# Patient Record
Sex: FEMALE | Race: WHITE | NOT HISPANIC OR LATINO | Employment: OTHER | URBAN - METROPOLITAN AREA
[De-identification: names, ages, dates, MRNs, and addresses within clinical notes are randomized per-mention and may not be internally consistent; named-entity substitution may affect disease eponyms.]

---

## 2017-01-09 ENCOUNTER — TRANSFERRED RECORDS (OUTPATIENT)
Dept: HEALTH INFORMATION MANAGEMENT | Facility: CLINIC | Age: 82
End: 2017-01-09

## 2017-01-11 DIAGNOSIS — E11.22 TYPE 2 DIABETES MELLITUS WITH STAGE 3 CHRONIC KIDNEY DISEASE (H): ICD-10-CM

## 2017-01-11 DIAGNOSIS — I25.10 CORONARY ARTERY DISEASE INVOLVING NATIVE CORONARY ARTERY OF NATIVE HEART WITHOUT ANGINA PECTORIS: ICD-10-CM

## 2017-01-11 DIAGNOSIS — N18.30 TYPE 2 DIABETES MELLITUS WITH STAGE 3 CHRONIC KIDNEY DISEASE (H): ICD-10-CM

## 2017-01-11 DIAGNOSIS — E78.5 HYPERLIPIDEMIA LDL GOAL <100: ICD-10-CM

## 2017-01-11 LAB
ALBUMIN SERPL-MCNC: 3.3 G/DL (ref 3.4–5)
ALP SERPL-CCNC: 91 U/L (ref 40–150)
ALT SERPL W P-5'-P-CCNC: 32 U/L (ref 0–50)
ANION GAP SERPL CALCULATED.3IONS-SCNC: 10 MMOL/L (ref 3–14)
AST SERPL W P-5'-P-CCNC: 28 U/L (ref 0–45)
BILIRUB SERPL-MCNC: 0.4 MG/DL (ref 0.2–1.3)
BUN SERPL-MCNC: 32 MG/DL (ref 7–30)
CALCIUM SERPL-MCNC: 8.5 MG/DL (ref 8.5–10.1)
CHLORIDE SERPL-SCNC: 107 MMOL/L (ref 94–109)
CHOLEST SERPL-MCNC: 184 MG/DL
CHOLEST SERPL-MCNC: 186 MG/DL
CO2 SERPL-SCNC: 21 MMOL/L (ref 20–32)
CREAT SERPL-MCNC: 0.95 MG/DL (ref 0.52–1.04)
GFR SERPL CREATININE-BSD FRML MDRD: 56 ML/MIN/1.7M2
GLUCOSE SERPL-MCNC: 137 MG/DL (ref 70–99)
HBA1C MFR BLD: 6.2 % (ref 4.3–6)
HDLC SERPL-MCNC: 74 MG/DL
HDLC SERPL-MCNC: 76 MG/DL
LDLC SERPL CALC-MCNC: 82 MG/DL
LDLC SERPL CALC-MCNC: 82 MG/DL
NONHDLC SERPL-MCNC: 110 MG/DL
NONHDLC SERPL-MCNC: 110 MG/DL
POTASSIUM SERPL-SCNC: 4 MMOL/L (ref 3.4–5.3)
PROT SERPL-MCNC: 7.3 G/DL (ref 6.8–8.8)
SODIUM SERPL-SCNC: 138 MMOL/L (ref 133–144)
TRIGL SERPL-MCNC: 141 MG/DL
TRIGL SERPL-MCNC: 142 MG/DL

## 2017-01-11 PROCEDURE — 83036 HEMOGLOBIN GLYCOSYLATED A1C: CPT | Performed by: INTERNAL MEDICINE

## 2017-01-11 PROCEDURE — 80061 LIPID PANEL: CPT | Performed by: INTERNAL MEDICINE

## 2017-01-11 PROCEDURE — 36415 COLL VENOUS BLD VENIPUNCTURE: CPT | Performed by: NURSE PRACTITIONER

## 2017-01-11 PROCEDURE — 80053 COMPREHEN METABOLIC PANEL: CPT | Performed by: INTERNAL MEDICINE

## 2017-01-11 PROCEDURE — 80061 LIPID PANEL: CPT | Performed by: NURSE PRACTITIONER

## 2017-01-11 NOTE — Clinical Note
"[facility]  [address] [place] [address]  [phone]    1/14/2017    Gosia BRENDA Cheri  [address] [facility] [address]           Dear MsAlessio Cheri,    The results of your lab tests are enclosed. Everything looks fine. Unless noted otherwise below, any results that are outside the \"normal\" range are within acceptable limits and are of no concern.    Hemoglobin A1C measures control of diabetes. Your Hemoglobin A1C is shown. The ideal target is under 7.0.    LDL= Bad Cholesterol-- the target is below 100.     HDL= Good Cholesterol-- although this is determined mostly by heredity, exercise and/or medications may sometimes raise this number.    Triglycerides are another type of fat in the blood, and can sometimes be lowered by reducing intake of sweets or excess carbohydrates, alcohol, and by weight reduction if needed.  Sometimes medications are also used.    AST and ALT are liver tests, as are the bilirubin (total and direct), albumin, total protein, and alkaline phosphatase. Yours are all normal.     Urea Nitrogen and Creatinine are kidney tests--yours are normal. GFR stands for Glomerular Filtration Rate, a more complicated estimate of kidney function.    Sodium, Potassium, Chloride, Carbon Dioxide, and Calcium are all normal salts in the bloodstream. Yours all look normal. Your glucose (blood sugar) also looks fine. (You can ignore the anion gap result).     If you have any further questions or problems, please contact our office.    Sincerely,      KRIS LEE M.D.  Attachment: Lab results     "

## 2017-01-12 ENCOUNTER — PRE VISIT (OUTPATIENT)
Dept: CARDIOLOGY | Facility: CLINIC | Age: 82
End: 2017-01-12

## 2017-01-13 ENCOUNTER — OFFICE VISIT (OUTPATIENT)
Dept: CARDIOLOGY | Facility: CLINIC | Age: 82
End: 2017-01-13
Payer: MEDICARE

## 2017-01-13 VITALS
DIASTOLIC BLOOD PRESSURE: 72 MMHG | WEIGHT: 136.8 LBS | HEART RATE: 72 BPM | HEIGHT: 65 IN | SYSTOLIC BLOOD PRESSURE: 126 MMHG | BODY MASS INDEX: 22.79 KG/M2

## 2017-01-13 DIAGNOSIS — I25.10 CORONARY ARTERY DISEASE INVOLVING NATIVE CORONARY ARTERY OF NATIVE HEART WITHOUT ANGINA PECTORIS: ICD-10-CM

## 2017-01-13 DIAGNOSIS — F41.9 ANXIETY: Primary | ICD-10-CM

## 2017-01-13 PROCEDURE — 99213 OFFICE O/P EST LOW 20 MIN: CPT | Performed by: INTERNAL MEDICINE

## 2017-01-13 RX ORDER — GUAIFENESIN 600 MG/1
600 TABLET, EXTENDED RELEASE ORAL PRN
COMMUNITY
End: 2022-01-01

## 2017-01-13 RX ORDER — CLORAZEPATE DIPOTASSIUM 7.5 MG/1
7.5 TABLET ORAL 2 TIMES DAILY PRN
Qty: 60 TABLET | Refills: 0 | Status: SHIPPED | OUTPATIENT
Start: 2017-01-13 | End: 2017-02-24

## 2017-01-13 NOTE — PATIENT INSTRUCTIONS
You may try moving your ATORVASTATIN to the morning and see if that makes any difference in your night time sweating.  You may stop the CLOPIDOGREL at this point and your bruising should fade over the next few months.  If you get any additional chest discomfort, call us.  See Rock in three months.

## 2017-01-13 NOTE — Clinical Note
1/13/2017    Michael Londono MD  [facility]   [address]  [facility] [address]    RE: Gosia Alonzo       Dear Colleague,    I had the pleasure of seeing Gosia BRENDA Alonzo in the [facility].    Cardiology Progress Note          Assessment and Plan:     1. Coronary artery disease status post PCI of the LAD and early stent thrombosis    Resultant ischemic cardiomyopathy, medically managed.  Minimally symptomatic.    She may discontinue the clopidogrel as she is having extensive bruising and bleeding.  We discussed there is a risk of late stent thrombosis but her current oozing and bleeding are distressing enough that it warrants a trial of discontinuation.    Continue following with myself and Rock Ortiz.    Follow-up with Rock in three months.      This note was transcribed using electronic voice recognition software and there may be typographical errors present.                Interval History:   The patient is a pleasant 84 year old whom I have seen previously.  She had LAD PCI and early stent thrombosis and anterior infarct in the setting of GI bleed on Brillinta.  She has done well on dual anti-platelet therapy with Plavix and aspirin.  She has extensive bruising and easy bleeding.  She is now about one year from her stent thrombosis.      She feels her energy is good.  She has no limitation.  She does not have dyspnea on exertion or chest discomfort.                       Review of Systems:   Review of Systems:  Skin:  Positive for bruising   Eyes:  Positive for glasses  ENT:  Negative    Respiratory:  Negative    Cardiovascular:  Negative    Gastroenterology: Positive for heartburn;reflux;constipation  Genitourinary:  Positive for nocturia;urinary frequency  Musculoskeletal:  Positive for joint pain;arthritis;back pain  Neurologic:  Positive for headaches  Psychiatric:  Negative    Heme/Lymph/Imm:  Positive for easy bruising;allergies  Endocrine:  " Positive for hot flashes;night sweats              Physical Exam:     Vitals: /72 mmHg  Pulse 72  Ht 1.651 m (5' 5\")  Wt 62.052 kg (136 lb 12.8 oz)  BMI 22.76 kg/m2  Constitutional:  cooperative, alert and oriented, well developed, well nourished, in no acute distress        Skin:  warm and dry to the touch, no apparent skin lesions or masses noted        Head:  normocephalic, no masses or lesions        Eyes:  pupils equal and round, conjunctivae and lids unremarkable, sclera white, no xanthalasma, EOMS intact, no nystagmus        ENT:  no pallor or cyanosis, dentition good        Neck:  JVP normal        Chest:  normal breath sounds, clear to auscultation, normal A-P diameter, normal symmetry, normal respiratory excursion, no use of accessory muscles        Cardiac: regular rhythm                  Extremities and Back:  no deformities, clubbing, cyanosis, erythema observed        Neurological:  affect appropriate, oriented to time, person and place                 Medications:     Current Outpatient Prescriptions   Medication Sig Dispense Refill     guaiFENesin (MUCINEX) 600 MG 12 hr tablet Take 1,200 mg by mouth as needed for congestion       Docusate Sodium (EQUATE STOOL SOFTENER OR) Take by mouth daily       clorazepate (TRANXENE) 7.5 MG tablet Take 1 tablet (7.5 mg) by mouth 2 times daily as needed for anxiety 60 tablet 0     losartan (COZAAR) 25 MG tablet Take 1 tablet (25 mg) by mouth daily 90 tablet 3     atorvastatin (LIPITOR) 40 MG tablet Take 1 tablet (40 mg) by mouth daily 90 tablet 0     nitroglycerin (NITROSTAT) 0.4 MG SL tablet if you are still having symptoms after 3 doses (15 minutes) call 911. 25 tablet 2     carvedilol (COREG) 3.125 MG tablet Take 1 tablet (3.125 mg) by mouth 2 times daily (with meals) 180 tablet 6     pantoprazole (PROTONIX) 40 MG enteric coated tablet Take 1 tablet (40 mg) by mouth daily Take 30-60 minutes before a meal. 90 tablet 9     acetaminophen (TYLENOL) 500 MG " tablet Take 1,000 mg by mouth 2 times daily as needed for mild pain       furosemide (LASIX) 20 MG tablet as needed  90 tablet 3     aspirin EC 81 MG EC tablet Take 1 tablet (81 mg) by mouth daily       HYDROcodone-acetaminophen (NORCO)  MG per tablet Take 1-2 tablets by mouth every 4 hours as needed for moderate to severe pain       Multiple Vitamins-Minerals (CENTRUM SILVER ADULT 50+ PO) Take 1 tablet by mouth daily       [DISCONTINUED] clorazepate (TRANXENE) 7.5 MG tablet Take 1 tablet (7.5 mg) by mouth 2 times daily as needed for anxiety 60 tablet 0                Data:   All laboratory data reviewed  No results found for this or any previous visit (from the past 24 hour(s)).    All laboratory data reviewed  CHOL      184   1/11/2017  HDL       74   1/11/2017  LDL       82   1/11/2017  TRIG      142   1/11/2017  CHOLHDLRATIO      2.8   11/5/2015  TSH   Date Value Ref Range Status   01/04/2016 2.08 0.40 - 4.00 mU/L Final     Last Basic Metabolic Panel:  NA      138   1/11/2017   POTASSIUM      4.0   1/11/2017  CHLORIDE      107   1/11/2017  LIGIA      8.5   1/11/2017  CO2       21   1/11/2017  BUN       32   1/11/2017  CR     0.95   1/11/2017  CR       78   1/22/2009  GLC      137   1/11/2017  WBC      9.3   1/10/2016  RBC     3.36   1/10/2016  HGB     11.4   9/12/2016  HCT     29.3   1/10/2016  MCV       87   1/10/2016  MCH     29.8   1/10/2016  MCHC     34.1   1/10/2016  RDW     15.8   1/10/2016  PLT      292   1/10/2016    Thank you for allowing me to participate in the care of your patient.    Sincerely,     Hiren Collins MD     [facility]

## 2017-01-13 NOTE — PROGRESS NOTES
"Cardiology Progress Note          Assessment and Plan:     1. Coronary artery disease status post PCI of the LAD and early stent thrombosis    Resultant ischemic cardiomyopathy, medically managed.  Minimally symptomatic.    She may discontinue the clopidogrel as she is having extensive bruising and bleeding.  We discussed there is a risk of late stent thrombosis but her current oozing and bleeding are distressing enough that it warrants a trial of discontinuation.    Continue following with myself and Rock Ortiz.    Follow-up with Rock in three months.      This note was transcribed using electronic voice recognition software and there may be typographical errors present.                Interval History:   The patient is a pleasant 84 year old whom I have seen previously.  She had LAD PCI and early stent thrombosis and anterior infarct in the setting of GI bleed on Brillinta.  She has done well on dual anti-platelet therapy with Plavix and aspirin.  She has extensive bruising and easy bleeding.  She is now about one year from her stent thrombosis.      She feels her energy is good.  She has no limitation.  She does not have dyspnea on exertion or chest discomfort.                       Review of Systems:   Review of Systems:  Skin:  Positive for bruising   Eyes:  Positive for glasses  ENT:  Negative    Respiratory:  Negative    Cardiovascular:  Negative    Gastroenterology: Positive for heartburn;reflux;constipation  Genitourinary:  Positive for nocturia;urinary frequency  Musculoskeletal:  Positive for joint pain;arthritis;back pain  Neurologic:  Positive for headaches  Psychiatric:  Negative    Heme/Lymph/Imm:  Positive for easy bruising;allergies  Endocrine:  Positive for hot flashes;night sweats              Physical Exam:     Vitals: /72 mmHg  Pulse 72  Ht 1.651 m (5' 5\")  Wt 62.052 kg (136 lb 12.8 oz)  BMI 22.76 kg/m2  Constitutional:  cooperative, alert and oriented, well developed, well " nourished, in no acute distress        Skin:  warm and dry to the touch, no apparent skin lesions or masses noted        Head:  normocephalic, no masses or lesions        Eyes:  pupils equal and round, conjunctivae and lids unremarkable, sclera white, no xanthalasma, EOMS intact, no nystagmus        ENT:  no pallor or cyanosis, dentition good        Neck:  JVP normal        Chest:  normal breath sounds, clear to auscultation, normal A-P diameter, normal symmetry, normal respiratory excursion, no use of accessory muscles        Cardiac: regular rhythm                  Extremities and Back:  no deformities, clubbing, cyanosis, erythema observed        Neurological:  affect appropriate, oriented to time, person and place                 Medications:     Current Outpatient Prescriptions   Medication Sig Dispense Refill     guaiFENesin (MUCINEX) 600 MG 12 hr tablet Take 1,200 mg by mouth as needed for congestion       Docusate Sodium (EQUATE STOOL SOFTENER OR) Take by mouth daily       clorazepate (TRANXENE) 7.5 MG tablet Take 1 tablet (7.5 mg) by mouth 2 times daily as needed for anxiety 60 tablet 0     losartan (COZAAR) 25 MG tablet Take 1 tablet (25 mg) by mouth daily 90 tablet 3     atorvastatin (LIPITOR) 40 MG tablet Take 1 tablet (40 mg) by mouth daily 90 tablet 0     nitroglycerin (NITROSTAT) 0.4 MG SL tablet if you are still having symptoms after 3 doses (15 minutes) call 911. 25 tablet 2     carvedilol (COREG) 3.125 MG tablet Take 1 tablet (3.125 mg) by mouth 2 times daily (with meals) 180 tablet 6     pantoprazole (PROTONIX) 40 MG enteric coated tablet Take 1 tablet (40 mg) by mouth daily Take 30-60 minutes before a meal. 90 tablet 9     acetaminophen (TYLENOL) 500 MG tablet Take 1,000 mg by mouth 2 times daily as needed for mild pain       furosemide (LASIX) 20 MG tablet as needed  90 tablet 3     aspirin EC 81 MG EC tablet Take 1 tablet (81 mg) by mouth daily       HYDROcodone-acetaminophen (NORCO)  MG  per tablet Take 1-2 tablets by mouth every 4 hours as needed for moderate to severe pain       Multiple Vitamins-Minerals (CENTRUM SILVER ADULT 50+ PO) Take 1 tablet by mouth daily       [DISCONTINUED] clorazepate (TRANXENE) 7.5 MG tablet Take 1 tablet (7.5 mg) by mouth 2 times daily as needed for anxiety 60 tablet 0                Data:   All laboratory data reviewed  No results found for this or any previous visit (from the past 24 hour(s)).    All laboratory data reviewed  CHOL      184   1/11/2017  HDL       74   1/11/2017  LDL       82   1/11/2017  TRIG      142   1/11/2017  CHOLHDLRATIO      2.8   11/5/2015  TSH   Date Value Ref Range Status   01/04/2016 2.08 0.40 - 4.00 mU/L Final     Last Basic Metabolic Panel:  NA      138   1/11/2017   POTASSIUM      4.0   1/11/2017  CHLORIDE      107   1/11/2017  LIGIA      8.5   1/11/2017  CO2       21   1/11/2017  BUN       32   1/11/2017  CR     0.95   1/11/2017  CR       78   1/22/2009  GLC      137   1/11/2017  WBC      9.3   1/10/2016  RBC     3.36   1/10/2016  HGB     11.4   9/12/2016  HCT     29.3   1/10/2016  MCV       87   1/10/2016  MCH     29.8   1/10/2016  MCHC     34.1   1/10/2016  RDW     15.8   1/10/2016  PLT      292   1/10/2016

## 2017-01-13 NOTE — MR AVS SNAPSHOT
After Visit Summary   1/13/2017    Gosia Alonzo    MRN: [id number]           Patient Information     Date Of Birth          11/14/1932        Visit Information        Provider Department      1/13/2017 2:15 PM Hiren Collins MD [facility]        Today's Diagnoses     Anxiety    -  1     Coronary artery disease involving native coronary artery of native heart without angina pectoris           Care Instructions    You may try moving your ATORVASTATIN to the morning and see if that makes any difference in your night time sweating.  You may stop the CLOPIDOGREL at this point and your bruising should fade over the next few months.  If you get any additional chest discomfort, call us.  See Rock in three months.        Follow-ups after your visit        Additional Services     Follow-Up with Cardiac Advanced Practice Provider                 Your next 10 appointments already scheduled     Apr 13, 2017  1:50 PM   Core Return with WILLIE Gilbert CNP   [facility] ([facility])    [address]  [facility] [address] [phone]              Future tests that were ordered for you today     Open Future Orders        Priority Expected Expires Ordered    Follow-Up with Cardiac Advanced Practice Provider Routine 4/13/2017 1/13/2018 1/13/2017            Who to contact     If you have questions or need follow up information about today's clinic visit or your schedule please contact [facility] directly at [phone].  Normal or non-critical lab and imaging results will be communicated to you by MyChart, letter or phone within 4 business days after the clinic has received the results. If you do not hear from us within 7 days, please contact the clinic through MyChart or phone. If you have a critical or abnormal lab result, we will notify you by  "phone as soon as possible.  Submit refill requests through [employer] or call your pharmacy and they will forward the refill request to us. Please allow 3 business days for your refill to be completed.          Additional Information About Your Visit        Care EveryWhere ID     This is your Care EveryWhere ID. This could be used by other organizations to access your [facility] medical records  [id number]        Your Vitals Were     Pulse Height BMI (Body Mass Index)             72 1.651 m (5' 5\") 22.76 kg/m2          Blood Pressure from Last 3 Encounters:   01/13/17 126/72   12/07/16 120/76   10/31/16 133/64    Weight from Last 3 Encounters:   01/13/17 62.052 kg (136 lb 12.8 oz)   12/07/16 61.236 kg (135 lb)   10/31/16 60.691 kg (133 lb 12.8 oz)              We Performed the Following     Follow-Up with Cardiologist          Today's Medication Changes          These changes are accurate as of: 1/13/17  2:52 PM.  If you have any questions, ask your nurse or doctor.               Stop taking these medicines if you haven't already. Please contact your care team if you have questions.     clopidogrel 75 MG tablet   Commonly known as:  PLAVIX           RANITIDINE HCL PO                Where to get your medicines      Some of these will need a paper prescription and others can be bought over the counter.  Ask your nurse if you have questions.     Bring a paper prescription for each of these medications    - clorazepate 7.5 MG tablet             Primary Care Provider Office Phone # Fax #    Michael Londono [phone]       [facility] [address] [place] [address]        Thank you!     Thank you for choosing [facility] [place]  for your care. Our goal is always to provide you with excellent care. Hearing back from our patients is one way we can continue to improve our services. Please take a few minutes to complete the written survey that you may " receive in the mail after your visit with us. Thank you!             Your Updated Medication List - Protect others around you: Learn how to safely use, store and throw away your medicines at www.disposemymeds.org.          This list is accurate as of: 1/13/17  2:52 PM.  Always use your most recent med list.                   Brand Name Dispense Instructions for use    acetaminophen 500 MG tablet    TYLENOL     Take 1,000 mg by mouth 2 times daily as needed for mild pain       aspirin 81 MG EC tablet      Take 1 tablet (81 mg) by mouth daily       atorvastatin 40 MG tablet    LIPITOR    90 tablet    Take 1 tablet (40 mg) by mouth daily       carvedilol 3.125 MG tablet    COREG    180 tablet    Take 1 tablet (3.125 mg) by mouth 2 times daily (with meals)       CENTRUM SILVER ADULT 50+ PO      Take 1 tablet by mouth daily       clorazepate 7.5 MG tablet    TRANXENE    60 tablet    Take 1 tablet (7.5 mg) by mouth 2 times daily as needed for anxiety       EQUATE STOOL SOFTENER OR      Take by mouth daily       furosemide 20 MG tablet    LASIX    90 tablet    as needed       losartan 25 MG tablet    COZAAR    90 tablet    Take 1 tablet (25 mg) by mouth daily       MUCINEX 600 MG 12 hr tablet   Generic drug:  guaiFENesin      Take 1,200 mg by mouth as needed for congestion       nitroglycerin 0.4 MG sublingual tablet    NITROSTAT    25 tablet    if you are still having symptoms after 3 doses (15 minutes) call 911.       NORCO  MG per tablet   Generic drug:  HYDROcodone-acetaminophen      Take 1-2 tablets by mouth every 4 hours as needed for moderate to severe pain       pantoprazole 40 MG EC tablet    PROTONIX    90 tablet    Take 1 tablet (40 mg) by mouth daily Take 30-60 minutes before a meal.

## 2017-01-16 ENCOUNTER — TELEPHONE (OUTPATIENT)
Dept: CARDIOLOGY | Facility: CLINIC | Age: 82
End: 2017-01-16

## 2017-01-16 NOTE — TELEPHONE ENCOUNTER
Pt called stating that she saw Dr. Collins on 1/13/17 and that she got the report of her OV. Pt was wondering if she could get her lab work sent .     Chart reviewed:   Notes Recorded by Aide Rangel MA on 1/16/2017 at 11:50 AM  Letter mailed with copy of results.    CHUYITA RANGEL CMA        ------    Notes Recorded by Michael Londono MD on 1/14/2017 at 6:53 PM  Letter completed, please mail along with lab results.      Informed patient of this. Pt thanked writer for looking into it.

## 2017-02-24 DIAGNOSIS — F41.9 ANXIETY: ICD-10-CM

## 2017-02-24 RX ORDER — CLORAZEPATE DIPOTASSIUM 7.5 MG/1
7.5 TABLET ORAL 2 TIMES DAILY PRN
Qty: 60 TABLET | Refills: 0 | Status: SHIPPED | OUTPATIENT
Start: 2017-02-24 | End: 2017-04-09

## 2017-02-24 NOTE — TELEPHONE ENCOUNTER
Clorazepate      Last Written Prescription Date:  1/13/17 (by Dr. Collins)  Last Fill Quantity: 60,   # refills: 0  Last Office Visit with [facility] prescribing provider: 12/7/16  Future Office visit:       Routing refill request to provider for review/approval because:  Drug not on the [facility] refill protocol or controlled substance

## 2017-03-07 ENCOUNTER — TRANSFERRED RECORDS (OUTPATIENT)
Dept: HEALTH INFORMATION MANAGEMENT | Facility: CLINIC | Age: 82
End: 2017-03-07

## 2017-04-05 DIAGNOSIS — I21.3 ST ELEVATION MYOCARDIAL INFARCTION (STEMI), UNSPECIFIED ARTERY (H): ICD-10-CM

## 2017-04-05 DIAGNOSIS — I21.09 ST ELEVATION MYOCARDIAL INFARCTION (STEMI) INVOLVING OTHER CORONARY ARTERY OF ANTERIOR WALL (H): ICD-10-CM

## 2017-04-05 RX ORDER — ATORVASTATIN CALCIUM 40 MG/1
TABLET, FILM COATED ORAL
Qty: 90 TABLET | Refills: 3 | Status: SHIPPED | OUTPATIENT
Start: 2017-04-05 | End: 2018-04-04

## 2017-04-05 NOTE — TELEPHONE ENCOUNTER
Atorvasatin     Last Written Prescription Date: 10/4/16  Last Fill Quantity: 90, # refills: 0  Last Office Visit with [facility] or [facility] prescribing provider: 12/07/16       Lab Results   Component Value Date    CHOL 184 01/11/2017     Lab Results   Component Value Date    HDL 74 01/11/2017     Lab Results   Component Value Date    LDL 82 01/11/2017     Lab Results   Component Value Date    TRIG 142 01/11/2017     Lab Results   Component Value Date    CHOLHDLRATIO 2.8 11/05/2015       Labs showing if normal/abnormal  Lab Results   Component Value Date    CHOL 184 01/11/2017    TRIG 142 01/11/2017    HDL 74 01/11/2017    LDL 82 01/11/2017    VLDL 30 11/05/2015    CHOLHDLRATIO 2.8 11/05/2015     Prescription approved per [facility] Refill Protocol.

## 2017-04-09 DIAGNOSIS — F41.9 ANXIETY: ICD-10-CM

## 2017-04-11 ENCOUNTER — OFFICE VISIT (OUTPATIENT)
Dept: CARDIOLOGY | Facility: CLINIC | Age: 82
End: 2017-04-11
Attending: INTERNAL MEDICINE
Payer: MEDICARE

## 2017-04-11 VITALS
BODY MASS INDEX: 23.6 KG/M2 | DIASTOLIC BLOOD PRESSURE: 60 MMHG | WEIGHT: 141.8 LBS | HEART RATE: 78 BPM | OXYGEN SATURATION: 97 % | SYSTOLIC BLOOD PRESSURE: 122 MMHG

## 2017-04-11 DIAGNOSIS — I25.10 CORONARY ARTERY DISEASE INVOLVING NATIVE CORONARY ARTERY OF NATIVE HEART WITHOUT ANGINA PECTORIS: ICD-10-CM

## 2017-04-11 PROCEDURE — 99213 OFFICE O/P EST LOW 20 MIN: CPT | Performed by: NURSE PRACTITIONER

## 2017-04-11 RX ORDER — CLORAZEPATE DIPOTASSIUM 7.5 MG/1
TABLET ORAL
Qty: 60 TABLET | Refills: 0 | Status: SHIPPED | OUTPATIENT
Start: 2017-04-11 | End: 2017-05-19

## 2017-04-11 NOTE — PATIENT INSTRUCTIONS
Call the C.O.R.E. nurse for any questions or concerns:  [phone]      1. Medication changes from today: take a lasix in am , low salt diet.       2. Weigh yourself daily and write it down.      3. Call CORE nurse if your weight is up more than 2 pounds in one day or 5 pounds in one week.      4. Call CORE nurse if you feel more short of breath, have more abdominal bloating, or leg swelling.      5. Continue low sodium diet (less than 2000 mg daily). If you eat less salt, you will retain less fluid.      6. Do NOT take Aleve or ibuprofen without talking to your doctor first.      7. Lab Results:     [facility]: Cardiomyopathy, Optimization, Rehabilitation, Education  The [facility] is a heart failure specialty clinic within the [facility] where you will work with specialized nurse practitioners, physician assistants, doctors, and registered nurses. They are dedicated to helping patients with heart failure to carefully adjust medications, receive education, and learn who and when to call if symptoms develop. They specialize in helping you better understand your condition, slow the progression of your disease, improve the length and quality of your life, help you detect future heart problems before they become life threatening, and avoid hospitalizations.

## 2017-04-11 NOTE — LETTER
4/11/2017    Michael Londono MD  [facility]   [address]  [facility] [address]    RE: Gosia Alonzo       Dear Colleague,    I had the pleasure of seeing Gosia Alonzo in the [facility].    Gosia Alonzo is a pleasant 84-year-old patient who has a history of LAD PCI and early stent thrombosis and anterior infarct in the setting of GI bleed on Brilinta.  She did well on dual antiplatelet therapy with Plavix and aspirin.  When she was seen by Dr. Collins in 01/2017, she had extensive bruising and bleeding.  He discussed the risk of late stent thrombosis, but with her bleeding he thought it warranted a trial of discontinuation of her Plavix.  She returns today stating that it is much improved.  She has limited bleeding now.  She denies chest pain, chest pressure, neck or arm pain.      She tells me that she takes Lasix as needed.  She has not taken it for 3 days because she has had 3 days in a row of doctor appointments.  Her weight is up 5 pounds.  She states that she has been eating a general diet.  She does not watch the salt in her diet much anymore.     Outpatient Encounter Prescriptions as of 4/11/2017   Medication Sig Dispense Refill     atorvastatin (LIPITOR) 40 MG tablet TAKE 1 TABLET BY MOUTH ONCE DAILY 90 tablet 3     [DISCONTINUED] clorazepate (TRANXENE) 7.5 MG tablet Take 1 tablet (7.5 mg) by mouth 2 times daily as needed for anxiety 60 tablet 0     guaiFENesin (MUCINEX) 600 MG 12 hr tablet Take 1,200 mg by mouth as needed for congestion Reported on 4/11/2017       Docusate Sodium (EQUATE STOOL SOFTENER OR) Take by mouth daily       losartan (COZAAR) 25 MG tablet Take 1 tablet (25 mg) by mouth daily 90 tablet 3     nitroglycerin (NITROSTAT) 0.4 MG SL tablet if you are still having symptoms after 3 doses (15 minutes) call 911. 25 tablet 2     carvedilol (COREG) 3.125 MG tablet Take 1 tablet (3.125 mg) by mouth 2 times daily (with meals) 180  tablet 6     [DISCONTINUED] pantoprazole (PROTONIX) 40 MG enteric coated tablet Take 1 tablet (40 mg) by mouth daily Take 30-60 minutes before a meal. 90 tablet 9     acetaminophen (TYLENOL) 500 MG tablet Take 1,000 mg by mouth 2 times daily as needed for mild pain       [DISCONTINUED] furosemide (LASIX) 20 MG tablet as needed  90 tablet 3     aspirin EC 81 MG EC tablet Take 1 tablet (81 mg) by mouth daily       HYDROcodone-acetaminophen (NORCO)  MG per tablet Take 1-2 tablets by mouth every 4 hours as needed for moderate to severe pain       [DISCONTINUED] Multiple Vitamins-Minerals (CENTRUM SILVER ADULT 50+ PO) Take 1 tablet by mouth daily Reported on 4/11/2017       No facility-administered encounter medications on file as of 4/11/2017.       IMPRESSION AND PLAN:   1.  Coronary artery disease, status post PCI of the LAD and early stent thrombosis.   2.  Ischemic cardiomyopathy, medically managed.  We have tried to increase the carvedilol in the past, and she can only tolerate this dose.  She recently had her clopidogrel discontinued due to extensive bruising.  She has done well without clopidogrel.  She remains on aspirin.      She has not taken her Lasix and has eaten a general diet.  Her weight is up about 5 pounds, and her assessment shows a slight volume overload.  I encouraged her to take a furosemide in the morning and to watch her diet.  I reviewed her echocardiogram, which shows an ejection fraction of 35%-40%.  I explained to her that she is at risk for fluid retention.  She will follow up in 3 months for an annual echocardiogram, fasting lipid profile and a visit with Dr. Collins.     Again, thank you for allowing me to participate in the care of your patient.      Sincerely,    WILLIE Harding [facility]
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
## 2017-04-11 NOTE — MR AVS SNAPSHOT
After Visit Summary   4/11/2017    Gosia lAonzo    MRN: [id number]           Patient Information     Date Of Birth          11/14/1932        Visit Information        Provider Department      4/11/2017 3:10 PM Megan Ortiz APRN CNP [facility] [place]        Today's Diagnoses     Coronary artery disease involving native coronary artery of native heart without angina pectoris          Care Instructions    Call the C.O.R.E. nurse for any questions or concerns:  [phone]      1. Medication changes from today: take a lasix in am , low salt diet.       2. Weigh yourself daily and write it down.      3. Call [facility] nurse if your weight is up more than 2 pounds in one day or 5 pounds in one week.      4. Call [facility] nurse if you feel more short of breath, have more abdominal bloating, or leg swelling.      5. Continue low sodium diet (less than 2000 mg daily). If you eat less salt, you will retain less fluid.      6. Do NOT take Aleve or ibuprofen without talking to your doctor first.      7. Lab Results:     [facility]: Cardiomyopathy, Optimization, Rehabilitation, Education  The [facility] is a heart failure specialty clinic within the [facility] where you will work with specialized nurse practitioners, physician assistants, doctors, and registered nurses. They are dedicated to helping patients with heart failure to carefully adjust medications, receive education, and learn who and when to call if symptoms develop. They specialize in helping you better understand your condition, slow the progression of your disease, improve the length and quality of your life, help you detect future heart problems before they become life threatening, and avoid hospitalizations.            Follow-ups after your visit        Additional Services     Follow-Up with Cardiologist                 Future tests that were ordered for you today     Open Future Orders         "Priority Expected Expires Ordered    Lipid Profile Routine 7/10/2017 2018 2017    Echocardiogram Routine 7/10/2017 2018 2017    Follow-Up with Cardiologist Routine 7/10/2017 2018 2017    Basic metabolic panel Routine 7/10/2017 2018 2017            Who to contact     If you have questions or need follow up information about today's clinic visit or your schedule please contact [facility] [place] directly at [phone].  Normal or non-critical lab and imaging results will be communicated to you by [employer], letter or phone within 4 business days after the clinic has received the results. If you do not hear from us within 7 days, please contact the clinic through [employer] or phone. If you have a critical or abnormal lab result, we will notify you by phone as soon as possible.  Submit refill requests through [employer] or call your pharmacy and they will forward the refill request to us. Please allow 3 business days for your refill to be completed.          Additional Information About Your Visit        MyChart Information     [employer] lets you send messages to your doctor, view your test results, renew your prescriptions, schedule appointments and more. To sign up, go to [email or web address] . Click on \"Log in\" on the left side of the screen, which will take you to the Welcome page. Then click on \"Sign up Now\" on the right side of the page.     You will be asked to enter the access code listed below, as well as some personal information. Please follow the directions to create your username and password.     Your access code is: [email or web address]  Expires: 7/10/2017  3:56 PM     Your access code will  in 90 days. If you need help or a new code, please call your [facility] clinic or [phone].        Care EveryWhere ID     This is your Care EveryWhere ID. This could be used by other organizations to access your [facility] medical " records  [id number]        Your Vitals Were     Pulse Pulse Oximetry BMI (Body Mass Index)             78 97% 23.6 kg/m2          Blood Pressure from Last 3 Encounters:   04/11/17 122/60   01/13/17 126/72   12/07/16 120/76    Weight from Last 3 Encounters:   04/11/17 64.3 kg (141 lb 12.8 oz)   01/13/17 62.1 kg (136 lb 12.8 oz)   12/07/16 61.2 kg (135 lb)              We Performed the Following     Follow-Up with Cardiac Advanced Practice Provider        Primary Care Provider Office Phone # Fax #    Michael Londono [phone]       [facility] [address]  [facility] [address]        Thank you!     Thank you for choosing [facility] [place]  for your care. Our goal is always to provide you with excellent care. Hearing back from our patients is one way we can continue to improve our services. Please take a few minutes to complete the written survey that you may receive in the mail after your visit with us. Thank you!             Your Updated Medication List - Protect others around you: Learn how to safely use, store and throw away your medicines at www.disposemymeds.org.          This list is accurate as of: 4/11/17  3:57 PM.  Always use your most recent med list.                   Brand Name Dispense Instructions for use    acetaminophen 500 MG tablet    TYLENOL     Take 1,000 mg by mouth 2 times daily as needed for mild pain       aspirin 81 MG EC tablet      Take 1 tablet (81 mg) by mouth daily       atorvastatin 40 MG tablet    LIPITOR    90 tablet    TAKE 1 TABLET BY MOUTH ONCE DAILY       carvedilol 3.125 MG tablet    COREG    180 tablet    Take 1 tablet (3.125 mg) by mouth 2 times daily (with meals)       clorazepate 7.5 MG tablet    TRANXENE    60 tablet    Take 1 tablet (7.5 mg) by mouth 2 times daily as needed for anxiety       EQUATE STOOL SOFTENER OR      Take by mouth daily       furosemide 20 MG tablet    LASIX    90 tablet    as  needed       losartan 25 MG tablet    COZAAR    90 tablet    Take 1 tablet (25 mg) by mouth daily       MUCINEX 600 MG 12 hr tablet   Generic drug:  guaiFENesin      Take 1,200 mg by mouth as needed for congestion Reported on 4/11/2017       nitroglycerin 0.4 MG sublingual tablet    NITROSTAT    25 tablet    if you are still having symptoms after 3 doses (15 minutes) call 911.       NORCO  MG per tablet   Generic drug:  HYDROcodone-acetaminophen      Take 1-2 tablets by mouth every 4 hours as needed for moderate to severe pain       pantoprazole 40 MG EC tablet    PROTONIX    90 tablet    Take 1 tablet (40 mg) by mouth daily Take 30-60 minutes before a meal.

## 2017-04-12 NOTE — PROGRESS NOTES
HISTORY OF PRESENT ILLNESS:     Milagro Ely is a pleasant 84-year-old patient who has a history of LAD PCI and early stent thrombosis and anterior infarct in the setting of GI bleed on Brilinta.  She did well on dual antiplatelet therapy with Plavix and aspirin.  When she was seen by Dr. Collins in 01/2017, she had extensive bruising and bleeding.  He discussed the risk of late stent thrombosis, but with her bleeding he thought it warranted a trial of discontinuation of her Plavix.  She returns today stating that it is much improved.  She has limited bleeding now.  She denies chest pain, chest pressure, neck or arm pain.      She tells me that she takes Lasix as needed.  She has not taken it for 3 days because she has had 3 days in a row of doctor appointments.  Her weight is up 5 pounds.  She states that she has been eating a general diet.  She does not watch the salt in her diet much anymore.      IMPRESSION AND PLAN:   1.  Coronary artery disease, status post PCI of the LAD and early stent thrombosis.   2.  Ischemic cardiomyopathy, medically managed.  We have tried to increase the carvedilol in the past, and she can only tolerate this dose.  She recently had her clopidogrel discontinued due to extensive bruising.  She has done well without clopidogrel.  She remains on aspirin.      She has not taken her Lasix and has eaten a general diet.  Her weight is up about 5 pounds, and her assessment shows a slight volume overload.  I encouraged her to take a furosemide in the morning and to watch her diet.  I reviewed her echocardiogram, which shows an ejection fraction of 35%-40%.  I explained to her that she is at risk for fluid retention.  She will follow up in 3 months for an annual echocardiogram, fasting lipid profile and a visit with Dr. Collins.         WILLIE PRATER, CNP             D: 04/11/2017 15:58   T: 04/12/2017 05:56   MT: ROYER      Name:     MILAGRO ELY   MRN:      [id number]         Account:      [id number]   :      1932           Service Date: 2017      Document: [id number]

## 2017-04-28 DIAGNOSIS — I21.3 ST ELEVATION MYOCARDIAL INFARCTION (STEMI), UNSPECIFIED ARTERY (H): ICD-10-CM

## 2017-04-28 RX ORDER — FUROSEMIDE 20 MG
20 TABLET ORAL PRN
Qty: 90 TABLET | Refills: 2 | Status: SHIPPED | OUTPATIENT
Start: 2017-04-28 | End: 2017-09-05

## 2017-04-28 NOTE — TELEPHONE ENCOUNTER
Refill request from [facility] for furosemide. Called patient to review furosemide use. She said that she is still using one 20 mg tablet daily as needed for swelling or weight gain. Asked patient to call if one tablet a day is not working for her HF symptoms and she agreed. Cardiology and echo follow up due in July. Gloria JORDAN

## 2017-05-19 DIAGNOSIS — F41.9 ANXIETY: ICD-10-CM

## 2017-05-19 RX ORDER — CLORAZEPATE DIPOTASSIUM 7.5 MG/1
7.5 TABLET ORAL 2 TIMES DAILY PRN
Qty: 60 TABLET | Refills: 0 | Status: SHIPPED | OUTPATIENT
Start: 2017-05-19 | End: 2017-07-10

## 2017-05-19 NOTE — TELEPHONE ENCOUNTER
Routing refill request to provider for review/approval because:  Drug not on the [facility] refill protocol     No signed CSA form in chart.    Please advise, thanks.

## 2017-05-19 NOTE — TELEPHONE ENCOUNTER
Clorazepate      Last Written Prescription Date: 04/11/17  Last Fill Quantity: 60,  # refills: 0   Last Office Visit with [facility], [facility] or [facility] prescribing provider: 12/07/16                                         Next 5 appointments (look out 90 days)     Jul 18, 2017 10:45 AM CDT   Return Visit with Hiren Collins MD   [facility] [place] ([facility])    [address] [address] [address] [phone]

## 2017-05-31 DIAGNOSIS — K21.9 GASTROESOPHAGEAL REFLUX DISEASE WITHOUT ESOPHAGITIS: ICD-10-CM

## 2017-05-31 RX ORDER — PANTOPRAZOLE SODIUM 40 MG/1
40 TABLET, DELAYED RELEASE ORAL DAILY
Qty: 90 TABLET | Refills: 0 | Status: SHIPPED | OUTPATIENT
Start: 2017-05-31 | End: 2017-09-05

## 2017-07-10 DIAGNOSIS — F41.9 ANXIETY: ICD-10-CM

## 2017-07-10 DIAGNOSIS — E78.5 HYPERLIPIDEMIA LDL GOAL <100: ICD-10-CM

## 2017-07-10 DIAGNOSIS — N18.30 TYPE 2 DIABETES MELLITUS WITH STAGE 3 CHRONIC KIDNEY DISEASE, WITHOUT LONG-TERM CURRENT USE OF INSULIN (H): Primary | ICD-10-CM

## 2017-07-10 DIAGNOSIS — E03.8 TSH DEFICIENCY: ICD-10-CM

## 2017-07-10 DIAGNOSIS — E11.22 TYPE 2 DIABETES MELLITUS WITH STAGE 3 CHRONIC KIDNEY DISEASE, WITHOUT LONG-TERM CURRENT USE OF INSULIN (H): Primary | ICD-10-CM

## 2017-07-11 RX ORDER — CLORAZEPATE DIPOTASSIUM 7.5 MG/1
7.5 TABLET ORAL 2 TIMES DAILY PRN
Qty: 60 TABLET | Refills: 0 | Status: SHIPPED | OUTPATIENT
Start: 2017-07-11 | End: 2017-09-06

## 2017-07-11 NOTE — TELEPHONE ENCOUNTER
Pt calls to check on refill request below, she has to  prescription from Cardiology today and would like to get the Clorazepate at the same time.    Informed pt that prescription and orders were just approved by Dr. Londono. Once he signs prescription we will fax it for her.

## 2017-07-14 ENCOUNTER — HOSPITAL ENCOUNTER (OUTPATIENT)
Dept: CARDIOLOGY | Facility: CLINIC | Age: 82
Discharge: HOME | End: 2017-07-14
Attending: NURSE PRACTITIONER | Admitting: NURSE PRACTITIONER
Payer: MEDICARE

## 2017-07-14 DIAGNOSIS — I25.10 CORONARY ARTERY DISEASE INVOLVING NATIVE CORONARY ARTERY OF NATIVE HEART WITHOUT ANGINA PECTORIS: ICD-10-CM

## 2017-07-14 DIAGNOSIS — E11.22 TYPE 2 DIABETES MELLITUS WITH STAGE 3 CHRONIC KIDNEY DISEASE, WITHOUT LONG-TERM CURRENT USE OF INSULIN (H): ICD-10-CM

## 2017-07-14 DIAGNOSIS — F41.9 ANXIETY: ICD-10-CM

## 2017-07-14 DIAGNOSIS — N18.30 TYPE 2 DIABETES MELLITUS WITH STAGE 3 CHRONIC KIDNEY DISEASE, WITHOUT LONG-TERM CURRENT USE OF INSULIN (H): ICD-10-CM

## 2017-07-14 DIAGNOSIS — E03.8 TSH DEFICIENCY: ICD-10-CM

## 2017-07-14 LAB
ALT SERPL W P-5'-P-CCNC: 22 U/L (ref 0–50)
ANION GAP SERPL CALCULATED.3IONS-SCNC: 8 MMOL/L (ref 3–14)
BUN SERPL-MCNC: 28 MG/DL (ref 7–30)
CALCIUM SERPL-MCNC: 8.2 MG/DL (ref 8.5–10.1)
CHLORIDE SERPL-SCNC: 105 MMOL/L (ref 94–109)
CHOLEST SERPL-MCNC: 199 MG/DL
CO2 SERPL-SCNC: 24 MMOL/L (ref 20–32)
CREAT SERPL-MCNC: 1.02 MG/DL (ref 0.52–1.04)
GFR SERPL CREATININE-BSD FRML MDRD: 52 ML/MIN/1.7M2
GLUCOSE SERPL-MCNC: 116 MG/DL (ref 70–99)
HBA1C MFR BLD: 6.8 % (ref 4.3–6)
HDLC SERPL-MCNC: 66 MG/DL
LDLC SERPL CALC-MCNC: 91 MG/DL
NONHDLC SERPL-MCNC: 133 MG/DL
POTASSIUM SERPL-SCNC: 3.6 MMOL/L (ref 3.4–5.3)
SODIUM SERPL-SCNC: 137 MMOL/L (ref 133–144)
TRIGL SERPL-MCNC: 210 MG/DL
TSH SERPL DL<=0.005 MIU/L-ACNC: 1.14 MU/L (ref 0.4–4)

## 2017-07-14 PROCEDURE — 80048 BASIC METABOLIC PNL TOTAL CA: CPT | Performed by: NURSE PRACTITIONER

## 2017-07-14 PROCEDURE — 93306 TTE W/DOPPLER COMPLETE: CPT | Mod: 26 | Performed by: INTERNAL MEDICINE

## 2017-07-14 PROCEDURE — 84460 ALANINE AMINO (ALT) (SGPT): CPT | Performed by: NURSE PRACTITIONER

## 2017-07-14 PROCEDURE — 80061 LIPID PANEL: CPT | Performed by: NURSE PRACTITIONER

## 2017-07-14 PROCEDURE — 36415 COLL VENOUS BLD VENIPUNCTURE: CPT | Performed by: NURSE PRACTITIONER

## 2017-07-14 PROCEDURE — 83036 HEMOGLOBIN GLYCOSYLATED A1C: CPT | Performed by: NURSE PRACTITIONER

## 2017-07-14 PROCEDURE — 93306 TTE W/DOPPLER COMPLETE: CPT

## 2017-07-14 PROCEDURE — 84443 ASSAY THYROID STIM HORMONE: CPT | Performed by: NURSE PRACTITIONER

## 2017-07-18 ENCOUNTER — OFFICE VISIT (OUTPATIENT)
Dept: CARDIOLOGY | Facility: CLINIC | Age: 82
End: 2017-07-18
Attending: NURSE PRACTITIONER
Payer: MEDICARE

## 2017-07-18 VITALS
BODY MASS INDEX: 23.74 KG/M2 | WEIGHT: 142.5 LBS | DIASTOLIC BLOOD PRESSURE: 60 MMHG | HEART RATE: 64 BPM | HEIGHT: 65 IN | SYSTOLIC BLOOD PRESSURE: 126 MMHG

## 2017-07-18 DIAGNOSIS — I25.10 CORONARY ARTERY DISEASE INVOLVING NATIVE CORONARY ARTERY OF NATIVE HEART WITHOUT ANGINA PECTORIS: ICD-10-CM

## 2017-07-18 PROCEDURE — 99213 OFFICE O/P EST LOW 20 MIN: CPT | Performed by: INTERNAL MEDICINE

## 2017-07-18 NOTE — LETTER
7/18/2017    Michael Londono MD  [facility]   [address]  [facility] [address]    RE: Gosia Alonzo       Dear Colleague,    I had the pleasure of seeing Gosia Alonzo in the [facility].    Cardiology Progress Note          Assessment and Plan:     1. Ischemic cardiomyopathy, fairly well compensated    Have asked her to take her furosemide more regularly as she appears to be retaining a little bit of fluid.  May consider increasing her atorvastatin to maximum strength given slight suboptimal LDL.    Echocardiogram unchanged with EF 30-35%.      2. Lower extremity edema right greater than left, likely secondary to venous incompetence    Offered venous competency testing.  Patient will try compression stockings or wraps first.    This note was transcribed using electronic voice recognition software and there may be typographical errors present.                Interval History:   The patient is a very inquisitive 84 year old with ischemic cardiomyopathy who comes in to discuss her right lower extremity edema.  She has had multiple knee surgeries on that side.  She does not feel short of breath.  Her weight is up a few pounds.  She does not take her furosemide regularly.  She denies claudication.  She has a number of different questions and is somewhat tangential in her history telling.                     Review of Systems:   Review of Systems:  Skin:  Positive for bruising   Eyes:  Positive for glasses  ENT:  Negative    Respiratory:  Negative    Cardiovascular:    edema;Positive for  Gastroenterology: Positive for heartburn  Genitourinary:  not assessed    Musculoskeletal:  not assessed    Neurologic:  Negative    Psychiatric:  Negative    Heme/Lymph/Imm:  Positive for easy bruising;allergies  Endocrine:  Negative                Physical Exam:     Vitals: /60 (BP Location: Right arm, Patient Position: Sitting, Cuff Size: Adult Regular)  Pulse  "64  Ht 1.651 m (5' 5\")  Wt 64.6 kg (142 lb 8 oz)  Breastfeeding? No  BMI 23.71 kg/m2  Constitutional:  cooperative, alert and oriented, well developed, well nourished, in no acute distress        Skin:  warm and dry to the touch, no apparent skin lesions or masses noted        Head:  normocephalic, no masses or lesions        Eyes:  pupils equal and round, conjunctivae and lids unremarkable, sclera white, no xanthalasma, EOMS intact, no nystagmus        ENT:  no pallor or cyanosis, dentition good        Neck:    JVP 8-10      Chest:  normal breath sounds, clear to auscultation, normal A-P diameter, normal symmetry, normal respiratory excursion, no use of accessory muscles        Cardiac: regular rhythm               with ectopy    Vascular: pulses full and equal                                      Extremities and Back:  no deformities, clubbing, cyanosis, erythema observed        Neurological:  affect appropriate, oriented to time, person and place                 Medications:     Current Outpatient Prescriptions   Medication Sig Dispense Refill     clorazepate (TRANXENE) 7.5 MG tablet Take 1 tablet (7.5 mg) by mouth 2 times daily as needed for anxiety Need to schedule office f/u appt with Dr Londono. 60 tablet 0     pantoprazole (PROTONIX) 40 MG EC tablet Take 1 tablet (40 mg) by mouth daily Take 30-60 minutes before a meal. 90 tablet 0     furosemide (LASIX) 20 MG tablet Take 1 tablet (20 mg) by mouth as needed 90 tablet 2     atorvastatin (LIPITOR) 40 MG tablet TAKE 1 TABLET BY MOUTH ONCE DAILY 90 tablet 3     guaiFENesin (MUCINEX) 600 MG 12 hr tablet Take 1,200 mg by mouth as needed for congestion Reported on 4/11/2017       Docusate Sodium (EQUATE STOOL SOFTENER OR) Take by mouth daily       losartan (COZAAR) 25 MG tablet Take 1 tablet (25 mg) by mouth daily 90 tablet 3     nitroglycerin (NITROSTAT) 0.4 MG SL tablet if you are still having symptoms after 3 doses (15 minutes) call 193. 25 tablet 2     " carvedilol (COREG) 3.125 MG tablet Take 1 tablet (3.125 mg) by mouth 2 times daily (with meals) 180 tablet 6     acetaminophen (TYLENOL) 500 MG tablet Take 1,000 mg by mouth 2 times daily as needed for mild pain       aspirin EC 81 MG EC tablet Take 1 tablet (81 mg) by mouth daily       HYDROcodone-acetaminophen (NORCO)  MG per tablet Take 1-2 tablets by mouth every 4 hours as needed for moderate to severe pain                  Data:   All laboratory data reviewed  No results found for this or any previous visit (from the past 24 hour(s)).    All laboratory data reviewed  Lab Results   Component Value Date    CHOL 199 07/14/2017     Lab Results   Component Value Date    HDL 66 07/14/2017     Lab Results   Component Value Date    LDL 91 07/14/2017     Lab Results   Component Value Date    TRIG 210 07/14/2017     Lab Results   Component Value Date    CHOLHDLRATIO 2.8 11/05/2015     TSH   Date Value Ref Range Status   07/14/2017 1.14 0.40 - 4.00 mU/L Final     Last Basic Metabolic Panel:  Lab Results   Component Value Date     07/14/2017      Lab Results   Component Value Date    POTASSIUM 3.6 07/14/2017     Lab Results   Component Value Date    CHLORIDE 105 07/14/2017     Lab Results   Component Value Date    LIGIA 8.2 07/14/2017     Lab Results   Component Value Date    CO2 24 07/14/2017     Lab Results   Component Value Date    BUN 28 07/14/2017     Lab Results   Component Value Date    CR 1.02 07/14/2017     Lab Results   Component Value Date     07/14/2017     Lab Results   Component Value Date    WBC 9.3 01/10/2016     Lab Results   Component Value Date    RBC 3.36 01/10/2016     Lab Results   Component Value Date    HGB 11.4 09/12/2016     Lab Results   Component Value Date    HCT 29.3 01/10/2016     Lab Results   Component Value Date    MCV 87 01/10/2016     Lab Results   Component Value Date    MCH 29.8 01/10/2016     Lab Results   Component Value Date    MCHC 34.1 01/10/2016     Lab Results    Component Value Date    RDW 15.8 01/10/2016     Lab Results   Component Value Date     01/10/2016     Thank you for allowing me to participate in the care of your patient.    Sincerely,     Hiren Collins MD     [facility]

## 2017-07-18 NOTE — MR AVS SNAPSHOT
"              After Visit Summary   2017    Gosia Alonzo    MRN: [id number]           Patient Information     Date Of Birth          1932        Visit Information        Provider Department      2017 10:45 AM Hiren Collins MD [facility]        Today's Diagnoses     Coronary artery disease involving native coronary artery of native heart without angina pectoris           Follow-ups after your visit        Who to contact     If you have questions or need follow up information about today's clinic visit or your schedule please contact [facility] directly at [phone].  Normal or non-critical lab and imaging results will be communicated to you by MyChart, letter or phone within 4 business days after the clinic has received the results. If you do not hear from us within 7 days, please contact the clinic through [employer] or phone. If you have a critical or abnormal lab result, we will notify you by phone as soon as possible.  Submit refill requests through [employer] or call your pharmacy and they will forward the refill request to us. Please allow 3 business days for your refill to be completed.          Additional Information About Your Visit        MyChart Information     [employer] lets you send messages to your doctor, view your test results, renew your prescriptions, schedule appointments and more. To sign up, go to [email or web address] . Click on \"Log in\" on the left side of the screen, which will take you to the Welcome page. Then click on \"Sign up Now\" on the right side of the page.     You will be asked to enter the access code listed below, as well as some personal information. Please follow the directions to create your username and password.     Your access code is: [email or web address]  Expires: 10/16/2017 11:17 AM     Your access code will  in 90 days. If you need help or a new code, please call " "your [facility] clinic or [phone].        Care EveryWhere ID     This is your Care EveryWhere ID. This could be used by other organizations to access your [facility] medical records  [id number]        Your Vitals Were     Pulse Height Breastfeeding? BMI (Body Mass Index)          64 1.651 m (5' 5\") No 23.71 kg/m2         Blood Pressure from Last 3 Encounters:   07/18/17 126/60   04/11/17 122/60   01/13/17 126/72    Weight from Last 3 Encounters:   07/18/17 64.6 kg (142 lb 8 oz)   04/11/17 64.3 kg (141 lb 12.8 oz)   01/13/17 62.1 kg (136 lb 12.8 oz)              We Performed the Following     Follow-Up with Cardiologist        Primary Care Provider Office Phone # Fax #    Michael Londono [phone]       [facility] [address] [facility] [address]  [place] [id number]        Equal Access to Services     LORELEI JOHN : Hadii aad ku hadasho Soomaali, waaxda luqadaha, qaybta kaalmada adeegyada, waxay idiin hayaan adeeg kharajacobo la'alan . So [facility] [phone].    ATENCIÓN: Si habla español, tiene a barriga disposición servicios gratuitos de asistencia lingüística. Llame al [phone].    We comply with applicable federal civil rights laws and [place] laws. We do not discriminate on the basis of race, color, national origin, age, disability sex, sexual orientation or gender identity.            Thank you!     Thank you for choosing [facility]  for your care. Our goal is always to provide you with excellent care. Hearing back from our patients is one way we can continue to improve our services. Please take a few minutes to complete the written survey that you may receive in the mail after your visit with us. Thank you!             Your Updated Medication List - Protect others around you: Learn how to safely use, store and throw away your medicines at www.disposemymeds.org.          This list is accurate as of: 7/18/17 11:17 AM.  Always use your most recent med " list.                   Brand Name Dispense Instructions for use Diagnosis    acetaminophen 500 MG tablet    TYLENOL     Take 1,000 mg by mouth 2 times daily as needed for mild pain        aspirin 81 MG EC tablet      Take 1 tablet (81 mg) by mouth daily        atorvastatin 40 MG tablet    LIPITOR    90 tablet    TAKE 1 TABLET BY MOUTH ONCE DAILY    ST elevation myocardial infarction (STEMI), unspecified artery (H), ST elevation myocardial infarction (STEMI) involving other coronary artery of anterior wall (H)       carvedilol 3.125 MG tablet    COREG    180 tablet    Take 1 tablet (3.125 mg) by mouth 2 times daily (with meals)    Ischemic cardiomyopathy       clorazepate 7.5 MG tablet    TRANXENE    60 tablet    Take 1 tablet (7.5 mg) by mouth 2 times daily as needed for anxiety Need to schedule office f/u appt with Dr Londono.    Anxiety       EQUATE STOOL SOFTENER OR      Take by mouth daily        furosemide 20 MG tablet    LASIX    90 tablet    Take 1 tablet (20 mg) by mouth as needed    ST elevation myocardial infarction (STEMI), unspecified artery (H)       losartan 25 MG tablet    COZAAR    90 tablet    Take 1 tablet (25 mg) by mouth daily    Coronary artery disease involving native coronary artery of native heart without angina pectoris       MUCINEX 600 MG 12 hr tablet   Generic drug:  guaiFENesin      Take 1,200 mg by mouth as needed for congestion Reported on 4/11/2017        nitroGLYcerin 0.4 MG sublingual tablet    NITROSTAT    25 tablet    if you are still having symptoms after 3 doses (15 minutes) call 911.    ST elevation myocardial infarction (STEMI), unspecified artery (H), ST elevation myocardial infarction (STEMI) involving other coronary artery of anterior wall (H)       NORCO  MG per tablet   Generic drug:  HYDROcodone-acetaminophen      Take 1-2 tablets by mouth every 4 hours as needed for moderate to severe pain        pantoprazole 40 MG EC tablet    PROTONIX    90 tablet    Take 1  tablet (40 mg) by mouth daily Take 30-60 minutes before a meal.    Gastroesophageal reflux disease without esophagitis

## 2017-07-18 NOTE — PROGRESS NOTES
"Cardiology Progress Note          Assessment and Plan:     1. Ischemic cardiomyopathy, fairly well compensated    Have asked her to take her furosemide more regularly as she appears to be retaining a little bit of fluid.  May consider increasing her atorvastatin to maximum strength given slight suboptimal LDL.    Echocardiogram unchanged with EF 30-35%.      2. Lower extremity edema right greater than left, likely secondary to venous incompetence    Offered venous competency testing.  Patient will try compression stockings or wraps first.    This note was transcribed using electronic voice recognition software and there may be typographical errors present.                Interval History:   The patient is a very inquisitive 84 year old with ischemic cardiomyopathy who comes in to discuss her right lower extremity edema.  She has had multiple knee surgeries on that side.  She does not feel short of breath.  Her weight is up a few pounds.  She does not take her furosemide regularly.  She denies claudication.  She has a number of different questions and is somewhat tangential in her history telling.                     Review of Systems:   Review of Systems:  Skin:  Positive for bruising   Eyes:  Positive for glasses  ENT:  Negative    Respiratory:  Negative    Cardiovascular:    edema;Positive for  Gastroenterology: Positive for heartburn  Genitourinary:  not assessed    Musculoskeletal:  not assessed    Neurologic:  Negative    Psychiatric:  Negative    Heme/Lymph/Imm:  Positive for easy bruising;allergies  Endocrine:  Negative                Physical Exam:     Vitals: /60 (BP Location: Right arm, Patient Position: Sitting, Cuff Size: Adult Regular)  Pulse 64  Ht 1.651 m (5' 5\")  Wt 64.6 kg (142 lb 8 oz)  Breastfeeding? No  BMI 23.71 kg/m2  Constitutional:  cooperative, alert and oriented, well developed, well nourished, in no acute distress        Skin:  warm and dry to the touch, no apparent skin lesions " or masses noted        Head:  normocephalic, no masses or lesions        Eyes:  pupils equal and round, conjunctivae and lids unremarkable, sclera white, no xanthalasma, EOMS intact, no nystagmus        ENT:  no pallor or cyanosis, dentition good        Neck:    JVP 8-10      Chest:  normal breath sounds, clear to auscultation, normal A-P diameter, normal symmetry, normal respiratory excursion, no use of accessory muscles        Cardiac: regular rhythm               with ectopy    Vascular: pulses full and equal                                      Extremities and Back:  no deformities, clubbing, cyanosis, erythema observed        Neurological:  affect appropriate, oriented to time, person and place                 Medications:     Current Outpatient Prescriptions   Medication Sig Dispense Refill     clorazepate (TRANXENE) 7.5 MG tablet Take 1 tablet (7.5 mg) by mouth 2 times daily as needed for anxiety Need to schedule office f/u appt with Dr Londono. 60 tablet 0     pantoprazole (PROTONIX) 40 MG EC tablet Take 1 tablet (40 mg) by mouth daily Take 30-60 minutes before a meal. 90 tablet 0     furosemide (LASIX) 20 MG tablet Take 1 tablet (20 mg) by mouth as needed 90 tablet 2     atorvastatin (LIPITOR) 40 MG tablet TAKE 1 TABLET BY MOUTH ONCE DAILY 90 tablet 3     guaiFENesin (MUCINEX) 600 MG 12 hr tablet Take 1,200 mg by mouth as needed for congestion Reported on 4/11/2017       Docusate Sodium (EQUATE STOOL SOFTENER OR) Take by mouth daily       losartan (COZAAR) 25 MG tablet Take 1 tablet (25 mg) by mouth daily 90 tablet 3     nitroglycerin (NITROSTAT) 0.4 MG SL tablet if you are still having symptoms after 3 doses (15 minutes) call 911. 25 tablet 2     carvedilol (COREG) 3.125 MG tablet Take 1 tablet (3.125 mg) by mouth 2 times daily (with meals) 180 tablet 6     acetaminophen (TYLENOL) 500 MG tablet Take 1,000 mg by mouth 2 times daily as needed for mild pain       aspirin EC 81 MG EC tablet Take 1 tablet (81  mg) by mouth daily       HYDROcodone-acetaminophen (NORCO)  MG per tablet Take 1-2 tablets by mouth every 4 hours as needed for moderate to severe pain                  Data:   All laboratory data reviewed  No results found for this or any previous visit (from the past 24 hour(s)).    All laboratory data reviewed  Lab Results   Component Value Date    CHOL 199 07/14/2017     Lab Results   Component Value Date    HDL 66 07/14/2017     Lab Results   Component Value Date    LDL 91 07/14/2017     Lab Results   Component Value Date    TRIG 210 07/14/2017     Lab Results   Component Value Date    CHOLHDLRATIO 2.8 11/05/2015     TSH   Date Value Ref Range Status   07/14/2017 1.14 0.40 - 4.00 mU/L Final     Last Basic Metabolic Panel:  Lab Results   Component Value Date     07/14/2017      Lab Results   Component Value Date    POTASSIUM 3.6 07/14/2017     Lab Results   Component Value Date    CHLORIDE 105 07/14/2017     Lab Results   Component Value Date    LIGIA 8.2 07/14/2017     Lab Results   Component Value Date    CO2 24 07/14/2017     Lab Results   Component Value Date    BUN 28 07/14/2017     Lab Results   Component Value Date    CR 1.02 07/14/2017     Lab Results   Component Value Date     07/14/2017     Lab Results   Component Value Date    WBC 9.3 01/10/2016     Lab Results   Component Value Date    RBC 3.36 01/10/2016     Lab Results   Component Value Date    HGB 11.4 09/12/2016     Lab Results   Component Value Date    HCT 29.3 01/10/2016     Lab Results   Component Value Date    MCV 87 01/10/2016     Lab Results   Component Value Date    MCH 29.8 01/10/2016     Lab Results   Component Value Date    MCHC 34.1 01/10/2016     Lab Results   Component Value Date    RDW 15.8 01/10/2016     Lab Results   Component Value Date     01/10/2016

## 2017-08-15 DIAGNOSIS — I25.5 ISCHEMIC CARDIOMYOPATHY: ICD-10-CM

## 2017-08-15 RX ORDER — CARVEDILOL 3.12 MG/1
3.12 TABLET ORAL 2 TIMES DAILY WITH MEALS
Qty: 180 TABLET | Refills: 3 | Status: SHIPPED | OUTPATIENT
Start: 2017-08-15 | End: 2018-08-10

## 2017-09-05 ENCOUNTER — APPOINTMENT (OUTPATIENT)
Dept: CT IMAGING | Facility: CLINIC | Age: 82
End: 2017-09-05
Attending: EMERGENCY MEDICINE
Payer: MEDICARE

## 2017-09-05 ENCOUNTER — HOSPITAL ENCOUNTER (OUTPATIENT)
Facility: CLINIC | Age: 82
Setting detail: OBSERVATION
Discharge: HOME | End: 2017-09-06
Attending: EMERGENCY MEDICINE | Admitting: INTERNAL MEDICINE
Payer: MEDICARE

## 2017-09-05 DIAGNOSIS — R07.9 ACUTE CHEST PAIN: ICD-10-CM

## 2017-09-05 DIAGNOSIS — K21.9 GASTROESOPHAGEAL REFLUX DISEASE WITHOUT ESOPHAGITIS: ICD-10-CM

## 2017-09-05 DIAGNOSIS — G89.29 CHRONIC LEFT SHOULDER PAIN: Primary | ICD-10-CM

## 2017-09-05 DIAGNOSIS — M25.512 CHRONIC LEFT SHOULDER PAIN: Primary | ICD-10-CM

## 2017-09-05 DIAGNOSIS — M25.512 ACUTE PAIN OF LEFT SHOULDER: ICD-10-CM

## 2017-09-05 LAB
ANION GAP SERPL CALCULATED.3IONS-SCNC: 9 MMOL/L (ref 3–14)
BASOPHILS # BLD AUTO: 0.1 10E9/L (ref 0–0.2)
BASOPHILS NFR BLD AUTO: 1.5 %
BUN SERPL-MCNC: 16 MG/DL (ref 7–30)
CALCIUM SERPL-MCNC: 8.6 MG/DL (ref 8.5–10.1)
CHLORIDE SERPL-SCNC: 101 MMOL/L (ref 94–109)
CO2 SERPL-SCNC: 27 MMOL/L (ref 20–32)
CREAT SERPL-MCNC: 0.91 MG/DL (ref 0.52–1.04)
DIFFERENTIAL METHOD BLD: ABNORMAL
EOSINOPHIL # BLD AUTO: 0.2 10E9/L (ref 0–0.7)
EOSINOPHIL NFR BLD AUTO: 3.2 %
ERYTHROCYTE [DISTWIDTH] IN BLOOD BY AUTOMATED COUNT: 12 % (ref 10–15)
GFR SERPL CREATININE-BSD FRML MDRD: 59 ML/MIN/1.7M2
GLUCOSE SERPL-MCNC: 115 MG/DL (ref 70–99)
HCT VFR BLD AUTO: 36 % (ref 35–47)
HGB BLD-MCNC: 11.6 G/DL (ref 11.7–15.7)
IMM GRANULOCYTES # BLD: 0 10E9/L (ref 0–0.4)
IMM GRANULOCYTES NFR BLD: 0.4 %
LYMPHOCYTES # BLD AUTO: 1.8 10E9/L (ref 0.8–5.3)
LYMPHOCYTES NFR BLD AUTO: 34 %
MCH RBC QN AUTO: 32.6 PG (ref 26.5–33)
MCHC RBC AUTO-ENTMCNC: 32.2 G/DL (ref 31.5–36.5)
MCV RBC AUTO: 101 FL (ref 78–100)
MONOCYTES # BLD AUTO: 0.8 10E9/L (ref 0–1.3)
MONOCYTES NFR BLD AUTO: 15 %
NEUTROPHILS # BLD AUTO: 2.4 10E9/L (ref 1.6–8.3)
NEUTROPHILS NFR BLD AUTO: 45.9 %
NRBC # BLD AUTO: 0 10*3/UL
NRBC BLD AUTO-RTO: 0 /100
NT-PROBNP SERPL-MCNC: 1830 PG/ML (ref 0–1800)
PLATELET # BLD AUTO: 271 10E9/L (ref 150–450)
POTASSIUM SERPL-SCNC: 3.2 MMOL/L (ref 3.4–5.3)
RBC # BLD AUTO: 3.56 10E12/L (ref 3.8–5.2)
SODIUM SERPL-SCNC: 137 MMOL/L (ref 133–144)
TROPONIN I SERPL-MCNC: <0.015 UG/L (ref 0–0.04)
WBC # BLD AUTO: 5.3 10E9/L (ref 4–11)

## 2017-09-05 PROCEDURE — 71260 CT THORAX DX C+: CPT

## 2017-09-05 PROCEDURE — 84484 ASSAY OF TROPONIN QUANT: CPT | Performed by: EMERGENCY MEDICINE

## 2017-09-05 PROCEDURE — 96361 HYDRATE IV INFUSION ADD-ON: CPT

## 2017-09-05 PROCEDURE — 99285 EMERGENCY DEPT VISIT HI MDM: CPT | Mod: 25

## 2017-09-05 PROCEDURE — 99220 ZZC INITIAL OBSERVATION CARE,LEVL III: CPT | Performed by: INTERNAL MEDICINE

## 2017-09-05 PROCEDURE — 25000128 H RX IP 250 OP 636: Performed by: EMERGENCY MEDICINE

## 2017-09-05 PROCEDURE — 80048 BASIC METABOLIC PNL TOTAL CA: CPT | Performed by: EMERGENCY MEDICINE

## 2017-09-05 PROCEDURE — 25000132 ZZH RX MED GY IP 250 OP 250 PS 637: Mod: GY | Performed by: EMERGENCY MEDICINE

## 2017-09-05 PROCEDURE — 93005 ELECTROCARDIOGRAM TRACING: CPT

## 2017-09-05 PROCEDURE — G0378 HOSPITAL OBSERVATION PER HR: HCPCS

## 2017-09-05 PROCEDURE — 85025 COMPLETE CBC W/AUTO DIFF WBC: CPT | Performed by: EMERGENCY MEDICINE

## 2017-09-05 PROCEDURE — A9270 NON-COVERED ITEM OR SERVICE: HCPCS | Mod: GY | Performed by: EMERGENCY MEDICINE

## 2017-09-05 PROCEDURE — 83880 ASSAY OF NATRIURETIC PEPTIDE: CPT | Performed by: EMERGENCY MEDICINE

## 2017-09-05 PROCEDURE — 96374 THER/PROPH/DIAG INJ IV PUSH: CPT

## 2017-09-05 RX ORDER — HYDROCODONE BITARTRATE AND ACETAMINOPHEN 10; 325 MG/1; MG/1
1 TABLET ORAL EVERY 6 HOURS PRN
Status: DISCONTINUED | OUTPATIENT
Start: 2017-09-05 | End: 2017-09-06

## 2017-09-05 RX ORDER — ASPIRIN 81 MG/1
243 TABLET, CHEWABLE ORAL ONCE
Status: COMPLETED | OUTPATIENT
Start: 2017-09-05 | End: 2017-09-05

## 2017-09-05 RX ORDER — HYDROMORPHONE HCL/0.9% NACL/PF 0.2MG/0.2
0.2 SYRINGE (ML) INTRAVENOUS
Status: DISCONTINUED | OUTPATIENT
Start: 2017-09-05 | End: 2017-09-06

## 2017-09-05 RX ORDER — IOPAMIDOL 755 MG/ML
500 INJECTION, SOLUTION INTRAVASCULAR ONCE
Status: COMPLETED | OUTPATIENT
Start: 2017-09-05 | End: 2017-09-05

## 2017-09-05 RX ORDER — PANTOPRAZOLE SODIUM 40 MG/1
40 TABLET, DELAYED RELEASE ORAL DAILY
Qty: 90 TABLET | Refills: 0 | Status: SHIPPED | OUTPATIENT
Start: 2017-09-05 | End: 2017-12-01

## 2017-09-05 RX ADMIN — Medication 0.2 MG: at 22:06

## 2017-09-05 RX ADMIN — SODIUM CHLORIDE 500 ML: 9 INJECTION, SOLUTION INTRAVENOUS at 20:28

## 2017-09-05 RX ADMIN — HYDROCODONE BITARTRATE AND ACETAMINOPHEN 1 TABLET: 10; 325 TABLET ORAL at 19:21

## 2017-09-05 RX ADMIN — IOPAMIDOL 80 ML: 755 INJECTION, SOLUTION INTRAVENOUS at 20:05

## 2017-09-05 RX ADMIN — ASPIRIN 81 MG 243 MG: 81 TABLET ORAL at 19:20

## 2017-09-05 ASSESSMENT — ENCOUNTER SYMPTOMS: ARTHRALGIAS: 1

## 2017-09-05 NOTE — ED NOTES
Pt presents with chest pain. Pt states L side pain into back, states has shoulder pain but this feels different. Pain since Sunday, took 3 nitros at home without change in pain. ABCs intact. Had an echo 7/18, rates pain 9/10.

## 2017-09-05 NOTE — IP AVS SNAPSHOT
[facility]ation [facility]    [address]    [facility] [address]    Phone:  [phone]                                       After Visit Summary   9/5/2017    Gosia Alonzo    MRN: [id number]           After Visit Summary Signature Page     I have received my discharge instructions, and my questions have been answered. I have discussed any challenges I see with this plan with the nurse or doctor.    ..........................................................................................................................................  Patient/Patient Representative Signature      ..........................................................................................................................................  Patient Representative Print Name and Relationship to Patient    ..................................................               ................................................  Date                                            Time    ..........................................................................................................................................  Reviewed by Signature/Title    ...................................................              ..............................................  Date                                                            Time

## 2017-09-05 NOTE — IP AVS SNAPSHOT
MRN:[id number]                      After Visit Summary   9/5/2017    Gosia Alonzo    MRN: [id number]           Thank you!     Thank you for choosing [facility] for your care. Our goal is always to provide you with excellent care. Hearing back from our patients is one way we can continue to improve our services. Please take a few minutes to complete the written survey that you may receive in the mail after you visit. If you would like to speak to someone directly about your visit please contact Patient Relations at [phone]. Thank you!          Patient Information     Date Of Birth          11/14/1932        About your hospital stay     You were admitted on:  September 5, 2017 You last received care in the:  [facility] Observation Department    You were discharged on:  September 6, 2017        Reason for your hospital stay       Left shoulder and chest pain                  Who to Call     For medical emergencies, please call 911.  For non-urgent questions about your medical care, please call your primary care provider or clinic, [phone]          Attending Provider     Provider Specialty    Damien Khan DO Emergency Medicine    Jeremie Barrera MD Emergency Medicine    Zander Toledo MD Internal Medicine    Nicola Garcia MD Internal Medicine       Primary Care Provider Office Phone # Fax #    Michael Londono [phone]      After Care Instructions     Activity       Your activity upon discharge: activity as tolerated            Diet       Follow this diet upon discharge: Regular                  Follow-up Appointments     Follow-up and recommended labs and tests        Follow up with primary care provider, Michael Londono MD as previously scheduled for the 13th. Discuss the finding of possible rupture of your implant with him. Follow-up with your orthopedic surgeon for your scheduled injection. Follow-up with  "cardiology to discuss need for stress testing.                  Your next 10 appointments already scheduled     Sep 13, 2017  2:00 PM CDT   [facility] with [facility]ma MD   [facility]    [address]  [facility] [id number] [phone]                         Pending Results     No orders found for last 3 day(s).            Statement of Approval     Ordered          17 0954  I have reviewed and agree with all the recommendations and orders detailed in this document.  EFFECTIVE NOW     Approved and electronically signed by:  Nicola Garcia MD             Admission Information     Date & Time Provider Department Dept. Phone    2017 Nicola Garcia MD [facility] Observation Department [phone]      Your Vitals Were     Blood Pressure Pulse Temperature Respirations Height Weight    140/72 (BP Location: Right arm) 72 97.6  F (36.4  C) (Oral) 18 1.651 m (5' 5\") 67.4 kg (148 lb 9.6 oz)    Pulse Oximetry BMI (Body Mass Index)                98% 24.73 kg/m2          [employer] Information     [employer] lets you send messages to your doctor, view your test results, renew your prescriptions, schedule appointments and more. To sign up, go to [email or web address] . Click on \"Log in\" on the left side of the screen, which will take you to the Welcome page. Then click on \"Sign up Now\" on the right side of the page.     You will be asked to enter the access code listed below, as well as some personal information. Please follow the directions to create your username and password.     Your access code is: [email or web address]  Expires: 10/16/2017 11:17 AM     Your access code will  in 90 days. If you need help or a new code, please call your [place] clinic or [phone].        Care EveryWhere ID     This is your Care EveryWhere ID. This could be used by other organizations to access your [place] medical records  [id number]        Equal " Access to Services     [facility]: Hadii viral bhakta gemmaerica Ventura, waaxda luqadaha, qaybta kaalmada sparklenedrapadmaja, alejandro bates. So [facility] [phone].    ATENCIÓN: Si habla español, tiene a barriga disposición servicios gratuitos de asistencia lingüística. Llame al [phone].    We comply with applicable federal civil rights laws and [place] laws. We do not discriminate on the basis of race, color, national origin, age, disability sex, sexual orientation or gender identity.               Review of your medicines      CONTINUE these medicines which have NOT CHANGED        Dose / Directions    acetaminophen 500 MG tablet   Commonly known as:  TYLENOL        Dose:  1000 mg   Take 1,000 mg by mouth 2 times daily as needed for mild pain   Refills:  0       aspirin 81 MG EC tablet        Dose:  81 mg   Take 1 tablet (81 mg) by mouth daily   Refills:  0       atorvastatin 40 MG tablet   Commonly known as:  LIPITOR   Used for:  ST elevation myocardial infarction (STEMI), unspecified artery (H), ST elevation myocardial infarction (STEMI) involving other coronary artery of anterior wall (H)        TAKE 1 TABLET BY MOUTH ONCE DAILY   Quantity:  90 tablet   Refills:  3       carvedilol 3.125 MG tablet   Commonly known as:  COREG   Used for:  Ischemic cardiomyopathy        Dose:  3.125 mg   Take 1 tablet (3.125 mg) by mouth 2 times daily (with meals)   Quantity:  180 tablet   Refills:  3       clorazepate 7.5 MG tablet   Commonly known as:  TRANXENE   Used for:  Anxiety        Dose:  7.5 mg   Take 1 tablet (7.5 mg) by mouth 2 times daily as needed for anxiety Need to schedule office f/u appt with Dr Londono.   Quantity:  60 tablet   Refills:  0       doxylamine 25 MG Tabs tablet   Commonly known as:  UNISOM        Dose:  50 mg   Take 50 mg by mouth At Bedtime   Refills:  0       EQUATE STOOL SOFTENER OR        Dose:  100 mg   Take 100 mg by mouth At Bedtime   Refills:  0       HYDROcodone-acetaminophen   MG per tablet   Commonly known as:  NORCO        Dose:  1-2 tablet   Take 1-2 tablets by mouth every 4 hours as needed for moderate to severe pain   Quantity:  20 tablet   Refills:  0       LASIX PO        Dose:  20 mg   Take 20 mg by mouth 2 times daily as needed (edema) 1 tab upto bid prn   Refills:  0       losartan 25 MG tablet   Commonly known as:  COZAAR   Used for:  Coronary artery disease involving native coronary artery of native heart without angina pectoris        Dose:  25 mg   Take 1 tablet (25 mg) by mouth daily   Quantity:  90 tablet   Refills:  3       MUCINEX 600 MG 12 hr tablet   Generic drug:  guaiFENesin        Dose:  1200 mg   Take 1,200 mg by mouth as needed for congestion Reported on 4/11/2017   Refills:  0       nitroGLYcerin 0.4 MG sublingual tablet   Commonly known as:  NITROSTAT   Used for:  ST elevation myocardial infarction (STEMI), unspecified artery (H), ST elevation myocardial infarction (STEMI) involving other coronary artery of anterior wall (H)        if you are still having symptoms after 3 doses (15 minutes) call 911.   Quantity:  25 tablet   Refills:  2       pantoprazole 40 MG EC tablet   Commonly known as:  PROTONIX   Used for:  Gastroesophageal reflux disease without esophagitis        Dose:  40 mg   Take 1 tablet (40 mg) by mouth daily Take 30-60 minutes before a meal.   Quantity:  90 tablet   Refills:  0            Where to get your medicines      Some of these will need a paper prescription and others can be bought over the counter. Ask your nurse if you have questions.     Bring a paper prescription for each of these medications     HYDROcodone-acetaminophen  MG per tablet                Protect others around you: Learn how to safely use, store and throw away your medicines at www.disposemymeds.org.             Medication List: This is a list of all your medications and when to take them. Check marks below indicate your daily home schedule. Keep this list as  a reference.      Medications           Morning Afternoon Evening Bedtime As Needed    acetaminophen 500 MG tablet   Commonly known as:  TYLENOL   Take 1,000 mg by mouth 2 times daily as needed for mild pain                                   aspirin 81 MG EC tablet   Take 1 tablet (81 mg) by mouth daily   Last time this was given:  81 mg on 9/6/2017  7:52 AM                                   atorvastatin 40 MG tablet   Commonly known as:  LIPITOR   TAKE 1 TABLET BY MOUTH ONCE DAILY   Last time this was given:  40 mg on 9/6/2017  7:52 AM                                   carvedilol 3.125 MG tablet   Commonly known as:  COREG   Take 1 tablet (3.125 mg) by mouth 2 times daily (with meals)   Last time this was given:  3.125 mg on 9/6/2017  7:52 AM                                      clorazepate 7.5 MG tablet   Commonly known as:  TRANXENE   Take 1 tablet (7.5 mg) by mouth 2 times daily as needed for anxiety Need to schedule office f/u appt with Dr Londono.                                   doxylamine 25 MG Tabs tablet   Commonly known as:  UNISOM   Take 50 mg by mouth At Bedtime                                   EQUATE STOOL SOFTENER OR   Take 100 mg by mouth At Bedtime                                   HYDROcodone-acetaminophen  MG per tablet   Commonly known as:  NORCO   Take 1-2 tablets by mouth every 4 hours as needed for moderate to severe pain   Last time this was given:  2 tablets on 9/6/2017  9:02 AM                            Next available: 1:00 pm       LASIX PO   Take 20 mg by mouth 2 times daily as needed (edema) 1 tab upto bid prn         You did not take lasix today                            losartan 25 MG tablet   Commonly known as:  COZAAR   Take 1 tablet (25 mg) by mouth daily   Last time this was given:  25 mg on 9/6/2017 12:24 AM                                   MUCINEX 600 MG 12 hr tablet   Take 1,200 mg by mouth as needed for congestion Reported on 4/11/2017   Generic drug:  guaiFENesin                                    nitroGLYcerin 0.4 MG sublingual tablet   Commonly known as:  NITROSTAT   if you are still having symptoms after 3 doses (15 minutes) call 911.                                   pantoprazole 40 MG EC tablet   Commonly known as:  PROTONIX   Take 1 tablet (40 mg) by mouth daily Take 30-60 minutes before a meal.   Last time this was given:  40 mg on 9/6/2017  7:52 AM                                             More Information        Shoulder Pain with Uncertain Cause  Shoulder pain can have many causes. Pain often comes from the structures that surround the shoulder joint. These are the joint capsule, ligaments, tendons, muscles, and bursa. Pain can also come from cartilage in the joint. Cartilage can become worn out or injured. It s important to know what s causing your pain so the healthcare provider can use the correct treatment. But sometimes it s difficult to find the exact cause of shoulder pain. You may need to see a specialist (orthopedist). You may also need special tests such as a CT scan or MRI. The provider may need to use special tools to look inside the joint (arthroscopy).  Shoulder pain can be treated with a sling or a device that keeps your shoulder from moving. You can take an anti-inflammatory medicine such as ibuprofen to ease pain. You may need to do special shoulder exercises. Follow up with a specialist if the pain is severe or doesn t go away after a few weeks.  Home care  Follow these tips when caring for yourself at home:    If a sling was given to you, leave it in place for the time advised by your healthcare provider. If you aren t sure how long to wear it, ask for advice. If the sling becomes loose, adjust it so that your forearm is level with the ground. Your shoulder should feel well supported.    Put an ice pack on the injured area for 20 minutes every 1 to 2 hours the first day. You can make your own ice pack by putting ice cubes in a plastic bag. Wrap the  bag in a thin towel. Continue with ice packs 3 to 4 times a day for the next 2 days. Then use the pack as needed to ease pain and swelling.    You may use acetaminophen or ibuprofen to control pain, unless another pain medicine was prescribed. If you have chronic liver or kidney disease, talk with your healthcare provider before using these medicines. Also talk with your provider if you ve ever had a stomach ulcer or GI bleeding.    Shoulder pain may seem worse at night, when there is less to distract you from the pain. If you sleep on your side, try to keep weight off your painful shoulder. Propping pillows behind you may stop you from rolling over onto that shoulder during sleep.     Shoulder and elbow joints can become stiff if left in a sling for too long. You should start range of motion exercises about 7 to 10 days after the injury. Talk with your provider to find out what type of exercises to do and how soon to start.    You can take the sling off to shower or bathe.  Follow-up care  Follow up with your healthcare provider if you don t start to get better in the next 5 days.  When to seek medical advice  Call your healthcare provider right away if any of these occur:    Pain or swelling gets worse or continues for more than a few days    Your hand or fingers become cold, blue, numb, or tingly    Large amount of bruising on your shoulder or upper arm    Difficulty moving your hand or fingers    Weakness in your hand or fingers    Your shoulder becomes stiff    It feels like your shoulder is popping out    You are less able to do your daily activities  Date Last Reviewed: 10/1/2016    [id number] [employer]. [address] [place] [address], [place] [address]. All rights reserved. This information is not intended as a substitute for professional medical care. Always follow your healthcare professional's instructions.                *CHEST PAIN, NONCARDIAC    Based on your visit today, the exact cause of your  chest pain is not certain. Your condition does not seem serious and your pain does not appear to be coming from your heart. However, sometimes the signs of a serious problem take more time to appear. Therefore, please watch for the warning signs listed below.  HOME CARE:  1. Rest today and avoid strenuous activity.  2. Take any prescribed medicine as directed.  FOLLOW UP with your doctor in 1-3 days.   GET PROMPT MEDICAL ATTENTION if any of the following occur:    A change in the type of pain: if it feels different, becomes more severe, lasts longer, or begins to spread into your shoulder, arm, neck, jaw or back    Shortness of breath or increased pain with breathing    Cough with blood or dark colored sputum (phlegm)    Weakness, dizziness, or fainting    Fever over 101  F (38.3  C)    Swelling, pain or redness in one leg    [phone] [facility], [address] [place], [place] [address]. All rights reserved. This information is not intended as a substitute for professional medical care. Always follow your healthcare professional's instructions.

## 2017-09-05 NOTE — TELEPHONE ENCOUNTER
Pantoprazole  (given previously by )      Last Written Prescription Date: 05/31/17  Last Fill Quantity: 90,  # refills: 0   Last Office Visit with [facility], [facility] or [facility] prescribing provider: 12/07/16                                         Next 5 appointments (look out 90 days)     Sep 13, 2017  2:00 PM CDT   SHORT with Brandon Varma MD   [facility] ([facility])    [address]  [facility] [id number] [phone]

## 2017-09-05 NOTE — ED PROVIDER NOTES
History     Chief Complaint:  Chest Pain      HPI   Gosia Alonzo is a 84 year old female who presents to the emergency department today for evaluation of chest pain. The patient reports left-sided chest pain into back for the past week that worsened today. She states that she took 3 Nitros and 1 Aspirin at home without significant improvement. The patient reports that she may have cracked a rib because she has done so in the past from bending over. She states that the pain began on the right side of her chest and has now moved to the left side. She states that has a previous issue with her left shoulder that is also causing her pain and that she has an appointment with her Orthopedist for her shots. The patient denies any shortness of breath. She notes recent leg swelling for which she has been wearing compression socks. She is on Lasix but has not had a recent dose charge. Of note, the patient's most recent echocardiogram was 7/18 as per below.    [facility] - 7/14/2017  Echocardiogram  There is anterior, septal, and apical wall akinesis.  Left ventricular systolic function is moderate to severely reduced.  The visual ejection fraction is estimated at 30-35%.  The left ventricle is mildly dilated.  The left atrium is moderately dilated.  There is trace mitral regurgitation.  Report per radiology      Cardiac and PE/DVT Risk Factors:  CAD:                           Positive  Hypertension:              Positive  Hyperlipidemia:           Negative  Diabetes:                     Negative  Tobacco use:              Negative  Gender:                       Female  Age:                             84  Familial Hx of CAD:    Positive  Hx of PE/DVT:                        Positive  Hx of clotting disorder:            Negative  Hx of cancer:                          Negative    Allergies:  Codeine  Escitalopram Oxalate  Esomeprazole Magnesium Trihydrate  Imdur [Isosorbide]  Meperidine Hcl  Morphine  Hcl  Oxycodone  Pentazocine  Propoxyphene Hcl  Sumatriptan Succinate      Medications:    nitroglycerin (NITROSTAT) 0.4 MG SL tablet  pantoprazole (PROTONIX) 40 MG EC tablet  carvedilol (COREG) 3.125 MG tablet  clorazepate (TRANXENE) 7.5 MG tablet  furosemide (LASIX) 20 MG tablet  atorvastatin (LIPITOR) 40 MG tablet  losartan (COZAAR) 25 MG tablet  aspirin EC 81 MG EC tablet    Past Medical History:    Allergic rhinitis, cause unspecified   Arthritis   CAD (coronary artery disease)   CKD (chronic kidney disease), stage 3 (moderate)   Diverticula of colon   Edema   Esophageal reflux   GIB (gastrointestinal bleeding)   Hiatal hernia   Hypertension   Impaired fasting glucose   Infected R knee prosthesis   Infiltrating Ductal CA Right Breast   Ischemic cardiomyopathy   Migraine, unspecified, without mention of intractable migraine without mention of status migrainosus  NSTEMI (non-ST elevated myocardial infarction) (H)   Obesity, unspecified   Osteoporosis   Other and unspecified hyperlipidemia   Other iron deficiency anemia   Other seborrheic keratosis    Paroxysmal atrial fibrillation (H)   Pericarditis   PONV (postoperative nausea and vomiting)   Postop DVT right calf   Pyogenic granuloma of skin and subcutaneous tissue   R upper extremity edema, postop   Solitary cyst of breast   Vasomotor rhinitis  Viral warts, unspecified     Past Surgical History:    Angiogram x 2  Appendectomy  Breast surgery  Needle aspiration breast  Colonoscopy  Phacoemulsification clear cornea with standard intraocular lens implant  Right rotator cuff  Appendectomy  Hysterectomy  Left elbow  Total right knee  Total left knee  Lumbar fusion x 4  Right knee re-replacement  Right mastectomy  Right knee prosthesis replacement  Rotator cuff/shoulder replacement  Shoulder revision    Family History:    C.A.D.  Pancreatic Cancer  Colon Cancer  Hypertension    Social History:  The patient was accompanied to the ED by her daughter.  Smoking Status:  Never Smoker  Smokeless Tobacco: Never Used  Alcohol Use: No   Marital Status:        Review of Systems   Cardiovascular: Positive for chest pain.   Musculoskeletal: Positive for arthralgias (left shoulder).   All other systems reviewed and are negative.    Physical Exam   First Vitals:  /73  Temp 97.3  F (36.3  C) (Oral)  Resp 18  SpO2 99%        Physical Exam  Nursing note and vitals reviewed.  Constitutional: Cooperative.   HENT:   Mouth/Throat: Moist mucous membranes.   Eyes: EOMI, nonicteric sclera  Cardiovascular: Normal rate, regular rhythm, no murmurs, rubs, or gallops  Pulmonary/Chest: Effort normal and breath sounds normal. No respiratory distress. No wheezes. No rales.   Abdominal: Soft. Nontender, nondistended, no guarding or rigidity. BS present.   Musculoskeletal: Normal range of motion with exception of left shoulder. Pain to palpation left scapula, medial border. Also pain with palpation over anterior left and right lower ribs at about 6-8. No crepitance.     Neurological: Alert. Moves all extremities spontaneously.   Skin: Skin is warm and dry. No rash noted.   Psychiatric: Normal mood and affect.     Emergency Department Course     ECG:  Indication: Chest Pain  Completed at 1837.  Read at 1853.   Normal sinus rhythm  Inferior infarct, age undetermined  Abnormal ECG  Rate 86 bpm. CT interval 188. QRS duration 78. QT/QTc 384/459. P-R-T axes 11 -14 41.     Imaging:  Radiology findings were communicated with the patient who voiced understanding of the findings.    Chest CT, IV contrast only - PE protocol   IMPRESSION:  1. No evidence of pulmonary embolism. Thoracic aorta is unremarkable.  2. Large hiatal hernia.  3. Evidence of old granulomatous disease.  4. Probable T12 vertebral body compression fracture of uncertain age.  5. Right shoulder joint replacement and probable right breast implant  rupture.  Report per radiology      Laboratory:  Laboratory findings were communicated with  the patient who voiced understanding of the findings.  CBC: HGB 1.6 (L) o/w WNL. (WBC 5.3, )   BMP: Potassium 3.2 (L), Glucose 115 (H), GFR 59 (L) o/wWNL (Creatinine 0.91)  Troponin (Collected 1843): <0.015  BNP: 1830 (H)     Interventions:  1920: Aspirin 243mg PO  1921: Norco 10-325mg PO  2028: NS Bolus 500mL IV      Emergency Department Course:  Nursing notes and vitals reviewed.  1855: I performed an exam of the patient as documented above.   EKG obtained in the ED, see results above.    The patient was sent for a Chest CT, IV contrast only - PE protocol while in the emergency department, results above.   IV was inserted and blood was drawn for laboratory testing, results above.   2152: Patient rechecked and updated.   2211: I spoke with Dr. Toledo of the hospitalist service regarding patient's presentation, findings, and plan of care.   I discussed the treatment plan with the patient. They expressed understanding of this plan and consented to admission. I discussed the patient with Dr. Toledo , who will admit the patient to a monitored bed for further evaluation and treatment.   I personally reviewed the laboratory and imaging results with the Patient and answered all related questions prior to discharge.     Impression & Plan      Medical Decision Making:  Gosia Alonzo is a 84 year old female who presents with left-sided chest and shoulder pain. The patient's shoulder pain is a chronic complaint for the patient that gets worse every few months and improves after a steroid injection. The patient's left-sided chest pain appears to be new, though is reproducible with palpation. Patient does have a quite significant history of Coronary Artery Disease and does have stents in place. Her troponin was fortunately normal and this is reassuring after a week of symptoms. Her proBNP is mildly elevated. A CT chest was performed given her complaints and there is no evidence of any aortic problems, PE, or other  emergent concerns. The patient was informed of the incidental finding of a possible right breast implant rupture. Ultimately, unsure of etiology of patient's complaints. Given her history of Coronary Artery Disease and her stents that is currently only being anticoagulated with Aspirin, this does raise concern for ACS. The patient is high risk by heart score. Given this, I did have a discussion with the patient and her family concerning disposition. Given her risk factors, it is safer to admit her overnight and trend troponins. The patient agrees with this disposition. I discussed with hospitalist, Dr. Toledo who accepts patient for admission. All questions were answered and they are in agreement with the plan.    Diagnosis:    ICD-10-CM    1. Acute chest pain R07.9    2. Acute pain of left shoulder M25.512      Disposition:  Admitted to Observation    Scribe Disclosure:  I, Donna Flannery, am serving as a scribe at 6:35 PM on 9/5/2017 to document services personally performed by Jeremie Barrera MD based on my observations and the provider's statements to me.    9/5/2017   [facility] EMERGENCY DEPARTMENT       Jeremie Barrera MD  09/06/17 0315

## 2017-09-06 VITALS
RESPIRATION RATE: 18 BRPM | DIASTOLIC BLOOD PRESSURE: 72 MMHG | SYSTOLIC BLOOD PRESSURE: 140 MMHG | HEART RATE: 72 BPM | BODY MASS INDEX: 24.76 KG/M2 | TEMPERATURE: 97.6 F | WEIGHT: 148.6 LBS | OXYGEN SATURATION: 98 % | HEIGHT: 65 IN

## 2017-09-06 DIAGNOSIS — F41.9 ANXIETY: ICD-10-CM

## 2017-09-06 PROBLEM — M25.512 LEFT SHOULDER PAIN: Status: ACTIVE | Noted: 2017-09-06

## 2017-09-06 LAB
ANION GAP SERPL CALCULATED.3IONS-SCNC: 6 MMOL/L (ref 3–14)
BASOPHILS # BLD AUTO: 0.1 10E9/L (ref 0–0.2)
BASOPHILS NFR BLD AUTO: 1.7 %
BUN SERPL-MCNC: 13 MG/DL (ref 7–30)
CALCIUM SERPL-MCNC: 8.4 MG/DL (ref 8.5–10.1)
CHLORIDE SERPL-SCNC: 104 MMOL/L (ref 94–109)
CO2 SERPL-SCNC: 27 MMOL/L (ref 20–32)
CREAT SERPL-MCNC: 0.84 MG/DL (ref 0.52–1.04)
DIFFERENTIAL METHOD BLD: ABNORMAL
EOSINOPHIL # BLD AUTO: 0.2 10E9/L (ref 0–0.7)
EOSINOPHIL NFR BLD AUTO: 4.1 %
ERYTHROCYTE [DISTWIDTH] IN BLOOD BY AUTOMATED COUNT: 11.9 % (ref 10–15)
GFR SERPL CREATININE-BSD FRML MDRD: 65 ML/MIN/1.7M2
GLUCOSE SERPL-MCNC: 110 MG/DL (ref 70–99)
HCT VFR BLD AUTO: 33 % (ref 35–47)
HGB BLD-MCNC: 10.5 G/DL (ref 11.7–15.7)
IMM GRANULOCYTES # BLD: 0 10E9/L (ref 0–0.4)
IMM GRANULOCYTES NFR BLD: 0.2 %
INTERPRETATION ECG - MUSE: NORMAL
LYMPHOCYTES # BLD AUTO: 1.5 10E9/L (ref 0.8–5.3)
LYMPHOCYTES NFR BLD AUTO: 31 %
MCH RBC QN AUTO: 32.1 PG (ref 26.5–33)
MCHC RBC AUTO-ENTMCNC: 31.8 G/DL (ref 31.5–36.5)
MCV RBC AUTO: 101 FL (ref 78–100)
MONOCYTES # BLD AUTO: 0.7 10E9/L (ref 0–1.3)
MONOCYTES NFR BLD AUTO: 14.1 %
NEUTROPHILS # BLD AUTO: 2.3 10E9/L (ref 1.6–8.3)
NEUTROPHILS NFR BLD AUTO: 48.9 %
NRBC # BLD AUTO: 0 10*3/UL
NRBC BLD AUTO-RTO: 0 /100
PLATELET # BLD AUTO: 230 10E9/L (ref 150–450)
POTASSIUM SERPL-SCNC: 3.9 MMOL/L (ref 3.4–5.3)
RBC # BLD AUTO: 3.27 10E12/L (ref 3.8–5.2)
SODIUM SERPL-SCNC: 137 MMOL/L (ref 133–144)
TROPONIN I SERPL-MCNC: <0.015 UG/L (ref 0–0.04)
TROPONIN I SERPL-MCNC: <0.015 UG/L (ref 0–0.04)
WBC # BLD AUTO: 4.7 10E9/L (ref 4–11)

## 2017-09-06 PROCEDURE — 99217 ZZC OBSERVATION CARE DISCHARGE: CPT | Performed by: HOSPITALIST

## 2017-09-06 PROCEDURE — 85025 COMPLETE CBC W/AUTO DIFF WBC: CPT | Performed by: INTERNAL MEDICINE

## 2017-09-06 PROCEDURE — 84484 ASSAY OF TROPONIN QUANT: CPT | Performed by: INTERNAL MEDICINE

## 2017-09-06 PROCEDURE — G0378 HOSPITAL OBSERVATION PER HR: HCPCS

## 2017-09-06 PROCEDURE — 80048 BASIC METABOLIC PNL TOTAL CA: CPT | Performed by: INTERNAL MEDICINE

## 2017-09-06 PROCEDURE — A9270 NON-COVERED ITEM OR SERVICE: HCPCS | Mod: GY | Performed by: INTERNAL MEDICINE

## 2017-09-06 PROCEDURE — 25000132 ZZH RX MED GY IP 250 OP 250 PS 637: Mod: GY | Performed by: INTERNAL MEDICINE

## 2017-09-06 PROCEDURE — 36415 COLL VENOUS BLD VENIPUNCTURE: CPT | Performed by: INTERNAL MEDICINE

## 2017-09-06 RX ORDER — POTASSIUM CHLORIDE 29.8 MG/ML
20 INJECTION INTRAVENOUS
Status: DISCONTINUED | OUTPATIENT
Start: 2017-09-06 | End: 2017-09-06 | Stop reason: CLARIF

## 2017-09-06 RX ORDER — NITROGLYCERIN 0.4 MG/1
0.4 TABLET SUBLINGUAL EVERY 5 MIN PRN
Status: DISCONTINUED | OUTPATIENT
Start: 2017-09-06 | End: 2017-09-06 | Stop reason: HOSPADM

## 2017-09-06 RX ORDER — CARVEDILOL 3.12 MG/1
3.12 TABLET ORAL 2 TIMES DAILY WITH MEALS
Status: DISCONTINUED | OUTPATIENT
Start: 2017-09-06 | End: 2017-09-06 | Stop reason: HOSPADM

## 2017-09-06 RX ORDER — POTASSIUM CHLORIDE 1.5 G/1.58G
20-40 POWDER, FOR SOLUTION ORAL
Status: DISCONTINUED | OUTPATIENT
Start: 2017-09-06 | End: 2017-09-06 | Stop reason: HOSPADM

## 2017-09-06 RX ORDER — LOSARTAN POTASSIUM 25 MG/1
25 TABLET ORAL AT BEDTIME
Status: DISCONTINUED | OUTPATIENT
Start: 2017-09-06 | End: 2017-09-06 | Stop reason: HOSPADM

## 2017-09-06 RX ORDER — POTASSIUM CHLORIDE 1500 MG/1
20-40 TABLET, EXTENDED RELEASE ORAL
Status: DISCONTINUED | OUTPATIENT
Start: 2017-09-06 | End: 2017-09-06 | Stop reason: HOSPADM

## 2017-09-06 RX ORDER — HYDROCODONE BITARTRATE AND ACETAMINOPHEN 10; 325 MG/1; MG/1
1-2 TABLET ORAL EVERY 4 HOURS PRN
Qty: 20 TABLET | Refills: 0 | Status: SHIPPED | OUTPATIENT
Start: 2017-09-06 | End: 2019-01-09

## 2017-09-06 RX ORDER — NALOXONE HYDROCHLORIDE 0.4 MG/ML
.1-.4 INJECTION, SOLUTION INTRAMUSCULAR; INTRAVENOUS; SUBCUTANEOUS
Status: DISCONTINUED | OUTPATIENT
Start: 2017-09-06 | End: 2017-09-06 | Stop reason: HOSPADM

## 2017-09-06 RX ORDER — ASPIRIN 81 MG/1
81 TABLET ORAL DAILY
Status: DISCONTINUED | OUTPATIENT
Start: 2017-09-06 | End: 2017-09-06 | Stop reason: HOSPADM

## 2017-09-06 RX ORDER — FUROSEMIDE 20 MG
20 TABLET ORAL DAILY
Status: DISCONTINUED | OUTPATIENT
Start: 2017-09-06 | End: 2017-09-06 | Stop reason: HOSPADM

## 2017-09-06 RX ORDER — CLORAZEPATE DIPOTASSIUM 3.75 MG/1
7.5 TABLET ORAL 2 TIMES DAILY PRN
Status: DISCONTINUED | OUTPATIENT
Start: 2017-09-06 | End: 2017-09-06 | Stop reason: HOSPADM

## 2017-09-06 RX ORDER — POTASSIUM CHLORIDE 7.45 MG/ML
10 INJECTION INTRAVENOUS
Status: DISCONTINUED | OUTPATIENT
Start: 2017-09-06 | End: 2017-09-06 | Stop reason: HOSPADM

## 2017-09-06 RX ORDER — POTASSIUM CL/LIDO/0.9 % NACL 10MEQ/0.1L
10 INTRAVENOUS SOLUTION, PIGGYBACK (ML) INTRAVENOUS
Status: DISCONTINUED | OUTPATIENT
Start: 2017-09-06 | End: 2017-09-06 | Stop reason: HOSPADM

## 2017-09-06 RX ORDER — PANTOPRAZOLE SODIUM 40 MG/1
40 TABLET, DELAYED RELEASE ORAL
Status: DISCONTINUED | OUTPATIENT
Start: 2017-09-06 | End: 2017-09-06 | Stop reason: HOSPADM

## 2017-09-06 RX ORDER — ACETAMINOPHEN 500 MG
1000 TABLET ORAL 2 TIMES DAILY PRN
Status: DISCONTINUED | OUTPATIENT
Start: 2017-09-06 | End: 2017-09-06 | Stop reason: HOSPADM

## 2017-09-06 RX ORDER — ATORVASTATIN CALCIUM 40 MG/1
40 TABLET, FILM COATED ORAL DAILY
Status: DISCONTINUED | OUTPATIENT
Start: 2017-09-06 | End: 2017-09-06 | Stop reason: HOSPADM

## 2017-09-06 RX ORDER — HYDROCODONE BITARTRATE AND ACETAMINOPHEN 10; 325 MG/1; MG/1
1-2 TABLET ORAL EVERY 4 HOURS PRN
Status: DISCONTINUED | OUTPATIENT
Start: 2017-09-06 | End: 2017-09-06 | Stop reason: HOSPADM

## 2017-09-06 RX ADMIN — ASPIRIN 81 MG: 81 TABLET, COATED ORAL at 07:52

## 2017-09-06 RX ADMIN — CARVEDILOL 3.12 MG: 3.12 TABLET, FILM COATED ORAL at 07:52

## 2017-09-06 RX ADMIN — HYDROCODONE BITARTRATE AND ACETAMINOPHEN 2 TABLET: 10; 325 TABLET ORAL at 00:24

## 2017-09-06 RX ADMIN — LOSARTAN POTASSIUM 25 MG: 25 TABLET ORAL at 00:24

## 2017-09-06 RX ADMIN — PANTOPRAZOLE SODIUM 40 MG: 40 TABLET, DELAYED RELEASE ORAL at 07:52

## 2017-09-06 RX ADMIN — HYDROCODONE BITARTRATE AND ACETAMINOPHEN 2 TABLET: 10; 325 TABLET ORAL at 04:35

## 2017-09-06 RX ADMIN — ATORVASTATIN CALCIUM 40 MG: 40 TABLET, FILM COATED ORAL at 07:52

## 2017-09-06 RX ADMIN — HYDROCODONE BITARTRATE AND ACETAMINOPHEN 2 TABLET: 10; 325 TABLET ORAL at 09:02

## 2017-09-06 NOTE — ED NOTES
[facility]  ED Nurse Handoff Report    Gosia Alonzo is a 84 year old female   ED Chief complaint: Chest Pain  . ED Diagnosis:   Final diagnoses:   Acute chest pain   Acute pain of left shoulder     Allergies:   Allergies   Allergen Reactions     Codeine      GI UPSET     Escitalopram Oxalate      fatigue     Esomeprazole Magnesium Trihydrate      HA     Imdur [Isosorbide]      Headache       Meperidine Hcl      N/V     Morphine Hcl      HIVES     Oxycodone      (percodan) GI UPSET     Pentazocine      (talwin)  HALLUCINATIONS     Propoxyphene Hcl      STOMACH UPSET     Sumatriptan Succinate      chest pain       Code Status: Full Code  Activity level - Baseline/Home:  Stand with Assist. Activity Level - Current:   Stand with Assist of 2. Lift room needed: No. Bariatric: No   Needed: No   Isolation: No. Infection: Not Applicable.     Vital Signs:   Vitals:    09/05/17 1945 09/05/17 2030 09/05/17 2045 09/05/17 2100   BP:  138/75  133/85   Resp:       Temp:       TempSrc:       SpO2: 100% 100% 99% 100%       Cardiac Rhythm:  ,      Pain level: 0-10 Pain Scale: 7  Patient confused: No. Patient Falls Risk: Yes.   Elimination Status: Unable to void, no urge during ED stay.  Patient Report - Initial Complaint: Pt presents with chest pain. Pt states L side pain into back, states has shoulder pain but this feels different. Pain since Sunday, took 3 nitros at home without change in pain. Focused Assessment: sinus rhythm, pain L chest into L shoulder. Pt uses walker at home, was able to pivot with Ax2 from wheelchair to bed. A&Ox4.   Tests Performed: Blood work, Chest CT. Abnormal Results:   Labs Ordered and Resulted from Time of ED Arrival Up to the Time of Departure from the ED   CBC WITH PLATELETS DIFFERENTIAL - Abnormal; Notable for the following:        Result Value    RBC Count 3.56 (*)     Hemoglobin 11.6 (*)      (*)     All other components within normal limits   BASIC METABOLIC  PANEL - Abnormal; Notable for the following:     Potassium 3.2 (*)     Glucose 115 (*)     GFR Estimate 59 (*)     All other components within normal limits   NT PROBNP INPATIENT - Abnormal; Notable for the following:     N-Terminal Pro BNP Inpatient 1830 (*)     All other components within normal limits   TROPONIN I   PERIPHERAL IV CATHETER   Ct showed no acute cause for pain. Hiatal hernia.    Treatments provided: Pain management. ECG monitoring. Repositioning for comfort.  Family Comments: Daughters at bedside, have left. Pt is A&O able to answer questions.  OBS brochure/video discussed/provided to patient:  Yes  ED Medications:   Medications   HYDROcodone-acetaminophen (NORCO)  MG per tablet 1 tablet (1 tablet Oral Given 9/5/17 1921)   0.9% sodium chloride BOLUS (0 mLs Intravenous Stopped 9/5/17 2006)   HYDROmorphone (DILAUDID) injection 0.2 mg (0.2 mg Intravenous Given 9/5/17 2206)   aspirin chewable tablet 243 mg (243 mg Oral Given 9/5/17 1920)   0.9% sodium chloride BOLUS (0 mLs Intravenous Stopped 9/5/17 2150)   iopamidol (ISOVUE-370) solution 500 mL (80 mLs Intravenous Given 9/5/17 2005)     Drips infusing:  No  For the majority of the shift this patient was Green. Interventions performed were N/A.     Severe Sepsis OR Septic Shock Diagnosis Present: No      ED Nurse Name/Phone Number: Brayan Lucas,   10:10 PM  RECEIVING UNIT ED HANDOFF REVIEW    Above ED Nurse Handoff Report was reviewed: Yes  Reviewed by: Nicole Nava on September 5, 2017 at 10:50 PM

## 2017-09-06 NOTE — TELEPHONE ENCOUNTER
Clorazepate Dipotassium      Last Written Prescription Date:  07/11/17  Last Fill Quantity: 60,   # refills: 0  Last Office Visit with [facility], [facility] or [facility] prescribing provider: 12/07/17 Avani  Future Office visit:    Next 5 appointments (look out 90 days)     Sep 13, 2017  2:00 PM CDT   [facility]   [facility]    [address]  [facility] [id number] [phone]                   Routing refill request to provider for review/approval because:  Drug not on the [facility], [facility] or [facility] refill protocol or controlled substance

## 2017-09-06 NOTE — PLAN OF CARE
PRIMARY DIAGNOSIS: CHEST PAIN  OUTPATIENT/OBSERVATION GOALS TO BE MET BEFORE DISCHARGE:  1. Ruled out acute coronary syndrome (negative or stable Troponin):  Yes  2. Pain Status: Improved-controlled with oral pain medications.  3. Appropriate provocative testing performed: N/A  - Stress Test Procedure: N/A, nothing scheduled at this time.   - Interpretation of cardiac rhythm per telemetry tech: Sinus rhythm    4. Cleared by Consultants (if applicable): N/A  5. Return to near baseline physical activity: No  Discharge Planner Nurse   Safe discharge environment identified: Yes  Barriers to discharge: No       Entered by: Dee Bueno 09/06/2017 5:20 AM     Please review provider order for any additional goals.   Nurse to notify provider when observation goals have been met and patient is ready for discharge.    A&Ox4. Up Ax1 with walker. IV saline locked. Voiding without difficulty. Chest & L shoulder pain rated 8/10, given [place]. Trop 0200 negative. Sleeping between cares. Will continue to monitor.

## 2017-09-06 NOTE — PLAN OF CARE
PRIMARY DIAGNOSIS: CHEST PAIN  OUTPATIENT/OBSERVATION GOALS TO BE MET BEFORE DISCHARGE:  1. Ruled out acute coronary syndrome (negative or stable Troponin):  No  2. Pain Status: Improved-controlled with oral pain medications.  3. Appropriate provocative testing performed: N/A  - Stress Test Procedure: N/A, nothing scheduled at this time.   - Interpretation of cardiac rhythm per telemetry tech: Sinus rhythm    4. Cleared by Consultants (if applicable):N/A  5. Return to near baseline physical activity: Yes  Discharge Planner Nurse   Safe discharge environment identified: Yes  Barriers to discharge: No       Entered by: Dee Bueno 09/06/2017 2:36 AM     Please review provider order for any additional goals.   Nurse to notify provider when observation goals have been met and patient is ready for discharge.    A&Ox4. Oriented to room. Up SBA with walker. IV saline locked. Voiding without difficulty. Chest & L shoulder pain rated 8/10, given Norco. Trop scheduled for 0200. Will continue to monitor.

## 2017-09-06 NOTE — DISCHARGE SUMMARY
[facility]  Discharge Summary  Name: Gosia Alonzo    MRN: [id number]  YOB: 1932    Age: 84 year old  Date of Discharge:  9/6/2017  Date of Admission: 9/5/2017  Primary Care Provider: Michael Londono  Discharge Physician:  Nicola Garcia MD  Discharging Service:  Hospitalist  Date of Service (when I saw the patient): 09/06/17    Discharge Diagnosis:  Left shoulder pain, chest pain (likely due to shoulder pain)     Other Diagnosis:  Breast ca/mastectomy, CAD s/p CAMILA to mid and prox LAD in 1/2016, ischemic cardiomyopathy, CKD, paroxysmal Afib, hypertension, osteoporosis, history of GI bleed, leg edema     Discharge Disposition:  Discharged to home     Allergies:  Allergies   Allergen Reactions     Codeine      GI UPSET     Escitalopram Oxalate      fatigue     Esomeprazole Magnesium Trihydrate      HA     Imdur [Isosorbide]      Headache       Meperidine Hcl      N/V     Morphine Hcl      HIVES     Oxycodone      (percodan) GI UPSET     Pentazocine      (talwin)  HALLUCINATIONS     Propoxyphene Hcl      STOMACH UPSET     Sumatriptan Succinate      chest pain        Discharge Medications:   Current Discharge Medication List      CONTINUE these medications which have CHANGED    Details   HYDROcodone-acetaminophen (NORCO)  MG per tablet Take 1-2 tablets by mouth every 4 hours as needed for moderate to severe pain  Qty: 20 tablet, Refills: 0    Associated Diagnoses: Chronic left shoulder pain         CONTINUE these medications which have NOT CHANGED    Details   pantoprazole (PROTONIX) 40 MG EC tablet Take 1 tablet (40 mg) by mouth daily Take 30-60 minutes before a meal.  Qty: 90 tablet, Refills: 0    Associated Diagnoses: Gastroesophageal reflux disease without esophagitis      Furosemide (LASIX PO) Take 20 mg by mouth 2 times daily as needed (edema) 1 tab upto bid prn      doxylamine (UNISOM) 25 MG TABS tablet Take 50 mg by mouth At Bedtime      carvedilol (COREG) 3.125 MG tablet  "Take 1 tablet (3.125 mg) by mouth 2 times daily (with meals)  Qty: 180 tablet, Refills: 3    Associated Diagnoses: Ischemic cardiomyopathy      clorazepate (TRANXENE) 7.5 MG tablet Take 1 tablet (7.5 mg) by mouth 2 times daily as needed for anxiety Need to schedule office f/u appt with Dr Londono.  Qty: 60 tablet, Refills: 0    Associated Diagnoses: Anxiety      atorvastatin (LIPITOR) 40 MG tablet TAKE 1 TABLET BY MOUTH ONCE DAILY  Qty: 90 tablet, Refills: 3    Associated Diagnoses: ST elevation myocardial infarction (STEMI), unspecified artery (H); ST elevation myocardial infarction (STEMI) involving other coronary artery of anterior wall (H)      guaiFENesin (MUCINEX) 600 MG 12 hr tablet Take 1,200 mg by mouth as needed for congestion Reported on 4/11/2017      Docusate Sodium (EQUATE STOOL SOFTENER OR) Take 100 mg by mouth At Bedtime       losartan (COZAAR) 25 MG tablet Take 1 tablet (25 mg) by mouth daily  Qty: 90 tablet, Refills: 3    Associated Diagnoses: Coronary artery disease involving native coronary artery of native heart without angina pectoris      nitroglycerin (NITROSTAT) 0.4 MG SL tablet if you are still having symptoms after 3 doses (15 minutes) call 911.  Qty: 25 tablet, Refills: 2    Associated Diagnoses: ST elevation myocardial infarction (STEMI), unspecified artery (H); ST elevation myocardial infarction (STEMI) involving other coronary artery of anterior wall (H)      acetaminophen (TYLENOL) 500 MG tablet Take 1,000 mg by mouth 2 times daily as needed for mild pain      aspirin EC 81 MG EC tablet Take 1 tablet (81 mg) by mouth daily              Condition on Discharge:  Discharge condition: Stable   Discharge vitals: Blood pressure 140/72, pulse 72, temperature 97.6  F (36.4  C), temperature source Oral, resp. rate 18, height 1.651 m (5' 5\"), weight 67.4 kg (148 lb 9.6 oz), SpO2 98 %, not currently breastfeeding.   Code status on discharge: DNR / DNI     History of Illness:  See detailed " admission note for full details.    84 year old female patient with history of CAD s/p CAMILA to mid and prox LAD in 1/2016, ischemic cardiomyopathy, CKD, paroxysmal Afib, hypertension, osteoporosis, history of GI bleed, leg edema, came with a complaint of left shoulder pain and left side chest pain. She stated that she has been having left shoulder pain for long time. She had steroid injection 9 months ago. She stated that she is due for left shoulder steroid injection next week. She denies shortness of breath. Patient thinks her current symptoms are related to her shoulder. She has chronic leg edema    Significant Physical Exam Findings:  Patient states she still has her shoulder/chest pain today but it is much better than when she came in. She really feels like her chest pain is coming from her shoulder. No sob, abdo pain, n/v/d  s1s2 rrr, lungs clear, abdo +BS, left shoulder tenderness    Procedures:  none     Imaging:  Results for orders placed or performed during the hospital encounter of 09/05/17   Chest CT, IV contrast only - PE protocol    Narrative    CT CHEST PULMONARY EMBOLISM WITH CONTRAST 9/5/2017 8:17 PM     HISTORY: Chest pain.    TECHNIQUE: Thin section axial images are performed from the thoracic  inlet to the lung bases utilizing 63 mL of Isovue 370 IV contrast  without adverse event. Coronal reformatted images are also generated.  Radiation dose for this scan was reduced using automated exposure  control, adjustment of the mA and/or kV according to patient size, or  iterative reconstruction technique.    FINDINGS:     Chest: Scattered calcified granulomas are present in the lungs  bilaterally. Significant artifact is noted around the thoracic inlet  due to a right shoulder joint replacement. No infiltrate or  consolidation to indicate pneumonia. Trace amount of left pleural  fluid is present. No right pleural fluid. No pericardial fluid is  evident and the heart is normal in size. Large hiatal  hernia is  present. Esophagus is otherwise unremarkable. Thyroid gland is  prominent but symmetric in size. No enlarged axillary or intrathoracic  lymph nodes. Calcified mediastinal and hilar lymph nodes are  consistent with old granulomatous disease. No evidence of pulmonary  embolism. Thoracic aorta is unremarkable. Extensive coronary artery  calcification is present. Right breast implant appears to have  ruptured. Limited images upper abdomen are within normal limits. Bone  window examination demonstrates marked osteopenia of the bony  structures with multilevel degenerative disc changes. T12 vertebral  body appears to have lost some height anteriorly possibly due to  compression deformity.      Impression    IMPRESSION:  1. No evidence of pulmonary embolism. Thoracic aorta is unremarkable.  2. Large hiatal hernia.  3. Evidence of old granulomatous disease.  4. Probable T12 vertebral body compression fracture of uncertain age.  5. Right shoulder joint replacement and probable right breast implant  rupture.    STACIA DIAZ MD        Consultations:  No consultations were requested during this admission.     Significant Lab Results:    Recent Labs  Lab 09/06/17  0619 09/05/17  1843   WBC 4.7 5.3   HGB 10.5* 11.6*   HCT 33.0* 36.0   * 101*    271          Lab Results   Component Value Date     09/06/2017     09/05/2017     07/14/2017    Lab Results   Component Value Date    CHLORIDE 104 09/06/2017    CHLORIDE 101 09/05/2017    CHLORIDE 105 07/14/2017    Lab Results   Component Value Date    BUN 13 09/06/2017    BUN 16 09/05/2017    BUN 28 07/14/2017      Lab Results   Component Value Date    POTASSIUM 3.9 09/06/2017    POTASSIUM 3.2 09/05/2017    POTASSIUM 3.6 07/14/2017    Lab Results   Component Value Date    CO2 27 09/06/2017    CO2 27 09/05/2017    CO2 24 07/14/2017    Lab Results   Component Value Date    CR 0.84 09/06/2017    CR 0.91 09/05/2017    CR 1.02 07/14/2017           Recent Labs  Lab 09/06/17  0619 09/06/17  0228 09/05/17  1843   TROPI <0.015 <0.015 <0.015          Pending Results:  Unresulted Labs Ordered in the Past 30 Days of this Admission     No orders found for last 61 day(s).           Discharge Instructions and Follow-Up:   Discharge diet:   Active Diet Order      Combination Diet Low Saturated Fat Na <2400mg Diet, No Caffeine Diet      Diet   Discharge activity: Activity as tolerated   Discharge follow-up: Follow up with primary care provider in 7 days   Outpatient therapy: None    Home Care agency: None    Other instructions: Follow-up with orthopedics for injection and cardiology      Hospital Course:  Patient was admitted to the Obs Unit. Her EKG showed no new changes. Trops were negative. She felt her symptoms were due to her shoulder pain. CT showed possible breast implant rupture. I discussed this with her. She states her implant was done when she had her mastectomy in about 1996. She states it is saline. Her surgeon is long retired. She states she will discuss the CT finding with her PCP. I offered to see if I could have her shoulder injected while she was here, but she prefers to see her long-standing orthopedica surgeon. I discussed with her that we have not ruled out underlying CAD and that she should follow-up with Dr. Collins to discuss any need for stress testing (she does not want anything done here). Of note she had early stent thrombosis with her CAMILA and is at some risk for late stent thrombosis due to not tolerating Plavix due to bruising.     Total time spent in face to face contact with the patient and coordinating discharge was:  45 Minutes.    Nicola Garcia MD  Pager: [phone]

## 2017-09-06 NOTE — PROGRESS NOTES
ROOM # 213-2    Living Situation (if not independent, order SW consult): live at home alone  Facility name:  : Daughter, Wen    Activity level at baseline: independently with walker  Activity level on admit: SBA with walker      Patient registered to observation; given Patient Bill of Rights; given the opportunity to ask questions about observation status and their plan of care.  Patient has been oriented to the observation room, bathroom and call light is in place.    Discussed discharge goals and expectations with patient/family.
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
## 2017-09-06 NOTE — PHARMACY-ADMISSION MEDICATION HISTORY
Admission medication history interview status for this patient is complete. See [facility] admission navigator for allergy information, prior to admission medications and immunization status.     Medication history interview source(s):Patient  Medication history resources (including written lists, pill bottles, clinic record):None    Changes made to PTA medication list:  Added: unisojoce colace  Deleted: none  Changed: lasix    Actions taken by pharmacist (provider contacted, etc):None     Additional medication history information:None    Medication reconciliation/reorder completed by provider prior to medication history? Yes    For patients on insulin therapy: no (Yes/No)   Lantus/levemir/NPH/Mix 70/30 dose: ___ in AM/PM or twice daily   Sliding scale Novolog Y/N   If Yes, do you have a baseline novolog pre-meal dose: ______units with meals   Patients eat three meals a day: Y/N ---  How many episodes of hypoglycemia (low blood glucose) do you have weekly: ---   How many missed doses do you have a week: ---  How many times do you check your blood glucose per day: ---  Any Barriers to therapy: cost of medications/comfortable with giving injections (if applicable)/ comfortable and confident with current diabetes regimen ---      Prior to Admission medications    Medication Sig Last Dose Taking? Auth Provider   pantoprazole (PROTONIX) 40 MG EC tablet Take 1 tablet (40 mg) by mouth daily Take 30-60 minutes before a meal. 9/4/2017 at Unknown time Yes Michael Londono MD   Furosemide (LASIX PO) Take 20 mg by mouth 2 times daily as needed (edema) 1 tab upto bid prn 9/5/2017 at am Yes Unknown, Entered By History   doxylamine (UNISOM) 25 MG TABS tablet Take 50 mg by mouth At Bedtime 9/4/2017 at Unknown time Yes Unknown, Entered By History   carvedilol (COREG) 3.125 MG tablet Take 1 tablet (3.125 mg) by mouth 2 times daily (with meals) 9/5/2017 at am Yes Hiren Collins MD   clorazepate (TRANXENE) 7.5 MG tablet Take 1 tablet  (7.5 mg) by mouth 2 times daily as needed for anxiety Need to schedule office f/u appt with Dr Londono. 9/4/2017 at Unknown time Yes Michael Londono MD   atorvastatin (LIPITOR) 40 MG tablet TAKE 1 TABLET BY MOUTH ONCE DAILY 9/5/2017 at am Yes Michael Londono MD   guaiFENesin (MUCINEX) 600 MG 12 hr tablet Take 1,200 mg by mouth as needed for congestion Reported on 4/11/2017 prn Yes Reported, Patient   Docusate Sodium (EQUATE STOOL SOFTENER OR) Take 100 mg by mouth At Bedtime  9/4/2017 at Unknown time Yes Reported, Patient   losartan (COZAAR) 25 MG tablet Take 1 tablet (25 mg) by mouth daily 9/4/2017 at hs Yes Megan Ortiz APRN CNP   nitroglycerin (NITROSTAT) 0.4 MG SL tablet if you are still having symptoms after 3 doses (15 minutes) call 911. 9/5/2017 at Unknown time Yes Megan Ortiz APRN CNP   acetaminophen (TYLENOL) 500 MG tablet Take 1,000 mg by mouth 2 times daily as needed for mild pain Past Week at Unknown time Yes Reported, Patient   aspirin EC 81 MG EC tablet Take 1 tablet (81 mg) by mouth daily 9/5/2017 at am Yes Radha Umanzor PA-C   HYDROcodone-acetaminophen (NORCO)  MG per tablet Take 1-2 tablets by mouth every 4 hours as needed for moderate to severe pain 9/5/2017 at 1700 Yes Unknown, Entered By History

## 2017-09-06 NOTE — PLAN OF CARE
Problem: Discharge Planning  Goal: Discharge Planning (Adult, OB, Behavioral, Peds)  Outcome: Adequate for Discharge Date Met:  09/06/17  PRIMARY DIAGNOSIS: CHEST PAIN  OUTPATIENT/OBSERVATION GOALS TO BE MET BEFORE DISCHARGE:  1. Ruled out acute coronary syndrome (negative or stable Troponin):  Yes  2. Pain Status: Improved-controlled with oral pain medications.  3. Appropriate provocative testing performed: N/A  - Stress Test Procedure: N/A,will follow up with cardiology as outpatient  - Interpretation of cardiac rhythm per telemetry tech: Sinus rhythm     4. Cleared by Consultants (if applicable): N/A  5. Return to near baseline physical activity: Yes  Discharge Planner Nurse   Safe discharge environment identified: Yes  Barriers to discharge: No         Please review provider order for any additional goals.   Nurse to notify provider when observation goals have been met and patient is ready for discharge.     Pt ok to discharge to home. Reviewed all dc instructions and follow up times. Gave norco script for patient to fill at own pharmacy. Pt has a steroid injection for her left shoulder scheduled with her orthopedic MD. Discussed avoiding tylenol while taking max dose of norco. No further questions at this time. Awaiting daughter to transport to home.

## 2017-09-07 ENCOUNTER — TELEPHONE (OUTPATIENT)
Dept: INTERNAL MEDICINE | Facility: CLINIC | Age: 82
End: 2017-09-07

## 2017-09-07 RX ORDER — CLORAZEPATE DIPOTASSIUM 7.5 MG/1
7.5 TABLET ORAL 2 TIMES DAILY PRN
Qty: 60 TABLET | Refills: 0 | Status: SHIPPED | OUTPATIENT
Start: 2017-09-07 | End: 2017-11-02

## 2017-09-07 NOTE — TELEPHONE ENCOUNTER
"IP F/U    Date: 9-6-17  Diagnosis: Chronic L shoulder pain, acute chest pain  Is patient active in care coordination? No  Was patient in TCU? No    Next 5 appointments (look out 90 days)     Sep 13, 2017  2:00 PM CDT   [facility]    [address]  [facility] [address]   [phone]                  ED/Discharge Protocol    \"Hi, my name is Maci Cortes, a registered nurse, and I am calling on behalf of Dr. Londono's office at [facility].  I am calling to follow up and see how things are going for you after your recent visit.\"    \"I see that you were in the (ER/UC/IP) on 9-5-17.    How are you doing now that you are home?\" still c/o L shoulder and some weakness    Is patient experiencing symptoms that may require a hospital visit?  Not at this time    Discharge Instructions    \"Let's review your discharge instructions.  What is/are the follow-up recommendations?  Pt. Response: Discharge Instructions and Follow-Up:   Discharge diet:    Active Diet Order      Combination Diet Low Saturated Fat Na <2400mg Diet, No Caffeine Diet      Diet   Discharge activity: Activity as tolerated   Discharge follow-up: Follow up with primary care provider in 7 days   Outpatient therapy: None    Home Care agency: None    Other instructions: Follow-up with orthopedics for injection and cardiology          \"Were you instructed to make a follow-up appointment?\"  Pt. Response: Yes.  Has appointment been made?   Yes      \"When you see the provider, I would recommend that you bring your discharge instructions with you.    Medications    \"How many new medications are you on since your hospitalization/ED visit?\"    0-1  \"How many of your current medicines changed (dose, timing, name, etc.) while you were in the hospital/ED visit?\"   0-1  \"Do you have questions about your medications?\"   No  \"Were you newly diagnosed with heart failure, COPD, diabetes " "or did you have a heart attack?\"   No  Medication reconciliation completed? Yes    Was MTM referral placed (*Make sure to put transitions as reason for referral)?   No    Call Summary    \"Do you have any questions or concerns about your condition or care plan at the moment?\"    No  Triage nurse advice given: follow DC instructions and keep appt with PCP.  She will also call and schedule her cortisone inj for L shoulder pain.    Patient was in ER 1 in the past year (assess appropriateness of ER visits.)      \"If you have questions or things don't continue to improve, we encourage you contact us through the main clinic number,  [phone].  Even if the clinic is not open, triage nurses are available 24/7 to help you.     We would like you to know that our clinic has extended hours (provide information).  We also have urgent care (provide details on closest location and hours/contact info)\"      \"Thank you for your time and take care!\"        "

## 2017-09-15 ENCOUNTER — TELEPHONE (OUTPATIENT)
Dept: OTHER | Facility: CLINIC | Age: 82
End: 2017-09-15

## 2017-09-15 ENCOUNTER — OFFICE VISIT (OUTPATIENT)
Dept: INTERNAL MEDICINE | Facility: CLINIC | Age: 82
End: 2017-09-15
Payer: MEDICARE

## 2017-09-15 VITALS
BODY MASS INDEX: 23.99 KG/M2 | TEMPERATURE: 100 F | HEART RATE: 104 BPM | WEIGHT: 144 LBS | OXYGEN SATURATION: 100 % | SYSTOLIC BLOOD PRESSURE: 110 MMHG | DIASTOLIC BLOOD PRESSURE: 50 MMHG | HEIGHT: 65 IN

## 2017-09-15 DIAGNOSIS — R60.0 BILATERAL LEG EDEMA: Primary | ICD-10-CM

## 2017-09-15 DIAGNOSIS — Z23 NEED FOR PROPHYLACTIC VACCINATION AND INOCULATION AGAINST INFLUENZA: ICD-10-CM

## 2017-09-15 DIAGNOSIS — M25.512 LEFT SHOULDER PAIN, UNSPECIFIED CHRONICITY: ICD-10-CM

## 2017-09-15 DIAGNOSIS — T85.43XD RUPTURED RIGHT BREAST IMPLANT, SUBSEQUENT ENCOUNTER: ICD-10-CM

## 2017-09-15 PROCEDURE — G0008 ADMIN INFLUENZA VIRUS VAC: HCPCS | Performed by: INTERNAL MEDICINE

## 2017-09-15 PROCEDURE — 90662 IIV NO PRSV INCREASED AG IM: CPT | Performed by: INTERNAL MEDICINE

## 2017-09-15 PROCEDURE — 99495 TRANSJ CARE MGMT MOD F2F 14D: CPT | Performed by: INTERNAL MEDICINE

## 2017-09-15 NOTE — PATIENT INSTRUCTIONS
I suspect that no action is required for the ruptured saline implant. I don't suspect that there is a silicone concern if this was a saline implant. The only avenues to pursue this further would either be to see a different plastic surgeon, or to try to get a hold of the previous operative report.     I think that the Lymphedema clinic seems like the best option to manage the leg swelling.     Another option would be to see the Vascular Medicine specialists. Contact information is provided. If they call you to help schedule appointment, you can either schedule one with them or tell them you want to wait.

## 2017-09-15 NOTE — PROGRESS NOTES
SUBJECTIVE:                                                    Gosia Alonzo is a 84 year old female who presents to clinic today for the following health issues:    Hospital Follow-up Visit:    Hospital/Nursing Home/IP Rehab Facility: [facility]  Date of Admission:9/5/17  Date of Discharge: 9/6/17  Reason(s) for Admission: left shoulder pain             Problems taking medications regularly:  None       Medication changes since discharge: None       Problems adhering to non-medication therapy:  None    Summary of hospitalization:  [facility] discharge summary reviewed  Diagnostic Tests/Treatments reviewed.  Follow up needed: none  Other Healthcare Providers Involved in Patient s Care:         None  Update since discharge: improved.     Post Discharge Medication Reconciliation: discharge medications reconciled and changed, per note/orders (see AVS).  Plan of care communicated with patient and family     Coding guidelines for this visit:  Type of Medical   Decision Making Face-to-Face Visit       within 7 Days of discharge Face-to-Face Visit        within 14 days of discharge   Moderate Complexity [id number]   High Complexity [id number]          Patient was in ED 9/5/2017-9/6/2017 due to left shoulder pain. Patient will see Dr. Abdul 9/18/2017 to receive a cortisone injection in the left shoulder. She follows with Dr. Abdul every 9 weeks for cortisone injections in the left shoulder and left knee due to osteoarthritis.     An CT was performed at ED that revealed no cardiac issues, but revealed that the right breast implant had ruptured. She had the breast implant placed in 1998, and she expressed concern about there being silicone present even though it was a saline implant. Discussed silicone poisoning symptoms with patient. She states that the implant currently does not bother her and she does not feel ill.     Patient had an echocardiogram performed 7/18/2017. Normal results. Pain  "originates from upper left back and will radiate to left chest, left neck, and left arm. Took nitro today states that it had no alleviating affect on pain.     Bilateral lower leg edema   Patient has been experiencing lower extremity edema for many years. Left leg and foot edema is worse than right leg and right foot edema. Patient was taking her nonprescription compression socks off the other day when she cut her leg. The wound kept bleeding and eventually stopped bleeding, but continued to leak fluids. There are no blisters present and fluid continues to leak through skin. She wears 2 Maxi pads on her left lower leg and changes them twice a day.     She is currently taking Lasix as needed and will occasionally take 2 tablets if needed as well as wrap her legs to improve symptoms. Patient has never been to a Lymphedema Clinic.     Patient reports no pain with edema.     Problem list and histories reviewed & adjusted, as indicated.  Additional history: as documented    ROS:  No dyspnea or cough. No chest discomfort, dizziness or palpitations. No diarrhea, abdominal pain or rectal bleeding.   No acute problems with vision or speech, lateralizing weakness or paresthesias.    ROS: as above or negative for Respiratory, CV, GI, endocrine, neuro systems.    MUSC: POSITIVE for left shoulder pain     This document serves as a record of the services and decisions personally performed and made by Michael Londono MD. It was created on his behalf by Rosalind Lainez, a trained medical scribe. The creation of this document is based on the provider's statements to the medical scribe.  Rosalind Lainez September 15, 2017 3:18 PM     OBJECTIVE:     /50  Pulse 104  Temp 100  F (37.8  C)  Ht 1.651 m (5' 5\")  Wt 65.3 kg (144 lb)  SpO2 100%  BMI 23.96 kg/m2  Body mass index is 23.96 kg/(m^2).    GENERAL: healthy, alert and no distress. Patient is present with daughter in-law  RESP: lungs clear to auscultation - no rales, rhonchi " or wheezes  CV: regular rate and rhythm, normal S1 S2, no S3 or S4, no murmur, click or rub, no peripheral edema and peripheral pulses strong  MS: no gross musculoskeletal defects noted. Pretibial and ankle/pedal pitting edema bilaterally, slightly worse on right than left  SKIN: no suspicious lesions or rashes  NEURO: Normal strength and tone, mentation intact and speech normal  PSYCH: mentation appears normal, affect normal/bright    ASSESSMENT/PLAN:   (R60.0) Bilateral leg edema  Comment: Discussed pathophysiology of venous insufficiency. Discussed risks of diuretic therapy. Patient has had difficulty using tight occlusive stockings. Referred to Lymphedema clinic for leg edema. May consider seeing vascular specialist if symptoms do not improve  Plan: LYMPHEDEMA THERAPY REFERRAL, VASCULAR SURGERY         REFERRAL          (T85.49XD) Ruptured right breast implant, subsequent encounter  Comment: Suspect no action required, Offered Referred to plastic surgery  Plan: PLASTIC SURGERY REFERRAL    (M25.512) Left shoulder pain, unspecified chronicity    Comment: Recent left shoulder/arm pain appears to have been noncardiac. We agreed that cardiology f/u could be     Deferred. Pain still present. Continue seeing Dr. Castro as recommended.           (Z23) Need for prophylactic vaccination and inoculation against influenza  Comment: Administered today  Plan: FLU VACCINE, INCREASED ANTIGEN, PRESV FREE          FUTURE APPOINTMENTS:       - Follow-up visit as needed     Patient Instructions   I suspect that no action is required for the ruptured saline implant. I don't suspect that there is a silicone concern if this was a saline implant. The only avenues to pursue this further would either be to see a different plastic surgeon, or to try to get a hold of the previous operative report.     I think that the Lymphedema clinic seems like the best option to manage the leg swelling.     Another option would be to see the Vascular  Medicine specialists. Contact information is provided. If they call you to help schedule appointment, you can either schedule one with them or tell them you want to wait.     The information in this document, created by the medical scribe for me, accurately reflects the services I personally performed and the decisions made by me. I have reviewed and approved this document for accuracy prior to leaving the patient care area.  September 15, 2017 3:18 PM    Michael Londono MD  [facility]      Injectable Influenza Immunization Documentation    1.  Is the person to be vaccinated sick today? No     2. Does the person to be vaccinated have an allergy to a component   of the vaccine? No    3. Has the person to be vaccinated ever had a serious reaction   to influenza vaccine in the past? NO    4. Has the person to be vaccinated ever had Guillain-Barré syndrome? NO    Form completed by TEE Beltran LPN

## 2017-09-15 NOTE — TELEPHONE ENCOUNTER
Pt referred to [facility] by  for bilateral lower extremity edema.    Pt needs to be scheduled for a consult with vascular medicine.  Will route to scheduling to coordinate an appointment next available.    Harika Tucker RN BSN

## 2017-09-15 NOTE — MR AVS SNAPSHOT
After Visit Summary   9/15/2017    Gosia Alonzo    MRN: [id number]           Patient Information     Date Of Birth          11/14/1932        Visit Information        Provider Department      9/15/2017 2:20 PM Michael Londono MD [facility]        Today's Diagnoses     Left shoulder pain, unspecified chronicity    -  1    Bilateral leg edema        Ruptured right breast implant, subsequent encounter        Need for prophylactic vaccination and inoculation against influenza          Care Instructions    I suspect that no action is required for the ruptured saline implant. I don't suspect that there is a silicone concern if this was a saline implant. The only avenues to pursue this further would either be to see a different plastic surgeon, or to try to get a hold of the previous operative report.     I think that the Lymphedema clinic seems like the best option to manage the leg swelling.     Another option would be to see the Vascular Medicine specialists. Contact information is provided. If they call you to help schedule appointment, you can either schedule one with them or tell them you want to wait.           Follow-ups after your visit        Additional Services     LYMPHEDEMA THERAPY REFERRAL       *This therapy referral will be filtered to a centralized scheduling office at [facility] and the patient will receive a call to schedule an appointment at a [place] location most convenient for them. *   If you have not heard from the scheduling office within 2 business days, please call [phone] for all locations, with the exception of [place], please call [phone].     Treatment: PT or OT Evaluation & Treatment  Special Instructions: Lymphedema  PT/OT Treatment Diagnosis: Lymphedema    Please be aware that coverage of these services is subject to the terms and limitations of your health insurance plan.  Call member services at your health plan with  "any benefit or coverage questions.      **Note to Provider:  If you are referring outside of [place] for the therapy appointment, please list the name of the location in the \"special instructions\" above, print the referral and give to the patient to schedule the appointment.            PLASTIC SURGERY REFERRAL       Your provider has referred you to: [facility]: Plastic and Reconstructive Surgery Clinic [facility] [phone]   [email or web address]  [facility] [phone]   [email or web address]    Please be aware that coverage of these services is subject to the terms and limitations of your health insurance plan.  Call member services at your health plan with any benefit or coverage questions.      Please bring the following with you to your appointment:    (1) Any X-Rays, CTs or MRIs which have been performed.  Contact the facility where they were done to arrange for  prior to your scheduled appointment.    (2) List of current medications  (3) This referral request   (4) Any documents/labs given to you for this referral            VASCULAR SURGERY REFERRAL       Your provider has referred you to: **Vascular  Services [phone] - Vascular Medicine Management & None - Please Order Appropriate Testing   [email or web address]    Please be aware that coverage of these services is subject to the terms and limitations of your health insurance plan.  Call member services at your health plan with any benefit or coverage questions.      Please bring the following with you to your appointment:    (1) Any X-Rays, CTs or MRIs which have been performed.  Contact the facility where they were done to arrange for  prior to your scheduled appointment.    (2) List of current medications   (3) This referral request   (4) Any documents/labs given to you for this referral             " "     Who to contact     If you have questions or need follow up information about today's clinic visit or your schedule please contact [facility] directly at [phone].  Normal or non-critical lab and imaging results will be communicated to you by MyChart, letter or phone within 4 business days after the clinic has received the results. If you do not hear from us within 7 days, please contact the clinic through MyChart or phone. If you have a critical or abnormal lab result, we will notify you by phone as soon as possible.  Submit refill requests through [employer] or call your pharmacy and they will forward the refill request to us. Please allow 3 business days for your refill to be completed.          Additional Information About Your Visit        [employer] Information     [employer] lets you send messages to your doctor, view your test results, renew your prescriptions, schedule appointments and more. To sign up, go to [email or web address] . Click on \"Log in\" on the left side of the screen, which will take you to the Welcome page. Then click on \"Sign up Now\" on the right side of the page.     You will be asked to enter the access code listed below, as well as some personal information. Please follow the directions to create your username and password.     Your access code is: [email or web address]  Expires: 10/16/2017 11:17 AM     Your access code will  in 90 days. If you need help or a new code, please call your [place] clinic or [phone].        Care EveryWhere ID     This is your Care EveryWhere ID. This could be used by other organizations to access your [facility] medical records  [id number]        Your Vitals Were     Pulse Temperature Height Pulse Oximetry BMI (Body Mass Index)       104 100  F (37.8  C) 5' 5\" (1.651 m) 100% 23.96 kg/m2        Blood Pressure from Last 3 Encounters:   09/15/17 110/50   17 140/72   17 126/60    Weight from Last 3 Encounters:   09/15/17 144 lb " (65.3 kg)   09/06/17 148 lb 9.6 oz (67.4 kg)   07/18/17 142 lb 8 oz (64.6 kg)              We Performed the Following     FLU VACCINE, INCREASED ANTIGEN, PRESV FREE     LYMPHEDEMA THERAPY REFERRAL     PLASTIC SURGERY REFERRAL     VASCULAR SURGERY REFERRAL        Primary Care Provider Office Phone # Fax #    Michael Londono [phone]       [address] [facility] [address]  [facility] [address]        Equal Access to Services     [facility] HospitalANGELO : Hadii aad ku hadasho Soomaali, waaxda luqadaha, qaybta kaalmada adeegyada, waxay idiin hayaan adeeg kharash la'aan . So [facility] [phone].    ATENCIÓN: Si habla español, tiene a barriga disposición servicios gratuitos de asistencia lingüística. [facility] [phone].    We comply with applicable federal civil rights laws and [place] laws. We do not discriminate on the basis of race, color, national origin, age, disability sex, sexual orientation or gender identity.            Thank you!     Thank you for choosing [facility]  for your care. Our goal is always to provide you with excellent care. Hearing back from our patients is one way we can continue to improve our services. Please take a few minutes to complete the written survey that you may receive in the mail after your visit with us. Thank you!             Your Updated Medication List - Protect others around you: Learn how to safely use, store and throw away your medicines at www.disposemymeds.org.          This list is accurate as of: 9/15/17  3:46 PM.  Always use your most recent med list.                   Brand Name Dispense Instructions for use Diagnosis    acetaminophen 500 MG tablet    TYLENOL     Take 1,000 mg by mouth 2 times daily as needed for mild pain        aspirin 81 MG EC tablet      Take 1 tablet (81 mg) by mouth daily        atorvastatin 40 MG tablet    LIPITOR    90 tablet    TAKE 1 TABLET BY MOUTH ONCE DAILY    ST elevation myocardial infarction (STEMI), unspecified artery (H),  ST elevation myocardial infarction (STEMI) involving other coronary artery of anterior wall (H)       carvedilol 3.125 MG tablet    COREG    180 tablet    Take 1 tablet (3.125 mg) by mouth 2 times daily (with meals)    Ischemic cardiomyopathy       clorazepate 7.5 MG tablet    TRANXENE    60 tablet    Take 1 tablet (7.5 mg) by mouth 2 times daily as needed for anxiety Need to schedule office f/u appt with Dr Londono.    Anxiety       doxylamine 25 MG Tabs tablet    UNISOM     Take 50 mg by mouth At Bedtime        EQUATE STOOL SOFTENER OR      Take 100 mg by mouth At Bedtime        HYDROcodone-acetaminophen  MG per tablet    NORCO    20 tablet    Take 1-2 tablets by mouth every 4 hours as needed for moderate to severe pain    Chronic left shoulder pain       LASIX PO      Take 20 mg by mouth 2 times daily as needed (edema) 1 tab upto bid prn        losartan 25 MG tablet    COZAAR    90 tablet    Take 1 tablet (25 mg) by mouth daily    Coronary artery disease involving native coronary artery of native heart without angina pectoris       MUCINEX 600 MG 12 hr tablet   Generic drug:  guaiFENesin      Take 1,200 mg by mouth as needed for congestion Reported on 4/11/2017        nitroGLYcerin 0.4 MG sublingual tablet    NITROSTAT    25 tablet    if you are still having symptoms after 3 doses (15 minutes) call 911.    ST elevation myocardial infarction (STEMI), unspecified artery (H), ST elevation myocardial infarction (STEMI) involving other coronary artery of anterior wall (H)       pantoprazole 40 MG EC tablet    PROTONIX    90 tablet    Take 1 tablet (40 mg) by mouth daily Take 30-60 minutes before a meal.    Gastroesophageal reflux disease without esophagitis

## 2017-09-18 NOTE — TELEPHONE ENCOUNTER
LM on pt's home phone asking for callback to schedule recommended consult.    Harika Tucker, ADRIANN, RN

## 2017-09-26 ENCOUNTER — OFFICE VISIT (OUTPATIENT)
Dept: SURGERY | Facility: CLINIC | Age: 82
End: 2017-09-26
Payer: MEDICARE

## 2017-09-26 VITALS
HEIGHT: 65 IN | HEART RATE: 92 BPM | BODY MASS INDEX: 23.99 KG/M2 | DIASTOLIC BLOOD PRESSURE: 74 MMHG | SYSTOLIC BLOOD PRESSURE: 130 MMHG | OXYGEN SATURATION: 97 % | WEIGHT: 144 LBS

## 2017-09-26 DIAGNOSIS — M79.89 SWOLLEN LEG: Primary | ICD-10-CM

## 2017-09-26 PROCEDURE — 99204 OFFICE O/P NEW MOD 45 MIN: CPT | Performed by: INTERNAL MEDICINE
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
## 2017-09-26 NOTE — MR AVS SNAPSHOT
"              After Visit Summary   9/26/2017    Gosia Alonzo    MRN: [id number]           Patient Information     Date Of Birth          11/14/1932        Visit Information        Provider Department      9/26/2017 3:00 PM Naresh Valadez MD [facility]      Today's Diagnoses     Swollen leg    -  1       Follow-ups after your visit        Follow-up notes from your care team     Return in about 7 days (around 10/3/2017).      Future tests that were ordered for you today     Open Future Orders        Priority Expected Expires Ordered    Comprehensive metabolic panel Routine 9/28/2017 9/26/2018 9/26/2017     Lower Extremity Venous Duplex Bilateral Routine 9/28/2017 9/26/2018 9/26/2017            Who to contact     If you have questions or need follow up information about today's clinic visit or your schedule please contact [facility] directly at [phone].  Normal or non-critical lab and imaging results will be communicated to you by MyChart, letter or phone within 4 business days after the clinic has received the results. If you do not hear from us within 7 days, please contact the clinic through [employer] or phone. If you have a critical or abnormal lab result, we will notify you by phone as soon as possible.  Submit refill requests through [employer] or call your pharmacy and they will forward the refill request to us. Please allow 3 business days for your refill to be completed.          Additional Information About Your Visit        [employer] Information     [employer] lets you send messages to your doctor, view your test results, renew your prescriptions, schedule appointments and more. To sign up, go to [email or web address] . Click on \"Log in\" on the left side of the screen, which will take you to the Welcome page. Then click on \"Sign up Now\" on the right side of the page.     You will be asked to enter the access code " "listed below, as well as some personal information. Please follow the directions to create your username and password.     Your access code is: [email or web address]  Expires: 10/16/2017 11:17 AM     Your access code will  in 90 days. If you need help or a new code, please call your [facility] or [phone].        Care EveryWhere ID     This is your Care EveryWhere ID. This could be used by other organizations to access your [place] medical records  [id number]        Your Vitals Were     Pulse Height Pulse Oximetry Breastfeeding? BMI (Body Mass Index)       92 5' 5\" (1.651 m) 97% No 23.96 kg/m2        Blood Pressure from Last 3 Encounters:   17 130/74   09/15/17 110/50   17 140/72    Weight from Last 3 Encounters:   17 144 lb (65.3 kg)   09/15/17 144 lb (65.3 kg)   17 148 lb 9.6 oz (67.4 kg)              We Performed the Following     D dimer quantitative        Primary Care Provider Office Phone # Fax #    Michael Londono [phone]       [address] [facility] [address]  [place] [id number]        Equal Access to Services     LORELEI JOHN : Hadii aad ku hadasho Soomaali, waaxda luqadaha, qaybta kaalmada adeegyada, waxay idiin hayaan adeeg kristina la'davidn . So [facility] [phone].    ATENCIÓN: Si habla español, tiene a barriga disposición servicios gratuitos de asistencia lingüística. Llame al [phone].    We comply with applicable federal civil rights laws and [place] laws. We do not discriminate on the basis of race, color, national origin, age, disability sex, sexual orientation or gender identity.            Thank you!     Thank you for choosing [facility]  for your care. Our goal is always to provide you with excellent care. Hearing back from our patients is one way we can continue to improve our services. Please take a few minutes to complete the written survey that you may receive in the mail after your visit with us. Thank you!             Your " Updated Medication List - Protect others around you: Learn how to safely use, store and throw away your medicines at www.disposemymeds.org.          This list is accurate as of: 9/26/17  3:50 PM.  Always use your most recent med list.                   Brand Name Dispense Instructions for use Diagnosis    acetaminophen 500 MG tablet    TYLENOL     Take 1,000 mg by mouth 2 times daily as needed for mild pain        aspirin 81 MG EC tablet      Take 1 tablet (81 mg) by mouth daily        atorvastatin 40 MG tablet    LIPITOR    90 tablet    TAKE 1 TABLET BY MOUTH ONCE DAILY    ST elevation myocardial infarction (STEMI), unspecified artery (H), ST elevation myocardial infarction (STEMI) involving other coronary artery of anterior wall (H)       carvedilol 3.125 MG tablet    COREG    180 tablet    Take 1 tablet (3.125 mg) by mouth 2 times daily (with meals)    Ischemic cardiomyopathy       clorazepate 7.5 MG tablet    TRANXENE    60 tablet    Take 1 tablet (7.5 mg) by mouth 2 times daily as needed for anxiety Need to schedule office f/u appt with Dr Londono.    Anxiety       doxylamine 25 MG Tabs tablet    UNISOM     Take 50 mg by mouth At Bedtime        EQUATE STOOL SOFTENER OR      Take 100 mg by mouth At Bedtime        HYDROcodone-acetaminophen  MG per tablet    NORCO    20 tablet    Take 1-2 tablets by mouth every 4 hours as needed for moderate to severe pain    Chronic left shoulder pain       LASIX PO      Take 20 mg by mouth 2 times daily as needed (edema) 1 tab upto bid prn        losartan 25 MG tablet    COZAAR    90 tablet    Take 1 tablet (25 mg) by mouth daily    Coronary artery disease involving native coronary artery of native heart without angina pectoris       MUCINEX 600 MG 12 hr tablet   Generic drug:  guaiFENesin      Take 1,200 mg by mouth as needed for congestion Reported on 4/11/2017        nitroGLYcerin 0.4 MG sublingual tablet    NITROSTAT    25 tablet    if you are still having symptoms  after 3 doses (15 minutes) call 911.    ST elevation myocardial infarction (STEMI), unspecified artery (H), ST elevation myocardial infarction (STEMI) involving other coronary artery of anterior wall (H)       pantoprazole 40 MG EC tablet    PROTONIX    90 tablet    Take 1 tablet (40 mg) by mouth daily Take 30-60 minutes before a meal.    Gastroesophageal reflux disease without esophagitis

## 2017-09-29 ENCOUNTER — HOSPITAL ENCOUNTER (OUTPATIENT)
Dept: ULTRASOUND IMAGING | Facility: CLINIC | Age: 82
Discharge: HOME | End: 2017-09-29
Attending: INTERNAL MEDICINE | Admitting: INTERNAL MEDICINE
Payer: MEDICARE

## 2017-09-29 DIAGNOSIS — M79.89 SWOLLEN LEG: ICD-10-CM

## 2017-09-29 LAB
ALBUMIN SERPL-MCNC: 3.4 G/DL (ref 3.4–5)
ALP SERPL-CCNC: 154 U/L (ref 40–150)
ALT SERPL W P-5'-P-CCNC: 19 U/L (ref 0–50)
ANION GAP SERPL CALCULATED.3IONS-SCNC: 8 MMOL/L (ref 3–14)
AST SERPL W P-5'-P-CCNC: 26 U/L (ref 0–45)
BILIRUB SERPL-MCNC: 0.4 MG/DL (ref 0.2–1.3)
BUN SERPL-MCNC: 44 MG/DL (ref 7–30)
CALCIUM SERPL-MCNC: 8.4 MG/DL (ref 8.5–10.1)
CHLORIDE SERPL-SCNC: 107 MMOL/L (ref 94–109)
CO2 SERPL-SCNC: 20 MMOL/L (ref 20–32)
CREAT SERPL-MCNC: 1.2 MG/DL (ref 0.52–1.04)
GFR SERPL CREATININE-BSD FRML MDRD: 43 ML/MIN/1.7M2
GLUCOSE SERPL-MCNC: 132 MG/DL (ref 70–99)
POTASSIUM SERPL-SCNC: 4.6 MMOL/L (ref 3.4–5.3)
PROT SERPL-MCNC: 7.9 G/DL (ref 6.8–8.8)
SODIUM SERPL-SCNC: 135 MMOL/L (ref 133–144)

## 2017-09-29 PROCEDURE — 80053 COMPREHEN METABOLIC PANEL: CPT | Performed by: INTERNAL MEDICINE

## 2017-09-29 PROCEDURE — 93970 EXTREMITY STUDY: CPT

## 2017-09-29 PROCEDURE — 36415 COLL VENOUS BLD VENIPUNCTURE: CPT | Performed by: INTERNAL MEDICINE

## 2017-10-03 ENCOUNTER — OFFICE VISIT (OUTPATIENT)
Dept: SURGERY | Facility: CLINIC | Age: 82
End: 2017-10-03
Payer: MEDICARE

## 2017-10-03 DIAGNOSIS — M79.89 SWOLLEN LEG: Primary | ICD-10-CM

## 2017-10-03 PROCEDURE — 99213 OFFICE O/P EST LOW 20 MIN: CPT | Performed by: INTERNAL MEDICINE

## 2017-10-03 NOTE — PROGRESS NOTES
Vascular Medicine Progress Note     Gosia Alonzo is a 84 year old female who was seen on (Not on file). For follow-up on lab testing and Doppler venous ultrasound    Interval History   Patient's lower extremity edema subsided almost completely with compression stockings and patient is starting to exercise and move around more frequently    Physical Exam                      There were no vitals filed for this visit.  Vital Signs with Ranges     [unfilled]    Constitutional: awake, alert, cooperative, no apparent distress, and appears stated age  Eyes: Lids and lashes normal, pupils equal, round and reactive to light, extra ocular muscles intact, sclera clear, conjunctiva normal  ENT: normocepalic, without obvious abnormality, oropharynx pink and moist  Hematologic / Lymphatic: no lymphadenopathy  Respiratory: No increased work of breathing, good air exchange, clear to auscultation bilaterally, no crackles or wheezing  Cardiovascular: regular rate and rhythm, normal S1 and S2 and no murmur noted  GI: Normal bowel sounds, soft, non-distended, non-tender  Skin: no redness, warmth, or swelling, no rashes and no lesions  Musculoskeletal: There is no redness, warmth, or swelling of the joints.  Full range of motion noted.  Motor strength is 5 out of 5 all extremities bilaterally.  Tone is normal.  Neurologic: Awake, alert, oriented to name, place and time.  Cranial nerves II-XII are grossly intact.  Motor is 5 out of 5 bilaterally.    Neuropsychiatric:  Normal affect, memory, insight.  Pulses: Same as previous exam. No carotid bruits appreciated.     Medications           Data  no evidence of DVT bilaterally especially the left leg and lab didn't show much of deterioration in kidney function GFR is 52       Assessment & Plan   No diagnosis found.      Summary: Continue wearing compression stockings, continue with the same current dose of Lasix, continue with exercise and moving around  Follow-up in 6-12  months from now    Naresh Valadez MD

## 2017-10-03 NOTE — MR AVS SNAPSHOT
"              After Visit Summary   10/3/2017    Gosia Alonzo    MRN: [id number]           Patient Information     Date Of Birth          1932        Visit Information        Provider Department      10/3/2017 2:30 PM Naresh Valadez MD [facility]      Today's Diagnoses     Swollen leg    -  1       Follow-ups after your visit        Who to contact     If you have questions or need follow up information about today's clinic visit or your schedule please contact [facility] directly at [phone].  Normal or non-critical lab and imaging results will be communicated to you by [email or web address], letter or phone within 4 business days after the clinic has received the results. If you do not hear from us within 7 days, please contact the clinic through [email or web address] or phone. If you have a critical or abnormal lab result, we will notify you by phone as soon as possible.  Submit refill requests through [employer] or call your pharmacy and they will forward the refill request to us. Please allow 3 business days for your refill to be completed.          Additional Information About Your Visit        MyChart Information     [employer] lets you send messages to your doctor, view your test results, renew your prescriptions, schedule appointments and more. To sign up, go to [email or web address] . Click on \"Log in\" on the left side of the screen, which will take you to the Welcome page. Then click on \"Sign up Now\" on the right side of the page.     You will be asked to enter the access code listed below, as well as some personal information. Please follow the directions to create your username and password.     Your access code is: [email or web address]  Expires: 10/16/2017 11:17 AM     Your access code will  in 90 days. If you need help or a new code, please call your [place] clinic or [phone].        Care EveryWhere ID     This is your Care " EveryWhere ID. This could be used by other organizations to access your [place] medical records  [id number]         Blood Pressure from Last 3 Encounters:   09/26/17 130/74   09/15/17 110/50   09/06/17 140/72    Weight from Last 3 Encounters:   09/26/17 144 lb (65.3 kg)   09/15/17 144 lb (65.3 kg)   09/06/17 148 lb 9.6 oz (67.4 kg)              Today, you had the following     No orders found for display       Primary Care Provider Office Phone # Fax #    Michael Londono [phone]       [address] [facility] [address]  [facility] [address]        Equal Access to Services     MARK JOHN : Hadii viral hearn Sofelicity, waaxda luqadaha, qaybta kaalmada adeegyada, alejandro burnett . So [facility] [phone].    ATENCIÓN: Si habla español, tiene a barriga disposición servicios gratuitos de asistencia lingüística. Llame al [phone].    We comply with applicable federal civil rights laws and [place] laws. We do not discriminate on the basis of race, color, national origin, age, disability, sex, sexual orientation, or gender identity.            Thank you!     Thank you for choosing [facility]  for your care. Our goal is always to provide you with excellent care. Hearing back from our patients is one way we can continue to improve our services. Please take a few minutes to complete the written survey that you may receive in the mail after your visit with us. Thank you!             Your Updated Medication List - Protect others around you: Learn how to safely use, store and throw away your medicines at www.disposemymeds.org.          This list is accurate as of: 10/3/17  3:09 PM.  Always use your most recent med list.                   Brand Name Dispense Instructions for use Diagnosis    acetaminophen 500 MG tablet    TYLENOL     Take 1,000 mg by mouth 2 times daily as needed for mild pain        aspirin 81 MG EC tablet      Take 1 tablet (81 mg) by mouth daily         atorvastatin 40 MG tablet    LIPITOR    90 tablet    TAKE 1 TABLET BY MOUTH ONCE DAILY    ST elevation myocardial infarction (STEMI), unspecified artery (H), ST elevation myocardial infarction (STEMI) involving other coronary artery of anterior wall (H)       carvedilol 3.125 MG tablet    COREG    180 tablet    Take 1 tablet (3.125 mg) by mouth 2 times daily (with meals)    Ischemic cardiomyopathy       clorazepate 7.5 MG tablet    TRANXENE    60 tablet    Take 1 tablet (7.5 mg) by mouth 2 times daily as needed for anxiety Need to schedule office f/u appt with Dr Londono.    Anxiety       doxylamine 25 MG Tabs tablet    UNISOM     Take 50 mg by mouth At Bedtime        EQUATE STOOL SOFTENER OR      Take 100 mg by mouth At Bedtime        HYDROcodone-acetaminophen  MG per tablet    NORCO    20 tablet    Take 1-2 tablets by mouth every 4 hours as needed for moderate to severe pain    Chronic left shoulder pain       LASIX PO      Take 20 mg by mouth 2 times daily as needed (edema) 1 tab upto bid prn        losartan 25 MG tablet    COZAAR    90 tablet    Take 1 tablet (25 mg) by mouth daily    Coronary artery disease involving native coronary artery of native heart without angina pectoris       MUCINEX 600 MG 12 hr tablet   Generic drug:  guaiFENesin      Take 1,200 mg by mouth as needed for congestion Reported on 4/11/2017        nitroGLYcerin 0.4 MG sublingual tablet    NITROSTAT    25 tablet    if you are still having symptoms after 3 doses (15 minutes) call 911.    ST elevation myocardial infarction (STEMI), unspecified artery (H), ST elevation myocardial infarction (STEMI) involving other coronary artery of anterior wall (H)       pantoprazole 40 MG EC tablet    PROTONIX    90 tablet    Take 1 tablet (40 mg) by mouth daily Take 30-60 minutes before a meal.    Gastroesophageal reflux disease without esophagitis

## 2017-11-02 DIAGNOSIS — F41.9 ANXIETY: ICD-10-CM

## 2017-11-06 NOTE — TELEPHONE ENCOUNTER
Tranxene      Last Written Prescription Date:  9/7/17  Last Fill Quantity: 60,   # refills: 0  Future Office visit:       Routing refill request to provider for review/approval because:  Drug not on the [facility] or [facility] refill protocol or controlled substance.

## 2017-11-07 RX ORDER — CLORAZEPATE DIPOTASSIUM 7.5 MG/1
7.5 TABLET ORAL 2 TIMES DAILY PRN
Qty: 60 TABLET | Refills: 0 | Status: SHIPPED | OUTPATIENT
Start: 2017-11-07 | End: 2017-12-28

## 2017-11-08 DIAGNOSIS — I25.5 ISCHEMIC CARDIOMYOPATHY: Primary | ICD-10-CM

## 2017-11-08 DIAGNOSIS — I25.10 CORONARY ARTERY DISEASE INVOLVING NATIVE CORONARY ARTERY OF NATIVE HEART WITHOUT ANGINA PECTORIS: ICD-10-CM

## 2017-11-08 RX ORDER — LOSARTAN POTASSIUM 25 MG/1
25 TABLET ORAL DAILY
Qty: 90 TABLET | Refills: 1 | Status: SHIPPED | OUTPATIENT
Start: 2017-11-08 | End: 2018-05-08

## 2017-11-08 NOTE — TELEPHONE ENCOUNTER
Received refill request for:  Losartan  Last OV was: 7/18/2017 with Dr. Collins  Labs/EKG: last BMP 9/29/2017  F/U scheduled: Orders placed for CORE f/u 1/2018  New script sent to:  [facility]

## 2017-12-28 DIAGNOSIS — F41.9 ANXIETY: ICD-10-CM

## 2018-01-02 ENCOUNTER — OFFICE VISIT (OUTPATIENT)
Dept: CARDIOLOGY | Facility: CLINIC | Age: 83
End: 2018-01-02
Attending: NURSE PRACTITIONER
Payer: MEDICARE

## 2018-01-02 VITALS
HEART RATE: 80 BPM | SYSTOLIC BLOOD PRESSURE: 128 MMHG | BODY MASS INDEX: 23.88 KG/M2 | WEIGHT: 143.3 LBS | HEIGHT: 65 IN | DIASTOLIC BLOOD PRESSURE: 66 MMHG | OXYGEN SATURATION: 98 %

## 2018-01-02 DIAGNOSIS — I25.5 ISCHEMIC CARDIOMYOPATHY: ICD-10-CM

## 2018-01-02 LAB
ANION GAP SERPL CALCULATED.3IONS-SCNC: 9 MMOL/L (ref 3–14)
BUN SERPL-MCNC: 26 MG/DL (ref 7–30)
CALCIUM SERPL-MCNC: 8.7 MG/DL (ref 8.5–10.1)
CHLORIDE SERPL-SCNC: 102 MMOL/L (ref 94–109)
CO2 SERPL-SCNC: 23 MMOL/L (ref 20–32)
CREAT SERPL-MCNC: 1.04 MG/DL (ref 0.52–1.04)
GFR SERPL CREATININE-BSD FRML MDRD: 50 ML/MIN/1.7M2
GLUCOSE SERPL-MCNC: 127 MG/DL (ref 70–99)
POTASSIUM SERPL-SCNC: 3.9 MMOL/L (ref 3.4–5.3)
SODIUM SERPL-SCNC: 134 MMOL/L (ref 133–144)

## 2018-01-02 PROCEDURE — 80048 BASIC METABOLIC PNL TOTAL CA: CPT | Performed by: NURSE PRACTITIONER

## 2018-01-02 PROCEDURE — 36415 COLL VENOUS BLD VENIPUNCTURE: CPT | Performed by: NURSE PRACTITIONER

## 2018-01-02 PROCEDURE — 99214 OFFICE O/P EST MOD 30 MIN: CPT | Performed by: NURSE PRACTITIONER

## 2018-01-02 RX ORDER — FUROSEMIDE 20 MG
20 TABLET ORAL DAILY
COMMUNITY
End: 2018-01-12

## 2018-01-02 NOTE — PATIENT INSTRUCTIONS
Call CORE nurse for any questions or concerns:  [phone]    1. Weigh yourself daily and write it down.    2. Call CORE nurse if your weight is up more than 2 pounds in one day or 5 pounds in one week.    3. Call CORE nurse if you feel more short of breath, have more abdominal bloating, or leg swelling.    4. Continue low sodium diet (less than 2000 mg daily). If you eat less salt, you will retain less fluid.    5. Medication changes:       6. Lab results:  Component      Latest Ref Rng & Units 1/2/2018   Sodium      133 - 144 mmol/L 134   Potassium      3.4 - 5.3 mmol/L 3.9   Chloride      94 - 109 mmol/L 102   Carbon Dioxide      20 - 32 mmol/L 23   Anion Gap      3 - 14 mmol/L 9   Glucose      70 - 99 mg/dL 127 (H)   Urea Nitrogen      7 - 30 mg/dL 26   Creatinine      0.52 - 1.04 mg/dL 1.04   GFR Estimate      >60 mL/min/1.7m2 50 (L)   GFR Estimate If Black      >60 mL/min/1.7m2 61   Calcium      8.5 - 10.1 mg/dL 8.7
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
## 2018-01-02 NOTE — LETTER
1/2/2018      Michael Londono MD, MD  [address] [facility] [address]  [facility] [address]      RE: Gosia Alonzo       Dear Colleague,    I had the pleasure of seeing Gosia Alonzo in the [facility].    HISTORY OF PRESENT ILLNESS:  Gosia Alonzo was here in routine followup.  She is an 85-year-old patient who has a history of CAD, status post CAMILA to the mid and proximal LAD in 01/2016, ischemic cardiomyopathy, CKD, paroxysmal atrial fibrillation, hypertension, osteoporosis, history of GI bleed, leg edema.  She follows with Dr. Collins and myself.  She recently presented to the emergency room with left shoulder pain and reproducible chest pain.  Her symptoms were likely related to chronic pain.  Troponins were negative.  CT showed possible breast implant rupture.  She planned to discuss the CT finding with her PCP.  She was offered a shoulder injection, but she preferred to follow up with her longstanding orthopedic surgeon.  She then followed up with her orthopedic surgeon.      The pain is now relieved.  She returns today in followup.  She denies chest pain, chest pressure, neck or arm pain.  When she was seen by Dr. Collins back in 07/2017, she had increased lower extremity edema.  He placed her on daily Lasix and this clearly has helped.  She now is wearing her compression stockings every day as well.  Her weight came down a few pounds and she is feeling fantastic.      Blood pressure is 128/66, pulse is 80, weight is 143 pounds in the clinic and 139 pounds at home.      Please see complete physical examination below.      ASSESSMENT AND PLAN:   1.  Ischemic cardiomyopathy, well-compensated.  Continue furosemide 20 mg daily.      Echocardiogram in 07/2018.  Her echocardiogram from 2017 showed an LVEF of 30%-35%.      2.  Lower extremity edema, improved with decreasing salt in her diet and increasing Lasix to daily.  Her basic metabolic panel shows a sodium 134, potassium 3.9, BUN  26, creatinine 1.04, GFR 50.      3.  Dyslipidemia.  Dr. Collins considered increasing the atorvastatin when she had an LDL of 91, but continued 40 mg a day.  She will have a repeat fasting lipid profile in July and Dr. Collins will determine if he will increase the dose.        Outpatient Encounter Prescriptions as of 1/2/2018   Medication Sig Dispense Refill     furosemide (LASIX) 20 MG tablet Take 20 mg by mouth daily Add additional 20mg for increased edema       pantoprazole (PROTONIX) 40 MG EC tablet Take 1 tablet (40 mg) by mouth daily, 30-60 minutes before a meal 90 tablet 2     losartan (COZAAR) 25 MG tablet Take 1 tablet (25 mg) by mouth daily 90 tablet 1     [DISCONTINUED] clorazepate (TRANXENE) 7.5 MG tablet Take 1 tablet (7.5 mg) by mouth 2 times daily as needed for anxiety 60 tablet 0     HYDROcodone-acetaminophen (NORCO)  MG per tablet Take 1-2 tablets by mouth every 4 hours as needed for moderate to severe pain 20 tablet 0     doxylamine (UNISOM) 25 MG TABS tablet Take 50 mg by mouth At Bedtime       carvedilol (COREG) 3.125 MG tablet Take 1 tablet (3.125 mg) by mouth 2 times daily (with meals) 180 tablet 3     atorvastatin (LIPITOR) 40 MG tablet TAKE 1 TABLET BY MOUTH ONCE DAILY 90 tablet 3     guaiFENesin (MUCINEX) 600 MG 12 hr tablet Take 600 mg by mouth as needed for congestion Reported on 4/11/2017       Docusate Sodium (EQUATE STOOL SOFTENER OR) Take 100 mg by mouth daily as needed        nitroglycerin (NITROSTAT) 0.4 MG SL tablet if you are still having symptoms after 3 doses (15 minutes) call 911. 25 tablet 2     acetaminophen (TYLENOL) 500 MG tablet Take 1,000 mg by mouth 2 times daily as needed for mild pain       aspirin EC 81 MG EC tablet Take 1 tablet (81 mg) by mouth daily       [DISCONTINUED] Furosemide (LASIX PO) Take 20 mg by mouth 2 times daily as needed (edema) 1 tab upto bid prn       No facility-administered encounter medications on file as of 1/2/2018.        Again, thank you for  allowing me to participate in the care of your patient.      Sincerely,    WILLIE Harding CNP     [facility]

## 2018-01-02 NOTE — MR AVS SNAPSHOT
After Visit Summary   1/2/2018    Gosia Alonzo    MRN: [id number]           Patient Information     Date Of Birth          11/14/1932        Visit Information        Provider Department      1/2/2018 3:10 PM Megan Ortiz APRN CNP [facility]        Today's Diagnoses     Ischemic cardiomyopathy          Care Instructions    Call [facility] nurse for any questions or concerns:  [phone]    1. Weigh yourself daily and write it down.    2. Call [facility] nurse if your weight is up more than 2 pounds in one day or 5 pounds in one week.    3. Call [facility] nurse if you feel more short of breath, have more abdominal bloating, or leg swelling.    4. Continue low sodium diet (less than 2000 mg daily). If you eat less salt, you will retain less fluid.    5. Medication changes:       6. Lab results:  Component      Latest Ref Rng & Units 1/2/2018   Sodium      133 - 144 mmol/L 134   Potassium      3.4 - 5.3 mmol/L 3.9   Chloride      94 - 109 mmol/L 102   Carbon Dioxide      20 - 32 mmol/L 23   Anion Gap      3 - 14 mmol/L 9   Glucose      70 - 99 mg/dL 127 (H)   Urea Nitrogen      7 - 30 mg/dL 26   Creatinine      0.52 - 1.04 mg/dL 1.04   GFR Estimate      >60 mL/min/1.7m2 50 (L)   GFR Estimate If Black      >60 mL/min/1.7m2 61   Calcium      8.5 - 10.1 mg/dL 8.7             Follow-ups after your visit        Additional Services     Follow-Up with Cardiologist                 Future tests that were ordered for you today     Open Future Orders        Priority Expected Expires Ordered    Basic metabolic panel Routine 7/1/2018 1/2/2019 1/2/2018    Lipid Profile Routine 7/1/2018 1/2/2019 1/2/2018    Follow-Up with Cardiologist Routine 7/9/2018 1/2/2019 1/2/2018    Echocardiogram Routine 7/1/2018 1/2/2019 1/2/2018            Who to contact     If you have questions or need follow up information about today's clinic visit or your schedule please contact [facility]  [facility] directly at [phone].  Normal or non-critical lab and imaging results will be communicated to you by MyChart, letter or phone within 4 business days after the clinic has received the results. If you do not hear from us within 7 days, please contact the clinic through [employer] or phone. If you have a critical or abnormal lab result, we will notify you by phone as soon as possible.  Submit refill requests through [employer] or call your pharmacy and they will forward the refill request to us. Please allow 3 business days for your refill to be completed.          Additional Information About Your Visit        [employer] Connect Information     [employer] lets you send messages to your doctor, view your test results, renew your prescriptions, schedule appointments and more. To sign up, go to [email or web address] . Click on \"Log in\" on the left side of the screen, which will take you to the Welcome page. Then click on \"Sign up Now\" on the right side of the page.     You will be asked to enter the access code listed below, as well as some personal information. Please follow the directions to create your username and password.     Your access code is: [email or web address]  Expires: 2018  3:45 PM     Your access code will  in 90 days. If you need help or a new code, please call your [place] clinic or [phone].        Care EveryWhere ID     This is your Care EveryWhere ID. This could be used by other organizations to access your [place] medical records  [id number]        Your Vitals Were     Pulse Height Pulse Oximetry BMI (Body Mass Index)          80 1.651 m (5' 5\") 98% 23.85 kg/m2         Blood Pressure from Last 3 Encounters:   18 128/66   17 130/74   09/15/17 110/50    Weight from Last 3 Encounters:   18 65 kg (143 lb 4.8 oz)   17 65.3 kg (144 lb)   09/15/17 65.3 kg (144 lb)              We Performed the Following     Follow-Up with [facility] - JOANN visit  "       Primary Care Provider Office Phone # Fax #    Michael Londono [phone]       [address] [facility] [address]  [facility] [address]        Equal Access to Services     LORELEI JOHN : Hadede viral bhakta gemmao Sofelicity, waaxda luqadaha, qaybta kaalmada laura, alejandro vincentgregg jana. So [facility] [phone].    ATENCIÓN: Si habla español, tiene a barriga disposición servicios gratuitos de asistencia lingüística. Llame al [phone].    We comply with applicable federal civil rights laws and [place] laws. We do not discriminate on the basis of race, color, national origin, age, disability, sex, sexual orientation, or gender identity.            Thank you!     Thank you for choosing [facility]  for your care. Our goal is always to provide you with excellent care. Hearing back from our patients is one way we can continue to improve our services. Please take a few minutes to complete the written survey that you may receive in the mail after your visit with us. Thank you!             Your Updated Medication List - Protect others around you: Learn how to safely use, store and throw away your medicines at www.disposemymeds.org.          This list is accurate as of: 1/2/18  3:45 PM.  Always use your most recent med list.                   Brand Name Dispense Instructions for use Diagnosis    acetaminophen 500 MG tablet    TYLENOL     Take 1,000 mg by mouth 2 times daily as needed for mild pain        aspirin 81 MG EC tablet      Take 1 tablet (81 mg) by mouth daily        atorvastatin 40 MG tablet    LIPITOR    90 tablet    TAKE 1 TABLET BY MOUTH ONCE DAILY    ST elevation myocardial infarction (STEMI), unspecified artery (H), ST elevation myocardial infarction (STEMI) involving other coronary artery of anterior wall (H)       carvedilol 3.125 MG tablet    COREG    180 tablet    Take 1 tablet (3.125 mg) by mouth 2 times daily (with meals)    Ischemic  cardiomyopathy       clorazepate 7.5 MG tablet    TRANXENE    60 tablet    Take 1 tablet (7.5 mg) by mouth 2 times daily as needed for anxiety    Anxiety       doxylamine 25 MG Tabs tablet    UNISOM     Take 50 mg by mouth At Bedtime        EQUATE STOOL SOFTENER OR      Take 100 mg by mouth daily as needed        furosemide 20 MG tablet    LASIX     Take 20 mg by mouth daily Add additional 20mg for increased edema        HYDROcodone-acetaminophen  MG per tablet    NORCO    20 tablet    Take 1-2 tablets by mouth every 4 hours as needed for moderate to severe pain    Chronic left shoulder pain       losartan 25 MG tablet    COZAAR    90 tablet    Take 1 tablet (25 mg) by mouth daily    Coronary artery disease involving native coronary artery of native heart without angina pectoris       MUCINEX 600 MG 12 hr tablet   Generic drug:  guaiFENesin      Take 600 mg by mouth as needed for congestion Reported on 4/11/2017        nitroGLYcerin 0.4 MG sublingual tablet    NITROSTAT    25 tablet    if you are still having symptoms after 3 doses (15 minutes) call 911.    ST elevation myocardial infarction (STEMI), unspecified artery (H), ST elevation myocardial infarction (STEMI) involving other coronary artery of anterior wall (H)       pantoprazole 40 MG EC tablet    PROTONIX    90 tablet    Take 1 tablet (40 mg) by mouth daily, 30-60 minutes before a meal    Gastroesophageal reflux disease without esophagitis

## 2018-01-02 NOTE — PROGRESS NOTES
HISTORY OF PRESENT ILLNESS:  Gosia Alonzo was here in routine followup.  She is an 85-year-old patient who has a history of CAD, status post CAMILA to the mid and proximal LAD in 01/2016, ischemic cardiomyopathy, CKD, paroxysmal atrial fibrillation, hypertension, osteoporosis, history of GI bleed, leg edema.  She follows with Dr. Collins and myself.  She recently presented to the emergency room with left shoulder pain and reproducible chest pain.  Her symptoms were likely related to chronic pain.  Troponins were negative.  CT showed possible breast implant rupture.  She planned to discuss the CT finding with her PCP.  She was offered a shoulder injection, but she preferred to follow up with her longstanding orthopedic surgeon.  She then followed up with her orthopedic surgeon.      The pain is now relieved.  She returns today in followup.  She denies chest pain, chest pressure, neck or arm pain.  When she was seen by Dr. Collins back in 07/2017, she had increased lower extremity edema.  He placed her on daily Lasix and this clearly has helped.  She now is wearing her compression stockings every day as well.  Her weight came down a few pounds and she is feeling fantastic.      Blood pressure is 128/66, pulse is 80, weight is 143 pounds in the clinic and 139 pounds at home.      Please see complete physical examination below.      ASSESSMENT AND PLAN:   1.  Ischemic cardiomyopathy, well-compensated.  Continue furosemide 20 mg daily.      Echocardiogram in 07/2018.  Her echocardiogram from 2017 showed an LVEF of 30%-35%.      2.  Lower extremity edema, improved with decreasing salt in her diet and increasing Lasix to daily.  Her basic metabolic panel shows a sodium 134, potassium 3.9, BUN 26, creatinine 1.04, GFR 50.      3.  Dyslipidemia.  Dr. Collins considered increasing the atorvastatin when she had an LDL of 91, but continued 40 mg a day.  She will have a repeat fasting lipid profile in July and Dr. Collins will determine if he  will increase the dose.         WILLIE PRATER, CNP             D: 2018 15:50   T: 2018 16:45   MT: al      Name:     MILAGRO ELY   MRN:      [id number]        Account:      [id number]   :      1932           Service Date: 2018      Document: [id number]

## 2018-01-04 RX ORDER — CLORAZEPATE DIPOTASSIUM 7.5 MG/1
TABLET ORAL
Qty: 60 TABLET | Refills: 0 | Status: SHIPPED | OUTPATIENT
Start: 2018-01-04 | End: 2018-02-14

## 2018-01-04 NOTE — TELEPHONE ENCOUNTER
Tranxene      Last Written Prescription Date:  11-7-17  Last Fill Quantity: 60,   # refills: 0  Last Office Visit: 9-15-17  Future Office visit:       Routing refill request to provider for review/approval because:  Drug not on the [facility], [facility] or [facility] refill protocol or controlled substance    Please advise, thanks.

## 2018-01-05 NOTE — TELEPHONE ENCOUNTER
Pt calls to check on status of refill request. Informed pt that this was approved and faxed yesterday. Apologized to pt for the delay.

## 2018-01-12 DIAGNOSIS — I25.5 ISCHEMIC CARDIOMYOPATHY: Primary | ICD-10-CM

## 2018-01-12 RX ORDER — FUROSEMIDE 20 MG
20 TABLET ORAL DAILY
Qty: 95 TABLET | Refills: 3 | Status: SHIPPED | OUTPATIENT
Start: 2018-01-12 | End: 2019-02-01

## 2018-01-12 NOTE — TELEPHONE ENCOUNTER
Received refill request for:  furosemide  Last OV was: 1/2/2018 with GURPREET Sun  Labs/EKG: last BMP 1/2/2018  F/U scheduled: orders in Epic for 7/2018  New script sent to: [facility]

## 2018-02-10 ENCOUNTER — HOSPITAL ENCOUNTER (EMERGENCY)
Facility: CLINIC | Age: 83
Discharge: HOME | End: 2018-02-10
Attending: EMERGENCY MEDICINE | Admitting: EMERGENCY MEDICINE
Payer: MEDICARE

## 2018-02-10 ENCOUNTER — APPOINTMENT (OUTPATIENT)
Dept: GENERAL RADIOLOGY | Facility: CLINIC | Age: 83
End: 2018-02-10
Attending: EMERGENCY MEDICINE
Payer: MEDICARE

## 2018-02-10 ENCOUNTER — APPOINTMENT (OUTPATIENT)
Dept: CT IMAGING | Facility: CLINIC | Age: 83
End: 2018-02-10
Attending: EMERGENCY MEDICINE
Payer: MEDICARE

## 2018-02-10 VITALS
RESPIRATION RATE: 18 BRPM | TEMPERATURE: 98.3 F | OXYGEN SATURATION: 98 % | DIASTOLIC BLOOD PRESSURE: 72 MMHG | SYSTOLIC BLOOD PRESSURE: 131 MMHG

## 2018-02-10 DIAGNOSIS — S32.591A: ICD-10-CM

## 2018-02-10 PROCEDURE — 25000128 H RX IP 250 OP 636: Performed by: EMERGENCY MEDICINE

## 2018-02-10 PROCEDURE — 73030 X-RAY EXAM OF SHOULDER: CPT | Mod: 50

## 2018-02-10 PROCEDURE — 96361 HYDRATE IV INFUSION ADD-ON: CPT

## 2018-02-10 PROCEDURE — 96374 THER/PROPH/DIAG INJ IV PUSH: CPT

## 2018-02-10 PROCEDURE — 99285 EMERGENCY DEPT VISIT HI MDM: CPT | Mod: 25

## 2018-02-10 PROCEDURE — 73502 X-RAY EXAM HIP UNI 2-3 VIEWS: CPT

## 2018-02-10 PROCEDURE — 72125 CT NECK SPINE W/O DYE: CPT

## 2018-02-10 PROCEDURE — 96376 TX/PRO/DX INJ SAME DRUG ADON: CPT

## 2018-02-10 RX ORDER — HYDROCODONE BITARTRATE AND ACETAMINOPHEN 5; 325 MG/1; MG/1
1 TABLET ORAL EVERY 6 HOURS PRN
Qty: 14 TABLET | Refills: 0 | Status: SHIPPED | OUTPATIENT
Start: 2018-02-10 | End: 2019-01-09

## 2018-02-10 RX ORDER — HYDROMORPHONE HYDROCHLORIDE 1 MG/ML
0.5 INJECTION, SOLUTION INTRAMUSCULAR; INTRAVENOUS; SUBCUTANEOUS ONCE
Status: COMPLETED | OUTPATIENT
Start: 2018-02-10 | End: 2018-02-10

## 2018-02-10 RX ADMIN — SODIUM CHLORIDE 500 ML: 9 INJECTION, SOLUTION INTRAVENOUS at 14:54

## 2018-02-10 RX ADMIN — Medication 0.5 MG: at 16:15

## 2018-02-10 RX ADMIN — Medication 0.5 MG: at 14:53

## 2018-02-10 ASSESSMENT — ENCOUNTER SYMPTOMS
BACK PAIN: 1
HEADACHES: 0
NECK PAIN: 1
ARTHRALGIAS: 1
WOUND: 1

## 2018-02-10 NOTE — ED PROVIDER NOTES
History     Chief Complaint:  Fall      HPI   Gosia Alonzo is an 85 year old female who presents accompanied by her family for evaluation following a fall. On 2/1/2018, the patient was putting on a pair of pants when she caught her foot on the pants and fell to the ground striking her buttocks, back, and head on the ground. She did not lose consciousness from this injury. She also sustained a skin tear to her right lower leg from the fall. Following the fall, the patient started to develop pain in her buttocks and, right hip. However, she was able to get up on her own without difficulty. Since then, the patient feels that she has been supporting herself more with her upper body when using her walker due to her right hip pain, and she has since developed pain in her bilateral shoulders and neck. She did not strike her shoulders or neck in the fall, and again feels that she has developed these pains due to supporting herself with the walker. She has been taking Norco without significant improvement of her pain. Due to her persistent pain today, the patient's family brought her into the ED for evaluation. Currently in the ED, the patient rates her pain at a severity of 10/10. The patient takes 81 mg of Aspirin daily but is not otherwise anticoagulated. She has not had any headache since the fall.     Allergies:  Codeine  Escitalopram oxalate   Esomeprazole magnesium trihydrate   Imdur  Meperidine hcl  Morphine hcl   Oxycodone   Pentazocine  Propoxyphene hcl  Sumatriptan succinate      Medications:    furosemide (LASIX) 20 MG tablet  clorazepate (TRANXENE) 7.5 MG tablet  pantoprazole (PROTONIX) 40 MG EC tablet  losartan (COZAAR) 25 MG tablet  HYDROcodone-acetaminophen (NORCO)  MG per tablet  doxylamine (UNISOM) 25 MG TABS tablet  carvedilol (COREG) 3.125 MG tablet  atorvastatin (LIPITOR) 40 MG tablet  guaiFENesin (MUCINEX) 600 MG 12 hr tablet  Docusate Sodium (EQUATE STOOL SOFTENER OR)  nitroglycerin  (NITROSTAT) 0.4 MG SL tablet  acetaminophen (TYLENOL) 500 MG tablet  aspirin EC 81 MG EC tablet     Past Medical History:    Allergic rhinitis  Arthritis  CAD   CKD, stage 3   Diverticula of colon  Edema  Esophageal reflux  Gastrointestinal bleeding   Hiatal hernia  Hypertension   Impaired fasting glucose  Infected right knee prosthesis   Infiltrating ductal CA right breast   NSTEMI   Migraine headaches   Ischemic cardiomyopathy   Obesity  Osteoporosis  Hyperlipidemia  Iron deficiency anemia  Seborrheic keratosis   Paroxysmal atrial fibrillation   Pericarditis   Post-op DVT   Pyogenic granuloma of skin and subcutaneous tissue  Solitary cyst of breast  Vasomotor rhinitis     Past Surgical History:    Angiogram  Appendectomy  Breast surgery  Needle aspiration breast  Colonoscopy   Phacoemulsification clear cornea with standard intraocular lens implant   Right rotator cuff repair  Appendectomy  Hysterectomy   Left elbow surgery  Right total knee  Lumbar fusion  Right mastectomy  Right knee prosthesis replacement   Right shoulder revision     Family History:    CAD - Father  Cancer, pancreatic - Mother  Cancer, colon - Brother   Hypertension - Sister     Social History:  Tobacco use:    Never smoker  Alcohol use:    Negative  Marital status:       Accompanied to ED by:  Family      Review of Systems   Musculoskeletal: Positive for arthralgias (right hip, bilateral shoulders), back pain and neck pain.   Skin: Positive for wound (skin tear, right lower leg).   Neurological: Negative for syncope and headaches.   All other systems reviewed and are negative.    Physical Exam   First Vitals:  BP: (!) 161/137  Heart Rate: 96  Temp: 98.3  F (36.8  C)  Resp: 16  SpO2: 99 %      Physical Exam  Constitutional: The patient is oriented to person, place, and time. Alert and cooperative.  HENT:   Head: atraumatic.   Right Ear: External ear normal. TM normal appearing.   Left Ear: External ear normal. TM normal appearing.    Nose: Nose normal.   Mouth/Throat: Uvula is midline, oropharynx is clear and moist and mucous membranes are normal. No posterior oropharyngeal edema or erythema.   Eyes: Conjunctivae, EOM and lids are normal. Pupils are equal, round, and reactive to light.   Neck: Trachea normal. Normal range of motion. Neck supple.   Cardiovascular: Normal rate, regular rhythm, normal heart sounds, and intact distal pulses.    Pulmonary/Chest: Effort normal and breath sounds equal bilaterally. No crackles or wheezing.   Abdominal: Soft. No tenderness. No rebound and no guarding.   Musculoskeletal: No significant midline tenderness, step-offs, or deformities in the C/T/L spine.  RUE: no obvious deformity, skin intact. Non-tender to palpation over the clavicle, shoulder, humeral shaft, elbow, forearm, wrist, and hand. Normal ROM of the shoulder, elbow, wrist, and hand. Sensation intact in the Ax/M/R/U nerve distributions. Radial pulse 2+  LUE: no obvious deformity, skin intact. Non-tender to palpation over the clavicle, shoulder, humeral shaft, elbow, forearm, wrist, and hand. Normal ROM of the shoulder, elbow, wrist, and hand. Sensation intact in the Ax/M/R/U nerve distributions. Radial pulse 2+  RLE: no obvious deformity. Skin tear to anterior lower leg. Tenderness to palpation over the hip and groin region. Non-tender to palpation over the distal femur, knee, lower leg, ankle, and foot. Decreased ROM at the hip secondary to pain. Normal ROM at the knee, ankle, and foot. Sensation intact to light touch throughout. 2+ DP and PT pulse.  Neurological: Alert and Oriented. Strength 5/5 in upper and lower extremities bilaterally. Sensation intact to light touch throughout.  Skin: Skin is dry. No rash noted.          Emergency Department Course     Imaging:  Radiographic findings were communicated with the patient and family who voiced understanding of the findings.    XR Shoulder, Bilateral:  IMPRESSION:   1. No acute bone abnormality  identified in either shoulder.  2. Severe left glenohumeral osteoarthritis.  3. Postoperative changes of right shoulder arthroplasty.  Per radiology.     XR Pelvis w Hip, Right:  IMPRESSION: Mildly displaced fractures involving the superior and inferior pubic rami on the right. No other fractures are seen in the pelvis or right femur. Partially imaged right total knee arthroplasty  with extended femoral component.  Per radiology.     Cervical Spine CT w/o Contrast:  IMPRESSION:    1. Prior anterior fusion throughout the cervical spine which appears intact.  2. No fractures identified.  3. No stenosis of the cervical spine.  Per radiology.     Interventions:  1453 Dilaudid 0.5 mg IV   1454  mL IV  1615 Dilaudid 0.5 mg IV     Emergency Department Course:  Nursing notes and vitals reviewed.  1416: I performed an exam of the patient as documented above.     1620: I updated and reassessed the patient.     1640: I spoke with Dr. Patrick of the hospitalist service regarding patient's presentation, findings, and plan of care.     1648: I updated and reassessed the patient.     1659: I spoke with Dr. Donaldson of the orthopedics service regarding patient's presentation, findings, and plan of care.     Findings and plan explained to the Patient and family. Patient discharged home with instructions regarding supportive care, medications, and reasons to return. The importance of close follow-up was reviewed. The patient was prescribed Norco.     Impression & Plan      Medical Decision Making:  Gosia Alonzo is an 85 year old female with a history of atrial fibrillation who presents to the emergency department for evaluation of right hip pain following a fall. Upon presentation in the ED, the patient is non-toxic appearing. She is hypertensive but vitals are otherwise within normal limits and stable. On exam, she is well-appearing. She is alert, oriented, and neurologic exam is non-focal. Head is atraumatic without signs  of basilar skull fracture. She has no significant midline tenderness, step-offs, or deformities in the C/T/L spine. Cardiopulmonary exam is unremarkable. Abdomen is soft and non-tender throughout. On exam of the bilateral upper extremities, there is no obvious deformities. Skin is intact. She has good range of motion of the upper extremities without significant pain. She is neurovascularly intact. On exam of the right lower extremity, she has no obvious deformity. She does endorse tenderness with palpation of the groin region and has mildly decreased range of motion of the hip secondary to pain. She is neurovascularly intact. She does have a skin tear over the anterior distal right lower leg. The rest of her exam is as mentioned above. The patient notes that she suffered a mechanical fall one week ago and since then has had persistent right hip pain. She notes that she did not lose consciousness, and although she thinks she may have hit her head, she has not had headaches or any neurologic complaints since that time. Given that the fall was one week ago and the patient denies headache and has a non-focal neurologic exam, I did not feel that CT imaging of the head is indicated at this time. CT of the C spine demonstrates no acute abnormality. X-ray of the bilateral shoulders demonstrates no acute abnormality. X-ray of the pelvis was obtained and demonstrates mildly displaced fractures involving the superior and inferior pubic rami on the right. There are no other fractures identified. These results were discussed with the patient and she notes understanding. The patient was then discussed with the on-call orthopedic physician. He notes that the patient can bear weight as tolerated. The patient does live independently but states that she prefers to be discharged to home. She was given a trial of ambulation with her walker and tolerated this well without difficulty. I did offer admission for pain management and PT/OT,  but the patient adamantly declines this. The patient's family are present and note that they can help care for the patient at home. Given that she is well-appearing, is ambulating independently, and pain is controlled, I do feel that she is safe for discharge home. I did discuss with the patient that I would recommend close follow up with an orthopedic physician and her primary care physician, and she notes understanding and agreement. Return instructions were given. She was stable / improved at the time of discharge.     Diagnosis:    ICD-10-CM   1. Multiple closed stable fractures of ramus of right pubis, initial encounter (H) S32.591A       Disposition:  Discharged to home with [place].     Discharge Medications:  New Prescriptions    HYDROCODONE-ACETAMINOPHEN (NORCO) 5-325 MG PER TABLET    Take 1 tablet by mouth every 6 hours as needed for moderate to severe pain         I, Brad Mcpherson, am serving as a scribe at 2:16 PM on 2/10/2018 to document services personally performed by Dr. Starkey, based on my observations and the provider's statements to me.    [facility] EMERGENCY DEPARTMENT       Rae Starkey MD  02/12/18 173

## 2018-02-10 NOTE — ED AVS SNAPSHOT
[facility]    [address]    [facility] [address]    Phone:  [phone]    Fax:  [phone]                                       Gosia Alonzo   MRN: [id number]    Department:  [facility]   Date of Visit:  2/10/2018           After Visit Summary Signature Page     I have received my discharge instructions, and my questions have been answered. I have discussed any challenges I see with this plan with the nurse or doctor.    ..........................................................................................................................................  Patient/Patient Representative Signature      ..........................................................................................................................................  Patient Representative Print Name and Relationship to Patient    ..................................................               ................................................  Date                                            Time    ..........................................................................................................................................  Reviewed by Signature/Title    ...................................................              ..............................................  Date                                                            Time

## 2018-02-10 NOTE — ED NOTES
Fall last week Thursday. Was not seen at that time. Here with bilateral shoulder pain and right groin pain. Fall was mechanical in nature; caught her foot on her pants leg.

## 2018-02-10 NOTE — DISCHARGE INSTRUCTIONS
Please follow up  Closely with the orthopedic physician and you regular physician.     Please return to the ED if your symptoms worsen or if you develop new or concerning symptoms.         Pelvic Fracture  You have a break or fracture of the pelvic bone. Your fracture is stable because the bones are not out of place and there are no signs of serious internal bleeding. No surgery or other special treatment will be needed. As long as your pain is controlled by oral medicine, you can be treated at home. A broken pelvis will take about 6 to 8 weeks to heal. It can be painful to move for the first 3 to 4 weeks.     Home care    Bed rest and pain medicine is the only treatment required. Stay in bed for the first 2 to 3 days to reduce pain with movement. During this time, you will need help bathing, using the bathroom, and meals. A bedpan or bedside commode may be easier to use than getting up to use the bathroom. As soon as possible, begin sitting or walking to avoid problems with prolonged bed rest (muscle weakness, worsening back stiffness and pain, blood clots in the legs). A walker, crutches, or cane will make walking easier in the first 3 weeks.    Home healthcare may be available to provide in-home nursing services. Check with your healthcare provider, the hospital s social service department or a private nursing agency to see if your insurance will cover this kind of care.    During the first 2 days after the injury there will probably be localized swelling and bruising on the skin over the pelvis. During this time apply an ice pack to the painful area for no more than 20 minutes every 1 to 2 hours to reduce swelling and pain. Wrap the ice pack in a thin towel. Never put ice or an ice pack directly on your skin.    You may use over-the-counter pain medicine to control pain, unless another medicine was prescribed. Take pain medicine as directed. Call your healthcare provider if your pain is not well-controlled. A  dose change or stronger medicine may be needed. If you have chronic liver or kidney disease, talk with your healthcare provider before using pain medicine.  Follow-up care  Follow up with your healthcare provider, or as advised. This will help to make sure the bone is healing properly.  If X-rays were taken, you will be told of any new findings that may affect your care.  Call 911  Call 911 if you have:    Swelling, pain or redness below your knee    Chest pain or shortness of breath  When to seek medical advice  Call your healthcare provider right away if any of these occur:    Pain becomes worse or you are unable to walk with help for more than three days    Blood in your urine or bleeding from the urethra (the opening where urine comes out)    Difficulty passing urine or unable to pass stool due to pain    Fever of 100.4 F (38 C) or above lasting for 24 to 48 hours  Date Last Reviewed: 12/3/2015    [id number] [employer]. [address] [place] [address], [place] [address]. All rights reserved. This information is not intended as a substitute for professional medical care. Always follow your healthcare professional's instructions.

## 2018-02-10 NOTE — ED NOTES
[facility]  ED Nurse Handoff Report    Gosai Alonzo is a 85 year old female   ED Chief complaint: Fall  . ED Diagnosis:   Final diagnoses:   None     Allergies:   Allergies   Allergen Reactions     Codeine      GI UPSET     Escitalopram Oxalate      fatigue     Esomeprazole Magnesium Trihydrate      HA     Imdur [Isosorbide]      Headache       Meperidine Hcl      N/V     Morphine Hcl      HIVES     Oxycodone      (percodan) GI UPSET     Pentazocine      (talwin)  HALLUCINATIONS     Propoxyphene Hcl      STOMACH UPSET     Sumatriptan Succinate      chest pain       Code Status: DNR / DNI  Activity level - Baseline/Home:  Independent. Activity Level - Current:   Stand with Assist of 2. Lift room needed: No. Bariatric: No   Needed: No   Isolation: No. Infection: Not Applicable.     Vital Signs:   Vitals:    02/10/18 1253   BP: (!) 161/137   Resp: 16   Temp: 98.3  F (36.8  C)   TempSrc: Temporal   SpO2: 99%       Cardiac Rhythm:  ,      Pain level: 0-10 Pain Scale: 10  Patient confused: No. Patient Falls Risk: Yes.   Elimination Status: Has voided   Patient Report - Initial Complaint: Fall. Focused Assessment: Neck pain, bilateral shoulder pain, right hip pain   Tests Performed: Xray, CT. Abnormal Results:   XR Shoulder Bilateral G/E 2 Views   Final Result   IMPRESSION:    1. No acute bone abnormality identified in either shoulder.   2. Severe left glenohumeral osteoarthritis.   3. Postoperative changes of right shoulder arthroplasty.      BILL GIRARD MD      Cervical spine CT w/o contrast   Final Result   IMPRESSION:     1. Prior anterior fusion throughout the cervical spine which appears   intact.   2. No fractures identified.   3. No stenosis of the cervical spine.      LINDSEY MAXWELL MD      XR Pelvis w Hip Right G/E 2 Views    (Results Pending)     .   Treatments provided: See MAR  Family Comments: Family at bedside  OBS brochure/video discussed/provided to patient:  Yes  ED  Medications:   Medications   HYDROmorphone (PF) (DILAUDID) injection 0.5 mg (0.5 mg Intravenous Given 2/10/18 1453)   0.9% sodium chloride BOLUS (0 mLs Intravenous Stopped 2/10/18 1616)   HYDROmorphone (PF) (DILAUDID) injection 0.5 mg (0.5 mg Intravenous Given 2/10/18 1615)     Drips infusing:  No  For the majority of the shift, the patient's behavior Green. Interventions performed were See MAR.     Severe Sepsis OR Septic Shock Diagnosis Present: No      ED Nurse Name/Phone Number: Eddi Fairchild,   4:41 PM

## 2018-02-10 NOTE — ED AVS SNAPSHOT
[facility] Emergency Department    [address]    [facility] [address]    Phone:  [phone]    Fax:  [phone]                                       Gosia Alonzo   MRN: [id number]    Department:  [facility] Emergency Department   Date of Visit:  2/10/2018           Patient Information     Date Of Birth          11/14/1932        Your diagnoses for this visit were:     Multiple closed stable fractures of ramus of right pubis, initial encounter (H)        You were seen by Rae Starkey MD.      Follow-up Information     Follow up with Michael Londono MD In 3 days.    Specialty:  Internal Medicine    Contact information:    [address]  [facility] [id number] [phone]          Discharge Instructions       Please follow up  Closely with the orthopedic physician and you regular physician.     Please return to the ED if your symptoms worsen or if you develop new or concerning symptoms.         Pelvic Fracture  You have a break or fracture of the pelvic bone. Your fracture is stable because the bones are not out of place and there are no signs of serious internal bleeding. No surgery or other special treatment will be needed. As long as your pain is controlled by oral medicine, you can be treated at home. A broken pelvis will take about 6 to 8 weeks to heal. It can be painful to move for the first 3 to 4 weeks.     Home care    Bed rest and pain medicine is the only treatment required. Stay in bed for the first 2 to 3 days to reduce pain with movement. During this time, you will need help bathing, using the bathroom, and meals. A bedpan or bedside commode may be easier to use than getting up to use the bathroom. As soon as possible, begin sitting or walking to avoid problems with prolonged bed rest (muscle weakness, worsening back stiffness and pain, blood clots in the legs). A walker, crutches, or cane will make walking easier in the first 3  weeks.    Home healthcare may be available to provide in-home nursing services. Check with your healthcare provider, the hospital s social service department or a private nursing agency to see if your insurance will cover this kind of care.    During the first 2 days after the injury there will probably be localized swelling and bruising on the skin over the pelvis. During this time apply an ice pack to the painful area for no more than 20 minutes every 1 to 2 hours to reduce swelling and pain. Wrap the ice pack in a thin towel. Never put ice or an ice pack directly on your skin.    You may use over-the-counter pain medicine to control pain, unless another medicine was prescribed. Take pain medicine as directed. Call your healthcare provider if your pain is not well-controlled. A dose change or stronger medicine may be needed. If you have chronic liver or kidney disease, talk with your healthcare provider before using pain medicine.  Follow-up care  Follow up with your healthcare provider, or as advised. This will help to make sure the bone is healing properly.  If X-rays were taken, you will be told of any new findings that may affect your care.  Call 911  Call 911 if you have:    Swelling, pain or redness below your knee    Chest pain or shortness of breath  When to seek medical advice  Call your healthcare provider right away if any of these occur:    Pain becomes worse or you are unable to walk with help for more than three days    Blood in your urine or bleeding from the urethra (the opening where urine comes out)    Difficulty passing urine or unable to pass stool due to pain    Fever of 100.4 F (38 C) or above lasting for 24 to 48 hours  Date Last Reviewed: 12/3/2015    [id number] [employer]. [address] [place] [address], [place] [address]. All rights reserved. This information is not intended as a substitute for professional medical care. Always follow your healthcare professional's  instructions.          24 Hour Appointment Hotline       To make an appointment at any [facility], call [phone] ([phone]). If you don't have a family doctor or clinic, we will help you find one. [facility] are conveniently located to serve the needs of you and your family.             Review of your medicines      CONTINUE these medicines which may have CHANGED, or have new prescriptions. If we are uncertain of the size of tablets/capsules you have at home, strength may be listed as something that might have changed.        Dose / Directions Last dose taken    * HYDROcodone-acetaminophen  MG per tablet   Commonly known as:  NORCO   Dose:  1-2 tablet   What changed:  Another medication with the same name was added. Make sure you understand how and when to take each.   Quantity:  20 tablet        Take 1-2 tablets by mouth every 4 hours as needed for moderate to severe pain   Refills:  0        * HYDROcodone-acetaminophen 5-325 MG per tablet   Commonly known as:  NORCO   Dose:  1 tablet   What changed:  You were already taking a medication with the same name, and this prescription was added. Make sure you understand how and when to take each.   Quantity:  14 tablet        Take 1 tablet by mouth every 6 hours as needed for moderate to severe pain   Refills:  0        * Notice:  This list has 2 medication(s) that are the same as other medications prescribed for you. Read the directions carefully, and ask your doctor or other care provider to review them with you.      Our records show that you are taking the medicines listed below. If these are incorrect, please call your family doctor or clinic.        Dose / Directions Last dose taken    acetaminophen 500 MG tablet   Commonly known as:  TYLENOL   Dose:  1000 mg        Take 1,000 mg by mouth 2 times daily as needed for mild pain   Refills:  0        aspirin 81 MG EC tablet   Dose:  81 mg        Take 1 tablet (81 mg) by mouth daily    Refills:  0        atorvastatin 40 MG tablet   Commonly known as:  LIPITOR   Quantity:  90 tablet        TAKE 1 TABLET BY MOUTH ONCE DAILY   Refills:  3        carvedilol 3.125 MG tablet   Commonly known as:  COREG   Dose:  3.125 mg   Quantity:  180 tablet        Take 1 tablet (3.125 mg) by mouth 2 times daily (with meals)   Refills:  3        clorazepate 7.5 MG tablet   Commonly known as:  TRANXENE   Quantity:  60 tablet        TAKE ONE TABLET BY MOUTH TWICE A DAY AS NEEDED FOR ANXIETY   Refills:  0        doxylamine 25 MG Tabs tablet   Commonly known as:  UNISOM   Dose:  50 mg        Take 50 mg by mouth At Bedtime   Refills:  0        EQUATE STOOL SOFTENER OR   Dose:  100 mg        Take 100 mg by mouth daily as needed   Refills:  0        furosemide 20 MG tablet   Commonly known as:  LASIX   Dose:  20 mg   Quantity:  95 tablet        Take 1 tablet (20 mg) by mouth daily Add additional 20mg for increased edema   Refills:  3        losartan 25 MG tablet   Commonly known as:  COZAAR   Dose:  25 mg   Quantity:  90 tablet        Take 1 tablet (25 mg) by mouth daily   Refills:  1        MUCINEX 600 MG 12 hr tablet   Dose:  600 mg   Generic drug:  guaiFENesin        Take 600 mg by mouth as needed for congestion Reported on 4/11/2017   Refills:  0        nitroGLYcerin 0.4 MG sublingual tablet   Commonly known as:  NITROSTAT   Quantity:  25 tablet        if you are still having symptoms after 3 doses (15 minutes) call 911.   Refills:  2        pantoprazole 40 MG EC tablet   Commonly known as:  PROTONIX   Quantity:  90 tablet        Take 1 tablet (40 mg) by mouth daily, 30-60 minutes before a meal   Refills:  2                Prescriptions were sent or printed at these locations (1 Prescription)                   Other Prescriptions                Printed at Department/Unit printer (1 of 1)         HYDROcodone-acetaminophen (NORCO) 5-325 MG per tablet                Procedures and tests performed during your visit      Cervical spine CT w/o contrast    XR Pelvis w Hip Right G/E 2 Views    XR Shoulder Bilateral G/E 2 Views      Orders Needing Specimen Collection     None      Pending Results     No orders found from 2/8/2018 to 2/11/2018.            Pending Culture Results     No orders found from 2/8/2018 to 2/11/2018.            Pending Results Instructions     If you had any lab results that were not finalized at the time of your Discharge, you can call the ED Lab Result RN at [phone]. You will be contacted by this team for any positive Lab results or changes in treatment. The nurses are available 7 days a week from 10A to 6:30P.  You can leave a message 24 hours per day and they will return your call.        Test Results From Your Hospital Stay        2/10/2018  3:39 PM      Narrative     CT CERVICAL SPINE WITHOUT CONTRAST   2/10/2018 3:27 PM     HISTORY: Trauma with neck pain.     TECHNIQUE: Axial images of the cervical spine were obtained without  intravenous contrast. Multiplanar reformations were performed.   Radiation dose for this scan was reduced using automated exposure  control, adjustment of the mA and/or kV according to patient size, or  iterative reconstruction technique.    COMPARISON: None.    FINDINGS: There is no evidence of fracture. There is anterior fusion  throughout the entire cervical spine with opaque suture at the C3-4  and C5-6 levels. The fusion appears intact. The placement of these  wires of opaque suture is unusual. I have not seen this with cervical  fusion in my experience, but the wires appear to be intact. Spinal  canal appears adequate.    Alignment: Normal.      Craniocervical junction: Normal.     C1-C2:  Normal.     C2-C3:  No central or lateral stenosis.     C3-C4:  No central or lateral stenosis.     C4-C5:  No central or lateral stenosis.     C5-C6:  No central or lateral stenosis.      C6-C7:  No central or lateral stenosis.      C7-T1:   No central or lateral stenosis.         Impression     IMPRESSION:    1. Prior anterior fusion throughout the cervical spine which appears  intact.  2. No fractures identified.  3. No stenosis of the cervical spine.    LINDSEY MAXWELL MD         2/10/2018  4:06 PM      Narrative     XR SHOULDER BILATERAL G/E 2 VW 2/10/2018 4:00 PM    COMPARISON: 3/30/2015 and 4/13/2005    HISTORY: Trauma.    FINDINGS:  Right shoulder: Postoperative changes of right shoulder arthroplasty.  Hardware appears intact. No fractures are seen. No dislocation. No  significant soft tissue swelling.    Left shoulder: Severe glenohumeral osteoarthritis with complete loss  of joint space and humeral head remodeling again seen. No fracture or  dislocation identified. No significant soft tissue swelling.        Impression     IMPRESSION:   1. No acute bone abnormality identified in either shoulder.  2. Severe left glenohumeral osteoarthritis.  3. Postoperative changes of right shoulder arthroplasty.    BILL GIRARD MD         2/10/2018  4:46 PM      Narrative     XR PELVIS AND HIP RIGHT 2 VIEWS 2/10/2018 3:59 PM    COMPARISON: None.    HISTORY: Trauma.        Impression     IMPRESSION: Mildly displaced fractures involving the superior and  inferior pubic rami on the right. No other fractures are seen in the  pelvis or right femur. Partially imaged right total knee arthroplasty  with extended femoral component.    BILL GIRARD MD                Clinical Quality Measure: Blood Pressure Screening     Your blood pressure was checked while you were in the emergency department today. The last reading we obtained was  BP: 131/72 . Please read the guidelines below about what these numbers mean and what you should do about them.  If your systolic blood pressure (the top number) is less than 120 and your diastolic blood pressure (the bottom number) is less than 80, then your blood pressure is normal. There is nothing more that you need to do about it.  If your systolic blood pressure (the  "top number) is 120-139 or your diastolic blood pressure (the bottom number) is 80-89, your blood pressure may be higher than it should be. You should have your blood pressure rechecked within a year by a primary care provider.  If your systolic blood pressure (the top number) is 140 or greater or your diastolic blood pressure (the bottom number) is 90 or greater, you may have high blood pressure. High blood pressure is treatable, but if left untreated over time it can put you at risk for heart attack, stroke, or kidney failure. You should have your blood pressure rechecked by a primary care provider within the next 4 weeks.  If your provider in the emergency department today gave you specific instructions to follow-up with your doctor or provider even sooner than that, you should follow that instruction and not wait for up to 4 weeks for your follow-up visit.        Thank you for choosing [facility]       Thank you for choosing [facility] for your care. Our goal is always to provide you with excellent care. Hearing back from our patients is one way we can continue to improve our services. Please take a few minutes to complete the written survey that you may receive in the mail after you visit with us. Thank you!        [employer] Information     [employer] lets you send messages to your doctor, view your test results, renew your prescriptions, schedule appointments and more. To sign up, go to [email or web address] . Click on \"Log in\" on the left side of the screen, which will take you to the Welcome page. Then click on \"Sign up Now\" on the right side of the page.     You will be asked to enter the access code listed below, as well as some personal information. Please follow the directions to create your username and password.     Your access code is: [email or web address]  Expires: 2018  3:45 PM     Your access code will  in 90 days. If you need help or a new code, please call your [facility] clinic or [phone].      "   Care EveryWhere ID     This is your Care EveryWhere ID. This could be used by other organizations to access your [place] medical records  [id number]        Equal Access to Services     LORELEI JOHN : Haroon Whitehead, naveen hayes, louise sanchez, alejandro bates. So [facility] [phone].    ATENCIÓN: Si habla español, tiene a barriga disposición servicios gratuitos de asistencia lingüística. Llame al [phone].    We comply with applicable federal civil rights laws and [place] laws. We do not discriminate on the basis of race, color, national origin, age, disability, sex, sexual orientation, or gender identity.            After Visit Summary       This is your record. Keep this with you and show to your community pharmacist(s) and doctor(s) at your next visit.

## 2018-02-14 DIAGNOSIS — F41.9 ANXIETY: ICD-10-CM

## 2018-02-15 NOTE — TELEPHONE ENCOUNTER
clorazepate      Last Written Prescription Date:  1/14/18  Last Fill Quantity: 60,   # refills: 0  Last Office Visit: 9/15/17  Future Office visit:       Routing refill request to provider for review/approval because:  Drug not on the [facility], [facility] or [facility] refill protocol or controlled substance

## 2018-02-16 RX ORDER — CLORAZEPATE DIPOTASSIUM 7.5 MG/1
TABLET ORAL
Qty: 60 TABLET | Refills: 0 | Status: SHIPPED | OUTPATIENT
Start: 2018-02-16 | End: 2018-04-04

## 2018-03-08 ENCOUNTER — TELEPHONE (OUTPATIENT)
Dept: INTERNAL MEDICINE | Facility: CLINIC | Age: 83
End: 2018-03-08

## 2018-03-08 NOTE — TELEPHONE ENCOUNTER
Wen Alonzo, daughter of patient, dropped off an FMLA form to take care of Gosia. Gosia broke her hip and is very fragile. Her daughter will be her caregiver during her recovery.   said he would do it for yesterday, when I spoke to him about it.   I went over the entire form with the daughter and took down everything we needed to know to fill out the form properly.     Fax the completed form and make a copy for us.   Wen (the FMLA recipient) will  the original.  I have the form on my desk until I can talk to Dr. oLndono about it this afternoon.

## 2018-04-04 DIAGNOSIS — F41.9 ANXIETY: ICD-10-CM

## 2018-04-04 DIAGNOSIS — I21.3 ST ELEVATION MYOCARDIAL INFARCTION (STEMI), UNSPECIFIED ARTERY (H): ICD-10-CM

## 2018-04-04 DIAGNOSIS — I21.09 ST ELEVATION MYOCARDIAL INFARCTION (STEMI) INVOLVING OTHER CORONARY ARTERY OF ANTERIOR WALL (H): ICD-10-CM

## 2018-04-06 RX ORDER — ATORVASTATIN CALCIUM 40 MG/1
40 TABLET, FILM COATED ORAL DAILY
Qty: 90 TABLET | Refills: 1 | Status: SHIPPED | OUTPATIENT
Start: 2018-04-06 | End: 2018-10-02

## 2018-04-06 RX ORDER — CLORAZEPATE DIPOTASSIUM 7.5 MG/1
TABLET ORAL
Qty: 60 TABLET | Refills: 0 | Status: SHIPPED | OUTPATIENT
Start: 2018-04-06 | End: 2018-06-05

## 2018-04-06 NOTE — TELEPHONE ENCOUNTER
"Patient requesting refill today as she will be out of meds.    Requested Prescriptions   Pending Prescriptions Disp Refills     atorvastatin (LIPITOR) 40 MG tablet 90 tablet 3     Sig: Take 1 tablet (40 mg) by mouth daily    Statins Protocol Passed    4/4/2018 11:12 AM       Passed - LDL on file in past 12 months    Recent Labs   Lab Test  07/14/17   1035   LDL  91            Passed - No abnormal creatine kinase in past 12 months    No lab results found.            Passed - Recent (12 mo) or future (30 days) visit within the authorizing provider's specialty    Patient had office visit in the last 12 months or has a visit in the next 30 days with authorizing provider or within the authorizing provider's specialty.  See \"Patient Info\" tab in inbasket, or \"Choose Columns\" in Meds & Orders section of the refill encounter.           Passed - Patient is age 18 or older       Passed - No active pregnancy on record       Passed - No positive pregnancy test in past 12 months        clorazepate (TRANXENE) 7.5 MG tablet 60 tablet 0    There is no refill protocol information for this order        Atorvastatin-Prescription approved per [facility] Refill Protocol.  Clorazepate-Routing refill request to provider for review/approval because:  Drug not on the [facility] refill protocol         "

## 2018-05-08 DIAGNOSIS — I25.10 CORONARY ARTERY DISEASE INVOLVING NATIVE CORONARY ARTERY OF NATIVE HEART WITHOUT ANGINA PECTORIS: ICD-10-CM

## 2018-05-08 RX ORDER — LOSARTAN POTASSIUM 25 MG/1
25 TABLET ORAL DAILY
Qty: 90 TABLET | Refills: 1 | Status: SHIPPED | OUTPATIENT
Start: 2018-05-08 | End: 2018-11-05

## 2018-06-05 DIAGNOSIS — F41.9 ANXIETY: ICD-10-CM

## 2018-06-05 RX ORDER — CLORAZEPATE DIPOTASSIUM 7.5 MG/1
TABLET ORAL
Qty: 60 TABLET | Refills: 0 | Status: SHIPPED | OUTPATIENT
Start: 2018-06-05 | End: 2019-01-09

## 2018-06-05 NOTE — TELEPHONE ENCOUNTER
Last refill-4/6/18-#60    Last OV-9/15/17      Requested Prescriptions   Pending Prescriptions Disp Refills     clorazepate (TRANXENE) 7.5 MG tablet [Pharmacy Med Name: Clorazepate Dipotassium Oral Tablet 7.5 MG] 60 tablet 0     Sig: TAKE ONE TABLET BY MOUTH TWICE DAILY AS NEEDED FOR ANXIETY    There is no refill protocol information for this order

## 2018-06-15 ENCOUNTER — TELEPHONE (OUTPATIENT)
Dept: INTERNAL MEDICINE | Facility: CLINIC | Age: 83
End: 2018-06-15

## 2018-06-15 DIAGNOSIS — F41.9 ANXIETY: Primary | ICD-10-CM

## 2018-06-15 RX ORDER — DIAZEPAM 2 MG
2 TABLET ORAL 2 TIMES DAILY PRN
Qty: 60 TABLET | Refills: 0 | Status: SHIPPED | OUTPATIENT
Start: 2018-06-15 | End: 2018-08-19

## 2018-06-15 NOTE — TELEPHONE ENCOUNTER
"Patient calls. She has prescription for Clorazepate, but cost has increased to $99/month with insurance. Patient would like to have this changed to a similar medication as this is too expensive for her. Patient has not tried any alternatives in the past, she does not want a really strong medication that would \"knock her out.\"  "

## 2018-06-15 NOTE — TELEPHONE ENCOUNTER
She is currently taking fairly aggressive doses of hydrocodone, prescribed by another doctor (Dr Castro).   Benzodiazepines add to the risk of adverse effects of opiates.   She has never signed a CSA in our office.  She needs to schedule an appointment with me to discuss these issues.   Please advise patient of the above.     In the meantime, recommend trying to switch to diazepam.   Please fax written Rx.

## 2018-08-09 ENCOUNTER — TELEPHONE (OUTPATIENT)
Dept: CARDIOLOGY | Facility: CLINIC | Age: 83
End: 2018-08-09

## 2018-08-09 NOTE — TELEPHONE ENCOUNTER
VM from patient's daughter inquiring about the patient taking Aleve for shoulder pain that was recommended by her orthopedic doctor. Attempted to contact patient's daughter, she did not answer. Left detailed message explaining that it is not recommended for patient's with coronary artery disease to take Aleve or ibuprofen due to the increased risk of another heart attack. Left Team 4 direct phone number to call back with any further questions.

## 2018-08-10 DIAGNOSIS — I25.5 ISCHEMIC CARDIOMYOPATHY: ICD-10-CM

## 2018-08-10 DIAGNOSIS — I21.09 ST ELEVATION MYOCARDIAL INFARCTION (STEMI) INVOLVING OTHER CORONARY ARTERY OF ANTERIOR WALL (H): ICD-10-CM

## 2018-08-10 DIAGNOSIS — I21.3 ST ELEVATION MYOCARDIAL INFARCTION (STEMI), UNSPECIFIED ARTERY (H): ICD-10-CM

## 2018-08-10 RX ORDER — NITROGLYCERIN 0.4 MG/1
TABLET SUBLINGUAL
Qty: 25 TABLET | Refills: 1 | Status: SHIPPED | OUTPATIENT
Start: 2018-08-10 | End: 2020-02-25 | Stop reason: CLARIF

## 2018-08-10 RX ORDER — CARVEDILOL 3.12 MG/1
3.12 TABLET ORAL 2 TIMES DAILY WITH MEALS
Qty: 180 TABLET | Refills: 3 | Status: SHIPPED | OUTPATIENT
Start: 2018-08-10 | End: 2019-07-31

## 2018-08-10 NOTE — TELEPHONE ENCOUNTER
Refill request received for: nitrostat SL 0.4 mg and carvedilol  Last cardiology office visit: 1/2018  F/u scheduled: 10/2018  New prescription sent to: [facility] #[id number]  Patient said she only ever used 1 nitrostat tablet, but the bottle was opened a year ago and her prescription is from 2016. Refill sent.   Gloria JORDAN

## 2018-08-19 DIAGNOSIS — F41.9 ANXIETY: ICD-10-CM

## 2018-08-20 NOTE — TELEPHONE ENCOUNTER
Requested Prescriptions   Pending Prescriptions Disp Refills     diazepam (VALIUM) 2 MG tablet [Pharmacy Med Name: DiazePAM Oral Tablet 2 MG]  Last Written Prescription Date:  6/15/2018  Last Fill Quantity: 60,  # refills: 0   Last office visit: 9/15/2017 with prescribing provider:     Future Office Visit:   Next 5 appointments (look out 90 days)     Oct 05, 2018  1:45 PM CDT   Return Visit with Hiren Collins MD   [facility]    [address] [place] [address] [phone]                60 tablet 0     Sig: TAKE 1 TABLET BY MOUTH 2 TIMES DAILY AS NEEDED FOR ANXIETY    There is no refill protocol information for this order

## 2018-08-21 RX ORDER — DIAZEPAM 2 MG
TABLET ORAL
Qty: 60 TABLET | Refills: 0 | Status: SHIPPED | OUTPATIENT
Start: 2018-08-21 | End: 2018-10-02

## 2018-08-21 NOTE — TELEPHONE ENCOUNTER
RX [place] reviewed:  reviewed- no concerns    [place]  [email or web address]

## 2018-10-01 ENCOUNTER — HOSPITAL ENCOUNTER (OUTPATIENT)
Dept: CARDIOLOGY | Facility: CLINIC | Age: 83
Discharge: HOME | End: 2018-10-01
Attending: NURSE PRACTITIONER | Admitting: NURSE PRACTITIONER
Payer: MEDICARE

## 2018-10-01 DIAGNOSIS — I25.5 ISCHEMIC CARDIOMYOPATHY: ICD-10-CM

## 2018-10-01 LAB
ANION GAP SERPL CALCULATED.3IONS-SCNC: 6 MMOL/L (ref 3–14)
BUN SERPL-MCNC: 20 MG/DL (ref 7–30)
CALCIUM SERPL-MCNC: 8.5 MG/DL (ref 8.5–10.1)
CHLORIDE SERPL-SCNC: 104 MMOL/L (ref 94–109)
CHOLEST SERPL-MCNC: 139 MG/DL
CO2 SERPL-SCNC: 25 MMOL/L (ref 20–32)
CREAT SERPL-MCNC: 1 MG/DL (ref 0.52–1.04)
GFR SERPL CREATININE-BSD FRML MDRD: 53 ML/MIN/1.7M2
GLUCOSE SERPL-MCNC: 122 MG/DL (ref 70–99)
HDLC SERPL-MCNC: 48 MG/DL
LDLC SERPL CALC-MCNC: 67 MG/DL
NONHDLC SERPL-MCNC: 91 MG/DL
POTASSIUM SERPL-SCNC: 3.8 MMOL/L (ref 3.4–5.3)
SODIUM SERPL-SCNC: 135 MMOL/L (ref 133–144)
TRIGL SERPL-MCNC: 119 MG/DL

## 2018-10-01 PROCEDURE — 93306 TTE W/DOPPLER COMPLETE: CPT

## 2018-10-01 PROCEDURE — 80061 LIPID PANEL: CPT | Performed by: NURSE PRACTITIONER

## 2018-10-01 PROCEDURE — 80048 BASIC METABOLIC PNL TOTAL CA: CPT | Performed by: NURSE PRACTITIONER

## 2018-10-01 PROCEDURE — 36415 COLL VENOUS BLD VENIPUNCTURE: CPT | Performed by: NURSE PRACTITIONER

## 2018-10-01 PROCEDURE — 93306 TTE W/DOPPLER COMPLETE: CPT | Mod: 26 | Performed by: INTERNAL MEDICINE

## 2018-10-02 DIAGNOSIS — F41.9 ANXIETY: ICD-10-CM

## 2018-10-02 DIAGNOSIS — I21.3 ST ELEVATION MYOCARDIAL INFARCTION (STEMI), UNSPECIFIED ARTERY (H): ICD-10-CM

## 2018-10-02 DIAGNOSIS — I21.09 ST ELEVATION MYOCARDIAL INFARCTION (STEMI) INVOLVING OTHER CORONARY ARTERY OF ANTERIOR WALL (H): ICD-10-CM

## 2018-10-02 NOTE — TELEPHONE ENCOUNTER
"Requested Prescriptions   Pending Prescriptions Disp Refills     atorvastatin (LIPITOR) 40 MG tablet [Pharmacy Med Name: Atorvastatin Calcium Oral Tablet 40 MG]  Last Written Prescription Date:  4/6/18  Last Fill Quantity: 90,  # refills: 1   Last office visit: 9/15/2017 with prescribing provider:  Bry Rosenberg Office Visit:   Next 5 appointments (look out 90 days)     Oct 05, 2018  1:45 PM CDT   Return Visit with Hiren Collins MD   [facility]    [address]  [facility] [address] [phone]                  90 tablet 0     Sig: TAKE 1 TABLET BY MOUTH ONE TIME DAILY    Statins Protocol Failed    10/2/2018  3:32 PM       Failed - Recent (12 mo) or future (30 days) visit within the authorizing provider's specialty    Patient had office visit in the last 12 months or has a visit in the next 30 days with authorizing provider or within the authorizing provider's specialty.  See \"Patient Info\" tab in inbasket, or \"Choose Columns\" in Meds & Orders section of the refill encounter.           Passed - LDL on file in past 12 months    Recent Labs   Lab Test  10/01/18   1026   LDL  67            Passed - No abnormal creatine kinase in past 12 months    No lab results found.            Passed - Patient is age 18 or older       Passed - No active pregnancy on record       Passed - No positive pregnancy test in past 12 months        diazepam (VALIUM) 2 MG tablet [Pharmacy Med Name: DiazePAM Oral Tablet 2 MG]  Last Written Prescription Date:  8/21/18  Last Fill Quantity: 60,  # refills: 0   Last office visit: 9/15/2017 with prescribing provider:  Bry Rosenberg Office Visit:   Next 5 appointments (look out 90 days)     Oct 05, 2018  1:45 PM CDT   Return Visit with Hiren Collins MD   [facility]    [address]  [facility] [address] [phone] "                  60 tablet 0     Sig: TAKE ONE TABLET BY MOUTH TWICE A DAY AS NEEDED FOR ANXIETY (NEEDS TO SCHEDULE FOLLOW UP APPT WITH DR. LEE BEFORE RECEIVING ADDITIONAL REF    There is no refill protocol information for this order

## 2018-10-04 RX ORDER — DIAZEPAM 2 MG
TABLET ORAL
Qty: 60 TABLET | Refills: 0 | Status: SHIPPED | OUTPATIENT
Start: 2018-10-04 | End: 2018-11-14

## 2018-10-04 RX ORDER — ATORVASTATIN CALCIUM 40 MG/1
TABLET, FILM COATED ORAL
Qty: 30 TABLET | Refills: 0 | Status: SHIPPED | OUTPATIENT
Start: 2018-10-04 | End: 2018-11-04

## 2018-10-05 ENCOUNTER — OFFICE VISIT (OUTPATIENT)
Dept: CARDIOLOGY | Facility: CLINIC | Age: 83
End: 2018-10-05
Payer: MEDICARE

## 2018-10-05 VITALS
WEIGHT: 139.3 LBS | HEIGHT: 65 IN | HEART RATE: 80 BPM | BODY MASS INDEX: 23.21 KG/M2 | DIASTOLIC BLOOD PRESSURE: 66 MMHG | SYSTOLIC BLOOD PRESSURE: 120 MMHG

## 2018-10-05 DIAGNOSIS — I25.5 ISCHEMIC CARDIOMYOPATHY: Primary | ICD-10-CM

## 2018-10-05 PROCEDURE — 99213 OFFICE O/P EST LOW 20 MIN: CPT | Performed by: INTERNAL MEDICINE

## 2018-10-05 NOTE — MR AVS SNAPSHOT
"              After Visit Summary   10/5/2018    Gosia Alonzo    MRN: [id number]           Patient Information     Date Of Birth          11/14/1932        Visit Information        Provider Department      10/5/2018 1:45 PM Hiren Collins MD [facility]        Today's Diagnoses     Ischemic cardiomyopathy    -  1       Follow-ups after your visit        Additional Services     Follow-Up with Cardiologist                 Future tests that were ordered for you today     Open Future Orders        Priority Expected Expires Ordered    Follow-Up with Cardiologist Routine 10/5/2019 10/6/2019 10/5/2018            Who to contact     If you have questions or need follow up information about today's clinic visit or your schedule please contact [facility] directly at [phone].  Normal or non-critical lab and imaging results will be communicated to you by MyChart, letter or phone within 4 business days after the clinic has received the results. If you do not hear from us within 7 days, please contact the clinic through MyChart or phone. If you have a critical or abnormal lab result, we will notify you by phone as soon as possible.  Submit refill requests through [employer] or call your pharmacy and they will forward the refill request to us. Please allow 3 business days for your refill to be completed.          Additional Information About Your Visit        Care EveryWhere ID     This is your Care EveryWhere ID. This could be used by other organizations to access your [facility] medical records  [id number]        Your Vitals Were     Pulse Height Breastfeeding? BMI (Body Mass Index)          80 1.651 m (5' 5\") No 23.18 kg/m2         Blood Pressure from Last 3 Encounters:   10/05/18 120/66   02/10/18 131/72   01/02/18 128/66    Weight from Last 3 Encounters:   10/05/18 63.2 kg (139 lb 4.8 oz)   01/02/18 65 kg (143 lb 4.8 oz) "   09/26/17 65.3 kg (144 lb)               Primary Care Provider Office Phone # Fax #    Michael Londono [phone]       [address] [facility] [address]  [facility] [address]        Equal Access to Services     LORELEI JOHN : Hadii aad ku hadpalomoo Soomaali, waaxda luqadaha, qaybta kaalmada adeegyada, waxrachell idiin hayaan adedory acevedo lacinthyagregg . So [facility] [phone].    ATENCIÓN: Si habla español, tiene a barriga disposición servicios gratuitos de asistencia lingüística. Llame al [phone].    We comply with applicable federal civil rights laws and [place] laws. We do not discriminate on the basis of race, color, national origin, age, disability, sex, sexual orientation, or gender identity.            Thank you!     Thank you for choosing [facility]  for your care. Our goal is always to provide you with excellent care. Hearing back from our patients is one way we can continue to improve our services. Please take a few minutes to complete the written survey that you may receive in the mail after your visit with us. Thank you!             Your Updated Medication List - Protect others around you: Learn how to safely use, store and throw away your medicines at www.disposemymeds.org.          This list is accurate as of 10/5/18  2:18 PM.  Always use your most recent med list.                   Brand Name Dispense Instructions for use Diagnosis    acetaminophen 500 MG tablet    TYLENOL     Take 1,000 mg by mouth 2 times daily as needed for mild pain        aspirin 81 MG EC tablet      Take 1 tablet (81 mg) by mouth daily        atorvastatin 40 MG tablet    LIPITOR    30 tablet    TAKE 1 TABLET BY MOUTH ONE TIME DAILY    ST elevation myocardial infarction (STEMI), unspecified artery (H), ST elevation myocardial infarction (STEMI) involving other coronary artery of anterior wall (H)       carvedilol 3.125 MG tablet    COREG    180 tablet    Take 1 tablet (3.125 mg) by mouth 2  times daily (with meals)    Ischemic cardiomyopathy       clorazepate 7.5 MG tablet    TRANXENE    60 tablet    TAKE ONE TABLET BY MOUTH TWICE DAILY AS NEEDED FOR ANXIETY    Anxiety       diazepam 2 MG tablet    VALIUM    60 tablet    TAKE ONE TABLET BY MOUTH TWICE A DAY AS NEEDED FOR ANXIETY (NEEDS TO SCHEDULE FOLLOW UP APPT WITH DR. LEE BEFORE RECEIVING ADDITIONAL REF    Anxiety       doxylamine 25 MG Tabs tablet    UNISOM     Take 50 mg by mouth At Bedtime        EQUATE STOOL SOFTENER OR      Take 100 mg by mouth daily as needed        furosemide 20 MG tablet    LASIX    95 tablet    Take 1 tablet (20 mg) by mouth daily Add additional 20mg for increased edema    Ischemic cardiomyopathy       * HYDROcodone-acetaminophen  MG per tablet    NORCO    20 tablet    Take 1-2 tablets by mouth every 4 hours as needed for moderate to severe pain    Chronic left shoulder pain       * HYDROcodone-acetaminophen 5-325 MG per tablet    NORCO    14 tablet    Take 1 tablet by mouth every 6 hours as needed for moderate to severe pain        losartan 25 MG tablet    COZAAR    90 tablet    Take 1 tablet (25 mg) by mouth daily    Coronary artery disease involving native coronary artery of native heart without angina pectoris       MUCINEX 600 MG 12 hr tablet   Generic drug:  guaiFENesin      Take 600 mg by mouth as needed for congestion Reported on 4/11/2017        nitroGLYcerin 0.4 MG sublingual tablet    NITROSTAT    25 tablet    if you are still having symptoms after 3 doses (15 minutes) call 911.    ST elevation myocardial infarction (STEMI), unspecified artery (H), ST elevation myocardial infarction (STEMI) involving other coronary artery of anterior wall (H)       pantoprazole 40 MG EC tablet    PROTONIX    90 tablet    Take 1 tablet (40 mg) by mouth daily, 30-60 minutes before a meal    Gastroesophageal reflux disease without esophagitis       * Notice:  This list has 2 medication(s) that are the same as other  medications prescribed for you. Read the directions carefully, and ask your doctor or other care provider to review them with you.

## 2018-10-05 NOTE — PROGRESS NOTES
"Cardiology Progress Note          Assessment and Plan:     1. Ischemic cardiomyopathy, stable    Continue current medical management.  Echocardiogram unchanged.    Routine follow-up in 1 year, sooner if symptoms arise.    This note was transcribed using electronic voice recognition software and there may be typographical errors present.                Interval History:   The patient is a very pleasant 85 year old whom I have been following for stable ischemic cardiomyopathy.  She comes in with her daughter whom I have met before.  She remembers that my daughter was just born at our last visit.  She currently feels okay with regard to her dyspnea and lower extremity edema.  Her main complaints are orthopedic and left shoulder.  Echocardiogram shows stable LV function.                     Review of Systems:   Review of Systems:  Skin:  Positive for     Eyes:  Positive for glasses  ENT:  Positive for postnasal drainage;sinus trouble;hearing loss  Respiratory:  Positive for shortness of breath  Cardiovascular:    edema;Positive for  Gastroenterology: Negative    Genitourinary:  Negative    Musculoskeletal:  Positive for back pain;neck pain;joint pain  Neurologic:  Positive for headaches  Psychiatric:       Heme/Lymph/Imm:  Negative    Endocrine:  Negative                Physical Exam:     Vitals: /66 (BP Location: Right arm, Patient Position: Sitting, Cuff Size: Adult Regular)  Pulse 80  Ht 1.651 m (5' 5\")  Wt 63.2 kg (139 lb 4.8 oz)  Breastfeeding? No  BMI 23.18 kg/m2  Constitutional:  cooperative, alert and oriented, well developed, well nourished, in no acute distress        Skin:  warm and dry to the touch, no apparent skin lesions or masses noted        Head:  normocephalic, no masses or lesions        Eyes:  pupils equal and round, conjunctivae and lids unremarkable, sclera white, no xanthalasma, EOMS intact, no nystagmus        ENT:  no pallor or cyanosis, dentition good        Neck:  JVP normal    "     Chest:  normal breath sounds, clear to auscultation, normal A-P diameter, normal symmetry, normal respiratory excursion, no use of accessory muscles        Cardiac: regular rhythm                  Abdomen:      benign    Extremities and Back:  no deformities, clubbing, cyanosis, erythema observed;no edema        Neurological:  no gross motor deficits;affect appropriate                 Medications:     Current Outpatient Prescriptions   Medication Sig Dispense Refill     acetaminophen (TYLENOL) 500 MG tablet Take 1,000 mg by mouth 2 times daily as needed for mild pain       aspirin EC 81 MG EC tablet Take 1 tablet (81 mg) by mouth daily       atorvastatin (LIPITOR) 40 MG tablet TAKE 1 TABLET BY MOUTH ONE TIME DAILY  30 tablet 0     carvedilol (COREG) 3.125 MG tablet Take 1 tablet (3.125 mg) by mouth 2 times daily (with meals) 180 tablet 3     diazepam (VALIUM) 2 MG tablet TAKE ONE TABLET BY MOUTH TWICE A DAY AS NEEDED FOR ANXIETY (NEEDS TO SCHEDULE FOLLOW UP APPT WITH DR. LEE BEFORE RECEIVING ADDITIONAL REF 60 tablet 0     Docusate Sodium (EQUATE STOOL SOFTENER OR) Take 100 mg by mouth daily as needed        doxylamine (UNISOM) 25 MG TABS tablet Take 50 mg by mouth At Bedtime       furosemide (LASIX) 20 MG tablet Take 1 tablet (20 mg) by mouth daily Add additional 20mg for increased edema 95 tablet 3     guaiFENesin (MUCINEX) 600 MG 12 hr tablet Take 600 mg by mouth as needed for congestion Reported on 4/11/2017       HYDROcodone-acetaminophen (NORCO)  MG per tablet Take 1-2 tablets by mouth every 4 hours as needed for moderate to severe pain 20 tablet 0     losartan (COZAAR) 25 MG tablet Take 1 tablet (25 mg) by mouth daily 90 tablet 1     nitroGLYcerin (NITROSTAT) 0.4 MG sublingual tablet if you are still having symptoms after 3 doses (15 minutes) call 911. 25 tablet 1     clorazepate (TRANXENE) 7.5 MG tablet TAKE ONE TABLET BY MOUTH TWICE DAILY AS NEEDED FOR ANXIETY  (Patient not taking: Reported on  10/5/2018) 60 tablet 0     HYDROcodone-acetaminophen (NORCO) 5-325 MG per tablet Take 1 tablet by mouth every 6 hours as needed for moderate to severe pain (Patient not taking: Reported on 10/5/2018) 14 tablet 0     pantoprazole (PROTONIX) 40 MG EC tablet Take 1 tablet (40 mg) by mouth daily, 30-60 minutes before a meal (Patient not taking: Reported on 10/5/2018) 90 tablet 2                Data:   All laboratory data reviewed  No results found for this or any previous visit (from the past 24 hour(s)).    All laboratory data reviewed  Lab Results   Component Value Date    CHOL 139 10/01/2018     Lab Results   Component Value Date    HDL 48 10/01/2018     Lab Results   Component Value Date    LDL 67 10/01/2018     Lab Results   Component Value Date    TRIG 119 10/01/2018     Lab Results   Component Value Date    CHOLHDLRATIO 2.8 11/05/2015     TSH   Date Value Ref Range Status   07/14/2017 1.14 0.40 - 4.00 mU/L Final     Last Basic Metabolic Panel:  Lab Results   Component Value Date     10/01/2018      Lab Results   Component Value Date    POTASSIUM 3.8 10/01/2018     Lab Results   Component Value Date    CHLORIDE 104 10/01/2018     Lab Results   Component Value Date    LIGIA 8.5 10/01/2018     Lab Results   Component Value Date    CO2 25 10/01/2018     Lab Results   Component Value Date    BUN 20 10/01/2018     Lab Results   Component Value Date    CR 1.00 10/01/2018     Lab Results   Component Value Date     10/01/2018     Lab Results   Component Value Date    WBC 4.7 09/06/2017     Lab Results   Component Value Date    RBC 3.27 09/06/2017     Lab Results   Component Value Date    HGB 10.5 09/06/2017     Lab Results   Component Value Date    HCT 33.0 09/06/2017     Lab Results   Component Value Date     09/06/2017     Lab Results   Component Value Date    MCH 32.1 09/06/2017     Lab Results   Component Value Date    MCHC 31.8 09/06/2017     Lab Results   Component Value Date    RDW 11.9 09/06/2017      Lab Results   Component Value Date     09/06/2017

## 2018-10-05 NOTE — LETTER
"10/5/2018    Michael Londono MD, MD  [address] [facility] [address]  [facility] [address]    RE: Gosia Fryel       Dear Colleague,    I had the pleasure of seeing Gosia Alonzo in the [facility].    Cardiology Progress Note          Assessment and Plan:     1. Ischemic cardiomyopathy, stable    Continue current medical management.  Echocardiogram unchanged.    Routine follow-up in 1 year, sooner if symptoms arise.    This note was transcribed using electronic voice recognition software and there may be typographical errors present.                Interval History:   The patient is a very pleasant 85 year old whom I have been following for stable ischemic cardiomyopathy.  She comes in with her daughter whom I have met before.  She remembers that my daughter was just born at our last visit.  She currently feels okay with regard to her dyspnea and lower extremity edema.  Her main complaints are orthopedic and left shoulder.  Echocardiogram shows stable LV function.                     Review of Systems:   Review of Systems:  Skin:  Positive for     Eyes:  Positive for glasses  ENT:  Positive for postnasal drainage;sinus trouble;hearing loss  Respiratory:  Positive for shortness of breath  Cardiovascular:    edema;Positive for  Gastroenterology: Negative    Genitourinary:  Negative    Musculoskeletal:  Positive for back pain;neck pain;joint pain  Neurologic:  Positive for headaches  Psychiatric:       Heme/Lymph/Imm:  Negative    Endocrine:  Negative                Physical Exam:     Vitals: /66 (BP Location: Right arm, Patient Position: Sitting, Cuff Size: Adult Regular)  Pulse 80  Ht 1.651 m (5' 5\")  Wt 63.2 kg (139 lb 4.8 oz)  Breastfeeding? No  BMI 23.18 kg/m2  Constitutional:  cooperative, alert and oriented, well developed, well nourished, in no acute distress        Skin:  warm and dry to the touch, no apparent skin lesions or masses noted        Head:  " normocephalic, no masses or lesions        Eyes:  pupils equal and round, conjunctivae and lids unremarkable, sclera white, no xanthalasma, EOMS intact, no nystagmus        ENT:  no pallor or cyanosis, dentition good        Neck:  JVP normal        Chest:  normal breath sounds, clear to auscultation, normal A-P diameter, normal symmetry, normal respiratory excursion, no use of accessory muscles        Cardiac: regular rhythm                  Abdomen:      benign    Extremities and Back:  no deformities, clubbing, cyanosis, erythema observed;no edema        Neurological:  no gross motor deficits;affect appropriate                 Medications:     Current Outpatient Prescriptions   Medication Sig Dispense Refill     acetaminophen (TYLENOL) 500 MG tablet Take 1,000 mg by mouth 2 times daily as needed for mild pain       aspirin EC 81 MG EC tablet Take 1 tablet (81 mg) by mouth daily       atorvastatin (LIPITOR) 40 MG tablet TAKE 1 TABLET BY MOUTH ONE TIME DAILY  30 tablet 0     carvedilol (COREG) 3.125 MG tablet Take 1 tablet (3.125 mg) by mouth 2 times daily (with meals) 180 tablet 3     diazepam (VALIUM) 2 MG tablet TAKE ONE TABLET BY MOUTH TWICE A DAY AS NEEDED FOR ANXIETY (NEEDS TO SCHEDULE FOLLOW UP APPT WITH DR. LEE BEFORE RECEIVING ADDITIONAL REF 60 tablet 0     Docusate Sodium (EQUATE STOOL SOFTENER OR) Take 100 mg by mouth daily as needed        doxylamine (UNISOM) 25 MG TABS tablet Take 50 mg by mouth At Bedtime       furosemide (LASIX) 20 MG tablet Take 1 tablet (20 mg) by mouth daily Add additional 20mg for increased edema 95 tablet 3     guaiFENesin (MUCINEX) 600 MG 12 hr tablet Take 600 mg by mouth as needed for congestion Reported on 4/11/2017       HYDROcodone-acetaminophen (NORCO)  MG per tablet Take 1-2 tablets by mouth every 4 hours as needed for moderate to severe pain 20 tablet 0     losartan (COZAAR) 25 MG tablet Take 1 tablet (25 mg) by mouth daily 90 tablet 1     nitroGLYcerin  (NITROSTAT) 0.4 MG sublingual tablet if you are still having symptoms after 3 doses (15 minutes) call 911. 25 tablet 1     clorazepate (TRANXENE) 7.5 MG tablet TAKE ONE TABLET BY MOUTH TWICE DAILY AS NEEDED FOR ANXIETY  (Patient not taking: Reported on 10/5/2018) 60 tablet 0     HYDROcodone-acetaminophen (NORCO) 5-325 MG per tablet Take 1 tablet by mouth every 6 hours as needed for moderate to severe pain (Patient not taking: Reported on 10/5/2018) 14 tablet 0     pantoprazole (PROTONIX) 40 MG EC tablet Take 1 tablet (40 mg) by mouth daily, 30-60 minutes before a meal (Patient not taking: Reported on 10/5/2018) 90 tablet 2                Data:   All laboratory data reviewed  No results found for this or any previous visit (from the past 24 hour(s)).    All laboratory data reviewed  Lab Results   Component Value Date    CHOL 139 10/01/2018     Lab Results   Component Value Date    HDL 48 10/01/2018     Lab Results   Component Value Date    LDL 67 10/01/2018     Lab Results   Component Value Date    TRIG 119 10/01/2018     Lab Results   Component Value Date    CHOLHDLRATIO 2.8 11/05/2015     TSH   Date Value Ref Range Status   07/14/2017 1.14 0.40 - 4.00 mU/L Final     Last Basic Metabolic Panel:  Lab Results   Component Value Date     10/01/2018      Lab Results   Component Value Date    POTASSIUM 3.8 10/01/2018     Lab Results   Component Value Date    CHLORIDE 104 10/01/2018     Lab Results   Component Value Date    LIGIA 8.5 10/01/2018     Lab Results   Component Value Date    CO2 25 10/01/2018     Lab Results   Component Value Date    BUN 20 10/01/2018     Lab Results   Component Value Date    CR 1.00 10/01/2018     Lab Results   Component Value Date     10/01/2018     Lab Results   Component Value Date    WBC 4.7 09/06/2017     Lab Results   Component Value Date    RBC 3.27 09/06/2017     Lab Results   Component Value Date    HGB 10.5 09/06/2017     Lab Results   Component Value Date    HCT 33.0  09/06/2017     Lab Results   Component Value Date     09/06/2017     Lab Results   Component Value Date    MCH 32.1 09/06/2017     Lab Results   Component Value Date    MCHC 31.8 09/06/2017     Lab Results   Component Value Date    RDW 11.9 09/06/2017     Lab Results   Component Value Date     09/06/2017                 Thank you for allowing me to participate in the care of your patient.    Sincerely,     Hiren Collins MD     [facility]

## 2018-11-04 DIAGNOSIS — I21.09 ST ELEVATION MYOCARDIAL INFARCTION (STEMI) INVOLVING OTHER CORONARY ARTERY OF ANTERIOR WALL (H): ICD-10-CM

## 2018-11-04 DIAGNOSIS — I21.3 ST ELEVATION MYOCARDIAL INFARCTION (STEMI), UNSPECIFIED ARTERY (H): ICD-10-CM

## 2018-11-05 DIAGNOSIS — I25.10 CORONARY ARTERY DISEASE INVOLVING NATIVE CORONARY ARTERY OF NATIVE HEART WITHOUT ANGINA PECTORIS: ICD-10-CM

## 2018-11-05 RX ORDER — LOSARTAN POTASSIUM 25 MG/1
25 TABLET ORAL DAILY
Qty: 90 TABLET | Refills: 3 | Status: SHIPPED | OUTPATIENT
Start: 2018-11-05 | End: 2019-10-31

## 2018-11-05 NOTE — TELEPHONE ENCOUNTER
Received refill request for:  Losartan  Last OV was: 10/5/2018 with Dr. Collins  Labs/EKG: last BMP 10/1/2018  F/U scheduled: orders in Epic for 10/2019  New script sent to: [facility]

## 2018-11-05 NOTE — TELEPHONE ENCOUNTER
"Requested Prescriptions   Pending Prescriptions Disp Refills     atorvastatin (LIPITOR) 40 MG tablet [Pharmacy Med Name: Atorvastatin Calcium Oral Tablet 40 MG] 30 tablet 0    Last Written Prescription Date:  10/04/2018  Last Fill Quantity: 30,  # refills: 0   Last office visit: 9/15/2017 with prescribing provider:     Future Office Visit:   Next 5 appointments (look out 90 days)     Jan 09, 2019  3:20 PM CST   Office Visit with Michael Londono MD   [facility]    [address]  [facility] [id number] [phone]                Sig: TAKE 1 TABLET BY MOUTH ONE TIME DAILY **NEED APPT**    Statins Protocol Failed    11/4/2018 10:04 AM       Failed - Recent (12 mo) or future (30 days) visit within the authorizing provider's specialty    Patient had office visit in the last 12 months or has a visit in the next 30 days with authorizing provider or within the authorizing provider's specialty.  See \"Patient Info\" tab in inbasket, or \"Choose Columns\" in Meds & Orders section of the refill encounter.             Passed - LDL on file in past 12 months    Recent Labs   Lab Test  10/01/18   1026   LDL  67            Passed - No abnormal creatine kinase in past 12 months    No lab results found.            Passed - Patient is age 18 or older       Passed - No active pregnancy on record       Passed - No positive pregnancy test in past 12 months        "

## 2018-11-06 NOTE — TELEPHONE ENCOUNTER
Routing refill request to provider for review/approval because:  Patient needs to be seen because it has been more than 1 year since last office visit.  Patient does have office visit scheduled 1/9/2018    ARABELLA Valentine, RN  [facility]

## 2018-11-08 RX ORDER — ATORVASTATIN CALCIUM 40 MG/1
TABLET, FILM COATED ORAL
Qty: 90 TABLET | Refills: 0 | Status: SHIPPED | OUTPATIENT
Start: 2018-11-08 | End: 2019-02-01

## 2018-11-14 DIAGNOSIS — F41.9 ANXIETY: ICD-10-CM

## 2018-11-14 NOTE — TELEPHONE ENCOUNTER
Requested Prescriptions   Pending Prescriptions Disp Refills     diazepam (VALIUM) 2 MG tablet [Pharmacy Med Name: DiazePAM Oral Tablet 2 MG] 60 tablet 0     Sig: TAKE ONE TABLET BY MOUTH TWICE DAILY AS NEEDED FOR ANXIETY **NEED APPT**    There is no refill protocol information for this order      Last Written Prescription Date:  10/04/2018  Last Fill Quantity: 60,  # refills: 0   Last office visit: 9/15/2017 with prescribing provider:     Future Office Visit:   Next 5 appointments (look out 90 days)     Jan 09, 2019  3:20 PM CST   Office Visit with Michael Londono MD   [facility] ([facility]    [address]  [facility] [id number] [phone]

## 2018-11-15 RX ORDER — DIAZEPAM 2 MG
TABLET ORAL
Qty: 60 TABLET | Refills: 0 | Status: SHIPPED | OUTPATIENT
Start: 2018-11-15 | End: 2018-12-28

## 2018-12-28 DIAGNOSIS — F41.9 ANXIETY: ICD-10-CM

## 2018-12-28 NOTE — TELEPHONE ENCOUNTER
Requested Prescriptions   Pending Prescriptions Disp Refills     diazepam (VALIUM) 2 MG tablet [Pharmacy Med Name: DiazePAM Oral Tablet 2 MG] 60 tablet 0     Sig: TAKE ONE TABLET BY MOUTH TWICE DAILY AS NEEDED FOR ANXIETY **NEED APPT**    There is no refill protocol information for this order      Last Written Prescription Date:  11/15/2018  Last Fill Quantity: 60,  # refills: 0   Last office visit: 9/15/2017 with prescribing provider:     Future Office Visit:   Next 5 appointments (look out 90 days)    Jan 09, 2019  3:20 PM CST  Office Visit with Michael Londono MD  [facility] ([facility]) [address]  [facility] [id number] [phone]

## 2018-12-31 NOTE — TELEPHONE ENCOUNTER
Routing refill request to provider for review/approval because:  Drug not on the [facility] refill protocol   Marianna Dunn RN

## 2019-01-03 RX ORDER — DIAZEPAM 2 MG
TABLET ORAL
Qty: 60 TABLET | Refills: 0 | Status: SHIPPED | OUTPATIENT
Start: 2019-01-03 | End: 2019-02-14

## 2019-01-09 ENCOUNTER — OFFICE VISIT (OUTPATIENT)
Dept: INTERNAL MEDICINE | Facility: CLINIC | Age: 84
End: 2019-01-09
Payer: MEDICARE

## 2019-01-09 VITALS
HEART RATE: 98 BPM | TEMPERATURE: 98.1 F | SYSTOLIC BLOOD PRESSURE: 132 MMHG | DIASTOLIC BLOOD PRESSURE: 60 MMHG | WEIGHT: 124 LBS | HEIGHT: 65 IN | OXYGEN SATURATION: 100 % | RESPIRATION RATE: 16 BRPM | BODY MASS INDEX: 20.66 KG/M2

## 2019-01-09 DIAGNOSIS — I50.22 CHRONIC SYSTOLIC HEART FAILURE (H): ICD-10-CM

## 2019-01-09 DIAGNOSIS — E44.1 MILD PROTEIN-CALORIE MALNUTRITION (H): ICD-10-CM

## 2019-01-09 DIAGNOSIS — J31.0 CHRONIC RHINITIS: Primary | ICD-10-CM

## 2019-01-09 DIAGNOSIS — I48.0 PAROXYSMAL ATRIAL FIBRILLATION (H): ICD-10-CM

## 2019-01-09 DIAGNOSIS — F41.9 ANXIETY: ICD-10-CM

## 2019-01-09 DIAGNOSIS — E11.21 TYPE 2 DIABETES MELLITUS WITH DIABETIC NEPHROPATHY, WITHOUT LONG-TERM CURRENT USE OF INSULIN (H): ICD-10-CM

## 2019-01-09 PROBLEM — E46 PROTEIN-CALORIE MALNUTRITION (H): Status: ACTIVE | Noted: 2019-01-09

## 2019-01-09 LAB — HBA1C MFR BLD: 6.2 % (ref 0–5.6)

## 2019-01-09 PROCEDURE — 84443 ASSAY THYROID STIM HORMONE: CPT | Performed by: INTERNAL MEDICINE

## 2019-01-09 PROCEDURE — 80048 BASIC METABOLIC PNL TOTAL CA: CPT | Performed by: INTERNAL MEDICINE

## 2019-01-09 PROCEDURE — 36415 COLL VENOUS BLD VENIPUNCTURE: CPT | Performed by: INTERNAL MEDICINE

## 2019-01-09 PROCEDURE — 83036 HEMOGLOBIN GLYCOSYLATED A1C: CPT | Performed by: INTERNAL MEDICINE

## 2019-01-09 PROCEDURE — 99214 OFFICE O/P EST MOD 30 MIN: CPT | Performed by: INTERNAL MEDICINE

## 2019-01-09 RX ORDER — IPRATROPIUM BROMIDE 42 UG/1
2 SPRAY, METERED NASAL 4 TIMES DAILY PRN
Qty: 15 ML | Refills: 3 | Status: ON HOLD | OUTPATIENT
Start: 2019-01-09 | End: 2020-02-15

## 2019-01-09 RX ORDER — HYDROCODONE BITARTRATE AND ACETAMINOPHEN 10; 325 MG/1; MG/1
1 TABLET ORAL EVERY 4 HOURS PRN
Status: ON HOLD | COMMUNITY
End: 2020-02-15

## 2019-01-09 ASSESSMENT — MIFFLIN-ST. JEOR: SCORE: 1003.34
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
## 2019-01-09 NOTE — PATIENT INSTRUCTIONS
"Try the nose inhaler, \"ipratropium\", two puffs each nostril up to four times a day as needed for nasal congestion/post-nasal drainage.   It is also good to use the Mucinex and the Flonase inhaler.     Stop by the lab to update diabetes labs today.     If diabetes is well-controlled, I can see you in six months. If the diabetes is not well-controlled, I need to see you in three months.       "

## 2019-01-10 LAB
ANION GAP SERPL CALCULATED.3IONS-SCNC: 10 MMOL/L (ref 3–14)
BUN SERPL-MCNC: 23 MG/DL (ref 7–30)
CALCIUM SERPL-MCNC: 9.4 MG/DL (ref 8.5–10.1)
CHLORIDE SERPL-SCNC: 104 MMOL/L (ref 94–109)
CO2 SERPL-SCNC: 20 MMOL/L (ref 20–32)
CREAT SERPL-MCNC: 0.93 MG/DL (ref 0.52–1.04)
GFR SERPL CREATININE-BSD FRML MDRD: 55 ML/MIN/{1.73_M2}
GLUCOSE SERPL-MCNC: 143 MG/DL (ref 70–99)
POTASSIUM SERPL-SCNC: 4.4 MMOL/L (ref 3.4–5.3)
SODIUM SERPL-SCNC: 134 MMOL/L (ref 133–144)
TSH SERPL DL<=0.005 MIU/L-ACNC: 0.4 MU/L (ref 0.4–4)

## 2019-01-17 ENCOUNTER — TELEPHONE (OUTPATIENT)
Dept: INTERNAL MEDICINE | Facility: CLINIC | Age: 84
End: 2019-01-17

## 2019-01-17 NOTE — TELEPHONE ENCOUNTER
Daughter Wen Alonzo called and said Dr Londono did not complete the FMLA form completely question number 14.  She has copy of what was sent and would like it to be completed.   Wen will bring in today.     Daughter's phone [phone]. Can leave detailed message on vm.  Kenia Stuart  Pt Service Rep.

## 2019-01-18 ENCOUNTER — TELEPHONE (OUTPATIENT)
Dept: INTERNAL MEDICINE | Facility: CLINIC | Age: 84
End: 2019-01-18

## 2019-01-18 NOTE — LETTER
"  [facility]  [address] [place] [address]  [phone]    1/22/2019    Gosia GUTIERREZ Cheri  [address] [facility] [address] [id number]           Dear MsAlessio Alonzo,    The results of your lab tests from January 9 (ordered by Dr Londono) AND from 10/1/2018 (ordered by cardiology) are enclosed.   Everything looks fine.   Unless noted otherwise below, any results that are outside the \"normal\" range are within acceptable limits and are of no concern.    Hemoglobin A1C measures control of diabetes. Your Hemoglobin A1C is excellent at 6.2. The ideal target is under 7.0.    LDL= Bad Cholesterol-- the target is below 100.     HDL= Good Cholesterol-- although this is determined mostly by heredity, exercise and/or medications may sometimes raise this number.    Triglycerides are another type of fat in the blood, and can sometimes be lowered by reducing intake of sweets or excess carbohydrates, alcohol, and by weight reduction if needed.  Sometimes medications are also used.    Urea Nitrogen and Creatinine are kidney tests--yours are normal. GFR stands for Glomerular Filtration Rate, a more complicated estimate of kidney function.    Sodium, Potassium, Chloride, Carbon Dioxide, and Calcium are all normal salts in the bloodstream. Yours all look normal. Your glucose (blood sugar) also looks fine. (You can ignore the anion gap result).     TSH measures thyroid function. Yours is normal.     If you have any further questions or problems, please contact our office.    Sincerely,      KRIS LONDONO M.D.  Attachment: Lab results     "

## 2019-01-18 NOTE — TELEPHONE ENCOUNTER
Patient would like to discuss lab results.     Best call back is [phone], ok to leave a detailed message.

## 2019-01-22 NOTE — TELEPHONE ENCOUNTER
Letter completed. Please review briefly with patient.     Please mail letter (already completed and marked as sent) along with lab results.

## 2019-01-22 NOTE — TELEPHONE ENCOUNTER
Reason for Call:  Request for results:    Name of test or procedure: labs    Date of test of procedure: 1/9    Location of the test or procedure: [facility] to leave the result message on voice mail or with a family member? YES    Phone number Patient can be reached at:  Home number on file [phone] (home)    Additional comments: pt. Has called before would like someone to call her back     Call taken on 1/22/2019 at 12:56 PM by Leonila Corbett

## 2019-01-23 NOTE — TELEPHONE ENCOUNTER
Call to Wen. She states everything has been addressed and no further questions need to be answered.

## 2019-02-01 DIAGNOSIS — I21.3 ST ELEVATION MYOCARDIAL INFARCTION (STEMI), UNSPECIFIED ARTERY (H): ICD-10-CM

## 2019-02-01 DIAGNOSIS — I25.5 ISCHEMIC CARDIOMYOPATHY: ICD-10-CM

## 2019-02-01 DIAGNOSIS — I21.09 ST ELEVATION MYOCARDIAL INFARCTION (STEMI) INVOLVING OTHER CORONARY ARTERY OF ANTERIOR WALL (H): ICD-10-CM

## 2019-02-01 RX ORDER — FUROSEMIDE 20 MG
20 TABLET ORAL DAILY
Qty: 95 TABLET | Refills: 3 | Status: SHIPPED | OUTPATIENT
Start: 2019-02-01 | End: 2019-10-31

## 2019-02-01 NOTE — TELEPHONE ENCOUNTER
"Requested Prescriptions   Pending Prescriptions Disp Refills     atorvastatin (LIPITOR) 40 MG tablet  Last Written Prescription Date:  11/8/18  Last Fill Quantity: 90,  # refills: 0   Last office visit: 1/9/2019 with prescribing provider:  Bry   Future Office Visit:   90 tablet 0    Statins Protocol Passed - 2/1/2019  3:41 PM       Passed - LDL on file in past 12 months    Recent Labs   Lab Test 10/01/18  1026   LDL 67            Passed - No abnormal creatine kinase in past 12 months    No lab results found.            Passed - Recent (12 mo) or future (30 days) visit within the authorizing provider's specialty    Patient had office visit in the last 12 months or has a visit in the next 30 days with authorizing provider or within the authorizing provider's specialty.  See \"Patient Info\" tab in inbasket, or \"Choose Columns\" in Meds & Orders section of the refill encounter.             Passed - Medication is active on med list       Passed - Patient is age 18 or older       Passed - No active pregnancy on record       Passed - No positive pregnancy test in past 12 months        "

## 2019-02-01 NOTE — TELEPHONE ENCOUNTER
Received refill request for:  Furosemide  Last OV was: 10/5/2018 with Dr. Collins  Labs/EKG: last BMP 1/9/2019  F/U scheduled: orders in Epic for 10/2019  New script sent to: [facility]

## 2019-02-05 RX ORDER — ATORVASTATIN CALCIUM 40 MG/1
TABLET, FILM COATED ORAL
Qty: 90 TABLET | Refills: 0 | Status: SHIPPED | OUTPATIENT
Start: 2019-02-05 | End: 2019-10-31

## 2019-02-05 NOTE — TELEPHONE ENCOUNTER
Prescription approved per [facility] Refill Protocol.  LOV: 01/09/2019  Labs: up to date    Nancy Bonilla, RN  Triage Nurse

## 2019-02-08 ENCOUNTER — TELEPHONE (OUTPATIENT)
Dept: CARDIOLOGY | Facility: CLINIC | Age: 84
End: 2019-02-08

## 2019-02-08 NOTE — TELEPHONE ENCOUNTER
Letter from [facility] on recall of losartan may potentially include this patient - Gosia Alonzo.  Called to [facility] [phone] where patient fills medication and spoke with Pharmacist who states calls to effected patients have been completed and this patient is not effected.   Denise Shea RN 02/08/19 10:16 AM

## 2019-02-14 DIAGNOSIS — F41.9 ANXIETY: ICD-10-CM

## 2019-02-15 NOTE — TELEPHONE ENCOUNTER
Requested Prescriptions   Pending Prescriptions Disp Refills     diazepam (VALIUM) 2 MG tablet [Pharmacy Med Name: diazePAM Oral Tablet 2 MG] 60 tablet 0     Sig: TAKE 1 TABLET BY MOUTH TWICE DAILY AS NEEDED FOR ANXIETY    There is no refill protocol information for this order      Last Written Prescription Date:  01/03/2019  Last Fill Quantity: 60,  # refills: 0   Last office visit: 1/9/2019 with prescribing provider:     Future Office Visit:

## 2019-02-19 RX ORDER — DIAZEPAM 2 MG
TABLET ORAL
Qty: 60 TABLET | Refills: 0 | Status: SHIPPED | OUTPATIENT
Start: 2019-02-19 | End: 2019-04-06

## 2019-04-06 DIAGNOSIS — F41.9 ANXIETY: ICD-10-CM

## 2019-04-08 NOTE — TELEPHONE ENCOUNTER
Requested Prescriptions   Pending Prescriptions Disp Refills     diazepam (VALIUM) 2 MG tablet [Pharmacy Med Name: diazePAM Oral Tablet 2 MG] 60 tablet 0     Sig: TAKE ONE TABLET BY MOUTH TWICE A DAY AS NEEDED FOR ANXIETY       There is no refill protocol information for this order      Last Written Prescription Date:  02/19/2019  Last Fill Quantity: 60,  # refills: 0   Last office visit: 1/9/2019 with prescribing provider:     Future Office Visit:

## 2019-04-09 RX ORDER — DIAZEPAM 2 MG
TABLET ORAL
Qty: 60 TABLET | Refills: 0 | Status: SHIPPED | OUTPATIENT
Start: 2019-04-09 | End: 2019-05-04

## 2019-04-09 NOTE — TELEPHONE ENCOUNTER
Patient called this morning and wants her RX filled before the snow falls. She would like to p/u at pharmacy today or tomorrow.

## 2019-04-26 ENCOUNTER — TELEPHONE (OUTPATIENT)
Dept: INTERNAL MEDICINE | Facility: CLINIC | Age: 84
End: 2019-04-26

## 2019-04-26 NOTE — TELEPHONE ENCOUNTER
Reason for Call:  Other call back    Detailed comments: daughter in law seema called for pt.  She hurt her leg yesterday.  She went to er and they told her to use not stick pads for bandage.  These always stick and daughter in law is asking if the can use tagaderm that they got in hospital on previous visit.  Seema would also like to discuss other issues with nurse and asks for call back     Phone Number Patient can be reached at: Other phone number:  Seema [phone]    Best Time: asap     Can we leave a detailed message on this number? YES    Call taken on 4/26/2019 at 9:41 AM by Leonila Corbett

## 2019-04-26 NOTE — TELEPHONE ENCOUNTER
"Spoke to Seema.   Patient went to open her window   Tore skin on ankle about 4x2\", Paramedics were called and sent her to Urgent Care    Given antibiotic ointment and told to apply with non-stick pad and change dressing BID. She states that about a year ago had a skin tear when she fell and broke her pelvis, the hospital had her use Tegaderm to keep the wound moist to help with healing. She asks if they can try this instead as the non-stick pad is sticking to her skin and she had a little bleeding after the dressing change.     Unsure if Tegaderm would work, recommended they moisten dressing with clean water or saline before changing it to help loosen the dressing. Seema will inform family of this - family members are takign turns assisting patient with dressing changes. She states they were advised to follow-up with PCP, but patient asks if this is necessary as long as wound is not infected. Advised her okay hold off on follow-up appointment, but to monitor for signs and symptoms of infection and have patient be seen right away if any signs of infection are present. Seema agrees with plan.  "

## 2019-05-04 DIAGNOSIS — I21.09 ST ELEVATION MYOCARDIAL INFARCTION (STEMI) INVOLVING OTHER CORONARY ARTERY OF ANTERIOR WALL (H): ICD-10-CM

## 2019-05-04 DIAGNOSIS — F41.9 ANXIETY: ICD-10-CM

## 2019-05-04 DIAGNOSIS — I21.3 ST ELEVATION MYOCARDIAL INFARCTION (STEMI), UNSPECIFIED ARTERY (H): ICD-10-CM

## 2019-05-05 DIAGNOSIS — I21.3 ST ELEVATION MYOCARDIAL INFARCTION (STEMI), UNSPECIFIED ARTERY (H): ICD-10-CM

## 2019-05-05 DIAGNOSIS — I21.09 ST ELEVATION MYOCARDIAL INFARCTION (STEMI) INVOLVING OTHER CORONARY ARTERY OF ANTERIOR WALL (H): ICD-10-CM

## 2019-05-06 RX ORDER — DIAZEPAM 2 MG
TABLET ORAL
Qty: 60 TABLET | Refills: 0 | Status: SHIPPED | OUTPATIENT
Start: 2019-05-06 | End: 2019-06-26

## 2019-05-06 RX ORDER — ATORVASTATIN CALCIUM 40 MG/1
TABLET, FILM COATED ORAL
Qty: 90 TABLET | Refills: 0 | Status: SHIPPED | OUTPATIENT
Start: 2019-05-06 | End: 2019-07-31

## 2019-05-06 RX ORDER — ATORVASTATIN CALCIUM 40 MG/1
TABLET, FILM COATED ORAL
Qty: 90 TABLET | Refills: 0 | OUTPATIENT
Start: 2019-05-06

## 2019-05-06 NOTE — TELEPHONE ENCOUNTER
"Requested Prescriptions   Pending Prescriptions Disp Refills     atorvastatin (LIPITOR) 40 MG tablet [Pharmacy Med Name: Atorvastatin Calcium Oral Tablet 40 MG] 90 tablet 0     Sig: TAKE 1 TABLET BY MOUTH ONE TIME DAILY **NEED APPT**   Last Written Prescription Date:  02/05/2019  Last Fill Quantity: 90,  # refills: 0   Last office visit: 1/9/2019 with prescribing provider:     Future Office Visit:      Statins Protocol Passed - 5/5/2019  1:25 PM        Passed - LDL on file in past 12 months     Recent Labs   Lab Test 10/01/18  1026   LDL 67             Passed - No abnormal creatine kinase in past 12 months     No lab results found.             Passed - Recent (12 mo) or future (30 days) visit within the authorizing provider's specialty     Patient had office visit in the last 12 months or has a visit in the next 30 days with authorizing provider or within the authorizing provider's specialty.  See \"Patient Info\" tab in inbasket, or \"Choose Columns\" in Meds & Orders section of the refill encounter.              Passed - Medication is active on med list        Passed - Patient is age 18 or older        Passed - No active pregnancy on record        Passed - No positive pregnancy test in past 12 months        "

## 2019-05-06 NOTE — TELEPHONE ENCOUNTER
Encounter-Level CSA:    There are no encounter-level csa.     Patient-Level CSA:    There are no patient-level csa.        [place] reviewed:  reviewed- no concerns    [place]:  [email or web address]     Refill faxed to [facility].

## 2019-05-06 NOTE — TELEPHONE ENCOUNTER
"Requested Prescriptions   Pending Prescriptions Disp Refills     atorvastatin (LIPITOR) 40 MG tablet [Pharmacy Med Name: Atorvastatin Calcium Oral Tablet 40 MG] 90 tablet 0     Sig: TAKE 1 TABLET BY MOUTH ONE TIME DAILY **NEED APPT**       Statins Protocol Passed - 5/4/2019  9:14 AM        Passed - LDL on file in past 12 months     Recent Labs   Lab Test 10/01/18  1026   LDL 67             Passed - No abnormal creatine kinase in past 12 months     No lab results found.             Passed - Recent (12 mo) or future (30 days) visit within the authorizing provider's specialty     Patient had office visit in the last 12 months or has a visit in the next 30 days with authorizing provider or within the authorizing provider's specialty.  See \"Patient Info\" tab in inbasket, or \"Choose Columns\" in Meds & Orders section of the refill encounter.              Passed - Medication is active on med list        Passed - Patient is age 18 or older        Passed - No active pregnancy on record        Passed - No positive pregnancy test in past 12 months          "

## 2019-05-06 NOTE — TELEPHONE ENCOUNTER
Routing refill request to provider for review/approval because:  Drug not on the [facility] refill protocol   Writer is not authorized to check  for primary care provider

## 2019-05-06 NOTE — TELEPHONE ENCOUNTER
Prescription approved per [facility] [place]ill Protocol. FRANKLYN Ventura R.N.

## 2019-05-06 NOTE — TELEPHONE ENCOUNTER
Requested Prescriptions   Pending Prescriptions Disp Refills     diazepam (VALIUM) 2 MG tablet [Pharmacy Med Name: diazePAM Oral Tablet 2 MG] 60 tablet 0     Sig: TAKE ONE TABLET BY MOUTH TWICE DAILY AS NEEDED FOR ANXIETY       There is no refill protocol information for this order      Last Written Prescription Date:  04/09/2019  Last Fill Quantity: 60,  # refills: 0   Last office visit: 1/9/2019 with prescribing provider:     Future Office Visit:

## 2019-06-26 DIAGNOSIS — F41.9 ANXIETY: ICD-10-CM

## 2019-06-26 RX ORDER — DIAZEPAM 2 MG
TABLET ORAL
Qty: 60 TABLET | Refills: 0 | Status: SHIPPED | OUTPATIENT
Start: 2019-06-26 | End: 2019-07-31

## 2019-06-26 NOTE — TELEPHONE ENCOUNTER
Requested Prescriptions   Pending Prescriptions Disp Refills     diazepam (VALIUM) 2 MG tablet [Pharmacy Med Name: diazePAM Oral Tablet 2 MG] 60 tablet 0     Sig: TAKE ONE TABLET BY MOUTH TWICE DAILY AS NEEDED FOR ANXIETY       There is no refill protocol information for this order      Last Written Prescription Date:  05/06/2019  Last Fill Quantity: 60,  # refills: 0   Last office visit: 1/9/2019 with prescribing provider:     Future Office Visit:

## 2019-06-26 NOTE — TELEPHONE ENCOUNTER
Controlled Substance Refill Request for diazepam (VALIUM) 2 MG tablet  Problem List Complete:  No     PROVIDER TO CONSIDER COMPLETION OF PROBLEM LIST AND OVERVIEW/CONTROLLED SUBSTANCE AGREEMENT    Last Written Prescription Date:  5/6/19  Last Fill Quantity: 60,   # refills: 0    Last Office Visit with [facility] primary care provider: 1/9/19    Future Office visit:     Controlled substance agreement:   Encounter-Level CSA:    There are no encounter-level csa.     Patient-Level CSA:    There are no patient-level csa.         Last Urine Drug Screen: No results found for: CDAUT, No results found for: COMDAT, No results found for: THC13, PCP13, COC13, MAMP13, OPI13, AMP13, BZO13, TCA13, MTD13, BAR13, OXY13, PPX13, BUP13     Processing:  Fax Rx to [facility] pharmacy     [email or web address]    RX [place] program ([place] reviewed:  reviewed- no concerns

## 2019-06-28 ENCOUNTER — TELEPHONE (OUTPATIENT)
Dept: INTERNAL MEDICINE | Facility: CLINIC | Age: 84
End: 2019-06-28

## 2019-07-31 DIAGNOSIS — I21.3 ST ELEVATION MYOCARDIAL INFARCTION (STEMI), UNSPECIFIED ARTERY (H): ICD-10-CM

## 2019-07-31 DIAGNOSIS — I25.5 ISCHEMIC CARDIOMYOPATHY: ICD-10-CM

## 2019-07-31 DIAGNOSIS — F41.9 ANXIETY: ICD-10-CM

## 2019-07-31 DIAGNOSIS — I21.09 ST ELEVATION MYOCARDIAL INFARCTION (STEMI) INVOLVING OTHER CORONARY ARTERY OF ANTERIOR WALL (H): ICD-10-CM

## 2019-07-31 RX ORDER — CARVEDILOL 3.12 MG/1
3.12 TABLET ORAL 2 TIMES DAILY WITH MEALS
Qty: 180 TABLET | Refills: 0 | Status: SHIPPED | OUTPATIENT
Start: 2019-07-31 | End: 2019-11-01

## 2019-07-31 NOTE — TELEPHONE ENCOUNTER
"Requested Prescriptions   Pending Prescriptions Disp Refills     atorvastatin (LIPITOR) 40 MG tablet [Pharmacy Med Name: Atorvastatin Calcium Oral Tablet 40 MG] 90 tablet 0     Sig: TAKE 1 TABLET BY MOUTH ONE TIME DAILY **NEED APPT**   Last Written Prescription Date:  05/06/2019  Last Fill Quantity: 90,  # refills: 0   Last office visit: 1/9/2019 with prescribing provider:     Future Office Visit:      Statins Protocol Passed - 7/31/2019 12:26 PM        Passed - LDL on file in past 12 months     Recent Labs   Lab Test 10/01/18  1026   LDL 67             Passed - No abnormal creatine kinase in past 12 months     No lab results found.             Passed - Recent (12 mo) or future (30 days) visit within the authorizing provider's specialty     Patient had office visit in the last 12 months or has a visit in the next 30 days with authorizing provider or within the authorizing provider's specialty.  See \"Patient Info\" tab in inbasket, or \"Choose Columns\" in Meds & Orders section of the refill encounter.              Passed - Medication is active on med list        Passed - Patient is age 18 or older        Passed - No active pregnancy on record        Passed - No positive pregnancy test in past 12 months        diazepam (VALIUM) 2 MG tablet [Pharmacy Med Name: diazePAM Oral Tablet 2 MG] 60 tablet 0     Sig: TAKE ONE TABLET BY MOUTH TWICE DAILY AS NEEDED FOR ANXIETY   Last Written Prescription Date:  06/26/2019  Last Fill Quantity: 60,  # refills: 0   Last office visit: 1/9/2019 with prescribing provider:     Future Office Visit:      There is no refill protocol information for this order        "

## 2019-08-01 RX ORDER — ATORVASTATIN CALCIUM 40 MG/1
TABLET, FILM COATED ORAL
Qty: 90 TABLET | Refills: 0 | Status: SHIPPED | OUTPATIENT
Start: 2019-08-01 | End: 2019-10-31

## 2019-08-01 NOTE — TELEPHONE ENCOUNTER
Atorvastatin - Due for diabetes follow up.  Contacted patient, future appointment scheduled  Next 5 appointments (look out 90 days)    Aug 28, 2019  1:20 PM CDT  SHORT with Michael Londono MD  [facility] [address]  [facility] [address]  [phone]         Medication is being filled for 1 time refill only due to:  Future appointment scheduled     Controlled Substance Refill Request for diazepam (VALIUM) 2 MG tablet   Problem List Complete:  No     PROVIDER TO CONSIDER COMPLETION OF PROBLEM LIST AND OVERVIEW/CONTROLLED SUBSTANCE AGREEMENT    Last Written Prescription Date:  6/26/19  Last Fill Quantity: 60,   # refills: 0    Last Office Visit with [facility] primary care provider: 1/9/19    Future Office visit:     Controlled substance agreement:   Encounter-Level CSA:    There are no encounter-level csa.     Patient-Level CSA:    There are no patient-level csa.         Last Urine Drug Screen: No results found for: CDAUT, No results found for: COMDAT, No results found for: THC13, PCP13, COC13, MAMP13, OPI13, AMP13, BZO13, TCA13, MTD13, BAR13, OXY13, PPX13, BUP13     Processing:  Fax Rx to [facility] pharmacy     [email or web address]    [place] reviewed:  reviewed- no concerns    [place]:  [email or web address]

## 2019-08-02 RX ORDER — DIAZEPAM 2 MG
TABLET ORAL
Qty: 60 TABLET | Refills: 0 | Status: SHIPPED | OUTPATIENT
Start: 2019-08-02 | End: 2019-09-04

## 2019-09-04 DIAGNOSIS — F41.9 ANXIETY: ICD-10-CM

## 2019-09-04 NOTE — TELEPHONE ENCOUNTER
Requested Prescriptions   Pending Prescriptions Disp Refills     diazepam (VALIUM) 2 MG tablet [Pharmacy Med Name: diazePAM Oral Tablet 2 MG] 60 tablet 0     Sig: TAKE ONE TABLET BY MOUTH TWICE DAILY AS NEEDED FOR ANXIETY       There is no refill protocol information for this order      Last Written Prescription Date:  08/02/2019  Last Fill Quantity: 60,  # refills: 0   Last office visit: 1/9/2019 with prescribing provider:     Future Office Visit:   Next 5 appointments (look out 90 days)    Oct 31, 2019  3:00 PM CDT  SHORT with Michael Londono MD  [facility] [address]  [facility] [id number] [phone]

## 2019-09-05 NOTE — TELEPHONE ENCOUNTER
Encounter-Level CSA:    There are no encounter-level csa.     Patient-Level CSA:    There are no patient-level csa.        [place] reviewed:  not reviewed/not due - last done on 7/31/19-no concerns    [place]:  [email or web address]

## 2019-09-06 RX ORDER — DIAZEPAM 2 MG
TABLET ORAL
Qty: 60 TABLET | Refills: 0 | Status: SHIPPED | OUTPATIENT
Start: 2019-09-06 | End: 2019-10-31

## 2019-10-31 ENCOUNTER — OFFICE VISIT (OUTPATIENT)
Dept: INTERNAL MEDICINE | Facility: CLINIC | Age: 84
End: 2019-10-31
Payer: MEDICARE

## 2019-10-31 VITALS
DIASTOLIC BLOOD PRESSURE: 88 MMHG | HEIGHT: 65 IN | TEMPERATURE: 97.4 F | HEART RATE: 104 BPM | RESPIRATION RATE: 18 BRPM | BODY MASS INDEX: 21.04 KG/M2 | OXYGEN SATURATION: 99 % | WEIGHT: 126.3 LBS | SYSTOLIC BLOOD PRESSURE: 158 MMHG

## 2019-10-31 DIAGNOSIS — I25.5 ISCHEMIC CARDIOMYOPATHY: ICD-10-CM

## 2019-10-31 DIAGNOSIS — I21.09 ST ELEVATION MYOCARDIAL INFARCTION (STEMI) INVOLVING OTHER CORONARY ARTERY OF ANTERIOR WALL (H): ICD-10-CM

## 2019-10-31 DIAGNOSIS — I10 ESSENTIAL HYPERTENSION: ICD-10-CM

## 2019-10-31 DIAGNOSIS — N18.30 TYPE 2 DIABETES MELLITUS WITH STAGE 3 CHRONIC KIDNEY DISEASE, WITHOUT LONG-TERM CURRENT USE OF INSULIN (H): Primary | ICD-10-CM

## 2019-10-31 DIAGNOSIS — E11.22 TYPE 2 DIABETES MELLITUS WITH STAGE 3 CHRONIC KIDNEY DISEASE, WITHOUT LONG-TERM CURRENT USE OF INSULIN (H): Primary | ICD-10-CM

## 2019-10-31 DIAGNOSIS — E78.5 HYPERLIPIDEMIA LDL GOAL <100: ICD-10-CM

## 2019-10-31 DIAGNOSIS — F41.9 ANXIETY: ICD-10-CM

## 2019-10-31 DIAGNOSIS — I25.10 CORONARY ARTERY DISEASE INVOLVING NATIVE CORONARY ARTERY OF NATIVE HEART WITHOUT ANGINA PECTORIS: ICD-10-CM

## 2019-10-31 LAB — HBA1C MFR BLD: 6 % (ref 0–5.6)

## 2019-10-31 PROCEDURE — 99214 OFFICE O/P EST MOD 30 MIN: CPT | Mod: 25 | Performed by: INTERNAL MEDICINE

## 2019-10-31 PROCEDURE — 83036 HEMOGLOBIN GLYCOSYLATED A1C: CPT | Performed by: INTERNAL MEDICINE

## 2019-10-31 PROCEDURE — 36415 COLL VENOUS BLD VENIPUNCTURE: CPT | Performed by: INTERNAL MEDICINE

## 2019-10-31 PROCEDURE — 80061 LIPID PANEL: CPT | Performed by: INTERNAL MEDICINE

## 2019-10-31 PROCEDURE — 80053 COMPREHEN METABOLIC PANEL: CPT | Performed by: INTERNAL MEDICINE

## 2019-10-31 PROCEDURE — G0008 ADMIN INFLUENZA VIRUS VAC: HCPCS | Performed by: INTERNAL MEDICINE

## 2019-10-31 PROCEDURE — 90662 IIV NO PRSV INCREASED AG IM: CPT | Performed by: INTERNAL MEDICINE

## 2019-10-31 RX ORDER — FUROSEMIDE 20 MG
20 TABLET ORAL DAILY
Qty: 95 TABLET | Refills: 3 | Status: SHIPPED | OUTPATIENT
Start: 2019-10-31 | End: 2020-08-25

## 2019-10-31 RX ORDER — ATORVASTATIN CALCIUM 40 MG/1
TABLET, FILM COATED ORAL
Qty: 90 TABLET | Refills: 0 | Status: SHIPPED | OUTPATIENT
Start: 2019-10-31 | End: 2020-01-31

## 2019-10-31 RX ORDER — DIAZEPAM 2 MG
TABLET ORAL
Qty: 60 TABLET | Refills: 0 | Status: SHIPPED | OUTPATIENT
Start: 2019-10-31 | End: 2020-01-09

## 2019-10-31 RX ORDER — LOSARTAN POTASSIUM 25 MG/1
25 TABLET ORAL DAILY
Qty: 90 TABLET | Refills: 0 | Status: SHIPPED | OUTPATIENT
Start: 2019-10-31 | End: 2020-02-03

## 2019-10-31 ASSESSMENT — MIFFLIN-ST. JEOR: SCORE: 1013.77

## 2019-10-31 NOTE — LETTER
[facility]  10/31/19    Patient: Gosia Alonzo  YOB: 1932  Medical Record Number: [id number]  CSN: [id number]                                                                              Non-opioid Controlled Substance Agreement    I understand that my care provider has prescribed a controlled substance to help manage my condition(s). I am taking this medicine to help me function or work. I know this is strong medicine, and that it can cause serious side effects. Controlled substances can be sedating, addicting and may cause a dependency on the drug. They can affect my ability to drive or think, and cause depression. They need to be taken exactly as prescribed. Combining controlled substances with certain medicines or chemicals (such as cocaine, sedatives and tranquilizers, sleeping pills, meth) can be dangerous or even fatal. Also, if I stop controlled substances suddenly, I may have severe withdrawal symptoms.  If not helpful, I may be asked to stop them.    The risks, benefits, and side effects of these medicine(s) were explained to me. I agree that:    1. I will take part in other treatments as advised by my care team. This may be psychiatry or counseling, physical therapy, behavioral therapy, group treatment or a referral to a pain clinic. I will reduce or stop my medicine when my care team tells me to do so.  2. I will take my medicines as prescribed. I will not change the dose or schedule unless my care team tells me to. There will be no refills if I  run out early.   I may be contactedwithout warning and asked to complete a urine drug test or pill count at any time.   3. I will keep all my appointments, and understand this is part of the monitoring of controlled substances. My care team may require an office visit for EVERY controlled substance refill. If I miss appointments or don t follow instructions, my care team may stop my medicine.  4. I will not ask other  providers to prescribe controlled substances, and I will not accept controlled substances from other people. If I need another prescribed controlled substance for a new reason, I will tell my care team within 1 business day.  5. I will use one pharmacy to fill all of my controlled substance prescriptions, and it is up to me to make sure that I do not run out of my medicines on weekends or holidays. If my care team is willing to refill my controlled substance prescription without a visit, I must request refills only during office hours, refills may take up to 3 days to process, and it may take up to 5 to 7 days for my medicine to be mailed and ready at my pharmacy. Prescriptions will not be mailed anywhere except my pharmacy.    6. I am responsible for my prescriptions. If the medicine/prescription is lost or stolen, it will not be replaced. I also agree not to share controlled substance medicines with anyone.              [facility]  10/31/19  Patient:  Gosia Alonzo  YOB: 1932  Medical Record Number: [id number]  CSN: [id number]    7. I agree to not use ANY illegal or recreational drugs. This includes marijuana, cocaine, bath salts or other drugs. I agree not to use alcohol unless my care team says I may. I agree to give urine samples whenever asked. If I don t give a urine sample, the care team may stop my medicine.    8. If I enroll in the [place] Medical Marijuana program, I will tell my care team. I will also sign an agreement to share my medical records with my care team.    9. I will bring in my list of medicines (or my medicine bottles) each time I come to the clinic.   10. I will tell my care team right away if I become pregnant or have a new medical problem treated outside of my regular clinic.  11. I understand that this medicine can affect my thinking and judgment. It may be unsafe for me to drive, use machinery and do dangerous tasks. I will not do any of these things  until I know how the medicine affects me. If my dose changes, I will wait to see how it affects me. I will contact my care team if I have concerns about medicine side effects.    I understand that if I do not follow any of the conditions above, my prescriptions or treatment may be stopped.      I agree that my provider, clinic care team, and pharmacy may work with any city, state or federal law enforcement agency that investigates the misuse, sale, or other diversion of my controlled medicine. I will allow my provider to discuss my care with or share a copy of this agreement with any other treating provider, pharmacy or emergency room where I receive care. I agree to give up (waive) any right of privacy or confidentiality with respect to these consents.   I have read this agreement and have asked questions about anything I did not understand.    ____________________________________________________    ____________  ________  Patient signature                                                         Date      Time    ____________________________________________________     ____________  ________  Witness                                                          Date      Time    ____________________________________________________  Provider signature

## 2019-10-31 NOTE — PATIENT INSTRUCTIONS
Blood pressure a bit high today, in the 150-160 range for the top number.   Let's keep med doses the same for today, however, if BP still a bit high when you see cardiology ask them whether we should raise the dose of either the Losartan or the carvedilol.     We reviewed a Controlled Substance Agreement today and had you sign it (this is for the diazepam--Dr Castro has taken responsibility for prescribing the hydrocodone).     I've refilled all your meds today.     See me back in 4 to 6 months, sooner if problems.

## 2019-10-31 NOTE — NURSING NOTE
"BP (!) 158/88 (BP Location: Right arm, Patient Position: Sitting, Cuff Size: Adult Regular)   Pulse 104   Temp 97.4  F (36.3  C) (Oral)   Resp 18   Ht 1.651 m (5' 5\")   Wt 57.3 kg (126 lb 4.8 oz)   SpO2 99%   BMI 21.02 kg/m    Patient is here for a medication check.  "

## 2019-10-31 NOTE — NURSING NOTE
Prior to immunization administration, verified patients identity using patient s name and date of birth. Please see Immunization Activity for additional information.     Screening Questionnaire for Adult Immunization    Are you sick today?   No   Do you have allergies to medications, food, a vaccine component or latex?   No   Have you ever had a serious reaction after receiving a vaccination?   No   Do you have a long-term health problem with heart disease, lung disease, asthma, kidney disease, metabolic disease (e.g. diabetes), anemia, or other blood disorder?   No   Do you have cancer, leukemia, HIV/AIDS, or any other immune system problem?   No   In the past 3 months, have you taken medications that affect  your immune system, such as prednisone, other steroids, or anticancer drugs; drugs for the treatment of rheumatoid arthritis, Crohn s disease, or psoriasis; or have you had radiation treatments?   No   Have you had a seizure, or a brain or other nervous system problem?   No   During the past year, have you received a transfusion of blood or blood     products, or been given immune (gamma) globulin or antiviral drug?   No   For women: Are you pregnant or is there a chance you could become        pregnant during the next month?   No   Have you received any vaccinations in the past 4 weeks?   No     Immunization questionnaire answers were all negative.        Per orders of Dr. Londono, injection of high dose flu  given by Hannah Ro. Patient instructed to remain in clinic for 15 minutes afterwards, and to report any adverse reaction to me immediately.       Screening performed by Hannah Ro on 10/31/2019 at 3:29 PM.

## 2019-10-31 NOTE — PROGRESS NOTES
Subjective     Gosia Alonzo is a 86 year old female who presents to clinic today for the following health issues:  Patient is here for a medication check. Her son Gary is present and assists in providing history.    HPI   Hyperlipidemia Follow-Up      Are you having any of the following symptoms? (Select all that apply)  No complaints of shortness of breath, chest pain or pressure.  No increased sweating or nausea with activity.  No left-sided neck or arm pain.  No complaints of pain in calves when walking 1-2 blocks.    Are you regularly taking any medication or supplement to lower your cholesterol?   Yes- atorvastatin    Are you having muscle aches or other side effects that you think could be caused by your cholesterol lowering medication?  No    Hypertension Follow-up      Do you check your blood pressure regularly outside of the clinic? No     Are you following a low salt diet? Yes    Are your blood pressures ever more than 140 on the top number (systolic) OR more   than 90 on the bottom number (diastolic), for example 140/90? No      How many servings of fruits and vegetables do you eat daily?  2-3    On average, how many sweetened beverages do you drink each day (soda, juice, sweet tea, etc)?   0    How many days per week do you miss taking your medication? n/a    Diabetes with stage 3 chronic kidney disease.      Historically well controlled. Will update A1c today.     Coronary artery disease/Ischemic cardiomyopathy  She follows with cardiology yearly. Last echocardiogram on 10/1/2018 showed a visual ejection fraction estimated at 35-40 percent. Her carvedilol has been filled by cardiology. Other medications have been filled by Internal Medicine. She denies having any resting dyspnea, any chest discomfort, dizziness or palpitations.     Anxiety:  The patient has taken benzodiazepines daily for years. Most recently she has taken diazepam 2 mg once or twice daily. We reviewed a controlled substance  "agreement today which she has signed.     Chronic pain related to osteoarthritis:  She has for years followed with Dr Castro of [facility], who has prescribed hydrocodone at #120 tablets monthly for years.     Past medical, family and social histories as well as medications reviewed and updated as needed.  Reviewed and updated as needed this visit by Provider         Review of Systems   No dyspnea or cough. No chest discomfort, dizziness or palpitations. No diarrhea, abdominal pain or rectal bleeding.   No acute problems with vision or speech, lateralizing weakness or paresthesias.    ROS: as above or negative for Respiratory, CV, GI, endocrine, neuro systems.           Objective:  Vitals: BP (!) 158/88 (BP Location: Right arm, Patient Position: Sitting, Cuff Size: Adult Regular)   Pulse 104   Temp 97.4  F (36.3  C) (Oral)   Resp 18   Ht 1.651 m (5' 5\")   Wt 57.3 kg (126 lb 4.8 oz)   SpO2 99%   BMI 21.02 kg/m    BMI= Body mass index is 21.02 kg/m .     Physical Exam   GENERAL: healthy, alert and no distress  RESP: lungs clear to auscultation - no rales, rhonchi or wheezes  CV: regular rate and rhythm, normal S1 S2, no S3 or S4, no murmur, click or rub, no peripheral edema and peripheral pulses strong  MS: no gross musculoskeletal defects noted, no edema  PSYCH: mentation appears normal, affect normal/bright    Diagnostic Test Results:  Labs reviewed in Epic        Assessment & Plan   (E11.22,  N18.3) Type 2 diabetes mellitus with stage 3 chronic kidney disease, without long-term current use of insulin (H)  (primary encounter diagnosis)  Comment: Historically well controlled. Continue current measures. Update labs.   Plan: Hemoglobin A1c, Albumin Random Urine         Quantitative with Creat Ratio         (E78.5) Hyperlipidemia LDL goal <100  Comment: LDL at target. Continue current meds.   Plan: Lipid panel reflex to direct LDL Non-fasting,         Comprehensive metabolic panel (BMP + Alb, Alk         Phos, ALT, AST, " Total. Bili, TP)          (I10) Essential hypertension  Comment: BP above target. Cardiology might consider raising carvedilol dose at upcoming consultation.     (F41.9) Anxiety  Comment: CSA reviewed and signed. Refilled diazepam.   Plan: diazepam (VALIUM) 2 MG tablet          (I25.10) Coronary artery disease involving native coronary artery of native heart without angina pectoris  Comment: No angina. Continue current meds.   Plan: losartan (COZAAR) 25 MG tablet          (I21.09) ST elevation myocardial infarction (STEMI) involving other coronary artery of anterior wall (H)  Comment: Refilled atorvastatin. Update lipids.   Plan: atorvastatin (LIPITOR) 40 MG tablet          (I25.5) Ischemic cardiomyopathy  Comment: No edema on exam. Continue current meds.   Plan: furosemide (LASIX) 20 MG tablet            Patient Instructions   Blood pressure a bit high today, in the 150-160 range for the top number.   Let's keep med doses the same for today, however, if BP still a bit high when you see cardiology ask them whether we should raise the dose of either the Losartan or the carvedilol.     We reviewed a Controlled Substance Agreement today and had you sign it (this is for the diazepam--Dr Castro has taken responsibility for prescribing the hydrocodone).     I've refilled all your meds today.     See me back in 4 to 6 months, sooner if problems.       Return in about 6 months (around 4/30/2020).    Michael Londono MD,   [facility]

## 2019-10-31 NOTE — LETTER
"  [facility] [place]  [address] [place] [address]  [phone]    12/23/2019    Gosia Alonzo  [address] [facility] [address]           Dear Ms. Cheri,    The results of your lab tests are enclosed. Everything looks fine. Unless noted otherwise below, any results that are outside the \"normal\" range are within acceptable limits and are of no concern.    Hemoglobin A1C measures control of diabetes. Your Hemoglobin A1C is excellent at 6.0. The ideal target is under 7.0, although below 8.0 may be acceptable for some patients.    LDL= Bad Cholesterol-- the target is below 100.     HDL= Good Cholesterol-- although this is determined mostly by heredity, exercise and/or medications may sometimes raise this number.    Triglycerides are another type of fat in the blood, and can sometimes be lowered by reducing intake of sweets or excess carbohydrates, alcohol, and by weight reduction if needed.  Sometimes medications are also used.    AST and ALT are liver tests, as are the bilirubin (total and direct), albumin, total protein, and alkaline phosphatase. Yours are all normal.     Urea Nitrogen and Creatinine are kidney tests--yours are normal. GFR stands for Glomerular Filtration Rate, a more complicated estimate of kidney function.    Sodium, Potassium, Chloride, Carbon Dioxide, and Calcium are all normal salts in the bloodstream. Yours all look normal. Your glucose (blood sugar) also looks fine. (You can ignore the anion gap result).     If you have any further questions or problems, please contact our office.    Sincerely,      KRIS LEE M.D.  Attachment: Lab results      "

## 2019-11-01 DIAGNOSIS — I25.5 ISCHEMIC CARDIOMYOPATHY: ICD-10-CM

## 2019-11-01 LAB
ALBUMIN SERPL-MCNC: 3.9 G/DL (ref 3.4–5)
ALP SERPL-CCNC: 189 U/L (ref 40–150)
ALT SERPL W P-5'-P-CCNC: 22 U/L (ref 0–50)
ANION GAP SERPL CALCULATED.3IONS-SCNC: 7 MMOL/L (ref 3–14)
AST SERPL W P-5'-P-CCNC: 23 U/L (ref 0–45)
BILIRUB SERPL-MCNC: 0.4 MG/DL (ref 0.2–1.3)
BUN SERPL-MCNC: 33 MG/DL (ref 7–30)
CALCIUM SERPL-MCNC: 8.6 MG/DL (ref 8.5–10.1)
CHLORIDE SERPL-SCNC: 106 MMOL/L (ref 94–109)
CHOLEST SERPL-MCNC: 186 MG/DL
CO2 SERPL-SCNC: 21 MMOL/L (ref 20–32)
CREAT SERPL-MCNC: 0.97 MG/DL (ref 0.52–1.04)
GFR SERPL CREATININE-BSD FRML MDRD: 52 ML/MIN/{1.73_M2}
GLUCOSE SERPL-MCNC: 163 MG/DL (ref 70–99)
HDLC SERPL-MCNC: 57 MG/DL
LDLC SERPL CALC-MCNC: 88 MG/DL
NONHDLC SERPL-MCNC: 129 MG/DL
POTASSIUM SERPL-SCNC: 4.5 MMOL/L (ref 3.4–5.3)
PROT SERPL-MCNC: 8.6 G/DL (ref 6.8–8.8)
SODIUM SERPL-SCNC: 134 MMOL/L (ref 133–144)
TRIGL SERPL-MCNC: 204 MG/DL

## 2019-11-01 RX ORDER — CARVEDILOL 3.12 MG/1
3.12 TABLET ORAL 2 TIMES DAILY WITH MEALS
Qty: 180 TABLET | Refills: 0 | Status: SHIPPED | OUTPATIENT
Start: 2019-11-01 | End: 2020-02-03

## 2019-11-05 ENCOUNTER — CARE COORDINATION (OUTPATIENT)
Dept: CARDIOLOGY | Facility: CLINIC | Age: 84
End: 2019-11-05

## 2019-11-05 DIAGNOSIS — I50.22 CHRONIC SYSTOLIC HEART FAILURE (H): Primary | ICD-10-CM

## 2019-11-05 NOTE — PROGRESS NOTES
Pt called upset regarding 11/18 CORE appt with [facility] that she not aware about. Pt is also do for labs and echo has been over 1 yr ago.   Pt last [facility] 1/2/18 and Dr Collins 10/5/18.   Messaged scheduling to see if Echo can be scheduled soon and CORE will scheduled  appt with Dr Collins as it has been over a yr since she has seen Rock and needs to see a MD first.   Lorna Plaza RN 10:45 AM 11/05/19

## 2019-11-06 NOTE — PROGRESS NOTES
Echo and BMP scheduled for 11/27. Dr Collins appt for 12/18. Spoke to pt. Pt requested reminder be mailed.   Pt appreciative of the call. She understands she needs to see Dr Collins prior to re establishing with Rock.   Orders placed. Reminder mailed.   Lorna Plaza RN 9:22 AM 11/06/19

## 2019-12-11 ENCOUNTER — HOSPITAL ENCOUNTER (EMERGENCY)
Facility: CLINIC | Age: 84
Discharge: HOME | End: 2019-12-11
Attending: NURSE PRACTITIONER | Admitting: NURSE PRACTITIONER
Payer: MEDICARE

## 2019-12-11 ENCOUNTER — NURSE TRIAGE (OUTPATIENT)
Dept: NURSING | Facility: CLINIC | Age: 84
End: 2019-12-11

## 2019-12-11 ENCOUNTER — APPOINTMENT (OUTPATIENT)
Dept: GENERAL RADIOLOGY | Facility: CLINIC | Age: 84
End: 2019-12-11
Attending: NURSE PRACTITIONER
Payer: MEDICARE

## 2019-12-11 VITALS
OXYGEN SATURATION: 95 % | RESPIRATION RATE: 17 BRPM | DIASTOLIC BLOOD PRESSURE: 76 MMHG | SYSTOLIC BLOOD PRESSURE: 158 MMHG | HEART RATE: 90 BPM | TEMPERATURE: 100 F

## 2019-12-11 DIAGNOSIS — R11.0 NAUSEA: ICD-10-CM

## 2019-12-11 DIAGNOSIS — J06.9 UPPER RESPIRATORY TRACT INFECTION, UNSPECIFIED TYPE: ICD-10-CM

## 2019-12-11 LAB
ALBUMIN UR-MCNC: 30 MG/DL
ANION GAP SERPL CALCULATED.3IONS-SCNC: 13 MMOL/L (ref 3–14)
APPEARANCE UR: CLEAR
BILIRUB UR QL STRIP: NEGATIVE
BUN SERPL-MCNC: 16 MG/DL (ref 7–30)
CALCIUM SERPL-MCNC: 8.1 MG/DL (ref 8.5–10.1)
CHLORIDE SERPL-SCNC: 98 MMOL/L (ref 94–109)
CO2 SERPL-SCNC: 19 MMOL/L (ref 20–32)
COLOR UR AUTO: YELLOW
CREAT SERPL-MCNC: 0.84 MG/DL (ref 0.52–1.04)
ERYTHROCYTE [DISTWIDTH] IN BLOOD BY AUTOMATED COUNT: 13.2 % (ref 10–15)
FLUAV+FLUBV AG SPEC QL: NEGATIVE
FLUAV+FLUBV AG SPEC QL: NEGATIVE
GFR SERPL CREATININE-BSD FRML MDRD: 62 ML/MIN/{1.73_M2}
GLUCOSE SERPL-MCNC: 95 MG/DL (ref 70–99)
GLUCOSE UR STRIP-MCNC: NEGATIVE MG/DL
HCT VFR BLD AUTO: 37.9 % (ref 35–47)
HGB BLD-MCNC: 12.1 G/DL (ref 11.7–15.7)
HGB UR QL STRIP: NEGATIVE
KETONES UR STRIP-MCNC: 40 MG/DL
LEUKOCYTE ESTERASE UR QL STRIP: NEGATIVE
MCH RBC QN AUTO: 32.2 PG (ref 26.5–33)
MCHC RBC AUTO-ENTMCNC: 31.9 G/DL (ref 31.5–36.5)
MCV RBC AUTO: 101 FL (ref 78–100)
MUCOUS THREADS #/AREA URNS LPF: PRESENT /LPF
NITRATE UR QL: NEGATIVE
PH UR STRIP: 5.5 PH (ref 5–7)
PLATELET # BLD AUTO: 198 10E9/L (ref 150–450)
POTASSIUM SERPL-SCNC: 3.9 MMOL/L (ref 3.4–5.3)
RBC # BLD AUTO: 3.76 10E12/L (ref 3.8–5.2)
RBC #/AREA URNS AUTO: 1 /HPF (ref 0–2)
SODIUM SERPL-SCNC: 130 MMOL/L (ref 133–144)
SOURCE: ABNORMAL
SP GR UR STRIP: 1.02 (ref 1–1.03)
SPECIMEN SOURCE: NORMAL
SQUAMOUS #/AREA URNS AUTO: <1 /HPF (ref 0–1)
UROBILINOGEN UR STRIP-MCNC: NORMAL MG/DL (ref 0–2)
WBC # BLD AUTO: 6 10E9/L (ref 4–11)
WBC #/AREA URNS AUTO: 1 /HPF (ref 0–5)

## 2019-12-11 PROCEDURE — 85027 COMPLETE CBC AUTOMATED: CPT | Performed by: NURSE PRACTITIONER

## 2019-12-11 PROCEDURE — 81001 URINALYSIS AUTO W/SCOPE: CPT | Performed by: NURSE PRACTITIONER

## 2019-12-11 PROCEDURE — 87804 INFLUENZA ASSAY W/OPTIC: CPT | Performed by: NURSE PRACTITIONER

## 2019-12-11 PROCEDURE — 96361 HYDRATE IV INFUSION ADD-ON: CPT

## 2019-12-11 PROCEDURE — 25800030 ZZH RX IP 258 OP 636: Performed by: NURSE PRACTITIONER

## 2019-12-11 PROCEDURE — 25000128 H RX IP 250 OP 636: Performed by: NURSE PRACTITIONER

## 2019-12-11 PROCEDURE — 71046 X-RAY EXAM CHEST 2 VIEWS: CPT

## 2019-12-11 PROCEDURE — 99284 EMERGENCY DEPT VISIT MOD MDM: CPT | Mod: 25

## 2019-12-11 PROCEDURE — 80048 BASIC METABOLIC PNL TOTAL CA: CPT | Performed by: NURSE PRACTITIONER

## 2019-12-11 PROCEDURE — 96374 THER/PROPH/DIAG INJ IV PUSH: CPT

## 2019-12-11 RX ORDER — ONDANSETRON 4 MG/1
4 TABLET, ORALLY DISINTEGRATING ORAL EVERY 8 HOURS PRN
Qty: 10 TABLET | Refills: 0 | Status: SHIPPED | OUTPATIENT
Start: 2019-12-11 | End: 2020-02-11

## 2019-12-11 RX ORDER — ONDANSETRON 2 MG/ML
4 INJECTION INTRAMUSCULAR; INTRAVENOUS ONCE
Status: COMPLETED | OUTPATIENT
Start: 2019-12-11 | End: 2019-12-11

## 2019-12-11 RX ADMIN — SODIUM CHLORIDE 500 ML: 9 INJECTION, SOLUTION INTRAVENOUS at 15:36

## 2019-12-11 RX ADMIN — ONDANSETRON HYDROCHLORIDE 4 MG: 2 INJECTION, SOLUTION INTRAMUSCULAR; INTRAVENOUS at 15:36

## 2019-12-11 ASSESSMENT — ENCOUNTER SYMPTOMS
COUGH: 1
FREQUENCY: 0
VOMITING: 0
FATIGUE: 1
DYSURIA: 0
FEVER: 0
HEADACHES: 0
NAUSEA: 1
SHORTNESS OF BREATH: 0

## 2019-12-11 NOTE — ED TRIAGE NOTES
Pt presents with cough, nausea and generalized weakness that started a week ago. Pt is A&O, ABC's intact.

## 2019-12-11 NOTE — TELEPHONE ENCOUNTER
Patient's daughter in law Seema was transferred to the [facility], please call Seema back to advise at [phone].

## 2019-12-11 NOTE — TELEPHONE ENCOUNTER
Seema states patient has been sick for a week, and she is concerned for bronchitis. Seema denies further triage, states she would like an appointment today if possible. Assisted in scheduling. Advised to call with further concerns. Advised ER if respiratory distress. Seema verbalized understanding.    Next 5 appointments (look out 90 days)    Dec 11, 2019  2:40 PM CST  SHORT with Alcira Montague NP  [facility] [address]  [facility] [address] [phone]

## 2019-12-11 NOTE — TELEPHONE ENCOUNTER
Ri and Bry , P  Reason for call;  Daughter in law called without Pt .  Coughing for last week, no fever  . Hx of heart disease .  Caller  Declines to conferenced to Pt for triage, just wants an appt at [place] prefers with PCP Dr Londono .   Recommendation / teaching ; Conferenced to [place] backline for Dr Londono for appt if possible at [facility] today.      Caller Verbalizes understanding and denies further questions and will call back if further symptoms to triage or questions  .  Caroline Mayer RN  - [place] Nurse Advisor

## 2019-12-11 NOTE — ED NOTES
"Pt up to use restroom, ambulated with walker and help. Had issues getting down and up from toilet. Stated \"You guys need handicapped toilets, it's ridiculous you do not have them.\" Gave patient water and cracker. Also pt given jello per md.   "

## 2019-12-11 NOTE — DISCHARGE INSTRUCTIONS

## 2019-12-11 NOTE — ED AVS SNAPSHOT
[facility]  [address]  [facility] [address]  Phone:  [phone]  Fax:  [phone]                                    Gosia Alonzo   MRN: [id number]    Department:  [facility]   Date of Visit:  12/11/2019           After Visit Summary Signature Page    I have received my discharge instructions, and my questions have been answered. I have discussed any challenges I see with this plan with the nurse or doctor.    ..........................................................................................................................................  Patient/Patient Representative Signature      ..........................................................................................................................................  Patient Representative Print Name and Relationship to Patient    ..................................................               ................................................  Date                                   Time    ..........................................................................................................................................  Reviewed by Signature/Title    ...................................................              ..............................................  Date                                               Time          22EPIC Rev 08/18

## 2019-12-11 NOTE — ED PROVIDER NOTES
History     Chief Complaint:  Cough    HPI   Gosia Alonzo is a 87 year old female, with a history of STEMI, diabetes, CAD, CKD, hyperlipidemia, and atrial fibrillation, who presents with her children to the emergency department for evaluation of a productive cough. The patient reports she first started experiencing fatigue three days prior to evaluation. She notes associated productive cough, congestion, and nausea. She denies any fever, chest pain, shortness of breath, post-nasal drip, vomiting, or headache. She notes her daughter had similar symptoms but her symptoms have resolved.    Allergies:  Codeine  Escitalopram Oxalate  Esomeprazole  Imdur  Meperidine  Morphine  Oxycodone  Pentazocine  Propoxyphene  Sumatriptan     Medications:    81 mg Aspirin  Lipitor  Coreg  Docusate sodium  Unisom  Lasix  Mucinex  Losartan  Gas-X    Past Medical History:    Chronic systolic heart failure  Atrial fibrillation  Osteoarthritis  Cardiomyopathy  STEMI  Gastrointestinal bleed  Anxiety  DM 2  CAD  CKD  Anemia  TSH deficiency  HLD  Hiatal hernia  Migraine  Allergic rhinitis  Arthritis  NSTEMI  Pericarditis  DVT  Vasomotor rhinitis  Viral warts    Past Surgical History:    Angiogram x2  Appendectomy  Breast surgery  Needle aspiration  Phacoemulsification clear cornea with IOL  Right rotator cuff repair  Hysterectomy  Left elbow surgery  Right total knee surgery  Total knee removal  Lumbar fusion x4  Right knee re-replacement  Right mastectomy    Family History:    CAD  Cancer  HTN    Social History:  Smoking status: Never  Alcohol use: No  Drug use: No  The patient presents to the emergency department with her children.  PCP: Michael Londono  Marital Status:   [5]     Review of Systems   Constitutional: Positive for fatigue. Negative for fever.   HENT: Positive for congestion. Negative for postnasal drip.    Respiratory: Positive for cough. Negative for shortness of breath.    Cardiovascular: Negative for chest  pain.   Gastrointestinal: Positive for nausea. Negative for vomiting.   Genitourinary: Negative for dysuria and frequency.   Skin: Negative for rash.   Neurological: Negative for headaches.   All other systems reviewed and are negative.    Physical Exam     Patient Vitals for the past 24 hrs:   BP Temp Temp src Pulse Resp SpO2   12/11/19 1449 (!) 151/75 100  F (37.8  C) Temporal 90 22 98 %     Physical Exam  General: Alert, Mild  discomfort, well kept  Eyes: PERRL, conjunctivae pink no scleral icterus or conjunctival injection  ENT:   Moist mucus membranes, posterior oropharynx clear without erythema or exudates, No lymphadenopathy, Normal voice  Resp:  Lungs clear to auscultation bilaterally, no crackles/rubs/wheezes. Good air movement  CV:  Normal rate and rhythm, no murmurs/rubs/gallops, occasional cough  GI:  Abdomen soft and non-distended.  Normoactive BS.  No tenderness, guarding or rebound, No masses  Skin:  Warm, dry.  No rashes or petechiae  Musculoskeletal: No peripheral edema or calf tenderness, Normal gross ROM   Neuro: Alert and oriented to person/place/time, normal sensation  Psychiatric: Normal affect, cooperative, good eye contact    Emergency Department Course   Imaging:  Radiology findings were communicated with the patient and family who voiced understanding of the findings.    XR Chest 2 Views  IMPRESSION: There are no acute infiltrates. The cardiac silhouette is  not enlarged. Pulmonary vasculature is unremarkable.  As read by Radiology.    Laboratory:  Laboratory findings were communicated with the patient and family who voiced understanding of the findings.    UA: Ketones (40), Protein Albumin (30), Mucous (Present), o/w Negative  Urine Culture: Pending    CBC: WNL (WBC 6.0, HGB 12.1, )  BMP: Sodium 130 (L), Carbon Dioxide 19 (L), Calcium 8.1 (L) o/w WNL (Creatinine 0.84)  Influenza A/B antigen: Negative     Interventions:  1536  mL IV Bolus  1536 Zofran 4 mg IV    Emergency  Department Course:  Past medical records, nursing notes, and vitals reviewed.  1517: I performed an exam of the patient and obtained history, as documented above.     IV inserted and blood drawn.    Urinalysis and culture obtained and sent for evaluation.    Influenza screen was obtained and evaluated in the emergency department.    The patient was sent for a chest x-ray while in the emergency department, results above.     1720: I rechecked the patient. I reviewed the results with the Patient and son and daughter and answered all related questions prior to discharge.     Findings and plan explained to the Patient and son and daughter. Patient discharged home with instructions regarding supportive care, medications, and reasons to return. The importance of close follow-up was reviewed. The patient was prescribed Zofran.  Impression & Plan   Medical Decision Making:  Gosia Alonzo is a 87 year old female presents for evaluation of upper respiratory symptoms and nausea. Patient is concerned for nausea and pnuemonia. Evaluation consisted of CXR, physical exam and rapid flu; negative CXR and rapid flu. Exam consistent with viral illness. No meningismus, pneumothorax, or pleural effusion. No evidence of sepsis. Discharged with advice for symptomatic treatment including over the counter medication such as Tylenol and Ibuprofen. Advised to follow up with primary care provider in 5-7 days if continued symptoms, sooner if worsening. Patient will return to the ER/UR if they develop high fevers not controlled with medication, difficulty breathing, shortness of breath, or has other concerns.      Discharge Diagnosis:    ICD-10-CM   1. Upper respiratory tract infection, unspecified type J06.9   2. Nausea R11.0     Disposition:  Discharged to home.    Discharge Medications:  New Prescriptions    ONDANSETRON (ZOFRAN ODT) 4 MG ODT TAB    Take 1 tablet (4 mg) by mouth every 8 hours as needed for nausea     Sharri  Damon  12/11/2019   [facility] EMERGENCY DEPARTMENT  Scribe Disclosure:  I, Sharri Damon, am serving as a scribe at 3:17 PM on 12/11/2019 to document services personally performed by Eddi Desir APRN based on my observations and the provider's statements to me.      Eddi Desir APRN CNP  12/11/19 [id number]

## 2020-01-07 DIAGNOSIS — F41.9 ANXIETY: ICD-10-CM

## 2020-01-07 NOTE — TELEPHONE ENCOUNTER
Requested Prescriptions   Pending Prescriptions Disp Refills     diazepam (VALIUM) 2 MG tablet [Pharmacy Med Name: diazePAM Oral Tablet 2 MG] 60 tablet 0     Sig: TAKE ONE TABLET BY MOUTH TWICE DAILY AS NEEDED FOR ANXIETY  Last Written Prescription Date:  10/31/19  Last Fill Quantity: 60 tab,  # refills: 0   Last office visit: 10/31/2019 with prescribing provider:  Bry   Future Office Visit:         There is no refill protocol information for this order

## 2020-01-08 NOTE — TELEPHONE ENCOUNTER
Controlled Substance Refill Request for Diazepam  Problem List Complete:  No     PROVIDER TO CONSIDER COMPLETION OF PROBLEM LIST AND OVERVIEW/CONTROLLED SUBSTANCE AGREEMENT    Last Written Prescription Date:  10/31/19  Last Fill Quantity: 60,   # refills: 0    THE MOST RECENT OFFICE VISIT MUST BE WITHIN THE PAST 3 MONTHS. AT LEAST ONE FACE TO FACE VISIT MUST OCCUR EVERY 6 MONTHS. ADDITIONAL VISITS CAN BE VIRTUAL.  (THIS STATEMENT SHOULD BE DELETED.)    Last Office Visit with [facility] primary care provider: 10/31/19    Future Office visit:     Controlled substance agreement:   Encounter-Level CSA:    There are no encounter-level csa.     Patient-Level CSA:    Controlled Substance Agreement - Non - Opioid - Scan on 11/8/2019 10:22 AM: NON-OPIOID CONTROLLED SUBSTANCE AGREEMENT         Last Urine Drug Screen: No results found for: CDAUT, No results found for: COMDAT, No results found for: THC13, PCP13, COC13, MAMP13, OPI13, AMP13, BZO13, TCA13, MTD13, BAR13, OXY13, PPX13, BUP13   [place] reviewed:  reviewed- recommended provider review   [place]:  [email or web address]

## 2020-01-09 RX ORDER — DIAZEPAM 2 MG
TABLET ORAL
Qty: 60 TABLET | Refills: 0 | Status: ON HOLD | OUTPATIENT
Start: 2020-01-09 | End: 2020-02-15

## 2020-01-12 ENCOUNTER — APPOINTMENT (OUTPATIENT)
Dept: ULTRASOUND IMAGING | Facility: CLINIC | Age: 85
End: 2020-01-12
Attending: PHYSICIAN ASSISTANT
Payer: MEDICARE

## 2020-01-12 ENCOUNTER — HOSPITAL ENCOUNTER (EMERGENCY)
Facility: CLINIC | Age: 85
Discharge: HOME | End: 2020-01-12
Attending: PHYSICIAN ASSISTANT | Admitting: PHYSICIAN ASSISTANT
Payer: MEDICARE

## 2020-01-12 VITALS
SYSTOLIC BLOOD PRESSURE: 144 MMHG | HEART RATE: 87 BPM | RESPIRATION RATE: 18 BRPM | OXYGEN SATURATION: 99 % | DIASTOLIC BLOOD PRESSURE: 88 MMHG | TEMPERATURE: 98 F

## 2020-01-12 DIAGNOSIS — J32.9 SINUSITIS, UNSPECIFIED CHRONICITY, UNSPECIFIED LOCATION: ICD-10-CM

## 2020-01-12 DIAGNOSIS — R10.10 UPPER ABDOMINAL PAIN: ICD-10-CM

## 2020-01-12 DIAGNOSIS — R11.0 NAUSEA: ICD-10-CM

## 2020-01-12 LAB
ALBUMIN SERPL-MCNC: 3.1 G/DL (ref 3.4–5)
ALP SERPL-CCNC: 114 U/L (ref 40–150)
ALT SERPL W P-5'-P-CCNC: 14 U/L (ref 0–50)
ANION GAP SERPL CALCULATED.3IONS-SCNC: 10 MMOL/L (ref 3–14)
AST SERPL W P-5'-P-CCNC: 20 U/L (ref 0–45)
BASOPHILS # BLD AUTO: 0 10E9/L (ref 0–0.2)
BASOPHILS NFR BLD AUTO: 0.2 %
BILIRUB SERPL-MCNC: 0.9 MG/DL (ref 0.2–1.3)
BUN SERPL-MCNC: 33 MG/DL (ref 7–30)
CALCIUM SERPL-MCNC: 8.5 MG/DL (ref 8.5–10.1)
CHLORIDE SERPL-SCNC: 94 MMOL/L (ref 94–109)
CO2 SERPL-SCNC: 20 MMOL/L (ref 20–32)
CREAT SERPL-MCNC: 0.75 MG/DL (ref 0.52–1.04)
DIFFERENTIAL METHOD BLD: ABNORMAL
EOSINOPHIL # BLD AUTO: 0 10E9/L (ref 0–0.7)
EOSINOPHIL NFR BLD AUTO: 0 %
ERYTHROCYTE [DISTWIDTH] IN BLOOD BY AUTOMATED COUNT: 12.9 % (ref 10–15)
GFR SERPL CREATININE-BSD FRML MDRD: 72 ML/MIN/{1.73_M2}
GLUCOSE SERPL-MCNC: 152 MG/DL (ref 70–99)
HCT VFR BLD AUTO: 41.6 % (ref 35–47)
HGB BLD-MCNC: 14.2 G/DL (ref 11.7–15.7)
IMM GRANULOCYTES # BLD: 0.4 10E9/L (ref 0–0.4)
IMM GRANULOCYTES NFR BLD: 2 %
LIPASE SERPL-CCNC: 333 U/L (ref 73–393)
LYMPHOCYTES # BLD AUTO: 2.5 10E9/L (ref 0.8–5.3)
LYMPHOCYTES NFR BLD AUTO: 13.1 %
MCH RBC QN AUTO: 32.3 PG (ref 26.5–33)
MCHC RBC AUTO-ENTMCNC: 34.1 G/DL (ref 31.5–36.5)
MCV RBC AUTO: 95 FL (ref 78–100)
MONOCYTES # BLD AUTO: 2 10E9/L (ref 0–1.3)
MONOCYTES NFR BLD AUTO: 10.5 %
NEUTROPHILS # BLD AUTO: 13.9 10E9/L (ref 1.6–8.3)
NEUTROPHILS NFR BLD AUTO: 74.2 %
NRBC # BLD AUTO: 0 10*3/UL
NRBC BLD AUTO-RTO: 0 /100
PLATELET # BLD AUTO: 419 10E9/L (ref 150–450)
POTASSIUM SERPL-SCNC: 4.6 MMOL/L (ref 3.4–5.3)
PROT SERPL-MCNC: 7.5 G/DL (ref 6.8–8.8)
RBC # BLD AUTO: 4.39 10E12/L (ref 3.8–5.2)
SODIUM SERPL-SCNC: 124 MMOL/L (ref 133–144)
WBC # BLD AUTO: 18.8 10E9/L (ref 4–11)

## 2020-01-12 PROCEDURE — 25000132 ZZH RX MED GY IP 250 OP 250 PS 637: Mod: GY | Performed by: PHYSICIAN ASSISTANT

## 2020-01-12 PROCEDURE — 80053 COMPREHEN METABOLIC PANEL: CPT | Performed by: PHYSICIAN ASSISTANT

## 2020-01-12 PROCEDURE — 99284 EMERGENCY DEPT VISIT MOD MDM: CPT | Mod: 25

## 2020-01-12 PROCEDURE — 36415 COLL VENOUS BLD VENIPUNCTURE: CPT | Performed by: PHYSICIAN ASSISTANT

## 2020-01-12 PROCEDURE — 25000125 ZZHC RX 250: Performed by: PHYSICIAN ASSISTANT

## 2020-01-12 PROCEDURE — 85025 COMPLETE CBC W/AUTO DIFF WBC: CPT | Performed by: PHYSICIAN ASSISTANT

## 2020-01-12 PROCEDURE — 76705 ECHO EXAM OF ABDOMEN: CPT

## 2020-01-12 PROCEDURE — 83690 ASSAY OF LIPASE: CPT | Performed by: PHYSICIAN ASSISTANT

## 2020-01-12 RX ORDER — METOCLOPRAMIDE 10 MG/1
10 TABLET ORAL 3 TIMES DAILY PRN
Qty: 20 TABLET | Refills: 1 | Status: SHIPPED | OUTPATIENT
Start: 2020-01-12 | End: 2020-02-11

## 2020-01-12 RX ORDER — METOCLOPRAMIDE 5 MG/1
5 TABLET ORAL ONCE
Status: COMPLETED | OUTPATIENT
Start: 2020-01-12 | End: 2020-01-12

## 2020-01-12 RX ORDER — ONDANSETRON 4 MG/1
4 TABLET, ORALLY DISINTEGRATING ORAL ONCE
Status: DISCONTINUED | OUTPATIENT
Start: 2020-01-12 | End: 2020-01-12

## 2020-01-12 RX ORDER — ONDANSETRON 2 MG/ML
4 INJECTION INTRAMUSCULAR; INTRAVENOUS EVERY 30 MIN PRN
Status: DISCONTINUED | OUTPATIENT
Start: 2020-01-12 | End: 2020-01-12

## 2020-01-12 RX ORDER — DIPHENHYDRAMINE HCL 25 MG
25 CAPSULE ORAL ONCE
Status: COMPLETED | OUTPATIENT
Start: 2020-01-12 | End: 2020-01-12

## 2020-01-12 RX ADMIN — DIPHENHYDRAMINE HYDROCHLORIDE 25 MG: 25 CAPSULE ORAL at 17:10

## 2020-01-12 RX ADMIN — LIDOCAINE HYDROCHLORIDE 30 ML: 20 SOLUTION ORAL; TOPICAL at 17:35

## 2020-01-12 RX ADMIN — METOCLOPRAMIDE HYDROCHLORIDE 5 MG: 5 TABLET ORAL at 17:10

## 2020-01-12 ASSESSMENT — ENCOUNTER SYMPTOMS
SHORTNESS OF BREATH: 0
COUGH: 1
VOMITING: 0
SORE THROAT: 0
SINUS PRESSURE: 1
DIARRHEA: 0
NERVOUS/ANXIOUS: 1
NAUSEA: 0
ABDOMINAL PAIN: 1
FEVER: 0
DYSURIA: 0

## 2020-01-12 NOTE — DISCHARGE INSTRUCTIONS
Begin taking the Reglan as needed to help with nausea control.  Also take the Augmentin.  Continue doing Flonase at home.  You can consider doing Henning pot as well.  See your primary in 2 to 3 days if not improving.  Return for changing worsening symptoms, chest pain, shortness of breath, uncontrolled pain, new concerns.    Discharge Instructions  Sinus Infection    You have acute sinusitis, or an infection of the sinuses. The sinuses are the hollow areas within the facial bones that are connected to the nasal opening. The most common cause of acute sinusitis is a virus infection associated with the common cold. Bacterial sinusitis occurs much less commonly, usually as a complication of viral sinusitis. Experts say that most sinusitis is caused by a virus within the first 7-10 days of illness. Antibiotics do nothing to help with virus infections, so most people do not need antibiotics for acute sinusitis.     Return to the Emergency Department if:  Your vision changes.  You are confused or have difficulty thinking clearly.  You have swelling around your eye.  You develop a severe headache or neck stiffness.  You have a fever over 101 degrees.  Your symptoms get worse and you are unable to see your primary doctor.    Follow-up with your doctor:   See your primary doctor in one week if not improving.    Treatment:  Pain relief -- Non-prescription pain medications, such as Tylenol  (acetaminophen) or Motrin  or Advil  (ibuprofen) are recommended for pain.  Do not use a medicine that you are allergic to, or if your doctor has told you not to use it.     Nasal irrigation -- Flushing the nose and sinuses with a saline solution several times per day can help to decrease pain caused by congestion.  Nasal decongestants -- Nasal decongestant sprays, including Afrin  (oxymetazoline) and Torres-Synephrine  (phenylephrine) can be used to temporarily treat congestion. However, these sprays should not be used for more than two to  "three days due to the risk of rebound congestion (when the nose is congested constantly unless the medication is used repeatedly).  Nasal glucocorticoids -- These are prescription steroids delivered by a nasal spray that can help to reduce swelling inside the nose, usually within two to three days. These drugs have few side effects and dramatically relieve symptoms in most people.  If you use these in conjunction with Afrin  you will need to use at least 15 minutes prior to the nasal decongestant.    Do I need an antibiotic? -- Sometimes, but not always, antibiotics are used along with the above treatments.    Antibiotic Warning:   If you have been placed on antibiotics - watch for signs of allergic reaction.  These include rash, lip swelling, difficulty breathing, wheezing, and dizziness.  If you develop any of these symptoms, stop the antibiotic immediately and go to an Emergency Department or Urgent Care for evaluation.    Probiotics: If you have been given an antibiotic, you may want to also take a probiotic pill or eat yogurt with live cultures. Probiotics have \"good bacteria\" to help your intestines stay healthy. Studies have shown that probiotics help prevent diarrhea and other intestine problems (including C. diff infection) when you take antibiotics. You can buy these without a prescription in the pharmacy section of the store.   If you were given a prescription for medicine here today, be sure to read all of the information (including the package insert) that comes with your prescription.  This will include important information about the medicine, its side effects, and any warnings that you need to know about.  The pharmacist who fills the prescription can provide more information and answer questions you may have about the medicine.  If you have questions or concerns that the pharmacist cannot address, please call or return to the Emergency Department.   Opioid Medication Information    Pain medications " are among the most commonly prescribed medicines, so we are including this information for all our patients. If you did not receive pain medication or get a prescription for pain medicine, you can ignore it.     You may have been given a prescription for an opioid (narcotic) pain medicine and/or have received a pain medicine while here in the Emergency Department. These medicines can make you drowsy or impaired. You must not drive, operate dangerous equipment, or engage in any other dangerous activities while taking these medications. If you drive while taking these medications, you could be arrested for DUI, or driving under the influence. Do not drink any alcohol while you are taking these medications.     Opioid pain medications can cause addiction. If you have a history of chemical dependency of any type, you are at a higher risk of becoming addicted to pain medications.  Only take these prescribed medications to treat your pain when all other options have been tried. Take it for as short a time and as few doses as possible. Store your pain pills in a secure place, as they are frequently stolen and provide a dangerous opportunity for children or visitors in your house to start abusing these powerful medications. We will not replace any lost or stolen medicine.  As soon as your pain is better, you should flush all your remaining medication.     Many prescription pain medications contain Tylenol  (acetaminophen), including Vicodin , Tylenol #3 , Norco , Lortab , and Percocet .  You should not take any extra pills of Tylenol  if you are using these prescription medications or you can get very sick.  Do not ever take more than 3000 mg of acetaminophen in any 24 hour period.    All opioids tend to cause constipation. Drink plenty of water and eat foods that have a lot of fiber, such as fruits, vegetables, prune juice, apple juice and high fiber cereal.  Take a laxative if you don t move your bowels at least every  other day. Miralax , Milk of Magnesia, Colace , or Senna  can be used to keep you regular.      Remember that you can always come back to the Emergency Department if you are not able to see your regular doctor in the amount of time listed above, if you get any new symptoms, or if there is anything that worries you.

## 2020-01-12 NOTE — ED AVS SNAPSHOT
[facility] Emergency Department  [address]  [facility] [address]  Phone:  [phone]  Fax:  [phone]                                    Gosia Alonzo   MRN: [id number]    Department:  [facility] Emergency Department   Date of Visit:  1/12/2020           After Visit Summary Signature Page    I have received my discharge instructions, and my questions have been answered. I have discussed any challenges I see with this plan with the nurse or doctor.    ..........................................................................................................................................  Patient/Patient Representative Signature      ..........................................................................................................................................  Patient Representative Print Name and Relationship to Patient    ..................................................               ................................................  Date                                   Time    ..........................................................................................................................................  Reviewed by Signature/Title    ...................................................              ..............................................  Date                                               Time          22EPIC Rev 08/18

## 2020-01-12 NOTE — ED NOTES
Bed: ED38  Expected date: 1/12/20  Expected time: 3:18 PM  Means of arrival: Ambulance  Comments:  GARCIA Senior

## 2020-01-12 NOTE — ED PROVIDER NOTES
History     Chief Complaint:  Sinus pressure, nausea    HPI   Gosia Alonzo is a 87 year old female, with a history of right breast cancer, systolic CHF, NSTEMI, and paroxysmal a-fib, who presents to the ED for evaluation of sinus pressure, nausea. The patient reports she has been ill over the past month. She initially had flu symptoms then cold symptoms followed by a sinus symptoms. She was on Azithromycin which she has finished. The patient notes she has sinus pressure with postnasal drip. This is resulting in her coughing/gagging with associated abdominal pain. She has been using her Flonase spray. The patient reports she is feeling anxious because of her symptoms. The patient denies any fever, sore throat, shortness of breath, nausea, vomiting, diarrhea, dysuria, or other urinary symptoms. She denies history of GERD or gallbladder issues.      Allergies:  Codeine: GI disturbance    Escitalopram: fatigue  Esomeprazole: headache   Imdur: headache   Meperidine: nausea & vomiting   Morphine: hives   Oxycodone: GI disturbance    Pentazocine: hallucinations   Propoxyphene: GI disturbance    Sumatriptan: chest pain      Medications:    Aspirin 81mg  Atorvastatin  Coreg  Valium  Docusate  Unisom  Lasix  Losartan  Gas-X    Past Medical History:    Systolic CHF  Arthritis  CAD  CKD  Colon diverticula  GERD  GI hemorrhage  HTN  Hiatal hernia  Right breast cancer, infiltrating ductal cancer   Ischemic cardiomyopathy   Migraine  NSTEMI  Osteoporosis  Osteoarthritis  HLD  Anemia, iron deficiency   Seborrheic keratosis  Paroxysmal a-fib  Pericarditis  Right DVT  Pyogenic granuloma  Vasomotor rhinitis  Viral warts    Past Surgical History:    Aspiration thrombectomy w/ stent placement  Aspiration thrombectomy w/ balloon angioplasty  Appendectomy  Breast surgery  Right shoulder arthroplasty    Phacoemulsification clear cornea w/ IOL implant   Right rotator cuff repair  Hysterectomy  Left elbow surgery  Right total knee  surgery  Lumbar fusion x4  Right knee arthroplasty   Right mastectomy  Right shoulder revision surgery     Family History:    CAD, MI: father   Pancreatic cancer: mother   Colon cancer: brother   HTN: sister     Social History:  Smoking status: Never smoker    Substance use: No  Alcohol use: Rarely   Presents to ED with family    Marital Status:   [5]     Review of Systems   Constitutional: Negative for fever.   HENT: Positive for postnasal drip and sinus pressure. Negative for sore throat.    Respiratory: Positive for cough. Negative for shortness of breath.    Gastrointestinal: Positive for abdominal pain. Negative for diarrhea, nausea and vomiting.   Genitourinary: Negative for dysuria.   Psychiatric/Behavioral: The patient is nervous/anxious.    All other systems reviewed and are negative.    Physical Exam     Patient Vitals for the past 24 hrs:   BP Temp Temp src Pulse Heart Rate Resp SpO2   01/12/20 1745 (!) 144/88 -- -- 87 -- -- --   01/12/20 1600 -- -- -- -- 80 18 99 %   01/12/20 1545 -- -- -- -- 85 20 98 %   01/12/20 1532 (!) 162/101 98  F (36.7  C) Oral -- 107 -- 100 %     Physical Exam  General: Well appearing, well nourished. Anxious appearing. Occasionally gagging, postnasal drip.  Skin: Good turgor, no rash, no unusual bruising or prominent lesions.  HEENT: Head: Normocephalic, atraumatic, no visible masses.   Eyes: Conjunctiva clear.  Ears: EACs clear, TMs translucent.   Nose: No external lesions, mucosa non-inflamed, septum and turbinates normal.  Frontal and maxillary sinus tenderness with palpation.    Throat/pharynx: Mucous membranes moist, no mucosal lesions.   Neck: Supple, without lymphadenopathy.  Cardiac: Normal rate and regular rhythm, no murmur or gallop.   Lungs: Clear to auscultation.  Abdomen: Mild epigastric and RUQ tenderness with palpation. No rebound tenderness of guarding.   Musculoskeletal: Normal gait and station.   Neurologic: Oriented x 3. GCS: 15.  Psychiatric: Intact  recent and remote memory, judgment and insight. Anxious appearing.      Emergency Department Course     Imaging:  Radiographic findings were communicated with the patient and family who voiced understanding of the findings.    US Abdomen Limited (RUQ)  IMPRESSION:   1. No abnormality is identified on this ultrasound. The pancreas is  completely obscured and could not be evaluated. No definite etiology  for patient's right upper quadrant abdominal pain is seen. No evidence  for cholelithiasis or acute cholecystitis is identified on this study.  As read by Radiology.     Laboratory:  Laboratory findings were communicated with the patient and family who voiced understanding of the findings.    Lipase: 333    CBC: WBC 18.8(H), o/w WNL (HGB 14.2, )  CMP: (L), Glucose 152(H), BUN 33(H), Albumin 3.1(L), o/w WNL (Creatinine 0.75)     Interventions:  1710: Reglan 5mg PO  1710: Benadryl 25mg PO  1735: GI Cocktail - Mylanta/Maalox 15 mL, Viscous Lidocaine 2% 15 mL, 30 mLs PO    Emergency Department Course:  Patient arrived by EMS.     Past medical records, nursing notes, and vitals reviewed.    1531: I performed an exam of the patient as documented above.     1631: IV was inserted and blood was drawn for laboratory testing, results above.    The patient was sent for a limited abdomen ultrasound while in the emergency department, results above.     1721: I rechecked the patient and discussed the results of her workup thus far.     Findings and plan explained to the patient and family. Patient discharged home with instructions regarding supportive care, medications, and reasons to return. The importance of close follow-up was reviewed. The patient was prescribed Augmentin and Reglan.     I personally reviewed the laboratory results with the patient and family and answered all related questions prior to discharge.     Impression & Plan      Medical Decision Making:  Gosia Alonzo is a 87 year old female who  presented with numerous symptoms including nausea, gagging, sinus pressure, postnasal drip.  Details the patient's history can be noted in the HPI.  Differentials considered included viral sinusitis, bacterial sinusitis, influenza, pneumonia, postnasal drip, URI, bronchitis, postviral cough, amongst others.  Upon my exam, the patient was incredibly anxious, she was occasionally coughing and gagging, which occasionally seems self-inflicted.  She did have right upper quadrant tenderness that was mild as well as epigastric tenderness.  Basic labs obtained showing leukocytosis, which I suspect is due to her emesis and gagging.  No other significant abnormalities.  Ultrasound negative for gallbladder inflammation or other pathology.  The patient has only been treated with a azithromycin for her sinusitis.  This is been an adequate treatment, and I suspect that her symptoms are largely due to persistent sinusitis with postnasal drip.  This is likely causing her nausea, and her frequent coughing and nausea/vomiting are causing her upper abdominal discomfort.  She does not wish to try Zofran, Reglan improved her symptoms here as well as GI cocktail.  Will discharge her with Reglan and do treatment trial of Augmentin.  She will continue Flonase at home.  Advised adding humidifier and Sheba pot.  Follow-up with primary in the next 2 to 3 days.  Return for changing worsening symptoms, worsening cough, fever, uncontrolled vomiting, new concerns.  No obvious cough here, and would hold off on chest x-ray given her lack of fever as well.  All questions were answered prior to discharge.  She and her family were in agreement with the treatment plan as stated above.  Diagnosis:    ICD-10-CM   1. Sinusitis, unspecified chronicity, unspecified location J32.9   2. Upper abdominal pain R10.10   3. Nausea R11.0     Disposition: Patient discharged to home     Discharge Medications:  New Prescriptions    AMOXICILLIN-CLAVULANATE (AUGMENTIN)  875-125 MG TABLET    Take 1 tablet by mouth 2 times daily for 10 days    METOCLOPRAMIDE (REGLAN) 10 MG TABLET    Take 1 tablet (10 mg) by mouth 3 times daily as needed (Nausea or Vomiting)     Radha Santacruz  1/12/2020   [facility] EMERGENCY DEPARTMENT    Scribe Disclosure:  I, Radha Neeta, am serving as a scribe at 3:31 PM on 1/12/2020 to document services personally performed by Rebekah Romano PA-C based on my observations and the provider's statements to me.     This was created at least in part with a voice recognition software. Mistakes/typos may be present.        Rebekah Romano PA  01/12/20 6149

## 2020-01-13 ENCOUNTER — PATIENT OUTREACH (OUTPATIENT)
Dept: CARE COORDINATION | Facility: CLINIC | Age: 85
End: 2020-01-13

## 2020-01-13 DIAGNOSIS — Z71.89 OTHER SPECIFIED COUNSELING: ICD-10-CM

## 2020-01-13 NOTE — LETTER
Dear Gosia,                                                                         You were recently referred to the [facility]'s Clinic Care Coordination service.  This is a service offered through your Primary Care Clinic which can help you access resources and services in regard to your health and well-being goals. The clinic Community Health Worker has placed two calls to you to discuss the nature of this service and to offer enrollment.  If you are interested in learning more about Clinic Care Coordination, please call your Primary Care Clinic's Community Health Worker, Rolanda, at [phone].                                                    Sincerely,                                                                                                                                                                      Clinic Care Coordination                                                  [facility]

## 2020-01-13 NOTE — PROGRESS NOTES
Patient identified as high risk for readmission.  Patient meets criteria for care coordination outreach.    Cherry Stuart, [facility]  Clinic Care Coordinator  Ph. [phone]  [email or web address]

## 2020-01-14 ENCOUNTER — TELEPHONE (OUTPATIENT)
Dept: INTERNAL MEDICINE | Facility: CLINIC | Age: 85
End: 2020-01-14

## 2020-01-14 NOTE — PROGRESS NOTES
Community Health Worker called and patient stated not to call her again. If the patient is returning my call, please transfer the patient to [facility] at [phone].   Patient has been mailed an unreachable letter and was provided with CHW contact information if they are interested in accessing Clinic Care Coordination.  Order for Care Management has been closed, no further outreach will be done at this time and patient can be re-referred.     ______________________    1/13    Community Health Worker called and patient disconnected call 2x.

## 2020-01-30 NOTE — TELEPHONE ENCOUNTER
Forms completed, signed and faxed to number on form. Placed original at  for daughter to . Daughter called.

## 2020-01-31 DIAGNOSIS — I21.09 ST ELEVATION MYOCARDIAL INFARCTION (STEMI) INVOLVING OTHER CORONARY ARTERY OF ANTERIOR WALL (H): ICD-10-CM

## 2020-01-31 RX ORDER — ATORVASTATIN CALCIUM 40 MG/1
TABLET, FILM COATED ORAL
Qty: 90 TABLET | Refills: 1 | Status: SHIPPED | OUTPATIENT
Start: 2020-01-31 | End: 2020-02-21

## 2020-01-31 NOTE — TELEPHONE ENCOUNTER
"Requested Prescriptions   Pending Prescriptions Disp Refills     atorvastatin (LIPITOR) 40 MG tablet [Pharmacy Med Name: Atorvastatin Calcium Oral Tablet 40 MG] 90 tablet 0     Sig: TAKE 1 TABLET BY MOUTH ONE TIME DAILY   Last Written Prescription Date:  10/31/2019  Last Fill Quantity: 90,  # refills: 0   Last office visit: 10/31/2019 with prescribing provider:     Future Office Visit:      Statins Protocol Passed - 1/31/2020 11:48 AM        Passed - LDL on file in past 12 months     Recent Labs   Lab Test 10/31/19  1617   LDL 88             Passed - No abnormal creatine kinase in past 12 months     No lab results found.             Passed - Recent (12 mo) or future (30 days) visit within the authorizing provider's specialty     Patient has had an office visit with the authorizing provider or a provider within the authorizing providers department within the previous 12 mos or has a future within next 30 days. See \"Patient Info\" tab in inbasket, or \"Choose Columns\" in Meds & Orders section of the refill encounter.              Passed - Medication is active on med list        Passed - Patient is age 18 or older        Passed - No active pregnancy on record        Passed - No positive pregnancy test in past 12 months        "

## 2020-02-03 DIAGNOSIS — I25.10 CORONARY ARTERY DISEASE INVOLVING NATIVE CORONARY ARTERY OF NATIVE HEART WITHOUT ANGINA PECTORIS: ICD-10-CM

## 2020-02-03 DIAGNOSIS — I25.5 ISCHEMIC CARDIOMYOPATHY: ICD-10-CM

## 2020-02-03 RX ORDER — LOSARTAN POTASSIUM 25 MG/1
25 TABLET ORAL DAILY
Qty: 90 TABLET | Refills: 0 | Status: SHIPPED | OUTPATIENT
Start: 2020-02-03 | End: 2020-05-13

## 2020-02-03 RX ORDER — CARVEDILOL 3.12 MG/1
3.12 TABLET ORAL 2 TIMES DAILY WITH MEALS
Qty: 180 TABLET | Refills: 0 | Status: SHIPPED | OUTPATIENT
Start: 2020-02-03 | End: 2020-05-14

## 2020-02-03 NOTE — TELEPHONE ENCOUNTER
Medication Refilled: Losartan  Last office visit: 10/5/2018 With Dr. Collins  Last Labs/EK20  Next office visit: OVERDUE - OV 19 with Dr. Clolins cancelled due to illness. Pt was to call to reschedule. 0 day supply and letter sent to pt, note to pharmacy, no further refills.    Pharmacy sent to: Yokasta Upton RN

## 2020-02-03 NOTE — LETTER
February 3, 2020       TO: Gosia Alonzo  [address] [address] [address]       Dear Gosia Alonzo,    You have requested a medication refill and our records indicate that you have not been seen by one of our providers for your annual office visit.  We will refill your medication for 3 months, which will allow you enough time to be seen by one of our providers.    Please call our clinic at [phone] to schedule your appointment as soon as possible.    Thank you,    [facility]

## 2020-02-11 ENCOUNTER — APPOINTMENT (OUTPATIENT)
Dept: CT IMAGING | Facility: CLINIC | Age: 85
DRG: 377 | End: 2020-02-11
Attending: PHYSICIAN ASSISTANT
Payer: MEDICARE

## 2020-02-11 ENCOUNTER — APPOINTMENT (OUTPATIENT)
Dept: GENERAL RADIOLOGY | Facility: CLINIC | Age: 85
DRG: 377 | End: 2020-02-11
Attending: PHYSICIAN ASSISTANT
Payer: MEDICARE

## 2020-02-11 ENCOUNTER — HOSPITAL ENCOUNTER (INPATIENT)
Facility: CLINIC | Age: 85
LOS: 4 days | Discharge: NURSING, REHAB OR LONG-TERM CARE | DRG: 377 | End: 2020-02-15
Attending: PHYSICIAN ASSISTANT | Admitting: HOSPITALIST
Payer: MEDICARE

## 2020-02-11 DIAGNOSIS — R53.1 GENERALIZED WEAKNESS: ICD-10-CM

## 2020-02-11 DIAGNOSIS — K29.80 DUODENITIS: ICD-10-CM

## 2020-02-11 DIAGNOSIS — K92.2 ACUTE GI BLEEDING: Primary | ICD-10-CM

## 2020-02-11 DIAGNOSIS — D64.9 ANEMIA: ICD-10-CM

## 2020-02-11 DIAGNOSIS — F41.9 ANXIETY: ICD-10-CM

## 2020-02-11 DIAGNOSIS — K92.1 MELENA: ICD-10-CM

## 2020-02-11 LAB
ABO + RH BLD: NORMAL
ABO + RH BLD: NORMAL
ALBUMIN SERPL-MCNC: 2.5 G/DL (ref 3.4–5)
ALBUMIN UR-MCNC: NEGATIVE MG/DL
ALP SERPL-CCNC: 92 U/L (ref 40–150)
ALT SERPL W P-5'-P-CCNC: 11 U/L (ref 0–50)
ANION GAP SERPL CALCULATED.3IONS-SCNC: 10 MMOL/L (ref 3–14)
APPEARANCE UR: CLEAR
AST SERPL W P-5'-P-CCNC: 16 U/L (ref 0–45)
BASOPHILS # BLD AUTO: 0.1 10E9/L (ref 0–0.2)
BASOPHILS # BLD AUTO: 0.1 10E9/L (ref 0–0.2)
BASOPHILS NFR BLD AUTO: 0.4 %
BASOPHILS NFR BLD AUTO: 0.5 %
BILIRUB SERPL-MCNC: 0.4 MG/DL (ref 0.2–1.3)
BILIRUB UR QL STRIP: NEGATIVE
BLD GP AB SCN SERPL QL: NORMAL
BLD PROD TYP BPU: NORMAL
BLOOD BANK CMNT PATIENT-IMP: NORMAL
BUN SERPL-MCNC: 23 MG/DL (ref 7–30)
CALCIUM SERPL-MCNC: 8.2 MG/DL (ref 8.5–10.1)
CHLORIDE SERPL-SCNC: 99 MMOL/L (ref 94–109)
CO2 SERPL-SCNC: 23 MMOL/L (ref 20–32)
COLOR UR AUTO: ABNORMAL
CREAT SERPL-MCNC: 0.65 MG/DL (ref 0.52–1.04)
DIFFERENTIAL METHOD BLD: ABNORMAL
DIFFERENTIAL METHOD BLD: ABNORMAL
EOSINOPHIL # BLD AUTO: 0 10E9/L (ref 0–0.7)
EOSINOPHIL # BLD AUTO: 0 10E9/L (ref 0–0.7)
EOSINOPHIL NFR BLD AUTO: 0.1 %
EOSINOPHIL NFR BLD AUTO: 0.1 %
ERYTHROCYTE [DISTWIDTH] IN BLOOD BY AUTOMATED COUNT: 14.1 % (ref 10–15)
ERYTHROCYTE [DISTWIDTH] IN BLOOD BY AUTOMATED COUNT: 14.1 % (ref 10–15)
GFR SERPL CREATININE-BSD FRML MDRD: 80 ML/MIN/{1.73_M2}
GLUCOSE SERPL-MCNC: 135 MG/DL (ref 70–99)
GLUCOSE UR STRIP-MCNC: NEGATIVE MG/DL
HCT VFR BLD AUTO: 23.1 % (ref 35–47)
HCT VFR BLD AUTO: 25.2 % (ref 35–47)
HEMOCCULT STL QL: POSITIVE
HGB BLD-MCNC: 7.2 G/DL (ref 11.7–15.7)
HGB BLD-MCNC: 8 G/DL (ref 11.7–15.7)
HGB UR QL STRIP: NEGATIVE
IMM GRANULOCYTES # BLD: 0.4 10E9/L (ref 0–0.4)
IMM GRANULOCYTES # BLD: 0.5 10E9/L (ref 0–0.4)
IMM GRANULOCYTES NFR BLD: 2.7 %
IMM GRANULOCYTES NFR BLD: 3.1 %
KETONES UR STRIP-MCNC: 10 MG/DL
LACTATE BLD-SCNC: 1.7 MMOL/L (ref 0.7–2)
LEUKOCYTE ESTERASE UR QL STRIP: NEGATIVE
LIPASE SERPL-CCNC: 173 U/L (ref 73–393)
LYMPHOCYTES # BLD AUTO: 2 10E9/L (ref 0.8–5.3)
LYMPHOCYTES # BLD AUTO: 2.5 10E9/L (ref 0.8–5.3)
LYMPHOCYTES NFR BLD AUTO: 12.8 %
LYMPHOCYTES NFR BLD AUTO: 17.8 %
MCH RBC QN AUTO: 32.3 PG (ref 26.5–33)
MCH RBC QN AUTO: 32.3 PG (ref 26.5–33)
MCHC RBC AUTO-ENTMCNC: 31.2 G/DL (ref 31.5–36.5)
MCHC RBC AUTO-ENTMCNC: 31.7 G/DL (ref 31.5–36.5)
MCV RBC AUTO: 102 FL (ref 78–100)
MCV RBC AUTO: 104 FL (ref 78–100)
MONOCYTES # BLD AUTO: 1 10E9/L (ref 0–1.3)
MONOCYTES # BLD AUTO: 1.1 10E9/L (ref 0–1.3)
MONOCYTES NFR BLD AUTO: 6.7 %
MONOCYTES NFR BLD AUTO: 7.4 %
MUCOUS THREADS #/AREA URNS LPF: PRESENT /LPF
NEUTROPHILS # BLD AUTO: 12.2 10E9/L (ref 1.6–8.3)
NEUTROPHILS # BLD AUTO: 9.8 10E9/L (ref 1.6–8.3)
NEUTROPHILS NFR BLD AUTO: 71.5 %
NEUTROPHILS NFR BLD AUTO: 76.9 %
NITRATE UR QL: NEGATIVE
NRBC # BLD AUTO: 0 10*3/UL
NRBC # BLD AUTO: 0 10*3/UL
NRBC BLD AUTO-RTO: 0 /100
NRBC BLD AUTO-RTO: 0 /100
NUM BPU REQUESTED: 1
PH UR STRIP: 6 PH (ref 5–7)
PLATELET # BLD AUTO: 326 10E9/L (ref 150–450)
PLATELET # BLD AUTO: 387 10E9/L (ref 150–450)
POTASSIUM SERPL-SCNC: 3.5 MMOL/L (ref 3.4–5.3)
PROT SERPL-MCNC: 6.3 G/DL (ref 6.8–8.8)
RBC # BLD AUTO: 2.23 10E12/L (ref 3.8–5.2)
RBC # BLD AUTO: 2.48 10E12/L (ref 3.8–5.2)
RBC #/AREA URNS AUTO: <1 /HPF (ref 0–2)
SODIUM SERPL-SCNC: 132 MMOL/L (ref 133–144)
SOURCE: ABNORMAL
SP GR UR STRIP: 1.02 (ref 1–1.03)
SPECIMEN EXP DATE BLD: NORMAL
TROPONIN I SERPL-MCNC: <0.015 UG/L (ref 0–0.04)
TSH SERPL DL<=0.005 MIU/L-ACNC: 0.69 MU/L (ref 0.4–4)
UROBILINOGEN UR STRIP-MCNC: NORMAL MG/DL (ref 0–2)
WBC # BLD AUTO: 13.8 10E9/L (ref 4–11)
WBC # BLD AUTO: 15.8 10E9/L (ref 4–11)
WBC #/AREA URNS AUTO: 2 /HPF (ref 0–5)

## 2020-02-11 PROCEDURE — 99285 EMERGENCY DEPT VISIT HI MDM: CPT | Mod: 25

## 2020-02-11 PROCEDURE — 80053 COMPREHEN METABOLIC PANEL: CPT | Performed by: PHYSICIAN ASSISTANT

## 2020-02-11 PROCEDURE — 87493 C DIFF AMPLIFIED PROBE: CPT | Performed by: PHYSICIAN ASSISTANT

## 2020-02-11 PROCEDURE — 25000125 ZZHC RX 250: Performed by: PHYSICIAN ASSISTANT

## 2020-02-11 PROCEDURE — P9016 RBC LEUKOCYTES REDUCED: HCPCS | Performed by: PHYSICIAN ASSISTANT

## 2020-02-11 PROCEDURE — 83690 ASSAY OF LIPASE: CPT | Performed by: PHYSICIAN ASSISTANT

## 2020-02-11 PROCEDURE — 12000047 ZZH R&B IMCU

## 2020-02-11 PROCEDURE — 25800030 ZZH RX IP 258 OP 636: Performed by: PHYSICIAN ASSISTANT

## 2020-02-11 PROCEDURE — 99223 1ST HOSP IP/OBS HIGH 75: CPT | Mod: AI | Performed by: HOSPITALIST

## 2020-02-11 PROCEDURE — 85025 COMPLETE CBC W/AUTO DIFF WBC: CPT | Performed by: PHYSICIAN ASSISTANT

## 2020-02-11 PROCEDURE — 86900 BLOOD TYPING SEROLOGIC ABO: CPT | Performed by: PHYSICIAN ASSISTANT

## 2020-02-11 PROCEDURE — 71046 X-RAY EXAM CHEST 2 VIEWS: CPT

## 2020-02-11 PROCEDURE — 81001 URINALYSIS AUTO W/SCOPE: CPT | Performed by: PHYSICIAN ASSISTANT

## 2020-02-11 PROCEDURE — 96361 HYDRATE IV INFUSION ADD-ON: CPT

## 2020-02-11 PROCEDURE — 93005 ELECTROCARDIOGRAM TRACING: CPT

## 2020-02-11 PROCEDURE — 86901 BLOOD TYPING SEROLOGIC RH(D): CPT | Performed by: PHYSICIAN ASSISTANT

## 2020-02-11 PROCEDURE — 86850 RBC ANTIBODY SCREEN: CPT | Performed by: PHYSICIAN ASSISTANT

## 2020-02-11 PROCEDURE — C9113 INJ PANTOPRAZOLE SODIUM, VIA: HCPCS | Performed by: PHYSICIAN ASSISTANT

## 2020-02-11 PROCEDURE — 25000128 H RX IP 250 OP 636: Performed by: PHYSICIAN ASSISTANT

## 2020-02-11 PROCEDURE — 83605 ASSAY OF LACTIC ACID: CPT | Performed by: PHYSICIAN ASSISTANT

## 2020-02-11 PROCEDURE — 84443 ASSAY THYROID STIM HORMONE: CPT | Performed by: PHYSICIAN ASSISTANT

## 2020-02-11 PROCEDURE — 87506 IADNA-DNA/RNA PROBE TQ 6-11: CPT | Performed by: PHYSICIAN ASSISTANT

## 2020-02-11 PROCEDURE — 86923 COMPATIBILITY TEST ELECTRIC: CPT | Performed by: PHYSICIAN ASSISTANT

## 2020-02-11 PROCEDURE — 96375 TX/PRO/DX INJ NEW DRUG ADDON: CPT

## 2020-02-11 PROCEDURE — 82272 OCCULT BLD FECES 1-3 TESTS: CPT | Performed by: PHYSICIAN ASSISTANT

## 2020-02-11 PROCEDURE — 74177 CT ABD & PELVIS W/CONTRAST: CPT

## 2020-02-11 PROCEDURE — 84484 ASSAY OF TROPONIN QUANT: CPT | Performed by: PHYSICIAN ASSISTANT

## 2020-02-11 PROCEDURE — 96374 THER/PROPH/DIAG INJ IV PUSH: CPT

## 2020-02-11 RX ORDER — CARVEDILOL 3.12 MG/1
3.12 TABLET ORAL 2 TIMES DAILY WITH MEALS
Status: CANCELLED | OUTPATIENT
Start: 2020-02-12

## 2020-02-11 RX ORDER — IOPAMIDOL 755 MG/ML
500 INJECTION, SOLUTION INTRAVASCULAR ONCE
Status: COMPLETED | OUTPATIENT
Start: 2020-02-11 | End: 2020-02-11

## 2020-02-11 RX ORDER — HYDROMORPHONE HYDROCHLORIDE 1 MG/ML
0.2 INJECTION, SOLUTION INTRAMUSCULAR; INTRAVENOUS; SUBCUTANEOUS
Status: COMPLETED | OUTPATIENT
Start: 2020-02-11 | End: 2020-02-11

## 2020-02-11 RX ORDER — ACETAMINOPHEN 325 MG/1
650 TABLET ORAL EVERY 4 HOURS PRN
Status: CANCELLED | OUTPATIENT
Start: 2020-02-11

## 2020-02-11 RX ADMIN — IOPAMIDOL 63 ML: 755 INJECTION, SOLUTION INTRAVENOUS at 21:29

## 2020-02-11 RX ADMIN — PANTOPRAZOLE SODIUM 40 MG: 40 INJECTION, POWDER, FOR SOLUTION INTRAVENOUS at 21:58

## 2020-02-11 RX ADMIN — SODIUM CHLORIDE 1000 ML: 9 INJECTION, SOLUTION INTRAVENOUS at 19:07

## 2020-02-11 RX ADMIN — HYDROMORPHONE HYDROCHLORIDE 0.2 MG: 1 INJECTION, SOLUTION INTRAMUSCULAR; INTRAVENOUS; SUBCUTANEOUS at 23:59

## 2020-02-11 RX ADMIN — SODIUM CHLORIDE 56 ML: 9 INJECTION, SOLUTION INTRAVENOUS at 21:29

## 2020-02-11 ASSESSMENT — ENCOUNTER SYMPTOMS
DIARRHEA: 1
COUGH: 0
FEVER: 0
DYSURIA: 0
BLOOD IN STOOL: 0
WEAKNESS: 1
SHORTNESS OF BREATH: 0

## 2020-02-12 PROBLEM — K92.2 ACUTE GI BLEEDING: Status: ACTIVE | Noted: 2020-02-12

## 2020-02-12 LAB
ANION GAP SERPL CALCULATED.3IONS-SCNC: 12 MMOL/L (ref 3–14)
BLD PROD TYP BPU: NORMAL
BLD UNIT ID BPU: 0
BLOOD PRODUCT CODE: NORMAL
BPU ID: NORMAL
BUN SERPL-MCNC: 17 MG/DL (ref 7–30)
C COLI+JEJUNI+LARI FUSA STL QL NAA+PROBE: NOT DETECTED
C DIFF TOX B STL QL: POSITIVE
CALCIUM SERPL-MCNC: 7.3 MG/DL (ref 8.5–10.1)
CHLORIDE SERPL-SCNC: 108 MMOL/L (ref 94–109)
CO2 SERPL-SCNC: 17 MMOL/L (ref 20–32)
CREAT SERPL-MCNC: 0.66 MG/DL (ref 0.52–1.04)
EC STX1 GENE STL QL NAA+PROBE: NOT DETECTED
EC STX2 GENE STL QL NAA+PROBE: NOT DETECTED
ENTERIC PATHOGEN COMMENT: NORMAL
ERYTHROCYTE [DISTWIDTH] IN BLOOD BY AUTOMATED COUNT: 15.4 % (ref 10–15)
GFR SERPL CREATININE-BSD FRML MDRD: 79 ML/MIN/{1.73_M2}
GLUCOSE BLDC GLUCOMTR-MCNC: 74 MG/DL (ref 70–99)
GLUCOSE SERPL-MCNC: 80 MG/DL (ref 70–99)
HCT VFR BLD AUTO: 25.3 % (ref 35–47)
HGB BLD-MCNC: 7.5 G/DL (ref 11.7–15.7)
HGB BLD-MCNC: 8.3 G/DL (ref 11.7–15.7)
HGB BLD-MCNC: 8.3 G/DL (ref 11.7–15.7)
INTERPRETATION ECG - MUSE: NORMAL
LACTATE BLD-SCNC: 0.9 MMOL/L (ref 0.7–2)
MCH RBC QN AUTO: 31.6 PG (ref 26.5–33)
MCHC RBC AUTO-ENTMCNC: 32.8 G/DL (ref 31.5–36.5)
MCV RBC AUTO: 96 FL (ref 78–100)
NOROV GI+II ORF1-ORF2 JNC STL QL NAA+PR: NOT DETECTED
PLATELET # BLD AUTO: 307 10E9/L (ref 150–450)
POTASSIUM SERPL-SCNC: 3.4 MMOL/L (ref 3.4–5.3)
RBC # BLD AUTO: 2.63 10E12/L (ref 3.8–5.2)
RVA NSP5 STL QL NAA+PROBE: NOT DETECTED
SALMONELLA SP RPOD STL QL NAA+PROBE: NOT DETECTED
SHIGELLA SP+EIEC IPAH STL QL NAA+PROBE: NOT DETECTED
SODIUM SERPL-SCNC: 137 MMOL/L (ref 133–144)
SPECIMEN SOURCE: ABNORMAL
TRANSFUSION STATUS PATIENT QL: NORMAL
TRANSFUSION STATUS PATIENT QL: NORMAL
UPPER GI ENDOSCOPY: NORMAL
V CHOL+PARA RFBL+TRKH+TNAA STL QL NAA+PR: NOT DETECTED
WBC # BLD AUTO: 11.2 10E9/L (ref 4–11)
Y ENTERO RECN STL QL NAA+PROBE: NOT DETECTED

## 2020-02-12 PROCEDURE — 43239 EGD BIOPSY SINGLE/MULTIPLE: CPT | Performed by: INTERNAL MEDICINE

## 2020-02-12 PROCEDURE — 85027 COMPLETE CBC AUTOMATED: CPT | Performed by: HOSPITALIST

## 2020-02-12 PROCEDURE — 25000132 ZZH RX MED GY IP 250 OP 250 PS 637: Mod: GY | Performed by: INTERNAL MEDICINE

## 2020-02-12 PROCEDURE — 12000047 ZZH R&B IMCU

## 2020-02-12 PROCEDURE — 85018 HEMOGLOBIN: CPT | Performed by: HOSPITALIST

## 2020-02-12 PROCEDURE — 83605 ASSAY OF LACTIC ACID: CPT | Performed by: HOSPITALIST

## 2020-02-12 PROCEDURE — 00000146 ZZHCL STATISTIC GLUCOSE BY METER IP

## 2020-02-12 PROCEDURE — 25000128 H RX IP 250 OP 636: Performed by: HOSPITALIST

## 2020-02-12 PROCEDURE — 36415 COLL VENOUS BLD VENIPUNCTURE: CPT | Performed by: HOSPITALIST

## 2020-02-12 PROCEDURE — C9113 INJ PANTOPRAZOLE SODIUM, VIA: HCPCS | Performed by: HOSPITALIST

## 2020-02-12 PROCEDURE — 25000128 H RX IP 250 OP 636: Performed by: INTERNAL MEDICINE

## 2020-02-12 PROCEDURE — 88305 TISSUE EXAM BY PATHOLOGIST: CPT | Performed by: INTERNAL MEDICINE

## 2020-02-12 PROCEDURE — 88305 TISSUE EXAM BY PATHOLOGIST: CPT | Mod: 26 | Performed by: INTERNAL MEDICINE

## 2020-02-12 PROCEDURE — 0W3P8ZZ CONTROL BLEEDING IN GASTROINTESTINAL TRACT, VIA NATURAL OR ARTIFICIAL OPENING ENDOSCOPIC: ICD-10-PCS | Performed by: INTERNAL MEDICINE

## 2020-02-12 PROCEDURE — 40000104 ZZH STATISTIC MODERATE SEDATION < 10 MIN: Performed by: INTERNAL MEDICINE

## 2020-02-12 PROCEDURE — 80048 BASIC METABOLIC PNL TOTAL CA: CPT | Performed by: HOSPITALIST

## 2020-02-12 PROCEDURE — 25800030 ZZH RX IP 258 OP 636: Performed by: HOSPITALIST

## 2020-02-12 PROCEDURE — 99233 SBSQ HOSP IP/OBS HIGH 50: CPT | Performed by: HOSPITALIST

## 2020-02-12 PROCEDURE — 25000125 ZZHC RX 250: Performed by: HOSPITALIST

## 2020-02-12 PROCEDURE — 25000132 ZZH RX MED GY IP 250 OP 250 PS 637: Mod: GY | Performed by: HOSPITALIST

## 2020-02-12 PROCEDURE — 0DB68ZX EXCISION OF STOMACH, VIA NATURAL OR ARTIFICIAL OPENING ENDOSCOPIC, DIAGNOSTIC: ICD-10-PCS | Performed by: INTERNAL MEDICINE

## 2020-02-12 RX ORDER — HYDROCODONE BITARTRATE AND ACETAMINOPHEN 10; 325 MG/1; MG/1
1 TABLET ORAL EVERY 4 HOURS PRN
Status: DISCONTINUED | OUTPATIENT
Start: 2020-02-12 | End: 2020-02-15 | Stop reason: HOSPADM

## 2020-02-12 RX ORDER — LIDOCAINE 40 MG/G
CREAM TOPICAL
Status: DISCONTINUED | OUTPATIENT
Start: 2020-02-12 | End: 2020-02-12 | Stop reason: HOSPADM

## 2020-02-12 RX ORDER — VANCOMYCIN HYDROCHLORIDE 50 MG/ML
125 KIT ORAL 4 TIMES DAILY
Status: DISCONTINUED | OUTPATIENT
Start: 2020-02-12 | End: 2020-02-15 | Stop reason: HOSPADM

## 2020-02-12 RX ORDER — ONDANSETRON 4 MG/1
4 TABLET, ORALLY DISINTEGRATING ORAL EVERY 6 HOURS PRN
Status: DISCONTINUED | OUTPATIENT
Start: 2020-02-12 | End: 2020-02-15 | Stop reason: HOSPADM

## 2020-02-12 RX ORDER — FENTANYL CITRATE 50 UG/ML
25 INJECTION, SOLUTION INTRAMUSCULAR; INTRAVENOUS EVERY 5 MIN PRN
Status: DISCONTINUED | OUTPATIENT
Start: 2020-02-12 | End: 2020-02-13

## 2020-02-12 RX ORDER — ONDANSETRON 2 MG/ML
4 INJECTION INTRAMUSCULAR; INTRAVENOUS EVERY 6 HOURS PRN
Status: DISCONTINUED | OUTPATIENT
Start: 2020-02-12 | End: 2020-02-15 | Stop reason: HOSPADM

## 2020-02-12 RX ORDER — ACETAMINOPHEN 650 MG/1
650 SUPPOSITORY RECTAL EVERY 4 HOURS PRN
Status: DISCONTINUED | OUTPATIENT
Start: 2020-02-12 | End: 2020-02-15 | Stop reason: HOSPADM

## 2020-02-12 RX ORDER — FLUMAZENIL 0.1 MG/ML
0.2 INJECTION, SOLUTION INTRAVENOUS
Status: DISCONTINUED | OUTPATIENT
Start: 2020-02-12 | End: 2020-02-13

## 2020-02-12 RX ORDER — FLUMAZENIL 0.1 MG/ML
0.2 INJECTION, SOLUTION INTRAVENOUS
Status: DISCONTINUED | OUTPATIENT
Start: 2020-02-12 | End: 2020-02-12

## 2020-02-12 RX ORDER — LIDOCAINE 40 MG/G
CREAM TOPICAL
Status: DISCONTINUED | OUTPATIENT
Start: 2020-02-12 | End: 2020-02-15 | Stop reason: HOSPADM

## 2020-02-12 RX ORDER — FENTANYL CITRATE 50 UG/ML
50 INJECTION, SOLUTION INTRAMUSCULAR; INTRAVENOUS
Status: DISCONTINUED | OUTPATIENT
Start: 2020-02-12 | End: 2020-02-13

## 2020-02-12 RX ORDER — CARVEDILOL 3.12 MG/1
3.12 TABLET ORAL 2 TIMES DAILY WITH MEALS
Status: DISCONTINUED | OUTPATIENT
Start: 2020-02-12 | End: 2020-02-15 | Stop reason: HOSPADM

## 2020-02-12 RX ORDER — NALOXONE HYDROCHLORIDE 0.4 MG/ML
.1-.4 INJECTION, SOLUTION INTRAMUSCULAR; INTRAVENOUS; SUBCUTANEOUS
Status: DISCONTINUED | OUTPATIENT
Start: 2020-02-12 | End: 2020-02-15 | Stop reason: HOSPADM

## 2020-02-12 RX ORDER — NALOXONE HYDROCHLORIDE 0.4 MG/ML
.1-.4 INJECTION, SOLUTION INTRAMUSCULAR; INTRAVENOUS; SUBCUTANEOUS
Status: ACTIVE | OUTPATIENT
Start: 2020-02-12 | End: 2020-02-13

## 2020-02-12 RX ORDER — ATORVASTATIN CALCIUM 40 MG/1
40 TABLET, FILM COATED ORAL EVERY EVENING
Status: DISCONTINUED | OUTPATIENT
Start: 2020-02-12 | End: 2020-02-15 | Stop reason: HOSPADM

## 2020-02-12 RX ORDER — SODIUM CHLORIDE 9 MG/ML
INJECTION, SOLUTION INTRAVENOUS CONTINUOUS
Status: DISCONTINUED | OUTPATIENT
Start: 2020-02-12 | End: 2020-02-13

## 2020-02-12 RX ORDER — DIAZEPAM 2 MG
2 TABLET ORAL EVERY 12 HOURS PRN
Status: DISCONTINUED | OUTPATIENT
Start: 2020-02-12 | End: 2020-02-15 | Stop reason: HOSPADM

## 2020-02-12 RX ORDER — NALOXONE HYDROCHLORIDE 0.4 MG/ML
.1-.4 INJECTION, SOLUTION INTRAMUSCULAR; INTRAVENOUS; SUBCUTANEOUS
Status: ACTIVE | OUTPATIENT
Start: 2020-02-12 | End: 2020-02-14

## 2020-02-12 RX ORDER — HYDROMORPHONE HYDROCHLORIDE 1 MG/ML
.3-.5 INJECTION, SOLUTION INTRAMUSCULAR; INTRAVENOUS; SUBCUTANEOUS
Status: DISCONTINUED | OUTPATIENT
Start: 2020-02-12 | End: 2020-02-13

## 2020-02-12 RX ORDER — IPRATROPIUM BROMIDE 42 UG/1
2 SPRAY, METERED NASAL 4 TIMES DAILY PRN
Status: DISCONTINUED | OUTPATIENT
Start: 2020-02-12 | End: 2020-02-12

## 2020-02-12 RX ORDER — ACETAMINOPHEN 325 MG/1
650 TABLET ORAL EVERY 4 HOURS PRN
Status: DISCONTINUED | OUTPATIENT
Start: 2020-02-12 | End: 2020-02-15 | Stop reason: HOSPADM

## 2020-02-12 RX ORDER — FENTANYL CITRATE 50 UG/ML
INJECTION, SOLUTION INTRAMUSCULAR; INTRAVENOUS PRN
Status: DISCONTINUED | OUTPATIENT
Start: 2020-02-12 | End: 2020-02-12 | Stop reason: HOSPADM

## 2020-02-12 RX ADMIN — HYDROMORPHONE HYDROCHLORIDE 0.5 MG: 1 INJECTION, SOLUTION INTRAMUSCULAR; INTRAVENOUS; SUBCUTANEOUS at 17:09

## 2020-02-12 RX ADMIN — CARVEDILOL 3.12 MG: 3.12 TABLET, FILM COATED ORAL at 14:15

## 2020-02-12 RX ADMIN — DOXYLAMINE SUCCINATE 50 MG: 25 TABLET ORAL at 21:16

## 2020-02-12 RX ADMIN — SODIUM CHLORIDE: 9 INJECTION, SOLUTION INTRAVENOUS at 03:53

## 2020-02-12 RX ADMIN — SODIUM CHLORIDE: 9 INJECTION, SOLUTION INTRAVENOUS at 17:12

## 2020-02-12 RX ADMIN — PANTOPRAZOLE SODIUM 40 MG: 40 INJECTION, POWDER, FOR SOLUTION INTRAVENOUS at 09:14

## 2020-02-12 RX ADMIN — VANCOMYCIN HYDROCHLORIDE 125 MG: KIT at 18:29

## 2020-02-12 RX ADMIN — HYDROMORPHONE HYDROCHLORIDE 0.5 MG: 1 INJECTION, SOLUTION INTRAMUSCULAR; INTRAVENOUS; SUBCUTANEOUS at 10:04

## 2020-02-12 RX ADMIN — ATORVASTATIN CALCIUM 40 MG: 40 TABLET, FILM COATED ORAL at 21:10

## 2020-02-12 RX ADMIN — CARVEDILOL 3.12 MG: 3.12 TABLET, FILM COATED ORAL at 17:06

## 2020-02-12 RX ADMIN — HYDROCODONE BITARTRATE AND ACETAMINOPHEN 1 TABLET: 10; 325 TABLET ORAL at 03:50

## 2020-02-12 RX ADMIN — VANCOMYCIN HYDROCHLORIDE 125 MG: KIT at 06:36

## 2020-02-12 RX ADMIN — ACETAMINOPHEN 650 MG: 325 TABLET, FILM COATED ORAL at 15:57

## 2020-02-12 RX ADMIN — VANCOMYCIN HYDROCHLORIDE 125 MG: KIT at 21:16

## 2020-02-12 RX ADMIN — PANTOPRAZOLE SODIUM 40 MG: 40 INJECTION, POWDER, FOR SOLUTION INTRAVENOUS at 21:10

## 2020-02-12 RX ADMIN — VANCOMYCIN HYDROCHLORIDE 125 MG: KIT at 14:15

## 2020-02-12 ASSESSMENT — ACTIVITIES OF DAILY LIVING (ADL)
ADLS_ACUITY_SCORE: 17
ADLS_ACUITY_SCORE: 17
ADLS_ACUITY_SCORE: 19
ADLS_ACUITY_SCORE: 16
ADLS_ACUITY_SCORE: 17

## 2020-02-12 ASSESSMENT — MIFFLIN-ST. JEOR: SCORE: 962.96

## 2020-02-12 NOTE — PHARMACY-ADMISSION MEDICATION HISTORY
Admission medication history interview status for this patient is complete. See [facility] admission navigator for allergy information, prior to admission medications and immunization status.     Medication history interview source(s):Patient and Family (daughter and son-in-law)  Medication history resources (including written lists, pill bottles, clinic record): SureScripts, Care Everywhere, and home med Rx bottles, brought by daughter  Primary pharmacy: [facility] [address] [place] [address]    Changes made to PTA medication list:  Added: none  Deleted: augmentin, metoclopramide, and ondansetron  Changed: none    Actions taken by pharmacist (provider contacted, etc):None     Additional medication history information: Patient struggles with constipation from the Norco, then subsequent diarrhea when using docusate to treat the constipation. Would like a different alternative to regulate her bowel movements.    Medication reconciliation/reorder completed by provider prior to medication history?  No     Do you take OTC medications (eg tylenol, ibuprofen, fish oil, eye/ear drops, etc)? Yes - see list       Prior to Admission medications    Medication Sig Last Dose Taking? Auth Provider   acetaminophen (TYLENOL) 500 MG tablet Take 1,000 mg by mouth 2 times daily as needed for mild pain 2/10/2020 at Unknown time Yes Reported, Patient   aspirin EC 81 MG EC tablet Take 1 tablet (81 mg) by mouth daily 2/10/2020 at am Yes Radha Umanzor PA-C   atorvastatin (LIPITOR) 40 MG tablet TAKE 1 TABLET BY MOUTH ONE TIME DAILY 2/10/2020 at pm Yes Michael Londono MD   carvedilol (COREG) 3.125 MG tablet Take 1 tablet (3.125 mg) by mouth 2 times daily (with meals) 2/11/2020 at am Yes Hiren Collins MD   diazepam (VALIUM) 2 MG tablet TAKE ONE TABLET BY MOUTH TWICE DAILY AS NEEDED FOR ANXIETY 2/10/2020 at Unknown time Yes Michael Londono MD   Docusate Sodium (EQUATE STOOL SOFTENER OR) Take 100 mg by mouth daily  as needed  Past Month at Unknown time Yes Reported, Patient   doxylamine (UNISOM) 25 MG TABS tablet Take 50 mg by mouth At Bedtime 2/10/2020 at pm Yes Unknown, Entered By History   furosemide (LASIX) 20 MG tablet Take 1 tablet (20 mg) by mouth daily Add additional 20mg for increased edema 2/10/2020 at Unknown time Yes Michael Londono MD   HYDROcodone-acetaminophen (NORCO)  MG per tablet Take 1 tablet by mouth every 4 hours as needed for severe pain 2/10/2020 at am Yes Reported, Patient   losartan (COZAAR) 25 MG tablet Take 1 tablet (25 mg) by mouth daily 2/11/2020 at am Yes Hiren Collins MD   nitroGLYcerin (NITROSTAT) 0.4 MG sublingual tablet if you are still having symptoms after 3 doses (15 minutes) call 911.  Yes Hiren Collins MD   Simethicone (GAS-X PO) Take 80 mg by mouth 4 times daily as needed  Past Week at Unknown time Yes Reported, Patient   guaiFENesin (MUCINEX) 600 MG 12 hr tablet Take 600 mg by mouth as needed for congestion Reported on 4/11/2017 More than a month at Unknown time  Reported, Patient

## 2020-02-12 NOTE — ED NOTES
[facility]  ED Nurse Handoff Report    Gosia Alonzo is a 87 year old female   ED Chief complaint: Diarrhea  . ED Diagnosis:   Final diagnoses:   Anemia   Melena   Duodenitis   Generalized weakness     Allergies:   Allergies   Allergen Reactions     Codeine      GI UPSET     Escitalopram Oxalate      fatigue     Esomeprazole Magnesium Trihydrate      HA     Imdur [Isosorbide]      Headache       Meperidine Hcl      N/V     Morphine Hcl      HIVES     Oxycodone      (percodan) GI UPSET     Pentazocine      (talwin)  HALLUCINATIONS     Propoxyphene Hcl      STOMACH UPSET     Sumatriptan Succinate      chest pain       Code Status: DNR / DNI  Activity level - Baseline/Home:  Assist by walker. Activity Level - Current:   Assist X 2. Lift room needed: No. Bariatric: No   Needed: No   Isolation: Yes. Infection: Not Applicable  C-Diff Pending.     Vital Signs:   Vitals:    02/11/20 2250 02/11/20 2300 02/11/20 2310 02/11/20 2320   BP: 121/67 128/70 122/66 114/69   Pulse: 121 105 109 109   Resp: 22 12 13 15   Temp:       TempSrc:       SpO2: 97% 97% 96% 95%       Cardiac Rhythm:  ,   Cardiac  Cardiac Rhythm: Sinus tachycardia;Other (Comment)(W/ freq PACs)  Pain level:    Patient confused: No. Patient Falls Risk: Yes.   Elimination Status: Has voided   Patient Report - Initial Complaint: Diarrhea. Focused Assessment:  Gosia Alonzo is a 87 year old female with history CHF, GERD, GI hemorrhage, NSTEMI, CAD, CKD, and HLD who presents with diarrhea. The patient states since her last visit in the ED on 01/12/2020, she has had intermittent weakness and has been cycling between constipation and diarrhea. The patient says today since 1330 she has had persistent diarrhea with black stool and is severely weak, prompting today's visit. During evaluation, she reports having not eaten all day. She denies emesis, blood in her stool ,dysuria, chest pain, shortness of breath, fever, and cough. She notes  baseline leg swelling and abdominal pain. Outside the room, the patient's daughter voiced concerned of the patient taking more pain medication than prescribed a day, as she found out today the patient was out of her pain medication the past 5 days.  Tests Performed: EKG, labs, CT, xray Abnormal Results:   Labs Ordered and Resulted from Time of ED Arrival Up to the Time of Departure from the ED   CBC WITH PLATELETS DIFFERENTIAL - Abnormal; Notable for the following components:       Result Value    WBC 15.8 (*)     RBC Count 2.48 (*)     Hemoglobin 8.0 (*)     Hematocrit 25.2 (*)      (*)     Absolute Neutrophil 12.2 (*)     Abs Immature Granulocytes 0.5 (*)     All other components within normal limits   COMPREHENSIVE METABOLIC PANEL - Abnormal; Notable for the following components:    Sodium 132 (*)     Glucose 135 (*)     Calcium 8.2 (*)     Albumin 2.5 (*)     Protein Total 6.3 (*)     All other components within normal limits   ROUTINE UA WITH MICROSCOPIC - Abnormal; Notable for the following components:    Ketones Urine 10 (*)     Mucous Urine Present (*)     All other components within normal limits   OCCULT BLOOD STOOL - Abnormal; Notable for the following components:    Occult Blood Positive (*)     All other components within normal limits   CBC WITH PLATELETS DIFFERENTIAL - Abnormal; Notable for the following components:    WBC 13.8 (*)     RBC Count 2.23 (*)     Hemoglobin 7.2 (*)     Hematocrit 23.1 (*)      (*)     MCHC 31.2 (*)     Absolute Neutrophil 9.8 (*)     All other components within normal limits   LIPASE   TROPONIN I   TSH WITH FREE T4 REFLEX   LACTIC ACID WHOLE BLOOD   PERIPHERAL IV CATHETER   ABO/RH TYPE AND SCREEN   ENTERIC BACTERIA AND VIRUS PANEL BY [facility] STOOL   CLOSTRIDIUM DIFFICILE TOXIN B     XR Chest 2 Views   Final Result   IMPRESSION: Prominence of interstitial markings. Calcified hilar lymph nodes. No consolidation. Heart size normal. Coronary artery calcifications.  Large hiatal hernia. Right shoulder arthroplasty. Deformity of the left shoulder. Overall no change    compared to previous study.      CT Abdomen Pelvis w Contrast   Final Result   IMPRESSION:    1.  Inflammatory change demonstrated involving the antrum and the first and second portions of the duodenum consistent with duodenitis. There is an air-filled structure in the medial border of the duodenum which may represent a small ulcer versus    diverticulum.   2.  Hepatic steatosis.            Treatments provided: see MAR  Family Comments: Son and daughter-in-law at bedside  OBS brochure/video discussed/provided to patient:  No  ED Medications:   Medications   0.9% sodium chloride BOLUS (0 mLs Intravenous Stopped 2/11/20 2035)   pantoprazole (PROTONIX) 40 mg IV push injection (40 mg Intravenous Given 2/11/20 2158)   CT Scan Flush (56 mLs Intravenous Given 2/11/20 2129)   iopamidol (ISOVUE-370) solution 500 mL (63 mLs Intravenous Given 2/11/20 2129)     Drips infusing:  Yes - blood  For the majority of the shift, the patient's behavior Green. Interventions performed were N/A.     Severe Sepsis OR Septic Shock Diagnosis Present: No      ED Nurse Name/Phone Number: Ethel Liu RN,   11:26 PM    **Please contact family w/ updates:  -Daughter-in-law: Seema Alonzo, [phone]  -Son: Narayan Alonzo, [phone]    RECEIVING UNIT ED HANDOFF REVIEW    Above ED Nurse Handoff Report was reviewed: Yes  Reviewed by: Brigette Conte RN on February 12, 2020 at 2:23 AM

## 2020-02-12 NOTE — ED NOTES
Pt reports 10/10 L shoulder pain which is chronic pain for her, I informed TORIBIO Welch and requested a pain medication to assist in relieving pt's discomfort.

## 2020-02-12 NOTE — ED PROVIDER NOTES
Emergency Department Attending Supervision Note  2/11/2020  7:59 PM      I evaluated this patient in conjunction with Rebekah Romano PA-c       Briefly, the patient presented with melena, weakness, and low grade abdominal pain. No chest pain, shortness of breath, fever, or vomiting. Patient has a reported history of GI bleed in the past.      On my exam, the patient appears ill, awake, alert and oriented. Respirations even and unlabored. No increased work of breathing. Radial pulses 2+ bilaterally. Generalized pallor. Abdomen with mild epigastric tenderness otherwise soft, without guarding or rebound. No lower abdominal tenderness. No palpable masses.     Results:  All ED results personally reviewed by myself.    ED course:  The patient was seen and evaluated with Rebekah. Signs and symptoms consistent with likely upper GI bleeding. Significant hemoglobin drop since last blood draw and multiple episodes of dark melena diarrheal stools in the ED. No fever or evidence of infection. CT with evidence of duodenitis and possible ulcer/diverticulum which is the likely source of the GI bleeding. Hgb down trended slightly in the ED.  Patient will be admitted for further monitoring, evaluation and treatment. She will likely require GI evaluation and possible endoscopy. Patient remained hemodynamically stable in the ED.     My impression is #1 upper GI bleeding #2 duodenitis         Diagnosis    ICD-10-CM    1. Anemia D64.9 Enteric Bacteria and Virus Panel by [facility] Stool     Clostridium difficile toxin B PCR     ABO/Rh type and screen     Blood component     Blood component   2. Melena K92.1    3. Duodenitis K29.80    4. Generalized weakness R53.1               Michael Morley MD  02/12/20 0110       Michael Morley MD  02/12/20 0112

## 2020-02-12 NOTE — PROVIDER NOTIFICATION
FYI pt CDiff came back positive. Do you want to start on  PO vanco? Thanks  Brigette Conte RN on 2/12/2020 at 4:59 AM

## 2020-02-12 NOTE — PLAN OF CARE
Vss. A&Ox4 c/o chronic left arm pain. Controlled with dilauded  as pt was npo. Denies abd pain n/v. Back from egd around 1400. Tolerated po. A&OX4. Denies pain. Sats stable on RA. Tele sr/st. Coreg given. David inplace did have one incont brief. No bm this shift.

## 2020-02-12 NOTE — ED TRIAGE NOTES
Pt arrives via EMS with diarrhea that started this afternoon about 1300. Pt reports her stool was black today. Pt was having loose stools 2 days ago as well. Denies nausea/vomiting. Reports generalized weakness

## 2020-02-12 NOTE — ED PROVIDER NOTES
History     Chief Complaint:  Diarrhea    HPI   Gosia Alonzo is a 87 year old female with history CHF, GERD, GI hemorrhage, NSTEMI, CAD, CKD, and HLD who presents with diarrhea. The patient states since her last visit in the ED on 01/12/2020, she has had intermittent weakness and has been cycling between constipation and diarrhea. The patient says today since 1330 she has had persistent diarrhea with black stool and is severely weak, prompting today's visit. During evaluation, she reports having not eaten all day. She denies emesis, blood in her stool ,dysuria, chest pain, shortness of breath, fever, and cough. She notes baseline leg swelling and abdominal pain. Outside the room, the patient's daughter voiced concerned of the patient taking more pain medication than prescribed a day, as she found out today the patient was out of her pain medication the past 5 days. Denies NSAID use.    Allergies:  Codeine  Escitalopram  Esomeprazole  Imdur  Meperidine  Morphine  Oxycodone  Pentazocine  Propoxyphene  Sumatriptan     Medications:    Aspirin 81mg  Atorvastatin  Coreg  Valium  Docusate  Unisom  Lasix  Losartan  Gas-X     Past Medical History:    Systolic CHF  Arthritis  CAD  CKD  Colon diverticula  GERD  GI hemorrhage  HTN  Hiatal hernia  Right breast cancer, infiltrating ductal cancer   Ischemic cardiomyopathy   Migraine  NSTEMI  Osteoporosis  Osteoarthritis  HLD  Anemia, iron deficiency   Seborrheic keratosis  Paroxysmal a-fib  Pericarditis  Right DVT  Pyogenic granuloma  Vasomotor rhinitis  Viral warts     Past Surgical History:    Aspiration thrombectomy w/ stent placement  Aspiration thrombectomy w/ balloon angioplasty  Appendectomy  Breast surgery  Right shoulder arthroplasty    Phacoemulsification clear cornea w/ IOL implant   Right rotator cuff repair  Hysterectomy  Left elbow surgery  Right total knee surgery  Lumbar fusion x4  Right knee arthroplasty   Right mastectomy  Right shoulder revision surgery       Family History:    CAD  MI  Pancreatic cancer  Colon cancer  HTN    Social History:  Smoking status: No  Alcohol use: No  Drug use: No  The patient presents to the emergency department with daughter and son-in-law.  Marital Status:   [5]     Review of Systems   Constitutional: Negative for fever.   Respiratory: Negative for cough and shortness of breath.    Gastrointestinal: Positive for diarrhea. Negative for blood in stool.   Genitourinary: Negative for dysuria.   Neurological: Positive for weakness.   All other systems reviewed and are negative.    Physical Exam     Patient Vitals for the past 24 hrs:   BP Temp Temp src Pulse Heart Rate Resp SpO2   02/11/20 2320 114/69 -- -- 109 117 15 95 %   02/11/20 2310 122/66 -- -- 109 109 13 96 %   02/11/20 2300 128/70 -- -- 105 107 12 97 %   02/11/20 2250 121/67 -- -- 121 106 22 97 %   02/11/20 2240 116/68 -- -- 109 109 15 96 %   02/11/20 2230 127/62 -- -- 112 104 11 97 %   02/11/20 2220 119/76 -- -- 105 109 12 --   02/11/20 2155 118/85 -- -- -- 106 15 --   02/11/20 2120 (!) 124/90 -- -- 106 111 14 --   02/11/20 2110 127/69 -- -- 105 107 -- --   02/11/20 2100 124/66 -- -- 105 123 17 --   02/11/20 2040 137/77 -- -- 110 109 -- --   02/11/20 2030 125/85 -- -- 106 101 15 --   02/11/20 2020 (!) 125/108 -- -- 112 107 -- --   02/11/20 2010 (!) 143/78 -- -- 105 102 17 --   02/11/20 2000 (!) 146/113 -- -- 111 111 11 100 %   02/11/20 1955 (!) 143/74 -- -- 112 125 15 96 %   02/11/20 1945 (!) 146/79 -- -- 109 115 14 --   02/11/20 1930 (!) 142/60 -- -- 101 104 24 97 %   02/11/20 1915 113/71 -- -- 108 105 14 100 %   02/11/20 1912 -- -- -- -- 103 14 100 %   02/11/20 1910 108/58 -- -- 104 105 14 100 %   02/11/20 1909 108/58 -- -- 104 99 18 --   02/11/20 1845 133/59 -- -- 111 113 15 96 %   02/11/20 1820 129/76 -- -- 64 -- -- --   02/11/20 1814 118/67 97.7  F (36.5  C) Oral 107 107 16 98 %     Physical Exam  General: Resting on the bed, appears fatigued.  Mildly pale.    Skin:  Good turgor, no rash, no unusual bruising or prominent lesions.  HEENT: Head: Normocephalic, atraumatic, no visible masses.   Eyes: Conjunctiva clear. No conjunctival pallor.  Ears: EACs clear, TMs translucent.   Nose: No external lesions, mucosa non-inflamed, septum and turbinates normal.   Throat/pharynx: Mucous membranes moist, no mucosal lesions.   Cardiac: Tachycardia,regular rhythm, no murmur or gallop.   Lungs: Clear to auscultation.  Abdomen: Diffuse abdominal tenderness, greatest in the upper abdomen, and epigastric region.  No CVA tenderness bilaterally.  Musculoskeletal: Normal gait and station. No calf tenderness or swelling.   Neurologic: Oriented x 3. GCS: 15.  Psychiatric: Intact recent and remote memory, judgment and insight, normal mood and affect.   Rectal: Melena dried on buttocks.  Patient incontinent of stool.  Rectal exam shows shelbie melena in rectal vault.  No obvious external or internal hemorrhoids or palpable mass.    Emergency Department Course   ECG (18:53:05):  Rate 102 bpm. CT interval 176. QRS duration 76. QT/QTc 364/474. P-R-T axes 101 1 44. Sinus tachycardia with premature atrial complexes. Possible inferior infarct, age undetermined. Cannot rule out anterior infarct, age undetermined. Abnormal ECG. Interpreted at 1854 by Jennie Salazar MD.    Imaging:  Radiographic findings were communicated with the patient who voiced understanding of the findings.    CT Abdomen Pelvis w Contrast  IMPRESSION:   1.  Inflammatory change demonstrated involving the antrum and the first and second portions of the duodenum consistent with duodenitis. There is an air-filled structure in the medial border of the duodenum which may represent a small ulcer versus   diverticulum.  2.  Hepatic steatosis.    As read by Radiology.    XR Chest 2 Views  IMPRESSION: Prominence of interstitial markings. Calcified hilar lymph nodes. No consolidation. Heart size normal. Coronary artery calcifications. Large hiatal  hernia. Right shoulder arthroplasty. Deformity of the left shoulder. Overall no change   compared to previous study.    As read by Radiology.    Laboratory:  CBC: WBC 15.8 (H), HGB 8.0 (L), o/w WNL () (1904)  CMP: Sodium 132 (L), Glucose 135 (H), Calcium 8.2 (L), Albumin 2.5 (L), Protein 6.3 (L), o/w WNL (Creatinine 0.65)  1904 Troponin I <0.015  Lipase 173  TSH 0.69  ABO/Rh: ABO AB, Rh Pos, Antibody Neg.  1904 Lactic acid 1.7    HGB: 7.2 (2217)    Stool occult blood: pos.    Interventions:  1907 NS 1L IV Bolus  2158 Protonix 40 mg IV  2359 Dilaudid 0.2 mg IV    Emergency Department Course:  Past medical records, nursing notes, and vitals reviewed.  1827: I performed an exam of the patient and obtained history, as documented above.    IV inserted and blood drawn.    1950: Rectal exam performed.    The patient was sent for a chest x-ray and abdomen pelvis CT while in the emergency department, findings above.    2026: I rechecked the patient. Explained findings to patient.    2253: I rechecked the patient.    Findings and plan explained to the Patient who consents to admission.     2310: Discussed the patient with Dr. French, who will admit the patient to an adult med/surg bed for further monitoring, evaluation, and treatment.     Impression & Plan    Medical Decision Making:  Gosia Alonzo is a 87 year old female who presented the ED today for evaluation of weakness, diffuse diarrhea.  Details the patient's history can be known HPI.  Differentials considered included colitis, gastroenteritis, diverticulitis, anemia, electrolyte abnormalities, ACS, CVA, amongst others.  On my exam, patient appeared weak, uncomfortable, was tachycardic.  She had diffuse abdominal tenderness, greatest in epigastric region.  Basic labs obtained, initial hemoglobin returning at 8.  When this was repeated 3 hours later, it had dropped to 7.2.  Stool occult test was positive here.  She had shelbie melena on rectal exam.    Due to  her diffuse abdominal tenderness, CT was obtained, that showed duodenitis with possible duodenal ulcer.  I do suspect that this is the cause of the patient's bleeding source, abdominal pain, causing her increasing weakness and anemia.  Additional labs showed a normal troponin.  ECG is without ischemic changes.  Urine negative for infection.  Chest x-ray clear.    The patient was consented for blood products here.  They were initiated after her second hemoglobin returned and had significant drop.  40 mg of Protonix was also initiated.  She does need admission for transfusion as well as gastroenterology consultation for upper endoscopy.  I discussed the patient's case with Dr. French who agreed to admit the patient to hospital for further management and care.  All the findings as noted above were discussed in great detail with the patient and her family.  All questions were answered prior to admission.  They are in agreement with the treatment plan as stated above.    Diagnosis:    ICD-10-CM    1. Anemia D64.9 Enteric Bacteria and Virus Panel by [facility] Stool     Clostridium difficile toxin B PCR   2. Melena K92.1    3. Duodenitis K29.80    4. Generalized weakness R53.1        Disposition:  Admitted to [facility] [place]    Scribe Disclosure:  I, Kat Sullivan, am serving as a scribe at 6:27 PM on 2/11/2020 to document services personally performed by Rebekah Romano PA based on my observations and the provider's statements to me.    [facility] EMERGENCY DEPARTMENT    This was created at least in part with a voice recognition software. Mistakes/typos may be present.        Rebekah Romano PA  02/12/20 0010

## 2020-02-12 NOTE — PLAN OF CARE
A&O. VSS. Tele  ST  (HR low 100's)  w/ PAC's.  C/o  left shoulder/neck/back pain- PRN Norco  given. IVF's infusing. Hgb 7.2-1  unit RBC given in ED. NPO. Diarrhea. CDiff and fecal occult positive. +2 BLE  edema. Bilateral legs  xena w/ abrasions. Buttocks blanchable redness- barrier cream applied. Incontinent.  Pt refused repositioning-education provided. C/o dizziness w/ turning. IVP Protonix ordered. GI consulted. Continue plan of care.   Brigette Conte RN on 2/12/2020 at 5:11 AM

## 2020-02-12 NOTE — PROGRESS NOTES
[facility]    Hospitalist Progress Note  Name: Gosia Alonzo    MRN: [id number]  Provider:  Gary Amaya DO, MPH  Date of Service: 02/12/2020    Summary of Stay: Gosia Alonzo is a 87 year old female  patient with history of CAD s/p CAMILA to mid and prox LAD in 1/2016, ischemic cardiomyopathy, CKD, paroxysmal Afib, hypertension, osteoporosis, history of GI bleed, leg edema who presents with loose melanotic stools and epigastric abdominal pain.  CT scan of the abdomen in the emergency room shows duodenitis and duodenal ulcer.  Hemoglobin was in the low 7 range and received 1 unit of packed red blood cells on 2/11.  Started on Protonix and IV fluids.  Seen in consultation by gastroenterology and planning for EGD today.  Also found to have C. difficile and started on oral vancomycin.     Problem list:  1. Upper GI bleed suspect secondary to duodenal ulcer seen on CT scan.  Hemoglobin relatively stable although did receive 1 unit of packed red blood cells on 2/11.  Continue serial hemoglobin checks.  Currently on IV Protonix.  GI consulted and planning EGD today.  Continue IV fluids.     2. CAD s/p CAMILA to mid and prox LAD in 1/2016: EF 35-40% in echocardiogram 2018.  Chronically on aspirin.  Hold for now with ongoing GI bleeding.  No acute issues appears euvolemic.     3. Ischemic cardiomyopathy: Will hold her lasix, losartan, aspirin in the setting of bleeding and borderline blood pressures.  Ejection fraction about 35 to 40% on echocardiogram 2 years ago.  Monitor closely for volume overload.  Resuming Coreg.     4. Atrial fibrillation not on anticoagulation: Not controlled currently, but feel exacerbated in the setting of anemia and bleed.  Will resume prior to admission low-dose Coreg and continue IV fluids.     5. HTN: Blood pressure stable.  Resume Coreg but hold losartan.     6. Weakness: Will probably need PT evaluation prior to discharge.    DVT Prophylaxis: Pneumatic Compression  Devices  Code Status: Prior  Diet: NPO for Medical/Clinical Reasons Except for: Meds, Ice Chips    Manzano Catheter: not present  Disposition: Expected discharge in 2-3 days to home. Goals prior to discharge include work up as above.   Incidental Findings: None.  Family updated today: No     Interval History   Assumed care from previous hospitalist. The history was fully reviewed.  The patient reports doing well. No chest pain or shortness of breath. No nausea, vomiting, diarrhea, constipation. No fevers. No other specific complaints identified.     -Data reviewed today: I personally reviewed all new labs and imaging results over the last 24 hours.     Physical Exam   Temp: 98.1  F (36.7  C) Temp src: Oral BP: 124/84 Pulse: 105 Heart Rate: 99 Resp: 16 SpO2: 99 % O2 Device: None (Room air)    Vitals:    02/12/20 0411   Weight: 52.7 kg (116 lb 3.2 oz)     Vital Signs with Ranges  Temp:  [97.7  F (36.5  C)-98.9  F (37.2  C)] 98.1  F (36.7  C)  Pulse:  [] 105  Heart Rate:  [] 99  Resp:  [11-35] 16  BP: (108-146)/() 124/84  SpO2:  [93 %-100 %] 99 %  I/O last 3 completed shifts:  In: 1316 [IV Piggyback:1000]  Out: -     GENERAL: No apparent distress. Awake, alert, and fully oriented.  HEENT: Normocephalic, atraumatic. Extraocular movements intact.  CARDIOVASCULAR: Tachy slightly and IRR IRR without murmurs or rubs. No S3.  PULMONARY: Clear bilaterally.  GASTROINTESTINAL: Soft, non-tender, non-distended. Bowel sounds normoactive.   EXTREMITIES: No cyanosis or clubbing. No edema.  NEUROLOGICAL: CN 2-12 grossly intact, no focal neurological deficits.  DERMATOLOGICAL: No rash, ulcer, bruising, nor jaundice.     Medications     sodium chloride 75 mL/hr at 02/12/20 0914       atorvastatin  40 mg Oral QPM     doxylamine  50 mg Oral At Bedtime     pantoprazole (PROTONIX) IV  40 mg Intravenous Q12H     sodium chloride (PF)  3 mL Intracatheter Q8H     vancomycin  125 mg Oral 4x Daily     Data     Laboratory:  Recent  Labs   Lab 02/12/20  0621 02/11/20  2217 02/11/20  1904   WBC 11.2* 13.8* 15.8*   HGB 8.3* 7.2* 8.0*   HCT 25.3* 23.1* 25.2*   MCV 96 104* 102*    326 387     Recent Labs   Lab 02/12/20  0621 02/11/20  1904    132*   POTASSIUM 3.4 3.5   CHLORIDE 108 99   CO2 17* 23   ANIONGAP 12 10   GLC 80 135*   BUN 17 23   CR 0.66 0.65   GFRESTIMATED 79 80   GFRESTBLACK >90 >90   LIGIA 7.3* 8.2*     No results for input(s): CULT in the last 168 hours.    Imaging:  Recent Results (from the past 24 hour(s))   CT Abdomen Pelvis w Contrast    Narrative    EXAM: CT ABDOMEN PELVIS W CONTRAST  LOCATION: [facility]  DATE/TIME: 2/11/2020 9:26 PM    INDICATION: Abdomen pain and diarrhea  COMPARISON: None.  TECHNIQUE: CT scan of the abdomen and pelvis was performed following injection of IV contrast. Multiplanar reformats were obtained. Dose reduction techniques were used.  CONTRAST: 63mL Isovue-370    FINDINGS:   LOWER CHEST: Subpleural reticulation right middle lobe and lower lobes. No consolidation.    HEPATOBILIARY: Hepatic steatosis. No radiopaque gallstones.    PANCREAS: Normal.    SPLEEN: Calcified splenic granuloma.    ADRENAL GLANDS: Normal.    KIDNEYS/BLADDER: Right kidney is normal with no mass, stones, or hydronephrosis.   Left kidney is normal with no mass, stones, or hydronephrosis.  Bladder is normal.    BOWEL: There is inflammatory change present involving the antrum, first and second portions of the duodenum consistent with duodenitis. There may be a duodenal ulcer along the medial border of the duodenum on image 28 versus a diverticulum of the   duodenum.    LYMPH NODES: Normal.    VASCULATURE: Atherosclerosis, no aneurysm.    PELVIC ORGANS: Normal.    MUSCULOSKELETAL: Compression fracture T12. Demineralization.      Impression    IMPRESSION:   1.  Inflammatory change demonstrated involving the antrum and the first and second portions of the duodenum consistent with duodenitis. There is an  air-filled structure in the medial border of the duodenum which may represent a small ulcer versus   diverticulum.  2.  Hepatic steatosis.     XR Chest 2 Views    Narrative    EXAM: XR CHEST 2 VW  LOCATION: [facility]  DATE/TIME: 2/11/2020 9:34 PM    INDICATION: Tachycardia and weakness.  COMPARISON: 12/11/2019.      Impression    IMPRESSION: Prominence of interstitial markings. Calcified hilar lymph nodes. No consolidation. Heart size normal. Coronary artery calcifications. Large hiatal hernia. Right shoulder arthroplasty. Deformity of the left shoulder. Overall no change   compared to previous study.         Gary Amaya DO MPH  Formerly [facility] Hospitalist  [address] [place] [address]  Pager: [phone]  02/12/2020

## 2020-02-12 NOTE — CONSULTS
GASTROENTEROLOGY CONSULTATION      Gosia Alonzo  [address] [facility] [id number]  87 year old female     Admission Date/Time: 2/11/2020  Primary Care Provider: Michael Londono     We were asked to see the patient in consultation by Dr. Edward for evaluation of melena.    CC: melena, diarrhea     HPI:  Gosia Alonzo is a 87 year old female with past medical history of CAD s/p drug eluting cardiac stent in 2016, ischemic cardiomyopathy, CKD, paroxysmal Afib, hypertension, osteoporosis, admitted 2/11 with symptoms of epigastric pain, melena, and diarrhea.     Patient reports that since the end of December she has been experiencing both epigastric pain and black stools. Reports having 1-2 loose black stools per day. Epigastric pain noted to feel more like a pressure/bloating sensation without radiation. No associated nausea, vomiting, fevers, chills. Yesterday, she felt weak and lightheaded and was experiencing numerous black liquid stools prompting her to call EMS. Melena noted in ER. History notable for baby ASA use but no other NSAIDs or blood thinners. She denies heartburn or reflux and is not on any antacids. EHR mentions history of GI bleed but patient does not recall having GI bleeding in the past. Recent antibiotic use for upper respiratory infection one month ago.     Reports remote history of EGD and colonoscopy.     Hemoglobin on admission was down to 8 from 14.2 on January 12. Dropped to 7.2, and she has now received one unit PRBC with improvement to 8.3. MCV elevated 102. Platelets normal. WBC elevated 15.8. CT abdomen/pelvis showed wall thickening of the gastric antrum and duodenum concerning for possible duodenal ulcer vs duodenal diverticulum and hepatic steatosis. C diff just came back positive and she has had one dose of vancomycin. Vitals notable for tachycardia but BP stable.      Seen in the ER in January for sinus pressure and nausea and abdominal pain. RUQ US was  unremarkable without evidence of gall stones or bile duct dilation.      PAST MEDICAL HISTORY:  Patient Active Problem List    Diagnosis Date Noted     Acute GI bleeding 02/12/2020     Priority: Medium     Chronic systolic heart failure (H) 01/09/2019     Priority: Medium     Protein-calorie malnutrition (H) 01/09/2019     Priority: Medium     Left shoulder pain 09/06/2017     Priority: Medium     Paroxysmal atrial fibrillation (H) 10/26/2016     Priority: Medium     Osteoarthritis of left knee 10/26/2016     Priority: Medium     Osteoarthritis of shoulder region 10/26/2016     Priority: Medium     Other iron deficiency anemia 04/21/2016     Priority: Medium     Ischemic cardiomyopathy      Priority: Medium     7/14/2017 - EF 30-35% by echo  5/17/2016 - EF 35-40% by echo  2/16/2016 - EF 20-25% by echo  1/9/2016 - EF 25-30% by echo  12/29/2015 - EF 25-30% by echo  8/10/2015 - EF 55-60% by echo         STEMI involving left anterior descending coronary artery (H) 01/09/2016     Priority: Medium     Health Care Home 01/04/2016     Priority: Medium     Pt not active in clinic care coordination at this time.  Status:  Core clinic  Care Coordinator:  Teena Mg    See Letters for [facility] Care Plan  Date:  January 4, 2016           GIB (gastrointestinal bleeding) 01/04/2016     Priority: Medium     Anxiety 12/26/2015     Priority: Medium     Type 2 diabetes mellitus with diabetic nephropathy (H) 10/25/2015     Priority: Medium     CAD (coronary artery disease)      Priority: Medium     Cath 12/2015: aspiration thrombectomy and CAMILA to mid and prox LAD. Cath 1/9/16: In-stent restenosis. Aspiration thrombectomy and PTCA to mLAD.       CKD (chronic kidney disease), stage 3 (moderate)      Priority: Medium     Anemia 11/26/2014     Priority: Medium     Advanced directives, counseling/discussion 01/04/2012     Priority: Medium     Pt received HCD and will return when complete.     Wilton Story MA         Type 2 diabetes  mellitus with stage 3 chronic kidney disease (H) 08/05/2011     Priority: Medium     TSH deficiency 02/16/2010     Priority: Medium     HYPERLIPIDEMIA LDL GOAL <100 02/10/2010     Priority: Medium     Macrocytosis without anemia 01/08/2010     Priority: Medium     Essential hypertension      Priority: Medium     Borderline LAE and LVH on echo 1/09       Osteoporosis      Priority: Medium     Hiatal hernia      Priority: Medium     VASOMOTOR RHINITIS      Priority: Medium     Versus sphenopalatine neuralgia  Dr. Wilson       Esophageal reflux 10/27/2004     Priority: Medium     Hiatal Hernia, Schatski's Ring       R upper extremity edema, postop 01/08/2004     Priority: Medium     Migraine 12/13/2002     Priority: Medium     Zomig 2.5 mg effective for unilateral migraines, though causes nausea  Excedrin migraine works well for non-migraine headaches   Relpax not helpful  Maxalt trial 6/7/2007   Problem list name updated by automated process. Provider to review       Obesity 12/13/2002     Priority: Medium     Problem list name updated by automated process. Provider to review       Allergic rhinitis 12/13/2002     Priority: Medium     Problem list name updated by automated process. Provider to review       Malignant neoplasm of female breast (H) 12/08/1998     Priority: Medium     Mastectomy and reconstruction  Problem list name updated by automated process. Provider to review            ROS: A comprehensive ten point review of systems was negative aside from those in mentioned in the HPI.       MEDICATIONS:   Prior to Admission medications    Medication Sig Start Date End Date Taking? Authorizing Provider   acetaminophen (TYLENOL) 500 MG tablet Take 1,000 mg by mouth 2 times daily as needed for mild pain   Yes Reported, Patient   aspirin EC 81 MG EC tablet Take 1 tablet (81 mg) by mouth daily 1/12/16  Yes Radha Umanzor PA-C   atorvastatin (LIPITOR) 40 MG tablet TAKE 1 TABLET BY MOUTH ONE TIME DAILY 1/31/20   Yes Michael Londono MD   carvedilol (COREG) 3.125 MG tablet Take 1 tablet (3.125 mg) by mouth 2 times daily (with meals) 2/3/20  Yes Hiren Collins MD   diazepam (VALIUM) 2 MG tablet TAKE ONE TABLET BY MOUTH TWICE DAILY AS NEEDED FOR ANXIETY 1/9/20  Yes Michael Londono MD   Docusate Sodium (EQUATE STOOL SOFTENER OR) Take 100 mg by mouth daily as needed    Yes Reported, Patient   doxylamine (UNISOM) 25 MG TABS tablet Take 50 mg by mouth At Bedtime   Yes Unknown, Entered By History   furosemide (LASIX) 20 MG tablet Take 1 tablet (20 mg) by mouth daily Add additional 20mg for increased edema 10/31/19  Yes Michael Londono MD   HYDROcodone-acetaminophen (NORCO)  MG per tablet Take 1 tablet by mouth every 4 hours as needed for severe pain   Yes Reported, Patient   losartan (COZAAR) 25 MG tablet Take 1 tablet (25 mg) by mouth daily 2/3/20  Yes Hiren Collins MD   nitroGLYcerin (NITROSTAT) 0.4 MG sublingual tablet if you are still having symptoms after 3 doses (15 minutes) call 911. 8/10/18  Yes Hiren Collins MD   Simethicone (GAS-X PO) Take 80 mg by mouth 4 times daily as needed    Yes Reported, Patient   guaiFENesin (MUCINEX) 600 MG 12 hr tablet Take 600 mg by mouth as needed for congestion Reported on 4/11/2017    Reported, Patient   ipratropium (ATROVENT) 0.06 % nasal spray Spray 2 sprays into both nostrils 4 times daily as needed for rhinitis 1/9/19 2/11/20  Michael Londono MD        ALLERGIES:   Allergies   Allergen Reactions     Codeine      GI UPSET     Escitalopram Oxalate      fatigue     Esomeprazole Magnesium Trihydrate      HA     Imdur [Isosorbide]      Headache       Meperidine Hcl      N/V     Morphine Hcl      HIVES     Oxycodone      (percodan) GI UPSET     Pentazocine      (talwin)  HALLUCINATIONS     Propoxyphene Hcl      STOMACH UPSET     Sumatriptan Succinate      chest pain        SOCIAL HISTORY:  Social History     Tobacco Use     Smoking status: Never Smoker      "Smokeless tobacco: Never Used   Substance Use Topics     Alcohol use: No     Alcohol/week: 0.0 standard drinks     Comment: rarely     Drug use: No        FAMILY HISTORY:  Family History   Problem Relation Age of Onset     C.A.D. Father         MI 56     Cancer Mother         pancreatic CA     Cancer Brother         CA colon 58     Hypertension Sister         PHYSICAL EXAM:   /58 (BP Location: Left arm)   Pulse 105   Temp 98.8  F (37.1  C) (Oral)   Resp 18   Ht 1.651 m (5' 5\")   Wt 52.7 kg (116 lb 3.2 oz)   SpO2 100%   BMI 19.34 kg/m       PHYSICAL EXAM:  General: alert, oriented, NAD  SKIN: no suspicious lesions, rashes, jaundice, or spider angiomas  HEAD: Normocephalic. No masses, lesions, tenderness or abnormalities  NECK: Neck supple. No adenopathy. Thyroid symmetric, normal size.  EYES: No scleral icterus  ENT: ENT exam normal, no neck nodes or sinus tenderness  RESPIRATORY: negative, Good diaphragmatic excursion. Lungs clear  CARDIOVASCULAR: negative, PMI normal. No lifts, heaves, or thrills. RRR. No murmurs, clicks gallops or rub  GASTROINTESTINAL: +BS, soft, NT, ND, no HSM, no masses/guarding/rebound  JOINT/EXTREMITIES: extremities normal- no gross deformities noted, gait normal and normal muscle tone  NEURO: Reflexes grossly normal and symmetric. Sensation grossly WNL.  PSYCH: no abnormal anxiety/depression  LYMPH: No anterior cervical, posterior cervical, or supraclavicular adenopathy     LABS:  I reviewed the patient's new clinical lab test results.   Recent Labs   Lab Test 02/12/20  0621 02/11/20  2217 02/11/20  1904  01/09/16  1341  01/04/16  1940  08/10/15  1259   WBC 11.2* 13.8* 15.8*   < > 10.2  --  9.3   < > 7.1   HGB 8.3* 7.2* 8.0*   < > 10.5*   < > 8.3*   < > 11.2*   MCV 96 104* 102*   < > 87  --  92   < > 98    326 387   < > 294  --  266   < > 303   INR  --   --   --   --  0.95  --  1.10  --  0.98    < > = values in this interval not displayed.     Recent Labs   Lab Test " 02/12/20  0621 02/11/20  1904 01/12/20  1631    132* 124*   POTASSIUM 3.4 3.5 4.6   CHLORIDE 108 99 94   CO2 17* 23 20   BUN 17 23 33*   ANIONGAP 12 10 10   LIGIA 7.3* 8.2* 8.5     Recent Labs   Lab Test 02/11/20 2018 02/11/20  1904 01/12/20  1631 12/11/19  1654 10/31/19  1617   ALBUMIN  --  2.5* 3.1*  --  3.9   BILITOTAL  --  0.4 0.9  --  0.4   ALT  --  11 14  --  22   AST  --  16 20  --  23   ALKPHOS  --  92 114  --  189*   PROTEIN Negative  --   --  30*  --    LIPASE  --  173 333  --   --         IMAGING  I personally reviewed the patient's new imaging results.    EXAM: CT ABDOMEN PELVIS W CONTRAST  LOCATION: [facility]  DATE/TIME: 2/11/2020 9:26 PM     INDICATION: Abdomen pain and diarrhea  COMPARISON: None.  TECHNIQUE: CT scan of the abdomen and pelvis was performed following injection of IV contrast. Multiplanar reformats were obtained. Dose reduction techniques were used.  CONTRAST: 63mL Isovue-370     FINDINGS:   LOWER CHEST: Subpleural reticulation right middle lobe and lower lobes. No consolidation.     HEPATOBILIARY: Hepatic steatosis. No radiopaque gallstones.     PANCREAS: Normal.     SPLEEN: Calcified splenic granuloma.     ADRENAL GLANDS: Normal.     KIDNEYS/BLADDER: Right kidney is normal with no mass, stones, or hydronephrosis.   Left kidney is normal with no mass, stones, or hydronephrosis.  Bladder is normal.     BOWEL: There is inflammatory change present involving the antrum, first and second portions of the duodenum consistent with duodenitis. There may be a duodenal ulcer along the medial border of the duodenum on image 28 versus a diverticulum of the   duodenum.     LYMPH NODES: Normal.     VASCULATURE: Atherosclerosis, no aneurysm.     PELVIC ORGANS: Normal.     MUSCULOSKELETAL: Compression fracture T12. Demineralization.                                                                      IMPRESSION:   1.  Inflammatory change demonstrated involving the antrum and the  first and second portions of the duodenum consistent with duodenitis. There is an air-filled structure in the medial border of the duodenum which may represent a small ulcer versus   diverticulum.  2.  Hepatic steatosis.     CONSULTATION ASSESSMENT AND PLAN:    87 year old female with past medical history of CAD s/p drug eluting cardiac stent in 2016, ischemic cardiomyopathy, CKD, paroxysmal Afib, hypertension, osteoporosis, admitted 2/11 with symptoms of epigastric pain, melena, and diarrhea. Melena/diarrhea worsened yesterday but had been ongoing over the last 1.5 months. Workup notable for anemia and CT scan concerning for duodenitis vs duodenal ulcer. C diff also positive.     1. Melena. Concern for PUD with associated upper GI bleed likely exacerbated by ASA use. Differential also includes H pylori, small bowel AVM, and malignancy. EHR mentions history of GI bleed but patient does not recall. Remote history of prior EGD and colonoscopy.     2. Epigastric pain likely secondary to above.     --Patient will need EGD to assess cause for bleeding and possible treatment. --Will discuss with Dr. Kingston timing given new C diff infection.   --Continue BID IV PPI.   --Monitor HGB and transfuse prn.   --Hold ASA.   --NPO.    3. C diff positive. Increased diarrhea symptoms yesterday with recent history of antibiotic use. WBC elevated on admission.   --Vancomycin 125 mg QID x 14 days.      Thank you for asking us to participate in the care of this patient.    Mary Egan, PAC  [facility] ([facility])
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
## 2020-02-13 LAB
ANION GAP SERPL CALCULATED.3IONS-SCNC: 9 MMOL/L (ref 3–14)
BUN SERPL-MCNC: 10 MG/DL (ref 7–30)
CALCIUM SERPL-MCNC: 7.3 MG/DL (ref 8.5–10.1)
CHLORIDE SERPL-SCNC: 109 MMOL/L (ref 94–109)
CO2 SERPL-SCNC: 19 MMOL/L (ref 20–32)
COPATH REPORT: NORMAL
CREAT SERPL-MCNC: 0.6 MG/DL (ref 0.52–1.04)
ERYTHROCYTE [DISTWIDTH] IN BLOOD BY AUTOMATED COUNT: 16.6 % (ref 10–15)
GFR SERPL CREATININE-BSD FRML MDRD: 82 ML/MIN/{1.73_M2}
GLUCOSE SERPL-MCNC: 76 MG/DL (ref 70–99)
HCT VFR BLD AUTO: 23.4 % (ref 35–47)
HGB BLD-MCNC: 7.2 G/DL (ref 11.7–15.7)
HGB BLD-MCNC: 8 G/DL (ref 11.7–15.7)
HGB BLD-MCNC: 8.2 G/DL (ref 11.7–15.7)
MCH RBC QN AUTO: 30.6 PG (ref 26.5–33)
MCHC RBC AUTO-ENTMCNC: 30.8 G/DL (ref 31.5–36.5)
MCV RBC AUTO: 100 FL (ref 78–100)
PLATELET # BLD AUTO: 243 10E9/L (ref 150–450)
POTASSIUM SERPL-SCNC: 3.1 MMOL/L (ref 3.4–5.3)
POTASSIUM SERPL-SCNC: 3.8 MMOL/L (ref 3.4–5.3)
RBC # BLD AUTO: 2.35 10E12/L (ref 3.8–5.2)
SODIUM SERPL-SCNC: 137 MMOL/L (ref 133–144)
WBC # BLD AUTO: 6.8 10E9/L (ref 4–11)

## 2020-02-13 PROCEDURE — 25000132 ZZH RX MED GY IP 250 OP 250 PS 637: Mod: GY | Performed by: HOSPITALIST

## 2020-02-13 PROCEDURE — 25800030 ZZH RX IP 258 OP 636: Performed by: HOSPITALIST

## 2020-02-13 PROCEDURE — 85027 COMPLETE CBC AUTOMATED: CPT | Performed by: HOSPITALIST

## 2020-02-13 PROCEDURE — 84132 ASSAY OF SERUM POTASSIUM: CPT | Performed by: HOSPITALIST

## 2020-02-13 PROCEDURE — 99233 SBSQ HOSP IP/OBS HIGH 50: CPT | Performed by: HOSPITALIST

## 2020-02-13 PROCEDURE — 25000132 ZZH RX MED GY IP 250 OP 250 PS 637: Mod: GY | Performed by: INTERNAL MEDICINE

## 2020-02-13 PROCEDURE — 85018 HEMOGLOBIN: CPT | Performed by: HOSPITALIST

## 2020-02-13 PROCEDURE — 25000125 ZZHC RX 250: Performed by: HOSPITALIST

## 2020-02-13 PROCEDURE — 36415 COLL VENOUS BLD VENIPUNCTURE: CPT | Performed by: HOSPITALIST

## 2020-02-13 PROCEDURE — 80048 BASIC METABOLIC PNL TOTAL CA: CPT | Performed by: HOSPITALIST

## 2020-02-13 PROCEDURE — 12000000 ZZH R&B MED SURG/OB

## 2020-02-13 RX ORDER — POTASSIUM CHLORIDE 1500 MG/1
20-40 TABLET, EXTENDED RELEASE ORAL
Status: DISCONTINUED | OUTPATIENT
Start: 2020-02-13 | End: 2020-02-15 | Stop reason: HOSPADM

## 2020-02-13 RX ORDER — POTASSIUM CHLORIDE 29.8 MG/ML
20 INJECTION INTRAVENOUS
Status: DISCONTINUED | OUTPATIENT
Start: 2020-02-13 | End: 2020-02-13

## 2020-02-13 RX ORDER — POTASSIUM CHLORIDE 1.5 G/1.58G
20-40 POWDER, FOR SOLUTION ORAL
Status: DISCONTINUED | OUTPATIENT
Start: 2020-02-13 | End: 2020-02-15 | Stop reason: HOSPADM

## 2020-02-13 RX ORDER — MAGNESIUM SULFATE HEPTAHYDRATE 40 MG/ML
4 INJECTION, SOLUTION INTRAVENOUS EVERY 4 HOURS PRN
Status: DISCONTINUED | OUTPATIENT
Start: 2020-02-13 | End: 2020-02-15 | Stop reason: HOSPADM

## 2020-02-13 RX ORDER — POTASSIUM CHLORIDE 7.45 MG/ML
10 INJECTION INTRAVENOUS
Status: DISCONTINUED | OUTPATIENT
Start: 2020-02-13 | End: 2020-02-15 | Stop reason: HOSPADM

## 2020-02-13 RX ORDER — PANTOPRAZOLE SODIUM 40 MG/1
40 TABLET, DELAYED RELEASE ORAL
Status: DISCONTINUED | OUTPATIENT
Start: 2020-02-13 | End: 2020-02-15 | Stop reason: HOSPADM

## 2020-02-13 RX ORDER — CALCIUM CARBONATE 500 MG/1
1000 TABLET, CHEWABLE ORAL EVERY 4 HOURS PRN
Status: DISCONTINUED | OUTPATIENT
Start: 2020-02-13 | End: 2020-02-15 | Stop reason: HOSPADM

## 2020-02-13 RX ORDER — POTASSIUM CL/LIDO/0.9 % NACL 10MEQ/0.1L
10 INTRAVENOUS SOLUTION, PIGGYBACK (ML) INTRAVENOUS
Status: DISCONTINUED | OUTPATIENT
Start: 2020-02-13 | End: 2020-02-15 | Stop reason: HOSPADM

## 2020-02-13 RX ADMIN — CARVEDILOL 3.12 MG: 3.12 TABLET, FILM COATED ORAL at 08:16

## 2020-02-13 RX ADMIN — DIAZEPAM 2 MG: 2 TABLET ORAL at 22:27

## 2020-02-13 RX ADMIN — POTASSIUM CHLORIDE 40 MEQ: 1500 TABLET, EXTENDED RELEASE ORAL at 09:43

## 2020-02-13 RX ADMIN — VANCOMYCIN HYDROCHLORIDE 125 MG: KIT at 12:43

## 2020-02-13 RX ADMIN — ATORVASTATIN CALCIUM 40 MG: 40 TABLET, FILM COATED ORAL at 21:04

## 2020-02-13 RX ADMIN — POTASSIUM CHLORIDE 20 MEQ: 1500 TABLET, EXTENDED RELEASE ORAL at 12:42

## 2020-02-13 RX ADMIN — CARVEDILOL 3.12 MG: 3.12 TABLET, FILM COATED ORAL at 17:25

## 2020-02-13 RX ADMIN — SODIUM CHLORIDE: 9 INJECTION, SOLUTION INTRAVENOUS at 06:29

## 2020-02-13 RX ADMIN — PANTOPRAZOLE SODIUM 40 MG: 40 TABLET, DELAYED RELEASE ORAL at 16:39

## 2020-02-13 RX ADMIN — VANCOMYCIN HYDROCHLORIDE 125 MG: KIT at 08:27

## 2020-02-13 RX ADMIN — PANTOPRAZOLE SODIUM 40 MG: 40 TABLET, DELAYED RELEASE ORAL at 09:43

## 2020-02-13 RX ADMIN — VANCOMYCIN HYDROCHLORIDE 125 MG: KIT at 21:04

## 2020-02-13 RX ADMIN — HYDROCODONE BITARTRATE AND ACETAMINOPHEN 1 TABLET: 10; 325 TABLET ORAL at 06:45

## 2020-02-13 RX ADMIN — VANCOMYCIN HYDROCHLORIDE 125 MG: KIT at 17:25

## 2020-02-13 RX ADMIN — DOXYLAMINE SUCCINATE 50 MG: 25 TABLET ORAL at 21:04

## 2020-02-13 RX ADMIN — ACETAMINOPHEN 650 MG: 325 TABLET, FILM COATED ORAL at 08:16

## 2020-02-13 ASSESSMENT — ACTIVITIES OF DAILY LIVING (ADL)
ADLS_ACUITY_SCORE: 19
ADLS_ACUITY_SCORE: 19
ADLS_ACUITY_SCORE: 20
ADLS_ACUITY_SCORE: 20
ADLS_ACUITY_SCORE: 18
ADLS_ACUITY_SCORE: 19

## 2020-02-13 NOTE — CONSULTS
CLINICAL NUTRITION SERVICES  -  ASSESSMENT NOTE    ADDENDUM 2/14: updated diet and weight history, nutrition focused physical exam     Recommendations Ordered by Registered Dietitian (RD):     Oral nutrition supplements prn   Malnutrition 2/14:   % Intake:</= 75% for >/= 1 month (severe malnutrition)  % Weight Loss:> 7.5% in 3 months (severe malnutrition)  Subcutaneous Fat Loss: Moderate, as outlined above  Muscle Loss: Moderate, as outlined above  Fluid/Edema:None noted    Malnutrition diagnosis: Severe malnutrition  In the context of: Acute illness or injury with underlying chronic illness or disease     REASON FOR ASSESSMENT  Gosia Alonzo is a 87 year old female seen by Registered Dietitian for positive nutrition risk screen - unintentional weight loss of 10# or more in past 2 months    History of CAD s/p CAMILA to mid and prox LAD in 1/2016, ischemic cardiomyopathy, CKD, paroxysmal Afib, hypertension, osteoporosis, history of GI bleed, leg edema who presents with loose melanotic stools and epigastric abdominal pain    NUTRITION HISTORY    Information obtained from patient  Food allergies/intolerances: NKFA   Patient is on a regular diet at home.  Typical food/fluid intake PTA: overall decline in appetite, volume consumed for ~1.5 months   Relies on convenience based and easy to prepare meals (freezer options)  Supplements at home: premier protein mixed with ice cream, banana - 1 per day  Living situation: alone  Grocery shopping: daughter in law and patient shop  Previous education and adherence: denies previous restrictions  Issues chewing or swallowing: denies  Factors affecting nutrition intake include: diarrhea, decreased appetite, weakness    CURRENT NUTRITION ORDERS    Diet: Regular (advanced 2/13)    Supplement: none  Current Intake/Tolerance:    Per flow sheet review, no intake for meals documented.    Factors affecting nutrition intake include: diarrhea    2/14: reports improving appetite. Meals  "have been cold due to isolation room    NUTRITION FOCUSED PHYSICAL ASSESSMENT FOR DIAGNOSING MALNUTRITION + PHYSICAL FINDINGS  Completed and Observed:    Fat wasting: moderate    Orbital region and surrounding area - hollow look    Upper and lower arm region - some depth pinch but not ample    Thoracic and lumbar region -  not fully observed  Muscle wasting:    Temple - moderate scooping    Clavicle and acromion bone region - prominent and shoulder to arm joint look square    Scapular bone region -  not fully observed    Dorsal hand / Interosseous Muscle - depressed area between thumb and forefinger, bruising    Patellar/quadriceps region - overall thin    Anterior thigh region - at least moderate depression/line on thigh, obviously thin    Gastrocnemius/Posterior Calf region - not well developed bulb of muscle  Dentition: intact  Poor skin turgor and fragile looking skin, bruised/ecchymotic skin  Obtained from Chart/Interdisciplinary Team    Froylan nutrition score: 2; total score: 13     Last BM: 2/12, C diff +    Skin: buttocks blanchable, red    Generalized weakness    Edema: +1-2 BLE edema    EF  35-40%    Lethargy    EGD 2/12 revealed a medium-sized hiatal hernia and two large nonbleeding duodenal ulcers with adherent clot. Biopsies are pending to rule out H.pylori infection.    ANTHROPOMETRICS  Height: 5' 5\"  Weight: 52 kg   Body mass index is 19.34 kg/m .  Weight Status:  Normal BMI (low for age)  Weight History:  8% weight loss in 3 months, as noted below, patient endorses  Wt Readings from Last 10 Encounters:   02/12/20 52.7 kg (116 lb 3.2 oz)   10/31/19 57.3 kg (126 lb 4.8 oz)   01/09/19 56.2 kg (124 lb)   10/05/18 63.2 kg (139 lb 4.8 oz)   01/02/18 65 kg (143 lb 4.8 oz)   09/26/17 65.3 kg (144 lb)   09/15/17 65.3 kg (144 lb)   09/06/17 67.4 kg (148 lb 9.6 oz)   07/18/17 64.6 kg (142 lb 8 oz)   04/11/17 64.3 kg (141 lb 12.8 oz)       ASSESSED NUTRITION NEEDS (PER APPROVED PRACTICE GUIDELINES, Dosing " weight: 52.7 kg):  Estimated Energy Needs: 3262-7189+ kcals (25-30 Kcal/Kg)  Justification: maintenance   Estimated Protein Needs: 63-79 grams protein (1.2-1.5 g pro/Kg)  Justification: preservation of lean body mass  Estimated Fluid Needs: >1 mL/Kcal  Justification: maintenance    LABS  Labs reviewed  Electrolytes  Potassium (mmol/L)   Date Value   02/13/2020 3.1 (L)   02/12/2020 3.4   02/11/2020 3.5     Phosphorus (mg/dL)   Date Value   01/05/2009 4.0    Blood Glucose  Glucose (mg/dL)   Date Value   02/13/2020 76   02/12/2020 80   02/11/2020 135 (H)   01/12/2020 152 (H)   12/11/2019 95     Hemoglobin A1C (%)   Date Value   10/31/2019 6.0 (H)   01/09/2019 6.2 (H)    Inflammatory Markers    WBC (10e9/L)   Date Value   02/13/2020 6.8   02/12/2020 11.2 (H)   02/11/2020 13.8 (H)     Albumin (g/dL)   Date Value   02/11/2020 2.5 (L)      Sodium (mmol/L)   Date Value   02/13/2020 137   02/12/2020 137   02/11/2020 132 (L)    Renal  Urea Nitrogen (mg/dL)   02/13/2020 10   02/12/2020 17   02/11/2020 23     Creatinine (mg/dL)   02/13/2020 0.60   02/12/2020 0.66   02/11/2020 0.65     Additional  Triglycerides (mg/dL)   Date Value   10/31/2019 204 (H)   10/01/2018 119   07/14/2017 210 (H)     Ketones Urine (mg/dL)   Date Value   02/11/2020 10 (A)        MEDICATIONS    Medications reviewed    atorvastatin  40 mg Oral QPM     carvedilol  3.125 mg Oral BID w/meals     doxylamine  50 mg Oral At Bedtime     pantoprazole  40 mg Oral BID AC     sodium chloride (PF)  3 mL Intracatheter Q8H     vancomycin  125 mg Oral 4x Daily         NUTRITION DIAGNOSIS  Inadequate oral intake related to diarrhea, poor appetite and weakness as evidenced by meeting <75% of estimated needs orally for >1 month    INTERVENTIONS  Recommendations / Nutrition Prescription  Continue regular diet as ordered + oral nutrition supplements if any decline in intake/appetite (patient declined scheduled options)    Implementation  Nutrition education: encouraged  small, frequent sips/bites to increase overall intake, snack/ons options   Medical food supplement: Boost plus prn  Collaboration and Referral of care: Discussed patient during interdisciplinary care rounds this morning    Goals  Patient to consume >/= 50-75% of meals TID    MONITORING AND EVALUATION:  Progress towards goals will be monitored and evaluated per protocol and Practice Guidelines      Shae Dennis, DIAZ, RDN, LD, CNSC  Pager - 3rd floor/ICU: [phone]  Pager - All other floors: [phone]  Pager - Weekend/holiday: [phone]  Office: [phone]

## 2020-02-13 NOTE — PROVIDER NOTIFICATION
Dr. Amaya paged: pt is very weak with mobility. family and pt concerned of returning home alone. do you want to order PT consult?

## 2020-02-13 NOTE — PLAN OF CARE
A&Ox4, VSS, prn Tylenol for headache, IV Dilaudid for left shoulder pain, repositioned q2h, IVF NS@75ml/hr, Tele SR/ST with PAC's, tolerating clear liquid diet, GI following, Purewick in place, Enteric precaution, Vanco po for C-diff, reddened heels, foam boots applied, will continue with POC.    Recheck Hgb at 7.5, MD notified.

## 2020-02-13 NOTE — PLAN OF CARE
A&O. VSS. C/o left knee and shoulder pain- Norco given. IVF's infusing. Purewick in place. Receiving PO Vanco for + CDiff. Tele SR. Clear liquid diet. Repositioned q 2 hrs. No BM overnight. GI following.   Brigette Conte RN on 2/13/2020 at 5:03 AM

## 2020-02-13 NOTE — PROGRESS NOTES
"[facility]  [facility]  Gastroenterology Progress Note    Interval History:    Patient had no BM overnight. No abdominal pain, nausea, or vomiting. Tolerating clear liquids; diet advanced to regular. Hemoglobin 8.3 --> 7.5 --> 7.2 over past 24 hours. She has many questions about her C.diff infection.    Physical Exam:      /80 (BP Location: Left arm)   Pulse 93   Temp 98.3  F (36.8  C) (Oral)   Resp 16   Ht 1.651 m (5' 5\")   Wt 52.7 kg (116 lb 3.2 oz)   SpO2 98%   BMI 19.34 kg/m    Temp (24hrs), Av.1  F (36.7  C), Min:97.5  F (36.4  C), Max:98.6  F (37  C)    Patient Vitals for the past 72 hrs:   Weight   20 0411 52.7 kg (116 lb 3.2 oz)       Intake/Output Summary (Last 24 hours) at 2020 0958  Last data filed at 2020 0629  Gross per 24 hour   Intake 1921 ml   Output 500 ml   Net 1421 ml         Constitutional: Comfortable, no distress.  Cardiovascular: RRR.  Respiratory: Effort normal.  Abdomen: Soft, nondistended, nontender.  Skin: No jaundice.    Additional Comments:  ROS, FH, SH: See initial GI consult for details.      Laboratory Data:  Recent Labs   Lab Test 20  0617 20  2207 20  1348 20  0621 20  2217  16  1341  16  1940  08/10/15  1259   WBC 6.8  --   --  11.2* 13.8*   < > 10.2  --  9.3   < > 7.1   HGB 7.2* 7.5* 8.3* 8.3* 7.2*   < > 10.5*   < > 8.3*   < > 11.2*     --   --  96 104*   < > 87  --  92   < > 98     --   --  307 326   < > 294  --  266   < > 303   INR  --   --   --   --   --   --  0.95  --  1.10  --  0.98    < > = values in this interval not displayed.     Recent Labs   Lab Test 20  0617 20  0621 20  1904    137 132*   POTASSIUM 3.1* 3.4 3.5   CHLORIDE 109 108 99   CO2 19* 17* 23   BUN 10 17 23   CR 0.60 0.66 0.65   ANIONGAP 9 12 10   LIGIA 7.3* 7.3* 8.2*     Recent Labs   Lab Test 20  2018 20  1904 20  1631 19  1654 10/31/19  1617 "   ALBUMIN  --  2.5* 3.1*  --  3.9   BILITOTAL  --  0.4 0.9  --  0.4   ALT  --  11 14  --  22   AST  --  16 20  --  23   ALKPHOS  --  92 114  --  189*   PROTEIN Negative  --   --  30*  --    LIPASE  --  173 333  --   --          Assessment & Plan:  Gosia Alonzo is an 87-year-old female with PMH including CAD status post CAIMLA, ischemic cardiomyopathy, atrial fibrillation, hypertension, CKD, and osteoporosis who presented with epigastric pain, melena, and diarrhea. Lab workup notable for anemia and CT scan was concerning for duodenitis vs duodenal ulcer. Stool tested positive for C.diff.    EGD 2/12 revealed a medium-sized hiatal hernia and two large nonbleeding duodenal ulcers with adherent clot. Biopsies are pending to rule out H.pylori infection.    1) Upper GI bleeding 2/2 duodenal ulcers        - Monitor hemoglobin and stool output.        - Follow up biopsy results.        - Diet as tolerated.        - Continue BID PPI for 2 months, then would remain on once daily dosing indefinitely while on aspirin.        - Hold aspirin for 72 hours and can restart if no further signs of bleeding.    2) C.diff infection        - In the setting of recent antibiotic use 1 month ago.        - Continue vancomycin 125 mg QID x 14 days.     Discussed with Dr. Kingston. GI will sign off at this time. Please call if further assistance is needed.    Rae Elizondo PA-C  [facility] ([facility])

## 2020-02-13 NOTE — PROGRESS NOTES
[facility]    Hospitalist Progress Note  Name: Gosia Alonzo    MRN: [id number]  Provider:  Gary Amaya DO, MPH  Date of Service: 02/13/2020    Summary of Stay: Gosia Alonzo is a 87 year old female  patient with history of CAD s/p CAMILA to mid and prox LAD in 1/2016, ischemic cardiomyopathy, CKD, paroxysmal Afib, hypertension, osteoporosis, history of GI bleed, leg edema who presents with loose melanotic stools and epigastric abdominal pain.  CT scan of the abdomen in the emergency room shows duodenitis and duodenal ulcer.  Hemoglobin was in the low 7 range and received 1 unit of packed red blood cells on 2/11.  Started on Protonix and IV fluids.  Seen in consultation by gastroenterology and EGD yesterday shows duodenal ulcers.  Also found to have C. difficile and started on oral vancomycin.     Problem list:  1. Upper GI bleed back in due to duodenal bleeding ulcers.  Hemoglobin relatively stable although did receive 1 unit of packed red blood cells on 2/11.  Continue serial hemoglobin checks.  Currently on PO Protonix.  GI consulted and EGD yesterday shows 2 large nonbleeding duodenal ulcers with adherent clot.  No bleeding overnight, start regular diet as tolerated clears.  Hold prior to admit aspirin for 72 hours.     2. CAD s/p CAMILA to mid and prox LAD in 1/2016: EF 35-40% in echocardiogram 2018.  Chronically on aspirin.  Hold for now with ongoing GI bleeding.  No acute issues appears euvolemic.     3. Ischemic cardiomyopathy: Will hold her lasix, losartan, aspirin in the setting of bleeding and borderline blood pressures.  Ejection fraction about 35 to 40% on echocardiogram 2 years ago.  Monitor closely for volume overload.  Resumed Coreg.     4. Atrial fibrillation not on anticoagulation: Not controlled currently, but feel exacerbated in the setting of anemia and bleed.  Resumed prior to admission low-dose Coreg.     5. HTN: Blood pressure stable.  Resume Coreg but hold losartan.     6.  Weakness: Will probably need PT evaluation prior to discharge.    7.  C. difficile: 14 days of oral vancomycin.    DVT Prophylaxis: Pneumatic Compression Devices  Code Status: DNR/DNI  Diet: Regular Diet Adult    Manzano Catheter: not present  Disposition: Expected discharge in 1-2 days to home. Goals prior to discharge include work up as above.   Incidental Findings: None.  Family updated today: No.     Interval History   The patient reports doing well. No chest pain or shortness of breath. No nausea, vomiting, diarrhea, constipation. No fevers. No other specific complaints identified.     -Data reviewed today: I personally reviewed all new labs and imaging results over the last 24 hours.     Physical Exam   Temp: 98.3  F (36.8  C) Temp src: Oral BP: 128/80 Pulse: 93 Heart Rate: 77 Resp: 16 SpO2: 98 % O2 Device: None (Room air) Oxygen Delivery: 2 LPM  Vitals:    02/12/20 0411   Weight: 52.7 kg (116 lb 3.2 oz)     Vital Signs with Ranges  Temp:  [97.5  F (36.4  C)-98.6  F (37  C)] 98.3  F (36.8  C)  Pulse:  [] 93  Heart Rate:  [] 77  Resp:  [9-28] 16  BP: (100-159)/() 128/80  SpO2:  [91 %-100 %] 98 %  I/O last 3 completed shifts:  In: 1921 [P.O.:720; I.V.:1201]  Out: 500 [Urine:500]    GENERAL: No apparent distress. Awake, alert, and fully oriented.  HEENT: Normocephalic, atraumatic. Extraocular movements intact.  CARDIOVASCULAR: Tachy slightly and IRR IRR without murmurs or rubs. No S3.  PULMONARY: Clear bilaterally.  GASTROINTESTINAL: Soft, non-tender, non-distended. Bowel sounds normoactive.   EXTREMITIES: No cyanosis or clubbing. No edema.  NEUROLOGICAL: CN 2-12 grossly intact, no focal neurological deficits.  DERMATOLOGICAL: No rash, ulcer, bruising, nor jaundice.     Medications     - MEDICATION INSTRUCTIONS -         atorvastatin  40 mg Oral QPM     carvedilol  3.125 mg Oral BID w/meals     doxylamine  50 mg Oral At Bedtime     pantoprazole  40 mg Oral BID AC     sodium chloride (PF)  3 mL  Intracatheter Q8H     vancomycin  125 mg Oral 4x Daily     Data     Laboratory:  Recent Labs   Lab 02/13/20  0617 02/12/20  2207 02/12/20  1348 02/12/20  0621 02/11/20  2217   WBC 6.8  --   --  11.2* 13.8*   HGB 7.2* 7.5* 8.3* 8.3* 7.2*   HCT 23.4*  --   --  25.3* 23.1*     --   --  96 104*     --   --  307 326     Recent Labs   Lab 02/13/20  0617 02/12/20  0621 02/11/20  1904    137 132*   POTASSIUM 3.1* 3.4 3.5   CHLORIDE 109 108 99   CO2 19* 17* 23   ANIONGAP 9 12 10   GLC 76 80 135*   BUN 10 17 23   CR 0.60 0.66 0.65   GFRESTIMATED 82 79 80   GFRESTBLACK >90 >90 >90   LIGIA 7.3* 7.3* 8.2*     No results for input(s): CULT in the last 168 hours.    Imaging:  No results found for this or any previous visit (from the past 24 hour(s)).      Gary Amaya DO MPH  [facility] Hospitalist  [address] [place] [address]  Pager: [phone]  02/13/2020

## 2020-02-14 ENCOUNTER — APPOINTMENT (OUTPATIENT)
Dept: PHYSICAL THERAPY | Facility: CLINIC | Age: 85
DRG: 377 | End: 2020-02-14
Attending: HOSPITALIST
Payer: MEDICARE

## 2020-02-14 LAB
ANION GAP SERPL CALCULATED.3IONS-SCNC: 8 MMOL/L (ref 3–14)
BUN SERPL-MCNC: 9 MG/DL (ref 7–30)
CALCIUM SERPL-MCNC: 7.7 MG/DL (ref 8.5–10.1)
CHLORIDE SERPL-SCNC: 107 MMOL/L (ref 94–109)
CO2 SERPL-SCNC: 20 MMOL/L (ref 20–32)
CREAT SERPL-MCNC: 0.71 MG/DL (ref 0.52–1.04)
ERYTHROCYTE [DISTWIDTH] IN BLOOD BY AUTOMATED COUNT: 16.3 % (ref 10–15)
GFR SERPL CREATININE-BSD FRML MDRD: 76 ML/MIN/{1.73_M2}
GLUCOSE SERPL-MCNC: 97 MG/DL (ref 70–99)
HCT VFR BLD AUTO: 25.3 % (ref 35–47)
HGB BLD-MCNC: 7.9 G/DL (ref 11.7–15.7)
MAGNESIUM SERPL-MCNC: 1.4 MG/DL (ref 1.6–2.3)
MAGNESIUM SERPL-MCNC: 2.7 MG/DL (ref 1.6–2.3)
MCH RBC QN AUTO: 31.7 PG (ref 26.5–33)
MCHC RBC AUTO-ENTMCNC: 31.2 G/DL (ref 31.5–36.5)
MCV RBC AUTO: 102 FL (ref 78–100)
PLATELET # BLD AUTO: 252 10E9/L (ref 150–450)
POTASSIUM SERPL-SCNC: 4.2 MMOL/L (ref 3.4–5.3)
RBC # BLD AUTO: 2.49 10E12/L (ref 3.8–5.2)
SODIUM SERPL-SCNC: 135 MMOL/L (ref 133–144)
WBC # BLD AUTO: 6.7 10E9/L (ref 4–11)

## 2020-02-14 PROCEDURE — 97116 GAIT TRAINING THERAPY: CPT | Mod: GP | Performed by: PHYSICAL THERAPIST

## 2020-02-14 PROCEDURE — 85027 COMPLETE CBC AUTOMATED: CPT | Performed by: HOSPITALIST

## 2020-02-14 PROCEDURE — 83735 ASSAY OF MAGNESIUM: CPT | Performed by: INTERNAL MEDICINE

## 2020-02-14 PROCEDURE — 83735 ASSAY OF MAGNESIUM: CPT | Performed by: HOSPITALIST

## 2020-02-14 PROCEDURE — 99233 SBSQ HOSP IP/OBS HIGH 50: CPT | Performed by: INTERNAL MEDICINE

## 2020-02-14 PROCEDURE — 25000132 ZZH RX MED GY IP 250 OP 250 PS 637: Mod: GY | Performed by: HOSPITALIST

## 2020-02-14 PROCEDURE — 80048 BASIC METABOLIC PNL TOTAL CA: CPT | Performed by: HOSPITALIST

## 2020-02-14 PROCEDURE — 25000128 H RX IP 250 OP 636: Performed by: INTERNAL MEDICINE

## 2020-02-14 PROCEDURE — 25000125 ZZHC RX 250: Performed by: HOSPITALIST

## 2020-02-14 PROCEDURE — 85018 HEMOGLOBIN: CPT | Performed by: HOSPITALIST

## 2020-02-14 PROCEDURE — 36415 COLL VENOUS BLD VENIPUNCTURE: CPT | Performed by: HOSPITALIST

## 2020-02-14 PROCEDURE — 36415 COLL VENOUS BLD VENIPUNCTURE: CPT | Performed by: INTERNAL MEDICINE

## 2020-02-14 PROCEDURE — 97530 THERAPEUTIC ACTIVITIES: CPT | Mod: GP | Performed by: PHYSICAL THERAPIST

## 2020-02-14 PROCEDURE — 12000000 ZZH R&B MED SURG/OB

## 2020-02-14 PROCEDURE — 25000132 ZZH RX MED GY IP 250 OP 250 PS 637: Mod: GY | Performed by: INTERNAL MEDICINE

## 2020-02-14 PROCEDURE — 97161 PT EVAL LOW COMPLEX 20 MIN: CPT | Mod: GP | Performed by: PHYSICAL THERAPIST

## 2020-02-14 RX ORDER — MAGNESIUM SULFATE HEPTAHYDRATE 40 MG/ML
4 INJECTION, SOLUTION INTRAVENOUS EVERY 4 HOURS PRN
Status: DISCONTINUED | OUTPATIENT
Start: 2020-02-14 | End: 2020-02-15 | Stop reason: HOSPADM

## 2020-02-14 RX ORDER — FUROSEMIDE 20 MG/1
10 TABLET ORAL DAILY
Status: DISCONTINUED | OUTPATIENT
Start: 2020-02-14 | End: 2020-02-15 | Stop reason: HOSPADM

## 2020-02-14 RX ADMIN — VANCOMYCIN HYDROCHLORIDE 125 MG: KIT at 12:26

## 2020-02-14 RX ADMIN — Medication 12.5 MG: at 16:29

## 2020-02-14 RX ADMIN — CARVEDILOL 3.12 MG: 3.12 TABLET, FILM COATED ORAL at 18:37

## 2020-02-14 RX ADMIN — DOXYLAMINE SUCCINATE 50 MG: 25 TABLET ORAL at 20:33

## 2020-02-14 RX ADMIN — VANCOMYCIN HYDROCHLORIDE 125 MG: KIT at 09:07

## 2020-02-14 RX ADMIN — VANCOMYCIN HYDROCHLORIDE 125 MG: KIT at 20:29

## 2020-02-14 RX ADMIN — MAGNESIUM SULFATE IN WATER 4 G: 40 INJECTION, SOLUTION INTRAVENOUS at 09:40

## 2020-02-14 RX ADMIN — PANTOPRAZOLE SODIUM 40 MG: 40 TABLET, DELAYED RELEASE ORAL at 06:39

## 2020-02-14 RX ADMIN — CARVEDILOL 3.12 MG: 3.12 TABLET, FILM COATED ORAL at 09:07

## 2020-02-14 RX ADMIN — Medication 10 MG: at 16:29

## 2020-02-14 RX ADMIN — VANCOMYCIN HYDROCHLORIDE 125 MG: KIT at 18:37

## 2020-02-14 RX ADMIN — ATORVASTATIN CALCIUM 40 MG: 40 TABLET, FILM COATED ORAL at 20:29

## 2020-02-14 RX ADMIN — PANTOPRAZOLE SODIUM 40 MG: 40 TABLET, DELAYED RELEASE ORAL at 16:29

## 2020-02-14 RX ADMIN — HYDROCODONE BITARTRATE AND ACETAMINOPHEN 1 TABLET: 10; 325 TABLET ORAL at 23:50

## 2020-02-14 RX ADMIN — HYDROCODONE BITARTRATE AND ACETAMINOPHEN 1 TABLET: 10; 325 TABLET ORAL at 00:20

## 2020-02-14 ASSESSMENT — ACTIVITIES OF DAILY LIVING (ADL)
ADLS_ACUITY_SCORE: 19
ADLS_ACUITY_SCORE: 18
ADLS_ACUITY_SCORE: 19
ADLS_ACUITY_SCORE: 18

## 2020-02-14 NOTE — PROGRESS NOTES
Care Transition Initial Assessment - SW     Met with: Patient    Active Problems:    Acute GI bleeding     Weakness with PT recommendation for TCU  DATA  Lives With: alone   Living Arrangements: Condominium       Identified issues/concerns regarding health management:  PT/OT recommending TCU but patient resistant.  Patient states she wants to go home with PT/OT.  Patient states she would prefer [facility].      Transportation Anticipated: family will provide    ASSESSMENT  Cognitive Status:  WNL  Concerns to be addressed:  Patient weak but refusing to go to TCU instead stating her family will see her everyday and provide her food and everything she needs.  Patient states all her friends live in her condominium and they will also be with her regularly.     PLAN  Financial costs for the patient: none noted  Patient given options and choices for discharge Patient offered TCU list for choices but refused.   Patient stated SW to talk to her daughter-in-law Seema to see what she suggests.    Patient/family is agreeable to the plan?  SW spoke with patient's daughter-in-law who stated she would only support patient going to TCU but she will have to discuss the matter with other family members.  Seema does request a referral for TCU be made to ER however at this time.  Seema said patient returned home after 3 heart attacks despite other recommendation as she can be very persistent in what she would like to do.   Transportation/person available to transport on day of discharge.  Family members will transport.  Patient no longer drives.   Patient Goals and Preferences: Currently patient's goal is to return home with home PT/OT. Discussed homebound status with patient.     Patient anticipates discharging to:  Home. With PT/OT/RN.  Per request of daughter-in-law referral made to ER shared room.        ADDENDUM:  Pt now agreeable to TCU private room per daughter-in-law.  Referrals sent.     Daughter-in-law was referred to the Medicare  website for TCU,?with associated star ratings to assist with choice for referrals/discharge planning Yes   Education was given to pt/family that star ratings are updated/maintained by Medicare and can be reviewed by visiting www.medicare.gov?Yes

## 2020-02-14 NOTE — PLAN OF CARE
VSS, no BM this shift, continues on Vanco po dx C-diff, repositioned q2h, Purewick in place, Protonix po now, tolerating regular diet, PT consult, Hgb recheck at 8.2, will continue with POC.    Prn Valium for anxiety administered at 2230.

## 2020-02-14 NOTE — PLAN OF CARE
Discharge Planner PT   Patient plan for discharge: return home  Current status: PT: Order received; Initial evaluation completed and treatment initiated; patient hospitalized with epigastric pain and found to have duodenitis and duodenal ulcer; also with diarrhea and found to have C-diff; reports being sick on and off since December and now has significant weakness; At baseline patient reports having a bad L shoulder and knee but was independent with use of a rolling walker; [facility]ap [facility] without [facility]; On eval patient with significant weakness and decreased activity tolerance; able to perform bed mobility with HOB elevated and SBA/extra time; no dizziness; min A x 2 for sit>stand; use of 4WW; min A for gait with 4WW x 20 feet in room; slow gait speed; poor safety awareness when backing up to chair; 2nd person to manage equipment and lines primarily; up in bedside chair at end of session;educated about need for TCU; patient with some decreased insight into current deficits; nurse updated on progress  Barriers to return to prior living situation: lives alone; weakness; decreased activity tolerance; falls risk; unable to care for self in current condition  Recommendations for discharge: TCU  Rationale for recommendations: Patient would benefit from ongoing PT to address weakness, decreased activity tolerance, and impaired functional mobility to allow for eventual return to home       Entered by: Marlen Lopez 02/14/2020 11:41 AM

## 2020-02-14 NOTE — PLAN OF CARE
A&O. VSS. C/o left shoulder pain- PRN Norco given. Purewick in place. Refused repositioning. Buttocks  red- blanchable, barrier cream applied. No BM overnight. On PO Vanco for positive CDiff. PT consulted. Possible discharge 1-2 days.  Brigette Conte RN on 2/14/2020 at 6:00 AM

## 2020-02-14 NOTE — PLAN OF CARE
Vss, no co pain/cp/sob.   K+ 4.2, hgb 7.9, mag 1.4, replaced with rechecks ordered for am and later today. Alarms on for safety, PT consulted to see today, per report up with Ax2+gb+walker, pt states that she feels very weak, up to chair for meals.  Purewick in place (needs to be changed by 1600), po vanco continues, no BM this shift, PCDs off, xena legs with BLE edema 1-2+ with R>L, see assessment for full skin details.  Enteric isolation continues for +Cdiff.  Continue poc and monitoring. DNR/DNI.

## 2020-02-14 NOTE — PROGRESS NOTES
"   02/14/20 1108   Quick Adds   Type of Visit Initial PT Evaluation   Living Environment   Lives With alone   [facility]   Home Accessibility no concerns   Transportation Anticipated car, drives self   Living Environment Comment 1 level townhome ; no stairs; handicap accessible   Self-Care   Usual Activity Tolerance good   Current Activity Tolerance poor  (\"very weak\")   Equipment Currently Used at Home walker, rolling  (4WW; has bad knees)   Functional Level Prior   Ambulation 1-->assistive equipment   Transferring 1-->assistive equipment   Toileting 0-->independent   Cognition 0 - no cognition issues reported   Fall history within last six months no   Which of the above functional risks had a recent onset or change? ambulation;transferring;toileting;bathing;dressing   Prior Functional Level Comment patient reports independence with all mobility and cares; uses a 4WW   General Information   Onset of Illness/Injury or Date of Surgery - Date 02/11/20   Referring Physician Gary Amaya, DO   Patient/Family Goals Statement return home   Pertinent History of Current Problem (include personal factors and/or comorbidities that impact the POC) per chart:  Gosia Alonzo is a 87 year old female  patient with history of CAD s/p CAMILA to mid and prox LAD in 1/2016, ischemic cardiomyopathy, CKD, paroxysmal Afib, hypertension, osteoporosis, history of GI bleed, leg edema who presents with loose melanotic stools and epigastric abdominal pain.  CT scan of the abdomen in the emergency room shows duodenitis and duodenal ulcer.  Hemoglobin was in the low 7 range and received 1 unit of packed red blood cells on 2/11.  Started on Protonix and IV fluids.  Seen in consultation by gastroenterology and EGD yesterday shows duodenal ulcers.  Also found to have C. difficile and started on oral vancomycin; see medical record for further information   Precautions/Limitations fall precautions   General " Observations patient in bed; agreeable to PT   General Info Comments needs knee replacement on L; prior replacement on R   Cognitive Status Examination   Orientation orientation to person, place and time   Level of Consciousness alert;other (see comments)  (some decreased insight)   Follows Commands and Answers Questions 100% of the time;75% of the time   Personal Safety and Judgment impaired;at risk behaviors demonstrated   Memory impaired   Cognitive Comment oriented but has some decreased insight into current condition   Pain Assessment   Patient Currently in Pain Yes, see Vital Sign flowsheet  (L shoulder pain; L knee pain)   Integumentary/Edema   Integumentary/Edema Comments dusky skin color in legs   Posture    Posture Comments forward flexed at head and trunk   Range of Motion (ROM)   ROM Comment L shoulder limited at baseline; L knee limited by pain; R knee limited by multiple replacements   Strength   Strength Comments significant functional weakness; decline of strength since December reported   Bed Mobility   Bed Mobility Comments SBA with HOB elevated; extra time   Transfer Skills   Transfer Comments sit>stand with min A of 1-2; use of 4WW;   Gait   Gait Comments gait in room slowly with min A of 1 and walker; second person for safety and to manage equipment   Balance   Balance Comments impaired; current need for walker and assist   General Therapy Interventions   Planned Therapy Interventions bed mobility training;gait training;strengthening;transfer training;progressive activity/exercise   Clinical Impression   Criteria for Skilled Therapeutic Intervention yes, treatment indicated   PT Diagnosis impaired gait/transfers; weakness   Influenced by the following impairments weakness; impaired balance; decreased activity tolerance; decreased insight into current deficits   Functional limitations due to impairments impaired independence with functional mobility   Clinical Presentation Evolving/Changing  "  Clinical Presentation Rationale clinical judgement   Clinical Decision Making (Complexity) Low complexity   Therapy Frequency Daily   Predicted Duration of Therapy Intervention (days/wks) 5 days   Anticipated Equipment Needs at Discharge front wheeled walker   Anticipated Discharge Disposition Transitional Care Facility   Risk & Benefits of therapy have been explained Yes   Patient, Family & other staff in agreement with plan of care Yes   [facility] TM \"6 Clicks\"   2016, Trustees of [facility], under license to [employer].  All rights reserved.   6 Clicks Short Forms Basic Mobility Inpatient Short Form   [facility]  \"6 Clicks\" V.2 Basic Mobility Inpatient Short Form   1. Turning from your back to your side while in a flat bed without using bedrails? 3 - A Little   2. Moving from lying on your back to sitting on the side of a flat bed without using bedrails? 3 - A Little   3. Moving to and from a bed to a chair (including a wheelchair)? 3 - A Little   4. Standing up from a chair using your arms (e.g., wheelchair, or bedside chair)? 2 - A Lot   5. To walk in hospital room? 3 - A Little   6. Climbing 3-5 steps with a railing? 2 - A Lot   Basic Mobility Raw Score (Score out of 24.Lower scores equate to lower levels of function) 16   Total Evaluation Time   Total Evaluation Time (Minutes) 10     "

## 2020-02-14 NOTE — PROGRESS NOTES
[facility]    Medicine Progress Note - Hospitalist Service       Date of Admission:  2/11/2020  Assessment & Plan   Summary of Stay: Gosia Alonzo is a 87 year old female  patient with history of CAD s/p CAMILA to mid and prox LAD in 1/2016, ischemic cardiomyopathy, CKD, paroxysmal Afib, hypertension, osteoporosis, history of GI bleed, leg edema who presents with loose melanotic stools and epigastric abdominal pain.  CT scan of the abdomen in the emergency room shows duodenitis and duodenal ulcer.  Hemoglobin was in the low 7 range and received 1 unit of packed red blood cells on 2/11.  Started on Protonix and IV fluids.  Seen in consultation by gastroenterology and EGD shows duodenal ulcers.  Also found to have C. difficile and started on oral vancomycin.     Problem list:  1. Upper GI bleed due to duodenal bleeding ulcers.  Hemoglobin relatively stable although did receive 1 unit of packed red blood cells on 2/11.  As hemoglobin has been very stable, decrease hemoglobin monitoring to once daily.  Currently on PO Protonix.    Needs to continue on twice daily pantoprazole for the next 2 months.  Likely then able to go to once daily PPI indefinitely.  GI consulted and EGD shows 2 large nonbleeding duodenal ulcers with adherent clot.  Hold prior to admit aspirin for 72 hours.     2. CAD s/p CAMILA to mid and prox LAD in 1/2016: EF 35-40% in echocardiogram 2018.  Chronically on aspirin.  Hold for now with ongoing GI bleeding.  No acute issues appears euvolemic.  Continue carvedilol and atorvastatin.  Restart losartan.     3. Ischemic cardiomyopathy: Ejection fraction about 35 to 40% on echocardiogram 2 years ago.  Furosemide and losartan held at time of admission.  Now restart losartan at 12.5 mg a day and furosemide 10 mg a day.     4. Atrial fibrillation not on anticoagulation: Resumed prior to admission low-dose Coreg.     5. HTN: Blood pressure initially on the low side.  Is now increased.  Has been  on carvedilol.  Furosemide and losartan initially held.  Will now restart furosemide at 10 mg a day and losartan at 12.5 mg a day.     6. Weakness: Physical therapy following.  Current recommendation is for transitional care unit at discharge.     7.  C. difficile: 14 days of oral vancomycin.    8.  Hypomagnesemia.  Start magnesium replacement protocol.    9.  Hypokalemia.  Potassium improved today.  Continue replacement protocol.    Please note that the above information has been mostly copied and pasted from previous documentation.  I have thoroughly reviewed this information.  My changes from today, 2/14, are italicized.    Diet: Regular Diet Adult  Snacks/Supplements Adult: Boost Plus; With Meals    DVT Prophylaxis: Pneumatic Compression Devices  Manzano Catheter: not present  Code Status: DNR/DNI      Disposition Plan   Expected discharge: 1-2 days, recommended to transitional care unit     Kd Malagon DO  Hospitalist Service  [facility]    ______________________________________________________________________    Interval History   No bowel movement today.  Does feel weak.  Denies chest pain, shortness of breath, fevers, chills, nausea, or vomiting.    Data reviewed today: I reviewed all medications, new labs and imaging results over the last 24 hours.     Physical Exam   Vital Signs: Temp: 98.6  F (37  C) Temp src: Oral BP: (!) 143/72 Pulse: 89 Heart Rate: 75 Resp: 16 SpO2: 99 % O2 Device: None (Room air)    Weight: 116 lbs 3.2 oz  Gen:  NAD, A&Ox3.  Eyes:  PERRL, sclera anicteric.  OP:  MMM, no lesions.  Neck:  Supple.  CV:  Regular, no murmurs.  Lung:  CTA b/l, normal effort.  Ab:  +BS, soft.  Skin:  Warm, dry to touch.  No rash.  Ext:  Mild non pitting edema LE b/l.      Data   Recent Labs   Lab 02/14/20  0633 02/13/20  2158 02/13/20  1635 02/13/20  1333 02/13/20  0617  02/12/20  0621  02/11/20  1904   WBC 6.7  --   --   --  6.8  --  11.2*   < > 15.8*   HGB 7.9* 8.2*  --  8.0* 7.2*   <  > 8.3*   < > 8.0*   *  --   --   --  100  --  96   < > 102*     --   --   --  243  --  307   < > 387     --   --   --  137  --  137  --  132*   POTASSIUM 4.2  --  3.8  --  3.1*  --  3.4  --  3.5   CHLORIDE 107  --   --   --  109  --  108  --  99   CO2 20  --   --   --  19*  --  17*  --  23   BUN 9  --   --   --  10  --  17  --  23   CR 0.71  --   --   --  0.60  --  0.66  --  0.65   ANIONGAP 8  --   --   --  9  --  12  --  10   LIGIA 7.7*  --   --   --  7.3*  --  7.3*  --  8.2*   GLC 97  --   --   --  76  --  80  --  135*   ALBUMIN  --   --   --   --   --   --   --   --  2.5*   PROTTOTAL  --   --   --   --   --   --   --   --  6.3*   BILITOTAL  --   --   --   --   --   --   --   --  0.4   ALKPHOS  --   --   --   --   --   --   --   --  92   ALT  --   --   --   --   --   --   --   --  11   AST  --   --   --   --   --   --   --   --  16   LIPASE  --   --   --   --   --   --   --   --  173   TROPI  --   --   --   --   --   --   --   --  <0.015    < > = values in this interval not displayed.

## 2020-02-14 NOTE — PROGRESS NOTES
Your home care referral was sent to [facility]  If you haven't heard from them within the next 24-48 hours,  Please call them at [phone]

## 2020-02-15 VITALS
HEART RATE: 101 BPM | HEIGHT: 65 IN | TEMPERATURE: 98.1 F | DIASTOLIC BLOOD PRESSURE: 73 MMHG | RESPIRATION RATE: 16 BRPM | SYSTOLIC BLOOD PRESSURE: 130 MMHG | WEIGHT: 116.2 LBS | OXYGEN SATURATION: 99 % | BODY MASS INDEX: 19.36 KG/M2

## 2020-02-15 LAB
ANION GAP SERPL CALCULATED.3IONS-SCNC: 3 MMOL/L (ref 3–14)
BUN SERPL-MCNC: 12 MG/DL (ref 7–30)
CALCIUM SERPL-MCNC: 7.9 MG/DL (ref 8.5–10.1)
CHLORIDE SERPL-SCNC: 105 MMOL/L (ref 94–109)
CO2 SERPL-SCNC: 26 MMOL/L (ref 20–32)
CREAT SERPL-MCNC: 0.69 MG/DL (ref 0.52–1.04)
ERYTHROCYTE [DISTWIDTH] IN BLOOD BY AUTOMATED COUNT: 15.8 % (ref 10–15)
GFR SERPL CREATININE-BSD FRML MDRD: 78 ML/MIN/{1.73_M2}
GLUCOSE SERPL-MCNC: 116 MG/DL (ref 70–99)
HCT VFR BLD AUTO: 26.8 % (ref 35–47)
HGB BLD-MCNC: 8.4 G/DL (ref 11.7–15.7)
MAGNESIUM SERPL-MCNC: 2.1 MG/DL (ref 1.6–2.3)
MCH RBC QN AUTO: 31.1 PG (ref 26.5–33)
MCHC RBC AUTO-ENTMCNC: 31.3 G/DL (ref 31.5–36.5)
MCV RBC AUTO: 99 FL (ref 78–100)
PLATELET # BLD AUTO: 284 10E9/L (ref 150–450)
POTASSIUM SERPL-SCNC: 4.3 MMOL/L (ref 3.4–5.3)
RBC # BLD AUTO: 2.7 10E12/L (ref 3.8–5.2)
SODIUM SERPL-SCNC: 134 MMOL/L (ref 133–144)
WBC # BLD AUTO: 6.8 10E9/L (ref 4–11)

## 2020-02-15 PROCEDURE — 25000132 ZZH RX MED GY IP 250 OP 250 PS 637: Mod: GY | Performed by: HOSPITALIST

## 2020-02-15 PROCEDURE — 25000132 ZZH RX MED GY IP 250 OP 250 PS 637: Mod: GY | Performed by: INTERNAL MEDICINE

## 2020-02-15 PROCEDURE — 83735 ASSAY OF MAGNESIUM: CPT | Performed by: INTERNAL MEDICINE

## 2020-02-15 PROCEDURE — 36415 COLL VENOUS BLD VENIPUNCTURE: CPT | Performed by: INTERNAL MEDICINE

## 2020-02-15 PROCEDURE — 85027 COMPLETE CBC AUTOMATED: CPT | Performed by: INTERNAL MEDICINE

## 2020-02-15 PROCEDURE — 99239 HOSP IP/OBS DSCHRG MGMT >30: CPT | Performed by: INTERNAL MEDICINE

## 2020-02-15 PROCEDURE — 80048 BASIC METABOLIC PNL TOTAL CA: CPT | Performed by: INTERNAL MEDICINE

## 2020-02-15 RX ORDER — HYDROCODONE BITARTRATE AND ACETAMINOPHEN 10; 325 MG/1; MG/1
1 TABLET ORAL EVERY 6 HOURS PRN
Qty: 12 TABLET | Refills: 0 | Status: ON HOLD | OUTPATIENT
Start: 2020-02-15 | End: 2022-01-01

## 2020-02-15 RX ORDER — PANTOPRAZOLE SODIUM 40 MG/1
40 TABLET, DELAYED RELEASE ORAL
DISCHARGE
Start: 2020-02-15 | End: 2020-03-19

## 2020-02-15 RX ORDER — VANCOMYCIN HYDROCHLORIDE 50 MG/ML
125 KIT ORAL 4 TIMES DAILY
DISCHARGE
Start: 2020-02-15 | End: 2020-02-21

## 2020-02-15 RX ORDER — ACETAMINOPHEN 325 MG/1
650 TABLET ORAL EVERY 6 HOURS PRN
DISCHARGE
Start: 2020-02-15 | End: 2022-01-01

## 2020-02-15 RX ORDER — DIAZEPAM 2 MG
2 TABLET ORAL EVERY 12 HOURS PRN
Qty: 10 TABLET | Refills: 0 | Status: SHIPPED | OUTPATIENT
Start: 2020-02-15 | End: 2020-03-19

## 2020-02-15 RX ADMIN — VANCOMYCIN HYDROCHLORIDE 125 MG: KIT at 12:35

## 2020-02-15 RX ADMIN — ACETAMINOPHEN 650 MG: 325 TABLET, FILM COATED ORAL at 02:22

## 2020-02-15 RX ADMIN — CARVEDILOL 3.12 MG: 3.12 TABLET, FILM COATED ORAL at 08:04

## 2020-02-15 RX ADMIN — PANTOPRAZOLE SODIUM 40 MG: 40 TABLET, DELAYED RELEASE ORAL at 06:44

## 2020-02-15 RX ADMIN — VANCOMYCIN HYDROCHLORIDE 125 MG: KIT at 08:04

## 2020-02-15 RX ADMIN — Medication 12.5 MG: at 08:04

## 2020-02-15 RX ADMIN — Medication 10 MG: at 08:04

## 2020-02-15 ASSESSMENT — ACTIVITIES OF DAILY LIVING (ADL)
ADLS_ACUITY_SCORE: 18

## 2020-02-15 NOTE — DISCHARGE SUMMARY
[facility]  Hospitalist Discharge Summary       Date of Admission:  2/11/2020  Date of Discharge:  2/15/2020  Discharging Provider: Kd Malagon DO      Discharge Diagnoses   1. Upper GI bleed due to duodenal bleeding ulcers.  Hemoglobin relatively stable although did receive 1 unit of packed red blood cells on 2/11. Continue pantoprazole 40 mg twice a day for now.  Likely able to decrease to once a day after 2 months.  Follow-up with gastroenterology in approximately 6 weeks in the outpatient setting.  Aspirin had initially been held during hospital stay.  Can restart aspirin tomorrow.  Recheck CBC in 5 days.     2. CAD s/p CAMILA to mid and prox LAD in 1/2016: EF 35-40% in echocardiogram 2018.  Chronically on aspirin.  Aspirin initially held during hospital stay due to GI bleed.  Aspirin to restart on 2/16.  Continue carvedilol and atorvastatin.  Restart losartan and furosemide at prior to admission dosing.     3. Ischemic cardiomyopathy: Ejection fraction about 35 to 40% on echocardiogram 2 years ago.  Furosemide and losartan held at time of admission.  Losartan and furosemide had been restarted on half normal dose on 2/14.  Now increased to prior to admission dose at time of discharge.     4. Atrial fibrillation not on anticoagulation: Resumed prior to admission low-dose Coreg.  Aspirin initially held due to GI bleed.  Aspirin to restart on 2/16.      5. HTN: Blood pressure initially on the low side.  Is now back to the normal level.  Resume prior to admission dosing of carvedilol, losartan, furosemide.     6. Weakness: Physical therapy following.  No discharge to transitional care unit with continued therapy.     7.  C. difficile colitis.  14 days of oral vancomycin.     8.  Hypomagnesemia.  Due to diarrhea from C. difficile colitis.  Has improved by time of discharge.  Was on magnesium replacement protocol during hospital stay.     9.  Hypokalemia.  Due to diarrhea from C. difficile colitis.   Was on potassium replacement protocol during hospital stay.  Recheck metabolic panel in 5 days.    10.  Severe malnutrition.  Seen in consultation by registered dietitian during hospital stay.    11.  Acute blood loss anemia.  Due to GI bleed from duodenal ulcer.  Did require unit of packed red blood cells in transfusion on 2/11.       Follow-ups Needed After Discharge   Follow-up Appointments     Follow Up and recommended labs and tests      Follow up with primary care provider in 5 days.  The following labs/tests   are recommended: CBC and BMP in 5 days.             Discharge Disposition   Discharged to rehabilitation facility  Condition at discharge: Stable        Consultations This Hospital Stay   GASTROENTEROLOGY IP CONSULT  PHYSICAL THERAPY ADULT IP CONSULT  SOCIAL WORK IP CONSULT  PHYSICAL THERAPY ADULT IP CONSULT  OCCUPATIONAL THERAPY ADULT IP CONSULT    Code Status   DNR/DNI    Time Spent on this Encounter   I spent 40 minutes with Ms. Alonzo and working on discharge on 2/15/2020.       Kd Malagon, [facility]  ______________________________________________________________________    Physical Exam   Vital Signs: Temp: 98.1  F (36.7  C) Temp src: Oral BP: 130/73 Pulse: 101 Heart Rate: 96 Resp: 16 SpO2: 99 % O2 Device: None (Room air)    Weight: 116 lbs 3.2 oz  Gen:  NAD, A&Ox3.  Eyes:  PERRL, sclera anicteric.  OP:  MMM, no lesions.  Neck:  Supple.  CV:  Fairly Regular, no murmurs.  Lung:  CTA b/l, normal effort.  Ab:  +BS, soft.  Skin:  Warm, dry to touch.  No rash.  Ext:  Mild non pitting edema LE b/l.         Primary Care Physician   Michael Londono MD    Discharge Orders      General info for SNF    Length of Stay Estimate: Short Term Care: Estimated # of Days <30  Condition at Discharge: Improving  Level of care:skilled   Rehabilitation Potential: Good  Admission H&P remains valid and up-to-date: Yes  Recent Chemotherapy: N/A  Use Nursing Home Standing Orders: Yes     Katie  instructions    Give two-step Mantoux (PPD) Per Facility Policy Yes     Reason for your hospital stay    Bleeding from ulcer     Follow Up and recommended labs and tests    Follow up with primary care provider in 5 days.  The following labs/tests are recommended: CBC and BMP in 5 days.     Activity - Up ad bran     Physical Therapy Adult Consult    Evaluate and treat as clinically indicated.    Reason:  weakness     Occupational Therapy Adult Consult    Evaluate and treat as clinically indicated.    Reason:  weakness     Advance Diet as Tolerated    Follow this diet upon discharge: Regular         Discharge Medications   Current Discharge Medication List      START taking these medications    Details   !! acetaminophen (TYLENOL) 325 MG tablet Take 2 tablets (650 mg) by mouth every 6 hours as needed for mild pain    Associated Diagnoses: Acute GI bleeding      pantoprazole (PROTONIX) 40 MG EC tablet Take 1 tablet (40 mg) by mouth 2 times daily (before meals)    Associated Diagnoses: Acute GI bleeding      vancomycin (FIRVANQ) 50 MG/ML oral solution Take 2.5 mLs (125 mg) by mouth 4 times daily for 10 days    Associated Diagnoses: Acute GI bleeding       !! - Potential duplicate medications found. Please discuss with provider.      CONTINUE these medications which have CHANGED    Details   diazepam (VALIUM) 2 MG tablet Take 1 tablet (2 mg) by mouth every 12 hours as needed for anxiety  Qty: 10 tablet, Refills: 0    Associated Diagnoses: Anxiety      HYDROcodone-acetaminophen (NORCO)  MG per tablet Take 1 tablet by mouth every 6 hours as needed for severe pain  Qty: 12 tablet, Refills: 0    Associated Diagnoses: Acute GI bleeding         CONTINUE these medications which have NOT CHANGED    Details   !! acetaminophen (TYLENOL) 500 MG tablet Take 1,000 mg by mouth 2 times daily as needed for mild pain      aspirin EC 81 MG EC tablet Take 1 tablet (81 mg) by mouth daily      atorvastatin (LIPITOR) 40 MG tablet TAKE 1  TABLET BY MOUTH ONE TIME DAILY  Qty: 90 tablet, Refills: 1    Associated Diagnoses: ST elevation myocardial infarction (STEMI) involving other coronary artery of anterior wall (H)      carvedilol (COREG) 3.125 MG tablet Take 1 tablet (3.125 mg) by mouth 2 times daily (with meals)  Qty: 180 tablet, Refills: 0    Comments: Please inform patient that they need an appointment before additional refills can be made. Please have pt. call [phone] as soon as possible to schedule appointment for follow up.  Associated Diagnoses: Ischemic cardiomyopathy      Docusate Sodium (EQUATE STOOL SOFTENER OR) Take 100 mg by mouth daily as needed       doxylamine (UNISOM) 25 MG TABS tablet Take 50 mg by mouth At Bedtime      furosemide (LASIX) 20 MG tablet Take 1 tablet (20 mg) by mouth daily Add additional 20mg for increased edema  Qty: 95 tablet, Refills: 3    Associated Diagnoses: Ischemic cardiomyopathy      losartan (COZAAR) 25 MG tablet Take 1 tablet (25 mg) by mouth daily  Qty: 90 tablet, Refills: 0    Comments: No further refills until follow-up OV  Associated Diagnoses: Coronary artery disease involving native coronary artery of native heart without angina pectoris      nitroGLYcerin (NITROSTAT) 0.4 MG sublingual tablet if you are still having symptoms after 3 doses (15 minutes) call 911.  Qty: 25 tablet, Refills: 1    Associated Diagnoses: ST elevation myocardial infarction (STEMI), unspecified artery (H); ST elevation myocardial infarction (STEMI) involving other coronary artery of anterior wall (H)      Simethicone (GAS-X PO) Take 80 mg by mouth 4 times daily as needed       guaiFENesin (MUCINEX) 600 MG 12 hr tablet Take 600 mg by mouth as needed for congestion Reported on 4/11/2017       !! - Potential duplicate medications found. Please discuss with provider.      STOP taking these medications       ipratropium (ATROVENT) 0.06 % nasal spray Comments:   Reason for Stopping:             Allergies   Allergies   Allergen  Reactions     Codeine      GI UPSET     Escitalopram Oxalate      fatigue     Esomeprazole Magnesium Trihydrate      HA     Imdur [Isosorbide]      Headache       Meperidine Hcl      N/V     Morphine Hcl      HIVES     Oxycodone      (percodan) GI UPSET     Pentazocine      (talwin)  HALLUCINATIONS     Propoxyphene Hcl      STOMACH UPSET     Sumatriptan Succinate      chest pain

## 2020-02-15 NOTE — PROGRESS NOTES
Spoke with [facility] who reported no bed availability till Tuesday.   Spoke with Arabella with [facility] who confirmed bed for today.   Jessica at [facility] confirmed bed for tomorrow.   Met with patient daughter in law who is agreement with bed at [facility].   Updated MD.   Received a call from Jessica at [facility] reporting bed availability for today. Jessica reported due to pt contact precaution they dont charge private room cost.   Discussed pt and her family about new bed. Family would like to go [facility].   Orders send to [facility].

## 2020-02-15 NOTE — PLAN OF CARE
Pt A&O x 4, /73, HR , LS clear / diminished, 99% O2 on RA.  +1-+2 lower ex edema, 6/10 right shoulder pain, given Norco with relief. Regular diet, assist x 1-2 with Gb and walker.  Pure wick in place       .  On enteric precautions for C diff.  SW / PT following.  Plan is discharge 1-2 days and continue PO Vanc, Protonix, Lasix.

## 2020-02-15 NOTE — PLAN OF CARE
Presentation/Diagnosis:Anemia, C diff  History:CKD, Afib, CKD, GI bleed  Labs/Protocols:K 4.2, Hgb 7.9, Mag 2.7  Vitals:VSS no complaints of pain  Respiratory:GRIFFIN, SOB, on RA sating 99%  Neuro:A&O  GI/:Purwick in place with good urine output, no BM on this shift   Skin:xena, bruised, scabs, scars   LDAs:PIV SL  Diet:Regular diet,   Activity:A1/2 with gait belt and walker   Teaching:pt educated on plan of care  Plan:on PO vanco for Cdiff, PO Lasix, PT, SW are following. Will cont with POC

## 2020-02-15 NOTE — PROGRESS NOTES
Pt is in stable condition, vss, no co pain/cp/sob.  Pt dc to TCU today, family to arrive around 1530.  All dc education printed to be reviewed with pt/family in regards to: diet, activity, safety, s/s to report, medications and rx, follow up appointments/care, etc by the oncoming evening shift RN when family arrives.   All belongings packed up to be sent with pt (including personal walker and electronics, foam booties), packet printed to be given to family for facility, will dc via wc by staff to main entrance, private transport to facility by family when they arrive later this afternoon. This RN will update oncoming RN of the above.

## 2020-02-15 NOTE — PROGRESS NOTES
Your information has been submitted on February 15th, 2020 at 02:44:07 PM CST. The confirmation number is [id number]    Sivan Mcintosh  Care Management Coordinator  [facility]  [phone]

## 2020-02-16 VITALS
HEART RATE: 97 BPM | BODY MASS INDEX: 19.83 KG/M2 | RESPIRATION RATE: 18 BRPM | DIASTOLIC BLOOD PRESSURE: 68 MMHG | OXYGEN SATURATION: 96 % | HEIGHT: 65 IN | SYSTOLIC BLOOD PRESSURE: 122 MMHG | TEMPERATURE: 97.9 F | WEIGHT: 119 LBS

## 2020-02-16 RX ORDER — ACETAMINOPHEN 500 MG
1000 TABLET ORAL 2 TIMES DAILY
COMMUNITY
End: 2020-02-25 | Stop reason: CLARIF

## 2020-02-16 ASSESSMENT — MIFFLIN-ST. JEOR: SCORE: 975.66

## 2020-02-16 NOTE — PROGRESS NOTES
[facility]  PRIMARY CARE PROVIDER AND CLINIC:  Michael Londono MD, MD, [address] [facility] [address] / [facility] [address]  Chief Complaint   Patient presents with     Hospital F/U     [place] [facility] Record Number:  [id number]  Place of Service where encounter took place:  [facility] ([facility] [[id number]]    Gosia Alonzo  is a 87 year old  (11/14/1932), history of CAD s/p CAMILA to mid and prox LAD in 1/2016, ischemic cardiomyopathy, CKD, paroxysmal Afib, hypertension, osteoporosis, history of GI bleed, leg edema who presents with loose melanotic stools and epigastric abdominal pain admitted to the above facility from  [facility]. Hospital stay 2/11/2020 through 2/15/2020..  Admitted to this facility for  rehab, medical management and nursing care.    HPI:    HPI information obtained from: facility chart records and facility staff.   Brief Summary of Hospital Course:   Upper GI bleed: CT shows duodenitis and duodenal ulcer, GI follow EGD shows duodenal ulcers,  started on pantoprazole 40mg BID, may decrease to once daily after 2 months, f/u with GI in 6 weeks, ASA initially held but restarted, Hgb 7 on admission stable after 1 unit of PRBC's  CAD/CAMILA to mid and prox LAD 1/2016: EF 35-40% 2018, on ASA chronically, held during hospitalization, resumed.   Ischemic cardiomyopathy/atrial fib, HTN: losartan, coreg and lasix initially held due to hypotension, restarted prior to discharge  C-diff colitis: vancomycin 14 days  Updates on Status Since Skilled nursing Admission: ON exam today patient is sitting up in WC alert, pleasant, states she is feeling very well, states she feels she has gotten much stronger in past 2 days, denies abdominal pain or discomfort, states she did have a loosely formed stool yesterday, appetite is good, denies N/V/D denies blood in stool, fever, chills, cough, congestion, SOB, CP, palpitations, states she slept fair last night, no other concerns noted.      CODE STATUS/ADVANCE DIRECTIVES DISCUSSION:   DNR / DNI  Patient's living condition: lives alone  ALLERGIES: Codeine; Escitalopram oxalate; Esomeprazole magnesium trihydrate; Imdur [isosorbide]; Meperidine hcl; Morphine hcl; Oxycodone; Pentazocine; Propoxyphene hcl; and Sumatriptan succinate  PAST MEDICAL HISTORY:  has a past medical history of Allergic rhinitis, cause unspecified, Arthritis, CAD (coronary artery disease), CKD (chronic kidney disease), stage 3 (moderate), Diverticula of colon, Edema, Esophageal reflux (10/04), GIB (gastrointestinal bleeding) (1/4/2016), Hiatal hernia, Hypertension (11/08), Impaired fasting glucose, 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), 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), Ischemic cardiomyopathy, Migraine, unspecified, without mention of intractable migraine without mention of status migrainosus, NSTEMI (non-ST elevated myocardial infarction) (H) (08-10-15), Obesity, unspecified, Osteoporosis (11/08), Other and unspecified hyperlipidemia, Other iron deficiency anemia (4/21/2016), Other seborrheic keratosis, PAF (paroxysmal atrial fibrillation) (H), Paroxysmal atrial fibrillation (H) (10/26/2016), Pericarditis (age 15), PONV (postoperative nausea and vomiting), Postop DVT right calf (1988), Pyogenic granuloma of skin and subcutaneous tissue, R upper extremity edema, postop, Solitary cyst of breast, VASOMOTOR RHINITIS (2006), and Viral warts, unspecified.  PAST SURGICAL HISTORY:   has a past surgical history that includes surgical history of -  (1/95); NONSPECIFIC PROCEDURE (surgery approx 1980); surgical history of -  (age 4); surgical history of -  (age 38); NONSPECIFIC PROCEDURE (1996); surgical history of -  (1/97); surgical history of -  (1988); surgical history of -  (6/97); surgical history of - ; surgical history of -  (8/97); surgical history of -  (11/98); surgical history of -  (4/88); surgical history of -  (10/99); surgical history of -  (1/04); surgical history of  -  (4/05); Breast surgery; colonoscopy; appendectomy; Phacoemulsification clear cornea with standard intraocular lens implant (12/16/2013); angiogram (12/29/15); angiogram (01/09/16); and Esophagoscopy, gastroscopy, duodenoscopy (EGD), combined (N/A, 2/12/2020).  FAMILY HISTORY: family history includes C.A.D. in her father; Cancer in her brother and mother; Hypertension in her sister.  SOCIAL HISTORY:   reports that she has never smoked. She has never used smokeless tobacco. She reports that she does not drink alcohol or use drugs.    Post Discharge Medication Reconciliation Status: discharge medications reconciled, continue medications without change    Current Outpatient Medications   Medication Sig Dispense Refill     acetaminophen (TYLENOL) 325 MG tablet Take 2 tablets (650 mg) by mouth every 6 hours as needed for mild pain       acetaminophen (TYLENOL) 500 MG tablet Take 1,000 mg by mouth 2 times daily       aspirin EC 81 MG EC tablet Take 1 tablet (81 mg) by mouth daily       atorvastatin (LIPITOR) 40 MG tablet TAKE 1 TABLET BY MOUTH ONE TIME DAILY 90 tablet 1     carvedilol (COREG) 3.125 MG tablet Take 1 tablet (3.125 mg) by mouth 2 times daily (with meals) 180 tablet 0     diazepam (VALIUM) 2 MG tablet Take 1 tablet (2 mg) by mouth every 12 hours as needed for anxiety 10 tablet 0     doxylamine (UNISOM) 25 MG TABS tablet Take 50 mg by mouth At Bedtime       furosemide (LASIX) 20 MG tablet Take 1 tablet (20 mg) by mouth daily Add additional 20mg for increased edema 95 tablet 3     guaiFENesin (MUCINEX) 600 MG 12 hr tablet Take 600 mg by mouth as needed for congestion Reported on 4/11/2017       HYDROcodone-acetaminophen (NORCO)  MG per tablet Take 1 tablet by mouth every 6 hours as needed for severe pain 12 tablet 0     losartan (COZAAR) 25 MG tablet Take 1 tablet (25 mg) by mouth daily 90 tablet 0     pantoprazole (PROTONIX) 40 MG EC tablet Take 1 tablet (40 mg) by mouth 2 times daily (before meals)    "    Simethicone (GAS-X PO) Take 80 mg by mouth 4 times daily as needed        vancomycin (FIRVANQ) 50 MG/ML oral solution Take 2.5 mLs (125 mg) by mouth 4 times daily for 10 days       Docusate Sodium (EQUATE STOOL SOFTENER OR) Take 100 mg by mouth daily as needed        nitroGLYcerin (NITROSTAT) 0.4 MG sublingual tablet if you are still having symptoms after 3 doses (15 minutes) call 911. 25 tablet 1       ROS:  10 point ROS including Respiratory, CV, GI and , other than that noted in the HPI,  is negative    Vitals:  /68   Pulse 97   Temp 97.9  F (36.6  C)   Resp 18   Ht 1.651 m (5' 5\")   Wt 54 kg (119 lb)   SpO2 96%   BMI 19.80 kg/m    Exam:  GENERAL APPEARANCE:  Alert, in no distress  ENT:  Mouth and posterior oropharynx normal, moist mucous membranes, [place]  EYES:  EOM, conjunctivae, lids, pupils and irises normal, PERRL  RESP:  respiratory effort and palpation of chest normal, lungs clear to auscultation , no respiratory distress  CV:  Palpation and auscultation of heart done , regular rate and rhythm, no murmur, rub, or gallop, peripheral edema trace+ in ankles bilaterallyu  ABDOMEN:  normal bowel sounds, soft, nontender, no hepatosplenomegaly or other masses  M/S:   Examination of:   right upper extremity, left upper extremity, right lower extremity and left lower extremity  Inspection, ROM, stability and muscle strength normal  SKIN:  Inspection of skin and subcutaneous tissue baseline  NEURO:   Cranial nerves 2-12 are normal tested and grossly at patient's baseline, speech WNL  PSYCH:  affect and mood normal    Lab/Diagnostic data:  Recent labs in [facility] reviewed by me today.     ASSESSMENT/PLAN:  Gastrointestinal hemorrhage associated with duodenal ulcer  Physical deconditioning  Acute/ongoing: PT and OT for strengthening, protonix 40mg BID,   F/u with GI in 6 weeks    Colitis due to Clostridium difficile  Ongoing: continue vancomycin 125mg QID for 10 days    Paroxysmal atrial fibrillation " (H)  Coronary artery disease involving native coronary artery of native heart without angina pectoris  Ischemic cardiomyopathy  Essential hypertension  Acute/ongoing: daily weights, vitals daily and prn, BMP weekly, continue cozaar 25mg QD, lasix 20mg QD, coreg 3.125mg BID,  lipitor 40mg QD, 81mg QD    CKD (chronic kidney disease), stage 3 (moderate)  Ongoing: BMP weekly, avoid nephrotoxic agents    Anemia due to blood loss, acute  Acute/ongoing: CBC weekly and prn       Orders written by provider at facility  BMP and CBC weekly    Total time spent with patient visit at the skilled nursing facility was 40 minutes including patient visit and review of past records. Greater than 50% of total time spent with counseling and coordinating care due to discussed current medications, will monitor Hgb, discussed therapy and strengthening, will continue with current POC. Discussed C-diff and what concerns to look for, encouraged patient to drink fluids and monitor for loose stool, cramping or abdominal pain.     Electronically signed by:  Tonya Lynn Haase, APRN CNP

## 2020-02-17 ENCOUNTER — NURSING HOME VISIT (OUTPATIENT)
Dept: GERIATRICS | Facility: CLINIC | Age: 85
End: 2020-02-17
Payer: MEDICARE

## 2020-02-17 DIAGNOSIS — N18.30 STAGE 3 CHRONIC KIDNEY DISEASE (H): ICD-10-CM

## 2020-02-17 DIAGNOSIS — D62 ANEMIA DUE TO BLOOD LOSS, ACUTE: ICD-10-CM

## 2020-02-17 DIAGNOSIS — I25.5 ISCHEMIC CARDIOMYOPATHY: ICD-10-CM

## 2020-02-17 DIAGNOSIS — R53.81 PHYSICAL DECONDITIONING: ICD-10-CM

## 2020-02-17 DIAGNOSIS — I10 ESSENTIAL HYPERTENSION: ICD-10-CM

## 2020-02-17 DIAGNOSIS — I25.10 CORONARY ARTERY DISEASE INVOLVING NATIVE CORONARY ARTERY OF NATIVE HEART WITHOUT ANGINA PECTORIS: ICD-10-CM

## 2020-02-17 DIAGNOSIS — K26.4 GASTROINTESTINAL HEMORRHAGE ASSOCIATED WITH DUODENAL ULCER: Primary | ICD-10-CM

## 2020-02-17 DIAGNOSIS — I48.0 PAROXYSMAL ATRIAL FIBRILLATION (H): ICD-10-CM

## 2020-02-17 DIAGNOSIS — A04.72 COLITIS DUE TO CLOSTRIDIUM DIFFICILE: ICD-10-CM

## 2020-02-17 PROCEDURE — 99309 SBSQ NF CARE MODERATE MDM 30: CPT | Performed by: NURSE PRACTITIONER

## 2020-02-17 PROCEDURE — 99207 ZZC CDG-MDM COMPONENT: MEETS LOW - DOWN CODED: CPT | Performed by: NURSE PRACTITIONER

## 2020-02-17 NOTE — LETTER
2/17/2020        RE: Gosia Alonzo  [address] [facility] [address]        [facility]  PRIMARY CARE PROVIDER AND CLINIC:  Michael Londono MD, MD, [address] / [facility] [address]  Chief Complaint   Patient presents with     Hospital F/U     [facility] Medical Record Number:  [id number]  Place of Service where encounter took place:  [facility] ([facility]) [[id number]]    Gosia Alonzo  is a 87 year old  (11/14/1932), history of CAD s/p CAMILA to mid and prox LAD in 1/2016, ischemic cardiomyopathy, CKD, paroxysmal Afib, hypertension, osteoporosis, history of GI bleed, leg edema who presents with loose melanotic stools and epigastric abdominal pain admitted to the above facility from  [facility]. Hospital stay 2/11/2020 through 2/15/2020..  Admitted to this facility for  rehab, medical management and nursing care.    HPI:    HPI information obtained from: facility chart records and facility staff.   Brief Summary of Hospital Course:   Upper GI bleed: CT shows duodenitis and duodenal ulcer, GI follow EGD shows duodenal ulcers,  started on pantoprazole 40mg BID, may decrease to once daily after 2 months, f/u with GI in 6 weeks, ASA initially held but restarted, Hgb 7 on admission stable after 1 unit of PRBC's  CAD/CAMILA to mid and prox LAD 1/2016: EF 35-40% 2018, on ASA chronically, held during hospitalization, resumed.   Ischemic cardiomyopathy/atrial fib, HTN: losartan, coreg and lasix initially held due to hypotension, restarted prior to discharge  C-diff colitis: vancomycin 14 days  Updates on Status Since Skilled nursing Admission: ON exam today patient is sitting up in WC alert, pleasant, states she is feeling very well, states she feels she has gotten much stronger in past 2 days, denies abdominal pain or discomfort, states she did have a loosely formed stool yesterday, appetite is good, denies N/V/D denies blood in stool, fever, chills, cough,  congestion, SOB, CP, palpitations, states she slept fair last night, no other concerns noted.     CODE STATUS/ADVANCE DIRECTIVES DISCUSSION:   DNR / DNI  Patient's living condition: lives alone  ALLERGIES: Codeine; Escitalopram oxalate; Esomeprazole magnesium trihydrate; Imdur [isosorbide]; Meperidine hcl; Morphine hcl; Oxycodone; Pentazocine; Propoxyphene hcl; and Sumatriptan succinate  PAST MEDICAL HISTORY:  has a past medical history of Allergic rhinitis, cause unspecified, Arthritis, CAD (coronary artery disease), CKD (chronic kidney disease), stage 3 (moderate), Diverticula of colon, Edema, Esophageal reflux (10/04), GIB (gastrointestinal bleeding) (1/4/2016), Hiatal hernia, Hypertension (11/08), Impaired fasting glucose, 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), 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), Ischemic cardiomyopathy, Migraine, unspecified, without mention of intractable migraine without mention of status migrainosus, NSTEMI (non-ST elevated myocardial infarction) (H) (08-10-15), Obesity, unspecified, Osteoporosis (11/08), Other and unspecified hyperlipidemia, Other iron deficiency anemia (4/21/2016), Other seborrheic keratosis, PAF (paroxysmal atrial fibrillation) (H), Paroxysmal atrial fibrillation (H) (10/26/2016), Pericarditis (age 15), PONV (postoperative nausea and vomiting), Postop DVT right calf (1988), Pyogenic granuloma of skin and subcutaneous tissue, R upper extremity edema, postop, Solitary cyst of breast, VASOMOTOR RHINITIS (2006), and Viral warts, unspecified.  PAST SURGICAL HISTORY:   has a past surgical history that includes surgical history of -  (1/95); NONSPECIFIC PROCEDURE (surgery approx 1980); surgical history of -  (age 4); surgical history of -  (age 38); NONSPECIFIC PROCEDURE (1996); surgical history of -  (1/97); surgical history of -  (1988); surgical history of -  (6/97); surgical history of - ; surgical history of -  (8/97); surgical history of -  (11/98); surgical history of  -  (4/88); surgical history of -  (10/99); surgical history of -  (1/04); surgical history of -  (4/05); Breast surgery; colonoscopy; appendectomy; Phacoemulsification clear cornea with standard intraocular lens implant (12/16/2013); angiogram (12/29/15); angiogram (01/09/16); and Esophagoscopy, gastroscopy, duodenoscopy (EGD), combined (N/A, 2/12/2020).  FAMILY HISTORY: family history includes C.A.D. in her father; Cancer in her brother and mother; Hypertension in her sister.  SOCIAL HISTORY:   reports that she has never smoked. She has never used smokeless tobacco. She reports that she does not drink alcohol or use drugs.    Post Discharge Medication Reconciliation Status: discharge medications reconciled, continue medications without change    Current Outpatient Medications   Medication Sig Dispense Refill     acetaminophen (TYLENOL) 325 MG tablet Take 2 tablets (650 mg) by mouth every 6 hours as needed for mild pain       acetaminophen (TYLENOL) 500 MG tablet Take 1,000 mg by mouth 2 times daily       aspirin EC 81 MG EC tablet Take 1 tablet (81 mg) by mouth daily       atorvastatin (LIPITOR) 40 MG tablet TAKE 1 TABLET BY MOUTH ONE TIME DAILY 90 tablet 1     carvedilol (COREG) 3.125 MG tablet Take 1 tablet (3.125 mg) by mouth 2 times daily (with meals) 180 tablet 0     diazepam (VALIUM) 2 MG tablet Take 1 tablet (2 mg) by mouth every 12 hours as needed for anxiety 10 tablet 0     doxylamine (UNISOM) 25 MG TABS tablet Take 50 mg by mouth At Bedtime       furosemide (LASIX) 20 MG tablet Take 1 tablet (20 mg) by mouth daily Add additional 20mg for increased edema 95 tablet 3     guaiFENesin (MUCINEX) 600 MG 12 hr tablet Take 600 mg by mouth as needed for congestion Reported on 4/11/2017       HYDROcodone-acetaminophen (NORCO)  MG per tablet Take 1 tablet by mouth every 6 hours as needed for severe pain 12 tablet 0     losartan (COZAAR) 25 MG tablet Take 1 tablet (25 mg) by mouth daily 90 tablet 0      "pantoprazole (PROTONIX) 40 MG EC tablet Take 1 tablet (40 mg) by mouth 2 times daily (before meals)       Simethicone (GAS-X PO) Take 80 mg by mouth 4 times daily as needed        vancomycin (FIRVANQ) 50 MG/ML oral solution Take 2.5 mLs (125 mg) by mouth 4 times daily for 10 days       Docusate Sodium (EQUATE STOOL SOFTENER OR) Take 100 mg by mouth daily as needed        nitroGLYcerin (NITROSTAT) 0.4 MG sublingual tablet if you are still having symptoms after 3 doses (15 minutes) call 911. 56 tablet 1       ROS:  10 point ROS including Respiratory, CV, GI and , other than that noted in the HPI,  is negative    Vitals:  /68   Pulse 97   Temp 97.9  F (36.6  C)   Resp 18   Ht 1.651 m (5' 5\")   Wt 54 kg (119 lb)   SpO2 96%   BMI 19.80 kg/m     Exam:  GENERAL APPEARANCE:  Alert, in no distress  ENT:  Mouth and posterior oropharynx normal, moist mucous membranes, [place]  EYES:  EOM, conjunctivae, lids, pupils and irises normal, PERRL  RESP:  respiratory effort and palpation of chest normal, lungs clear to auscultation , no respiratory distress  CV:  Palpation and auscultation of heart done , regular rate and rhythm, no murmur, rub, or gallop, peripheral edema trace+ in ankles bilaterallyu  ABDOMEN:  normal bowel sounds, soft, nontender, no hepatosplenomegaly or other masses  M/S:   Examination of:   right upper extremity, left upper extremity, right lower extremity and left lower extremity  Inspection, ROM, stability and muscle strength normal  SKIN:  Inspection of skin and subcutaneous tissue baseline  NEURO:   Cranial nerves 2-12 are normal tested and grossly at patient's baseline, speech WNL  PSYCH:  affect and mood normal    Lab/Diagnostic data:  Recent labs in [facility] reviewed by me today.     ASSESSMENT/PLAN:  Gastrointestinal hemorrhage associated with duodenal ulcer  Physical deconditioning  Acute/ongoing: PT and OT for strengthening, protonix 40mg BID,   F/u with GI in 6 weeks    Colitis due to " Clostridium difficile  Ongoing: continue vancomycin 125mg QID for 10 days    Paroxysmal atrial fibrillation (H)  Coronary artery disease involving native coronary artery of native heart without angina pectoris  Ischemic cardiomyopathy  Essential hypertension  Acute/ongoing: daily weights, vitals daily and prn, BMP weekly, continue cozaar 25mg QD, lasix 20mg QD, coreg 3.125mg BID,  lipitor 40mg QD, 81mg QD    CKD (chronic kidney disease), stage 3 (moderate)  Ongoing: BMP weekly, avoid nephrotoxic agents    Anemia due to blood loss, acute  Acute/ongoing: CBC weekly and prn       Orders written by provider at facility  BMP and CBC weekly    Total time spent with patient visit at the [facility] nursing [facility] was 40 minutes including patient visit and review of past records. Greater than 50% of total time spent with counseling and coordinating care due to discussed current medications, will monitor Hgb, discussed therapy and strengthening, will continue with current POC. Discussed C-diff and what concerns to look for, encouraged patient to drink fluids and monitor for loose stool, cramping or abdominal pain.     Electronically signed by:  Tonya Lynn Haase, APRN CNP                       Sincerely,        Tonya Lynn Haase, APRN CNP

## 2020-02-19 ENCOUNTER — TRANSFERRED RECORDS (OUTPATIENT)
Dept: MULTI SPECIALTY CLINIC | Facility: CLINIC | Age: 85
End: 2020-02-19

## 2020-02-19 LAB
ANION GAP SERPL CALCULATED.3IONS-SCNC: 11 MMOL/L (ref 7–16)
BUN SERPL-MCNC: 18 MG/DL (ref 7–26)
CALCIUM SERPL-MCNC: 8.2 MG/DL (ref 8.4–10.4)
CHLORIDE SERPLBLD-SCNC: 102 MMOL/L (ref 98–109)
CO2 SERPL-SCNC: 23 MMOL/L (ref 20–29)
CREAT SERPL-MCNC: 0.72 MG/DL (ref 0.55–1.02)
DIFFERENTIAL: ABNORMAL
ERYTHROCYTE [DISTWIDTH] IN BLOOD BY AUTOMATED COUNT: 15.9 % (ref 11.9–15.5)
GFR SERPL CREATININE-BSD FRML MDRD: >60 ML/MIN/1.73M2
GLUCOSE SERPL-MCNC: 105 MG/DL (ref 70–100)
HCT VFR BLD AUTO: 29.4 % (ref 34.9–44.5)
HEMOGLOBIN: 9 G/DL (ref 12–15.5)
MCH RBC QN AUTO: 30.8 PG (ref 27.6–33.3)
MCHC RBC AUTO-ENTMCNC: 30.6 G/DL (ref 31.5–35.2)
MCV RBC AUTO: 100.7 FL (ref 80–100)
PLATELET # BLD AUTO: 366 10^9/L (ref 150–450)
POTASSIUM SERPL-SCNC: 3.6 MMOL/L (ref 3.5–5.1)
RBC # BLD AUTO: 2.92 10^12/L (ref 3.9–5.03)
SODIUM SERPL-SCNC: 136 MMOL/L (ref 136–145)
WBC # BLD AUTO: 7.9 10^9/L (ref 3.5–10.5)

## 2020-02-21 ENCOUNTER — TELEPHONE (OUTPATIENT)
Dept: GERIATRICS | Facility: CLINIC | Age: 85
End: 2020-02-21

## 2020-02-21 DIAGNOSIS — K92.2 ACUTE GI BLEEDING: ICD-10-CM

## 2020-02-21 DIAGNOSIS — I21.09 ST ELEVATION MYOCARDIAL INFARCTION (STEMI) INVOLVING OTHER CORONARY ARTERY OF ANTERIOR WALL (H): ICD-10-CM

## 2020-02-21 DIAGNOSIS — A04.72 CLOSTRIDIUM DIFFICILE DIARRHEA: Primary | ICD-10-CM

## 2020-02-21 RX ORDER — ATORVASTATIN CALCIUM 40 MG/1
TABLET, FILM COATED ORAL
Qty: 90 TABLET | Refills: 1
Start: 2020-02-21 | End: 2020-08-14

## 2020-02-21 RX ORDER — VANCOMYCIN HYDROCHLORIDE 50 MG/ML
125 KIT ORAL 4 TIMES DAILY
Qty: 140 ML | Refills: 0
Start: 2020-02-21 | End: 2020-03-04

## 2020-02-21 RX ORDER — CHOLESTYRAMINE 4 G/9G
POWDER, FOR SUSPENSION ORAL
Start: 2020-02-21 | End: 2020-08-25

## 2020-02-21 RX ORDER — ONDANSETRON 4 MG/1
4 TABLET, ORALLY DISINTEGRATING ORAL EVERY 6 HOURS PRN
Start: 2020-02-21 | End: 2020-03-04

## 2020-02-21 NOTE — TELEPHONE ENCOUNTER
[facility] TELEPHONE ENCOUNTER    Chief Complaint   Patient presents with     watery stools, nausea, dehydrated     C Diff       Gosia Alonzo is a 87 year old  (11/14/1932),Nurse called today to report: very weak, dehydrated, watery stools started again this morning.    ASSESSMENT/PLAN  1. Clostridium difficile diarrhea  - ondansetron 4 MG PO ODT tab; Take 1 tablet (4 mg) by mouth every 6 hours as needed for nausea    - cholestyramine 4 g PO packet; 1 pkt at 2 pm and 1 pkt during the night PRN watery stool - SCHEDULE AT LEAST 2 HOURS AFTER AND 4 HOURS BEFORE ANY OTHER MEDICATION DUE TO ABSORPTION      - vancomycin 50 MG/ML PO oral solution; Take 2.5 mLs (125 mg) by mouth 4 times daily for 14 days EXTENDED FOR TOTAL OF 14 DAYS, WILL LIKELY NEED TO DO A SLOW TAPER    - atorvastatin 40 MG PO tablet; Hold for now due to diarrhea  - RESTART WHEN DIARRHEA SUBSIDES      START PERIPHERAL IV AND ADMINISTER LACTATED RINGERS TOTAL 2 LITERS @ 100 ML/HR      CBC AND BMP 2/22/20    WILLIE Amos CNP

## 2020-02-22 ENCOUNTER — TELEPHONE (OUTPATIENT)
Dept: GERIATRICS | Facility: CLINIC | Age: 85
End: 2020-02-22

## 2020-02-23 NOTE — TELEPHONE ENCOUNTER
[facility] TELEPHONE ENCOUNTER    Chief Complaint   Patient presents with     Abnormal Labs       Gosia Alonzo is a 87 year old  (11/14/1932),Nurse called today to report: results of labx, including hemoglobin 8.1, sodium 134 most remarkable today.  Patient is feeling better today, ate supper tonight, has had no loose BMs.    ASSESSMENT/PLAN  Sodium improved, hemoglobin slightly decreased from previous of 9.0.  Patient feeling better    Update primary on Monday    Lab draw on 2/24/20 to include     CBC with diff    BMP    WILLIE Stewart CNP

## 2020-02-24 ENCOUNTER — TRANSFERRED RECORDS (OUTPATIENT)
Dept: MULTI SPECIALTY CLINIC | Facility: CLINIC | Age: 85
End: 2020-02-24

## 2020-02-24 LAB
ANION GAP SERPL CALCULATED.3IONS-SCNC: 11 MMOL/L (ref 7–16)
BUN SERPL-MCNC: 8 MG/DL (ref 7–26)
CALCIUM SERPL-MCNC: 8.6 MG/DL (ref 8.4–10.4)
CHLORIDE SERPLBLD-SCNC: 104 MMOL/L (ref 98–109)
CO2 SERPL-SCNC: 22 MMOL/L (ref 20–29)
CREAT SERPL-MCNC: 0.71 MG/DL (ref 0.55–1.02)
DIFFERENTIAL: ABNORMAL
ERYTHROCYTE [DISTWIDTH] IN BLOOD BY AUTOMATED COUNT: 15.6 % (ref 11.9–15.5)
GFR SERPL CREATININE-BSD FRML MDRD: >60 ML/MIN/1.73M2
GLUCOSE SERPL-MCNC: 100 MG/DL (ref 70–100)
HCT VFR BLD AUTO: 31.7 % (ref 34.9–44.5)
HEMOGLOBIN: 9.8 G/DL (ref 12–15.5)
MCH RBC QN AUTO: 30.9 PG (ref 27.6–33.3)
MCHC RBC AUTO-ENTMCNC: 30.9 G/DL (ref 31.5–35.2)
MCV RBC AUTO: 100 FL (ref 80–100)
PLATELET # BLD AUTO: 336 X10^9/L (ref 150–450)
POTASSIUM SERPL-SCNC: 3.6 MMOL/L (ref 3.5–5.1)
RBC # BLD AUTO: 3.17 X10^12/L (ref 3.9–5.03)
SODIUM SERPL-SCNC: 137 MMOL/L (ref 136–145)
WBC # BLD AUTO: 5.9 X10^9/L (ref 3.5–10.5)

## 2020-02-25 ENCOUNTER — DISCHARGE SUMMARY NURSING HOME (OUTPATIENT)
Dept: GERIATRICS | Facility: CLINIC | Age: 85
End: 2020-02-25
Payer: MEDICARE

## 2020-02-25 VITALS
OXYGEN SATURATION: 96 % | WEIGHT: 116.7 LBS | HEART RATE: 84 BPM | BODY MASS INDEX: 19.44 KG/M2 | TEMPERATURE: 97.4 F | HEIGHT: 65 IN | SYSTOLIC BLOOD PRESSURE: 146 MMHG | DIASTOLIC BLOOD PRESSURE: 73 MMHG | RESPIRATION RATE: 18 BRPM

## 2020-02-25 DIAGNOSIS — I10 ESSENTIAL HYPERTENSION: ICD-10-CM

## 2020-02-25 DIAGNOSIS — K92.2 ACUTE GI BLEEDING: ICD-10-CM

## 2020-02-25 DIAGNOSIS — I21.09 ST ELEVATION MYOCARDIAL INFARCTION (STEMI) INVOLVING OTHER CORONARY ARTERY OF ANTERIOR WALL (H): ICD-10-CM

## 2020-02-25 DIAGNOSIS — I25.10 CORONARY ARTERY DISEASE INVOLVING NATIVE CORONARY ARTERY OF NATIVE HEART WITHOUT ANGINA PECTORIS: ICD-10-CM

## 2020-02-25 DIAGNOSIS — A04.72 CLOSTRIDIUM DIFFICILE DIARRHEA: Primary | ICD-10-CM

## 2020-02-25 DIAGNOSIS — I25.5 ISCHEMIC CARDIOMYOPATHY: ICD-10-CM

## 2020-02-25 DIAGNOSIS — I48.0 PAROXYSMAL ATRIAL FIBRILLATION (H): ICD-10-CM

## 2020-02-25 DIAGNOSIS — R53.81 PHYSICAL DECONDITIONING: ICD-10-CM

## 2020-02-25 DIAGNOSIS — D62 ANEMIA DUE TO BLOOD LOSS, ACUTE: ICD-10-CM

## 2020-02-25 DIAGNOSIS — N18.30 STAGE 3 CHRONIC KIDNEY DISEASE (H): ICD-10-CM

## 2020-02-25 PROCEDURE — 99316 NF DSCHRG MGMT 30 MIN+: CPT | Performed by: NURSE PRACTITIONER

## 2020-02-25 ASSESSMENT — MIFFLIN-ST. JEOR: SCORE: 965.23

## 2020-02-25 NOTE — LETTER
2/25/2020        RE: Gosia Alonzo  [address] [facility] [address]          [facility] DISCHARGE SUMMARY  PATIENT'S NAME: Gosia Alonzo  YOB: 1932  MEDICAL RECORD NUMBER:  [id number]  Place of Service where encounter took place:  [facility] ([facility]) [[id number]]    PRIMARY CARE PROVIDER AND CLINIC RESPONSIBLE AFTER TRANSFER:   Michael Londono MD, MD, [address] / [facility] [address]    [facility] Provider     Transferring providers: Tonya Lynn Haase, WILLIE CNP, Елена Rock MD  Recent Hospitalization/ED:  [facility] stay 2/11/2020 to 2/15/2020.  Date of SNF Admission: February / 15 / 2020  Date of SNF (anticipated) Discharge: February / 26 / 2020  Discharged to: previous independent home  Cognitive Scores: BIMS: 15/15 and CPT: SBT 0/28/5.6  Physical Function: Ambulating 300 ft with 4WW  DME: Walker    CODE STATUS/ADVANCE DIRECTIVES DISCUSSION:  DNR / DNI   ALLERGIES: Codeine; Escitalopram oxalate; Esomeprazole magnesium trihydrate; Imdur [isosorbide]; Meperidine hcl; Morphine hcl; Oxycodone; Pentazocine; Propoxyphene hcl; and Sumatriptan succinate    DISCHARGE DIAGNOSIS/NURSING FACILITY COURSE:   Patient progressed in therapy to walking up to 300 feet using a 4WW with SBA, some SBA for ADL's, just set indep with toileting today on exam. Patient will DC to home with home home PT and RN through [facility].     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), Ischemic cardiomyopathy, Migraine, unspecified, without mention of intractable migraine without  mention of status migrainosus, NSTEMI (non-ST elevated myocardial infarction) (H) (08-10-15), Obesity, unspecified, Osteoporosis (11/08), Other and unspecified hyperlipidemia, Other iron deficiency anemia (4/21/2016), Other seborrheic keratosis, PAF (paroxysmal atrial fibrillation) (H), Paroxysmal atrial fibrillation (H) (10/26/2016), Pericarditis (age 15), PONV (postoperative nausea and vomiting), Postop DVT right calf (1988), Pyogenic granuloma of skin and subcutaneous tissue, R upper extremity edema, postop, Solitary cyst of breast, VASOMOTOR RHINITIS (2006), and Viral warts, unspecified.    Discharge Medications:    Current Outpatient Medications   Medication Sig Dispense Refill     acetaminophen (TYLENOL) 325 MG tablet Take 2 tablets (650 mg) by mouth every 6 hours as needed for mild pain       aspirin EC 81 MG EC tablet Take 1 tablet (81 mg) by mouth daily       carvedilol (COREG) 3.125 MG tablet Take 1 tablet (3.125 mg) by mouth 2 times daily (with meals) 180 tablet 0     cholestyramine 4 g PO packet 1 pkt at 2 pm and 1 pkt during the night PRN watery stool       diazepam (VALIUM) 2 MG tablet Take 1 tablet (2 mg) by mouth every 12 hours as needed for anxiety 10 tablet 0     doxylamine (UNISOM) 25 MG TABS tablet Take 50 mg by mouth At Bedtime       furosemide (LASIX) 20 MG tablet Take 1 tablet (20 mg) by mouth daily Add additional 20mg for increased edema 95 tablet 3     guaiFENesin (MUCINEX) 600 MG 12 hr tablet Take 600 mg by mouth as needed for congestion Reported on 4/11/2017       HYDROcodone-acetaminophen (NORCO)  MG per tablet Take 1 tablet by mouth every 6 hours as needed for severe pain 12 tablet 0     losartan (COZAAR) 25 MG tablet Take 1 tablet (25 mg) by mouth daily 90 tablet 0     ondansetron 4 MG PO ODT tab Take 1 tablet (4 mg) by mouth every 6 hours as needed for nausea       pantoprazole (PROTONIX) 40 MG EC tablet Take 1 tablet (40 mg) by mouth 2 times daily (before meals)        "Simethicone (GAS-X PO) Take 80 mg by mouth 4 times daily as needed        vancomycin 50 MG/ML PO oral solution Take 2.5 mLs (125 mg) by mouth 4 times daily for 14 days 140 mL 0     acetaminophen (TYLENOL) 500 MG tablet Take 1,000 mg by mouth 2 times daily       atorvastatin 40 MG PO tablet Hold for now due to diarrhea (Patient not taking: Reported on 2/25/2020) 90 tablet 1     Docusate Sodium (EQUATE STOOL SOFTENER OR) Take 100 mg by mouth daily as needed        nitroGLYcerin (NITROSTAT) 0.4 MG sublingual tablet if you are still having symptoms after 3 doses (15 minutes) call 911. (Patient not taking: Reported on 2/25/2020) 25 tablet 1       Medication Changes/Rationale:     There were no medication changes made    Controlled medications sent with patient:   not applicable/none     ROS:   10 point ROS of systems including Constitutional, Eyes, Respiratory, Cardiovascular, Gastroenterology, Genitourinary, Integumentary, Musculoskeletal, Psychiatric were all negative except for pertinent positives noted in my HPI.    Physical Exam:   Vitals: BP (!) 146/73   Pulse 84   Temp 97.4  F (36.3  C)   Resp 18   Ht 1.651 m (5' 5\")   Wt 52.9 kg (116 lb 11.2 oz)   SpO2 96%   BMI 19.42 kg/m     BMI= Body mass index is 19.42 kg/m .  Exam:  GENERAL APPEARANCE:  Alert, in no distress  ENT:  Mouth and posterior oropharynx normal, moist mucous membranes, [place]  EYES:  EOM, conjunctivae, lids, pupils and irises normal, PERRL  RESP:  respiratory effort and palpation of chest normal, lungs clear to auscultation , no respiratory distress  CV:  Palpation and auscultation of heart done , regular rate and rhythm, no murmur, rub, or gallop, peripheral edema trace+ in ankles bilaterallyu  ABDOMEN:  normal bowel sounds, soft, nontender, no hepatosplenomegaly or other masses  M/S:   Examination of:   right upper extremity, left upper extremity, right lower extremity and left lower extremity  Inspection, ROM, stability and muscle strength " normal  SKIN:  Inspection of skin and subcutaneous tissue baseline  NEURO:   Cranial nerves 2-12 are normal tested and grossly at patient's baseline, speech WNL  PSYCH:  affect and mood normal     SNF labs: Recent labs in [facility] reviewed by me today.     ASSESSMENT/PLAN:  Gastrointestinal hemorrhage associated with duodenal ulcer  Physical deconditioning  Acute/ongoing: DC home with home PT and RN through [facility], continue  protonix 40mg BID,   F/u with GI in 6 weeks     Colitis due to Clostridium difficile  Ongoing: continue vancomycin 125mg QID for 14 days last day is 3/6/20     Paroxysmal atrial fibrillation (H)  Coronary artery disease involving native coronary artery of native heart without angina pectoris  Ischemic cardiomyopathy  Essential hypertension  Acute/ongoing: continue cozaar 25mg QD, lasix 20mg QD, coreg 3.125mg BID,  lipitor 40mg QD, 81mg QD     CKD (chronic kidney disease), stage 3 (moderate)  Ongoing: F/u with PCP, avoid nephrotoxic agents     Anemia due to blood loss, acute  Acute/ongoing: f/u with PCP, Hgb on 2/24/20 was 9.8 stable    DISCHARGE PLAN:    Follow up labs: No labs orders/due    Medical Follow Up:      Follow up with primary care provider in 1-2 weeks    MTM referral needed and placed by this provider: No    Current [place] scheduled appointments:  Next 5 appointments (look out 90 days)    Mar 04, 2020  2:20 PM CST  [facility] [address]  [facility] [id number] [phone]   Mar 24, 2020  1:10 PM CDT  Return Visit with WILLIE [facility] ([facility]) [address] [address]  [facility] [id number] [phone]           Discharge Services: Home Care:  Physical Therapy and Registered Nurse    Discharge Instructions Verbalized to Patient at Discharge:     None      TOTAL DISCHARGE TIME:   Greater than 30  minutes  Electronically signed by:  Tonya Lynn Haase, APRN CNP                       Sincerely,        Tonya Lynn Haase, APRN CNP

## 2020-02-25 NOTE — PROGRESS NOTES
[place] [facility] DISCHARGE SUMMARY  PATIENT'S NAME: Gosia Alonzo  YOB: 1932  MEDICAL RECORD NUMBER:  [id number]  Place of Service where encounter took place:  [facility] ([facility]) [[id number]]    PRIMARY CARE PROVIDER AND CLINIC RESPONSIBLE AFTER TRANSFER:   Michael Londono MD, MD, [address] [facility] [address] / [facility] [address]    [facility] Provider     Transferring providers: Tonya Lynn Haase, WILLIE VÁZQUEZ, Елена Rock MD  Recent Hospitalization/ED:  [facility] Hospital stay 2/11/2020 to 2/15/2020.  Date of SNF Admission: February / 15 / 2020  Date of SNF (anticipated) Discharge: February / 26 / 2020  Discharged to: previous independent home  Cognitive Scores: BIMS: 15/15 and CPT: SBT 0/28/5.6  Physical Function: Ambulating 300 ft with 4WW  DME: Walker    CODE STATUS/ADVANCE DIRECTIVES DISCUSSION:  DNR / DNI   ALLERGIES: Codeine; Escitalopram oxalate; Esomeprazole magnesium trihydrate; Imdur [isosorbide]; Meperidine hcl; Morphine hcl; Oxycodone; Pentazocine; Propoxyphene hcl; and Sumatriptan succinate    DISCHARGE DIAGNOSIS/NURSING FACILITY COURSE:   Patient progressed in therapy to walking up to 300 feet using a 4WW with SBA, some SBA for ADL's, just set indep with toileting today on exam. Patient will DC to home with home home PT and RN through [facility].     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), Ischemic cardiomyopathy, Migraine, unspecified, without mention of intractable migraine without mention of status migrainosus, NSTEMI (non-ST elevated myocardial infarction) (H) (08-10-15),  Obesity, unspecified, Osteoporosis (11/08), Other and unspecified hyperlipidemia, Other iron deficiency anemia (4/21/2016), Other seborrheic keratosis, PAF (paroxysmal atrial fibrillation) (H), Paroxysmal atrial fibrillation (H) (10/26/2016), Pericarditis (age 15), PONV (postoperative nausea and vomiting), Postop DVT right calf (1988), Pyogenic granuloma of skin and subcutaneous tissue, R upper extremity edema, postop, Solitary cyst of breast, VASOMOTOR RHINITIS (2006), and Viral warts, unspecified.    Discharge Medications:    Current Outpatient Medications   Medication Sig Dispense Refill     acetaminophen (TYLENOL) 325 MG tablet Take 2 tablets (650 mg) by mouth every 6 hours as needed for mild pain       aspirin EC 81 MG EC tablet Take 1 tablet (81 mg) by mouth daily       carvedilol (COREG) 3.125 MG tablet Take 1 tablet (3.125 mg) by mouth 2 times daily (with meals) 180 tablet 0     cholestyramine 4 g PO packet 1 pkt at 2 pm and 1 pkt during the night PRN watery stool       diazepam (VALIUM) 2 MG tablet Take 1 tablet (2 mg) by mouth every 12 hours as needed for anxiety 10 tablet 0     doxylamine (UNISOM) 25 MG TABS tablet Take 50 mg by mouth At Bedtime       furosemide (LASIX) 20 MG tablet Take 1 tablet (20 mg) by mouth daily Add additional 20mg for increased edema 95 tablet 3     guaiFENesin (MUCINEX) 600 MG 12 hr tablet Take 600 mg by mouth as needed for congestion Reported on 4/11/2017       HYDROcodone-acetaminophen (NORCO)  MG per tablet Take 1 tablet by mouth every 6 hours as needed for severe pain 12 tablet 0     losartan (COZAAR) 25 MG tablet Take 1 tablet (25 mg) by mouth daily 90 tablet 0     ondansetron 4 MG PO ODT tab Take 1 tablet (4 mg) by mouth every 6 hours as needed for nausea       pantoprazole (PROTONIX) 40 MG EC tablet Take 1 tablet (40 mg) by mouth 2 times daily (before meals)       Simethicone (GAS-X PO) Take 80 mg by mouth 4 times daily as needed        vancomycin 50 MG/ML PO oral  "solution Take 2.5 mLs (125 mg) by mouth 4 times daily for 14 days 140 mL 0     acetaminophen (TYLENOL) 500 MG tablet Take 1,000 mg by mouth 2 times daily       atorvastatin 40 MG PO tablet Hold for now due to diarrhea (Patient not taking: Reported on 2/25/2020) 90 tablet 1     Docusate Sodium (EQUATE STOOL SOFTENER OR) Take 100 mg by mouth daily as needed        nitroGLYcerin (NITROSTAT) 0.4 MG sublingual tablet if you are still having symptoms after 3 doses (15 minutes) call 911. (Patient not taking: Reported on 2/25/2020) 25 tablet 1       Medication Changes/Rationale:     There were no medication changes made    Controlled medications sent with patient:   not applicable/none     ROS:   10 point ROS of systems including Constitutional, Eyes, Respiratory, Cardiovascular, Gastroenterology, Genitourinary, Integumentary, Musculoskeletal, Psychiatric were all negative except for pertinent positives noted in my HPI.    Physical Exam:   Vitals: BP (!) 146/73   Pulse 84   Temp 97.4  F (36.3  C)   Resp 18   Ht 1.651 m (5' 5\")   Wt 52.9 kg (116 lb 11.2 oz)   SpO2 96%   BMI 19.42 kg/m    BMI= Body mass index is 19.42 kg/m .  Exam:  GENERAL APPEARANCE:  Alert, in no distress  ENT:  Mouth and posterior oropharynx normal, moist mucous membranes, [place]  EYES:  EOM, conjunctivae, lids, pupils and irises normal, PERRL  RESP:  respiratory effort and palpation of chest normal, lungs clear to auscultation , no respiratory distress  CV:  Palpation and auscultation of heart done , regular rate and rhythm, no murmur, rub, or gallop, peripheral edema trace+ in ankles bilaterallyu  ABDOMEN:  normal bowel sounds, soft, nontender, no hepatosplenomegaly or other masses  M/S:   Examination of:   right upper extremity, left upper extremity, right lower extremity and left lower extremity  Inspection, ROM, stability and muscle strength normal  SKIN:  Inspection of skin and subcutaneous tissue baseline  NEURO:   Cranial nerves 2-12 are " normal tested and grossly at patient's baseline, speech WNL  PSYCH:  affect and mood normal     SNF labs: Recent labs in EPIC reviewed by me today.     ASSESSMENT/PLAN:  Gastrointestinal hemorrhage associated with duodenal ulcer  Physical deconditioning  Acute/ongoing: DC home with home PT and RN through [facility], continue  protonix 40mg BID,   F/u with GI in 6 weeks     Colitis due to Clostridium difficile  Ongoing: continue vancomycin 125mg QID for 14 days last day is 3/6/20     Paroxysmal atrial fibrillation (H)  Coronary artery disease involving native coronary artery of native heart without angina pectoris  Ischemic cardiomyopathy  Essential hypertension  Acute/ongoing: continue cozaar 25mg QD, lasix 20mg QD, coreg 3.125mg BID,  lipitor 40mg QD, 81mg QD     CKD (chronic kidney disease), stage 3 (moderate)  Ongoing: F/u with PCP, avoid nephrotoxic agents     Anemia due to blood loss, acute  Acute/ongoing: f/u with PCP, Hgb on 2/24/20 was 9.8 stable    DISCHARGE PLAN:    Follow up labs: No labs orders/due    Medical Follow Up:      Follow up with primary care provider in 1-2 weeks    MT referral needed and placed by this provider: No    Current Roby scheduled appointments:  Next 5 appointments (look out 90 days)    Mar 04, 2020  2:20 PM CST  [facility]o [facility]  [facility] [address] [place], [place] [address] [id number] [phone]   Mar 24, 2020  1:10 PM CDT  Return Visit with WILLIE Whitfield CNP  [facility] ([facility]) [address] [facility] [address]  [facility] [id number] [phone]           Discharge Services: Home Care:  Physical Therapy and Registered Nurse    Discharge Instructions Verbalized to Patient at Discharge:     None      TOTAL DISCHARGE TIME:   Greater than 30 minutes  Electronically signed by:  Tonya Lynn Haase, APRN CNP

## 2020-02-27 ENCOUNTER — TELEPHONE (OUTPATIENT)
Dept: INTERNAL MEDICINE | Facility: CLINIC | Age: 85
End: 2020-02-27

## 2020-02-27 NOTE — TELEPHONE ENCOUNTER
Ariadna with [facility] calling for homecare order for PHYSICAL THERAPY for evaluation for one time.  Ariadna [phone]. Ariadna would like this today because she goes on vacation tomorrow.

## 2020-02-27 NOTE — TELEPHONE ENCOUNTER
IP F/U    Date: 2/26/20  Diagnosis: Clostridium difficile diarrhea   Is patient active in care coordination? No  Was patient in TCU? Yes - Route to Care Coordination (P [id number])    Next 5 appointments (look out 90 days)    Mar 04, 2020  2:20 PM CST  Office Visit with Michael Londono MD  [facility] [address]  [facility] [id number] [phone]   Mar 24, 2020  1:10 PM CDT  Return Visit with WILLIE Whitfield CNP  [facility] ([facility]) [address] [facility] [address] [address]  [facility] [address] [phone]

## 2020-02-27 NOTE — TELEPHONE ENCOUNTER
Verbal order given to Ariadna to approve physical therapy eval until certification received for MD signature.

## 2020-02-28 ENCOUNTER — PATIENT OUTREACH (OUTPATIENT)
Dept: CARE COORDINATION | Facility: CLINIC | Age: 85
End: 2020-02-28

## 2020-02-28 ENCOUNTER — TELEPHONE (OUTPATIENT)
Dept: CARE COORDINATION | Facility: CLINIC | Age: 85
End: 2020-02-28

## 2020-02-28 NOTE — TELEPHONE ENCOUNTER
[facility] requesting order for delay PT SOC for 3/2/20 per patient request. Thank you.    Please REPLY TO THIS MESSAGE in order to give authorization for orders when needed.  This is considered a verbal order, you will still receive a faxed copy of orders for signature.  Thank you for your timely assistance.    Order for Gosia Alonzo; MRN [id number]      Sincerely [facility]  Cecilia Pastrana RN  [phone]

## 2020-02-28 NOTE — PROGRESS NOTES
Clinic Care Coordination Contact  Care Coordination Communication     Recent Hospitalization/ED:  [facility] Hospital stay 2/11/2020 to 2/15/2020- GIB  Date of SNF Admission: February / 15 / 2020  Date of SNF (anticipated) Discharge: February / 26 / 2020  Discharged to: previous independent home  Cognitive Scores: BIMS: 15/15 and CPT: SBT 0/28/5.6  Physical Function: Ambulating 300 ft with 4WW  DME: Walker    Patient will DC to home with home home PT and RN through [facility].     Home Care Contact:              Home Care Agency: [facility] RN/PT              Contact name () and phone number: JULIAN Garcia [phone]              Care Coordination contact info added to non clinical note.  Request to be notified of discharge plans.              Anticipated start of care date: 2/27/20      Plan: RN/SW Care Coordinator will await notification from home care staff informing RN/SW Care Coordinator of patients discharge plans/needs. RN/SW Care Coordinator will review chart and outreach to home care every 4 weeks and as needed.      Erika Palomares RN  Care Coordination  Phone:  [phone]  Email: [email or web address], [email or web address], [place] and [facility]

## 2020-03-02 ENCOUNTER — TELEPHONE (OUTPATIENT)
Dept: CARE COORDINATION | Facility: CLINIC | Age: 85
End: 2020-03-02

## 2020-03-02 NOTE — TELEPHONE ENCOUNTER
[facility] now requests orders and shares plan of care/discharge summaries for some patients through [email or web address].  Please REPLY TO THIS MESSAGE OR ROUTE BACK TO THE AUTHOR in order to give authorization for orders when needed.  This is considered a verbal order, you will still receive a faxed copy of orders for signature.  Thank you for your assistance in improving collaboration for our patients.    ORDER  PT for 1 visit eval and treat for transfers, gait, strengthening, home safety recommendations.     MD SUMMARY/PLAN OF CARE  Pt presents today with chronic pain related to OA with increased difficulty with pain in L shoulder and L knee.  Pt reports she is due for injection in both in next few weeks.   Pt demonstrates functional weakness, impaired transfers and impaired gait.  Pt is at increased fall risk.      Goals to be met by 3/2/2020  1.  Pt will be independent with HEP and walking program for long term management of condition.    Brenna Caldwell, PT  [facility]  [phone]

## 2020-03-04 ENCOUNTER — OFFICE VISIT (OUTPATIENT)
Dept: INTERNAL MEDICINE | Facility: CLINIC | Age: 85
End: 2020-03-04
Payer: MEDICARE

## 2020-03-04 VITALS
HEART RATE: 93 BPM | HEIGHT: 65 IN | RESPIRATION RATE: 14 BRPM | WEIGHT: 107.6 LBS | DIASTOLIC BLOOD PRESSURE: 60 MMHG | SYSTOLIC BLOOD PRESSURE: 106 MMHG | TEMPERATURE: 98.4 F | BODY MASS INDEX: 17.93 KG/M2 | OXYGEN SATURATION: 98 %

## 2020-03-04 DIAGNOSIS — I50.22 CHRONIC SYSTOLIC HEART FAILURE (H): ICD-10-CM

## 2020-03-04 DIAGNOSIS — I10 ESSENTIAL HYPERTENSION: ICD-10-CM

## 2020-03-04 DIAGNOSIS — E44.1 MILD PROTEIN-CALORIE MALNUTRITION (H): ICD-10-CM

## 2020-03-04 DIAGNOSIS — E78.5 HYPERLIPIDEMIA LDL GOAL <100: ICD-10-CM

## 2020-03-04 DIAGNOSIS — D62 ANEMIA DUE TO BLOOD LOSS, ACUTE: Primary | ICD-10-CM

## 2020-03-04 DIAGNOSIS — N18.30 TYPE 2 DIABETES MELLITUS WITH STAGE 3 CHRONIC KIDNEY DISEASE, WITHOUT LONG-TERM CURRENT USE OF INSULIN (H): ICD-10-CM

## 2020-03-04 DIAGNOSIS — E11.22 TYPE 2 DIABETES MELLITUS WITH STAGE 3 CHRONIC KIDNEY DISEASE, WITHOUT LONG-TERM CURRENT USE OF INSULIN (H): ICD-10-CM

## 2020-03-04 DIAGNOSIS — I25.5 ISCHEMIC CARDIOMYOPATHY: ICD-10-CM

## 2020-03-04 DIAGNOSIS — L97.901 ULCER OF LOWER EXTREMITY, LIMITED TO BREAKDOWN OF SKIN, UNSPECIFIED LATERALITY (H): ICD-10-CM

## 2020-03-04 DIAGNOSIS — A04.72 C. DIFFICILE COLITIS: ICD-10-CM

## 2020-03-04 PROCEDURE — 99214 OFFICE O/P EST MOD 30 MIN: CPT | Performed by: INTERNAL MEDICINE

## 2020-03-04 ASSESSMENT — MIFFLIN-ST. JEOR: SCORE: 923.95

## 2020-03-04 NOTE — NURSING NOTE
"Patient was at [facility] 02/11/2020-02/15/2020 for acute GI bleeding.  BP (!) 0/0 (BP Location: Right arm, Patient Position: Sitting, Cuff Size: Adult Regular)   Pulse 119   Temp 98.4  F (36.9  C) (Oral)   Resp 14   Ht 1.651 m (5' 5\")   Wt 48.8 kg (107 lb 9.6 oz)   SpO2 98%   BMI 17.91 kg/m      "

## 2020-03-04 NOTE — PATIENT INSTRUCTIONS
The hospitalist suggested that you see the GI specialist back in the office in six weeks after discharge. The GI specialist did not say this in any of their notes.   For stomach ulcers, they usually repeat an endoscopy in six weeks to assure healing. This is not always done with Duodenal ulcers, which is what you had.     You will need to stay on twice daily pantoprazole for a minimum of two months, then once daily forever.     For the C Dificile colitis, some patients will note recurrence of diarrhea after stopping vancomycin. If this happens, we should resume vancomycin four times a day, then each week go down by one dose per day (for example, four times a day for one week, then three times a day for one week, etc).     Ask cardiology to check your hemoglobin when they check other labs.   We will need to check A1c and cholesterol soon. This can be done with the cardiology labs, or it can be done with me in May 2020.     See me back in around May of 2020. If all of the labs look fine and you are fine, this may be stretched out to no later than October 2020.

## 2020-03-11 ENCOUNTER — HOSPITAL ENCOUNTER (OUTPATIENT)
Dept: CARDIOLOGY | Facility: CLINIC | Age: 85
Discharge: HOME | End: 2020-03-11
Attending: INTERNAL MEDICINE | Admitting: INTERNAL MEDICINE
Payer: MEDICARE

## 2020-03-11 DIAGNOSIS — I50.22 CHRONIC SYSTOLIC HEART FAILURE (H): ICD-10-CM

## 2020-03-11 DIAGNOSIS — N18.30 TYPE 2 DIABETES MELLITUS WITH STAGE 3 CHRONIC KIDNEY DISEASE, WITHOUT LONG-TERM CURRENT USE OF INSULIN (H): ICD-10-CM

## 2020-03-11 DIAGNOSIS — D62 ANEMIA DUE TO BLOOD LOSS, ACUTE: ICD-10-CM

## 2020-03-11 DIAGNOSIS — E78.5 HYPERLIPIDEMIA LDL GOAL <100: ICD-10-CM

## 2020-03-11 DIAGNOSIS — E11.22 TYPE 2 DIABETES MELLITUS WITH STAGE 3 CHRONIC KIDNEY DISEASE, WITHOUT LONG-TERM CURRENT USE OF INSULIN (H): ICD-10-CM

## 2020-03-11 LAB
ALBUMIN SERPL-MCNC: 3 G/DL (ref 3.4–5)
ALP SERPL-CCNC: 164 U/L (ref 40–150)
ALT SERPL W P-5'-P-CCNC: 203 U/L (ref 0–50)
ANION GAP SERPL CALCULATED.3IONS-SCNC: 5 MMOL/L (ref 3–14)
AST SERPL W P-5'-P-CCNC: 139 U/L (ref 0–45)
BILIRUB SERPL-MCNC: 0.2 MG/DL (ref 0.2–1.3)
BUN SERPL-MCNC: 35 MG/DL (ref 7–30)
CALCIUM SERPL-MCNC: 8.4 MG/DL (ref 8.5–10.1)
CHLORIDE SERPL-SCNC: 106 MMOL/L (ref 94–109)
CO2 SERPL-SCNC: 24 MMOL/L (ref 20–32)
CREAT SERPL-MCNC: 0.76 MG/DL (ref 0.52–1.04)
ERYTHROCYTE [DISTWIDTH] IN BLOOD BY AUTOMATED COUNT: 15 % (ref 10–15)
GFR SERPL CREATININE-BSD FRML MDRD: 70 ML/MIN/{1.73_M2}
GLUCOSE SERPL-MCNC: 113 MG/DL (ref 70–99)
HBA1C MFR BLD: 5.7 % (ref 0–5.6)
HCT VFR BLD AUTO: 31.7 % (ref 35–47)
HGB BLD-MCNC: 9.5 G/DL (ref 11.7–15.7)
MCH RBC QN AUTO: 30.4 PG (ref 26.5–33)
MCHC RBC AUTO-ENTMCNC: 30 G/DL (ref 31.5–36.5)
MCV RBC AUTO: 102 FL (ref 78–100)
PLATELET # BLD AUTO: 292 10E9/L (ref 150–450)
POTASSIUM SERPL-SCNC: 4.3 MMOL/L (ref 3.4–5.3)
PROT SERPL-MCNC: 6.9 G/DL (ref 6.8–8.8)
RBC # BLD AUTO: 3.12 10E12/L (ref 3.8–5.2)
SODIUM SERPL-SCNC: 135 MMOL/L (ref 133–144)
WBC # BLD AUTO: 9.7 10E9/L (ref 4–11)

## 2020-03-11 PROCEDURE — 93306 TTE W/DOPPLER COMPLETE: CPT

## 2020-03-11 PROCEDURE — 85027 COMPLETE CBC AUTOMATED: CPT | Performed by: INTERNAL MEDICINE

## 2020-03-11 PROCEDURE — 93306 TTE W/DOPPLER COMPLETE: CPT | Mod: 26 | Performed by: INTERNAL MEDICINE

## 2020-03-11 PROCEDURE — 36415 COLL VENOUS BLD VENIPUNCTURE: CPT | Performed by: INTERNAL MEDICINE

## 2020-03-11 PROCEDURE — 83036 HEMOGLOBIN GLYCOSYLATED A1C: CPT | Performed by: INTERNAL MEDICINE

## 2020-03-11 PROCEDURE — 80053 COMPREHEN METABOLIC PANEL: CPT | Performed by: INTERNAL MEDICINE

## 2020-03-17 ENCOUNTER — TELEPHONE (OUTPATIENT)
Dept: INTERNAL MEDICINE | Facility: CLINIC | Age: 85
End: 2020-03-17

## 2020-03-17 ENCOUNTER — TELEPHONE (OUTPATIENT)
Dept: CARDIOLOGY | Facility: CLINIC | Age: 85
End: 2020-03-17

## 2020-03-17 DIAGNOSIS — K92.2 ACUTE GI BLEEDING: ICD-10-CM

## 2020-03-17 DIAGNOSIS — F41.9 ANXIETY: ICD-10-CM

## 2020-03-17 DIAGNOSIS — R79.89 ABNORMAL LFTS: Primary | ICD-10-CM

## 2020-03-17 NOTE — LETTER
"  [facility]  [address] [place] [address]  [phone]    3/17/2020    Regarding:  Gosia GUTIERREZ Cheri  [address] [facility] [address]           Dear Ms. Alonzo,    The results of your lab tests are enclosed. Some of the liver tests are abnormal, hopefully only temporarily so. Unless noted otherwise below, any other results that are outside the \"normal\" range are within acceptable limits and are of no concern.    Hemoglobin A1C measures control of diabetes. Your Hemoglobin A1C is excellent at 5.7. The ideal target is under 7.0, although below 8.0 may be acceptable for some patients.    AST and ALT are liver tests, as are the bilirubin (total and direct), albumin, total protein, and alkaline phosphatase. Your AST and ALT are mildly abnormal compared to last October. This may have been related to your recent illness and/or medications.   We will need to recheck this within the next 6-8 weeks.     Urea Nitrogen and Creatinine are kidney tests--yours are stable. GFR stands for Glomerular Filtration Rate, a more complicated estimate of kidney function.    Sodium, Potassium, Chloride, Carbon Dioxide, and Calcium are all normal salts in the bloodstream. Yours all look normal. Your glucose (blood sugar) also looks fine. (You can ignore the anion gap result).     If you have any further questions or problems, please contact our office.    Sincerely,      KRIS LEE M.D.  Attachment: Lab results     "

## 2020-03-17 NOTE — LETTER
"  [facility]  [address] [place] [address]  [phone]    3/17/2020    Regarding:  Gosia BRENDA Cheri  [address] [facility] [address]         Dear Ms. Alonzo,    The results of your lab tests are enclosed. Some of the liver tests are abnormal, hopefully only temporarily so. Unless noted otherwise below, any other results that are outside the \"normal\" range are within acceptable limits and are of no concern.    Hemoglobin A1C measures control of diabetes. Your Hemoglobin A1C is excellent at 5.7. The ideal target is under 7.0, although below 8.0 may be acceptable for some patients.    AST and ALT are liver tests, as are the bilirubin (total and direct), albumin, total protein, and alkaline phosphatase. Your AST and ALT are mildly abnormal compared to last October. This may have been related to your recent illness and/or medications.   We will need to recheck this within the next 6-8 weeks.     Your hemoglobin levelis stable compared to February. Your white blood cell count, and platelet count looked fine.  Hemoglobin measures the amount of red blood cells carrying oxygen to the body's tissues. A low hemoglobin can cause symptoms of fatigue.  WBC Count measures White Blood Cells, the cells that fight infection. The percentages of various types of white blood cells are listed and look fine.  Platelets assist in normal blood clotting. Your platelet count looks normal.    Reviewed and is urea Nitrogen and Creatinine are kidney tests--yours are stable. GFR stands for Glomerular Filtration Rate, a more complicated estimate of kidney function.    Sodium, Potassium, Chloride, Carbon Dioxide, and Calcium are all normal salts in the bloodstream. Yours all look normal. Your glucose (blood sugar) also looks fine. (You can ignore the anion gap result).     If you have any further questions or problems, please contact our office.    Sincerely,  For  KRIS LEE M.D.  Attachment: " Lab results

## 2020-03-17 NOTE — TELEPHONE ENCOUNTER
VM received from patient stating that she cancelled her OV for next week with Maribel due to COVID-19. Patient requesting the results of her echocardiogram over the phone instead. Contacted patient to review further. Patient states that she is not interested in a telephone visit at this time, she just wants the results of her echocardiogram. Reviewed echocardiogram results with patient. Copy of echocardiogram results mailed to patient per patient request.

## 2020-03-17 NOTE — TELEPHONE ENCOUNTER
RN, please contact patient and advise her of mild abnormalities on some liver tests, need to recheck in 6-8 weeks. See letter for details.     Letter completed, please mail along with lab results.

## 2020-03-18 NOTE — TELEPHONE ENCOUNTER
Pt was given Protonix while in hospital. Pt was told this med would need to be tapered. Please advise any new  instructions and will need refill as well. Please send to [facility] pharmacy in chart.BLU NEGRO LPN

## 2020-03-19 RX ORDER — DIAZEPAM 2 MG
2 TABLET ORAL EVERY 12 HOURS PRN
Qty: 10 TABLET | Refills: 0 | Status: SHIPPED | OUTPATIENT
Start: 2020-03-19 | End: 2020-03-27

## 2020-03-19 RX ORDER — PANTOPRAZOLE SODIUM 40 MG/1
40 TABLET, DELAYED RELEASE ORAL
Qty: 180 TABLET | Refills: 0 | Status: SHIPPED | OUTPATIENT
Start: 2020-03-19 | End: 2020-06-24

## 2020-03-19 NOTE — TELEPHONE ENCOUNTER
Spoke with patient and advised of primary care provider's recommendation below.    1.  Needs prescription e-scribed to pharmacy.  Takes 40mg tabs 1 tab BID.    Prescription pended.    2.  Also needs refill on Valium.    Controlled Substance Refill Request for Valium  Problem List Complete:  No     PROVIDER TO CONSIDER COMPLETION OF PROBLEM LIST AND OVERVIEW/CONTROLLED SUBSTANCE AGREEMENT    Last Written Prescription Date:  2-15-20 per Dr. Barraza  Last Fill Quantity: 10,   # refills: 0    THE MOST RECENT OFFICE VISIT MUST BE WITHIN THE PAST 3 MONTHS. AT LEAST ONE FACE TO FACE VISIT MUST OCCUR EVERY 6 MONTHS. ADDITIONAL VISITS CAN BE VIRTUAL.  (THIS STATEMENT SHOULD BE DELETED.)    Last Office Visit with [facility] primary care provider: 3-4-20    Future Office visit:     Controlled substance agreement:   Encounter-Level CSA:    There are no encounter-level csa.     Patient-Level CSA:    Controlled Substance Agreement - Non - Opioid - Scan on 11/8/2019 10:22 AM: NON-OPIOID CONTROLLED SUBSTANCE AGREEMENT         Last Urine Drug Screen: No results found for: CDAUT, No results found for: COMDAT, No results found for: THC13, PCP13, COC13, MAMP13, OPI13, AMP13, BZO13, TCA13, MTD13, BAR13, OXY13, PPX13, BUP13     Processing:  Rx to be electronically transmitted to pharmacy by provider      [email or web address]       checked in past 3 months?  Yes 3-19-20  Recommend provider review.    Please advise, thanks.

## 2020-03-26 ENCOUNTER — TELEPHONE (OUTPATIENT)
Dept: INTERNAL MEDICINE | Facility: CLINIC | Age: 85
End: 2020-03-26

## 2020-03-26 DIAGNOSIS — Z53.9 DIAGNOSIS NOT YET DEFINED: Primary | ICD-10-CM

## 2020-03-26 PROCEDURE — G0180 MD CERTIFICATION HHA PATIENT: HCPCS | Performed by: INTERNAL MEDICINE

## 2020-03-27 ENCOUNTER — PATIENT OUTREACH (OUTPATIENT)
Dept: CARE COORDINATION | Facility: CLINIC | Age: 85
End: 2020-03-27

## 2020-03-27 DIAGNOSIS — F41.9 ANXIETY: ICD-10-CM

## 2020-03-27 ASSESSMENT — ACTIVITIES OF DAILY LIVING (ADL): DEPENDENT_IADLS:: INDEPENDENT

## 2020-03-27 NOTE — PROGRESS NOTES
Per routing comment from Erika Palomares:    Spoke with patient today.  She had questing about her Diazepam prescription.  She is wondering why PCP changed RX.  She said normally quantity is 34 pills, but this last time only 10 pills were ordered.  Patient is requesting refill.   Please advise.     Erika Palomares, RN   Care Coordination   Phone:  [phone]   Email: [email or web address]   [facility], [place], [place] and [facility]     Per chart, patient was given #10 tabs at hospital discharge 2-15-20 and has received #60 tabs from primary care provider in the past.    Last fill 3-19-20 #10 tabs.    Refill with quantity of #60 tabs?    Please advise, thanks.

## 2020-03-27 NOTE — PROGRESS NOTES
"Clinic Care Coordination Contact    Chart reviewed.  Patient was discharged from [facility] services on 3/2/20.  She has since had follow up with PCP.    RN CC spoke with patient. She states she feels good, \"feeling back to normal\".  She denies any new concerns. She did have question about Diazepam prescription.  States most recent RX by PCP was only filled for 10 pills.  Patient usually gets 34 pills.  She is concerned because she thinks she may need more due to anxieties about COVID 19 and needing to shelter in place.  Message sent to PCP regarding this.    Patient lives a lone.  Daughter is near by and very helpful.  Patient is ambulating with 4WW.  Able to complete ADLs independently.      Patient denied any CC needs at this time.  She was appreciative of call.  RN CC contact info was provided to patient, she is encouraged to call with any other needs.    Erika Palomares RN  Care Coordination  Phone:  [phone]  Email: [email or web address], [place], [place] and [facility]        "

## 2020-03-27 NOTE — LETTER
[facility]ARE [facility]ORDIN[facility]  [address]  [facility] [address]    March 27, 2020    Gosia Alonzo  [address] [facility] [address]      Dear Gosia,    I am a clinic care coordinator who works with Michael Londono MD, MD at [facility]. I wanted to thank you for spending the time to talk with me.  Below is a description of clinic care coordination and how I can further assist you.      The clinic care coordinator team is made up of a registered nurse,  and community health worker who understand the health care system. The goal of clinic care coordination is to help you manage your health and improve access to the health care system in the most efficient manner. The team can assist you in meeting your health care goals by providing education, coordinating services, strengthening the communication among your providers  and supporting you with any resource needs.    Please feel free to contact me, at [phone] with any questions or concerns. We are focused on providing you with the highest-quality healthcare experience possible and that all starts with you.     Sincerely,     Erika Palomares RN  Care Coordination  Phone:  [phone]  Email: [email or web address], [place] and [facility]

## 2020-03-30 RX ORDER — DIAZEPAM 2 MG
2 TABLET ORAL EVERY 12 HOURS PRN
Qty: 60 TABLET | Refills: 0 | Status: SHIPPED | OUTPATIENT
Start: 2020-03-30 | End: 2020-06-24

## 2020-05-13 DIAGNOSIS — I25.10 CORONARY ARTERY DISEASE INVOLVING NATIVE CORONARY ARTERY OF NATIVE HEART WITHOUT ANGINA PECTORIS: ICD-10-CM

## 2020-05-13 RX ORDER — LOSARTAN POTASSIUM 25 MG/1
25 TABLET ORAL DAILY
Qty: 90 TABLET | Refills: 0 | Status: SHIPPED | OUTPATIENT
Start: 2020-05-13 | End: 2020-08-18

## 2020-05-14 DIAGNOSIS — I25.5 ISCHEMIC CARDIOMYOPATHY: ICD-10-CM

## 2020-05-14 RX ORDER — CARVEDILOL 3.12 MG/1
3.12 TABLET ORAL 2 TIMES DAILY WITH MEALS
Qty: 180 TABLET | Refills: 0 | Status: SHIPPED | OUTPATIENT
Start: 2020-05-14 | End: 2020-08-14

## 2020-06-23 DIAGNOSIS — K92.2 ACUTE GI BLEEDING: ICD-10-CM

## 2020-06-23 DIAGNOSIS — F41.9 ANXIETY: ICD-10-CM

## 2020-06-23 NOTE — TELEPHONE ENCOUNTER
Pantoprazole  Routing refill request to provider for review/approval because:  Diagnosis of osteoporosis on file     Controlled Substance Refill Request for Diazepam   Problem List Complete:  No     PROVIDER TO CONSIDER COMPLETION OF PROBLEM LIST AND OVERVIEW/CONTROLLED SUBSTANCE AGREEMENT    Last Written Prescription Date:  3/30/20  Last Fill Quantity: 60,   # refills: 0    Last Office Visit with [facility] [place] primary care provider: 3/4/20    Future Office visit:     Controlled substance agreement:   Encounter-Level CSA:    There are no encounter-level csa.     Patient-Level CSA:    Controlled Substance Agreement - Non - Opioid - Scan on 11/8/2019 10:22 AM: NON-OPIOID CONTROLLED SUBSTANCE AGREEMENT         Last Urine Drug Screen: No results found for: CDAUT, No results found for: COMDAT, No results found for: THC13, PCP13, COC13, MAMP13, OPI13, AMP13, BZO13, TCA13, MTD13, BAR13, OXY13, PPX13, BUP13     [place] reviewed:  reviewed- recommend provider review  [place]:  [email or web address]

## 2020-06-24 RX ORDER — DIAZEPAM 2 MG
TABLET ORAL
Qty: 60 TABLET | Refills: 0 | Status: SHIPPED | OUTPATIENT
Start: 2020-06-24 | End: 2020-10-01

## 2020-06-24 RX ORDER — PANTOPRAZOLE SODIUM 40 MG/1
40 TABLET, DELAYED RELEASE ORAL DAILY
Qty: 90 TABLET | Refills: 3 | Status: SHIPPED | OUTPATIENT
Start: 2020-06-24 | End: 2020-07-09

## 2020-07-07 ENCOUNTER — TELEPHONE (OUTPATIENT)
Dept: INTERNAL MEDICINE | Facility: CLINIC | Age: 85
End: 2020-07-07

## 2020-07-07 DIAGNOSIS — K21.9 GASTROESOPHAGEAL REFLUX DISEASE, ESOPHAGITIS PRESENCE NOT SPECIFIED: Primary | ICD-10-CM

## 2020-07-07 DIAGNOSIS — K92.2 ACUTE GI BLEEDING: ICD-10-CM

## 2020-07-07 NOTE — TELEPHONE ENCOUNTER
Patient calls stating that RX was written for one tablet per day,, but patient states she takes one tablet in the morning and one in the evening. Please correct and send new RX.

## 2020-07-09 RX ORDER — PANTOPRAZOLE SODIUM 40 MG/1
40 TABLET, DELAYED RELEASE ORAL 2 TIMES DAILY
Qty: 180 TABLET | Refills: 3 | Status: SHIPPED | OUTPATIENT
Start: 2020-07-09 | End: 2021-08-03

## 2020-07-09 NOTE — TELEPHONE ENCOUNTER
I presume the Rx we are talking about pantoprazole?  E-prescribed Rx with corrected #capsules and directions.

## 2020-08-13 DIAGNOSIS — I21.09 ST ELEVATION MYOCARDIAL INFARCTION (STEMI) INVOLVING OTHER CORONARY ARTERY OF ANTERIOR WALL (H): ICD-10-CM

## 2020-08-13 DIAGNOSIS — I25.10 CORONARY ARTERY DISEASE INVOLVING NATIVE CORONARY ARTERY OF NATIVE HEART WITHOUT ANGINA PECTORIS: ICD-10-CM

## 2020-08-13 DIAGNOSIS — A04.72 CLOSTRIDIUM DIFFICILE DIARRHEA: ICD-10-CM

## 2020-08-13 NOTE — TELEPHONE ENCOUNTER
Routing refill request to provider for review/approval because:  Last OV 3/4/20.    Last lipids 10/31/19, Triglycerides elevated-204

## 2020-08-14 DIAGNOSIS — I25.5 ISCHEMIC CARDIOMYOPATHY: ICD-10-CM

## 2020-08-14 RX ORDER — ATORVASTATIN CALCIUM 40 MG/1
TABLET, FILM COATED ORAL
Qty: 90 TABLET | Refills: 0 | Status: SHIPPED | OUTPATIENT
Start: 2020-08-14 | End: 2020-11-05

## 2020-08-14 RX ORDER — LOSARTAN POTASSIUM 25 MG/1
25 TABLET ORAL DAILY
Qty: 90 TABLET | Refills: 0 | OUTPATIENT
Start: 2020-08-14

## 2020-08-14 RX ORDER — CARVEDILOL 3.12 MG/1
3.12 TABLET ORAL 2 TIMES DAILY WITH MEALS
Qty: 180 TABLET | Refills: 0 | Status: SHIPPED | OUTPATIENT
Start: 2020-08-14 | End: 2020-08-25

## 2020-08-14 NOTE — TELEPHONE ENCOUNTER
Call from patient regarding prescription refill. Patient is overdue for annual OV and is being denied refills. Patient is apprehensive to come into clinic due to COVID-19. Patient doesn't have access to a smart phone but is willing to do a phone visit instead. Order placed for follow up. Message sent to scheduling to get this arranged. Rx for 90 days sent to pharmacy.           JULIAN Aguilera August 14, 2020 1:35 PM

## 2020-08-18 DIAGNOSIS — I25.10 CORONARY ARTERY DISEASE INVOLVING NATIVE CORONARY ARTERY OF NATIVE HEART WITHOUT ANGINA PECTORIS: ICD-10-CM

## 2020-08-18 RX ORDER — LOSARTAN POTASSIUM 25 MG/1
25 TABLET ORAL DAILY
Qty: 90 TABLET | Refills: 0 | Status: SHIPPED | OUTPATIENT
Start: 2020-08-18 | End: 2020-08-25

## 2020-08-25 ENCOUNTER — VIRTUAL VISIT (OUTPATIENT)
Dept: CARDIOLOGY | Facility: CLINIC | Age: 85
End: 2020-08-25
Payer: MEDICARE

## 2020-08-25 DIAGNOSIS — I25.10 CORONARY ARTERY DISEASE INVOLVING NATIVE CORONARY ARTERY OF NATIVE HEART WITHOUT ANGINA PECTORIS: ICD-10-CM

## 2020-08-25 DIAGNOSIS — I25.5 ISCHEMIC CARDIOMYOPATHY: ICD-10-CM

## 2020-08-25 DIAGNOSIS — I50.22 CHRONIC SYSTOLIC HEART FAILURE (H): ICD-10-CM

## 2020-08-25 PROCEDURE — 99442 ZZC PHYSICIAN TELEPHONE EVALUATION 11-20 MIN: CPT | Mod: 95 | Performed by: INTERNAL MEDICINE

## 2020-08-25 RX ORDER — FUROSEMIDE 20 MG
20 TABLET ORAL DAILY
Qty: 95 TABLET | Refills: 3 | Status: SHIPPED | OUTPATIENT
Start: 2020-08-25 | End: 2021-11-08

## 2020-08-25 RX ORDER — LOSARTAN POTASSIUM 25 MG/1
25 TABLET ORAL DAILY
Qty: 90 TABLET | Refills: 3 | Status: SHIPPED | OUTPATIENT
Start: 2020-08-25 | End: 2021-10-25

## 2020-08-25 RX ORDER — CARVEDILOL 3.12 MG/1
3.12 TABLET ORAL 2 TIMES DAILY WITH MEALS
Qty: 180 TABLET | Refills: 3 | Status: SHIPPED | OUTPATIENT
Start: 2020-08-25 | End: 2021-10-26

## 2020-08-25 NOTE — LETTER
"8/25/2020    Michael Londono MD, MD  [address] [facility] [address]  [facility] [address]    RE: Gosia Alonzo       Dear Colleague,    I had the pleasure of seeing Gosia Alonzo in the [facility].    Gosia Alonzo is a 87 year old female who is being evaluated via a billable telephone visit.      The patient has been notified of following:     \"This telephone visit will be conducted via a call between you and your physician/provider. We have found that certain health care needs can be provided without the need for a physical exam.  This service lets us provide the care you need with a short phone conversation.  If a prescription is necessary we can send it directly to your pharmacy.  If lab work is needed we can place an order for that and you can then stop by our lab to have the test done at a later time.    Telephone visits are billed at different rates depending on your insurance coverage. During this emergency period, for some insurers they may be billed the same as an in-person visit.  Please reach out to your insurance provider with any questions.    If during the course of the call the physician/provider feels a telephone visit is not appropriate, you will not be charged for this service.\"    Patient has given verbal consent for Telephone visit?  Yes    What phone number would you like to be contacted at? [phone]    How would you like to obtain your AVS? Mail a copy    Vitals reported by patient:  B/P:   HR:   Weight:    Review Of Systems  Skin: negative  Eyes: negative  Ears/Nose/Throat: negative  Respiratory: No shortness of breath, dyspnea on exertion, cough, or hemoptysis  Cardiovascular: negative  Gastrointestinal: negative  Genitourinary: negative  Musculoskeletal: arthritis  Neurologic: negative  Psychiatric: anxiety  Hematologic/Lymphatic/Immunologic: allergies  Endocrine: negative  Reviewed by: Colette Cox MA    Phone call duration: 11 " minutes    Cardiology Progress Note:  Physician location: [place] office  Duration of phone visit: 11 minutes    ROS, PMH, PSurgHx, FamHx, SocHx, medication list reviewed in EMR.    Telephone Exam:  General: No audible distress.  Psych: Affect normal, no pressured speech or flights of fancy.  Respiratory: Unlabored. Normal rate. No stridor or audible wheezing.          Interval History:     The patient is a very pleasant 87 year old whom I have been following for ischemic cardiomyopathy.  She has had a rough winter where she had GI bleed that brought her hemoglobin down from baseline 12-14 down to around 7.  She felt very poorly.  She also got C. difficile diarrhea.  Echocardiogram in March 2020 showed stable LV function.  She feels like her energy has been increasing since that time.  She denies any dyspnea on exertion or exertional chest discomfort different than her baseline.                          Assessment and Plan:         1. Stable ischemic cardiomyopathy with ejection fraction 35 to 40%    Currently asymptomatic.  Continue conservative management.  Refilled medications.    Follow-up in 1 year        This note was transcribed using electronic voice recognition software and there may be typographical errors present.        Thank you for allowing me to participate in the care of your patient.    Sincerely,     iHren Collins MD     [facility]

## 2020-08-25 NOTE — PROGRESS NOTES
"Gosia Alonzo is a 87 year old female who is being evaluated via a billable telephone visit.      The patient has been notified of following:     \"This telephone visit will be conducted via a call between you and your physician/provider. We have found that certain health care needs can be provided without the need for a physical exam.  This service lets us provide the care you need with a short phone conversation.  If a prescription is necessary we can send it directly to your pharmacy.  If lab work is needed we can place an order for that and you can then stop by our lab to have the test done at a later time.    Telephone visits are billed at different rates depending on your insurance coverage. During this emergency period, for some insurers they may be billed the same as an in-person visit.  Please reach out to your insurance provider with any questions.    If during the course of the call the physician/provider feels a telephone visit is not appropriate, you will not be charged for this service.\"    Patient has given verbal consent for Telephone visit?  Yes    What phone number would you like to be contacted at? [phone]    How would you like to obtain your AVS? Mail a copy    Vitals reported by patient:  B/P:   HR:   Weight:    Review Of Systems  Skin: negative  Eyes: negative  Ears/Nose/Throat: negative  Respiratory: No shortness of breath, dyspnea on exertion, cough, or hemoptysis  Cardiovascular: negative  Gastrointestinal: negative  Genitourinary: negative  Musculoskeletal: arthritis  Neurologic: negative  Psychiatric: anxiety  Hematologic/Lymphatic/Immunologic: allergies  Endocrine: negative  Reviewed by: Colette Cox MA    Phone call duration: 11 minutes    Cardiology Progress Note:  Physician location: [place] office  Duration of phone visit: 11 minutes    ROS, PMH, PSurgHx, FamHx, SocHx, medication list reviewed in EMR.    Telephone Exam:  General: No audible distress.  Psych: Affect " normal, no pressured speech or flights of fancy.  Respiratory: Unlabored. Normal rate. No stridor or audible wheezing.          Interval History:     The patient is a very pleasant 87 year old whom I have been following for ischemic cardiomyopathy.  She has had a rough winter where she had GI bleed that brought her hemoglobin down from baseline 12-14 down to around 7.  She felt very poorly.  She also got C. difficile diarrhea.  Echocardiogram in March 2020 showed stable LV function.  She feels like her energy has been increasing since that time.  She denies any dyspnea on exertion or exertional chest discomfort different than her baseline.                          Assessment and Plan:         1. Stable ischemic cardiomyopathy with ejection fraction 35 to 40%    Currently asymptomatic.  Continue conservative management.  Refilled medications.    Follow-up in 1 year        This note was transcribed using electronic voice recognition software and there may be typographical errors present.     Hiren Collins MD

## 2020-08-26 ENCOUNTER — TELEPHONE (OUTPATIENT)
Dept: INTERNAL MEDICINE | Facility: CLINIC | Age: 85
End: 2020-08-26

## 2020-08-26 NOTE — TELEPHONE ENCOUNTER
Patient needs a note stating that with her health condition she needs air conditioning. Hers broke and [place] [employer] wont come out today unless she gets this note stating she needs help today due to the heat.    Please fax this note to [phone]    Please call patient when this is faxed [phone] ok to lm

## 2020-08-26 NOTE — LETTER
[facility]  [address] [place] [address]  [phone]    8/26/2020    Regarding:  Gosia Alonzo  [address] [facility] [address]         To whom it may concern:     Due to her advanced age and numerous serious chronic medical conditions, Gosia Alonzo requires restoration of an air conditioned home environment today.       If you have any further questions or problems, please contact our office.    Sincerely,      KRIS LEE M.D.

## 2020-09-25 DIAGNOSIS — F41.9 ANXIETY: ICD-10-CM

## 2020-09-25 NOTE — TELEPHONE ENCOUNTER
Controlled Substance Refill Request for Diazepam  Problem List Complete:  No     PROVIDER TO CONSIDER COMPLETION OF PROBLEM LIST AND OVERVIEW/CONTROLLED SUBSTANCE AGREEMENT    Last Written Prescription Date:  6-24-20  Last Fill Quantity: 60,   # refills: 0    THE MOST RECENT OFFICE VISIT MUST BE WITHIN THE PAST 3 MONTHS. AT LEAST ONE FACE TO FACE VISIT MUST OCCUR EVERY 6 MONTHS. ADDITIONAL VISITS CAN BE VIRTUAL.  (THIS STATEMENT SHOULD BE DELETED.)    Last Office Visit with [facility] primary care provider: 3-4-20    Future Office visit:     Controlled substance agreement:   Encounter-Level CSA:    There are no encounter-level csa.     Patient-Level CSA:    Controlled Substance Agreement - Non - Opioid - Scan on 11/8/2019 10:22 AM: NON-OPIOID CONTROLLED SUBSTANCE AGREEMENT         Last Urine Drug Screen: No results found for: CDAUT, No results found for: COMDAT, No results found for: THC13, PCP13, COC13, MAMP13, OPI13, AMP13, BZO13, TCA13, MTD13, BAR13, OXY13, PPX13, BUP13     Processing:  Rx to be electronically transmitted to pharmacy by provider      [email or web address]       checked in past 3 months?  Yes 9-25-20  Recommend provider review.    Please advise, thanks.

## 2020-10-01 RX ORDER — DIAZEPAM 2 MG
TABLET ORAL
Qty: 60 TABLET | Refills: 0 | Status: SHIPPED | OUTPATIENT
Start: 2020-10-01 | End: 2020-12-11

## 2020-11-05 DIAGNOSIS — A04.72 CLOSTRIDIUM DIFFICILE DIARRHEA: ICD-10-CM

## 2020-11-05 DIAGNOSIS — I21.09 ST ELEVATION MYOCARDIAL INFARCTION (STEMI) INVOLVING OTHER CORONARY ARTERY OF ANTERIOR WALL (H): ICD-10-CM

## 2020-11-05 RX ORDER — ATORVASTATIN CALCIUM 40 MG/1
TABLET, FILM COATED ORAL
Qty: 90 TABLET | Refills: 0 | Status: SHIPPED | OUTPATIENT
Start: 2020-11-05 | End: 2021-02-08

## 2020-11-05 NOTE — LETTER
[facility]  [address], [address]  [place], [place]  [address]                                            TEL:[phone]  FAX:[phone]      Gosia Alonzo  [address] [facility] [address]      November 5, 2020    Dear Gosia,    We have received a refill request from your pharmacy for your medication - Atorvastatin. Many medications require routine follow-up with your provider and a review of your chart indicates that you are due for blood work and an appointment. We have sent a one time, 90 day refill to your pharmacy to allow you time to schedule an appointment.     Please call [phone] to schedule an appointment.  We look forward to seeing you in the near future.      Thank you,     [facility]

## 2020-11-05 NOTE — TELEPHONE ENCOUNTER
Medication is being filled for 1 time refill only due to:  Patient needs to be seen because due for labs and apt.     Sent letter.

## 2020-12-08 DIAGNOSIS — F41.9 ANXIETY: ICD-10-CM

## 2020-12-09 NOTE — TELEPHONE ENCOUNTER
Controlled Substance Refill Request for valium  Problem List Complete:  No     PROVIDER TO CONSIDER COMPLETION OF PROBLEM LIST AND OVERVIEW/CONTROLLED SUBSTANCE AGREEMENT    Last Written Prescription Date:  10/1/20  Last Fill Quantity: 60,   # refills: 0    [place] [facility] [place]: 3/4/20-office visit      Future Office visit:     Controlled substance agreement:   Encounter-Level CSA:    There are no encounter-level csa.     Patient-Level CSA:    Controlled Substance Agreement - Non - Opioid - Scan on 11/8/2019 10:22 AM: NON-OPIOID CONTROLLED SUBSTANCE AGREEMENT         Last Urine Drug Screen: No results found for: CDAUT, No results found for: COMDAT, No results found for: THC13, PCP13, COC13, MAMP13, OPI13, AMP13, BZO13, TCA13, MTD13, BAR13, OXY13, PPX13, BUP13     Processing:  Rx to be electronically transmitted to pharmacy by provider      [email or web address]       checked in past 3 months?  Yes checkedf- no concerns  Last filled 10/1/20

## 2020-12-10 NOTE — TELEPHONE ENCOUNTER
Patient calling to check on status of medication. Has pulled muscle in her upper thigh. Please send ASAP

## 2020-12-11 RX ORDER — DIAZEPAM 2 MG
TABLET ORAL
Qty: 60 TABLET | Refills: 0 | Status: SHIPPED | OUTPATIENT
Start: 2020-12-11 | End: 2021-02-05

## 2021-01-30 ENCOUNTER — IMMUNIZATION (OUTPATIENT)
Dept: NURSING | Facility: CLINIC | Age: 86
End: 2021-01-30
Payer: MEDICARE

## 2021-01-30 PROCEDURE — 0001A PR COVID VAC PFIZER DIL RECON 30 MCG/0.3 ML IM: CPT

## 2021-01-30 PROCEDURE — 91300 PR COVID VAC PFIZER DIL RECON 30 MCG/0.3 ML IM: CPT

## 2021-02-04 DIAGNOSIS — I21.09 ST ELEVATION MYOCARDIAL INFARCTION (STEMI) INVOLVING OTHER CORONARY ARTERY OF ANTERIOR WALL (H): ICD-10-CM

## 2021-02-04 DIAGNOSIS — A04.72 CLOSTRIDIUM DIFFICILE DIARRHEA: ICD-10-CM

## 2021-02-05 NOTE — TELEPHONE ENCOUNTER
Pending Prescriptions:                       Disp   Refills    atorvastatin (LIPITOR) 40 MG tablet [Pharm*90 tab*0        Sig: TAKE 1 TABLET BY MOUTH ONE TIME DAILY (appt with Dr. Londono need for future fills)    Routing refill request to provider for review/approval because:  Patient fails protocol    LDL Cholesterol Calculated   Date Value Ref Range Status   10/31/2019 88 <100 mg/dL Final     Comment:     Desirable:       <100 mg/dl

## 2021-02-08 RX ORDER — ATORVASTATIN CALCIUM 40 MG/1
TABLET, FILM COATED ORAL
Qty: 90 TABLET | Refills: 0 | Status: SHIPPED | OUTPATIENT
Start: 2021-02-08 | End: 2021-05-21

## 2021-02-20 ENCOUNTER — IMMUNIZATION (OUTPATIENT)
Dept: NURSING | Facility: CLINIC | Age: 86
End: 2021-02-20
Attending: INTERNAL MEDICINE
Payer: MEDICARE

## 2021-02-20 PROCEDURE — 0002A PR COVID VAC PFIZER DIL RECON 30 MCG/0.3 ML IM: CPT

## 2021-02-20 PROCEDURE — 91300 PR COVID VAC PFIZER DIL RECON 30 MCG/0.3 ML IM: CPT

## 2021-03-21 ENCOUNTER — HEALTH MAINTENANCE LETTER (OUTPATIENT)
Age: 86
End: 2021-03-21

## 2021-05-18 DIAGNOSIS — A04.72 CLOSTRIDIUM DIFFICILE DIARRHEA: ICD-10-CM

## 2021-05-18 DIAGNOSIS — I21.09 ST ELEVATION MYOCARDIAL INFARCTION (STEMI) INVOLVING OTHER CORONARY ARTERY OF ANTERIOR WALL (H): ICD-10-CM

## 2021-05-18 NOTE — TELEPHONE ENCOUNTER
Routing refill request to provider for review/approval because:  Fartun given x1 and patient did not follow up, please advise  Patient needs to be seen because it has been more than 1 year since last office visit.  Lashonda Doe RN, BSN

## 2021-05-21 RX ORDER — ATORVASTATIN CALCIUM 40 MG/1
TABLET, FILM COATED ORAL
Qty: 30 TABLET | Refills: 0 | Status: SHIPPED | OUTPATIENT
Start: 2021-05-21 | End: 2021-06-24

## 2021-05-21 NOTE — TELEPHONE ENCOUNTER
Pending Prescriptions:                       Disp   Refills    atorvastatin (LIPITOR) 40 MG tablet [Phar*30 tab*0            Sig: TAKE 1 TABLET BY MOUTH ONE TIME DAILY **NEED           APPT**    Medication is being filled for 1 time refill only due to:  patient has a future appointment scheduled

## 2021-06-07 ENCOUNTER — OFFICE VISIT (OUTPATIENT)
Dept: INTERNAL MEDICINE | Facility: CLINIC | Age: 86
End: 2021-06-07
Payer: MEDICARE

## 2021-06-07 ENCOUNTER — ANCILLARY PROCEDURE (OUTPATIENT)
Dept: GENERAL RADIOLOGY | Facility: CLINIC | Age: 86
End: 2021-06-07
Attending: INTERNAL MEDICINE
Payer: MEDICARE

## 2021-06-07 VITALS
TEMPERATURE: 97.8 F | DIASTOLIC BLOOD PRESSURE: 77 MMHG | BODY MASS INDEX: 19.9 KG/M2 | OXYGEN SATURATION: 94 % | HEART RATE: 96 BPM | WEIGHT: 119.6 LBS | SYSTOLIC BLOOD PRESSURE: 133 MMHG | RESPIRATION RATE: 18 BRPM

## 2021-06-07 DIAGNOSIS — M54.6 ACUTE MIDLINE THORACIC BACK PAIN: Primary | ICD-10-CM

## 2021-06-07 DIAGNOSIS — S22.000A COMPRESSION FRACTURE OF THORACIC VERTEBRA, INITIAL ENCOUNTER, UNSPECIFIED THORACIC VERTEBRAL LEVEL: ICD-10-CM

## 2021-06-07 DIAGNOSIS — M54.6 ACUTE MIDLINE THORACIC BACK PAIN: ICD-10-CM

## 2021-06-07 DIAGNOSIS — R07.81 PAIN IN RIB: ICD-10-CM

## 2021-06-07 PROCEDURE — 99213 OFFICE O/P EST LOW 20 MIN: CPT | Performed by: INTERNAL MEDICINE

## 2021-06-07 PROCEDURE — 72074 X-RAY EXAM THORAC SPINE4/>VW: CPT | Performed by: RADIOLOGY

## 2021-06-07 PROCEDURE — 71110 X-RAY EXAM RIBS BIL 3 VIEWS: CPT | Performed by: RADIOLOGY

## 2021-06-07 RX ORDER — CYCLOBENZAPRINE HCL 5 MG
5 TABLET ORAL 3 TIMES DAILY PRN
Qty: 60 TABLET | Refills: 0 | Status: SHIPPED | OUTPATIENT
Start: 2021-06-07 | End: 2022-01-01

## 2021-06-07 ASSESSMENT — ENCOUNTER SYMPTOMS
ARTHRALGIAS: 1
BACK PAIN: 1

## 2021-06-07 ASSESSMENT — PAIN SCALES - GENERAL: PAINLEVEL: EXTREME PAIN (8)

## 2021-06-07 NOTE — PROGRESS NOTES
Assessment & Plan   Problem List Items Addressed This Visit     None      Visit Diagnoses     Acute midline thoracic back pain    -  Primary    Relevant Medications    cyclobenzaprine (FLEXERIL) 5 MG tablet    Other Relevant Orders    XR Thoracic Spine G/E 4 Views    XR Ribs Bilateral 3 Views    XR Chest 2 Views    Pain in rib        Relevant Medications    cyclobenzaprine (FLEXERIL) 5 MG tablet         Patient has pain around paraspinal region/rib area.  X-ray of the thoracic and ribs ordered.  Muscle relaxer given.  Advised about side effects including drowsiness/fall risk.  Already on hydrocodone every 4-6 hours as needed.  Consider CT of the thoracic spine if pain does not improve.    No follow-ups on file.    Sawyer Hayden MD  [facility] JUNE Best is a 88 year old who presents for the following health issues  accompanied by her daughter:    HPI     She has pain in her back and the pain is going around her rib areas. This has been going on for couple of weeks.    She was playing picture puzzle, she was bending and looking at it and started having upper back pain.  Its gradually worsening. She has difficulty sleeping on the back. She is sleeping in seated position. It comes around lower ribs.  Its constant pain. Its a sharp pain.  Denies fall.  Denies numbness or tingling in upper and lower extremity with pain.  Denies bladder/bowel incontinence.  She takes hydrocodone q4-6 hrly and tylenol and not helping.  She sees [facility] for knee and shoulder.  She has h/o lumbar, cervical surgeries.    Review of Systems   Musculoskeletal: Positive for arthralgias and back pain.          Objective    /77 (BP Location: Right arm, Patient Position: Sitting, Cuff Size: Adult Small)   Pulse 96   Temp 97.8  F (36.6  C) (Oral)   Resp 18   Wt 54.3 kg (119 lb 9.6 oz)   SpO2 94%   BMI 19.90 kg/m    Body mass index is 19.9 kg/m .  Physical Exam  Vitals signs reviewed.    Cardiovascular:      Rate and Rhythm: Normal rate.      Heart sounds: No murmur.   Pulmonary:      Effort: Pulmonary effort is normal.   Musculoskeletal:      Comments: No thoracic spine tenderness noted.  Patient had discomfort palpating lower ribs around T10-T12.

## 2021-06-09 ENCOUNTER — VIRTUAL VISIT (OUTPATIENT)
Dept: INTERNAL MEDICINE | Facility: CLINIC | Age: 86
End: 2021-06-09
Payer: MEDICARE

## 2021-06-09 DIAGNOSIS — M80.88XD OSTEOPOROTIC COMPRESSION FRACTURE OF SPINE, WITH ROUTINE HEALING, SUBSEQUENT ENCOUNTER: Primary | ICD-10-CM

## 2021-06-09 DIAGNOSIS — M25.512 LEFT SHOULDER PAIN, UNSPECIFIED CHRONICITY: ICD-10-CM

## 2021-06-09 DIAGNOSIS — M17.12 OSTEOARTHRITIS OF LEFT KNEE, UNSPECIFIED OSTEOARTHRITIS TYPE: ICD-10-CM

## 2021-06-09 PROCEDURE — 99443 PR PHYSICIAN TELEPHONE EVALUATION 21-30 MIN: CPT | Mod: 95 | Performed by: INTERNAL MEDICINE

## 2021-06-09 NOTE — PROGRESS NOTES
Telephone visit: 30 minute conversation.  [phone]---[phone]    Estephania is a 88 year old who is being evaluated via a billable telephone visit.      What phone number would you like to be contacted at? [phone]  How would you like to obtain your AVS? Mayte    Assessment & Plan   (M80.88XD) Osteoporotic compression fracture of spine, with routine healing, subsequent encounter  (primary encounter diagnosis)  Comment: Referred to endocrinology. Will need to consider thoracic spine CT, and also will need to communicate with Dr Castro.   Plan: Adult Endocrinology Referral          (M25.512) Left shoulder pain, unspecified chronicity  (M17.12) Osteoarthritis of left knee, unspecified osteoarthritis type  Comment: Chronic pain for which she has been taking oxycodone at 10 mg q6h.      Patient Instructions   Dr Londono will try to communicate with Dr Castro.   Recommend endocrinology consult to consider medication for osteoporosis.     May need to consider CT scan of the upper spine.       No follow-ups on file.    Michael Londono MD,   [facility]   Estephania is a 88 year old who presents for the following health issues  accompanied by her daughter Wen and daughter-in-law Matilde: medication follow up and refill of Lipitor. Patient states she is in a lot of pain.    HPI     Hyperlipidemia Follow-Up      Are you regularly taking any medication or supplement to lower your cholesterol?   Yes- Lipitor    Are you having muscle aches or other side effects that you think could be caused by your cholesterol lowering medication?  No    Hypertension Follow-up      Do you check your blood pressure regularly outside of the clinic? No     Are you following a low salt diet? Yes    Are your blood pressures ever more than 140 on the top number (systolic) OR more   than 90 on the bottom number (diastolic), for example 140/90? No      How many servings of fruits and vegetables do you eat daily?  2-3    On  "average, how many sweetened beverages do you drink each day (Examples: soda, juice, sweet tea, etc.  Do NOT count diet or artificially sweetened beverages)?   0    How many days per week do you exercise enough to make your heart beat faster? 3 or less    How many minutes a day do you exercise enough to make your heart beat faster? 9 or less    How many days per week do you miss taking your medication? 0    The patient has been bothered in recent days by severe pain in her mid back \"near the bra strap\".  She describes it most of the time he is \"a good 10 out of 10\" pain in severity.  She is assisted on this phone call by her daughter-in-law Seema and her daughter Wen who are present with her on the phone.    She was seen for an office visit on June 7 by Dr. Hayden.. X-rays were obtained with the reports as noted below.  She notes that the patient has already been chronically taking hydrocodone at a dose of 10 mg every 6 hours, a chronic prescription from Dr. Castro who is her orthopedic surgeon.  This has been for chronic left shoulder and left knee pain which is also been treated with injections.    We discussed the difficulty in treating this acute pain superimposed on her chronic pain for which she is already certainly develop tolerance to chronic opioid therapy and moderately high doses.  We discussed some options including possibly seeing a pain specialist, and/or an endocrinologist for likely osteoporosis.    She will also continue to take acetaminophen, and is advised that she may take a maximum of 3000 to 4000 mg each day as a cumulative dose.    We agreed to check a CT scan to better characterize any acute versus chronic fractures.  Also, I have advised her that I would try to reach Dr. Castro to discuss her opioid therapy.  She is reminded that in general 1 provider should maintain responsibility for refilling chronic opioid medications.    Past medical, family and social histories as well as " medications reviewed and updated as needed.    Review of Systems   REVIEW OF SYSTEMS: The following systems have been completely reviewed and are negative except as noted above:   Constitutional, gastrointestinal, genitourinary, musculoskeletal, and neurologic systems.        Objective           Vitals:  No vitals were obtained today due to virtual visit.    Physical Exam   alert and mild distress  PSYCH: Alert and oriented times 3; coherent speech, normal   rate and volume, able to articulate logical thoughts  RESP: No cough, no audible wheezing, able to talk in full sentences  Remainder of exam unable to be completed due to telephone visits    Xr Thoracic Spine G/e 4 Views    Result Date: 6/7/2021  THORACIC SPINE FOUR OR MORE VIEWS  6/7/2021 12:56 PM COMPARISON: Thoracic spine x-ray series 7/18/2011. HISTORY: Acute midline thoracic back pain.     IMPRESSION: There has been interval development of moderate anterior wedge compression fracture deformity of T12. Minimal chronic anterior wedge compression deformity of T9 again noted. Probable minimal compression deformity of T10 and T11 is also new from the comparison study but is age indeterminate. If there is concern for acute fracture, CT of the thoracic spine may be helpful. Alignment of the thoracic vertebrae remains normal. ANDER RAY MD    Xr Ribs Bilateral 3 Views    Result Date: 6/7/2021  RIBS BILATERAL THREE VIEWS   6/7/2021 12:55 PM HISTORY:  Acute midline thoracic pain. COMPARISON: Chest 2/11/2020 FINDINGS: Prominent hiatal hernia. Spine degenerative change and scoliosis. Aortic tortuosity. Right shoulder arthroplasty. Right axillary surgical clips. Left humeral head remodeling; superior translation of the humeral head suggesting rotator cuff pathology. Osteopenia.     IMPRESSION:  No rib fracture identified. No pneumothorax. YANI DE GUZMAN MD            Phone call duration: 30 minutes

## 2021-06-10 ENCOUNTER — HOSPITAL ENCOUNTER (OUTPATIENT)
Dept: LAB | Facility: CLINIC | Age: 86
End: 2021-06-10
Attending: INTERNAL MEDICINE
Payer: MEDICARE

## 2021-06-10 ENCOUNTER — TELEPHONE (OUTPATIENT)
Dept: ENDOCRINOLOGY | Facility: CLINIC | Age: 86
End: 2021-06-10

## 2021-06-10 ENCOUNTER — HOSPITAL ENCOUNTER (OUTPATIENT)
Dept: CT IMAGING | Facility: CLINIC | Age: 86
End: 2021-06-10
Attending: INTERNAL MEDICINE
Payer: MEDICARE

## 2021-06-10 DIAGNOSIS — S22.000A COMPRESSION FRACTURE OF THORACIC VERTEBRA, INITIAL ENCOUNTER, UNSPECIFIED THORACIC VERTEBRAL LEVEL: ICD-10-CM

## 2021-06-10 DIAGNOSIS — M54.6 ACUTE MIDLINE THORACIC BACK PAIN: ICD-10-CM

## 2021-06-10 DIAGNOSIS — R07.81 PAIN IN RIB: ICD-10-CM

## 2021-06-10 DIAGNOSIS — R79.89 ABNORMAL LFTS: ICD-10-CM

## 2021-06-10 LAB
ALBUMIN SERPL-MCNC: 3.1 G/DL (ref 3.4–5)
ALP SERPL-CCNC: 216 U/L (ref 40–150)
ALT SERPL W P-5'-P-CCNC: 13 U/L (ref 0–50)
ANION GAP SERPL CALCULATED.3IONS-SCNC: 8 MMOL/L (ref 3–14)
AST SERPL W P-5'-P-CCNC: 20 U/L (ref 0–45)
BILIRUB SERPL-MCNC: 0.5 MG/DL (ref 0.2–1.3)
BUN SERPL-MCNC: 16 MG/DL (ref 7–30)
CALCIUM SERPL-MCNC: 9 MG/DL (ref 8.5–10.1)
CHLORIDE SERPL-SCNC: 105 MMOL/L (ref 94–109)
CO2 SERPL-SCNC: 20 MMOL/L (ref 20–32)
CREAT SERPL-MCNC: 0.82 MG/DL (ref 0.52–1.04)
GFR SERPL CREATININE-BSD FRML MDRD: 64 ML/MIN/{1.73_M2}
GLUCOSE SERPL-MCNC: 139 MG/DL (ref 70–99)
POTASSIUM SERPL-SCNC: 3.8 MMOL/L (ref 3.4–5.3)
PROT SERPL-MCNC: 8.5 G/DL (ref 6.8–8.8)
SODIUM SERPL-SCNC: 133 MMOL/L (ref 133–144)

## 2021-06-10 PROCEDURE — 80053 COMPREHEN METABOLIC PANEL: CPT | Performed by: INTERNAL MEDICINE

## 2021-06-10 PROCEDURE — 36415 COLL VENOUS BLD VENIPUNCTURE: CPT | Performed by: INTERNAL MEDICINE

## 2021-06-10 PROCEDURE — 72128 CT CHEST SPINE W/O DYE: CPT | Mod: ME

## 2021-06-10 NOTE — TELEPHONE ENCOUNTER
[facility] Call Center    Phone Message    May a detailed message be left on voicemail: no     Reason for Call: Appointment Intake    Referring Provider Name: Dr. Londono  Diagnosis and/or Symptoms: Osteoporotic compression fracture of spine, with routine healing,     This is an Urgent request for pt to be seen in 3-5 days, pt stated she is already scheduled with Neurosurgeon Dr. Garcia on 06/15/21 in [place].  Does pt need to be seen by both Neurosurgery and Endo?    Please have Dr. Garzon triage    Action Taken: Message routed to:  [facility] ([facility] Endo    Travel Screening: Not Applicable

## 2021-06-15 ENCOUNTER — TELEPHONE (OUTPATIENT)
Dept: INTERNAL MEDICINE | Facility: CLINIC | Age: 86
End: 2021-06-15

## 2021-06-15 ENCOUNTER — VIRTUAL VISIT (OUTPATIENT)
Dept: NEUROSURGERY | Facility: CLINIC | Age: 86
End: 2021-06-15
Attending: INTERNAL MEDICINE
Payer: MEDICARE

## 2021-06-15 VITALS — HEIGHT: 65 IN | WEIGHT: 125 LBS | BODY MASS INDEX: 20.83 KG/M2

## 2021-06-15 DIAGNOSIS — M79.18 ACUTE MYOFASCIAL PAIN: ICD-10-CM

## 2021-06-15 DIAGNOSIS — S22.060A COMPRESSION FRACTURE OF T8 VERTEBRA, INITIAL ENCOUNTER (H): Primary | ICD-10-CM

## 2021-06-15 PROCEDURE — 99442 PR PHYSICIAN TELEPHONE EVALUATION 11-20 MIN: CPT | Performed by: PHYSICIAN ASSISTANT

## 2021-06-15 ASSESSMENT — MIFFLIN-ST. JEOR: SCORE: 997.88
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
## 2021-06-15 NOTE — TELEPHONE ENCOUNTER
Pt was talking to Dr. Garcia about her fractured thoracic discs and he thinks she should have a trigger point injection for the pain they are causing.      Pt has appt tomorrow at 3:30 for cortisone injections in left knee and left shoulder with Dr. Castro.  She is wondering if you could talk to him to see if he can do the trigger point injection as well tomorrow.  She states you were going to have a conversation with him about how to handle her pain anyway, so would like you to call him.  [phone]    She has the results of recent xray but not of CT, faxed these results to Dr. Castro at [phone].
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
## 2021-06-16 ENCOUNTER — TRANSFERRED RECORDS (OUTPATIENT)
Dept: HEALTH INFORMATION MANAGEMENT | Facility: CLINIC | Age: 86
End: 2021-06-16

## 2021-06-16 NOTE — TELEPHONE ENCOUNTER
To schedulers : please schedule with consult service (or open new/FRANCESCO) within the week. This could be a virtual visit.      Frannie Garzon MD  Endocrine triage

## 2021-06-16 NOTE — TELEPHONE ENCOUNTER
Spoke with patient. Pt was unsure of reason for referral and was not sure about scheduling in addition to her other appointments at this time. Pt stated they would reach out after more information was gathered and I gave her our scheduling number.     OK for patient to schedule ASAP when pt calls back, using any 60 minute new/FRANCESCO slot. Dr. Garzon is on consult next week.

## 2021-06-21 NOTE — PATIENT INSTRUCTIONS
Dr Londono will try to communicate with Dr Castro.   Recommend endocrinology consult to consider medication for osteoporosis.     May need to consider CT scan of the upper spine.

## 2021-08-18 DIAGNOSIS — I50.22 CHRONIC SYSTOLIC HEART FAILURE (H): Primary | ICD-10-CM

## 2021-08-20 DIAGNOSIS — F41.9 ANXIETY: ICD-10-CM

## 2021-08-24 RX ORDER — DIAZEPAM 2 MG
TABLET ORAL
Qty: 60 TABLET | Refills: 0 | Status: SHIPPED | OUTPATIENT
Start: 2021-08-24 | End: 2021-09-03

## 2021-08-31 ENCOUNTER — TELEPHONE (OUTPATIENT)
Dept: INTERNAL MEDICINE | Facility: CLINIC | Age: 86
End: 2021-08-31

## 2021-08-31 NOTE — TELEPHONE ENCOUNTER
Patient has been receiving Hydrocodone from Dr Castro ([facility]th[facility]) for her back pain, but he will no longer be able to prescribe this medication because Dr Londono has prescribed Diazepam for the patient. Patient is very upset and states she needs to have the pain medication and would like Dr Londono to prescribe this for her. She takes 10 mg every 6 hours as needed for pain. Call her back to advise at [phone] (home)

## 2021-08-31 NOTE — TELEPHONE ENCOUNTER
Discussed with Dr Castro's care coordinator, Denise Baker. [employer] has advised the patient and [facility] that she should not be taking Valium and Hydrocodone at the same time.   She will forward copies of recent encounters with Dr Castro (he is out of the office for about two weeks).   Will review and then contact the patient later this week.

## 2021-09-02 NOTE — TELEPHONE ENCOUNTER
Patient calling. She is willing to stop taking Valium but she can not stop the Hydrocodone. Please advise.

## 2021-09-03 NOTE — TELEPHONE ENCOUNTER
Please call the patient.    I've spoken with Dr Castro's nurse coordinator (Dr Castro is out of the office for a couple of weeks).    She has advised me that [employer] has advised them that she should not be taking the combination of Valium and Hydrocodone.     I believe that Dr Castro is okay with continuing to prescribe the hydrocodone, so long as she is not also taking Valium.     I've left a message for Dr Castro's nurse coordinator advising her of the above.   She may continue to ask Dr Castro for hydrocodone refills, and we can stop the Valium.

## 2021-09-03 NOTE — TELEPHONE ENCOUNTER
Since my last note, I've spoken again with Dr Castro's coordinator. She expects that there will not be a problem for him to continue to prescribe the hydrocodone now that he knows that no one is prescribing Valium.     Please advise pt.

## 2021-09-03 NOTE — TELEPHONE ENCOUNTER
Patient advised.   Diazepam discontinued from medication list.    Paola Shepard RN  [facility]

## 2021-09-03 NOTE — TELEPHONE ENCOUNTER
Patient advised of Dr. Londono's message. Patient is concerned about Dr. Castro being able to continue to order the Hydrocodone as she was told that if he tried to order this again, he would loose his license.     She will check back with Dr. Castro's office in 2 weeks when he will be back to confirm he will be able to continue ordering Hydrocodone. If he cannot, she asks if Dr. Londono could take this over.      Paola Shepard RN  [facility]

## 2021-09-04 ENCOUNTER — HEALTH MAINTENANCE LETTER (OUTPATIENT)
Age: 86
End: 2021-09-04

## 2021-09-10 ENCOUNTER — HOSPITAL ENCOUNTER (OUTPATIENT)
Dept: CARDIOLOGY | Facility: CLINIC | Age: 86
Discharge: HOME | End: 2021-09-10
Attending: INTERNAL MEDICINE | Admitting: INTERNAL MEDICINE
Payer: MEDICARE

## 2021-09-10 ENCOUNTER — LAB (OUTPATIENT)
Dept: LAB | Facility: CLINIC | Age: 86
End: 2021-09-10
Attending: NURSE PRACTITIONER
Payer: MEDICARE

## 2021-09-10 DIAGNOSIS — I50.22 CHRONIC SYSTOLIC HEART FAILURE (H): ICD-10-CM

## 2021-09-10 LAB
CHOLEST SERPL-MCNC: 153 MG/DL
FASTING STATUS PATIENT QL REPORTED: YES
HDLC SERPL-MCNC: 51 MG/DL
LDLC SERPL CALC-MCNC: 79 MG/DL
LVEF ECHO: NORMAL
NONHDLC SERPL-MCNC: 102 MG/DL
TRIGL SERPL-MCNC: 113 MG/DL

## 2021-09-10 PROCEDURE — 93306 TTE W/DOPPLER COMPLETE: CPT | Mod: 26 | Performed by: INTERNAL MEDICINE

## 2021-09-10 PROCEDURE — 36415 COLL VENOUS BLD VENIPUNCTURE: CPT | Performed by: INTERNAL MEDICINE

## 2021-09-10 PROCEDURE — 80061 LIPID PANEL: CPT | Performed by: INTERNAL MEDICINE

## 2021-09-10 PROCEDURE — 93306 TTE W/DOPPLER COMPLETE: CPT

## 2021-09-15 ENCOUNTER — OFFICE VISIT (OUTPATIENT)
Dept: CARDIOLOGY | Facility: CLINIC | Age: 86
End: 2021-09-15
Payer: MEDICARE

## 2021-09-15 VITALS
DIASTOLIC BLOOD PRESSURE: 69 MMHG | WEIGHT: 123 LBS | BODY MASS INDEX: 20.47 KG/M2 | HEART RATE: 97 BPM | OXYGEN SATURATION: 99 % | SYSTOLIC BLOOD PRESSURE: 104 MMHG

## 2021-09-15 DIAGNOSIS — I25.10 CORONARY ARTERY DISEASE INVOLVING NATIVE CORONARY ARTERY OF NATIVE HEART WITHOUT ANGINA PECTORIS: ICD-10-CM

## 2021-09-15 DIAGNOSIS — I25.5 ISCHEMIC CARDIOMYOPATHY: ICD-10-CM

## 2021-09-15 DIAGNOSIS — I50.22 CHRONIC SYSTOLIC HEART FAILURE (H): ICD-10-CM

## 2021-09-15 PROCEDURE — 99214 OFFICE O/P EST MOD 30 MIN: CPT | Performed by: INTERNAL MEDICINE

## 2021-09-15 RX ORDER — FLUTICASONE PROPIONATE 50 MCG
2 SPRAY, SUSPENSION (ML) NASAL DAILY PRN
COMMUNITY

## 2021-09-15 NOTE — PROGRESS NOTES
Cardiology Progress Note          Assessment and Plan:       1. Well compensated mild ischemic cardiomyopathy from LAD disease status post PCI    Continue medical management    Routine follow-up in 1 year per patient request    30 minutes was spent with the patient, precharting and reviewing tests as well as post visit charting all done today..    This note was transcribed using electronic voice recognition software and there may be typographical errors present.                    Interval History:     The patient is a very pleasant 88 year old whom I have been following for coronary artery disease and ischemic cardiomyopathy, stable on echocardiogram 9/10/2021 with ejection fraction around 40-45%.  She feels well from a cardiac standpoint with stable dyspnea and no chest discomfort from a cardiac etiology.  She has significant chest pain from thoracic compression.  Blood pressure is well controlled.                     Review of Systems:     Review of Systems:  Skin:  Positive for bruising   Eyes:  Positive for glasses  ENT:  Positive for postnasal drainage;sinus trouble;hearing loss  Respiratory:  Negative    Cardiovascular:    edema;Positive for  Gastroenterology: Positive for heartburn;poor appetite  Genitourinary:  Negative nocturia;urinary frequency  Musculoskeletal:  Positive for back pain;neck pain;joint pain  Neurologic:  Negative    Psychiatric:  Negative anxiety  Heme/Lymph/Imm:  Negative night sweats;easy bruising  Endocrine:  Negative                Physical Exam:     Vitals: /69   Pulse 97   Wt 55.8 kg (123 lb)   SpO2 99%   BMI 20.47 kg/m    Constitutional:  cooperative;in no acute distress        Skin:  warm and dry to the touch, no apparent skin lesions or masses noted        Head:  normocephalic, no masses or lesions        Eyes:       Equal and reactive    Chest:  normal symmetry        Cardiac: regular rhythm tachycardic                Extremities and Back:  no deformities, clubbing,  cyanosis, erythema observed        Neurological:  no gross motor deficits;affect appropriate   walker             Medications:     Current Outpatient Medications   Medication Sig Dispense Refill     acetaminophen (TYLENOL) 325 MG tablet Take 2 tablets (650 mg) by mouth every 6 hours as needed for mild pain       aspirin EC 81 MG EC tablet Take 1 tablet (81 mg) by mouth daily       atorvastatin (LIPITOR) 40 MG tablet TAKE 1 TABLET BY MOUTH ONE TIME DAILY *Need to update lipid panel* 30 tablet 0     carvedilol (COREG) 3.125 MG tablet Take 1 tablet (3.125 mg) by mouth 2 times daily (with meals) 180 tablet 3     cyclobenzaprine (FLEXERIL) 5 MG tablet Take 1 tablet (5 mg) by mouth 3 times daily as needed for muscle spasms 60 tablet 0     doxylamine (UNISOM) 25 MG TABS tablet Take 50 mg by mouth At Bedtime       fluticasone (FLONASE) 50 MCG/ACT nasal spray Spray 1 spray into both nostrils daily       furosemide (LASIX) 20 MG tablet Take 1 tablet (20 mg) by mouth daily Add additional 20mg for increased edema 95 tablet 3     guaiFENesin (MUCINEX) 600 MG 12 hr tablet Take 600 mg by mouth as needed for congestion Reported on 4/11/2017       HYDROcodone-acetaminophen (NORCO)  MG per tablet Take 1 tablet by mouth every 6 hours as needed for severe pain 12 tablet 0     losartan (COZAAR) 25 MG tablet Take 1 tablet (25 mg) by mouth daily 90 tablet 3     pantoprazole (PROTONIX) 40 MG EC tablet Take 1 tablet (40 mg) by mouth 2 times daily 180 tablet 0                Data:   All laboratory data reviewed  No results found for this or any previous visit (from the past 24 hour(s)).    All laboratory data reviewed  Lab Results   Component Value Date    CHOL 153 09/10/2021    CHOL 186 10/31/2019     Lab Results   Component Value Date    HDL 51 09/10/2021    HDL 57 10/31/2019     Lab Results   Component Value Date    LDL 79 09/10/2021    LDL 88 10/31/2019     Lab Results   Component Value Date    TRIG 113 09/10/2021    TRIG 204  10/31/2019     Lab Results   Component Value Date    CHOLHDLRATIO 2.8 11/05/2015     TSH   Date Value Ref Range Status   02/11/2020 0.69 0.40 - 4.00 mU/L Final     Last Basic Metabolic Panel:  Lab Results   Component Value Date     06/10/2021      Lab Results   Component Value Date    POTASSIUM 3.8 06/10/2021     Lab Results   Component Value Date    CHLORIDE 105 06/10/2021     Lab Results   Component Value Date    LIGIA 9.0 06/10/2021     Lab Results   Component Value Date    CO2 20 06/10/2021     Lab Results   Component Value Date    BUN 16 06/10/2021     Lab Results   Component Value Date    CR 0.82 06/10/2021     Lab Results   Component Value Date     06/10/2021     Lab Results   Component Value Date    WBC 9.7 03/11/2020     Lab Results   Component Value Date    RBC 3.12 03/11/2020     Lab Results   Component Value Date    HGB 9.5 03/11/2020     Lab Results   Component Value Date    HCT 31.7 03/11/2020     Lab Results   Component Value Date     03/11/2020     Lab Results   Component Value Date    MCH 30.4 03/11/2020     Lab Results   Component Value Date    MCHC 30.0 03/11/2020     Lab Results   Component Value Date    RDW 15.0 03/11/2020     Lab Results   Component Value Date     03/11/2020

## 2021-09-15 NOTE — LETTER
9/15/2021    Michael Londono MD, MD  [address] [facility] [address]  [facility] [address]    RE: Gosia Alonzo       Dear Colleague,    I had the pleasure of seeing Gosia Alonzo in the [facility].    Cardiology Progress Note          Assessment and Plan:       1. Well compensated mild ischemic cardiomyopathy from LAD disease status post PCI    Continue medical management    Routine follow-up in 1 year per patient request    30 minutes was spent with the patient, precharting and reviewing tests as well as post visit charting all done today..    This note was transcribed using electronic voice recognition software and there may be typographical errors present.                    Interval History:     The patient is a very pleasant 88 year old whom I have been following for coronary artery disease and ischemic cardiomyopathy, stable on echocardiogram 9/10/2021 with ejection fraction around 40-45%.  She feels well from a cardiac standpoint with stable dyspnea and no chest discomfort from a cardiac etiology.  She has significant chest pain from thoracic compression.  Blood pressure is well controlled.                     Review of Systems:     Review of Systems:  Skin:  Positive for bruising   Eyes:  Positive for glasses  ENT:  Positive for postnasal drainage;sinus trouble;hearing loss  Respiratory:  Negative    Cardiovascular:    edema;Positive for  Gastroenterology: Positive for heartburn;poor appetite  Genitourinary:  Negative nocturia;urinary frequency  Musculoskeletal:  Positive for back pain;neck pain;joint pain  Neurologic:  Negative    Psychiatric:  Negative anxiety  Heme/Lymph/Imm:  Negative night sweats;easy bruising  Endocrine:  Negative                Physical Exam:     Vitals: /69   Pulse 97   Wt 55.8 kg (123 lb)   SpO2 99%   BMI 20.47 kg/m    Constitutional:  cooperative;in no acute distress        Skin:  warm and dry to the touch, no  apparent skin lesions or masses noted        Head:  normocephalic, no masses or lesions        Eyes:       Equal and reactive    Chest:  normal symmetry        Cardiac: regular rhythm tachycardic                Extremities and Back:  no deformities, clubbing, cyanosis, erythema observed        Neurological:  no gross motor deficits;affect appropriate   walker             Medications:     Current Outpatient Medications   Medication Sig Dispense Refill     acetaminophen (TYLENOL) 325 MG tablet Take 2 tablets (650 mg) by mouth every 6 hours as needed for mild pain       aspirin EC 81 MG EC tablet Take 1 tablet (81 mg) by mouth daily       atorvastatin (LIPITOR) 40 MG tablet TAKE 1 TABLET BY MOUTH ONE TIME DAILY *Need to update lipid panel* 30 tablet 0     carvedilol (COREG) 3.125 MG tablet Take 1 tablet (3.125 mg) by mouth 2 times daily (with meals) 180 tablet 3     cyclobenzaprine (FLEXERIL) 5 MG tablet Take 1 tablet (5 mg) by mouth 3 times daily as needed for muscle spasms 60 tablet 0     doxylamine (UNISOM) 25 MG TABS tablet Take 50 mg by mouth At Bedtime       fluticasone (FLONASE) 50 MCG/ACT nasal spray Spray 1 spray into both nostrils daily       furosemide (LASIX) 20 MG tablet Take 1 tablet (20 mg) by mouth daily Add additional 20mg for increased edema 95 tablet 3     guaiFENesin (MUCINEX) 600 MG 12 hr tablet Take 600 mg by mouth as needed for congestion Reported on 4/11/2017       HYDROcodone-acetaminophen (NORCO)  MG per tablet Take 1 tablet by mouth every 6 hours as needed for severe pain 12 tablet 0     losartan (COZAAR) 25 MG tablet Take 1 tablet (25 mg) by mouth daily 90 tablet 3     pantoprazole (PROTONIX) 40 MG EC tablet Take 1 tablet (40 mg) by mouth 2 times daily 180 tablet 0                Data:   All laboratory data reviewed  No results found for this or any previous visit (from the past 24 hour(s)).    All laboratory data reviewed  Lab Results   Component Value Date    CHOL 153 09/10/2021     CHOL 186 10/31/2019     Lab Results   Component Value Date    HDL 51 09/10/2021    HDL 57 10/31/2019     Lab Results   Component Value Date    LDL 79 09/10/2021    LDL 88 10/31/2019     Lab Results   Component Value Date    TRIG 113 09/10/2021    TRIG 204 10/31/2019     Lab Results   Component Value Date    CHOLHDLRATIO 2.8 11/05/2015     TSH   Date Value Ref Range Status   02/11/2020 0.69 0.40 - 4.00 mU/L Final     Last Basic Metabolic Panel:  Lab Results   Component Value Date     06/10/2021      Lab Results   Component Value Date    POTASSIUM 3.8 06/10/2021     Lab Results   Component Value Date    CHLORIDE 105 06/10/2021     Lab Results   Component Value Date    LIGIA 9.0 06/10/2021     Lab Results   Component Value Date    CO2 20 06/10/2021     Lab Results   Component Value Date    BUN 16 06/10/2021     Lab Results   Component Value Date    CR 0.82 06/10/2021     Lab Results   Component Value Date     06/10/2021     Lab Results   Component Value Date    WBC 9.7 03/11/2020     Lab Results   Component Value Date    RBC 3.12 03/11/2020     Lab Results   Component Value Date    HGB 9.5 03/11/2020     Lab Results   Component Value Date    HCT 31.7 03/11/2020     Lab Results   Component Value Date     03/11/2020     Lab Results   Component Value Date    MCH 30.4 03/11/2020     Lab Results   Component Value Date    MCHC 30.0 03/11/2020     Lab Results   Component Value Date    RDW 15.0 03/11/2020     Lab Results   Component Value Date     03/11/2020                   Thank you for allowing me to participate in the care of your patient.      Sincerely,     Hiren Collins MD     [facility]  cc:   Hiren Collins MD  [address]  [place] [id number]

## 2021-10-25 DIAGNOSIS — I25.10 CORONARY ARTERY DISEASE INVOLVING NATIVE CORONARY ARTERY OF NATIVE HEART WITHOUT ANGINA PECTORIS: ICD-10-CM

## 2021-10-25 RX ORDER — LOSARTAN POTASSIUM 25 MG/1
25 TABLET ORAL DAILY
Qty: 90 TABLET | Refills: 3 | Status: SHIPPED | OUTPATIENT
Start: 2021-10-25 | End: 2022-01-01

## 2021-10-26 ENCOUNTER — TELEPHONE (OUTPATIENT)
Dept: INTERNAL MEDICINE | Facility: CLINIC | Age: 86
End: 2021-10-26

## 2021-10-26 DIAGNOSIS — I21.09 ST ELEVATION MYOCARDIAL INFARCTION (STEMI) INVOLVING OTHER CORONARY ARTERY OF ANTERIOR WALL (H): ICD-10-CM

## 2021-10-26 DIAGNOSIS — I25.5 ISCHEMIC CARDIOMYOPATHY: ICD-10-CM

## 2021-10-26 DIAGNOSIS — A04.72 CLOSTRIDIUM DIFFICILE DIARRHEA: ICD-10-CM

## 2021-10-26 RX ORDER — CARVEDILOL 3.12 MG/1
3.12 TABLET ORAL 2 TIMES DAILY WITH MEALS
Qty: 180 TABLET | Refills: 3 | Status: SHIPPED | OUTPATIENT
Start: 2021-10-26

## 2021-10-26 RX ORDER — ATORVASTATIN CALCIUM 40 MG/1
TABLET, FILM COATED ORAL
Qty: 90 TABLET | Refills: 1 | Status: SHIPPED | OUTPATIENT
Start: 2021-10-26 | End: 2022-01-01

## 2021-10-26 NOTE — TELEPHONE ENCOUNTER
Refill request from  [facility]. Pharmacy confirmed.     Atorvastatin Calcium Oral Tablet 40 MG.   patient wants 90 day supply please.

## 2021-10-30 ENCOUNTER — HEALTH MAINTENANCE LETTER (OUTPATIENT)
Age: 86
End: 2021-10-30

## 2021-11-08 DIAGNOSIS — I25.5 ISCHEMIC CARDIOMYOPATHY: ICD-10-CM

## 2021-11-08 RX ORDER — FUROSEMIDE 20 MG
20 TABLET ORAL DAILY
Qty: 95 TABLET | Refills: 0 | Status: SHIPPED | OUTPATIENT
Start: 2021-11-08 | End: 2021-12-27

## 2021-11-09 ENCOUNTER — HOSPITAL ENCOUNTER (INPATIENT)
Facility: CLINIC | Age: 86
LOS: 2 days | Discharge: HOME | DRG: 596 | End: 2021-11-12
Attending: EMERGENCY MEDICINE | Admitting: INTERNAL MEDICINE
Payer: MEDICARE

## 2021-11-09 ENCOUNTER — APPOINTMENT (OUTPATIENT)
Dept: CT IMAGING | Facility: CLINIC | Age: 86
DRG: 596 | End: 2021-11-09
Attending: EMERGENCY MEDICINE
Payer: MEDICARE

## 2021-11-09 DIAGNOSIS — B02.9 HERPES ZOSTER INFECTION OF THORACIC REGION: Primary | ICD-10-CM

## 2021-11-09 DIAGNOSIS — R07.9 CHEST PAIN, UNSPECIFIED TYPE: ICD-10-CM

## 2021-11-09 DIAGNOSIS — D50.9 IRON DEFICIENCY ANEMIA, UNSPECIFIED IRON DEFICIENCY ANEMIA TYPE: ICD-10-CM

## 2021-11-09 DIAGNOSIS — M54.6 ACUTE BILATERAL THORACIC BACK PAIN: ICD-10-CM

## 2021-11-09 LAB
ANION GAP SERPL CALCULATED.3IONS-SCNC: 6 MMOL/L (ref 3–14)
ATRIAL RATE - MUSE: 94 BPM
BASOPHILS # BLD AUTO: 0 10E3/UL (ref 0–0.2)
BASOPHILS NFR BLD AUTO: 1 %
BUN SERPL-MCNC: 20 MG/DL (ref 7–30)
CALCIUM SERPL-MCNC: 8.3 MG/DL (ref 8.5–10.1)
CHLORIDE BLD-SCNC: 108 MMOL/L (ref 94–109)
CO2 SERPL-SCNC: 21 MMOL/L (ref 20–32)
CREAT SERPL-MCNC: 0.94 MG/DL (ref 0.52–1.04)
D DIMER PPP FEU-MCNC: 1.99 UG/ML FEU (ref 0–0.5)
DIASTOLIC BLOOD PRESSURE - MUSE: NORMAL MMHG
EOSINOPHIL # BLD AUTO: 0.2 10E3/UL (ref 0–0.7)
EOSINOPHIL NFR BLD AUTO: 3 %
ERYTHROCYTE [DISTWIDTH] IN BLOOD BY AUTOMATED COUNT: 17.9 % (ref 10–15)
GFR SERPL CREATININE-BSD FRML MDRD: 54 ML/MIN/1.73M2
GLUCOSE BLD-MCNC: 166 MG/DL (ref 70–99)
HCT VFR BLD AUTO: 28.2 % (ref 35–47)
HGB BLD-MCNC: 8.4 G/DL (ref 11.7–15.7)
HOLD SPECIMEN: NORMAL
IMM GRANULOCYTES # BLD: 0 10E3/UL
IMM GRANULOCYTES NFR BLD: 1 %
INTERPRETATION ECG - MUSE: NORMAL
LIPASE SERPL-CCNC: 106 U/L (ref 73–393)
LYMPHOCYTES # BLD AUTO: 1.6 10E3/UL (ref 0.8–5.3)
LYMPHOCYTES NFR BLD AUTO: 25 %
MCH RBC QN AUTO: 25.3 PG (ref 26.5–33)
MCHC RBC AUTO-ENTMCNC: 29.8 G/DL (ref 31.5–36.5)
MCV RBC AUTO: 85 FL (ref 78–100)
MONOCYTES # BLD AUTO: 0.7 10E3/UL (ref 0–1.3)
MONOCYTES NFR BLD AUTO: 11 %
NEUTROPHILS # BLD AUTO: 3.9 10E3/UL (ref 1.6–8.3)
NEUTROPHILS NFR BLD AUTO: 59 %
NRBC # BLD AUTO: 0 10E3/UL
NRBC BLD AUTO-RTO: 0 /100
P AXIS - MUSE: 43 DEGREES
PLATELET # BLD AUTO: 327 10E3/UL (ref 150–450)
POTASSIUM BLD-SCNC: 4.3 MMOL/L (ref 3.4–5.3)
PR INTERVAL - MUSE: 164 MS
QRS DURATION - MUSE: 72 MS
QT - MUSE: 364 MS
QTC - MUSE: 455 MS
R AXIS - MUSE: 5 DEGREES
RBC # BLD AUTO: 3.32 10E6/UL (ref 3.8–5.2)
SODIUM SERPL-SCNC: 135 MMOL/L (ref 133–144)
SYSTOLIC BLOOD PRESSURE - MUSE: NORMAL MMHG
T AXIS - MUSE: 67 DEGREES
TROPONIN I SERPL-MCNC: <0.015 UG/L (ref 0–0.04)
VENTRICULAR RATE- MUSE: 94 BPM
WBC # BLD AUTO: 6.4 10E3/UL (ref 4–11)

## 2021-11-09 PROCEDURE — 80048 BASIC METABOLIC PNL TOTAL CA: CPT | Performed by: EMERGENCY MEDICINE

## 2021-11-09 PROCEDURE — 96376 TX/PRO/DX INJ SAME DRUG ADON: CPT

## 2021-11-09 PROCEDURE — 93005 ELECTROCARDIOGRAM TRACING: CPT

## 2021-11-09 PROCEDURE — 250N000013 HC RX MED GY IP 250 OP 250 PS 637: Performed by: EMERGENCY MEDICINE

## 2021-11-09 PROCEDURE — 85379 FIBRIN DEGRADATION QUANT: CPT | Performed by: EMERGENCY MEDICINE

## 2021-11-09 PROCEDURE — 99285 EMERGENCY DEPT VISIT HI MDM: CPT | Mod: 25

## 2021-11-09 PROCEDURE — 250N000011 HC RX IP 250 OP 636: Performed by: EMERGENCY MEDICINE

## 2021-11-09 PROCEDURE — 96374 THER/PROPH/DIAG INJ IV PUSH: CPT | Mod: 59

## 2021-11-09 PROCEDURE — 84484 ASSAY OF TROPONIN QUANT: CPT | Performed by: EMERGENCY MEDICINE

## 2021-11-09 PROCEDURE — 36415 COLL VENOUS BLD VENIPUNCTURE: CPT | Performed by: EMERGENCY MEDICINE

## 2021-11-09 PROCEDURE — C9803 HOPD COVID-19 SPEC COLLECT: HCPCS

## 2021-11-09 PROCEDURE — 71275 CT ANGIOGRAPHY CHEST: CPT | Mod: ME

## 2021-11-09 PROCEDURE — 83690 ASSAY OF LIPASE: CPT | Performed by: EMERGENCY MEDICINE

## 2021-11-09 PROCEDURE — 85025 COMPLETE CBC W/AUTO DIFF WBC: CPT | Performed by: EMERGENCY MEDICINE

## 2021-11-09 PROCEDURE — 250N000009 HC RX 250: Performed by: EMERGENCY MEDICINE

## 2021-11-09 PROCEDURE — G1004 CDSM NDSC: HCPCS

## 2021-11-09 RX ORDER — NITROGLYCERIN 0.4 MG/1
0.4 TABLET SUBLINGUAL ONCE
Status: COMPLETED | OUTPATIENT
Start: 2021-11-09 | End: 2021-11-09

## 2021-11-09 RX ORDER — IOPAMIDOL 755 MG/ML
58 INJECTION, SOLUTION INTRAVASCULAR ONCE
Status: COMPLETED | OUTPATIENT
Start: 2021-11-09 | End: 2021-11-09

## 2021-11-09 RX ORDER — HYDROMORPHONE HYDROCHLORIDE 1 MG/ML
0.5 INJECTION, SOLUTION INTRAMUSCULAR; INTRAVENOUS; SUBCUTANEOUS
Status: COMPLETED | OUTPATIENT
Start: 2021-11-09 | End: 2021-11-09

## 2021-11-09 RX ORDER — HYDROMORPHONE HCL IN WATER/PF 6 MG/30 ML
0.2 PATIENT CONTROLLED ANALGESIA SYRINGE INTRAVENOUS
Status: COMPLETED | OUTPATIENT
Start: 2021-11-09 | End: 2021-11-09

## 2021-11-09 RX ADMIN — NITROGLYCERIN 0.4 MG: 0.4 TABLET SUBLINGUAL at 22:24

## 2021-11-09 RX ADMIN — IOPAMIDOL 58 ML: 755 INJECTION, SOLUTION INTRAVENOUS at 23:05

## 2021-11-09 RX ADMIN — HYDROMORPHONE HYDROCHLORIDE 0.2 MG: 0.2 INJECTION, SOLUTION INTRAMUSCULAR; INTRAVENOUS; SUBCUTANEOUS at 22:27

## 2021-11-09 RX ADMIN — HYDROMORPHONE HYDROCHLORIDE 0.5 MG: 1 INJECTION, SOLUTION INTRAMUSCULAR; INTRAVENOUS; SUBCUTANEOUS at 23:35

## 2021-11-09 RX ADMIN — SODIUM CHLORIDE 83 ML: 900 INJECTION INTRAVENOUS at 23:05

## 2021-11-09 ASSESSMENT — ENCOUNTER SYMPTOMS
VOMITING: 0
SHORTNESS OF BREATH: 0
DIAPHORESIS: 1
NAUSEA: 1
COUGH: 0
NUMBNESS: 0

## 2021-11-09 ASSESSMENT — MIFFLIN-ST. JEOR: SCORE: 970.66

## 2021-11-10 ENCOUNTER — APPOINTMENT (OUTPATIENT)
Dept: CT IMAGING | Facility: CLINIC | Age: 86
DRG: 596 | End: 2021-11-10
Attending: EMERGENCY MEDICINE
Payer: MEDICARE

## 2021-11-10 PROBLEM — B02.9 HERPES ZOSTER INFECTION OF THORACIC REGION: Status: ACTIVE | Noted: 2021-11-10

## 2021-11-10 PROBLEM — M54.6 ACUTE BILATERAL THORACIC BACK PAIN: Status: ACTIVE | Noted: 2021-11-10

## 2021-11-10 LAB
CRP SERPL-MCNC: 8.2 MG/L (ref 0–8)
FERRITIN SERPL-MCNC: 21 NG/ML (ref 8–252)
FOLATE SERPL-MCNC: 4.8 NG/ML
IRON SATN MFR SERPL: 8 % (ref 15–46)
IRON SERPL-MCNC: 26 UG/DL (ref 35–180)
SARS-COV-2 RNA RESP QL NAA+PROBE: NEGATIVE
TIBC SERPL-MCNC: 319 UG/DL (ref 240–430)
TROPONIN I SERPL-MCNC: <0.015 UG/L (ref 0–0.04)
VIT B12 SERPL-MCNC: 266 PG/ML (ref 193–986)

## 2021-11-10 PROCEDURE — 72128 CT CHEST SPINE W/O DYE: CPT | Mod: MG

## 2021-11-10 PROCEDURE — 250N000011 HC RX IP 250 OP 636: Performed by: INTERNAL MEDICINE

## 2021-11-10 PROCEDURE — 120N000004 HC R&B MS OVERFLOW

## 2021-11-10 PROCEDURE — 96375 TX/PRO/DX INJ NEW DRUG ADDON: CPT

## 2021-11-10 PROCEDURE — 83550 IRON BINDING TEST: CPT | Performed by: INTERNAL MEDICINE

## 2021-11-10 PROCEDURE — 250N000013 HC RX MED GY IP 250 OP 250 PS 637: Performed by: INTERNAL MEDICINE

## 2021-11-10 PROCEDURE — 84484 ASSAY OF TROPONIN QUANT: CPT | Performed by: INTERNAL MEDICINE

## 2021-11-10 PROCEDURE — 96376 TX/PRO/DX INJ SAME DRUG ADON: CPT

## 2021-11-10 PROCEDURE — 87635 SARS-COV-2 COVID-19 AMP PRB: CPT | Performed by: EMERGENCY MEDICINE

## 2021-11-10 PROCEDURE — 250N000013 HC RX MED GY IP 250 OP 250 PS 637: Performed by: PHYSICIAN ASSISTANT

## 2021-11-10 PROCEDURE — 258N000003 HC RX IP 258 OP 636: Performed by: HOSPITALIST

## 2021-11-10 PROCEDURE — 250N000011 HC RX IP 250 OP 636: Performed by: EMERGENCY MEDICINE

## 2021-11-10 PROCEDURE — 99223 1ST HOSP IP/OBS HIGH 75: CPT | Mod: AI | Performed by: INTERNAL MEDICINE

## 2021-11-10 PROCEDURE — 36415 COLL VENOUS BLD VENIPUNCTURE: CPT | Performed by: INTERNAL MEDICINE

## 2021-11-10 PROCEDURE — G1004 CDSM NDSC: HCPCS

## 2021-11-10 PROCEDURE — G0378 HOSPITAL OBSERVATION PER HR: HCPCS

## 2021-11-10 PROCEDURE — 250N000013 HC RX MED GY IP 250 OP 250 PS 637: Performed by: HOSPITALIST

## 2021-11-10 PROCEDURE — 96361 HYDRATE IV INFUSION ADD-ON: CPT

## 2021-11-10 PROCEDURE — 82746 ASSAY OF FOLIC ACID SERUM: CPT | Performed by: INTERNAL MEDICINE

## 2021-11-10 PROCEDURE — 86140 C-REACTIVE PROTEIN: CPT | Performed by: HOSPITALIST

## 2021-11-10 PROCEDURE — 99207 PR APP CREDIT; MD BILLING SHARED VISIT: CPT | Performed by: HOSPITALIST

## 2021-11-10 PROCEDURE — 999N000128 HC STATISTIC PERIPHERAL IV START W/O US GUIDANCE

## 2021-11-10 PROCEDURE — 82607 VITAMIN B-12: CPT | Performed by: INTERNAL MEDICINE

## 2021-11-10 PROCEDURE — 99207 PR CDG-CODE CATEGORY CHANGED: CPT | Performed by: INTERNAL MEDICINE

## 2021-11-10 PROCEDURE — 250N000009 HC RX 250: Performed by: HOSPITALIST

## 2021-11-10 PROCEDURE — 82728 ASSAY OF FERRITIN: CPT | Performed by: INTERNAL MEDICINE

## 2021-11-10 RX ORDER — NALOXONE HYDROCHLORIDE 0.4 MG/ML
0.4 INJECTION, SOLUTION INTRAMUSCULAR; INTRAVENOUS; SUBCUTANEOUS
Status: DISCONTINUED | OUTPATIENT
Start: 2021-11-10 | End: 2021-11-12 | Stop reason: HOSPADM

## 2021-11-10 RX ORDER — HYDROMORPHONE HYDROCHLORIDE 1 MG/ML
0.5 INJECTION, SOLUTION INTRAMUSCULAR; INTRAVENOUS; SUBCUTANEOUS
Status: DISCONTINUED | OUTPATIENT
Start: 2021-11-10 | End: 2021-11-12 | Stop reason: HOSPADM

## 2021-11-10 RX ORDER — SODIUM CHLORIDE 9 MG/ML
INJECTION, SOLUTION INTRAVENOUS CONTINUOUS
Status: ACTIVE | OUTPATIENT
Start: 2021-11-10 | End: 2021-11-10

## 2021-11-10 RX ORDER — NALOXONE HYDROCHLORIDE 0.4 MG/ML
0.2 INJECTION, SOLUTION INTRAMUSCULAR; INTRAVENOUS; SUBCUTANEOUS
Status: DISCONTINUED | OUTPATIENT
Start: 2021-11-10 | End: 2021-11-12 | Stop reason: HOSPADM

## 2021-11-10 RX ORDER — LOSARTAN POTASSIUM 25 MG/1
25 TABLET ORAL DAILY
Status: DISCONTINUED | OUTPATIENT
Start: 2021-11-10 | End: 2021-11-12 | Stop reason: HOSPADM

## 2021-11-10 RX ORDER — PANTOPRAZOLE SODIUM 40 MG/1
40 TABLET, DELAYED RELEASE ORAL 2 TIMES DAILY
Status: DISCONTINUED | OUTPATIENT
Start: 2021-11-10 | End: 2021-11-12 | Stop reason: HOSPADM

## 2021-11-10 RX ORDER — ONDANSETRON 2 MG/ML
4 INJECTION INTRAMUSCULAR; INTRAVENOUS
Status: DISCONTINUED | OUTPATIENT
Start: 2021-11-10 | End: 2021-11-10

## 2021-11-10 RX ORDER — HYDROCODONE BITARTRATE AND ACETAMINOPHEN 10; 325 MG/1; MG/1
1 TABLET ORAL EVERY 6 HOURS PRN
Status: DISCONTINUED | OUTPATIENT
Start: 2021-11-10 | End: 2021-11-12 | Stop reason: HOSPADM

## 2021-11-10 RX ORDER — ONDANSETRON 4 MG/1
4 TABLET, ORALLY DISINTEGRATING ORAL EVERY 6 HOURS PRN
Status: DISCONTINUED | OUTPATIENT
Start: 2021-11-10 | End: 2021-11-12 | Stop reason: HOSPADM

## 2021-11-10 RX ORDER — TIZANIDINE 2 MG/1
2 TABLET ORAL EVERY 6 HOURS PRN
Status: DISCONTINUED | OUTPATIENT
Start: 2021-11-10 | End: 2021-11-12 | Stop reason: HOSPADM

## 2021-11-10 RX ORDER — CARVEDILOL 3.12 MG/1
3.12 TABLET ORAL 2 TIMES DAILY WITH MEALS
Status: DISCONTINUED | OUTPATIENT
Start: 2021-11-10 | End: 2021-11-12 | Stop reason: HOSPADM

## 2021-11-10 RX ORDER — AMOXICILLIN 250 MG
2 CAPSULE ORAL 2 TIMES DAILY PRN
Status: DISCONTINUED | OUTPATIENT
Start: 2021-11-10 | End: 2021-11-12 | Stop reason: HOSPADM

## 2021-11-10 RX ORDER — PROCHLORPERAZINE 25 MG
12.5 SUPPOSITORY, RECTAL RECTAL EVERY 12 HOURS PRN
Status: DISCONTINUED | OUTPATIENT
Start: 2021-11-10 | End: 2021-11-12 | Stop reason: HOSPADM

## 2021-11-10 RX ORDER — ATORVASTATIN CALCIUM 40 MG/1
40 TABLET, FILM COATED ORAL AT BEDTIME
Status: DISCONTINUED | OUTPATIENT
Start: 2021-11-10 | End: 2021-11-12 | Stop reason: HOSPADM

## 2021-11-10 RX ORDER — GABAPENTIN 100 MG/1
100 CAPSULE ORAL 3 TIMES DAILY
Status: DISCONTINUED | OUTPATIENT
Start: 2021-11-10 | End: 2021-11-12 | Stop reason: HOSPADM

## 2021-11-10 RX ORDER — FLUTICASONE PROPIONATE 50 MCG
1 SPRAY, SUSPENSION (ML) NASAL DAILY
Status: DISCONTINUED | OUTPATIENT
Start: 2021-11-10 | End: 2021-11-12 | Stop reason: HOSPADM

## 2021-11-10 RX ORDER — KETOROLAC TROMETHAMINE 15 MG/ML
15 INJECTION, SOLUTION INTRAMUSCULAR; INTRAVENOUS ONCE
Status: COMPLETED | OUTPATIENT
Start: 2021-11-10 | End: 2021-11-10

## 2021-11-10 RX ORDER — HYDROMORPHONE HYDROCHLORIDE 1 MG/ML
0.5 INJECTION, SOLUTION INTRAMUSCULAR; INTRAVENOUS; SUBCUTANEOUS
Status: COMPLETED | OUTPATIENT
Start: 2021-11-10 | End: 2021-11-10

## 2021-11-10 RX ORDER — PROCHLORPERAZINE MALEATE 5 MG
5 TABLET ORAL EVERY 6 HOURS PRN
Status: DISCONTINUED | OUTPATIENT
Start: 2021-11-10 | End: 2021-11-12 | Stop reason: HOSPADM

## 2021-11-10 RX ORDER — ACETAMINOPHEN 325 MG/1
650 TABLET ORAL EVERY 6 HOURS PRN
Status: DISCONTINUED | OUTPATIENT
Start: 2021-11-10 | End: 2021-11-10

## 2021-11-10 RX ORDER — ASPIRIN 81 MG/1
81 TABLET ORAL DAILY
Status: DISCONTINUED | OUTPATIENT
Start: 2021-11-10 | End: 2021-11-12 | Stop reason: HOSPADM

## 2021-11-10 RX ORDER — ONDANSETRON 2 MG/ML
4 INJECTION INTRAMUSCULAR; INTRAVENOUS EVERY 6 HOURS PRN
Status: DISCONTINUED | OUTPATIENT
Start: 2021-11-10 | End: 2021-11-12 | Stop reason: HOSPADM

## 2021-11-10 RX ORDER — HYDROCODONE BITARTRATE AND ACETAMINOPHEN 10; 325 MG/1; MG/1
1 TABLET ORAL EVERY 6 HOURS PRN
Status: DISCONTINUED | OUTPATIENT
Start: 2021-11-10 | End: 2021-11-10

## 2021-11-10 RX ORDER — VALACYCLOVIR HYDROCHLORIDE 1 G/1
1000 TABLET, FILM COATED ORAL EVERY 12 HOURS SCHEDULED
Status: DISCONTINUED | OUTPATIENT
Start: 2021-11-10 | End: 2021-11-12 | Stop reason: HOSPADM

## 2021-11-10 RX ORDER — CYCLOBENZAPRINE HCL 5 MG
5 TABLET ORAL 3 TIMES DAILY PRN
Status: DISCONTINUED | OUTPATIENT
Start: 2021-11-10 | End: 2021-11-10

## 2021-11-10 RX ORDER — OXYCODONE HYDROCHLORIDE 5 MG/1
5 TABLET ORAL
Status: DISCONTINUED | OUTPATIENT
Start: 2021-11-10 | End: 2021-11-10

## 2021-11-10 RX ORDER — LIDOCAINE 40 MG/G
CREAM TOPICAL 2 TIMES DAILY PRN
Status: COMPLETED | OUTPATIENT
Start: 2021-11-10 | End: 2021-11-10

## 2021-11-10 RX ORDER — AMOXICILLIN 250 MG
1 CAPSULE ORAL 2 TIMES DAILY PRN
Status: DISCONTINUED | OUTPATIENT
Start: 2021-11-10 | End: 2021-11-12 | Stop reason: HOSPADM

## 2021-11-10 RX ADMIN — KETOROLAC TROMETHAMINE 15 MG: 15 INJECTION, SOLUTION INTRAMUSCULAR; INTRAVENOUS at 06:28

## 2021-11-10 RX ADMIN — HYDROCODONE BITARTRATE AND ACETAMINOPHEN 1 TABLET: 10; 325 TABLET ORAL at 17:06

## 2021-11-10 RX ADMIN — CARVEDILOL 3.12 MG: 3.12 TABLET, FILM COATED ORAL at 17:07

## 2021-11-10 RX ADMIN — VALACYCLOVIR HYDROCHLORIDE 1000 MG: 1 TABLET, FILM COATED ORAL at 10:48

## 2021-11-10 RX ADMIN — OXYCODONE HYDROCHLORIDE 5 MG: 5 TABLET ORAL at 04:26

## 2021-11-10 RX ADMIN — PANTOPRAZOLE SODIUM 40 MG: 40 TABLET, DELAYED RELEASE ORAL at 21:18

## 2021-11-10 RX ADMIN — LOSARTAN POTASSIUM 25 MG: 25 TABLET, FILM COATED ORAL at 09:10

## 2021-11-10 RX ADMIN — GABAPENTIN 100 MG: 100 CAPSULE ORAL at 09:10

## 2021-11-10 RX ADMIN — SODIUM CHLORIDE: 9 INJECTION, SOLUTION INTRAVENOUS at 09:10

## 2021-11-10 RX ADMIN — HYDROCODONE BITARTRATE AND ACETAMINOPHEN 1 TABLET: 10; 325 TABLET ORAL at 12:19

## 2021-11-10 RX ADMIN — HYDROMORPHONE HYDROCHLORIDE 0.5 MG: 1 INJECTION, SOLUTION INTRAMUSCULAR; INTRAVENOUS; SUBCUTANEOUS at 12:52

## 2021-11-10 RX ADMIN — PROCHLORPERAZINE MALEATE 5 MG: 5 TABLET ORAL at 13:41

## 2021-11-10 RX ADMIN — TIZANIDINE 2 MG: 2 TABLET ORAL at 07:54

## 2021-11-10 RX ADMIN — ONDANSETRON 4 MG: 2 INJECTION INTRAMUSCULAR; INTRAVENOUS at 03:15

## 2021-11-10 RX ADMIN — HYDROMORPHONE HYDROCHLORIDE 0.5 MG: 1 INJECTION, SOLUTION INTRAMUSCULAR; INTRAVENOUS; SUBCUTANEOUS at 05:02

## 2021-11-10 RX ADMIN — ASPIRIN 81 MG: 81 TABLET, COATED ORAL at 07:46

## 2021-11-10 RX ADMIN — PANTOPRAZOLE SODIUM 40 MG: 40 TABLET, DELAYED RELEASE ORAL at 07:46

## 2021-11-10 RX ADMIN — HYDROMORPHONE HYDROCHLORIDE 0.5 MG: 1 INJECTION, SOLUTION INTRAMUSCULAR; INTRAVENOUS; SUBCUTANEOUS at 07:46

## 2021-11-10 RX ADMIN — HYDROCODONE BITARTRATE AND ACETAMINOPHEN 1 TABLET: 10; 325 TABLET ORAL at 23:16

## 2021-11-10 RX ADMIN — ACETAMINOPHEN 650 MG: 325 TABLET, FILM COATED ORAL at 07:54

## 2021-11-10 RX ADMIN — ATORVASTATIN CALCIUM 40 MG: 40 TABLET, FILM COATED ORAL at 22:51

## 2021-11-10 RX ADMIN — HYDROMORPHONE HYDROCHLORIDE 1 MG: 1 INJECTION, SOLUTION INTRAMUSCULAR; INTRAVENOUS; SUBCUTANEOUS at 02:25

## 2021-11-10 RX ADMIN — LIDOCAINE: 40 CREAM TOPICAL at 11:33

## 2021-11-10 RX ADMIN — CARVEDILOL 3.12 MG: 3.12 TABLET, FILM COATED ORAL at 09:09

## 2021-11-10 RX ADMIN — GABAPENTIN 100 MG: 100 CAPSULE ORAL at 14:58

## 2021-11-10 RX ADMIN — VALACYCLOVIR HYDROCHLORIDE 1000 MG: 1 TABLET, FILM COATED ORAL at 21:18

## 2021-11-10 RX ADMIN — GABAPENTIN 100 MG: 100 CAPSULE ORAL at 21:18

## 2021-11-10 RX ADMIN — HYDROMORPHONE HYDROCHLORIDE 0.5 MG: 1 INJECTION, SOLUTION INTRAMUSCULAR; INTRAVENOUS; SUBCUTANEOUS at 01:37

## 2021-11-10 ASSESSMENT — ACTIVITIES OF DAILY LIVING (ADL)
HEARING_DIFFICULTY_OR_DEAF: YES
ADLS_ACUITY_SCORE: 16
DOING_ERRANDS_INDEPENDENTLY_DIFFICULTY: NO
ADLS_ACUITY_SCORE: 16
VISION_MANAGEMENT: EYEGLASSES
ADLS_ACUITY_SCORE: 13
PATIENT_/_FAMILY_COMMUNICATION_STYLE: SPOKEN LANGUAGE (ENGLISH OR BILINGUAL)
DIFFICULTY_COMMUNICATING: NO
ADLS_ACUITY_SCORE: 15
WEAR_GLASSES_OR_BLIND: YES
ADLS_ACUITY_SCORE: 13
ADLS_ACUITY_SCORE: 15
DIFFICULTY_EATING/SWALLOWING: NO
ADLS_ACUITY_SCORE: 16
DESCRIBE_HEARING_LOSS: BILATERAL HEARING LOSS
WALKING_OR_CLIMBING_STAIRS_DIFFICULTY: YES
CONCENTRATING,_REMEMBERING_OR_MAKING_DECISIONS_DIFFICULTY: NO
ADLS_ACUITY_SCORE: 16
FALL_HISTORY_WITHIN_LAST_SIX_MONTHS: NO
WERE_AUXILIARY_AIDS_OFFERED?: NO
THE_FOLLOWING_AIDS_WERE_PROVIDED;: PATIENT DECLINED OFFER OF AUXILIARY AIDS
ADLS_ACUITY_SCORE: 13
WHICH_OF_THE_ABOVE_FUNCTIONAL_RISKS_HAD_A_RECENT_ONSET_OR_CHANGE?: AMBULATION
ADLS_ACUITY_SCORE: 16
EQUIPMENT_CURRENTLY_USED_AT_HOME: WALKER, ROLLING
ADLS_ACUITY_SCORE: 16
DRESSING/BATHING_DIFFICULTY: NO
PATIENT'S_PREFERRED_MEANS_OF_COMMUNICATION: VERBAL
TOILETING_ISSUES: NO
WALKING_OR_CLIMBING_STAIRS: AMBULATION DIFFICULTY, REQUIRES EQUIPMENT

## 2021-11-10 NOTE — ED NOTES
"Back from CT. Reports back pain is \"just awful... just terrible\". Meds administered per orders. Pt readjusted in bed.   "
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
## 2021-11-10 NOTE — PLAN OF CARE
PRIMARY DIAGNOSIS: ACUTE PAIN  OUTPATIENT/OBSERVATION GOALS TO BE MET BEFORE DISCHARGE:  1. Pain Status: No improvement noted. Consider adjustment in pain regimen.    2. Return to near baseline physical activity: No    3. Cleared for discharge by consultants (if involved): No    Discharge Planner Nurse   Safe discharge environment identified: No  Barriers to discharge: Yes       Entered by: Mina Szymanski 11/10/2021 6:15 AM     Please review provider order for any additional goals.   Nurse to notify provider when observation goals have been met and patient is ready for discharge.

## 2021-11-10 NOTE — ED NOTES
Bed: ED28  Expected date:   Expected time:   Means of arrival:   Comments:  Lanny [address] 88f chest pain, hx of MI

## 2021-11-10 NOTE — ED NOTES
"Pt reports the pain comes and goes. Reports she would like more pain meds at this time if possible. Pain is still sharp, midsternal, \"comes and goes\".  "

## 2021-11-10 NOTE — PLAN OF CARE
Observation goals  PRIOR TO DISCHARGE        Comments: -diagnostic tests and consults completed and resulted - not met  -vital signs normal or at patient baseline - met  -adequate pain control on oral analgesics - not met  -returns to baseline functional status - not met  -safe disposition plan has been identified - not met  Nurse to notify provider when observation goals have been met and patient is ready for discharge.
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
## 2021-11-10 NOTE — PROVIDER NOTIFICATION
MD Notification    Notified Person: MD    Notified Person Name: Dr. MARÍA Gregory    Notification Date/Time: 11/10/2021 @ 04:59    Notification Interaction: AMCOM    Purpose of Notification: pt c/o nausea. Need prn compazine as 2nd choice    Orders Received: compazine prn (SL, IV, supp)    Comments:

## 2021-11-10 NOTE — ED PROVIDER NOTES
History   Chief Complaint:  Chest Pain     HPI   Gosia Alonzo is a 88 year old female with history of ischemic cardiomyopathy, CKD, type II diabetes, breast cancer, DVT and atrial fibrillation   who presents with chest pain. The patient reports that this morning she began having mid sternal chest pain that she descries as feeling like someone is stabbing her with a poker. This pain has been constant and steady since it began this morning. Her chest is also tender to the touch and the pain goes through her chest to her back. She also mentions some associated nausea and diaphoresis. The patient has a history of a myocardial infarction and states that her pain today is similar, though she did have arm pain with her prior heart attack. She additionally mentions that she broke 2 thoracic vertebra this summer and that her pain is similar to this. The patient did take 1 tab of nitroglycerin at home and this helped. EMS gave the patient another dose of nitroglycerin and 324 mg of Aspirin. The patient denies any cough, shortness of breath, vomiting, numbness or tingling.     Review of Systems   Constitutional: Positive for diaphoresis.   Respiratory: Negative for cough and shortness of breath.    Cardiovascular: Positive for chest pain.   Gastrointestinal: Positive for nausea. Negative for vomiting.   Neurological: Negative for numbness.   All other systems reviewed and are negative.    Allergies:  Codeine  Escitalopram Oxalate  Esomeprazole Magnesium Trihydrate  Imdur [Isosorbide]  Meperidine Hcl  Morphine Hcl  Oxycodone  Pentazocine  Propoxyphene Hcl  Sumatriptan Succinate    Medications:  Aspirin 81 mg   Lipitor  Coreg  Flexeril  Unisom  Flonase  Lasix  Mucinex  Norco  Cozaar  Protonix     Past Medical History:     Allergic rhinitis  Arthritis  Coronary artery disease   CKD  Diabetes mellitus type II  Diverticula of colon  Edema  Gastrointestinal bleeding  Hiatal hernia  Blood transfusion  Hypertension  Infected  "right knee prosthesis  Right breast cancer   Ischemic cardiomyopathy  Migraine  NSTEMI  Obesity  Osteoporosis  Anemia  Hyperlipidemia  Paroxysmal atrial fibrillation  Pericarditis  DVT  Pyogenic granuloma of skin  Viral warts  Vasomotor rhinitis  Protein-calorie malnutrition  Chronic systolic heart failure  STEMI    Past Surgical History:    Angiogram x2  Appendectomy  Colonoscopy  Esophagoscopy, gastroscopy, duodenoscopy, combined  Head and neck surgery  Cornea clearing  Right rotator cuff repair   Hysterectomy  Right knee surgery x5  Right shoulder surgery x2  Right mastectomy   Left elbow surgery  Lumbar fusion   Rib resection      Family History:    Father: coronary artery disease  Mother: pancreatic cancer  Brother: colon cancer  Sister: hypertension    Social History:  The patient presents with her son.     Physical Exam     Patient Vitals for the past 24 hrs:   BP Temp Temp src Pulse Resp SpO2 Height Weight   11/09/21 2330 133/76 -- -- 87 14 100 % -- --   11/09/21 2230 -- -- -- 106 26 99 % -- --   11/09/21 2225 128/81 -- -- 90 30 98 % -- --   11/09/21 2205 135/85 -- -- 95 19 98 % -- --   11/09/21 2151 -- 98.6  F (37  C) Oral 99 20 100 % 1.651 m (5' 5\") 54 kg (119 lb)       Physical Exam  General: Patient is awake, alert and interactive when I enter the room  Head: The scalp, face, and head appear normal  Eyes: The pupils are equal, round, and reactive to light. Conjunctivae and sclerae are normal  Neck: Normal range of motion.  CV: Regular rate and rhythm. Peripheral pulses including radial pulses are symmetric.   Resp: Lungs are clear without wheezes or rales. No respiratory distress.   GI: Abdomen is soft, no rigidity, guarding, or rebound. No distension. No tenderness to palpation in any quadrant.     MS: Chest wall is tender to palpation. No asymmetric leg swelling, calf or thigh tenderness.    Skin: No rash or lesions noted. Normal capillary refill noted  Neuro: Speech is normal and fluent. Face is " symmetric. Moving all extremities.   Psych:  Normal affect.  Appropriate interactions.    Emergency Department Course   ECG  ECG obtained at 2159, ECG read at 2203  Sinus rhythm with premature atrial complexes with aberrant conduction   No significant changes as compared to prior, dated 2/11/2020.  Rate 94 bpm. VT interval 164 ms. QRS duration 72 ms. QT/QTc 364/455 ms. P-R-T axes 43 5 67.     Imaging:  CT Chest Pulmonary Embolism W Contrast  1.  No evidence for pulmonary embolism or other acute abnormality in the chest.  2.  New cystic mass within the left upper quadrant, likely pancreatic in origin. Further characterization with CT or MRI of the abdomen recommended, which could be performed nonurgently.  Reading per radiology.    CT Thoracic Spine WO Contrast  1. No evidence of an acute thoracic fracture.  2. Good anatomic alignment  3. Chronic diffuse compression fractures T4-T12 with no evidence of retropulsion or significant change in height since previous CT.  4.  No significant canal compromise neural foraminal narrowing throughout thoracic spine.   Reading per radiology.     Laboratory:  CBC: WBC 6.4, HGB 8.4 (L),    BMP: Calcium 8.3 (L), Glucose 166 (H), GFR 54 (L) o/w WNL (Creatinine 0.94)     Troponin (Collected 2159): <0.015    Ddimer: 1.99 (H)    Lipase: 106    Asymptomatic COVID19 Virus PCR by nasopharyngeal swab pending     Emergency Department Course:  Reviewed:  I reviewed nursing notes, vitals, past medical history and Care Everywhere    Assessments:  2215 I obtained history and examined the patient as noted above.   0016 I rechecked the patient and explained findings.     Consults:  0043 I spoke with Dr. Gregory, hospitalist, who agreed to admit the patient.     Interventions:  2224 Nitrostat 0.4 mg Sublingual  2227 Dilaudid 0.2 mg IV   2335 Dilaudid 0.5 mg IV     Disposition:  The patient was admitted to the hospital under the care of Dr. Gregory.     [facility] & Plan     Medical  Decision Making:  Gosia Alonzo is a 88 year old female who presents for evaluation of chest pain. A broad differential was of course considered.  Certainly ischemia is in differential but her pain is very reproducible on exam. Although she states that this is similar to pain that she had with previous MI, this seems to be more musculoskeletal in nature. However given her history, cardiac work up will be completed.  EKG did not show any acute signs of ischemia nor dysrhythmia.  Troponin after multiple hours of symptoms is negative.  D-dimer was elevated therefore CT PE study was obtained.  Fortunately there is no evidence of pulmonary embolism, aortic dissection or significant chest or abdominal pathology that would explain her pain.  3 mm pancreatic cyst was noted on CT imaging.  I discussed these findings with the patient and her son and the need for outpatient follow-up. Other etiologies of chest pain considered in this patient included chest wall source, esophageal spasm or GI source, pleuritis, referred pain, etc. My suspicion for significant ACS at this point is very low and given risk/benefit ratio would not heparin.  Patient's pain was difficult to control therefore will be admitted for further pain control and evaluation.  I have strong suspicion that this is referred pain from her previous thoracic vertebrae fractures.  A CT scan of the thoracic spine was obtained which did not show any new fractures. Given the nature and timing of the patient's symptoms, I will admit the patient to the hospital for further workup and treatment  The patient agrees to be admitted and all questions were answered.      Diagnosis:    ICD-10-CM    1. Chest pain, unspecified type  R07.9    2. Acute bilateral thoracic back pain  M54.6        Scribe Disclosure:  MORENITA, Paxton Lawton, am serving as a scribe at 10:03 PM on 11/9/2021 to document services personally performed by Dany Solomon MD based on my observations and the  provider's statements to me.              Dany Solomon MD  11/10/21 0601

## 2021-11-10 NOTE — PHARMACY-ADMISSION MEDICATION HISTORY
Pharmacy Medication History  Admission medication history interview status for the 11/9/2021  admission is complete. See EPIC admission navigator for prior to admission medications     Location of Interview: Phone  Medication history sources: Patient    Significant changes made to the medication list:  None      In the past week, patient estimated taking medication this percent of the time: greater than 90%        Medication reconciliation completed by provider prior to medication history? Yes    Time spent in this activity: 10    Prior to Admission medications    Medication Sig Last Dose Taking? Auth Provider   acetaminophen (TYLENOL) 325 MG tablet Take 2 tablets (650 mg) by mouth every 6 hours as needed for mild pain 11/10/2021 at Unknown time Yes Kd Malagon,    aspirin EC 81 MG EC tablet Take 1 tablet (81 mg) by mouth daily 11/9/2021 at Unknown time Yes Radha Umanzor PA-C   atorvastatin (LIPITOR) 40 MG tablet TAKE 1 TABLET BY MOUTH ONE TIME DAILY 11/9/2021 at Unknown time Yes Michael Londono MD   carvedilol (COREG) 3.125 MG tablet Take 1 tablet (3.125 mg) by mouth 2 times daily (with meals) 11/9/2021 at Unknown time Yes Hiren Collins MD   cyclobenzaprine (FLEXERIL) 5 MG tablet Take 1 tablet (5 mg) by mouth 3 times daily as needed for muscle spasms Past Month at Unknown time Yes Sawyer Hayden MD   doxylamine (UNISOM) 25 MG TABS tablet Take 50 mg by mouth At Bedtime Past Week at Unknown time Yes Unknown, Entered By History   fluticasone (FLONASE) 50 MCG/ACT nasal spray Spray 1 spray into both nostrils daily 11/9/2021 at Unknown time Yes Reported, Patient   furosemide (LASIX) 20 MG tablet Take 1 tablet (20 mg) by mouth daily Add additional 20mg for increased edema 11/9/2021 at Unknown time Yes Hiren Collins MD   guaiFENesin (MUCINEX) 600 MG 12 hr tablet Take 600 mg by mouth as needed for congestion Reported on 4/11/2017 11/9/2021 at Unknown time Yes Reported, Patient    HYDROcodone-acetaminophen (NORCO)  MG per tablet Take 1 tablet by mouth every 6 hours as needed for severe pain 11/9/2021 at Unknown time Yes Kd Malagon DO   losartan (COZAAR) 25 MG tablet Take 1 tablet (25 mg) by mouth daily 11/9/2021 at Unknown time Yes Hiren Collins MD   pantoprazole (PROTONIX) 40 MG EC tablet Take 1 tablet (40 mg) by mouth 2 times daily 11/9/2021 at Unknown time Yes Michael Londono MD       The information provided in this note is only as accurate as the sources available at the time of update(s)
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
## 2021-11-10 NOTE — PROGRESS NOTES
A&Ox4, VSS and NVI. On Tele: NSR.  W/ persistent MSK c/o sharp stabbing pain at mid and substernal areas 8-10/10. Due PRN meds (Oxycodone PO and Dilaudid IV) given w/o full benefit effect. +Nausea-compazine given. W/ scabs at scalp-cdi. W/ multiple bruises at left forearm. W/ multiple skin lesions on BLE-baseline. Incontinent, purewick reapplied. On Trop I check q4: 0.015. PIV at R-SL comfort measures rendered. Pain not well controlled. MD paged twice w/ orders given. Will continue to monitor.

## 2021-11-10 NOTE — PLAN OF CARE
A&Ox4. VSS on RA. Not OOB this shift. Pure wick in place. Regular diet. Tele SR. C/P 7-8/10, IV dilaudid given, PO Norco, Topical lidocaine cream placed, pain decreases to 4/10. C/O nausea this afternoon, PO compazine given. IVF infusing. Contact precautions placed for Shingles,Valtrex started. Scattered bruising, scabbing. Declined working with PT today d/t pain. Will monitor.

## 2021-11-10 NOTE — PROVIDER NOTIFICATION
MD Notification    Notified Person: MD    Notified Person Name: Delbert Gregory    Notification Date/Time: 11/10/21 at 0614h    Notification Interaction: Amcom    Purpose of Notification: Patient is still c/o severe chest and shoulder pain. 8-9/10. She has taken Oxycodone (0425) and received Dilaudid at 0502. Pain not improving.     Orders Received:    Comments:

## 2021-11-10 NOTE — PROGRESS NOTES
RECEIVING UNIT ED HANDOFF REVIEW    ED Nurse Handoff Report was reviewed by: Mina Szymanski RN on November 10, 2021 at 3:19 AM

## 2021-11-10 NOTE — ED NOTES
"Pt reports she did not take her Lasix today, \"I haven't been swelling so that's why I didn't take it.\"  "

## 2021-11-10 NOTE — ED NOTES
[facility]  ED Nurse Handoff Report    ED Chief complaint: Chest Pain      ED Diagnosis:   Final diagnoses:   Chest pain, unspecified type   Acute bilateral thoracic back pain       Code Status: As determined by Hospitalist MD    Allergies:   Allergies   Allergen Reactions     Codeine      GI UPSET     Escitalopram Oxalate      fatigue     Esomeprazole Magnesium Trihydrate      HA     Imdur [Isosorbide]      Headache       Meperidine Hcl      N/V     Morphine Hcl      HIVES     Oxycodone      (percodan) GI UPSET     Pentazocine      (talwin)  HALLUCINATIONS     Propoxyphene Hcl      STOMACH UPSET     Sumatriptan Succinate      chest pain       Patient Story: Patient comes from home, reports she is having CP since this AM accompanied with diaphoresis and radiation to the back. Pain is stabbing pain, 10/10. Unrelieved with nitorgly tabs by EMS and while in ED. Patient has extensive hx of fractures; takes meds at home for her chronic pain. Has a hx of MI and other cardiac hx. Pain decreased with dilaudid admin.  Focused Assessment:    Neuro; WNL, A&Ox4  Cards: SR with PACs, CP, nitrogl tabs did not help with CP, PE CT done (neg), CT thoracic done as well  Resp: WNL, denies SOB with CP  Musculo: uses walker, purewick for voids  GI/: WNL        Treatments and/or interventions provided: Labs, CTs, pain meds (see MAR)  Patient's response to treatments and/or interventions: +    To be done/followed up on inpatient unit:  pain control/inpatient orders    Does this patient have any cognitive concerns?: n/a    Activity level - Baseline/Home:  Walker  Activity Level - Current:   Unknown    Patient's Preferred language: English   Needed?: No    Isolation: None  Infection: Not Applicable  Patient tested for COVID 19 prior to admission: YES  Bariatric?: No    Vital Signs:   Vitals:    11/09/21 2205 11/09/21 2225 11/09/21 2230 11/09/21 2330   BP: 135/85 128/81  133/76   Pulse: 95 90 106 87   Resp: 19  30 26 14   Temp:       TempSrc:       SpO2: 98% 98% 99% 100%   Weight:       Height:           Cardiac Rhythm:Cardiac Rhythm: Other (Comment) (SR with PACs)    Was the PSS-3 completed:   Yes  What interventions are required if any?               Family Comments: SonBala, was at bedside, aware of plan of care  OBS brochure/video discussed/provided to patient/family: Yes              Name of person given brochure if not patient: n/a              Relationship to patient: n/a    For the majority of the shift this patient's behavior was Green.   Behavioral interventions performed were n/a.    ED NURSE PHONE NUMBER: *[phone]

## 2021-11-10 NOTE — ED TRIAGE NOTES
Pt comes from home, this AM reported CP to midsternal, radiates to back, pt reports today she had some sweating with the CP today which prompted her to call 911. Pt has hx of MI and hx of vertebral fxs, pt reports pain with breathing in, Per EMS ST with PACs, BPs 140s/-- down to 120s/-- with 2 nitrogl tabs,  given by EMS, pt reported no relief of pain with nitrogl tabs.

## 2021-11-11 ENCOUNTER — APPOINTMENT (OUTPATIENT)
Dept: PHYSICAL THERAPY | Facility: CLINIC | Age: 86
DRG: 596 | End: 2021-11-11
Attending: INTERNAL MEDICINE
Payer: MEDICARE

## 2021-11-11 PROCEDURE — 250N000013 HC RX MED GY IP 250 OP 250 PS 637: Performed by: PHYSICIAN ASSISTANT

## 2021-11-11 PROCEDURE — 97161 PT EVAL LOW COMPLEX 20 MIN: CPT | Mod: GP

## 2021-11-11 PROCEDURE — 250N000013 HC RX MED GY IP 250 OP 250 PS 637: Performed by: HOSPITALIST

## 2021-11-11 PROCEDURE — 250N000011 HC RX IP 250 OP 636: Performed by: HOSPITALIST

## 2021-11-11 PROCEDURE — 120N000004 HC R&B MS OVERFLOW

## 2021-11-11 PROCEDURE — 99233 SBSQ HOSP IP/OBS HIGH 50: CPT | Performed by: HOSPITALIST

## 2021-11-11 PROCEDURE — 250N000013 HC RX MED GY IP 250 OP 250 PS 637: Performed by: INTERNAL MEDICINE

## 2021-11-11 PROCEDURE — 258N000003 HC RX IP 258 OP 636: Performed by: HOSPITALIST

## 2021-11-11 RX ORDER — HYDROXYZINE HYDROCHLORIDE 25 MG/1
25 TABLET, FILM COATED ORAL EVERY 6 HOURS PRN
Status: DISCONTINUED | OUTPATIENT
Start: 2021-11-11 | End: 2021-11-12 | Stop reason: HOSPADM

## 2021-11-11 RX ADMIN — ATORVASTATIN CALCIUM 40 MG: 40 TABLET, FILM COATED ORAL at 21:37

## 2021-11-11 RX ADMIN — LOSARTAN POTASSIUM 25 MG: 25 TABLET, FILM COATED ORAL at 09:05

## 2021-11-11 RX ADMIN — PANTOPRAZOLE SODIUM 40 MG: 40 TABLET, DELAYED RELEASE ORAL at 09:05

## 2021-11-11 RX ADMIN — GABAPENTIN 100 MG: 100 CAPSULE ORAL at 13:38

## 2021-11-11 RX ADMIN — VALACYCLOVIR HYDROCHLORIDE 1000 MG: 1 TABLET, FILM COATED ORAL at 09:04

## 2021-11-11 RX ADMIN — ASPIRIN 81 MG: 81 TABLET, COATED ORAL at 09:04

## 2021-11-11 RX ADMIN — VALACYCLOVIR HYDROCHLORIDE 1000 MG: 1 TABLET, FILM COATED ORAL at 19:32

## 2021-11-11 RX ADMIN — GABAPENTIN 100 MG: 100 CAPSULE ORAL at 09:04

## 2021-11-11 RX ADMIN — HYDROCODONE BITARTRATE AND ACETAMINOPHEN 1 TABLET: 10; 325 TABLET ORAL at 13:38

## 2021-11-11 RX ADMIN — FLUTICASONE PROPIONATE 1 SPRAY: 50 SPRAY, METERED NASAL at 09:07

## 2021-11-11 RX ADMIN — HYDROCODONE BITARTRATE AND ACETAMINOPHEN 1 TABLET: 10; 325 TABLET ORAL at 05:24

## 2021-11-11 RX ADMIN — CARVEDILOL 3.12 MG: 3.12 TABLET, FILM COATED ORAL at 09:04

## 2021-11-11 RX ADMIN — HYDROXYZINE HYDROCHLORIDE 25 MG: 25 TABLET ORAL at 10:43

## 2021-11-11 RX ADMIN — GABAPENTIN 100 MG: 100 CAPSULE ORAL at 19:32

## 2021-11-11 RX ADMIN — CARVEDILOL 3.12 MG: 3.12 TABLET, FILM COATED ORAL at 17:47

## 2021-11-11 RX ADMIN — PANTOPRAZOLE SODIUM 40 MG: 40 TABLET, DELAYED RELEASE ORAL at 19:32

## 2021-11-11 RX ADMIN — IRON SUCROSE 300 MG: 20 INJECTION, SOLUTION INTRAVENOUS at 10:32

## 2021-11-11 RX ADMIN — HYDROCODONE BITARTRATE AND ACETAMINOPHEN 1 TABLET: 10; 325 TABLET ORAL at 19:32

## 2021-11-11 ASSESSMENT — ACTIVITIES OF DAILY LIVING (ADL)
ADLS_ACUITY_SCORE: 19
ADLS_ACUITY_SCORE: 19
ADLS_ACUITY_SCORE: 15
ADLS_ACUITY_SCORE: 19
ADLS_ACUITY_SCORE: 15
ADLS_ACUITY_SCORE: 19
ADLS_ACUITY_SCORE: 15
ADLS_ACUITY_SCORE: 19
ADLS_ACUITY_SCORE: 15
ADLS_ACUITY_SCORE: 19
ADLS_ACUITY_SCORE: 15
ADLS_ACUITY_SCORE: 19
ADLS_ACUITY_SCORE: 15
ADLS_ACUITY_SCORE: 19
ADLS_ACUITY_SCORE: 15
ADLS_ACUITY_SCORE: 15

## 2021-11-11 NOTE — PROGRESS NOTES
11/11/21 1147   Quick Adds   Type of Visit Initial PT Evaluation   Living Environment   People in home alone   Current Living Arrangements house  (one story townhouse)   Home Accessibility no concerns   Self-Care   Usual Activity Tolerance moderate   Current Activity Tolerance fair   Regular Exercise No   Equipment Currently Used at Home walker, rolling   Activity/Exercise/Self-Care Comment Daughter assists with shopping and errands. Pt has a  to do cleaning and laundry.   Disability/Function   Hearing Difficulty or Deaf yes   Patient's preferred means of communication English speaker with hearing loss, no speech problems.   Describe hearing loss bilateral hearing loss   Fall history within last six months no   General Information   Onset of Illness/Injury or Date of Surgery 11/09/21   Referring Physician Delbert Gregory MD   Patient/Family Therapy Goals Statement (PT) Return home   Pertinent History of Current Problem (include personal factors and/or comorbidities that impact the POC) Pt admitted with chest and back pain, found to have shingles. PMH: ICM, CAD, HTN, chronic anemia, LUQ mass, history of GI bleed.    Existing Precautions/Restrictions fall   Weight-Bearing Status - LLE full weight-bearing   Weight-Bearing Status - RLE full weight-bearing   General Observations Daughter present   Cognition   Orientation Status (Cognition) person;place;situation   Affect/Mental Status (Cognition) WFL   Follows Commands (Cognition) WFL   Pain Assessment   Patient Currently in Pain Yes, see Vital Sign flowsheet  (L chest and back pain (skin only))   Integumentary/Edema   Integumentary/Edema Comments Shingles rash on L chest and back   Posture    Posture Forward head position;Protracted shoulders   Range of Motion (ROM)   ROM Quick Adds ROM WFL   Strength   Strength Comments B hip flex 4/5, knee ext NT due to pain, R ankle limited motion due to fusion, L ankle 4+/5   Bed Mobility   Comment (Bed Mobility)  Independent bed mobility   Transfers   Transfer Safety Comments Sit to/from stand wtih mod I and 4WW, steady on her feet   Gait/Stairs (Locomotion)   Comment (Gait/Stairs) Pt amb 150 ft with 4ww and SBA progressing to mod I. Steady balance, safe pace.   Balance   Balance Comments Steady balance with mobility   Sensory Examination   Sensory Perception Comments Denies changes   Clinical Impression   Criteria for Skilled Therapeutic Intervention no;evaluation only   Clinical Presentation Stable/Uncomplicated   Clinical Presentation Rationale medically stable   Clinical Decision Making (Complexity) low complexity   Therapy Frequency (PT) Evaluation only   Risk & Benefits of therapy have been explained evaluation/treatment results reviewed;patient;daughter   PT Discharge Planning    PT Discharge Recommendation (DC Rec) home   PT Rationale for DC Rec Pt is at baseline, moving well and has no skilled PT needs.   Total Evaluation Time   Total Evaluation Time (Minutes) 20

## 2021-11-11 NOTE — PROGRESS NOTES
MD Notification    Notified Person: MD    Notified Person Name: Dr. George    Notification Date/Time: 11-10 2130    Notification Interaction: page    Purpose of Notification: Pt states she takes Atorvastatin q night. Pt unsure of dose. Please order if appropriate.     Orders Received: yes, ordered.    Comments:

## 2021-11-11 NOTE — PROGRESS NOTES
Contact, valtrex tx. A&O. A1 walker. [place]. Tele. Reg diet. Purewick. Narco for pain. RA. Cont to monitor.

## 2021-11-11 NOTE — PLAN OF CARE
Pt is A&Ox4, [place]/hearing aid on, VSS on RA expt for HTN, Reg diet, IV SL, Ax1 w/ GB and walker, Reports of chx and back pain, managed w/ prn Atarax and Norco, IV iron sucrose given during shift, Pure wick in place, Incont of BL at times, PT consulted, continue to monitor

## 2021-11-11 NOTE — PLAN OF CARE
A&O, calm and cooperative, purewick in use, Norco for pain Q6 hours, refuse to be off loaded the bed today, refused PT, walker baseline, regular diet, needs assistance in ordering, shingles rashes steady in chest and back, continue to monitor.
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
## 2021-11-11 NOTE — PROGRESS NOTES
CLINICAL NUTRITION SERVICES  -  ASSESSMENT NOTE      Recommendations Ordered by Registered Dietitian (RD):   Magic Cup with dinner daily (vanilla)   Malnutrition:   % Weight Loss:  Weight loss does not meet criteria for malnutrition   % Intake:  <75% for > 7 days (moderate malnutrition)  Subcutaneous Fat Loss:  Orbital region mild depletion and Thoracic region moderate depletion  Muscle Loss:  Temporal region mild depletion, Clavicle bone region moderate depletion and Acromion bone region moderate depletion  Fluid Retention:  None noted    Malnutrition Diagnosis: Moderate malnutrition  In Context of:  Acute illness or injury  Chronic illness or disease        REASON FOR ASSESSMENT  Gosia Alonzo is a 88 year old female seen by Registered Dietitian for Admission Nutrition Risk Screen, MST score 2   - Have you recently lost weight without trying in the last six months?: yes, 2-13 lbs  - Have you been eating poorly because of a decreased appetite?: yes       NUTRITION HISTORY  Chart reviewed and discussed with patient   Resides at home and provides meals for self  States that over the past 1 week she has been in terrible pain and unable to eat much   Recalls that this has happened before and she had lost some weight then and gained it back (suspect this was during previous [facility] admission in 2020)  During this past week, she was able to tolerate some food such as ice cream, mac and cheese and loaded baked potatoes -- choosing higher calorie food items given her decreased PO intake  Has nutritional supplements and protein drinks at home but dislikes them and does not consume  She does not know her usual wt but had last weighed herself 1 day PTA at 119 lbs   No known food allergies       CURRENT NUTRITION ORDERS  Diet Order:     Regular     Current Intake/Tolerance:  Patients appetite continues to be decreased today; she only consumed ~25% of breakfast this AM  She is agreeable to try Magic Cup supplements w/ dinner  "      NUTRITION FOCUSED PHYSICAL ASSESSMENT FOR DIAGNOSING MALNUTRITION)  Yes         Observed:    Muscle wasting (refer to documentation in Malnutrition section) and Subcutaneous fat loss (refer to documentation in Malnutrition section)    Obtained from Chart/Interdisciplinary Team:  None noted     ANTHROPOMETRICS  Height: 5' 5\"  Weight: 119 lbs 0 oz  Body mass index is 19.8 kg/m .  Weight Status:  Normal BMI  IBW: 56.8 kg   % IBW: 95%  Weight History: potential for up to 5 lbs lost in the past </=2 months (3% body wt), however patient has been trending down in wt overall in the past 4 years  Wt Readings from Last 20 Encounters:   11/09/21 54 kg (119 lb)   09/15/21 55.8 kg (123 lb)   06/15/21 56.7 kg (125 lb)   06/07/21 54.3 kg (119 lb 9.6 oz)   03/04/20 48.8 kg (107 lb 9.6 oz)   02/25/20 52.9 kg (116 lb 11.2 oz)   02/16/20 54 kg (119 lb)   02/12/20 52.7 kg (116 lb 3.2 oz)   10/31/19 57.3 kg (126 lb 4.8 oz)   01/09/19 56.2 kg (124 lb)   10/05/18 63.2 kg (139 lb 4.8 oz)   01/02/18 65 kg (143 lb 4.8 oz)   09/26/17 65.3 kg (144 lb)   09/15/17 65.3 kg (144 lb)   09/06/17 67.4 kg (148 lb 9.6 oz)   07/18/17 64.6 kg (142 lb 8 oz)   04/11/17 64.3 kg (141 lb 12.8 oz)   01/13/17 62.1 kg (136 lb 12.8 oz)   12/07/16 61.2 kg (135 lb)   10/31/16 60.7 kg (133 lb 12.8 oz)       LABS  Labs reviewed    MEDICATIONS  Medications reviewed      ASSESSED NUTRITION NEEDS PER APPROVED PRACTICE GUIDELINES:    Dosing Weight 54 kg   Estimated Energy Needs: 2484-0764 kcals (25-30 Kcal/Kg)  Justification: maintenance  Estimated Protein Needs: 65+ grams protein (1.2+ g pro/Kg)  Justification: preservation of lean body mass     MALNUTRITION:  % Weight Loss:  Weight loss does not meet criteria for malnutrition   % Intake:  <75% for > 7 days (moderate malnutrition)  Subcutaneous Fat Loss:  Orbital region mild depletion and Thoracic region moderate depletion  Muscle Loss:  Temporal region mild depletion, Clavicle bone region moderate depletion and " Acromion bone region moderate depletion  Fluid Retention:  None noted    Malnutrition Diagnosis: Moderate malnutrition  In Context of:  Acute illness or injury  Chronic illness or disease      NUTRITION DIAGNOSIS:  Inadequate oral intake related to decreased appetite 2/2 pain as evidenced by intakes <75% for 1 week and 25% of breakfast this morning       NUTRITION INTERVENTIONS  Recommendations / Nutrition Prescription  Magic cup daily       Implementation  Nutrition education: Per Provider order if indicated. Encouraged small, frequent meals and nutrient dense food opinions   Medical Food Supplement: as above       Nutrition Goals  Patient will consume >50% meals TID and 100% 1 supplement/day       MONITORING AND EVALUATION:  Progress towards goals will be monitored and evaluated per protocol and Practice Guidelines        Nithya Harper RD, LD  Clinical Dietitian   Unit pager [phone]

## 2021-11-12 VITALS
HEART RATE: 95 BPM | WEIGHT: 119 LBS | OXYGEN SATURATION: 96 % | BODY MASS INDEX: 19.83 KG/M2 | DIASTOLIC BLOOD PRESSURE: 49 MMHG | TEMPERATURE: 98.2 F | RESPIRATION RATE: 18 BRPM | SYSTOLIC BLOOD PRESSURE: 98 MMHG | HEIGHT: 65 IN

## 2021-11-12 LAB
ANION GAP SERPL CALCULATED.3IONS-SCNC: 6 MMOL/L (ref 3–14)
BASOPHILS # BLD AUTO: 0 10E3/UL (ref 0–0.2)
BASOPHILS NFR BLD AUTO: 1 %
BUN SERPL-MCNC: 21 MG/DL (ref 7–30)
CALCIUM SERPL-MCNC: 8.5 MG/DL (ref 8.5–10.1)
CHLORIDE BLD-SCNC: 107 MMOL/L (ref 94–109)
CO2 SERPL-SCNC: 20 MMOL/L (ref 20–32)
CREAT SERPL-MCNC: 0.94 MG/DL (ref 0.52–1.04)
EOSINOPHIL # BLD AUTO: 0.1 10E3/UL (ref 0–0.7)
EOSINOPHIL NFR BLD AUTO: 3 %
ERYTHROCYTE [DISTWIDTH] IN BLOOD BY AUTOMATED COUNT: 18.3 % (ref 10–15)
GFR SERPL CREATININE-BSD FRML MDRD: 54 ML/MIN/1.73M2
GLUCOSE BLD-MCNC: 118 MG/DL (ref 70–99)
HCT VFR BLD AUTO: 28.5 % (ref 35–47)
HGB BLD-MCNC: 8.7 G/DL (ref 11.7–15.7)
IMM GRANULOCYTES # BLD: 0 10E3/UL
IMM GRANULOCYTES NFR BLD: 1 %
LYMPHOCYTES # BLD AUTO: 1.4 10E3/UL (ref 0.8–5.3)
LYMPHOCYTES NFR BLD AUTO: 25 %
MCH RBC QN AUTO: 25.7 PG (ref 26.5–33)
MCHC RBC AUTO-ENTMCNC: 30.5 G/DL (ref 31.5–36.5)
MCV RBC AUTO: 84 FL (ref 78–100)
MONOCYTES # BLD AUTO: 0.7 10E3/UL (ref 0–1.3)
MONOCYTES NFR BLD AUTO: 13 %
NEUTROPHILS # BLD AUTO: 3.2 10E3/UL (ref 1.6–8.3)
NEUTROPHILS NFR BLD AUTO: 57 %
NRBC # BLD AUTO: 0 10E3/UL
NRBC BLD AUTO-RTO: 0 /100
PLATELET # BLD AUTO: 260 10E3/UL (ref 150–450)
POTASSIUM BLD-SCNC: 4.3 MMOL/L (ref 3.4–5.3)
RBC # BLD AUTO: 3.38 10E6/UL (ref 3.8–5.2)
SODIUM SERPL-SCNC: 133 MMOL/L (ref 133–144)
WBC # BLD AUTO: 5.5 10E3/UL (ref 4–11)

## 2021-11-12 PROCEDURE — 85025 COMPLETE CBC W/AUTO DIFF WBC: CPT | Performed by: HOSPITALIST

## 2021-11-12 PROCEDURE — 250N000013 HC RX MED GY IP 250 OP 250 PS 637: Performed by: HOSPITALIST

## 2021-11-12 PROCEDURE — 250N000013 HC RX MED GY IP 250 OP 250 PS 637: Performed by: INTERNAL MEDICINE

## 2021-11-12 PROCEDURE — 82310 ASSAY OF CALCIUM: CPT | Performed by: HOSPITALIST

## 2021-11-12 PROCEDURE — 99239 HOSP IP/OBS DSCHRG MGMT >30: CPT | Performed by: HOSPITALIST

## 2021-11-12 PROCEDURE — 36415 COLL VENOUS BLD VENIPUNCTURE: CPT | Performed by: HOSPITALIST

## 2021-11-12 RX ORDER — LIDOCAINE 40 MG/G
CREAM TOPICAL 3 TIMES DAILY PRN
Qty: 30 G | Refills: 0 | Status: ON HOLD | OUTPATIENT
Start: 2021-11-12 | End: 2022-01-01

## 2021-11-12 RX ORDER — FERROUS SULFATE 325(65) MG
325 TABLET, DELAYED RELEASE (ENTERIC COATED) ORAL DAILY
Qty: 30 TABLET | Refills: 0 | Status: SHIPPED | OUTPATIENT
Start: 2021-11-12 | End: 2022-01-01

## 2021-11-12 RX ORDER — GABAPENTIN 100 MG/1
CAPSULE ORAL
Qty: 60 CAPSULE | Refills: 0 | Status: SHIPPED | OUTPATIENT
Start: 2021-11-12 | End: 2022-01-01

## 2021-11-12 RX ORDER — LIDOCAINE 40 MG/G
CREAM TOPICAL
Status: DISCONTINUED | OUTPATIENT
Start: 2021-11-12 | End: 2021-11-12 | Stop reason: HOSPADM

## 2021-11-12 RX ORDER — VALACYCLOVIR HYDROCHLORIDE 1 G/1
1000 TABLET, FILM COATED ORAL EVERY 12 HOURS
Qty: 16 TABLET | Refills: 0 | Status: SHIPPED | OUTPATIENT
Start: 2021-11-12 | End: 2022-01-01

## 2021-11-12 RX ORDER — HYDROXYZINE HYDROCHLORIDE 25 MG/1
25 TABLET, FILM COATED ORAL EVERY 6 HOURS PRN
Qty: 20 TABLET | Refills: 0 | Status: SHIPPED | OUTPATIENT
Start: 2021-11-12 | End: 2021-12-09

## 2021-11-12 RX ADMIN — LIDOCAINE: 40 CREAM TOPICAL at 09:29

## 2021-11-12 RX ADMIN — GABAPENTIN 100 MG: 100 CAPSULE ORAL at 08:15

## 2021-11-12 RX ADMIN — CARVEDILOL 3.12 MG: 3.12 TABLET, FILM COATED ORAL at 08:15

## 2021-11-12 RX ADMIN — VALACYCLOVIR HYDROCHLORIDE 1000 MG: 1 TABLET, FILM COATED ORAL at 08:15

## 2021-11-12 RX ADMIN — PANTOPRAZOLE SODIUM 40 MG: 40 TABLET, DELAYED RELEASE ORAL at 08:15

## 2021-11-12 RX ADMIN — ASPIRIN 81 MG: 81 TABLET, COATED ORAL at 08:15

## 2021-11-12 RX ADMIN — HYDROXYZINE HYDROCHLORIDE 25 MG: 25 TABLET ORAL at 09:34

## 2021-11-12 RX ADMIN — LOSARTAN POTASSIUM 25 MG: 25 TABLET, FILM COATED ORAL at 08:15

## 2021-11-12 RX ADMIN — TIZANIDINE 2 MG: 2 TABLET ORAL at 08:15

## 2021-11-12 RX ADMIN — FLUTICASONE PROPIONATE 1 SPRAY: 50 SPRAY, METERED NASAL at 08:16

## 2021-11-12 RX ADMIN — HYDROCODONE BITARTRATE AND ACETAMINOPHEN 1 TABLET: 10; 325 TABLET ORAL at 04:44

## 2021-11-12 RX ADMIN — HYDROCODONE BITARTRATE AND ACETAMINOPHEN 1 TABLET: 10; 325 TABLET ORAL at 11:23

## 2021-11-12 ASSESSMENT — ACTIVITIES OF DAILY LIVING (ADL)
ADLS_ACUITY_SCORE: 15

## 2021-11-12 NOTE — PLAN OF CARE
Pt A&Ox4, VSS on RA. Regular diet. Incont urine at times; purewick in place. PIV SL. L chest and flank shingles rash, c/o 8/10 pain in rash region, given PRN norco, lidocaine ointment, atarax. Bilat LE neuropathy, baseline per pt.     Pt discharging home with family via private transportation. Discharge teaching complete, pt and pt's daughter verbalized understanding of teaching. Medications sent to patients home pharmacy.

## 2021-11-12 NOTE — PLAN OF CARE
AxOx4. VSS on RA. [place]. Hearing aids in room. Regular diet, nutrition following, recommending magic cup supplement with dinner. A1 GB and walker. Purewick in place. C/o 3-4/10 chest and back pain, Norco given, tolerated well. PT recommending discharge home. Continue to monitor.
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
## 2021-11-12 NOTE — PROGRESS NOTES
Contact. A&O. SBA walker. TRISTON Thacker. GI and nutrition following. Narco for pain. Mepilex coccyx. Magic cup supplement with dinner. Plan to discharge to home.

## 2021-11-23 ENCOUNTER — TELEPHONE (OUTPATIENT)
Dept: INTERNAL MEDICINE | Facility: CLINIC | Age: 86
End: 2021-11-23

## 2021-11-23 ENCOUNTER — HOSPITAL ENCOUNTER (EMERGENCY)
Facility: CLINIC | Age: 86
Discharge: HOME | End: 2021-11-23
Attending: EMERGENCY MEDICINE | Admitting: EMERGENCY MEDICINE
Payer: MEDICARE

## 2021-11-23 ENCOUNTER — APPOINTMENT (OUTPATIENT)
Dept: GENERAL RADIOLOGY | Facility: CLINIC | Age: 86
End: 2021-11-23
Attending: EMERGENCY MEDICINE
Payer: MEDICARE

## 2021-11-23 VITALS
HEART RATE: 79 BPM | RESPIRATION RATE: 9 BRPM | SYSTOLIC BLOOD PRESSURE: 157 MMHG | OXYGEN SATURATION: 99 % | DIASTOLIC BLOOD PRESSURE: 83 MMHG

## 2021-11-23 DIAGNOSIS — R11.10 VOMITING AND DIARRHEA: ICD-10-CM

## 2021-11-23 DIAGNOSIS — R19.7 VOMITING AND DIARRHEA: ICD-10-CM

## 2021-11-23 DIAGNOSIS — R07.89 ATYPICAL CHEST PAIN: ICD-10-CM

## 2021-11-23 DIAGNOSIS — B02.9 HERPES ZOSTER WITHOUT COMPLICATION: ICD-10-CM

## 2021-11-23 LAB
ABO/RH(D): NORMAL
ALBUMIN SERPL-MCNC: 2.8 G/DL (ref 3.4–5)
ALP SERPL-CCNC: 243 U/L (ref 40–150)
ALT SERPL W P-5'-P-CCNC: 18 U/L (ref 0–50)
ANION GAP SERPL CALCULATED.3IONS-SCNC: 6 MMOL/L (ref 3–14)
ANTIBODY SCREEN: NEGATIVE
AST SERPL W P-5'-P-CCNC: 24 U/L (ref 0–45)
ATRIAL RATE - MUSE: 84 BPM
BASOPHILS # BLD AUTO: 0.1 10E3/UL (ref 0–0.2)
BASOPHILS NFR BLD AUTO: 1 %
BILIRUB SERPL-MCNC: 0.5 MG/DL (ref 0.2–1.3)
BUN SERPL-MCNC: 15 MG/DL (ref 7–30)
CALCIUM SERPL-MCNC: 8.6 MG/DL (ref 8.5–10.1)
CHLORIDE BLD-SCNC: 106 MMOL/L (ref 94–109)
CO2 SERPL-SCNC: 23 MMOL/L (ref 20–32)
CREAT SERPL-MCNC: 0.88 MG/DL (ref 0.52–1.04)
DIASTOLIC BLOOD PRESSURE - MUSE: NORMAL MMHG
EOSINOPHIL # BLD AUTO: 0.1 10E3/UL (ref 0–0.7)
EOSINOPHIL NFR BLD AUTO: 1 %
ERYTHROCYTE [DISTWIDTH] IN BLOOD BY AUTOMATED COUNT: 22.3 % (ref 10–15)
GFR SERPL CREATININE-BSD FRML MDRD: 58 ML/MIN/1.73M2
GLUCOSE BLD-MCNC: 151 MG/DL (ref 70–99)
HCT VFR BLD AUTO: 32.1 % (ref 35–47)
HEMOCCULT STL QL: NEGATIVE
HGB BLD-MCNC: 9.5 G/DL (ref 11.7–15.7)
IMM GRANULOCYTES # BLD: 0.1 10E3/UL
IMM GRANULOCYTES NFR BLD: 1 %
INTERPRETATION ECG - MUSE: NORMAL
LYMPHOCYTES # BLD AUTO: 1.9 10E3/UL (ref 0.8–5.3)
LYMPHOCYTES NFR BLD AUTO: 25 %
MCH RBC QN AUTO: 26.9 PG (ref 26.5–33)
MCHC RBC AUTO-ENTMCNC: 29.6 G/DL (ref 31.5–36.5)
MCV RBC AUTO: 91 FL (ref 78–100)
MONOCYTES # BLD AUTO: 0.7 10E3/UL (ref 0–1.3)
MONOCYTES NFR BLD AUTO: 8 %
NEUTROPHILS # BLD AUTO: 4.9 10E3/UL (ref 1.6–8.3)
NEUTROPHILS NFR BLD AUTO: 64 %
NRBC # BLD AUTO: 0 10E3/UL
NRBC BLD AUTO-RTO: 0 /100
P AXIS - MUSE: 23 DEGREES
PLATELET # BLD AUTO: 339 10E3/UL (ref 150–450)
POTASSIUM BLD-SCNC: 3.9 MMOL/L (ref 3.4–5.3)
PR INTERVAL - MUSE: 168 MS
PROT SERPL-MCNC: 7.9 G/DL (ref 6.8–8.8)
QRS DURATION - MUSE: 78 MS
QT - MUSE: 376 MS
QTC - MUSE: 444 MS
R AXIS - MUSE: -11 DEGREES
RBC # BLD AUTO: 3.53 10E6/UL (ref 3.8–5.2)
SODIUM SERPL-SCNC: 135 MMOL/L (ref 133–144)
SPECIMEN EXPIRATION DATE: NORMAL
SYSTOLIC BLOOD PRESSURE - MUSE: NORMAL MMHG
T AXIS - MUSE: 52 DEGREES
TROPONIN I SERPL-MCNC: <0.015 UG/L (ref 0–0.04)
VENTRICULAR RATE- MUSE: 84 BPM
WBC # BLD AUTO: 7.8 10E3/UL (ref 4–11)

## 2021-11-23 PROCEDURE — 93005 ELECTROCARDIOGRAM TRACING: CPT

## 2021-11-23 PROCEDURE — 84484 ASSAY OF TROPONIN QUANT: CPT | Performed by: EMERGENCY MEDICINE

## 2021-11-23 PROCEDURE — 82272 OCCULT BLD FECES 1-3 TESTS: CPT | Performed by: EMERGENCY MEDICINE

## 2021-11-23 PROCEDURE — 86900 BLOOD TYPING SEROLOGIC ABO: CPT | Performed by: EMERGENCY MEDICINE

## 2021-11-23 PROCEDURE — 96376 TX/PRO/DX INJ SAME DRUG ADON: CPT

## 2021-11-23 PROCEDURE — 71046 X-RAY EXAM CHEST 2 VIEWS: CPT

## 2021-11-23 PROCEDURE — 36415 COLL VENOUS BLD VENIPUNCTURE: CPT | Performed by: EMERGENCY MEDICINE

## 2021-11-23 PROCEDURE — 96375 TX/PRO/DX INJ NEW DRUG ADDON: CPT

## 2021-11-23 PROCEDURE — 96374 THER/PROPH/DIAG INJ IV PUSH: CPT

## 2021-11-23 PROCEDURE — 82040 ASSAY OF SERUM ALBUMIN: CPT | Performed by: EMERGENCY MEDICINE

## 2021-11-23 PROCEDURE — 250N000013 HC RX MED GY IP 250 OP 250 PS 637: Performed by: EMERGENCY MEDICINE

## 2021-11-23 PROCEDURE — 258N000003 HC RX IP 258 OP 636: Performed by: EMERGENCY MEDICINE

## 2021-11-23 PROCEDURE — 250N000009 HC RX 250: Performed by: EMERGENCY MEDICINE

## 2021-11-23 PROCEDURE — 250N000011 HC RX IP 250 OP 636: Performed by: EMERGENCY MEDICINE

## 2021-11-23 PROCEDURE — 99285 EMERGENCY DEPT VISIT HI MDM: CPT | Mod: 25

## 2021-11-23 PROCEDURE — 96361 HYDRATE IV INFUSION ADD-ON: CPT

## 2021-11-23 PROCEDURE — 85025 COMPLETE CBC W/AUTO DIFF WBC: CPT | Performed by: EMERGENCY MEDICINE

## 2021-11-23 RX ORDER — ONDANSETRON 2 MG/ML
4 INJECTION INTRAMUSCULAR; INTRAVENOUS EVERY 30 MIN PRN
Status: DISCONTINUED | OUTPATIENT
Start: 2021-11-23 | End: 2021-11-23 | Stop reason: HOSPADM

## 2021-11-23 RX ORDER — HYDROCODONE BITARTRATE AND ACETAMINOPHEN 5; 325 MG/1; MG/1
2 TABLET ORAL ONCE
Status: COMPLETED | OUTPATIENT
Start: 2021-11-23 | End: 2021-11-23

## 2021-11-23 RX ORDER — HYDROMORPHONE HCL IN WATER/PF 6 MG/30 ML
0.2 PATIENT CONTROLLED ANALGESIA SYRINGE INTRAVENOUS ONCE
Status: COMPLETED | OUTPATIENT
Start: 2021-11-23 | End: 2021-11-23

## 2021-11-23 RX ORDER — ONDANSETRON 2 MG/ML
4 INJECTION INTRAMUSCULAR; INTRAVENOUS ONCE
Status: COMPLETED | OUTPATIENT
Start: 2021-11-23 | End: 2021-11-23

## 2021-11-23 RX ORDER — ONDANSETRON 4 MG/1
4 TABLET, ORALLY DISINTEGRATING ORAL EVERY 8 HOURS PRN
Qty: 10 TABLET | Refills: 0 | Status: SHIPPED | OUTPATIENT
Start: 2021-11-23 | End: 2021-11-26

## 2021-11-23 RX ORDER — ONDANSETRON 4 MG/1
4 TABLET, ORALLY DISINTEGRATING ORAL ONCE
Status: COMPLETED | OUTPATIENT
Start: 2021-11-23 | End: 2021-11-23

## 2021-11-23 RX ADMIN — HYDROCODONE BITARTRATE AND ACETAMINOPHEN 2 TABLET: 5; 325 TABLET ORAL at 01:35

## 2021-11-23 RX ADMIN — ONDANSETRON 4 MG: 4 TABLET, ORALLY DISINTEGRATING ORAL at 03:59

## 2021-11-23 RX ADMIN — HYDROMORPHONE HYDROCHLORIDE 0.2 MG: 0.2 INJECTION, SOLUTION INTRAMUSCULAR; INTRAVENOUS; SUBCUTANEOUS at 02:27

## 2021-11-23 RX ADMIN — HYDROMORPHONE HYDROCHLORIDE 0.2 MG: 0.2 INJECTION, SOLUTION INTRAMUSCULAR; INTRAVENOUS; SUBCUTANEOUS at 03:38

## 2021-11-23 RX ADMIN — SODIUM CHLORIDE 500 ML: 9 INJECTION, SOLUTION INTRAVENOUS at 01:35

## 2021-11-23 RX ADMIN — ONDANSETRON 4 MG: 2 INJECTION INTRAMUSCULAR; INTRAVENOUS at 02:26

## 2021-11-23 RX ADMIN — ONDANSETRON 4 MG: 2 INJECTION INTRAMUSCULAR; INTRAVENOUS at 01:35

## 2021-11-23 RX ADMIN — FAMOTIDINE 20 MG: 10 INJECTION, SOLUTION INTRAVENOUS at 01:35

## 2021-11-23 ASSESSMENT — ENCOUNTER SYMPTOMS
ABDOMINAL PAIN: 0
BLOOD IN STOOL: 1
VOMITING: 1
NAUSEA: 1
BACK PAIN: 1
DIARRHEA: 1

## 2021-11-23 NOTE — ED NOTES
Bed: ED10  Expected date:   Expected time:   Means of arrival:   Comments:  [facility] 89F

## 2021-11-23 NOTE — TELEPHONE ENCOUNTER
RN--Please advise pt.     Would strongly encourage continuing with valcyclovir.  This has the best chance of resolving the shingles more rapidly. Confusion not a typical side effect of this.     She is already taking maximum dosing of hydrocodone for chronic arthritis.     We could consider raising her dose of gabapentin.  Recommend trying to increase dosing to 300 mg three times a day (morning, early afternoon and bedtime).

## 2021-11-23 NOTE — TELEPHONE ENCOUNTER
Seema is calling because her mother in law was seen in the ED due to her shingles pain.  The patient thinks the valacyclovir is causing her confusion so she stopped taking this.  They need direction.

## 2021-11-23 NOTE — TELEPHONE ENCOUNTER
Spoke to Seema, provided update from Dr. Londono.   Upon further review, the valacyclovir was ordered for 8 days on 11/12 so she should be done with this.     She thinks the increased Gabapentin dose my be helpful, but unsure who much of this patient has left and if new prescription is needed. Seema recommends we reach out to Wen (on consent to communicate) to discuss Gabapentin. Left voice message for Wen to call back. Please discuss recommendations with Wen for Gabapentin.     Paola Shepard RN  [facility]

## 2021-11-23 NOTE — TELEPHONE ENCOUNTER
Routed to Dr. Londono to review for direction for shingles pain. Patient was seen in ER this morning.     Paola Shepard RN  [facility]

## 2021-11-23 NOTE — ED TRIAGE NOTES
Pt BIBA from home after midsternal CP that radiates into her back. Pt was recently seen at [facility] for CP and was DC'd. Pt has shingles rash to center [facility]. Pt was given PO zofran en route, not able to start an IV per EMS. Pt is report black tarry stools. Pt has chronic L shoulder pain.

## 2021-11-23 NOTE — ED PROVIDER NOTES
History   Chief Complaint:  Black Stool     The history is provided by the patient.      Gosia Alonzo is a 89 year old female with history of diabetes mellitus type two, hypertension, and hyperlipidemia who presents with black stool.  She presents today after having one episode of loose black stool this afternoon. She has been nauseous and vomiting. She was recently discharged from [facility] on 11/12 after a three day hospitalization for chest pain and shingles. She complains of mid-sternal stabbing chest pain that radiates to her back which is the same as her previous pain and not new. She has known shingles. She had a bleeding ulcer a couple of years ago. She was given oral Zofran by EMS. Denies abdominal pain. No blood in her vomit. She is not on any blood thinners. She lives independently.     CT Chest PE w Contrast from 11/9/21  IMPRESSION:  1.  No evidence for pulmonary embolism or other acute abnormality in the chest.  2.  New cystic mass within the left upper quadrant, likely pancreatic in origin. Further characterization with CT or MRI of the abdomen recommended, which could be performed nonurgently.    Review of Systems   Cardiovascular: Positive for chest pain.   Gastrointestinal: Positive for blood in stool (black stool), diarrhea, nausea and vomiting. Negative for abdominal pain.   Musculoskeletal: Positive for back pain.   Skin: Positive for rash.   All other systems reviewed and are negative.    Allergies:  Codeine  Escitalopram oxalate  Esomeprazole magnesium trihydrate  Imdur  Meperidine hcl  Morphine hcl  Oxycodone  Pentazocine  Propoxyphene  Sumatriptan succinate     Medications:  Aspirin 81 mg  Lipitor  Coreg  Flexeril   Unisom  Lasix  Neurontin  Mucinex  Norco  Atarax  Cozaar  Protonix   Valtrex     Past Medical History:     Allergic rhinitis  Arthritis  CAD  CKD  Diabetes mellitus type two  Diverticula of colon  Edema  Esophageal reflux  GI bleeding  Hiatal  hernia  Hypertension  Infiltrating ductal ca right breast  Ischemic cardiomyopathy   Migraine  NSTEMI  Obesity  Osteoporosis  Paroxysmal atrial fibrillation  Pericarditis   Herpes zoster   Hyperlipidemia      Past Surgical History:    Angiogram x2  Appendectomy   Breast surgery  Back surgery  Colonoscopy   EGD  Head and neck surgery  Orthopedic surgery  Phacoemulsification clear cornea with standard iol  Right rotator cuff surgery   Hysterectomy   Left elbow surgery  Right knee surgery multiple  Lumbar fusion  Mastectomy     Family History:    Father: CAD, MI  Mother: pancreatic cancer  Brother: colon cancer  Sister: hypertension     Social History:  Presents to ED alone    Physical Exam     Patient Vitals for the past 24 hrs:   BP Pulse Resp SpO2   11/23/21 0345 -- 79 9 99 %   11/23/21 0330 -- 80 10 99 %   11/23/21 0315 -- 80 10 96 %   11/23/21 0300 (!) 157/83 87 14 99 %   11/23/21 0245 -- 92 -- --   11/23/21 0230 (!) 149/103 93 -- --   11/23/21 0130 (!) 163/84 84 11 100 %       Physical Exam  Constitutional:  Oriented to person, place, and time. Well-appearing.   HENT:   Head:    Normocephalic.   Mouth/Throat:   Oropharynx is clear and moist.   Eyes:    EOM are normal. Pupils are equal, round, and reactive to light.   Neck:    Neck supple.   Cardiovascular:  Normal rate, regular rhythm and normal heart sounds.      Exam reveals no gallop and no friction rub.       No murmur heard.  Pulmonary/Chest:  Effort normal and breath sounds normal.      No respiratory distress. No wheezes. No rales.      No reproducible chest wall pain.  Abdominal:   Soft. No distension. No tenderness. No rebound and no guarding.    :   Good rectal tone. She has dark grayish stool, not black. No gross blood.   Musculoskeletal:  Normal range of motion.   Neurological:   Alert and oriented to person, place, and time.           Moves all 4 extremities spontaneously    Skin:    No pallor. Crusting vesicular lesions noted on her left posterior  thoracic region and axilla.     Emergency Department Course   ECG  ECG obtained at 0115, ECG read at 0115  Normal sinus rhythm  Normal ECG   No significant change as compared to prior, dated 11/9/21.  Rate 84 bpm. HI interval 168 ms. QRS duration 78 ms. QT/QTc 376/444 ms. P-R-T axes 23 -11 52.     Imaging:  XR Chest 2 Views   Final Result   IMPRESSION: Cardiomegaly. Pulmonary vascularity normal. Slightly prominent interstitial pattern is unchanged. No new infiltrates or pleural effusions. Esophageal hiatal hernia. Right shoulder arthroplasty. Osteopenia. Chronic degenerative changes    proximal left humerus.      Report per radiology    Laboratory:  Labs Ordered and Resulted from Time of ED Arrival to Time of ED Departure   COMPREHENSIVE METABOLIC PANEL - Abnormal       Result Value    Sodium 135      Potassium 3.9      Chloride 106      Carbon Dioxide (CO2) 23      Anion Gap 6      Urea Nitrogen 15      Creatinine 0.88      Calcium 8.6      Glucose 151 (*)     Alkaline Phosphatase 243 (*)     AST 24      ALT 18      Protein Total 7.9      Albumin 2.8 (*)     Bilirubin Total 0.5      GFR Estimate 58 (*)    CBC WITH PLATELETS AND DIFFERENTIAL - Abnormal    WBC Count 7.8      RBC Count 3.53 (*)     Hemoglobin 9.5 (*)     Hematocrit 32.1 (*)     MCV 91      MCH 26.9      MCHC 29.6 (*)     RDW 22.3 (*)     Platelet Count 339      % Neutrophils 64      % Lymphocytes 25      % Monocytes 8      % Eosinophils 1      % Basophils 1      % Immature Granulocytes 1      NRBCs per 100 WBC 0      Absolute Neutrophils 4.9      Absolute Lymphocytes 1.9      Absolute Monocytes 0.7      Absolute Eosinophils 0.1      Absolute Basophils 0.1      Absolute Immature Granulocytes 0.1 (*)     Absolute NRBCs 0.0     TROPONIN I - Normal    Troponin I <0.015     OCCULT BLOOD STOOL - Normal    Occult Blood Negative     TYPE AND SCREEN, ADULT    ABO/RH(D) AB POS      Antibody Screen Negative      SPECIMEN EXPIRATION DATE [id number]     ABO/RH  TYPE AND SCREEN      Emergency Department Course:  Reviewed:  I reviewed nursing notes, vitals, past medical history and Care Everywhere    Assessments:  0111 I obtained history and examined the patient as noted above.   0215 I rechecked and updated.   0329 I rechecked the patient and explained findings.     Interventions:  0135 Pepcid, 20 mg, IV  0135 NS, 500 mL, IV  0135 Zofran, 4 mg, IV  0135 Norco, 2 tablet, PO  0226 Zofran, 4 mg, IV  0227 Dilaudid, 0.2 mg, IV  0338 Dilaudid, 0.2 mg, IV  0359 Zofran-odt, 4 mg, PO    Disposition:  The patient was discharged to home.     Impression & Plan         Medical Decision Making:  Ms. Alonzo is an 89 year old female who was discharged 11/12 for atypical chest pain turned out to be shingles. She is here with main complaint of one episode of vomiting, one episode of dark stool, and she was concerned for possible bleeding as well as continued chest pain which she has had for the past few weeks. In regards to her episode of vomiting and diarrhea and per rectal exam this does not appear to be bloody or black stool and hemoccult is negative. Her hemoglobin which is slightly low is higher today than her discharge level of 8.7. No concern for bleed. In regards to her chest pain this is clearly from her shingles. Her EKG is unchanged with a negative troponin and her symptoms ongoing for a few weeks. She had previous CT that was negative for PE and I do not believe this needs to be repeated. Upon reevaluation she is currently feeling improved although pain is still present. She is already on gabapentin and discussed further treatment options with her primary care doctor. Told to return for any furthering vomiting, diarrhea, chest pain, any symptoms or concerns.       Diagnosis:    ICD-10-CM    1. Vomiting and diarrhea  R11.10     R19.7    2. Herpes zoster without complication  B02.9    3. Atypical chest pain  R07.89        Discharge Medications:  New Prescriptions    ONDANSETRON  (ZOFRAN ODT) 4 MG ODT TAB    Take 1 tablet (4 mg) by mouth every 8 hours as needed       Scribe Disclosure:  I, Senthil Stockton, am serving as a scribe at 1:11 AM on 11/23/2021 to document services personally performed by Michael Hoff MD based on my observations and the provider's statements to me.            Michael Hoff MD  11/23/21 0399

## 2021-11-23 NOTE — DISCHARGE INSTRUCTIONS
Discharge Instructions  Chest Pain    You have been seen today for chest pain or discomfort.  At this time, your provider has found no signs that your chest pain is due to a serious or life-threatening condition, (or you have declined more testing and/or admission to the hospital). However, sometimes there is a serious problem that does not show up right away. Your evaluation today may not be complete and you may need further testing and evaluation.     Generally, every Emergency Department visit should have a follow-up clinic visit with either a primary or a specialty clinic/provider. Please follow-up as instructed by your emergency provider today.  Return to the Emergency Department if:  Your chest pain changes, gets worse, starts to happen more often, or comes with less activity.  You are newly short of breath.  You get very weak or tired.  You pass out or faint.  You have any new symptoms, like fever, cough, numb legs, or you cough up blood.  You have anything else that worries you.    Until you follow-up with your regular provider, please do the following:  Take one aspirin daily unless you have an allergy or are told not to by your provider.  If a stress test appointment has been made, go to the appointment.  If you have questions, contact your regular provider.  Follow-up with your regular provider/clinic as directed; this is very important.    If you were given a prescription for medicine here today, be sure to read all of the information (including the package insert) that comes with your prescription.  This will include important information about the medicine, its side effects, and any warnings that you need to know about.  The pharmacist who fills the prescription can provide more information and answer questions you may have about the medicine.  If you have questions or concerns that the pharmacist cannot address, please call or return to the Emergency Department.       Remember that you can always come  back to the Emergency Department if you are not able to see your regular provider in the amount of time listed above, if you get any new symptoms, or if there is anything that worries you.  Discharge Instructions  Vomiting and Diarrhea in Children    Your child was seen today for an illness with vomiting (throwing up) and/or diarrhea (loose stools). At this time, your provider feels that there are no signs that your child s symptoms are due to a serious or life-threatening condition, and your child does not appear severely dehydrated. However, sometimes there is a more serious illness that does not show up right away, and you need to watch your child at home and return as directed. Also, we will ask you to do all you can to keep your child from getting dehydrated, and to watch for signs of dehydration.    Generally, every Emergency Department visit should have a follow-up clinic visit with either a primary or a specialty clinic/provider. Please follow-up as instructed by your emergency provider today.    Return to the Emergency Department if:  Your child seems to get sicker, will not wake up, will not respond normally, or is crying for a long time and will not calm down.  Your child seems to have very bad abdominal (belly) pain, has blood in the stool (which may look red, maroon, or black like tar), or vomits bloody or black material.  Your child is showing signs of dehydration.  Signs of dehydration can be:  Your child has a significant decrease in urination (pee).  Your infant or child starts to have dry mouth and lips, or no saliva or tears.  Your child is very pale, seems very tired, or has sunken eyes.  Your child passes out or faints.  Your child has any new symptoms.   You notice anything else that worries you.    Oral Rehydration Therapy (ORT)  Your doctor has recommend that you continue oral rehydration therapy at home, which is the best treatment for mild to moderate cases of dehydration--safer and better  than IV fluids.     What Fluids to Use?   Commercial rehydration solution is best (Pedialyte or Rehydrate are common brands). You can also make your own oral rehydration solution at home with this recipe:  1 level teaspoon of salt.  8 level teaspoons of sugar.  5 measuring cups of clean drinking water.   If your child is older than 1 year and won t drink rehydration solution due to taste, you may use diluted sports drinks (e.g., half Gatorade, half water) or diluted apple juice (e.g., half juice, half water)     What Fluids to Avoid?  Large amounts of plain water   Infants should never be given plain water  High sugar drinks (full strength juice, sodas), this can worsen diarrhea  Diet or sugar free drinks     ORT: How-To  Give small amounts of liquids regularly, usually starting with 1 teaspoon every 5 minutes  Slowly add to the amount given each time, giving the solution less often as he or she tolerates more.  For example, give 1 tablespoon every 15 minutes.  Goals for ongoing rehydration are, by age:    Age Fluids to Start Ongoing Hydration   Age 0-6 Months 5ml (1 tsp) every 5 minutes If no vomiting, may increase to 15 mL (1Tbsp) every 15 minutes.  Gradually increase the amount given.  Goal is to give about 1.5-3 cups (12-24 oz) over the first 4 hour period.  Then give about 1 oz per hour until your child is drinking well on their own.   Age 6 Months - 3 Years Give 10 mL (2 tsp) every 5 minutes If no vomiting, you may increase to 30 mL (2Tbsp) every 15 minutes.  Goal is to give about 2-4 cups (16-32 oz) over the first 4 hours.  Then give about 1-2 oz per hour until your child is drinking well on their own.   Age 3 - 8 Years 15 mL (1 Tbsp) every 5 minutes If not vomiting you may increase to 45 mL (3 Tbsp) every 15 minutes.  Goal is to give about 4-8 cups (48-64oz) over the first 4 hours.  Then give about 3 oz per hour until your child is drinking well on their own.   Age > 8 Years 15-30mL (1-2Tbsp) every 5 minutes  If no vomiting, you may increase to 3 oz (about   cup) every 15 minutes.  Goal is to give about 6-12 cups over the first 4 hours.  Then give about 3-4 oz per hour until your child is drinking well on their own.     Volume References:  1 tsp = 5mL  1 tbsp = 15 mL  1 oz = 30 mL = 2 Tbsp  8 oz = 1 cup    If your child vomits, stop giving the fluid for about 30 minutes, then start again with 1 teaspoon, or at least with a little less than last time.   For younger children, the caregiver may need to use a medication syringe to give the fluid.  Older children may do well if you pour the recommended amount in a small cup and refill the cup every 15 minutes.  Set a timer.   If your child wants to take smaller amounts at a time, it is ok to give smaller amounts every 5-10 minutes to total the amounts listed above.  This may be more effective at the beginning of treatment.  After 4 hours, see if the child will drink on their own based on thirst.  Monitor fluid intake.  Infants can return to breastfeeding or taking formula anytime they are willing.  After older children are drinking one of the above options well, you can transition to liquids of their choice and gradually resume their usual diet.  There is no need to restrict milk or dairy products unless your child has prior dairy intolerance.    Adding Solid Foods  Once your child is taking oral rehydration solution well, you can add mild solids (or formula for babies) in small amounts (crackers, toast, noodles).   Avoid spicy, greasy, or fried foods until the vomiting and diarrhea have stopped for a day or two.   If your child vomits, stop the solids (or formula) for an hour or so. If your baby is breast fed, you may keep breastfeeding frequently.   If your child has diarrhea, milk may give them gas and loose bowels for a few days, and food may make them have more diarrhea at first, but they will get better faster!    What if my child vomits?  If your child vomits, take a  30 min break.  Use nausea/vomiting medications if prescribed then resume oral rehydration treatment.    What if my child still has diarrhea?  Children with ongoing diarrhea will need to take in extra fluids to replace fluids lost in the stool until rehydrated and taking fluids and age appropriate foods on their own.  Give extra rehydration until diarrhea resolves.     Fever:  Treat fever with Tylenol (acetaminophen).  Fever increases the body s need for liquids.    If your doctor today has told you to follow-up with your regular doctor, it is very important that you make an appointment with your clinic and go to that appointment.  If you do not follow-up with your primary doctor, it may result in missing an important development which could result in permanent injury or disability and/or lasting pain.  If there is any problem keeping your appointment, call your doctor or return to the Emergency Department.    If you were given a prescription for medicine here today, be sure to read all of the information (including the package insert) that comes with your prescription.  This will include important information about the medicine, its side effects, and any warnings that you need to know about.  The pharmacist who fills the prescription can provide more information and answer questions you may have about the medicine.  If you have questions or concerns that the pharmacist cannot address, please call or return to the Emergency Department.       Remember that you can always come back to the Emergency Department if you are not able to see your regular provider in the amount of time listed above, if you get any new symptoms, or if there is anything that worries you.

## 2021-11-23 NOTE — ED NOTES
Ambulated pt approx. 15 ft with walker, pt tolerated ambulation well, only reporting slight dizziness that went away after standing for a minute. While sitting up and standing, pt reported significant pain around L shoulder-blade and L chest area.

## 2021-11-26 NOTE — TELEPHONE ENCOUNTER
Spoke to Wen, states that patient has been feeling better. She states they found Hydroxyzine at home that she was not taking, so they had her start this and it seems to be helping. She would like to give this a few more days, she will call back if patient does not continue to improve.    Paola Shepard RN  [facility]

## 2021-12-01 NOTE — TELEPHONE ENCOUNTER
Call back from patient's daughter wondering if tylenol is ok for the patient to take. Reviewed with patient's daughter that tylenol is ok for the patient to take from a cardiac standpoint. She verbalized understanding.    What Is The Reason For Today's Visit?: Full Body Skin Examination

## 2021-12-09 ENCOUNTER — OFFICE VISIT (OUTPATIENT)
Dept: INTERNAL MEDICINE | Facility: CLINIC | Age: 86
End: 2021-12-09
Payer: MEDICARE

## 2021-12-09 VITALS
HEIGHT: 65 IN | WEIGHT: 108 LBS | RESPIRATION RATE: 14 BRPM | TEMPERATURE: 97.7 F | SYSTOLIC BLOOD PRESSURE: 122 MMHG | BODY MASS INDEX: 17.99 KG/M2 | DIASTOLIC BLOOD PRESSURE: 62 MMHG | HEART RATE: 103 BPM | OXYGEN SATURATION: 97 %

## 2021-12-09 DIAGNOSIS — Z23 NEED FOR PROPHYLACTIC VACCINATION AND INOCULATION AGAINST INFLUENZA: ICD-10-CM

## 2021-12-09 DIAGNOSIS — B02.9 HERPES ZOSTER INFECTION OF THORACIC REGION: ICD-10-CM

## 2021-12-09 DIAGNOSIS — I48.0 PAROXYSMAL ATRIAL FIBRILLATION (H): ICD-10-CM

## 2021-12-09 DIAGNOSIS — B02.29 POSTHERPETIC NEURALGIA: Primary | ICD-10-CM

## 2021-12-09 DIAGNOSIS — E44.1 MILD PROTEIN-CALORIE MALNUTRITION (H): ICD-10-CM

## 2021-12-09 PROCEDURE — 90662 IIV NO PRSV INCREASED AG IM: CPT | Performed by: INTERNAL MEDICINE

## 2021-12-09 PROCEDURE — G0008 ADMIN INFLUENZA VIRUS VAC: HCPCS | Performed by: INTERNAL MEDICINE

## 2021-12-09 PROCEDURE — 99214 OFFICE O/P EST MOD 30 MIN: CPT | Mod: 25 | Performed by: INTERNAL MEDICINE

## 2021-12-09 RX ORDER — HYDROXYZINE HYDROCHLORIDE 25 MG/1
25 TABLET, FILM COATED ORAL EVERY 6 HOURS PRN
Qty: 60 TABLET | Refills: 1 | Status: SHIPPED | OUTPATIENT
Start: 2021-12-09 | End: 2022-03-18

## 2021-12-09 RX ORDER — GABAPENTIN 100 MG/1
CAPSULE ORAL
Qty: 90 CAPSULE | Refills: 3 | Status: SHIPPED | OUTPATIENT
Start: 2021-12-09 | End: 2022-01-01

## 2021-12-09 RX ORDER — CYCLOBENZAPRINE HCL 5 MG
5 TABLET ORAL 3 TIMES DAILY PRN
Qty: 60 TABLET | Refills: 0 | Status: CANCELLED | OUTPATIENT
Start: 2021-12-09

## 2021-12-09 ASSESSMENT — PAIN SCALES - GENERAL: PAINLEVEL: SEVERE PAIN (6)

## 2021-12-09 ASSESSMENT — MIFFLIN-ST. JEOR: SCORE: 915.76

## 2021-12-09 NOTE — PROGRESS NOTES
Assessment & Plan   (B02.29) Postherpetic neuralgia  (primary encounter diagnosis)  Comment: Adjust dosing of gabapentin as per patient instructions.   Plan: gabapentin (NEURONTIN) 100 MG capsule          (B02.9) Herpes zoster infection of thoracic region  Comment: Offered Rx for hydroxyzine for itching.   Plan: hydrOXYzine (ATARAX) 25 MG tablet          (E44.1) Mild protein-calorie malnutrition (H)  Comment: moderate malnutrition noted recently by dietician while hospitalized.     (I48.0) Paroxysmal atrial fibrillation (H)  Comment: Stable at present.     (Z23) Need for prophylactic vaccination and inoculation against influenza  Plan: INFLUENZA, QUAD, HIGH DOSE, PF, 65YR + (FLUZONE        HD)               Patient Instructions   Two medications today:    Hydroxyzine 25 mg tabs--for itching. May take one tab every 4-6 hours for itching.     Gabapentin for the neuralgia pain (post-shingles pain).   Lowest dose is 100 mg capsules.   Maximum dose for your age is probably 2700 mg a day (which we won't give you).    Start out with four capsules at bedtime, one capsule in the morning.   Okay to gradually increase to six capsules at bedtime.   You can also gradually increase your morning dose, maybe to three capsules.           Return in about 2 months (around 2/9/2022) for Routine Visit.    Michael Londono MD,   [facility] JUNE Best is a 89 year old who presents for the following health issues  accompanied by her daughter.    [facility]       Hospital Follow-up Visit:    Hospital/Nursing Home/IP Rehab Facility: [facility]  Date of Admission: 11/09/2021  Date of Discharge: 11/12/2021  Reason(s) for Admission: chest pain due to shingles      Was your hospitalization related to COVID-19? No   Problems taking medications regularly:  None  Medication changes since discharge: None  Problems adhering to non-medication therapy:  None    Summary of hospitalization:  [facility] hospital discharge summary reviewed  Diagnostic Tests/Treatments reviewed.  Follow up needed: none  Other Healthcare Providers Involved in Patient s Care:         None  Update since discharge: stable.   Post Discharge Medication Reconciliation: discharge medications reconciled and changed, per note/orders.  Plan of care communicated with patient and daughter            We reviewed her recent hospitalization 11/9-11/12 for chest discomfort due to shingles, and her ED presentation on 11/23/2021 with black stools.     Reviewed hospital discharge summary dated 11/12. Admitted with concern that symptoms were due to angina. She ruled out for acute MI by EKG and cardiac enzymes. Chest CT scan ruled out pulmonary embolism. She developed rash in a left chest dermatomal distribution consistent with shingles, and was started on Valtrex, gabapentin and lidocaine topical cream.    She also received IV iron prior to discharge for iron deficiency anemia, was started on po iron and continued on BID Protonix.     An incidental finding of a left upper quadrant cystic mass was noted, and f/u imaging such as CT/MRI and possible EUS was deferred to her primary care provider.    Dietician noted moderate malnutrition.     She presented to ED again on 11/23 reporting an episode of vomiting and a black stool.  Her stool was heme negative and her hemoglobin was stable.   Her nausea has improved and she has had no further stool problems.     We discussed options for treating her shingles pain. Advised titrating up on her dosing of gabapentin.     Past medical, family and social histories as well as medications reviewed and updated as needed.    Review of Systems   REVIEW OF SYSTEMS: The following systems have been completely reviewed and are negative except as noted above:   Constitutional, respiratory, cardiovascular, gastrointestinal, musculoskeletal, dermatologic, endocrine, psychiatric, and neurologic systems.        Objective   "  /62   Pulse 103   Temp 97.7  F (36.5  C) (Tympanic)   Resp 14   Ht 1.651 m (5' 5\")   Wt 49 kg (108 lb)   LMP  (LMP Unknown)   SpO2 97%   Breastfeeding No   BMI 17.97 kg/m    Body mass index is 17.97 kg/m .     Physical Exam   GENERAL: healthy, alert and no distress  RESP: lungs clear to auscultation - no rales, rhonchi or wheezes  CV: regular rate and rhythm, normal S1 S2, no S3 or S4, no murmur, click or rub, no peripheral edema and peripheral pulses strong  MS: no gross musculoskeletal defects noted, no edema  SKIN: no suspicious lesions or rashes  NEURO: Normal strength and tone, mentation intact and speech normal  PSYCH: mentation appears normal, affect normal/bright          "

## 2021-12-09 NOTE — PATIENT INSTRUCTIONS
Two medications today:    Hydroxyzine 25 mg tabs--for itching. May take one tab every 4-6 hours for itching.     Gabapentin for the neuralgia pain (post-shingles pain).   Lowest dose is 100 mg capsules.   Maximum dose for your age is probably 2700 mg a day (which we won't give you).    Start out with four capsules at bedtime, one capsule in the morning.   Okay to gradually increase to six capsules at bedtime.   You can also gradually increase your morning dose, maybe to three capsules.

## 2021-12-10 ENCOUNTER — TELEPHONE (OUTPATIENT)
Dept: INTERNAL MEDICINE | Facility: CLINIC | Age: 86
End: 2021-12-10
Payer: MEDICARE

## 2021-12-10 NOTE — TELEPHONE ENCOUNTER
Prior Authorization Retail Medication Request    Medication/Dose: hydrOXYzine (ATARAX) 25 MG tablet  ICD code (if different than what is on RX):    Previously Tried and Failed:    Rationale:      Insurance Name:  CoverMyMeds   Insurance ID:  [id number]      Pharmacy Information (if different than what is on RX)  Name:  [facility]  Phone:

## 2021-12-13 NOTE — TELEPHONE ENCOUNTER
Please advise pt:    Would decrease to one each AM and one each bedtime. Gradually raise the bedtime dose every 1-2 days.

## 2021-12-13 NOTE — TELEPHONE ENCOUNTER
Central Prior Authorization Team   Phone: [phone]      PA Initiation    Medication: hydrOXYzine (ATARAX) 25 MG tablet  Insurance Company: Medicare Blue - Phone [phone] Fax [phone]  Pharmacy Filling the Rx: [id number] [place] [facility] [address]  Filling Pharmacy Phone: [phone]  Filling Pharmacy Fax:    Start Date: 12/13/2021

## 2021-12-13 NOTE — TELEPHONE ENCOUNTER
"Pt states she just started taking Gabapentin for her Shingles pain. Saw Dr Londono on Thursday.   She takes One in AM and 3 in PM. Per the directions.     She is having side effects. They are making her shaky and \"goofy\". Cannot concentrate. It is making her scared to feel like that.     She states one pill doesn't help her burning and itching. The 3 pills help her symptoms, but cause the SE.      She doesn't feel real safe walking after she takes the 3 pills.     Please advise if should decrease for a few days or what she should do?           "

## 2021-12-13 NOTE — TELEPHONE ENCOUNTER
Prior Authorization Approval    Authorization Effective Date: 9/14/2021  Authorization Expiration Date: 12/13/2022  Medication: hydrOXYzine (ATARAX) 25 MG tablet-APPROVED  Approved Dose/Quantity:   Reference #:     Insurance Company: Medicare Blue [employer] Phone [phone] Fax [phone]  Expected CoPay:       CoPay Card Available:      Foundation Assistance Needed:    Which Pharmacy is filling the prescription (Not needed for infusion/clinic administered): [id number] - [facility] [address]  Pharmacy Notified: Yes  Patient Notified: No    Pharmacy will notify patient when medication is ready.

## 2021-12-13 NOTE — TELEPHONE ENCOUNTER
Call to pt and advised of decreasing the Gabapentin. Discussed with her on how to do this. She agrees.

## 2021-12-21 ENCOUNTER — TELEPHONE (OUTPATIENT)
Dept: INTERNAL MEDICINE | Facility: CLINIC | Age: 86
End: 2021-12-21
Payer: MEDICARE

## 2021-12-21 NOTE — TELEPHONE ENCOUNTER
Call to patient. Patient informed of Dr. Londono's response below. Patient verbalized understanding.     Beth DURON RN   [facility]

## 2021-12-21 NOTE — TELEPHONE ENCOUNTER
Call to patient. Patient informed of Dr. Londono's response below. Patient verbalized understanding.     Patient wants to know how to wean off Gabapentin in the future.     Patient would like a call back with provider's recommendations on how to wean off Gabapentin.   Okay to leave detailed message on [phone].     Beth DURON RN   [facility]

## 2021-12-21 NOTE — TELEPHONE ENCOUNTER
Okay to transition to one capsule at bedtime if she would like. May try increasing to two capsules at bedtime if needed.    Please advise pt.

## 2021-12-21 NOTE — TELEPHONE ENCOUNTER
Patient also called 12/13/21 regarding concerns for Gabapentin side effects while taking 1 every AM and 3 in the evening. Dr. Londono had directed her decrease dose to 1 every AM and 1 at bedtime, then gradually raise bedtime dose every 1-2 days.    Called patient she was only taking 1 tab in morning and 1 tab at bedtime, but was having side effects with this dose - states she felt very goofy on Sunday night and was having a hard time talking. Gabapentin does help with shingles symptoms - she states she skipped taking it one night and had a lot of itching. Symptoms are worse at night, she asks if she should try just taking this at bedtime and skip the morning dose to see if she has less side effects.     She does have prescription for Lidocaine patches, but they do not stay on well due to the location of the shingles.     Paola Shepard RN  [facility]

## 2021-12-21 NOTE — TELEPHONE ENCOUNTER
If she is taking 600 mg at bedtime, she may reduce to 300 mg.     If taking 300 mg at bedtime and want to taper off, she may either just discontinue taking the capsule at bedtime, or she could try filling a prescription for 100 mg tablets and reduce the dose by 100 mg every 2-4 weeks.

## 2021-12-21 NOTE — TELEPHONE ENCOUNTER
Patient calling. She has been taking gabapentin but she is having side affects. She stated its working but she feels goofy. She is worried about having a fall. Please advise. Ok to call and sarah [phone]

## 2021-12-27 DIAGNOSIS — I25.5 ISCHEMIC CARDIOMYOPATHY: ICD-10-CM

## 2021-12-27 RX ORDER — FUROSEMIDE 20 MG
20 TABLET ORAL DAILY
Qty: 95 TABLET | Refills: 2 | Status: ON HOLD | OUTPATIENT
Start: 2021-12-27 | End: 2022-01-01

## 2022-01-01 ENCOUNTER — TRANSITIONAL CARE UNIT VISIT (OUTPATIENT)
Dept: GERIATRICS | Facility: CLINIC | Age: 87
End: 2022-01-01
Payer: MEDICARE

## 2022-01-01 ENCOUNTER — TELEPHONE (OUTPATIENT)
Dept: INTERNAL MEDICINE | Facility: CLINIC | Age: 87
End: 2022-01-01

## 2022-01-01 ENCOUNTER — HOSPITAL ENCOUNTER (INPATIENT)
Facility: CLINIC | Age: 87
LOS: 6 days | Discharge: NURSING, REHAB OR LONG-TERM CARE | DRG: 563 | End: 2022-10-04
Attending: EMERGENCY MEDICINE | Admitting: STUDENT IN AN ORGANIZED HEALTH CARE EDUCATION/TRAINING PROGRAM
Payer: MEDICARE

## 2022-01-01 ENCOUNTER — APPOINTMENT (OUTPATIENT)
Dept: GENERAL RADIOLOGY | Facility: CLINIC | Age: 87
DRG: 563 | End: 2022-01-01
Attending: EMERGENCY MEDICINE
Payer: MEDICARE

## 2022-01-01 ENCOUNTER — APPOINTMENT (OUTPATIENT)
Dept: CT IMAGING | Facility: CLINIC | Age: 87
DRG: 563 | End: 2022-01-01
Attending: EMERGENCY MEDICINE
Payer: MEDICARE

## 2022-01-01 ENCOUNTER — MEDICAL CORRESPONDENCE (OUTPATIENT)
Dept: HEALTH INFORMATION MANAGEMENT | Facility: CLINIC | Age: 87
End: 2022-01-01

## 2022-01-01 ENCOUNTER — PATIENT OUTREACH (OUTPATIENT)
Dept: NURSING | Facility: CLINIC | Age: 87
End: 2022-01-01
Attending: INTERNAL MEDICINE

## 2022-01-01 ENCOUNTER — APPOINTMENT (OUTPATIENT)
Dept: OCCUPATIONAL THERAPY | Facility: CLINIC | Age: 87
DRG: 542 | End: 2022-01-01
Attending: HOSPITALIST
Payer: MEDICARE

## 2022-01-01 ENCOUNTER — LAB REQUISITION (OUTPATIENT)
Dept: LAB | Facility: CLINIC | Age: 87
End: 2022-01-01

## 2022-01-01 ENCOUNTER — APPOINTMENT (OUTPATIENT)
Dept: ULTRASOUND IMAGING | Facility: CLINIC | Age: 87
DRG: 291 | End: 2022-01-01
Attending: EMERGENCY MEDICINE
Payer: MEDICARE

## 2022-01-01 ENCOUNTER — LAB (OUTPATIENT)
Dept: LAB | Facility: CLINIC | Age: 87
End: 2022-01-01
Payer: MEDICARE

## 2022-01-01 ENCOUNTER — PATIENT OUTREACH (OUTPATIENT)
Dept: CARE COORDINATION | Facility: CLINIC | Age: 87
End: 2022-01-01

## 2022-01-01 ENCOUNTER — APPOINTMENT (OUTPATIENT)
Dept: PHYSICAL THERAPY | Facility: CLINIC | Age: 87
DRG: 291 | End: 2022-01-01
Payer: MEDICARE

## 2022-01-01 ENCOUNTER — DOCUMENTATION ONLY (OUTPATIENT)
Dept: OTHER | Facility: CLINIC | Age: 87
End: 2022-01-01

## 2022-01-01 ENCOUNTER — APPOINTMENT (OUTPATIENT)
Dept: CT IMAGING | Facility: CLINIC | Age: 87
DRG: 542 | End: 2022-01-01
Attending: EMERGENCY MEDICINE
Payer: MEDICARE

## 2022-01-01 ENCOUNTER — OFFICE VISIT (OUTPATIENT)
Dept: INTERNAL MEDICINE | Facility: CLINIC | Age: 87
End: 2022-01-01
Payer: MEDICARE

## 2022-01-01 ENCOUNTER — APPOINTMENT (OUTPATIENT)
Dept: PHYSICAL THERAPY | Facility: CLINIC | Age: 87
DRG: 542 | End: 2022-01-01
Attending: HOSPITALIST
Payer: MEDICARE

## 2022-01-01 ENCOUNTER — HEALTH MAINTENANCE LETTER (OUTPATIENT)
Age: 87
End: 2022-01-01

## 2022-01-01 ENCOUNTER — HOSPITAL ENCOUNTER (INPATIENT)
Facility: CLINIC | Age: 87
LOS: 6 days | Discharge: NURSING, REHAB OR LONG-TERM CARE | DRG: 291 | End: 2022-07-25
Attending: EMERGENCY MEDICINE | Admitting: INTERNAL MEDICINE
Payer: MEDICARE

## 2022-01-01 ENCOUNTER — APPOINTMENT (OUTPATIENT)
Dept: PHYSICAL THERAPY | Facility: CLINIC | Age: 87
DRG: 563 | End: 2022-01-01
Attending: PHYSICIAN ASSISTANT
Payer: MEDICARE

## 2022-01-01 ENCOUNTER — APPOINTMENT (OUTPATIENT)
Dept: PHYSICAL THERAPY | Facility: CLINIC | Age: 87
DRG: 291 | End: 2022-01-01
Attending: STUDENT IN AN ORGANIZED HEALTH CARE EDUCATION/TRAINING PROGRAM
Payer: MEDICARE

## 2022-01-01 ENCOUNTER — APPOINTMENT (OUTPATIENT)
Dept: OCCUPATIONAL THERAPY | Facility: CLINIC | Age: 87
DRG: 563 | End: 2022-01-01
Payer: MEDICARE

## 2022-01-01 ENCOUNTER — LAB REQUISITION (OUTPATIENT)
Dept: LAB | Facility: CLINIC | Age: 87
End: 2022-01-01
Payer: MEDICARE

## 2022-01-01 ENCOUNTER — APPOINTMENT (OUTPATIENT)
Dept: GENERAL RADIOLOGY | Facility: CLINIC | Age: 87
DRG: 542 | End: 2022-01-01
Attending: EMERGENCY MEDICINE
Payer: MEDICARE

## 2022-01-01 ENCOUNTER — APPOINTMENT (OUTPATIENT)
Dept: CT IMAGING | Facility: CLINIC | Age: 87
DRG: 563 | End: 2022-01-01
Attending: PHYSICIAN ASSISTANT
Payer: MEDICARE

## 2022-01-01 ENCOUNTER — APPOINTMENT (OUTPATIENT)
Dept: CARDIOLOGY | Facility: CLINIC | Age: 87
DRG: 291 | End: 2022-01-01
Attending: INTERNAL MEDICINE
Payer: MEDICARE

## 2022-01-01 ENCOUNTER — DISCHARGE SUMMARY NURSING HOME (OUTPATIENT)
Dept: GERIATRICS | Facility: CLINIC | Age: 87
End: 2022-01-01
Payer: MEDICARE

## 2022-01-01 ENCOUNTER — APPOINTMENT (OUTPATIENT)
Dept: MRI IMAGING | Facility: CLINIC | Age: 87
DRG: 542 | End: 2022-01-01
Attending: PHYSICIAN ASSISTANT
Payer: MEDICARE

## 2022-01-01 ENCOUNTER — APPOINTMENT (OUTPATIENT)
Dept: OCCUPATIONAL THERAPY | Facility: CLINIC | Age: 87
DRG: 563 | End: 2022-01-01
Attending: PHYSICIAN ASSISTANT
Payer: MEDICARE

## 2022-01-01 ENCOUNTER — APPOINTMENT (OUTPATIENT)
Dept: OCCUPATIONAL THERAPY | Facility: CLINIC | Age: 87
DRG: 291 | End: 2022-01-01
Payer: MEDICARE

## 2022-01-01 ENCOUNTER — HOSPITAL ENCOUNTER (OUTPATIENT)
Dept: WOUND CARE | Facility: CLINIC | Age: 87
Discharge: HOME | End: 2022-10-19
Attending: PHYSICIAN ASSISTANT
Payer: COMMERCIAL

## 2022-01-01 ENCOUNTER — APPOINTMENT (OUTPATIENT)
Dept: GENERAL RADIOLOGY | Facility: CLINIC | Age: 87
DRG: 291 | End: 2022-01-01
Attending: EMERGENCY MEDICINE
Payer: MEDICARE

## 2022-01-01 ENCOUNTER — APPOINTMENT (OUTPATIENT)
Dept: OCCUPATIONAL THERAPY | Facility: CLINIC | Age: 87
DRG: 542 | End: 2022-01-01
Payer: MEDICARE

## 2022-01-01 ENCOUNTER — TELEPHONE (OUTPATIENT)
Dept: NURSING | Facility: CLINIC | Age: 87
End: 2022-01-01

## 2022-01-01 ENCOUNTER — APPOINTMENT (OUTPATIENT)
Dept: OCCUPATIONAL THERAPY | Facility: CLINIC | Age: 87
DRG: 291 | End: 2022-01-01
Attending: INTERNAL MEDICINE
Payer: MEDICARE

## 2022-01-01 ENCOUNTER — APPOINTMENT (OUTPATIENT)
Dept: PHYSICAL THERAPY | Facility: CLINIC | Age: 87
DRG: 563 | End: 2022-01-01
Payer: MEDICARE

## 2022-01-01 ENCOUNTER — HOSPITAL ENCOUNTER (INPATIENT)
Facility: CLINIC | Age: 87
LOS: 3 days | Discharge: HOME HEALTH | DRG: 542 | End: 2022-09-14
Attending: EMERGENCY MEDICINE | Admitting: HOSPITALIST
Payer: MEDICARE

## 2022-01-01 ENCOUNTER — TELEPHONE (OUTPATIENT)
Dept: GERIATRICS | Facility: CLINIC | Age: 87
End: 2022-01-01

## 2022-01-01 ENCOUNTER — NURSING HOME VISIT (OUTPATIENT)
Dept: GERIATRICS | Facility: CLINIC | Age: 87
End: 2022-01-01
Payer: MEDICARE

## 2022-01-01 ENCOUNTER — APPOINTMENT (OUTPATIENT)
Dept: CT IMAGING | Facility: CLINIC | Age: 87
DRG: 291 | End: 2022-01-01
Attending: EMERGENCY MEDICINE
Payer: MEDICARE

## 2022-01-01 ENCOUNTER — TELEPHONE (OUTPATIENT)
Dept: CARDIOLOGY | Facility: CLINIC | Age: 87
End: 2022-01-01

## 2022-01-01 ENCOUNTER — APPOINTMENT (OUTPATIENT)
Dept: ULTRASOUND IMAGING | Facility: CLINIC | Age: 87
DRG: 542 | End: 2022-01-01
Attending: HOSPITALIST
Payer: MEDICARE

## 2022-01-01 ENCOUNTER — PATIENT OUTREACH (OUTPATIENT)
Dept: NURSING | Facility: CLINIC | Age: 87
End: 2022-01-01
Payer: MEDICARE

## 2022-01-01 VITALS
HEART RATE: 75 BPM | OXYGEN SATURATION: 98 % | SYSTOLIC BLOOD PRESSURE: 116 MMHG | BODY MASS INDEX: 18.8 KG/M2 | TEMPERATURE: 98.2 F | DIASTOLIC BLOOD PRESSURE: 61 MMHG | RESPIRATION RATE: 18 BRPM | WEIGHT: 113 LBS

## 2022-01-01 VITALS
TEMPERATURE: 98.1 F | WEIGHT: 116 LBS | RESPIRATION RATE: 18 BRPM | OXYGEN SATURATION: 94 % | SYSTOLIC BLOOD PRESSURE: 119 MMHG | DIASTOLIC BLOOD PRESSURE: 68 MMHG | BODY MASS INDEX: 19.33 KG/M2 | HEART RATE: 90 BPM | HEIGHT: 65 IN

## 2022-01-01 VITALS
HEART RATE: 83 BPM | BODY MASS INDEX: 19.64 KG/M2 | HEIGHT: 65 IN | WEIGHT: 117.9 LBS | TEMPERATURE: 99 F | OXYGEN SATURATION: 99 % | SYSTOLIC BLOOD PRESSURE: 146 MMHG | RESPIRATION RATE: 18 BRPM | DIASTOLIC BLOOD PRESSURE: 74 MMHG

## 2022-01-01 VITALS
DIASTOLIC BLOOD PRESSURE: 80 MMHG | WEIGHT: 114.8 LBS | HEART RATE: 83 BPM | OXYGEN SATURATION: 95 % | RESPIRATION RATE: 18 BRPM | TEMPERATURE: 98 F | HEIGHT: 65 IN | BODY MASS INDEX: 19.13 KG/M2 | SYSTOLIC BLOOD PRESSURE: 137 MMHG

## 2022-01-01 VITALS
DIASTOLIC BLOOD PRESSURE: 65 MMHG | SYSTOLIC BLOOD PRESSURE: 133 MMHG | WEIGHT: 112.4 LBS | TEMPERATURE: 98.4 F | HEART RATE: 96 BPM | HEIGHT: 65 IN | BODY MASS INDEX: 18.73 KG/M2 | OXYGEN SATURATION: 95 %

## 2022-01-01 VITALS
TEMPERATURE: 98.6 F | OXYGEN SATURATION: 96 % | RESPIRATION RATE: 18 BRPM | WEIGHT: 113.6 LBS | BODY MASS INDEX: 18.9 KG/M2 | DIASTOLIC BLOOD PRESSURE: 59 MMHG | SYSTOLIC BLOOD PRESSURE: 123 MMHG | HEART RATE: 81 BPM

## 2022-01-01 VITALS
HEART RATE: 90 BPM | SYSTOLIC BLOOD PRESSURE: 132 MMHG | TEMPERATURE: 98.2 F | RESPIRATION RATE: 18 BRPM | OXYGEN SATURATION: 96 % | WEIGHT: 117.8 LBS | BODY MASS INDEX: 19.63 KG/M2 | DIASTOLIC BLOOD PRESSURE: 73 MMHG | HEIGHT: 65 IN

## 2022-01-01 VITALS
HEIGHT: 65 IN | RESPIRATION RATE: 18 BRPM | SYSTOLIC BLOOD PRESSURE: 152 MMHG | WEIGHT: 117.9 LBS | HEART RATE: 74 BPM | DIASTOLIC BLOOD PRESSURE: 75 MMHG | OXYGEN SATURATION: 97 % | TEMPERATURE: 97.9 F | BODY MASS INDEX: 19.64 KG/M2

## 2022-01-01 VITALS
SYSTOLIC BLOOD PRESSURE: 113 MMHG | BODY MASS INDEX: 26.51 KG/M2 | TEMPERATURE: 98.2 F | HEIGHT: 65 IN | HEART RATE: 109 BPM | DIASTOLIC BLOOD PRESSURE: 59 MMHG | OXYGEN SATURATION: 98 % | RESPIRATION RATE: 16 BRPM | WEIGHT: 159.1 LBS

## 2022-01-01 VITALS
WEIGHT: 113 LBS | RESPIRATION RATE: 18 BRPM | HEART RATE: 78 BPM | OXYGEN SATURATION: 96 % | SYSTOLIC BLOOD PRESSURE: 120 MMHG | DIASTOLIC BLOOD PRESSURE: 79 MMHG | TEMPERATURE: 97.7 F | BODY MASS INDEX: 18.8 KG/M2

## 2022-01-01 VITALS
OXYGEN SATURATION: 95 % | WEIGHT: 100 LBS | RESPIRATION RATE: 16 BRPM | HEART RATE: 88 BPM | DIASTOLIC BLOOD PRESSURE: 73 MMHG | TEMPERATURE: 98.1 F | HEIGHT: 65 IN | BODY MASS INDEX: 16.66 KG/M2 | SYSTOLIC BLOOD PRESSURE: 143 MMHG

## 2022-01-01 VITALS
WEIGHT: 117.9 LBS | RESPIRATION RATE: 18 BRPM | DIASTOLIC BLOOD PRESSURE: 63 MMHG | HEIGHT: 65 IN | HEART RATE: 72 BPM | BODY MASS INDEX: 19.64 KG/M2 | OXYGEN SATURATION: 97 % | SYSTOLIC BLOOD PRESSURE: 102 MMHG | TEMPERATURE: 97.5 F

## 2022-01-01 VITALS
HEART RATE: 80 BPM | SYSTOLIC BLOOD PRESSURE: 120 MMHG | OXYGEN SATURATION: 98 % | BODY MASS INDEX: 19.1 KG/M2 | WEIGHT: 114.8 LBS | TEMPERATURE: 97.6 F | RESPIRATION RATE: 18 BRPM | DIASTOLIC BLOOD PRESSURE: 65 MMHG

## 2022-01-01 VITALS
BODY MASS INDEX: 19.43 KG/M2 | DIASTOLIC BLOOD PRESSURE: 77 MMHG | HEIGHT: 65 IN | OXYGEN SATURATION: 93 % | RESPIRATION RATE: 12 BRPM | WEIGHT: 116.6 LBS | SYSTOLIC BLOOD PRESSURE: 160 MMHG | HEART RATE: 90 BPM | TEMPERATURE: 98.1 F

## 2022-01-01 VITALS
DIASTOLIC BLOOD PRESSURE: 72 MMHG | WEIGHT: 101 LBS | HEIGHT: 65 IN | HEART RATE: 88 BPM | TEMPERATURE: 97.3 F | OXYGEN SATURATION: 94 % | RESPIRATION RATE: 16 BRPM | SYSTOLIC BLOOD PRESSURE: 140 MMHG | BODY MASS INDEX: 16.83 KG/M2

## 2022-01-01 VITALS
DIASTOLIC BLOOD PRESSURE: 78 MMHG | TEMPERATURE: 98.6 F | RESPIRATION RATE: 17 BRPM | HEART RATE: 63 BPM | OXYGEN SATURATION: 96 % | HEIGHT: 65 IN | SYSTOLIC BLOOD PRESSURE: 129 MMHG | WEIGHT: 116.1 LBS | BODY MASS INDEX: 19.34 KG/M2

## 2022-01-01 VITALS
OXYGEN SATURATION: 96 % | TEMPERATURE: 98.4 F | DIASTOLIC BLOOD PRESSURE: 68 MMHG | HEART RATE: 81 BPM | BODY MASS INDEX: 26.48 KG/M2 | SYSTOLIC BLOOD PRESSURE: 121 MMHG | HEIGHT: 65 IN | RESPIRATION RATE: 18 BRPM

## 2022-01-01 VITALS
WEIGHT: 109 LBS | OXYGEN SATURATION: 98 % | SYSTOLIC BLOOD PRESSURE: 118 MMHG | RESPIRATION RATE: 18 BRPM | DIASTOLIC BLOOD PRESSURE: 58 MMHG | HEART RATE: 82 BPM | TEMPERATURE: 98.3 F | BODY MASS INDEX: 18.14 KG/M2

## 2022-01-01 DIAGNOSIS — E11.9 DIET-CONTROLLED DIABETES MELLITUS (H): ICD-10-CM

## 2022-01-01 DIAGNOSIS — Z86.718 HISTORY OF DEEP VENOUS THROMBOSIS: ICD-10-CM

## 2022-01-01 DIAGNOSIS — L97.812 SKIN ULCER OF RIGHT KNEE WITH FAT LAYER EXPOSED (H): ICD-10-CM

## 2022-01-01 DIAGNOSIS — E03.8 TSH DEFICIENCY: ICD-10-CM

## 2022-01-01 DIAGNOSIS — S42.292A OTHER CLOSED DISPLACED FRACTURE OF PROXIMAL END OF LEFT HUMERUS, INITIAL ENCOUNTER: ICD-10-CM

## 2022-01-01 DIAGNOSIS — I25.10 CORONARY ARTERY DISEASE INVOLVING NATIVE CORONARY ARTERY OF NATIVE HEART WITHOUT ANGINA PECTORIS: ICD-10-CM

## 2022-01-01 DIAGNOSIS — R53.81 PHYSICAL DECONDITIONING: ICD-10-CM

## 2022-01-01 DIAGNOSIS — G89.29 OTHER CHRONIC PAIN: ICD-10-CM

## 2022-01-01 DIAGNOSIS — I48.0 PAF (PAROXYSMAL ATRIAL FIBRILLATION) (H): ICD-10-CM

## 2022-01-01 DIAGNOSIS — I25.5 ISCHEMIC CARDIOMYOPATHY: ICD-10-CM

## 2022-01-01 DIAGNOSIS — S22.000D COMPRESSION FRACTURE OF THORACIC VERTEBRA WITH ROUTINE HEALING, UNSPECIFIED THORACIC VERTEBRAL LEVEL, SUBSEQUENT ENCOUNTER: ICD-10-CM

## 2022-01-01 DIAGNOSIS — E78.5 HYPERLIPIDEMIA, UNSPECIFIED: ICD-10-CM

## 2022-01-01 DIAGNOSIS — D64.9 ANEMIA, UNSPECIFIED TYPE: ICD-10-CM

## 2022-01-01 DIAGNOSIS — G89.29 OTHER CHRONIC PAIN: Primary | ICD-10-CM

## 2022-01-01 DIAGNOSIS — E11.21 TYPE 2 DIABETES MELLITUS WITH DIABETIC NEPHROPATHY, WITHOUT LONG-TERM CURRENT USE OF INSULIN (H): ICD-10-CM

## 2022-01-01 DIAGNOSIS — J30.9 ALLERGIC RHINITIS, UNSPECIFIED SEASONALITY, UNSPECIFIED TRIGGER: ICD-10-CM

## 2022-01-01 DIAGNOSIS — Z53.9 DIAGNOSIS NOT YET DEFINED: Primary | ICD-10-CM

## 2022-01-01 DIAGNOSIS — Z29.9 ENCOUNTER FOR PROPHYLACTIC MEASURES, UNSPECIFIED: ICD-10-CM

## 2022-01-01 DIAGNOSIS — I50.42 CHRONIC COMBINED SYSTOLIC AND DIASTOLIC CONGESTIVE HEART FAILURE (H): ICD-10-CM

## 2022-01-01 DIAGNOSIS — D64.9 CHRONIC ANEMIA: ICD-10-CM

## 2022-01-01 DIAGNOSIS — N18.30 STAGE 3 CHRONIC KIDNEY DISEASE, UNSPECIFIED WHETHER STAGE 3A OR 3B CKD (H): ICD-10-CM

## 2022-01-01 DIAGNOSIS — I21.09 ST ELEVATION MYOCARDIAL INFARCTION (STEMI) INVOLVING OTHER CORONARY ARTERY OF ANTERIOR WALL (H): ICD-10-CM

## 2022-01-01 DIAGNOSIS — M80.00XD AGE-RELATED OSTEOPOROSIS WITH CURRENT PATHOLOGICAL FRACTURE WITH ROUTINE HEALING, SUBSEQUENT ENCOUNTER: ICD-10-CM

## 2022-01-01 DIAGNOSIS — I10 HYPERTENSION, UNSPECIFIED TYPE: ICD-10-CM

## 2022-01-01 DIAGNOSIS — K21.9 GASTROESOPHAGEAL REFLUX DISEASE WITHOUT ESOPHAGITIS: ICD-10-CM

## 2022-01-01 DIAGNOSIS — K44.9 HIATAL HERNIA: ICD-10-CM

## 2022-01-01 DIAGNOSIS — S42.202D CLOSED FRACTURE OF PROXIMAL END OF LEFT HUMERUS WITH ROUTINE HEALING, UNSPECIFIED FRACTURE MORPHOLOGY, SUBSEQUENT ENCOUNTER: Primary | ICD-10-CM

## 2022-01-01 DIAGNOSIS — I50.43 ACUTE ON CHRONIC COMBINED SYSTOLIC AND DIASTOLIC CONGESTIVE HEART FAILURE (H): ICD-10-CM

## 2022-01-01 DIAGNOSIS — S22.030A COMPRESSION FRACTURE OF T3 VERTEBRA, INITIAL ENCOUNTER (H): ICD-10-CM

## 2022-01-01 DIAGNOSIS — E78.5 DYSLIPIDEMIA: ICD-10-CM

## 2022-01-01 DIAGNOSIS — A04.72 CLOSTRIDIUM DIFFICILE DIARRHEA: ICD-10-CM

## 2022-01-01 DIAGNOSIS — S42.202D CLOSED FRACTURE OF PROXIMAL END OF LEFT HUMERUS WITH ROUTINE HEALING, UNSPECIFIED FRACTURE MORPHOLOGY, SUBSEQUENT ENCOUNTER: ICD-10-CM

## 2022-01-01 DIAGNOSIS — S32.000D COMPRESSION FRACTURE OF LUMBAR VERTEBRA WITH ROUTINE HEALING, UNSPECIFIED LUMBAR VERTEBRAL LEVEL, SUBSEQUENT ENCOUNTER: ICD-10-CM

## 2022-01-01 DIAGNOSIS — F41.9 ANXIETY: ICD-10-CM

## 2022-01-01 DIAGNOSIS — B02.9 HERPES ZOSTER INFECTION OF THORACIC REGION: ICD-10-CM

## 2022-01-01 DIAGNOSIS — H04.123 DRY EYES: ICD-10-CM

## 2022-01-01 DIAGNOSIS — E44.0 MODERATE PROTEIN-CALORIE MALNUTRITION (H): ICD-10-CM

## 2022-01-01 DIAGNOSIS — K92.2 ACUTE GI BLEEDING: ICD-10-CM

## 2022-01-01 DIAGNOSIS — R07.9 CHEST PAIN, UNSPECIFIED TYPE: ICD-10-CM

## 2022-01-01 DIAGNOSIS — M19.90 OSTEOARTHRITIS, UNSPECIFIED OSTEOARTHRITIS TYPE, UNSPECIFIED SITE: ICD-10-CM

## 2022-01-01 DIAGNOSIS — R19.7 DIARRHEA OF PRESUMED INFECTIOUS ORIGIN: ICD-10-CM

## 2022-01-01 DIAGNOSIS — K21.9 GASTROESOPHAGEAL REFLUX DISEASE, UNSPECIFIED WHETHER ESOPHAGITIS PRESENT: ICD-10-CM

## 2022-01-01 DIAGNOSIS — I50.9 ACUTE ON CHRONIC CONGESTIVE HEART FAILURE, UNSPECIFIED HEART FAILURE TYPE (H): Primary | ICD-10-CM

## 2022-01-01 DIAGNOSIS — S42.401A CLOSED FRACTURE OF RIGHT ELBOW, INITIAL ENCOUNTER: ICD-10-CM

## 2022-01-01 DIAGNOSIS — I48.0 PAROXYSMAL ATRIAL FIBRILLATION (H): ICD-10-CM

## 2022-01-01 DIAGNOSIS — Z71.89 ADVANCED DIRECTIVES, COUNSELING/DISCUSSION: ICD-10-CM

## 2022-01-01 DIAGNOSIS — Z00.01 ENCOUNTER FOR GENERAL ADULT MEDICAL EXAMINATION WITH ABNORMAL FINDINGS: ICD-10-CM

## 2022-01-01 DIAGNOSIS — K59.01 SLOW TRANSIT CONSTIPATION: ICD-10-CM

## 2022-01-01 DIAGNOSIS — Z23 NEED FOR PROPHYLACTIC VACCINATION AND INOCULATION AGAINST INFLUENZA: ICD-10-CM

## 2022-01-01 DIAGNOSIS — Z86.79 HISTORY OF ATRIAL FIBRILLATION: ICD-10-CM

## 2022-01-01 DIAGNOSIS — Z09 HOSPITAL DISCHARGE FOLLOW-UP: Primary | ICD-10-CM

## 2022-01-01 DIAGNOSIS — I50.22 CHRONIC SYSTOLIC HEART FAILURE (H): Primary | ICD-10-CM

## 2022-01-01 DIAGNOSIS — M80.00XG AGE-RELATED OSTEOPOROSIS WITH CURRENT PATHOLOGICAL FRACTURE WITH DELAYED HEALING, SUBSEQUENT ENCOUNTER: Primary | ICD-10-CM

## 2022-01-01 DIAGNOSIS — Z79.899 ENCOUNTER FOR LONG-TERM (CURRENT) USE OF MEDICATIONS: ICD-10-CM

## 2022-01-01 DIAGNOSIS — D64.9 ACUTE ANEMIA: ICD-10-CM

## 2022-01-01 DIAGNOSIS — M25.512 ACUTE PAIN OF LEFT SHOULDER: Primary | ICD-10-CM

## 2022-01-01 DIAGNOSIS — I10 ESSENTIAL HYPERTENSION: ICD-10-CM

## 2022-01-01 DIAGNOSIS — R55 SYNCOPE, UNSPECIFIED SYNCOPE TYPE: ICD-10-CM

## 2022-01-01 DIAGNOSIS — R11.0 NAUSEA: ICD-10-CM

## 2022-01-01 DIAGNOSIS — E53.8 DEFICIENCY OF OTHER SPECIFIED B GROUP VITAMINS: ICD-10-CM

## 2022-01-01 DIAGNOSIS — R07.9 CHEST PAIN, UNSPECIFIED TYPE: Primary | ICD-10-CM

## 2022-01-01 DIAGNOSIS — D72.829 LEUKOCYTOSIS, UNSPECIFIED TYPE: Primary | ICD-10-CM

## 2022-01-01 DIAGNOSIS — R73.03 PRE-DIABETES: ICD-10-CM

## 2022-01-01 DIAGNOSIS — Z85.3 HISTORY OF BREAST CANCER: ICD-10-CM

## 2022-01-01 DIAGNOSIS — D72.829 LEUKOCYTOSIS, UNSPECIFIED TYPE: ICD-10-CM

## 2022-01-01 DIAGNOSIS — M80.00XD OSTEOPOROSIS WITH CURRENT PATHOLOGICAL FRACTURE WITH ROUTINE HEALING, UNSPECIFIED OSTEOPOROSIS TYPE, SUBSEQUENT ENCOUNTER: ICD-10-CM

## 2022-01-01 DIAGNOSIS — I50.9 ACUTE ON CHRONIC CONGESTIVE HEART FAILURE, UNSPECIFIED HEART FAILURE TYPE (H): ICD-10-CM

## 2022-01-01 DIAGNOSIS — R06.02 SOB (SHORTNESS OF BREATH): ICD-10-CM
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
## 2022-01-01 DIAGNOSIS — D50.8 OTHER IRON DEFICIENCY ANEMIA: ICD-10-CM

## 2022-01-01 DIAGNOSIS — K92.1 MELENA: ICD-10-CM

## 2022-01-01 DIAGNOSIS — D64.9 ANEMIA, UNSPECIFIED: ICD-10-CM

## 2022-01-01 DIAGNOSIS — K57.30 DIVERTICULAR DISEASE OF COLON: ICD-10-CM

## 2022-01-01 DIAGNOSIS — I10 ESSENTIAL (PRIMARY) HYPERTENSION: ICD-10-CM

## 2022-01-01 DIAGNOSIS — I50.43 ACUTE ON CHRONIC COMBINED SYSTOLIC AND DIASTOLIC CONGESTIVE HEART FAILURE (H): Primary | ICD-10-CM

## 2022-01-01 DIAGNOSIS — J90 PLEURAL EFFUSION: ICD-10-CM

## 2022-01-01 DIAGNOSIS — E87.6 HYPOKALEMIA: ICD-10-CM

## 2022-01-01 DIAGNOSIS — I50.40 COMBINED SYSTOLIC AND DIASTOLIC CONGESTIVE HEART FAILURE (H): ICD-10-CM

## 2022-01-01 DIAGNOSIS — I50.20 HFREF (HEART FAILURE WITH REDUCED EJECTION FRACTION) (H): ICD-10-CM

## 2022-01-01 DIAGNOSIS — E78.5 HYPERLIPIDEMIA LDL GOAL <100: Primary | ICD-10-CM

## 2022-01-01 DIAGNOSIS — I50.40 COMBINED CONGESTIVE SYSTOLIC AND DIASTOLIC HEART FAILURE (H): ICD-10-CM

## 2022-01-01 DIAGNOSIS — K21.00 GASTROESOPHAGEAL REFLUX DISEASE WITH ESOPHAGITIS, UNSPECIFIED WHETHER HEMORRHAGE: ICD-10-CM

## 2022-01-01 DIAGNOSIS — Z13.220 SCREENING FOR HYPERLIPIDEMIA: ICD-10-CM

## 2022-01-01 DIAGNOSIS — I50.22 CHRONIC HFREF (HEART FAILURE WITH REDUCED EJECTION FRACTION) (H): ICD-10-CM

## 2022-01-01 DIAGNOSIS — N28.9 RENAL LESION: ICD-10-CM

## 2022-01-01 DIAGNOSIS — K86.9 PANCREATIC LESION: ICD-10-CM

## 2022-01-01 DIAGNOSIS — E55.9 VITAMIN D DEFICIENCY, UNSPECIFIED: ICD-10-CM

## 2022-01-01 DIAGNOSIS — R91.8 PULMONARY NODULES: ICD-10-CM

## 2022-01-01 DIAGNOSIS — N18.2 CKD (CHRONIC KIDNEY DISEASE) STAGE 2, GFR 60-89 ML/MIN: ICD-10-CM

## 2022-01-01 DIAGNOSIS — S42.292A OTHER CLOSED DISPLACED FRACTURE OF PROXIMAL END OF LEFT HUMERUS, INITIAL ENCOUNTER: Primary | ICD-10-CM

## 2022-01-01 DIAGNOSIS — R11.0 NAUSEA: Primary | ICD-10-CM

## 2022-01-01 LAB
ALBUMIN SERPL BCG-MCNC: 2.8 G/DL (ref 3.5–5.2)
ALBUMIN SERPL BCG-MCNC: 3 G/DL (ref 3.5–5.2)
ALBUMIN SERPL-MCNC: 2.3 G/DL (ref 3.4–5)
ALP SERPL-CCNC: 143 U/L (ref 40–150)
ALP SERPL-CCNC: 261 U/L (ref 35–104)
ALP SERPL-CCNC: 327 U/L (ref 35–104)
ALT SERPL W P-5'-P-CCNC: 16 U/L (ref 0–50)
ALT SERPL W P-5'-P-CCNC: 16 U/L (ref 10–35)
ALT SERPL W P-5'-P-CCNC: 21 U/L (ref 10–35)
ANION GAP SERPL CALCULATED.3IONS-SCNC: 10 MMOL/L (ref 7–15)
ANION GAP SERPL CALCULATED.3IONS-SCNC: 10 MMOL/L (ref 7–15)
ANION GAP SERPL CALCULATED.3IONS-SCNC: 11 MMOL/L (ref 3–14)
ANION GAP SERPL CALCULATED.3IONS-SCNC: 11 MMOL/L (ref 7–15)
ANION GAP SERPL CALCULATED.3IONS-SCNC: 12 MMOL/L (ref 7–15)
ANION GAP SERPL CALCULATED.3IONS-SCNC: 13 MMOL/L (ref 7–15)
ANION GAP SERPL CALCULATED.3IONS-SCNC: 5 MMOL/L (ref 3–14)
ANION GAP SERPL CALCULATED.3IONS-SCNC: 6 MMOL/L (ref 3–14)
ANION GAP SERPL CALCULATED.3IONS-SCNC: 7 MMOL/L (ref 3–14)
ANION GAP SERPL CALCULATED.3IONS-SCNC: 8 MMOL/L (ref 3–14)
ANION GAP SERPL CALCULATED.3IONS-SCNC: 8 MMOL/L (ref 7–15)
ANION GAP SERPL CALCULATED.3IONS-SCNC: 9 MMOL/L (ref 3–14)
ANION GAP SERPL CALCULATED.3IONS-SCNC: 9 MMOL/L (ref 3–14)
ANION GAP SERPL CALCULATED.3IONS-SCNC: 9 MMOL/L (ref 7–15)
ANION GAP SERPL CALCULATED.3IONS-SCNC: 9 MMOL/L (ref 7–15)
APAP SERPL-MCNC: 13.6 UG/ML (ref 10–30)
APPEARANCE FLD: ABNORMAL
AST SERPL W P-5'-P-CCNC: 31 U/L (ref 10–35)
AST SERPL W P-5'-P-CCNC: 37 U/L (ref 0–45)
AST SERPL W P-5'-P-CCNC: 47 U/L (ref 10–35)
ATRIAL RATE - MUSE: 130 BPM
ATRIAL RATE - MUSE: 47 BPM
ATRIAL RATE - MUSE: 86 BPM
ATRIAL RATE - MUSE: 97 BPM
BACTERIA PLR CULT: NO GROWTH
BASOPHILS # BLD AUTO: 0 10E3/UL (ref 0–0.2)
BASOPHILS # BLD AUTO: 0 10E3/UL (ref 0–0.2)
BASOPHILS # BLD AUTO: 0.1 10E3/UL (ref 0–0.2)
BASOPHILS NFR BLD AUTO: 0 %
BASOPHILS NFR BLD AUTO: 0 %
BASOPHILS NFR BLD AUTO: 1 %
BILIRUB DIRECT SERPL-MCNC: <0.1 MG/DL (ref 0–0.2)
BILIRUB SERPL-MCNC: 0.3 MG/DL
BILIRUB SERPL-MCNC: 0.4 MG/DL
BILIRUB SERPL-MCNC: 0.6 MG/DL (ref 0.2–1.3)
BUN SERPL-MCNC: 17 MG/DL (ref 8–23)
BUN SERPL-MCNC: 17.1 MG/DL (ref 8–23)
BUN SERPL-MCNC: 18 MG/DL (ref 7–30)
BUN SERPL-MCNC: 18.8 MG/DL (ref 8–23)
BUN SERPL-MCNC: 19 MG/DL (ref 7–30)
BUN SERPL-MCNC: 19 MG/DL (ref 7–30)
BUN SERPL-MCNC: 25 MG/DL (ref 7–30)
BUN SERPL-MCNC: 25 MG/DL (ref 7–30)
BUN SERPL-MCNC: 25.1 MG/DL (ref 8–23)
BUN SERPL-MCNC: 25.1 MG/DL (ref 8–23)
BUN SERPL-MCNC: 26.2 MG/DL (ref 8–23)
BUN SERPL-MCNC: 28 MG/DL (ref 7–30)
BUN SERPL-MCNC: 30 MG/DL (ref 7–30)
BUN SERPL-MCNC: 30.1 MG/DL (ref 8–23)
BUN SERPL-MCNC: 31.3 MG/DL (ref 8–23)
BUN SERPL-MCNC: 31.7 MG/DL (ref 8–23)
BUN SERPL-MCNC: 33.7 MG/DL (ref 8–23)
BUN SERPL-MCNC: 35.6 MG/DL (ref 8–23)
BUN SERPL-MCNC: 36 MG/DL (ref 7–30)
BUN SERPL-MCNC: 49 MG/DL (ref 7–30)
CALCIUM SERPL-MCNC: 7 MG/DL (ref 8.5–10.1)
CALCIUM SERPL-MCNC: 7.4 MG/DL (ref 8.5–10.1)
CALCIUM SERPL-MCNC: 7.6 MG/DL (ref 8.5–10.1)
CALCIUM SERPL-MCNC: 7.6 MG/DL (ref 8.8–10.2)
CALCIUM SERPL-MCNC: 7.8 MG/DL (ref 8.5–10.1)
CALCIUM SERPL-MCNC: 8 MG/DL (ref 8.5–10.1)
CALCIUM SERPL-MCNC: 8.1 MG/DL (ref 8.8–10.2)
CALCIUM SERPL-MCNC: 8.2 MG/DL (ref 8.5–10.1)
CALCIUM SERPL-MCNC: 8.3 MG/DL (ref 8.8–10.2)
CALCIUM SERPL-MCNC: 8.4 MG/DL (ref 8.8–10.2)
CALCIUM SERPL-MCNC: 8.5 MG/DL (ref 8.2–9.6)
CALCIUM SERPL-MCNC: 8.5 MG/DL (ref 8.5–10.1)
CALCIUM SERPL-MCNC: 8.5 MG/DL (ref 8.8–10.2)
CALCIUM SERPL-MCNC: 8.5 MG/DL (ref 8.8–10.2)
CALCIUM SERPL-MCNC: 8.7 MG/DL (ref 8.8–10.2)
CELL COUNT BODY FLUID SOURCE: ABNORMAL
CHLORIDE BLD-SCNC: 102 MMOL/L (ref 94–109)
CHLORIDE BLD-SCNC: 104 MMOL/L (ref 94–109)
CHLORIDE BLD-SCNC: 106 MMOL/L (ref 94–109)
CHLORIDE BLD-SCNC: 106 MMOL/L (ref 94–109)
CHLORIDE BLD-SCNC: 107 MMOL/L (ref 94–109)
CHLORIDE BLD-SCNC: 107 MMOL/L (ref 94–109)
CHLORIDE BLD-SCNC: 108 MMOL/L (ref 94–109)
CHLORIDE BLD-SCNC: 109 MMOL/L (ref 94–109)
CHLORIDE BLD-SCNC: 110 MMOL/L (ref 94–109)
CHLORIDE SERPL-SCNC: 100 MMOL/L (ref 98–107)
CHLORIDE SERPL-SCNC: 100 MMOL/L (ref 98–107)
CHLORIDE SERPL-SCNC: 101 MMOL/L (ref 98–107)
CHLORIDE SERPL-SCNC: 101 MMOL/L (ref 98–107)
CHLORIDE SERPL-SCNC: 102 MMOL/L (ref 98–107)
CHLORIDE SERPL-SCNC: 102 MMOL/L (ref 98–107)
CHLORIDE SERPL-SCNC: 103 MMOL/L (ref 98–107)
CHLORIDE SERPL-SCNC: 104 MMOL/L (ref 98–107)
CHLORIDE SERPL-SCNC: 104 MMOL/L (ref 98–107)
CHOLEST SERPL-MCNC: 114 MG/DL
CO2 SERPL-SCNC: 19 MMOL/L (ref 20–32)
CO2 SERPL-SCNC: 22 MMOL/L (ref 20–32)
CO2 SERPL-SCNC: 23 MMOL/L (ref 20–32)
CO2 SERPL-SCNC: 23 MMOL/L (ref 20–32)
CO2 SERPL-SCNC: 24 MMOL/L (ref 20–32)
CO2 SERPL-SCNC: 27 MMOL/L (ref 20–32)
COLOR FLD: YELLOW
CREAT SERPL-MCNC: 0.65 MG/DL (ref 0.52–1.04)
CREAT SERPL-MCNC: 0.66 MG/DL (ref 0.51–0.95)
CREAT SERPL-MCNC: 0.67 MG/DL (ref 0.52–1.04)
CREAT SERPL-MCNC: 0.7 MG/DL (ref 0.51–0.95)
CREAT SERPL-MCNC: 0.71 MG/DL (ref 0.52–1.04)
CREAT SERPL-MCNC: 0.72 MG/DL (ref 0.51–0.95)
CREAT SERPL-MCNC: 0.72 MG/DL (ref 0.52–1.04)
CREAT SERPL-MCNC: 0.72 MG/DL (ref 0.52–1.04)
CREAT SERPL-MCNC: 0.74 MG/DL (ref 0.51–0.95)
CREAT SERPL-MCNC: 0.75 MG/DL (ref 0.51–0.95)
CREAT SERPL-MCNC: 0.75 MG/DL (ref 0.51–0.95)
CREAT SERPL-MCNC: 0.75 MG/DL (ref 0.52–1.04)
CREAT SERPL-MCNC: 0.76 MG/DL (ref 0.51–0.95)
CREAT SERPL-MCNC: 0.79 MG/DL (ref 0.51–0.95)
CREAT SERPL-MCNC: 0.79 MG/DL (ref 0.52–1.04)
CREAT SERPL-MCNC: 0.8 MG/DL (ref 0.51–0.95)
CREAT SERPL-MCNC: 0.81 MG/DL (ref 0.51–0.95)
CREAT SERPL-MCNC: 0.85 MG/DL (ref 0.52–1.04)
CREAT SERPL-MCNC: 0.89 MG/DL (ref 0.51–0.95)
CREAT SERPL-MCNC: 0.9 MG/DL (ref 0.52–1.04)
CREAT SERPL-MCNC: 0.96 MG/DL (ref 0.52–1.04)
D DIMER PPP FEU-MCNC: 1.97 UG/ML FEU (ref 0–0.5)
DEPRECATED CALCIDIOL+CALCIFEROL SERPL-MC: 20 UG/L (ref 20–75)
DEPRECATED CALCIDIOL+CALCIFEROL SERPL-MC: 6 UG/L (ref 20–75)
DEPRECATED HCO3 PLAS-SCNC: 21 MMOL/L (ref 22–29)
DEPRECATED HCO3 PLAS-SCNC: 23 MMOL/L (ref 22–29)
DEPRECATED HCO3 PLAS-SCNC: 23 MMOL/L (ref 22–29)
DEPRECATED HCO3 PLAS-SCNC: 24 MMOL/L (ref 22–29)
DEPRECATED HCO3 PLAS-SCNC: 24 MMOL/L (ref 22–29)
DEPRECATED HCO3 PLAS-SCNC: 25 MMOL/L (ref 22–29)
DEPRECATED HCO3 PLAS-SCNC: 25 MMOL/L (ref 22–29)
DEPRECATED HCO3 PLAS-SCNC: 26 MMOL/L (ref 22–29)
DEPRECATED HCO3 PLAS-SCNC: 27 MMOL/L (ref 22–29)
DIASTOLIC BLOOD PRESSURE - MUSE: NORMAL MMHG
EOSINOPHIL # BLD AUTO: 0 10E3/UL (ref 0–0.7)
EOSINOPHIL # BLD AUTO: 0 10E3/UL (ref 0–0.7)
EOSINOPHIL # BLD AUTO: 0.1 10E3/UL (ref 0–0.7)
EOSINOPHIL # BLD AUTO: 0.1 10E3/UL (ref 0–0.7)
EOSINOPHIL # BLD AUTO: 0.2 10E3/UL (ref 0–0.7)
EOSINOPHIL NFR BLD AUTO: 0 %
EOSINOPHIL NFR BLD AUTO: 0 %
EOSINOPHIL NFR BLD AUTO: 1 %
EOSINOPHIL NFR BLD AUTO: 1 %
EOSINOPHIL NFR BLD AUTO: 2 %
ERYTHROCYTE [DISTWIDTH] IN BLOOD BY AUTOMATED COUNT: 14.5 % (ref 10–15)
ERYTHROCYTE [DISTWIDTH] IN BLOOD BY AUTOMATED COUNT: 15.2 % (ref 10–15)
ERYTHROCYTE [DISTWIDTH] IN BLOOD BY AUTOMATED COUNT: 15.3 % (ref 10–15)
ERYTHROCYTE [DISTWIDTH] IN BLOOD BY AUTOMATED COUNT: 16 % (ref 10–15)
ERYTHROCYTE [DISTWIDTH] IN BLOOD BY AUTOMATED COUNT: 16.1 % (ref 10–15)
ERYTHROCYTE [DISTWIDTH] IN BLOOD BY AUTOMATED COUNT: 16.2 % (ref 10–15)
ERYTHROCYTE [DISTWIDTH] IN BLOOD BY AUTOMATED COUNT: 16.3 % (ref 10–15)
ERYTHROCYTE [DISTWIDTH] IN BLOOD BY AUTOMATED COUNT: 16.4 % (ref 10–15)
ERYTHROCYTE [DISTWIDTH] IN BLOOD BY AUTOMATED COUNT: 16.6 % (ref 10–15)
FERRITIN SERPL-MCNC: 42 NG/ML (ref 8–252)
FOLATE SERPL-MCNC: 27.1 NG/ML (ref 4.6–34.8)
GAMMA INTERFERON BACKGROUND BLD IA-ACNC: 0.06 IU/ML
GFR SERPL CREATININE-BSD FRML MDRD: 56 ML/MIN/1.73M2
GFR SERPL CREATININE-BSD FRML MDRD: 61 ML/MIN/1.73M2
GFR SERPL CREATININE-BSD FRML MDRD: 62 ML/MIN/1.73M2
GFR SERPL CREATININE-BSD FRML MDRD: 65 ML/MIN/1.73M2
GFR SERPL CREATININE-BSD FRML MDRD: 69 ML/MIN/1.73M2
GFR SERPL CREATININE-BSD FRML MDRD: 70 ML/MIN/1.73M2
GFR SERPL CREATININE-BSD FRML MDRD: 71 ML/MIN/1.73M2
GFR SERPL CREATININE-BSD FRML MDRD: 71 ML/MIN/1.73M2
GFR SERPL CREATININE-BSD FRML MDRD: 74 ML/MIN/1.73M2
GFR SERPL CREATININE-BSD FRML MDRD: 76 ML/MIN/1.73M2
GFR SERPL CREATININE-BSD FRML MDRD: 77 ML/MIN/1.73M2
GFR SERPL CREATININE-BSD FRML MDRD: 79 ML/MIN/1.73M2
GFR SERPL CREATININE-BSD FRML MDRD: 81 ML/MIN/1.73M2
GFR SERPL CREATININE-BSD FRML MDRD: 82 ML/MIN/1.73M2
GFR SERPL CREATININE-BSD FRML MDRD: 83 ML/MIN/1.73M2
GFR SERPL CREATININE-BSD FRML MDRD: 83 ML/MIN/1.73M2
GFR SERPL CREATININE-BSD FRML MDRD: 84 ML/MIN/1.73M2
GLUCOSE BLD-MCNC: 107 MG/DL (ref 70–99)
GLUCOSE BLD-MCNC: 113 MG/DL (ref 70–99)
GLUCOSE BLD-MCNC: 114 MG/DL (ref 70–99)
GLUCOSE BLD-MCNC: 114 MG/DL (ref 70–99)
GLUCOSE BLD-MCNC: 120 MG/DL (ref 70–99)
GLUCOSE BLD-MCNC: 123 MG/DL (ref 70–99)
GLUCOSE BLD-MCNC: 133 MG/DL (ref 70–99)
GLUCOSE BLD-MCNC: 135 MG/DL (ref 70–99)
GLUCOSE BLD-MCNC: 145 MG/DL (ref 70–99)
GLUCOSE BODY FLUID SOURCE: NORMAL
GLUCOSE FLD-MCNC: 116 MG/DL
GLUCOSE SERPL-MCNC: 111 MG/DL (ref 70–99)
GLUCOSE SERPL-MCNC: 120 MG/DL (ref 70–99)
GLUCOSE SERPL-MCNC: 132 MG/DL (ref 70–99)
GLUCOSE SERPL-MCNC: 136 MG/DL (ref 70–99)
GLUCOSE SERPL-MCNC: 140 MG/DL (ref 70–99)
GLUCOSE SERPL-MCNC: 142 MG/DL (ref 70–99)
GLUCOSE SERPL-MCNC: 143 MG/DL (ref 70–99)
GLUCOSE SERPL-MCNC: 161 MG/DL (ref 70–99)
GLUCOSE SERPL-MCNC: 162 MG/DL (ref 70–99)
GLUCOSE SERPL-MCNC: 208 MG/DL (ref 70–99)
GLUCOSE SERPL-MCNC: 92 MG/DL (ref 70–99)
GRAM STAIN RESULT: NORMAL
GRAM STAIN RESULT: NORMAL
HBA1C MFR BLD: 6.4 % (ref 0–5.6)
HBA1C MFR BLD: 6.6 %
HCT VFR BLD AUTO: 25.8 % (ref 35–47)
HCT VFR BLD AUTO: 26.4 % (ref 35–47)
HCT VFR BLD AUTO: 26.7 % (ref 35–47)
HCT VFR BLD AUTO: 27 % (ref 35–47)
HCT VFR BLD AUTO: 27.2 % (ref 35–47)
HCT VFR BLD AUTO: 28 % (ref 35–47)
HCT VFR BLD AUTO: 28.1 % (ref 35–47)
HCT VFR BLD AUTO: 28.8 % (ref 35–47)
HCT VFR BLD AUTO: 28.9 % (ref 35–47)
HCT VFR BLD AUTO: 29.2 % (ref 35–47)
HCT VFR BLD AUTO: 29.4 % (ref 35–47)
HCT VFR BLD AUTO: 29.7 % (ref 35–47)
HCT VFR BLD AUTO: 30.6 % (ref 35–47)
HCT VFR BLD AUTO: 30.8 % (ref 35–47)
HCT VFR BLD AUTO: 30.9 % (ref 35–47)
HCT VFR BLD AUTO: 31.7 % (ref 35–47)
HCT VFR BLD AUTO: 32.8 % (ref 35–47)
HCT VFR BLD AUTO: 33.1 % (ref 35–47)
HCT VFR BLD AUTO: 34.6 % (ref 35–47)
HDLC SERPL-MCNC: 42 MG/DL
HGB BLD-MCNC: 10.4 G/DL (ref 11.7–15.7)
HGB BLD-MCNC: 11 G/DL (ref 11.7–15.7)
HGB BLD-MCNC: 7.8 G/DL (ref 11.7–15.7)
HGB BLD-MCNC: 8.1 G/DL (ref 11.7–15.7)
HGB BLD-MCNC: 8.1 G/DL (ref 11.7–15.7)
HGB BLD-MCNC: 8.2 G/DL (ref 11.7–15.7)
HGB BLD-MCNC: 8.4 G/DL (ref 11.7–15.7)
HGB BLD-MCNC: 8.5 G/DL (ref 11.7–15.7)
HGB BLD-MCNC: 8.7 G/DL (ref 11.7–15.7)
HGB BLD-MCNC: 8.7 G/DL (ref 11.7–15.7)
HGB BLD-MCNC: 8.8 G/DL (ref 11.7–15.7)
HGB BLD-MCNC: 8.9 G/DL (ref 11.7–15.7)
HGB BLD-MCNC: 9.1 G/DL (ref 11.7–15.7)
HGB BLD-MCNC: 9.2 G/DL (ref 11.7–15.7)
HGB BLD-MCNC: 9.3 G/DL (ref 11.7–15.7)
HGB BLD-MCNC: 9.3 G/DL (ref 11.7–15.7)
HGB BLD-MCNC: 9.5 G/DL (ref 11.7–15.7)
HGB BLD-MCNC: 9.6 G/DL (ref 11.7–15.7)
HGB BLD-MCNC: 9.6 G/DL (ref 11.7–15.7)
HGB BLD-MCNC: 9.8 G/DL (ref 11.7–15.7)
HGB BLD-MCNC: 9.8 G/DL (ref 11.7–15.7)
HOLD SPECIMEN: NORMAL
IMM GRANULOCYTES # BLD: 0.1 10E3/UL
IMM GRANULOCYTES # BLD: 0.2 10E3/UL
IMM GRANULOCYTES NFR BLD: 1 %
IMM GRANULOCYTES NFR BLD: 2 %
INR PPP: 0.94 (ref 0.85–1.15)
INTERPRETATION ECG - MUSE: NORMAL
IRON BINDING CAPACITY (ROCHE): 285 UG/DL (ref 240–430)
IRON SATN MFR SERPL: 7 % (ref 15–46)
IRON SATN MFR SERPL: 9 % (ref 15–46)
IRON SERPL-MCNC: 19 UG/DL (ref 37–145)
IRON SERPL-MCNC: 28 UG/DL (ref 35–180)
LD BODY BODY FLUID SOURCE: NORMAL
LDH FLD L TO P-CCNC: 82 U/L
LDH SERPL L TO P-CCNC: 239 U/L (ref 0–250)
LDLC SERPL CALC-MCNC: 55 MG/DL
LIPASE SERPL-CCNC: 153 U/L (ref 73–393)
LIPASE SERPL-CCNC: 21 U/L (ref 13–60)
LVEF ECHO: NORMAL
LYMPHOCYTES # BLD AUTO: 1.3 10E3/UL (ref 0.8–5.3)
LYMPHOCYTES # BLD AUTO: 1.5 10E3/UL (ref 0.8–5.3)
LYMPHOCYTES # BLD AUTO: 1.6 10E3/UL (ref 0.8–5.3)
LYMPHOCYTES # BLD AUTO: 1.7 10E3/UL (ref 0.8–5.3)
LYMPHOCYTES # BLD AUTO: 1.9 10E3/UL (ref 0.8–5.3)
LYMPHOCYTES NFR BLD AUTO: 11 %
LYMPHOCYTES NFR BLD AUTO: 13 %
LYMPHOCYTES NFR BLD AUTO: 15 %
LYMPHOCYTES NFR BLD AUTO: 23 %
LYMPHOCYTES NFR BLD AUTO: 23 %
LYMPHOCYTES NFR FLD MANUAL: 85 %
M TB IFN-G BLD-IMP: NEGATIVE
M TB IFN-G CD4+ BCKGRND COR BLD-ACNC: 1.05 IU/ML
MAGNESIUM SERPL-MCNC: 1 MG/DL (ref 1.6–2.3)
MAGNESIUM SERPL-MCNC: 1.4 MG/DL (ref 1.6–2.3)
MAGNESIUM SERPL-MCNC: 1.5 MG/DL (ref 1.6–2.3)
MAGNESIUM SERPL-MCNC: 1.6 MG/DL (ref 1.6–2.3)
MAGNESIUM SERPL-MCNC: 1.8 MG/DL (ref 1.6–2.3)
MAGNESIUM SERPL-MCNC: 1.9 MG/DL (ref 1.6–2.3)
MCH RBC QN AUTO: 28.9 PG (ref 26.5–33)
MCH RBC QN AUTO: 29.1 PG (ref 26.5–33)
MCH RBC QN AUTO: 29.3 PG (ref 26.5–33)
MCH RBC QN AUTO: 29.5 PG (ref 26.5–33)
MCH RBC QN AUTO: 29.6 PG (ref 26.5–33)
MCH RBC QN AUTO: 29.8 PG (ref 26.5–33)
MCH RBC QN AUTO: 29.9 PG (ref 26.5–33)
MCH RBC QN AUTO: 29.9 PG (ref 26.5–33)
MCH RBC QN AUTO: 30.1 PG (ref 26.5–33)
MCH RBC QN AUTO: 30.2 PG (ref 26.5–33)
MCH RBC QN AUTO: 30.3 PG (ref 26.5–33)
MCH RBC QN AUTO: 30.6 PG (ref 26.5–33)
MCHC RBC AUTO-ENTMCNC: 29.8 G/DL (ref 31.5–36.5)
MCHC RBC AUTO-ENTMCNC: 29.9 G/DL (ref 31.5–36.5)
MCHC RBC AUTO-ENTMCNC: 30 G/DL (ref 31.5–36.5)
MCHC RBC AUTO-ENTMCNC: 30.1 G/DL (ref 31.5–36.5)
MCHC RBC AUTO-ENTMCNC: 30.2 G/DL (ref 31.5–36.5)
MCHC RBC AUTO-ENTMCNC: 30.3 G/DL (ref 31.5–36.5)
MCHC RBC AUTO-ENTMCNC: 30.3 G/DL (ref 31.5–36.5)
MCHC RBC AUTO-ENTMCNC: 30.4 G/DL (ref 31.5–36.5)
MCHC RBC AUTO-ENTMCNC: 30.4 G/DL (ref 31.5–36.5)
MCHC RBC AUTO-ENTMCNC: 30.6 G/DL (ref 31.5–36.5)
MCHC RBC AUTO-ENTMCNC: 30.9 G/DL (ref 31.5–36.5)
MCHC RBC AUTO-ENTMCNC: 30.9 G/DL (ref 31.5–36.5)
MCHC RBC AUTO-ENTMCNC: 31.1 G/DL (ref 31.5–36.5)
MCHC RBC AUTO-ENTMCNC: 31.2 G/DL (ref 31.5–36.5)
MCHC RBC AUTO-ENTMCNC: 31.3 G/DL (ref 31.5–36.5)
MCHC RBC AUTO-ENTMCNC: 31.4 G/DL (ref 31.5–36.5)
MCHC RBC AUTO-ENTMCNC: 31.6 G/DL (ref 31.5–36.5)
MCHC RBC AUTO-ENTMCNC: 31.7 G/DL (ref 31.5–36.5)
MCHC RBC AUTO-ENTMCNC: 31.8 G/DL (ref 31.5–36.5)
MCV RBC AUTO: 93 FL (ref 78–100)
MCV RBC AUTO: 93 FL (ref 78–100)
MCV RBC AUTO: 95 FL (ref 78–100)
MCV RBC AUTO: 95 FL (ref 78–100)
MCV RBC AUTO: 96 FL (ref 78–100)
MCV RBC AUTO: 96 FL (ref 78–100)
MCV RBC AUTO: 97 FL (ref 78–100)
MCV RBC AUTO: 98 FL (ref 78–100)
MCV RBC AUTO: 99 FL (ref 78–100)
MCV RBC AUTO: 99 FL (ref 78–100)
MITOGEN IGNF BCKGRD COR BLD-ACNC: 0.01 IU/ML
MITOGEN IGNF BCKGRD COR BLD-ACNC: 0.01 IU/ML
MONOCYTES # BLD AUTO: 0.9 10E3/UL (ref 0–1.3)
MONOCYTES # BLD AUTO: 1.5 10E3/UL (ref 0–1.3)
MONOCYTES # BLD AUTO: 2 10E3/UL (ref 0–1.3)
MONOCYTES NFR BLD AUTO: 11 %
MONOCYTES NFR BLD AUTO: 12 %
MONOCYTES NFR BLD AUTO: 12 %
MONOCYTES NFR BLD AUTO: 17 %
MONOCYTES NFR BLD AUTO: 9 %
MONOS+MACROS NFR FLD MANUAL: 15 %
NEUTROPHILS # BLD AUTO: 4.5 10E3/UL (ref 1.6–8.3)
NEUTROPHILS # BLD AUTO: 5.1 10E3/UL (ref 1.6–8.3)
NEUTROPHILS # BLD AUTO: 7.1 10E3/UL (ref 1.6–8.3)
NEUTROPHILS # BLD AUTO: 8.3 10E3/UL (ref 1.6–8.3)
NEUTROPHILS # BLD AUTO: 9.3 10E3/UL (ref 1.6–8.3)
NEUTROPHILS NFR BLD AUTO: 61 %
NEUTROPHILS NFR BLD AUTO: 63 %
NEUTROPHILS NFR BLD AUTO: 69 %
NEUTROPHILS NFR BLD AUTO: 73 %
NEUTROPHILS NFR BLD AUTO: 75 %
NEUTS BAND NFR FLD MANUAL: NORMAL %
NONHDLC SERPL-MCNC: 72 MG/DL
NRBC # BLD AUTO: 0 10E3/UL
NRBC BLD AUTO-RTO: 0 /100
NT-PROBNP SERPL-MCNC: 3783 PG/ML (ref 0–450)
NT-PROBNP SERPL-MCNC: 5316 PG/ML (ref 0–1800)
NT-PROBNP SERPL-MCNC: 6367 PG/ML (ref 0–1800)
P AXIS - MUSE: 39 DEGREES
P AXIS - MUSE: 43 DEGREES
P AXIS - MUSE: 47 DEGREES
P AXIS - MUSE: NORMAL DEGREES
PHOSPHATE SERPL-MCNC: 2.9 MG/DL (ref 2.5–4.5)
PHOSPHATE SERPL-MCNC: 3.1 MG/DL (ref 2.5–4.5)
PHOSPHATE SERPL-MCNC: 3.1 MG/DL (ref 2.5–4.5)
PHOSPHATE SERPL-MCNC: 3.2 MG/DL (ref 2.5–4.5)
PHOSPHATE SERPL-MCNC: 3.4 MG/DL (ref 2.5–4.5)
PLATELET # BLD AUTO: 189 10E3/UL (ref 150–450)
PLATELET # BLD AUTO: 200 10E3/UL (ref 150–450)
PLATELET # BLD AUTO: 206 10E3/UL (ref 150–450)
PLATELET # BLD AUTO: 245 10E3/UL (ref 150–450)
PLATELET # BLD AUTO: 247 10E3/UL (ref 150–450)
PLATELET # BLD AUTO: 248 10E3/UL (ref 150–450)
PLATELET # BLD AUTO: 253 10E3/UL (ref 150–450)
PLATELET # BLD AUTO: 254 10E3/UL (ref 150–450)
PLATELET # BLD AUTO: 266 10E3/UL (ref 150–450)
PLATELET # BLD AUTO: 275 10E3/UL (ref 150–450)
PLATELET # BLD AUTO: 280 10E3/UL (ref 150–450)
PLATELET # BLD AUTO: 281 10E3/UL (ref 150–450)
PLATELET # BLD AUTO: 289 10E3/UL (ref 150–450)
PLATELET # BLD AUTO: 298 10E3/UL (ref 150–450)
PLATELET # BLD AUTO: 308 10E3/UL (ref 150–450)
PLATELET # BLD AUTO: 319 10E3/UL (ref 150–450)
PLATELET # BLD AUTO: 373 10E3/UL (ref 150–450)
PLATELET # BLD AUTO: 392 10E3/UL (ref 150–450)
PLATELET # BLD AUTO: 530 10E3/UL (ref 150–450)
POTASSIUM BLD-SCNC: 3.1 MMOL/L (ref 3.4–5.3)
POTASSIUM BLD-SCNC: 3.6 MMOL/L (ref 3.4–5.3)
POTASSIUM BLD-SCNC: 3.7 MMOL/L (ref 3.4–5.3)
POTASSIUM BLD-SCNC: 3.9 MMOL/L (ref 3.4–5.3)
POTASSIUM BLD-SCNC: 4 MMOL/L (ref 3.4–5.3)
POTASSIUM BLD-SCNC: 4 MMOL/L (ref 3.4–5.3)
POTASSIUM BLD-SCNC: 4.1 MMOL/L (ref 3.4–5.3)
POTASSIUM BLD-SCNC: 4.1 MMOL/L (ref 3.4–5.3)
POTASSIUM BLD-SCNC: 4.2 MMOL/L (ref 3.4–5.3)
POTASSIUM BLD-SCNC: 4.3 MMOL/L (ref 3.4–5.3)
POTASSIUM BLD-SCNC: 4.5 MMOL/L (ref 3.4–5.3)
POTASSIUM BLD-SCNC: 4.6 MMOL/L (ref 3.4–5.3)
POTASSIUM BLD-SCNC: 4.6 MMOL/L (ref 3.4–5.3)
POTASSIUM BLD-SCNC: 4.7 MMOL/L (ref 3.4–5.3)
POTASSIUM SERPL-SCNC: 3.5 MMOL/L (ref 3.4–5.3)
POTASSIUM SERPL-SCNC: 3.7 MMOL/L (ref 3.4–5.3)
POTASSIUM SERPL-SCNC: 3.8 MMOL/L (ref 3.4–5.3)
POTASSIUM SERPL-SCNC: 3.8 MMOL/L (ref 3.4–5.3)
POTASSIUM SERPL-SCNC: 4 MMOL/L (ref 3.4–5.3)
POTASSIUM SERPL-SCNC: 4.1 MMOL/L (ref 3.4–5.3)
POTASSIUM SERPL-SCNC: 4.2 MMOL/L (ref 3.4–5.3)
POTASSIUM SERPL-SCNC: 4.2 MMOL/L (ref 3.4–5.3)
POTASSIUM SERPL-SCNC: 4.3 MMOL/L (ref 3.4–5.3)
POTASSIUM SERPL-SCNC: 4.6 MMOL/L (ref 3.4–5.3)
PR INTERVAL - MUSE: 154 MS
PR INTERVAL - MUSE: 156 MS
PR INTERVAL - MUSE: 164 MS
PR INTERVAL - MUSE: NORMAL MS
PROT FLD-MCNC: 2.4 G/DL
PROT SERPL-MCNC: 5.7 G/DL (ref 6.4–8.3)
PROT SERPL-MCNC: 6 G/DL (ref 6.8–8.8)
PROT SERPL-MCNC: 6.5 G/DL (ref 6.4–8.3)
PROT SERPL-MCNC: 6.7 G/DL (ref 6.4–8.3)
PROTEIN BODY FLUID SOURCE: NORMAL
PTH-INTACT SERPL-MCNC: 58 PG/ML (ref 15–65)
PTH-INTACT SERPL-MCNC: 67 PG/ML (ref 15–65)
QRS DURATION - MUSE: 66 MS
QRS DURATION - MUSE: 76 MS
QRS DURATION - MUSE: 76 MS
QRS DURATION - MUSE: 82 MS
QT - MUSE: 302 MS
QT - MUSE: 340 MS
QT - MUSE: 372 MS
QT - MUSE: 372 MS
QTC - MUSE: 444 MS
QTC - MUSE: 445 MS
QTC - MUSE: 449 MS
QTC - MUSE: 472 MS
QUANTIFERON MITOGEN: 1.11 IU/ML
QUANTIFERON NIL TUBE: 0.06 IU/ML
QUANTIFERON TB1 TUBE: 0.07 IU/ML
QUANTIFERON TB2 TUBE: 0.07
R AXIS - MUSE: -4 DEGREES
R AXIS - MUSE: -8 DEGREES
R AXIS - MUSE: 25 DEGREES
R AXIS - MUSE: 3 DEGREES
RBC # BLD AUTO: 2.68 10E6/UL (ref 3.8–5.2)
RBC # BLD AUTO: 2.76 10E6/UL (ref 3.8–5.2)
RBC # BLD AUTO: 2.76 10E6/UL (ref 3.8–5.2)
RBC # BLD AUTO: 2.77 10E6/UL (ref 3.8–5.2)
RBC # BLD AUTO: 2.81 10E6/UL (ref 3.8–5.2)
RBC # BLD AUTO: 2.87 10E6/UL (ref 3.8–5.2)
RBC # BLD AUTO: 2.91 10E6/UL (ref 3.8–5.2)
RBC # BLD AUTO: 2.94 10E6/UL (ref 3.8–5.2)
RBC # BLD AUTO: 2.94 10E6/UL (ref 3.8–5.2)
RBC # BLD AUTO: 2.96 10E6/UL (ref 3.8–5.2)
RBC # BLD AUTO: 3 10E6/UL (ref 3.8–5.2)
RBC # BLD AUTO: 3.04 10E6/UL (ref 3.8–5.2)
RBC # BLD AUTO: 3.17 10E6/UL (ref 3.8–5.2)
RBC # BLD AUTO: 3.21 10E6/UL (ref 3.8–5.2)
RBC # BLD AUTO: 3.25 10E6/UL (ref 3.8–5.2)
RBC # BLD AUTO: 3.31 10E6/UL (ref 3.8–5.2)
RBC # BLD AUTO: 3.39 10E6/UL (ref 3.8–5.2)
RBC # BLD AUTO: 3.49 10E6/UL (ref 3.8–5.2)
RBC # BLD AUTO: 3.71 10E6/UL (ref 3.8–5.2)
SARS-COV-2 RNA RESP QL NAA+PROBE: NEGATIVE
SODIUM SERPL-SCNC: 133 MMOL/L (ref 136–145)
SODIUM SERPL-SCNC: 134 MMOL/L (ref 136–145)
SODIUM SERPL-SCNC: 135 MMOL/L (ref 136–145)
SODIUM SERPL-SCNC: 136 MMOL/L (ref 133–144)
SODIUM SERPL-SCNC: 136 MMOL/L (ref 136–145)
SODIUM SERPL-SCNC: 137 MMOL/L (ref 133–144)
SODIUM SERPL-SCNC: 137 MMOL/L (ref 133–144)
SODIUM SERPL-SCNC: 137 MMOL/L (ref 136–145)
SODIUM SERPL-SCNC: 138 MMOL/L (ref 133–144)
SODIUM SERPL-SCNC: 138 MMOL/L (ref 136–145)
SODIUM SERPL-SCNC: 138 MMOL/L (ref 136–145)
SODIUM SERPL-SCNC: 139 MMOL/L (ref 136–145)
SODIUM SERPL-SCNC: 140 MMOL/L (ref 133–144)
SYSTOLIC BLOOD PRESSURE - MUSE: NORMAL MMHG
T AXIS - MUSE: 104 DEGREES
T AXIS - MUSE: 59 DEGREES
T AXIS - MUSE: 68 DEGREES
T AXIS - MUSE: 73 DEGREES
TIBC SERPL-MCNC: 326 UG/DL (ref 240–430)
TRIGL SERPL-MCNC: 83 MG/DL
TROPONIN I SERPL HS-MCNC: 31 NG/L
TROPONIN I SERPL HS-MCNC: 33 NG/L
TROPONIN I SERPL HS-MCNC: 34 NG/L
TROPONIN I SERPL HS-MCNC: 36 NG/L
TROPONIN I SERPL HS-MCNC: 37 NG/L
TROPONIN T SERPL HS-MCNC: 27 NG/L
TROPONIN T SERPL HS-MCNC: 28 NG/L
TSH SERPL DL<=0.005 MIU/L-ACNC: 0.14 UIU/ML (ref 0.3–4.2)
TSH SERPL DL<=0.005 MIU/L-ACNC: 1.12 MU/L (ref 0.4–4)
UPPER GI ENDOSCOPY: NORMAL
URATE SERPL-MCNC: 6.4 MG/DL (ref 2.4–5.7)
VENTRICULAR RATE- MUSE: 105 BPM
VENTRICULAR RATE- MUSE: 130 BPM
VENTRICULAR RATE- MUSE: 86 BPM
VENTRICULAR RATE- MUSE: 97 BPM
VIT B12 SERPL-MCNC: 488 PG/ML (ref 232–1245)
WBC # BLD AUTO: 10.2 10E3/UL (ref 4–11)
WBC # BLD AUTO: 10.4 10E3/UL (ref 4–11)
WBC # BLD AUTO: 11 10E3/UL (ref 4–11)
WBC # BLD AUTO: 11.2 10E3/UL (ref 4–11)
WBC # BLD AUTO: 11.5 10E3/UL (ref 4–11)
WBC # BLD AUTO: 12 10E3/UL (ref 4–11)
WBC # BLD AUTO: 12.3 10E3/UL (ref 4–11)
WBC # BLD AUTO: 15.6 10E3/UL (ref 4–11)
WBC # BLD AUTO: 5.7 10E3/UL (ref 4–11)
WBC # BLD AUTO: 6.1 10E3/UL (ref 4–11)
WBC # BLD AUTO: 7.3 10E3/UL (ref 4–11)
WBC # BLD AUTO: 7.4 10E3/UL (ref 4–11)
WBC # BLD AUTO: 7.5 10E3/UL (ref 4–11)
WBC # BLD AUTO: 7.5 10E3/UL (ref 4–11)
WBC # BLD AUTO: 8.1 10E3/UL (ref 4–11)
WBC # BLD AUTO: 8.3 10E3/UL (ref 4–11)
WBC # BLD AUTO: 8.8 10E3/UL (ref 4–11)
WBC # BLD AUTO: 9.6 10E3/UL (ref 4–11)
WBC # BLD AUTO: 9.8 10E3/UL (ref 4–11)
WBC # FLD AUTO: 349 /UL

## 2022-01-01 PROCEDURE — 250N000013 HC RX MED GY IP 250 OP 250 PS 637: Performed by: NURSE PRACTITIONER

## 2022-01-01 PROCEDURE — 99309 SBSQ NF CARE MODERATE MDM 30: CPT | Performed by: NURSE PRACTITIONER

## 2022-01-01 PROCEDURE — 250N000011 HC RX IP 250 OP 636: Performed by: PHYSICIAN ASSISTANT

## 2022-01-01 PROCEDURE — 97116 GAIT TRAINING THERAPY: CPT | Mod: GP

## 2022-01-01 PROCEDURE — 83735 ASSAY OF MAGNESIUM: CPT | Performed by: INTERNAL MEDICINE

## 2022-01-01 PROCEDURE — 99221 1ST HOSP IP/OBS SF/LOW 40: CPT | Performed by: PHYSICIAN ASSISTANT

## 2022-01-01 PROCEDURE — 36415 COLL VENOUS BLD VENIPUNCTURE: CPT | Performed by: STUDENT IN AN ORGANIZED HEALTH CARE EDUCATION/TRAINING PROGRAM

## 2022-01-01 PROCEDURE — 36415 COLL VENOUS BLD VENIPUNCTURE: CPT

## 2022-01-01 PROCEDURE — 99239 HOSP IP/OBS DSCHRG MGMT >30: CPT | Performed by: HOSPITALIST

## 2022-01-01 PROCEDURE — 36415 COLL VENOUS BLD VENIPUNCTURE: CPT | Performed by: INTERNAL MEDICINE

## 2022-01-01 PROCEDURE — 250N000013 HC RX MED GY IP 250 OP 250 PS 637: Performed by: INTERNAL MEDICINE

## 2022-01-01 PROCEDURE — 84100 ASSAY OF PHOSPHORUS: CPT | Performed by: INTERNAL MEDICINE

## 2022-01-01 PROCEDURE — 84443 ASSAY THYROID STIM HORMONE: CPT | Mod: ORL | Performed by: FAMILY MEDICINE

## 2022-01-01 PROCEDURE — 99233 SBSQ HOSP IP/OBS HIGH 50: CPT | Performed by: STUDENT IN AN ORGANIZED HEALTH CARE EDUCATION/TRAINING PROGRAM

## 2022-01-01 PROCEDURE — 99232 SBSQ HOSP IP/OBS MODERATE 35: CPT | Performed by: INTERNAL MEDICINE

## 2022-01-01 PROCEDURE — 82248 BILIRUBIN DIRECT: CPT | Performed by: EMERGENCY MEDICINE

## 2022-01-01 PROCEDURE — 36415 COLL VENOUS BLD VENIPUNCTURE: CPT | Performed by: EMERGENCY MEDICINE

## 2022-01-01 PROCEDURE — 82306 VITAMIN D 25 HYDROXY: CPT | Mod: ORL | Performed by: FAMILY MEDICINE

## 2022-01-01 PROCEDURE — 80048 BASIC METABOLIC PNL TOTAL CA: CPT | Performed by: PHYSICIAN ASSISTANT

## 2022-01-01 PROCEDURE — 80143 DRUG ASSAY ACETAMINOPHEN: CPT | Performed by: EMERGENCY MEDICINE

## 2022-01-01 PROCEDURE — 99495 TRANSJ CARE MGMT MOD F2F 14D: CPT | Mod: 25

## 2022-01-01 PROCEDURE — 36415 COLL VENOUS BLD VENIPUNCTURE: CPT | Mod: ORL | Performed by: FAMILY MEDICINE

## 2022-01-01 PROCEDURE — 97530 THERAPEUTIC ACTIVITIES: CPT | Mod: GO

## 2022-01-01 PROCEDURE — 250N000011 HC RX IP 250 OP 636: Performed by: NURSE PRACTITIONER

## 2022-01-01 PROCEDURE — 82565 ASSAY OF CREATININE: CPT | Performed by: NURSE PRACTITIONER

## 2022-01-01 PROCEDURE — 99232 SBSQ HOSP IP/OBS MODERATE 35: CPT | Performed by: HOSPITALIST

## 2022-01-01 PROCEDURE — 250N000013 HC RX MED GY IP 250 OP 250 PS 637: Performed by: PHYSICIAN ASSISTANT

## 2022-01-01 PROCEDURE — U0005 INFEC AGEN DETEC AMPLI PROBE: HCPCS | Performed by: EMERGENCY MEDICINE

## 2022-01-01 PROCEDURE — 85027 COMPLETE CBC AUTOMATED: CPT | Performed by: INTERNAL MEDICINE

## 2022-01-01 PROCEDURE — 93306 TTE W/DOPPLER COMPLETE: CPT

## 2022-01-01 PROCEDURE — 83550 IRON BINDING TEST: CPT

## 2022-01-01 PROCEDURE — 97530 THERAPEUTIC ACTIVITIES: CPT | Mod: GP | Performed by: PHYSICAL THERAPIST

## 2022-01-01 PROCEDURE — 73030 X-RAY EXAM OF SHOULDER: CPT | Mod: LT

## 2022-01-01 PROCEDURE — 250N000011 HC RX IP 250 OP 636: Performed by: INTERNAL MEDICINE

## 2022-01-01 PROCEDURE — 83690 ASSAY OF LIPASE: CPT | Performed by: EMERGENCY MEDICINE

## 2022-01-01 PROCEDURE — U0003 INFECTIOUS AGENT DETECTION BY NUCLEIC ACID (DNA OR RNA); SEVERE ACUTE RESPIRATORY SYNDROME CORONAVIRUS 2 (SARS-COV-2) (CORONAVIRUS DISEASE [COVID-19]), AMPLIFIED PROBE TECHNIQUE, MAKING USE OF HIGH THROUGHPUT TECHNOLOGIES AS DESCRIBED BY CMS-2020-01-R: HCPCS | Performed by: EMERGENCY MEDICINE

## 2022-01-01 PROCEDURE — 250N000013 HC RX MED GY IP 250 OP 250 PS 637: Performed by: HOSPITALIST

## 2022-01-01 PROCEDURE — 250N000011 HC RX IP 250 OP 636: Performed by: EMERGENCY MEDICINE

## 2022-01-01 PROCEDURE — 97535 SELF CARE MNGMENT TRAINING: CPT | Mod: GO

## 2022-01-01 PROCEDURE — 999N000157 HC STATISTIC RCP TIME EA 10 MIN

## 2022-01-01 PROCEDURE — G1010 CDSM STANSON: HCPCS

## 2022-01-01 PROCEDURE — 85027 COMPLETE CBC AUTOMATED: CPT | Performed by: PHYSICIAN ASSISTANT

## 2022-01-01 PROCEDURE — 120N000001 HC R&B MED SURG/OB

## 2022-01-01 PROCEDURE — 82310 ASSAY OF CALCIUM: CPT | Performed by: INTERNAL MEDICINE

## 2022-01-01 PROCEDURE — 250N000009 HC RX 250: Performed by: EMERGENCY MEDICINE

## 2022-01-01 PROCEDURE — 0DJ08ZZ INSPECTION OF UPPER INTESTINAL TRACT, VIA NATURAL OR ARTIFICIAL OPENING ENDOSCOPIC: ICD-10-PCS | Performed by: INTERNAL MEDICINE

## 2022-01-01 PROCEDURE — 36415 COLL VENOUS BLD VENIPUNCTURE: CPT | Performed by: PHYSICIAN ASSISTANT

## 2022-01-01 PROCEDURE — 97530 THERAPEUTIC ACTIVITIES: CPT | Mod: GO | Performed by: OCCUPATIONAL THERAPIST

## 2022-01-01 PROCEDURE — 97161 PT EVAL LOW COMPLEX 20 MIN: CPT | Mod: GP | Performed by: PHYSICAL THERAPIST

## 2022-01-01 PROCEDURE — 84484 ASSAY OF TROPONIN QUANT: CPT | Performed by: EMERGENCY MEDICINE

## 2022-01-01 PROCEDURE — 82607 VITAMIN B-12: CPT | Mod: ORL | Performed by: FAMILY MEDICINE

## 2022-01-01 PROCEDURE — 85027 COMPLETE CBC AUTOMATED: CPT | Performed by: STUDENT IN AN ORGANIZED HEALTH CARE EDUCATION/TRAINING PROGRAM

## 2022-01-01 PROCEDURE — G0463 HOSPITAL OUTPT CLINIC VISIT: HCPCS | Performed by: PHYSICIAN ASSISTANT

## 2022-01-01 PROCEDURE — 83970 ASSAY OF PARATHORMONE: CPT | Performed by: STUDENT IN AN ORGANIZED HEALTH CARE EDUCATION/TRAINING PROGRAM

## 2022-01-01 PROCEDURE — G0008 ADMIN INFLUENZA VIRUS VAC: HCPCS

## 2022-01-01 PROCEDURE — 93005 ELECTROCARDIOGRAM TRACING: CPT

## 2022-01-01 PROCEDURE — 74177 CT ABD & PELVIS W/CONTRAST: CPT | Mod: MG

## 2022-01-01 PROCEDURE — 83735 ASSAY OF MAGNESIUM: CPT | Performed by: STUDENT IN AN ORGANIZED HEALTH CARE EDUCATION/TRAINING PROGRAM

## 2022-01-01 PROCEDURE — 82310 ASSAY OF CALCIUM: CPT | Performed by: EMERGENCY MEDICINE

## 2022-01-01 PROCEDURE — 250N000013 HC RX MED GY IP 250 OP 250 PS 637: Performed by: STUDENT IN AN ORGANIZED HEALTH CARE EDUCATION/TRAINING PROGRAM

## 2022-01-01 PROCEDURE — 97530 THERAPEUTIC ACTIVITIES: CPT | Mod: GP

## 2022-01-01 PROCEDURE — P9604 ONE-WAY ALLOW PRORATED TRIP: HCPCS | Performed by: NURSE PRACTITIONER

## 2022-01-01 PROCEDURE — 36415 COLL VENOUS BLD VENIPUNCTURE: CPT | Performed by: NURSE PRACTITIONER

## 2022-01-01 PROCEDURE — 82728 ASSAY OF FERRITIN: CPT

## 2022-01-01 PROCEDURE — 84100 ASSAY OF PHOSPHORUS: CPT | Performed by: STUDENT IN AN ORGANIZED HEALTH CARE EDUCATION/TRAINING PROGRAM

## 2022-01-01 PROCEDURE — 250N000011 HC RX IP 250 OP 636: Performed by: STUDENT IN AN ORGANIZED HEALTH CARE EDUCATION/TRAINING PROGRAM

## 2022-01-01 PROCEDURE — G0180 MD CERTIFICATION HHA PATIENT: HCPCS | Performed by: INTERNAL MEDICINE

## 2022-01-01 PROCEDURE — 99316 NF DSCHRG MGMT 30 MIN+: CPT | Performed by: NURSE PRACTITIONER

## 2022-01-01 PROCEDURE — 85018 HEMOGLOBIN: CPT | Performed by: INTERNAL MEDICINE

## 2022-01-01 PROCEDURE — C9803 HOPD COVID-19 SPEC COLLECT: HCPCS

## 2022-01-01 PROCEDURE — 80048 BASIC METABOLIC PNL TOTAL CA: CPT | Performed by: HOSPITALIST

## 2022-01-01 PROCEDURE — 83880 ASSAY OF NATRIURETIC PEPTIDE: CPT

## 2022-01-01 PROCEDURE — 99305 1ST NF CARE MODERATE MDM 35: CPT | Performed by: INTERNAL MEDICINE

## 2022-01-01 PROCEDURE — 85025 COMPLETE CBC W/AUTO DIFF WBC: CPT

## 2022-01-01 PROCEDURE — 84443 ASSAY THYROID STIM HORMONE: CPT

## 2022-01-01 PROCEDURE — 99310 SBSQ NF CARE HIGH MDM 45: CPT | Performed by: NURSE PRACTITIONER

## 2022-01-01 PROCEDURE — 84132 ASSAY OF SERUM POTASSIUM: CPT | Performed by: INTERNAL MEDICINE

## 2022-01-01 PROCEDURE — 250N000013 HC RX MED GY IP 250 OP 250 PS 637: Performed by: EMERGENCY MEDICINE

## 2022-01-01 PROCEDURE — 97166 OT EVAL MOD COMPLEX 45 MIN: CPT | Mod: GO

## 2022-01-01 PROCEDURE — 83615 LACTATE (LD) (LDH) ENZYME: CPT | Performed by: HOSPITALIST

## 2022-01-01 PROCEDURE — 99291 CRITICAL CARE FIRST HOUR: CPT | Mod: 25

## 2022-01-01 PROCEDURE — 72131 CT LUMBAR SPINE W/O DYE: CPT | Mod: ME

## 2022-01-01 PROCEDURE — 97535 SELF CARE MNGMENT TRAINING: CPT | Mod: GO | Performed by: OCCUPATIONAL THERAPIST

## 2022-01-01 PROCEDURE — 83036 HEMOGLOBIN GLYCOSYLATED A1C: CPT

## 2022-01-01 PROCEDURE — 99233 SBSQ HOSP IP/OBS HIGH 50: CPT | Performed by: NURSE PRACTITIONER

## 2022-01-01 PROCEDURE — 99232 SBSQ HOSP IP/OBS MODERATE 35: CPT | Performed by: STUDENT IN AN ORGANIZED HEALTH CARE EDUCATION/TRAINING PROGRAM

## 2022-01-01 PROCEDURE — 83550 IRON BINDING TEST: CPT | Performed by: HOSPITALIST

## 2022-01-01 PROCEDURE — 99239 HOSP IP/OBS DSCHRG MGMT >30: CPT | Performed by: STUDENT IN AN ORGANIZED HEALTH CARE EDUCATION/TRAINING PROGRAM

## 2022-01-01 PROCEDURE — 86481 TB AG RESPONSE T-CELL SUSP: CPT | Performed by: NURSE PRACTITIONER

## 2022-01-01 PROCEDURE — 97116 GAIT TRAINING THERAPY: CPT | Mod: GP | Performed by: PHYSICAL THERAPIST

## 2022-01-01 PROCEDURE — 84155 ASSAY OF PROTEIN SERUM: CPT | Performed by: HOSPITALIST

## 2022-01-01 PROCEDURE — 82310 ASSAY OF CALCIUM: CPT | Performed by: PHYSICIAN ASSISTANT

## 2022-01-01 PROCEDURE — 80053 COMPREHEN METABOLIC PANEL: CPT | Mod: ORL | Performed by: FAMILY MEDICINE

## 2022-01-01 PROCEDURE — 73552 X-RAY EXAM OF FEMUR 2/>: CPT | Mod: RT

## 2022-01-01 PROCEDURE — 85014 HEMATOCRIT: CPT | Performed by: STUDENT IN AN ORGANIZED HEALTH CARE EDUCATION/TRAINING PROGRAM

## 2022-01-01 PROCEDURE — 99223 1ST HOSP IP/OBS HIGH 75: CPT | Mod: AI | Performed by: HOSPITALIST

## 2022-01-01 PROCEDURE — 82746 ASSAY OF FOLIC ACID SERUM: CPT | Mod: ORL | Performed by: FAMILY MEDICINE

## 2022-01-01 PROCEDURE — 80053 COMPREHEN METABOLIC PANEL: CPT | Performed by: EMERGENCY MEDICINE

## 2022-01-01 PROCEDURE — 99223 1ST HOSP IP/OBS HIGH 75: CPT | Mod: AI | Performed by: INTERNAL MEDICINE

## 2022-01-01 PROCEDURE — 96374 THER/PROPH/DIAG INJ IV PUSH: CPT

## 2022-01-01 PROCEDURE — 85025 COMPLETE CBC W/AUTO DIFF WBC: CPT | Performed by: EMERGENCY MEDICINE

## 2022-01-01 PROCEDURE — 90662 IIV NO PRSV INCREASED AG IM: CPT

## 2022-01-01 PROCEDURE — 99223 1ST HOSP IP/OBS HIGH 75: CPT | Mod: AI | Performed by: PHYSICIAN ASSISTANT

## 2022-01-01 PROCEDURE — 250N000009 HC RX 250: Performed by: INTERNAL MEDICINE

## 2022-01-01 PROCEDURE — 80048 BASIC METABOLIC PNL TOTAL CA: CPT | Performed by: STUDENT IN AN ORGANIZED HEALTH CARE EDUCATION/TRAINING PROGRAM

## 2022-01-01 PROCEDURE — 36415 COLL VENOUS BLD VENIPUNCTURE: CPT | Performed by: HOSPITALIST

## 2022-01-01 PROCEDURE — 83880 ASSAY OF NATRIURETIC PEPTIDE: CPT | Performed by: EMERGENCY MEDICINE

## 2022-01-01 PROCEDURE — 85018 HEMOGLOBIN: CPT | Performed by: NURSE PRACTITIONER

## 2022-01-01 PROCEDURE — 250N000009 HC RX 250: Performed by: HOSPITALIST

## 2022-01-01 PROCEDURE — 0W993ZZ DRAINAGE OF RIGHT PLEURAL CAVITY, PERCUTANEOUS APPROACH: ICD-10-PCS | Performed by: RADIOLOGY

## 2022-01-01 PROCEDURE — 84484 ASSAY OF TROPONIN QUANT: CPT | Performed by: INTERNAL MEDICINE

## 2022-01-01 PROCEDURE — 73070 X-RAY EXAM OF ELBOW: CPT | Mod: LT

## 2022-01-01 PROCEDURE — 85027 COMPLETE CBC AUTOMATED: CPT | Performed by: HOSPITALIST

## 2022-01-01 PROCEDURE — 96376 TX/PRO/DX INJ SAME DRUG ADON: CPT

## 2022-01-01 PROCEDURE — 99285 EMERGENCY DEPT VISIT HI MDM: CPT | Mod: 25

## 2022-01-01 PROCEDURE — 99239 HOSP IP/OBS DSCHRG MGMT >30: CPT | Performed by: INTERNAL MEDICINE

## 2022-01-01 PROCEDURE — 99232 SBSQ HOSP IP/OBS MODERATE 35: CPT | Performed by: NURSE PRACTITIONER

## 2022-01-01 PROCEDURE — 82306 VITAMIN D 25 HYDROXY: CPT | Performed by: STUDENT IN AN ORGANIZED HEALTH CARE EDUCATION/TRAINING PROGRAM

## 2022-01-01 PROCEDURE — U0003 INFECTIOUS AGENT DETECTION BY NUCLEIC ACID (DNA OR RNA); SEVERE ACUTE RESPIRATORY SYNDROME CORONAVIRUS 2 (SARS-COV-2) (CORONAVIRUS DISEASE [COVID-19]), AMPLIFIED PROBE TECHNIQUE, MAKING USE OF HIGH THROUGHPUT TECHNOLOGIES AS DESCRIBED BY CMS-2020-01-R: HCPCS | Performed by: HOSPITALIST

## 2022-01-01 PROCEDURE — 84157 ASSAY OF PROTEIN OTHER: CPT | Performed by: HOSPITALIST

## 2022-01-01 PROCEDURE — 99223 1ST HOSP IP/OBS HIGH 75: CPT | Performed by: NURSE PRACTITIONER

## 2022-01-01 PROCEDURE — G0463 HOSPITAL OUTPT CLINIC VISIT: HCPCS

## 2022-01-01 PROCEDURE — 97597 DBRDMT OPN WND 1ST 20 CM/<: CPT | Performed by: PHYSICIAN ASSISTANT

## 2022-01-01 PROCEDURE — 85610 PROTHROMBIN TIME: CPT | Performed by: EMERGENCY MEDICINE

## 2022-01-01 PROCEDURE — 272N000706 US THORACENTESIS BILATERAL

## 2022-01-01 PROCEDURE — 43235 EGD DIAGNOSTIC BRUSH WASH: CPT | Performed by: INTERNAL MEDICINE

## 2022-01-01 PROCEDURE — 250N000011 HC RX IP 250 OP 636: Performed by: HOSPITALIST

## 2022-01-01 PROCEDURE — 99306 1ST NF CARE HIGH MDM 50: CPT | Performed by: INTERNAL MEDICINE

## 2022-01-01 PROCEDURE — 84550 ASSAY OF BLOOD/URIC ACID: CPT | Mod: ORL | Performed by: FAMILY MEDICINE

## 2022-01-01 PROCEDURE — 97161 PT EVAL LOW COMPLEX 20 MIN: CPT | Mod: GP

## 2022-01-01 PROCEDURE — 80048 BASIC METABOLIC PNL TOTAL CA: CPT | Performed by: NURSE PRACTITIONER

## 2022-01-01 PROCEDURE — 99152 MOD SED SAME PHYS/QHP 5/>YRS: CPT

## 2022-01-01 PROCEDURE — 29105 APPLICATION LONG ARM SPLINT: CPT | Mod: LT

## 2022-01-01 PROCEDURE — 999N000128 HC STATISTIC PERIPHERAL IV START W/O US GUIDANCE

## 2022-01-01 PROCEDURE — 85379 FIBRIN DEGRADATION QUANT: CPT | Performed by: EMERGENCY MEDICINE

## 2022-01-01 PROCEDURE — 99292 CRITICAL CARE ADDL 30 MIN: CPT

## 2022-01-01 PROCEDURE — 89051 BODY FLUID CELL COUNT: CPT | Performed by: HOSPITALIST

## 2022-01-01 PROCEDURE — 96366 THER/PROPH/DIAG IV INF ADDON: CPT

## 2022-01-01 PROCEDURE — 85027 COMPLETE CBC AUTOMATED: CPT | Mod: ORL | Performed by: FAMILY MEDICINE

## 2022-01-01 PROCEDURE — 250N000011 HC RX IP 250 OP 636

## 2022-01-01 PROCEDURE — 97165 OT EVAL LOW COMPLEX 30 MIN: CPT | Mod: GO | Performed by: OCCUPATIONAL THERAPIST

## 2022-01-01 PROCEDURE — 84132 ASSAY OF SERUM POTASSIUM: CPT | Performed by: STUDENT IN AN ORGANIZED HEALTH CARE EDUCATION/TRAINING PROGRAM

## 2022-01-01 PROCEDURE — 83036 HEMOGLOBIN GLYCOSYLATED A1C: CPT | Mod: ORL | Performed by: FAMILY MEDICINE

## 2022-01-01 PROCEDURE — 96375 TX/PRO/DX INJ NEW DRUG ADDON: CPT

## 2022-01-01 PROCEDURE — 80048 BASIC METABOLIC PNL TOTAL CA: CPT

## 2022-01-01 PROCEDURE — 97162 PT EVAL MOD COMPLEX 30 MIN: CPT | Mod: GP | Performed by: PHYSICAL THERAPIST

## 2022-01-01 PROCEDURE — 85027 COMPLETE CBC AUTOMATED: CPT

## 2022-01-01 PROCEDURE — 71046 X-RAY EXAM CHEST 2 VIEWS: CPT

## 2022-01-01 PROCEDURE — 96367 TX/PROPH/DG ADDL SEQ IV INF: CPT

## 2022-01-01 PROCEDURE — 99233 SBSQ HOSP IP/OBS HIGH 50: CPT | Performed by: INTERNAL MEDICINE

## 2022-01-01 PROCEDURE — 93306 TTE W/DOPPLER COMPLETE: CPT | Mod: 26 | Performed by: INTERNAL MEDICINE

## 2022-01-01 PROCEDURE — 99304 1ST NF CARE SF/LOW MDM 25: CPT | Mod: 25 | Performed by: PHYSICIAN ASSISTANT

## 2022-01-01 PROCEDURE — 76705 ECHO EXAM OF ABDOMEN: CPT

## 2022-01-01 PROCEDURE — G0500 MOD SEDAT ENDO SERVICE >5YRS: HCPCS | Performed by: INTERNAL MEDICINE

## 2022-01-01 PROCEDURE — 0W9B3ZZ DRAINAGE OF LEFT PLEURAL CAVITY, PERCUTANEOUS APPROACH: ICD-10-PCS | Performed by: RADIOLOGY

## 2022-01-01 PROCEDURE — 999N000055 HC STATISTIC END TITIAL CO2 MONITORING

## 2022-01-01 PROCEDURE — 82374 ASSAY BLOOD CARBON DIOXIDE: CPT | Performed by: PHYSICIAN ASSISTANT

## 2022-01-01 PROCEDURE — 82374 ASSAY BLOOD CARBON DIOXIDE: CPT | Performed by: INTERNAL MEDICINE

## 2022-01-01 PROCEDURE — 80061 LIPID PANEL: CPT | Mod: ORL | Performed by: FAMILY MEDICINE

## 2022-01-01 PROCEDURE — 96365 THER/PROPH/DIAG IV INF INIT: CPT

## 2022-01-01 PROCEDURE — 93010 ELECTROCARDIOGRAM REPORT: CPT | Performed by: INTERNAL MEDICINE

## 2022-01-01 PROCEDURE — P9604 ONE-WAY ALLOW PRORATED TRIP: HCPCS | Mod: ORL | Performed by: FAMILY MEDICINE

## 2022-01-01 PROCEDURE — 82310 ASSAY OF CALCIUM: CPT | Performed by: STUDENT IN AN ORGANIZED HEALTH CARE EDUCATION/TRAINING PROGRAM

## 2022-01-01 PROCEDURE — 72146 MRI CHEST SPINE W/O DYE: CPT | Mod: MG

## 2022-01-01 PROCEDURE — 83735 ASSAY OF MAGNESIUM: CPT | Performed by: EMERGENCY MEDICINE

## 2022-01-01 PROCEDURE — 82945 GLUCOSE OTHER FLUID: CPT | Performed by: HOSPITALIST

## 2022-01-01 PROCEDURE — 87205 SMEAR GRAM STAIN: CPT | Performed by: HOSPITALIST

## 2022-01-01 RX ORDER — ATORVASTATIN CALCIUM 40 MG/1
40 TABLET, FILM COATED ORAL EVERY EVENING
Status: DISCONTINUED | OUTPATIENT
Start: 2022-01-01 | End: 2022-01-01 | Stop reason: HOSPADM

## 2022-01-01 RX ORDER — HYDROCODONE BITARTRATE AND ACETAMINOPHEN 7.5; 325 MG/1; MG/1
2 TABLET ORAL EVERY 6 HOURS PRN
Status: DISCONTINUED | OUTPATIENT
Start: 2022-01-01 | End: 2022-01-01

## 2022-01-01 RX ORDER — HYDROCODONE BITARTRATE AND ACETAMINOPHEN 10; 325 MG/1; MG/1
1 TABLET ORAL EVERY 6 HOURS PRN
Qty: 12 TABLET | Refills: 0 | Status: SHIPPED | OUTPATIENT
Start: 2022-01-01 | End: 2022-01-01

## 2022-01-01 RX ORDER — PROPOFOL 10 MG/ML
20 INJECTION, EMULSION INTRAVENOUS ONCE
Status: COMPLETED | OUTPATIENT
Start: 2022-01-01 | End: 2022-01-01

## 2022-01-01 RX ORDER — HYDROCODONE BITARTRATE AND ACETAMINOPHEN 5; 325 MG/1; MG/1
2 TABLET ORAL EVERY 6 HOURS
Qty: 60 TABLET | Refills: 0 | Status: SHIPPED | OUTPATIENT
Start: 2022-01-01 | End: 2022-01-01

## 2022-01-01 RX ORDER — PROCHLORPERAZINE 25 MG
12.5 SUPPOSITORY, RECTAL RECTAL EVERY 12 HOURS PRN
Status: DISCONTINUED | OUTPATIENT
Start: 2022-01-01 | End: 2022-01-01 | Stop reason: HOSPADM

## 2022-01-01 RX ORDER — PANTOPRAZOLE SODIUM 40 MG/1
40 TABLET, DELAYED RELEASE ORAL 2 TIMES DAILY
Status: DISCONTINUED | OUTPATIENT
Start: 2022-01-01 | End: 2022-01-01 | Stop reason: HOSPADM

## 2022-01-01 RX ORDER — HYDROCODONE BITARTRATE AND ACETAMINOPHEN 5; 325 MG/1; MG/1
2 TABLET ORAL 3 TIMES DAILY
Qty: 90 TABLET | Refills: 0 | Status: CANCELLED | OUTPATIENT
Start: 2022-01-01

## 2022-01-01 RX ORDER — MULTIVITAMIN,THERAPEUTIC
1 TABLET ORAL DAILY
Status: DISCONTINUED | OUTPATIENT
Start: 2022-01-01 | End: 2022-01-01 | Stop reason: HOSPADM

## 2022-01-01 RX ORDER — HYDROMORPHONE HYDROCHLORIDE 1 MG/ML
0.5 INJECTION, SOLUTION INTRAMUSCULAR; INTRAVENOUS; SUBCUTANEOUS
Status: COMPLETED | OUTPATIENT
Start: 2022-01-01 | End: 2022-01-01

## 2022-01-01 RX ORDER — FENTANYL CITRATE 0.05 MG/ML
50-100 INJECTION, SOLUTION INTRAMUSCULAR; INTRAVENOUS EVERY 5 MIN PRN
Status: DISCONTINUED | OUTPATIENT
Start: 2022-01-01 | End: 2022-01-01 | Stop reason: HOSPADM

## 2022-01-01 RX ORDER — HYDROCODONE BITARTRATE AND ACETAMINOPHEN 5; 325 MG/1; MG/1
2 TABLET ORAL EVERY 4 HOURS PRN
Qty: 15 TABLET | Refills: 0 | Status: SHIPPED | OUTPATIENT
Start: 2022-01-01 | End: 2022-01-01

## 2022-01-01 RX ORDER — NALOXONE HYDROCHLORIDE 0.4 MG/ML
0.4 INJECTION, SOLUTION INTRAMUSCULAR; INTRAVENOUS; SUBCUTANEOUS
Status: DISCONTINUED | OUTPATIENT
Start: 2022-01-01 | End: 2022-01-01 | Stop reason: HOSPADM

## 2022-01-01 RX ORDER — FENTANYL CITRATE 50 UG/ML
50 INJECTION, SOLUTION INTRAMUSCULAR; INTRAVENOUS ONCE
Status: COMPLETED | OUTPATIENT
Start: 2022-01-01 | End: 2022-01-01

## 2022-01-01 RX ORDER — HYDROXYZINE HYDROCHLORIDE 10 MG/1
10 TABLET, FILM COATED ORAL EVERY 6 HOURS PRN
Status: DISCONTINUED | OUTPATIENT
Start: 2022-01-01 | End: 2022-01-01

## 2022-01-01 RX ORDER — MAGNESIUM SULFATE HEPTAHYDRATE 40 MG/ML
4 INJECTION, SOLUTION INTRAVENOUS ONCE
Status: COMPLETED | OUTPATIENT
Start: 2022-01-01 | End: 2022-01-01

## 2022-01-01 RX ORDER — HYDROCODONE BITARTRATE AND ACETAMINOPHEN 5; 325 MG/1; MG/1
1-2 TABLET ORAL EVERY 6 HOURS PRN
Status: DISCONTINUED | OUTPATIENT
Start: 2022-01-01 | End: 2022-01-01 | Stop reason: HOSPADM

## 2022-01-01 RX ORDER — EPINEPHRINE 1 MG/ML
0.1 INJECTION, SOLUTION INTRAMUSCULAR; SUBCUTANEOUS
Status: DISCONTINUED | OUTPATIENT
Start: 2022-01-01 | End: 2022-01-01 | Stop reason: HOSPADM

## 2022-01-01 RX ORDER — HYDROMORPHONE HYDROCHLORIDE 1 MG/ML
0.5 INJECTION, SOLUTION INTRAMUSCULAR; INTRAVENOUS; SUBCUTANEOUS ONCE
Status: COMPLETED | OUTPATIENT
Start: 2022-01-01 | End: 2022-01-01

## 2022-01-01 RX ORDER — HYDROXYZINE HYDROCHLORIDE 25 MG/1
25 TABLET, FILM COATED ORAL EVERY 6 HOURS
Status: DISCONTINUED | OUTPATIENT
Start: 2022-01-01 | End: 2022-01-01 | Stop reason: HOSPADM

## 2022-01-01 RX ORDER — BISACODYL 5 MG/1
5 TABLET, DELAYED RELEASE ORAL DAILY PRN
COMMUNITY

## 2022-01-01 RX ORDER — FUROSEMIDE 10 MG/ML
60 INJECTION INTRAMUSCULAR; INTRAVENOUS ONCE
Status: COMPLETED | OUTPATIENT
Start: 2022-01-01 | End: 2022-01-01

## 2022-01-01 RX ORDER — CALCIUM CARBONATE 500 MG/1
1000 TABLET, CHEWABLE ORAL 4 TIMES DAILY PRN
Status: DISCONTINUED | OUTPATIENT
Start: 2022-01-01 | End: 2022-01-01 | Stop reason: HOSPADM

## 2022-01-01 RX ORDER — DIPHENHYDRAMINE HYDROCHLORIDE 50 MG/ML
25-50 INJECTION INTRAMUSCULAR; INTRAVENOUS
Status: DISCONTINUED | OUTPATIENT
Start: 2022-01-01 | End: 2022-01-01 | Stop reason: HOSPADM

## 2022-01-01 RX ORDER — PREGABALIN 25 MG/1
25 CAPSULE ORAL 2 TIMES DAILY
Qty: 60 CAPSULE | Refills: 0 | Status: SHIPPED | OUTPATIENT
Start: 2022-01-01 | End: 2022-01-01

## 2022-01-01 RX ORDER — FUROSEMIDE 40 MG
40 TABLET ORAL DAILY
Status: DISCONTINUED | OUTPATIENT
Start: 2022-01-01 | End: 2022-01-01 | Stop reason: HOSPADM

## 2022-01-01 RX ORDER — ASPIRIN 81 MG/1
81 TABLET ORAL DAILY
Status: DISCONTINUED | OUTPATIENT
Start: 2022-01-01 | End: 2022-01-01 | Stop reason: HOSPADM

## 2022-01-01 RX ORDER — AMOXICILLIN 250 MG
1 CAPSULE ORAL 2 TIMES DAILY PRN
Status: DISCONTINUED | OUTPATIENT
Start: 2022-01-01 | End: 2022-01-01 | Stop reason: HOSPADM

## 2022-01-01 RX ORDER — LORAZEPAM 2 MG/ML
0.5 INJECTION INTRAMUSCULAR EVERY 6 HOURS PRN
Status: DISCONTINUED | OUTPATIENT
Start: 2022-01-01 | End: 2022-01-01

## 2022-01-01 RX ORDER — HYDROCODONE BITARTRATE AND ACETAMINOPHEN 10; 325 MG/1; MG/1
1 TABLET ORAL EVERY 4 HOURS PRN
COMMUNITY
End: 2022-01-01

## 2022-01-01 RX ORDER — PROPOFOL 10 MG/ML
INJECTION, EMULSION INTRAVENOUS DAILY PRN
Status: COMPLETED | OUTPATIENT
Start: 2022-01-01 | End: 2022-01-01

## 2022-01-01 RX ORDER — MAGNESIUM SULFATE HEPTAHYDRATE 40 MG/ML
2 INJECTION, SOLUTION INTRAVENOUS ONCE
Status: COMPLETED | OUTPATIENT
Start: 2022-01-01 | End: 2022-01-01

## 2022-01-01 RX ORDER — HYDROCODONE BITARTRATE AND ACETAMINOPHEN 5; 325 MG/1; MG/1
2 TABLET ORAL ONCE
Status: COMPLETED | OUTPATIENT
Start: 2022-01-01 | End: 2022-01-01

## 2022-01-01 RX ORDER — HYDROXYZINE HYDROCHLORIDE 25 MG/1
TABLET, FILM COATED ORAL
Qty: 60 TABLET | Refills: 1 | Status: SHIPPED | OUTPATIENT
Start: 2022-01-01 | End: 2022-01-01

## 2022-01-01 RX ORDER — FLUMAZENIL 0.1 MG/ML
0.2 INJECTION, SOLUTION INTRAVENOUS
Status: DISCONTINUED | OUTPATIENT
Start: 2022-01-01 | End: 2022-01-01 | Stop reason: HOSPADM

## 2022-01-01 RX ORDER — FERROUS SULFATE 325(65) MG
325 TABLET ORAL
COMMUNITY
End: 2022-01-01

## 2022-01-01 RX ORDER — FUROSEMIDE 10 MG/ML
40 INJECTION INTRAMUSCULAR; INTRAVENOUS
Status: DISCONTINUED | OUTPATIENT
Start: 2022-01-01 | End: 2022-01-01

## 2022-01-01 RX ORDER — PREGABALIN 25 MG/1
25 CAPSULE ORAL 2 TIMES DAILY
Qty: 15 CAPSULE | Refills: 0 | Status: SHIPPED | OUTPATIENT
Start: 2022-01-01 | End: 2022-01-01

## 2022-01-01 RX ORDER — ACETAMINOPHEN 325 MG/1
650 TABLET ORAL EVERY 6 HOURS PRN
Status: DISCONTINUED | OUTPATIENT
Start: 2022-01-01 | End: 2022-01-01 | Stop reason: HOSPADM

## 2022-01-01 RX ORDER — LIDOCAINE 40 MG/G
CREAM TOPICAL
Status: DISCONTINUED | OUTPATIENT
Start: 2022-01-01 | End: 2022-01-01 | Stop reason: HOSPADM

## 2022-01-01 RX ORDER — BISACODYL 10 MG
10 SUPPOSITORY, RECTAL RECTAL DAILY PRN
Status: DISCONTINUED | OUTPATIENT
Start: 2022-01-01 | End: 2022-01-01 | Stop reason: HOSPADM

## 2022-01-01 RX ORDER — HYDROCODONE BITARTRATE AND ACETAMINOPHEN 10; 325 MG/1; MG/1
1 TABLET ORAL EVERY 4 HOURS PRN
Qty: 42 TABLET | Refills: 0 | Status: CANCELLED | OUTPATIENT
Start: 2022-01-01

## 2022-01-01 RX ORDER — ATROPINE SULFATE 0.1 MG/ML
1 INJECTION INTRAVENOUS
Status: DISCONTINUED | OUTPATIENT
Start: 2022-01-01 | End: 2022-01-01 | Stop reason: HOSPADM

## 2022-01-01 RX ORDER — HYDROCODONE BITARTRATE AND ACETAMINOPHEN 5; 325 MG/1; MG/1
TABLET ORAL
Qty: 60 TABLET | Refills: 0 | Status: SHIPPED | OUTPATIENT
Start: 2022-01-01 | End: 2022-01-01

## 2022-01-01 RX ORDER — NALOXONE HYDROCHLORIDE 0.4 MG/ML
0.2 INJECTION, SOLUTION INTRAMUSCULAR; INTRAVENOUS; SUBCUTANEOUS
Status: DISCONTINUED | OUTPATIENT
Start: 2022-01-01 | End: 2022-01-01 | Stop reason: HOSPADM

## 2022-01-01 RX ORDER — CARVEDILOL 3.12 MG/1
3.12 TABLET ORAL 2 TIMES DAILY WITH MEALS
Status: DISCONTINUED | OUTPATIENT
Start: 2022-01-01 | End: 2022-01-01 | Stop reason: HOSPADM

## 2022-01-01 RX ORDER — ATORVASTATIN CALCIUM 40 MG/1
40 TABLET, FILM COATED ORAL EVERY EVENING
Qty: 30 TABLET | Refills: 0 | Status: SHIPPED | OUTPATIENT
Start: 2022-01-01

## 2022-01-01 RX ORDER — LANOLIN ALCOHOL/MO/W.PET/CERES
3 CREAM (GRAM) TOPICAL
Status: DISCONTINUED | OUTPATIENT
Start: 2022-01-01 | End: 2022-01-01 | Stop reason: HOSPADM

## 2022-01-01 RX ORDER — PREGABALIN 25 MG/1
25 CAPSULE ORAL 2 TIMES DAILY
Status: DISCONTINUED | OUTPATIENT
Start: 2022-01-01 | End: 2022-01-01 | Stop reason: HOSPADM

## 2022-01-01 RX ORDER — HYDROXYZINE HYDROCHLORIDE 25 MG/1
25 TABLET, FILM COATED ORAL EVERY 6 HOURS PRN
Status: DISCONTINUED | OUTPATIENT
Start: 2022-01-01 | End: 2022-01-01

## 2022-01-01 RX ORDER — HYDROCODONE BITARTRATE AND ACETAMINOPHEN 5; 325 MG/1; MG/1
2 TABLET ORAL 3 TIMES DAILY
Qty: 20 TABLET | Refills: 0 | Status: SHIPPED | OUTPATIENT
Start: 2022-01-01 | End: 2022-01-01

## 2022-01-01 RX ORDER — HYDROCODONE BITARTRATE AND ACETAMINOPHEN 5; 325 MG/1; MG/1
1-2 TABLET ORAL 4 TIMES DAILY PRN
Qty: 60 TABLET | Refills: 0 | Status: ON HOLD | OUTPATIENT
Start: 2022-01-01 | End: 2023-01-01

## 2022-01-01 RX ORDER — SIMETHICONE 40MG/0.6ML
133 SUSPENSION, DROPS(FINAL DOSAGE FORM)(ML) ORAL
Status: DISCONTINUED | OUTPATIENT
Start: 2022-01-01 | End: 2022-01-01 | Stop reason: HOSPADM

## 2022-01-01 RX ORDER — HYDROMORPHONE HYDROCHLORIDE 1 MG/ML
0.3 INJECTION, SOLUTION INTRAMUSCULAR; INTRAVENOUS; SUBCUTANEOUS
Status: DISCONTINUED | OUTPATIENT
Start: 2022-01-01 | End: 2022-01-01 | Stop reason: HOSPADM

## 2022-01-01 RX ORDER — AMOXICILLIN 250 MG
2 CAPSULE ORAL 2 TIMES DAILY PRN
Status: DISCONTINUED | OUTPATIENT
Start: 2022-01-01 | End: 2022-01-01 | Stop reason: HOSPADM

## 2022-01-01 RX ORDER — PREGABALIN 25 MG/1
25 CAPSULE ORAL 2 TIMES DAILY
Qty: 60 CAPSULE | Refills: 0 | Status: ON HOLD | OUTPATIENT
Start: 2022-01-01 | End: 2022-01-01

## 2022-01-01 RX ORDER — HYDROCODONE BITARTRATE AND ACETAMINOPHEN 10; 325 MG/1; MG/1
1 TABLET ORAL EVERY 4 HOURS PRN
Status: DISCONTINUED | OUTPATIENT
Start: 2022-01-01 | End: 2022-01-01 | Stop reason: HOSPADM

## 2022-01-01 RX ORDER — HYDROCODONE BITARTRATE AND ACETAMINOPHEN 5; 325 MG/1; MG/1
2 TABLET ORAL EVERY 6 HOURS
Qty: 24 TABLET | Refills: 0 | Status: SHIPPED | OUTPATIENT
Start: 2022-01-01 | End: 2022-01-01

## 2022-01-01 RX ORDER — PREGABALIN 25 MG/1
50 CAPSULE ORAL
Status: DISCONTINUED | OUTPATIENT
Start: 2022-01-01 | End: 2022-01-01 | Stop reason: HOSPADM

## 2022-01-01 RX ORDER — PROCHLORPERAZINE MALEATE 5 MG
5 TABLET ORAL EVERY 6 HOURS PRN
Status: DISCONTINUED | OUTPATIENT
Start: 2022-01-01 | End: 2022-01-01 | Stop reason: HOSPADM

## 2022-01-01 RX ORDER — HYDROCODONE BITARTRATE AND ACETAMINOPHEN 5; 325 MG/1; MG/1
2 TABLET ORAL ONCE
Status: DISCONTINUED | OUTPATIENT
Start: 2022-01-01 | End: 2022-01-01

## 2022-01-01 RX ORDER — ACETAMINOPHEN 500 MG
500 TABLET ORAL 3 TIMES DAILY
Status: DISCONTINUED | OUTPATIENT
Start: 2022-01-01 | End: 2022-01-01 | Stop reason: HOSPADM

## 2022-01-01 RX ORDER — HYDROCODONE BITARTRATE AND ACETAMINOPHEN 5; 325 MG/1; MG/1
1 TABLET ORAL EVERY 4 HOURS PRN
Status: DISCONTINUED | OUTPATIENT
Start: 2022-01-01 | End: 2022-01-01

## 2022-01-01 RX ORDER — FUROSEMIDE 20 MG
40 TABLET ORAL DAILY
Qty: 95 TABLET | Refills: 2
Start: 2022-01-01

## 2022-01-01 RX ORDER — ENOXAPARIN SODIUM 100 MG/ML
40 INJECTION SUBCUTANEOUS EVERY 24 HOURS
Status: DISCONTINUED | OUTPATIENT
Start: 2022-01-01 | End: 2022-01-01 | Stop reason: HOSPADM

## 2022-01-01 RX ORDER — GUAIFENESIN 600 MG/1
600 TABLET, EXTENDED RELEASE ORAL 2 TIMES DAILY PRN
Status: DISCONTINUED | OUTPATIENT
Start: 2022-01-01 | End: 2022-01-01 | Stop reason: HOSPADM

## 2022-01-01 RX ORDER — HYDROCODONE BITARTRATE AND ACETAMINOPHEN 10; 325 MG/1; MG/1
1 TABLET ORAL EVERY 4 HOURS PRN
Qty: 40 TABLET | Refills: 0 | Status: SHIPPED | OUTPATIENT
Start: 2022-01-01 | End: 2022-01-01

## 2022-01-01 RX ORDER — PREGABALIN 25 MG/1
25 CAPSULE ORAL 3 TIMES DAILY
Status: DISCONTINUED | OUTPATIENT
Start: 2022-01-01 | End: 2022-01-01

## 2022-01-01 RX ORDER — ALENDRONATE SODIUM 70 MG/1
70 TABLET ORAL
Qty: 4 TABLET | Refills: 0 | Status: SHIPPED | OUTPATIENT
Start: 2022-01-01

## 2022-01-01 RX ORDER — ACETAMINOPHEN 325 MG/1
650 TABLET ORAL ONCE
Status: COMPLETED | OUTPATIENT
Start: 2022-01-01 | End: 2022-01-01

## 2022-01-01 RX ORDER — FUROSEMIDE 40 MG
40 TABLET ORAL ONCE
Status: COMPLETED | OUTPATIENT
Start: 2022-01-01 | End: 2022-01-01

## 2022-01-01 RX ORDER — HYDROCODONE BITARTRATE AND ACETAMINOPHEN 5; 325 MG/1; MG/1
1 TABLET ORAL ONCE
Status: COMPLETED | OUTPATIENT
Start: 2022-01-01 | End: 2022-01-01

## 2022-01-01 RX ORDER — BISACODYL 5 MG
5 TABLET, DELAYED RELEASE (ENTERIC COATED) ORAL DAILY PRN
Status: DISCONTINUED | OUTPATIENT
Start: 2022-01-01 | End: 2022-01-01 | Stop reason: HOSPADM

## 2022-01-01 RX ORDER — ACETAMINOPHEN 500 MG
1000 TABLET ORAL 3 TIMES DAILY
Status: DISCONTINUED | OUTPATIENT
Start: 2022-01-01 | End: 2022-01-01

## 2022-01-01 RX ORDER — HYDROCODONE BITARTRATE AND ACETAMINOPHEN 7.5; 325 MG/1; MG/1
2 TABLET ORAL EVERY 4 HOURS PRN
Status: DISCONTINUED | OUTPATIENT
Start: 2022-01-01 | End: 2022-01-01 | Stop reason: HOSPADM

## 2022-01-01 RX ORDER — LIDOCAINE 4 G/G
2 PATCH TOPICAL EVERY 24 HOURS
Qty: 30 PATCH | Refills: 1 | Status: SHIPPED | OUTPATIENT
Start: 2022-01-01 | End: 2022-01-01

## 2022-01-01 RX ORDER — FLUTICASONE PROPIONATE 50 MCG
2 SPRAY, SUSPENSION (ML) NASAL DAILY PRN
Status: DISCONTINUED | OUTPATIENT
Start: 2022-01-01 | End: 2022-01-01 | Stop reason: HOSPADM

## 2022-01-01 RX ORDER — ONDANSETRON 4 MG/1
4 TABLET, ORALLY DISINTEGRATING ORAL EVERY 6 HOURS PRN
Status: DISCONTINUED | OUTPATIENT
Start: 2022-01-01 | End: 2022-01-01 | Stop reason: HOSPADM

## 2022-01-01 RX ORDER — HYDROCODONE BITARTRATE AND ACETAMINOPHEN 10; 325 MG/1; MG/1
1 TABLET ORAL EVERY 4 HOURS PRN
Qty: 30 TABLET | Refills: 0 | Status: SHIPPED | OUTPATIENT
Start: 2022-01-01 | End: 2022-01-01 | Stop reason: DRUGHIGH

## 2022-01-01 RX ORDER — NITROGLYCERIN 0.4 MG/1
0.4 TABLET SUBLINGUAL EVERY 5 MIN PRN
Status: COMPLETED | OUTPATIENT
Start: 2022-01-01 | End: 2022-01-01

## 2022-01-01 RX ORDER — HYDROMORPHONE HYDROCHLORIDE 1 MG/ML
.5-1 INJECTION, SOLUTION INTRAMUSCULAR; INTRAVENOUS; SUBCUTANEOUS
Status: DISCONTINUED | OUTPATIENT
Start: 2022-01-01 | End: 2022-01-01

## 2022-01-01 RX ORDER — HYDROCODONE BITARTRATE AND ACETAMINOPHEN 10; 325 MG/1; MG/1
1 TABLET ORAL EVERY 6 HOURS PRN
Qty: 6 TABLET | Refills: 0 | Status: ON HOLD | OUTPATIENT
Start: 2022-01-01 | End: 2022-01-01

## 2022-01-01 RX ORDER — POLYETHYLENE GLYCOL 3350 17 G/17G
17 POWDER, FOR SOLUTION ORAL DAILY
Qty: 510 G | Refills: 0 | Status: ON HOLD | OUTPATIENT
Start: 2022-01-01 | End: 2023-01-01

## 2022-01-01 RX ORDER — PANTOPRAZOLE SODIUM 40 MG/1
40 TABLET, DELAYED RELEASE ORAL 2 TIMES DAILY
Status: DISCONTINUED | OUTPATIENT
Start: 2022-01-01 | End: 2022-01-01

## 2022-01-01 RX ORDER — HYDROCODONE BITARTRATE AND ACETAMINOPHEN 10; 325 MG/1; MG/1
1-2 TABLET ORAL EVERY 4 HOURS PRN
Status: DISCONTINUED | OUTPATIENT
Start: 2022-01-01 | End: 2022-01-01

## 2022-01-01 RX ORDER — HYDROXYZINE HYDROCHLORIDE 25 MG/1
25 TABLET, FILM COATED ORAL EVERY 6 HOURS PRN
Status: DISCONTINUED | OUTPATIENT
Start: 2022-01-01 | End: 2022-01-01 | Stop reason: HOSPADM

## 2022-01-01 RX ORDER — HYDROMORPHONE HYDROCHLORIDE 1 MG/ML
0.5 INJECTION, SOLUTION INTRAMUSCULAR; INTRAVENOUS; SUBCUTANEOUS
Status: DISCONTINUED | OUTPATIENT
Start: 2022-01-01 | End: 2022-01-01

## 2022-01-01 RX ORDER — FLUTICASONE PROPIONATE 50 MCG
1 SPRAY, SUSPENSION (ML) NASAL DAILY
Status: DISCONTINUED | OUTPATIENT
Start: 2022-01-01 | End: 2022-01-01 | Stop reason: HOSPADM

## 2022-01-01 RX ORDER — ACETAMINOPHEN 500 MG
500 TABLET ORAL 3 TIMES DAILY
DISCHARGE
Start: 2022-01-01 | End: 2022-01-01

## 2022-01-01 RX ORDER — POLYETHYLENE GLYCOL 3350 17 G/17G
17 POWDER, FOR SOLUTION ORAL DAILY PRN
Status: DISCONTINUED | OUTPATIENT
Start: 2022-01-01 | End: 2022-01-01 | Stop reason: HOSPADM

## 2022-01-01 RX ORDER — KETOROLAC TROMETHAMINE 15 MG/ML
15 INJECTION, SOLUTION INTRAMUSCULAR; INTRAVENOUS ONCE
Status: COMPLETED | OUTPATIENT
Start: 2022-01-01 | End: 2022-01-01

## 2022-01-01 RX ORDER — ACETAMINOPHEN 650 MG/1
650 SUPPOSITORY RECTAL EVERY 6 HOURS PRN
Status: DISCONTINUED | OUTPATIENT
Start: 2022-01-01 | End: 2022-01-01 | Stop reason: HOSPADM

## 2022-01-01 RX ORDER — FENTANYL CITRATE 50 UG/ML
INJECTION, SOLUTION INTRAMUSCULAR; INTRAVENOUS
Status: COMPLETED
Start: 2022-01-01 | End: 2022-01-01

## 2022-01-01 RX ORDER — GUAIFENESIN 600 MG/1
1200 TABLET, EXTENDED RELEASE ORAL 2 TIMES DAILY
COMMUNITY
End: 2023-01-01

## 2022-01-01 RX ORDER — LIDOCAINE 4 G/G
2 PATCH TOPICAL
Status: DISCONTINUED | OUTPATIENT
Start: 2022-01-01 | End: 2022-01-01 | Stop reason: HOSPADM

## 2022-01-01 RX ORDER — HYDROCODONE BITARTRATE AND ACETAMINOPHEN 10; 325 MG/1; MG/1
1 TABLET ORAL EVERY 6 HOURS PRN
Status: DISCONTINUED | OUTPATIENT
Start: 2022-01-01 | End: 2022-01-01 | Stop reason: HOSPADM

## 2022-01-01 RX ORDER — ONDANSETRON 2 MG/ML
4 INJECTION INTRAMUSCULAR; INTRAVENOUS EVERY 6 HOURS PRN
Status: DISCONTINUED | OUTPATIENT
Start: 2022-01-01 | End: 2022-01-01 | Stop reason: HOSPADM

## 2022-01-01 RX ORDER — HYDROCODONE BITARTRATE AND ACETAMINOPHEN 10; 325 MG/1; MG/1
1 TABLET ORAL EVERY 6 HOURS PRN
Qty: 6 TABLET | Refills: 0 | Status: CANCELLED | OUTPATIENT
Start: 2022-01-01

## 2022-01-01 RX ORDER — HYDROCODONE BITARTRATE AND ACETAMINOPHEN 5; 325 MG/1; MG/1
2 TABLET ORAL 3 TIMES DAILY
Qty: 5 TABLET | Refills: 0 | Status: SHIPPED | OUTPATIENT
Start: 2022-01-01 | End: 2022-01-01

## 2022-01-01 RX ORDER — IOPAMIDOL 755 MG/ML
500 INJECTION, SOLUTION INTRAVASCULAR ONCE
Status: COMPLETED | OUTPATIENT
Start: 2022-01-01 | End: 2022-01-01

## 2022-01-01 RX ORDER — ACETAMINOPHEN 500 MG
1000 TABLET ORAL 3 TIMES DAILY PRN
Status: ON HOLD | COMMUNITY
End: 2022-01-01

## 2022-01-01 RX ORDER — ONDANSETRON 4 MG/1
4 TABLET, FILM COATED ORAL EVERY 8 HOURS PRN
Qty: 30 TABLET | Refills: 0 | Status: SHIPPED | OUTPATIENT
Start: 2022-01-01 | End: 2023-01-01

## 2022-01-01 RX ORDER — NAPHAZOLINE/ZINC SULF/GLYCERIN 0.012-0.25
2 DROPS OPHTHALMIC (EYE) EVERY 4 HOURS PRN
COMMUNITY

## 2022-01-01 RX ORDER — DIAZEPAM 10 MG/2ML
2.5 INJECTION, SOLUTION INTRAMUSCULAR; INTRAVENOUS EVERY 6 HOURS PRN
Status: DISCONTINUED | OUTPATIENT
Start: 2022-01-01 | End: 2022-01-01 | Stop reason: HOSPADM

## 2022-01-01 RX ORDER — LIDOCAINE HYDROCHLORIDE 10 MG/ML
20 INJECTION, SOLUTION EPIDURAL; INFILTRATION; INTRACAUDAL; PERINEURAL ONCE
Status: DISCONTINUED | OUTPATIENT
Start: 2022-01-01 | End: 2022-01-01 | Stop reason: CLARIF

## 2022-01-01 RX ORDER — POTASSIUM CHLORIDE 7.45 MG/ML
10 INJECTION INTRAVENOUS ONCE
Status: COMPLETED | OUTPATIENT
Start: 2022-01-01 | End: 2022-01-01

## 2022-01-01 RX ORDER — HYDROMORPHONE HYDROCHLORIDE 2 MG/1
2-4 TABLET ORAL
Status: DISCONTINUED | OUTPATIENT
Start: 2022-01-01 | End: 2022-01-01

## 2022-01-01 RX ORDER — HYDROCODONE BITARTRATE AND ACETAMINOPHEN 5; 325 MG/1; MG/1
2 TABLET ORAL 3 TIMES DAILY
Qty: 60 TABLET | Refills: 0 | Status: SHIPPED | OUTPATIENT
Start: 2022-01-01 | End: 2022-01-01

## 2022-01-01 RX ORDER — PANTOPRAZOLE SODIUM 40 MG/1
40 TABLET, DELAYED RELEASE ORAL DAILY
Qty: 180 TABLET | Refills: 1 | DISCHARGE
Start: 2022-01-01 | End: 2022-01-01

## 2022-01-01 RX ORDER — PANTOPRAZOLE SODIUM 40 MG/1
40 TABLET, DELAYED RELEASE ORAL DAILY
Status: DISCONTINUED | OUTPATIENT
Start: 2022-01-01 | End: 2022-01-01 | Stop reason: HOSPADM

## 2022-01-01 RX ORDER — HYDROCODONE BITARTRATE AND ACETAMINOPHEN 10; 325 MG/1; MG/1
1 TABLET ORAL EVERY 4 HOURS PRN
Qty: 15 TABLET | Refills: 0 | Status: SHIPPED | OUTPATIENT
Start: 2022-01-01 | End: 2022-01-01

## 2022-01-01 RX ORDER — ONDANSETRON 4 MG/1
4 TABLET, FILM COATED ORAL EVERY 8 HOURS PRN
COMMUNITY
End: 2022-01-01

## 2022-01-01 RX ORDER — HYDROCODONE BITARTRATE AND ACETAMINOPHEN 5; 325 MG/1; MG/1
2 TABLET ORAL 3 TIMES DAILY
Qty: 60 TABLET | Refills: 0 | Status: SHIPPED | OUTPATIENT
Start: 2022-01-01 | End: 2022-01-01 | Stop reason: DRUGHIGH

## 2022-01-01 RX ORDER — ACETAMINOPHEN 500 MG
1000 TABLET ORAL 3 TIMES DAILY PRN
Status: DISCONTINUED | OUTPATIENT
Start: 2022-01-01 | End: 2022-01-01 | Stop reason: HOSPADM

## 2022-01-01 RX ORDER — ACETAMINOPHEN 325 MG/1
650 TABLET ORAL EVERY 8 HOURS
Status: DISCONTINUED | OUTPATIENT
Start: 2022-01-01 | End: 2022-01-01 | Stop reason: HOSPADM

## 2022-01-01 RX ORDER — ATORVASTATIN CALCIUM 40 MG/1
TABLET, FILM COATED ORAL
Qty: 90 TABLET | Refills: 0 | Status: SHIPPED | OUTPATIENT
Start: 2022-01-01 | End: 2022-01-01

## 2022-01-01 RX ORDER — PREGABALIN 25 MG/1
25 CAPSULE ORAL 2 TIMES DAILY
Qty: 60 CAPSULE | Refills: 0 | Status: SHIPPED | OUTPATIENT
Start: 2022-01-01

## 2022-01-01 RX ORDER — AMOXICILLIN 250 MG
1 CAPSULE ORAL 2 TIMES DAILY PRN
Qty: 100 TABLET | Refills: 0 | Status: SHIPPED | OUTPATIENT
Start: 2022-01-01

## 2022-01-01 RX ORDER — LIDOCAINE 4 G/G
1-2 PATCH TOPICAL EVERY 24 HOURS
Status: DISCONTINUED | OUTPATIENT
Start: 2022-01-01 | End: 2022-01-01 | Stop reason: HOSPADM

## 2022-01-01 RX ORDER — HYDROCODONE BITARTRATE AND ACETAMINOPHEN 10; 325 MG/1; MG/1
2 TABLET ORAL EVERY 4 HOURS PRN
Status: DISCONTINUED | OUTPATIENT
Start: 2022-01-01 | End: 2022-01-01

## 2022-01-01 RX ORDER — POTASSIUM CHLORIDE 1500 MG/1
20 TABLET, EXTENDED RELEASE ORAL ONCE
Status: COMPLETED | OUTPATIENT
Start: 2022-01-01 | End: 2022-01-01

## 2022-01-01 RX ORDER — PREGABALIN 25 MG/1
25 CAPSULE ORAL 2 TIMES DAILY
Status: DISCONTINUED | OUTPATIENT
Start: 2022-01-01 | End: 2022-01-01

## 2022-01-01 RX ORDER — FUROSEMIDE 20 MG
20 TABLET ORAL DAILY
Qty: 95 TABLET | Refills: 2 | Status: ON HOLD | COMMUNITY
Start: 2022-01-01 | End: 2022-01-01

## 2022-01-01 RX ORDER — HYDROCODONE BITARTRATE AND ACETAMINOPHEN 10; 325 MG/1; MG/1
1 TABLET ORAL EVERY 4 HOURS PRN
Qty: 28 TABLET | Refills: 0 | Status: SHIPPED | OUTPATIENT
Start: 2022-01-01 | End: 2022-01-01

## 2022-01-01 RX ORDER — ENOXAPARIN SODIUM 100 MG/ML
30 INJECTION SUBCUTANEOUS EVERY 24 HOURS
Status: DISCONTINUED | OUTPATIENT
Start: 2022-01-01 | End: 2022-01-01

## 2022-01-01 RX ORDER — METHOCARBAMOL 500 MG/1
500 TABLET, FILM COATED ORAL ONCE
Status: COMPLETED | OUTPATIENT
Start: 2022-01-01 | End: 2022-01-01

## 2022-01-01 RX ORDER — HYDROCODONE BITARTRATE AND ACETAMINOPHEN 5; 325 MG/1; MG/1
2 TABLET ORAL 3 TIMES DAILY
Qty: 42 TABLET | Refills: 0 | Status: SHIPPED | OUTPATIENT
Start: 2022-01-01 | End: 2022-01-01

## 2022-01-01 RX ORDER — FUROSEMIDE 10 MG/ML
20 INJECTION INTRAMUSCULAR; INTRAVENOUS EVERY 8 HOURS
Status: DISCONTINUED | OUTPATIENT
Start: 2022-01-01 | End: 2022-01-01

## 2022-01-01 RX ORDER — HYDROCODONE BITARTRATE AND ACETAMINOPHEN 10; 325 MG/1; MG/1
1 TABLET ORAL EVERY 4 HOURS PRN
Qty: 42 TABLET | Refills: 0 | Status: ON HOLD | OUTPATIENT
Start: 2022-01-01 | End: 2022-01-01

## 2022-01-01 RX ORDER — HYDROCODONE BITARTRATE AND ACETAMINOPHEN 10; 325 MG/1; MG/1
1 TABLET ORAL EVERY 4 HOURS PRN
Status: DISCONTINUED | OUTPATIENT
Start: 2022-01-01 | End: 2022-01-01

## 2022-01-01 RX ORDER — LIDOCAINE 4 G/G
2 PATCH TOPICAL EVERY 24 HOURS
Qty: 30 PATCH | Refills: 0 | Status: SHIPPED | OUTPATIENT
Start: 2022-01-01

## 2022-01-01 RX ORDER — MULTIVITAMIN,THERAPEUTIC
1 TABLET ORAL DAILY
COMMUNITY

## 2022-01-01 RX ORDER — FUROSEMIDE 40 MG
40 TABLET ORAL
Status: DISCONTINUED | OUTPATIENT
Start: 2022-01-01 | End: 2022-01-01 | Stop reason: HOSPADM

## 2022-01-01 RX ORDER — HYDROXYZINE HYDROCHLORIDE 25 MG/1
25 TABLET, FILM COATED ORAL EVERY 6 HOURS
Qty: 20 TABLET | Refills: 0 | Status: SHIPPED | OUTPATIENT
Start: 2022-01-01 | End: 2022-01-01

## 2022-01-01 RX ORDER — HYDROMORPHONE HYDROCHLORIDE 2 MG/1
2-4 TABLET ORAL EVERY 4 HOURS PRN
Status: DISCONTINUED | OUTPATIENT
Start: 2022-01-01 | End: 2022-01-01

## 2022-01-01 RX ORDER — ONDANSETRON 2 MG/ML
4 INJECTION INTRAMUSCULAR; INTRAVENOUS EVERY 30 MIN PRN
Status: DISCONTINUED | OUTPATIENT
Start: 2022-01-01 | End: 2022-01-01

## 2022-01-01 RX ORDER — FLUMAZENIL 0.1 MG/ML
0.2 INJECTION, SOLUTION INTRAVENOUS
Status: ACTIVE | OUTPATIENT
Start: 2022-01-01 | End: 2022-01-01

## 2022-01-01 RX ORDER — HYDROXYZINE HYDROCHLORIDE 50 MG/1
50 TABLET, FILM COATED ORAL EVERY 6 HOURS PRN
Status: DISCONTINUED | OUTPATIENT
Start: 2022-01-01 | End: 2022-01-01 | Stop reason: HOSPADM

## 2022-01-01 RX ORDER — HYDROXYZINE HYDROCHLORIDE 25 MG/1
25 TABLET, FILM COATED ORAL 4 TIMES DAILY PRN
Qty: 30 TABLET | Refills: 0 | Status: SHIPPED | OUTPATIENT
Start: 2022-01-01

## 2022-01-01 RX ORDER — LORAZEPAM 2 MG/ML
0.5 INJECTION INTRAMUSCULAR
Status: COMPLETED | OUTPATIENT
Start: 2022-01-01 | End: 2022-01-01

## 2022-01-01 RX ORDER — PANTOPRAZOLE SODIUM 40 MG/1
40 TABLET, DELAYED RELEASE ORAL
Status: DISCONTINUED | OUTPATIENT
Start: 2022-01-01 | End: 2022-01-01 | Stop reason: HOSPADM

## 2022-01-01 RX ORDER — FUROSEMIDE 20 MG
20 TABLET ORAL DAILY
Status: DISCONTINUED | OUTPATIENT
Start: 2022-01-01 | End: 2022-01-01 | Stop reason: HOSPADM

## 2022-01-01 RX ORDER — LOSARTAN POTASSIUM 25 MG/1
25 TABLET ORAL DAILY
Status: DISCONTINUED | OUTPATIENT
Start: 2022-01-01 | End: 2022-01-01 | Stop reason: HOSPADM

## 2022-01-01 RX ORDER — HYDROMORPHONE HCL IN WATER/PF 6 MG/30 ML
0.2 PATIENT CONTROLLED ANALGESIA SYRINGE INTRAVENOUS
Status: DISCONTINUED | OUTPATIENT
Start: 2022-01-01 | End: 2022-01-01 | Stop reason: HOSPADM

## 2022-01-01 RX ADMIN — HYDROCODONE BITARTRATE AND ACETAMINOPHEN 1 TABLET: 10; 325 TABLET ORAL at 03:24

## 2022-01-01 RX ADMIN — HYDROMORPHONE HYDROCHLORIDE 0.5 MG: 1 INJECTION, SOLUTION INTRAMUSCULAR; INTRAVENOUS; SUBCUTANEOUS at 08:42

## 2022-01-01 RX ADMIN — ASPIRIN 81 MG: 81 TABLET, COATED ORAL at 09:22

## 2022-01-01 RX ADMIN — THERA TABS 1 TABLET: TAB at 07:35

## 2022-01-01 RX ADMIN — FUROSEMIDE 60 MG: 10 INJECTION, SOLUTION INTRAMUSCULAR; INTRAVENOUS at 21:43

## 2022-01-01 RX ADMIN — HYDROXYZINE HYDROCHLORIDE 25 MG: 25 TABLET ORAL at 01:58

## 2022-01-01 RX ADMIN — HYDROMORPHONE HYDROCHLORIDE 1 MG: 1 INJECTION, SOLUTION INTRAMUSCULAR; INTRAVENOUS; SUBCUTANEOUS at 06:34

## 2022-01-01 RX ADMIN — FUROSEMIDE 20 MG: 10 INJECTION, SOLUTION INTRAMUSCULAR; INTRAVENOUS at 15:51

## 2022-01-01 RX ADMIN — HYDROMORPHONE HYDROCHLORIDE 0.3 MG: 1 INJECTION, SOLUTION INTRAMUSCULAR; INTRAVENOUS; SUBCUTANEOUS at 21:14

## 2022-01-01 RX ADMIN — PANTOPRAZOLE SODIUM 40 MG: 40 TABLET, DELAYED RELEASE ORAL at 09:41

## 2022-01-01 RX ADMIN — HYDROCODONE BITARTRATE AND ACETAMINOPHEN 1 TABLET: 10; 325 TABLET ORAL at 04:16

## 2022-01-01 RX ADMIN — DICLOFENAC SODIUM 4 G: 10 GEL TOPICAL at 19:45

## 2022-01-01 RX ADMIN — HYDROXYZINE HYDROCHLORIDE 25 MG: 25 TABLET, FILM COATED ORAL at 17:49

## 2022-01-01 RX ADMIN — LIDOCAINE 1 PATCH: 246 PATCH TOPICAL at 01:31

## 2022-01-01 RX ADMIN — CARVEDILOL 3.12 MG: 3.12 TABLET, FILM COATED ORAL at 18:18

## 2022-01-01 RX ADMIN — HYDROXYZINE HYDROCHLORIDE 10 MG: 10 TABLET ORAL at 20:06

## 2022-01-01 RX ADMIN — ACETAMINOPHEN 500 MG: 500 TABLET ORAL at 21:07

## 2022-01-01 RX ADMIN — MENTHOL 1 PATCH: 205.5 PATCH TOPICAL at 15:08

## 2022-01-01 RX ADMIN — HYDROCODONE BITARTRATE AND ACETAMINOPHEN 1 TABLET: 10; 325 TABLET ORAL at 07:47

## 2022-01-01 RX ADMIN — HYDROCODONE BITARTRATE AND ACETAMINOPHEN 2 TABLET: 5; 325 TABLET ORAL at 18:23

## 2022-01-01 RX ADMIN — HYDROXYZINE HYDROCHLORIDE 25 MG: 25 TABLET, FILM COATED ORAL at 21:37

## 2022-01-01 RX ADMIN — HYDROCODONE BITARTRATE AND ACETAMINOPHEN 1 TABLET: 10; 325 TABLET ORAL at 15:48

## 2022-01-01 RX ADMIN — DICLOFENAC SODIUM 4 G: 10 GEL TOPICAL at 10:17

## 2022-01-01 RX ADMIN — CARVEDILOL 3.12 MG: 3.12 TABLET, FILM COATED ORAL at 17:48

## 2022-01-01 RX ADMIN — ATORVASTATIN CALCIUM 40 MG: 40 TABLET, FILM COATED ORAL at 21:08

## 2022-01-01 RX ADMIN — HYDROMORPHONE HYDROCHLORIDE 0.5 MG: 1 INJECTION, SOLUTION INTRAMUSCULAR; INTRAVENOUS; SUBCUTANEOUS at 17:16

## 2022-01-01 RX ADMIN — ENOXAPARIN SODIUM 40 MG: 40 INJECTION SUBCUTANEOUS at 13:34

## 2022-01-01 RX ADMIN — PANTOPRAZOLE SODIUM 40 MG: 40 TABLET, DELAYED RELEASE ORAL at 09:28

## 2022-01-01 RX ADMIN — DICLOFENAC SODIUM 4 G: 10 GEL TOPICAL at 09:30

## 2022-01-01 RX ADMIN — ATORVASTATIN CALCIUM 40 MG: 40 TABLET, FILM COATED ORAL at 20:58

## 2022-01-01 RX ADMIN — PANTOPRAZOLE SODIUM 40 MG: 40 TABLET, DELAYED RELEASE ORAL at 20:24

## 2022-01-01 RX ADMIN — DICLOFENAC SODIUM 4 G: 10 GEL TOPICAL at 08:40

## 2022-01-01 RX ADMIN — PANTOPRAZOLE SODIUM 40 MG: 40 TABLET, DELAYED RELEASE ORAL at 09:47

## 2022-01-01 RX ADMIN — ASPIRIN 81 MG: 81 TABLET, COATED ORAL at 10:15

## 2022-01-01 RX ADMIN — PREGABALIN 25 MG: 25 CAPSULE ORAL at 13:36

## 2022-01-01 RX ADMIN — HYDROXYZINE HYDROCHLORIDE 25 MG: 25 TABLET ORAL at 04:49

## 2022-01-01 RX ADMIN — CARVEDILOL 3.12 MG: 3.12 TABLET, FILM COATED ORAL at 09:29

## 2022-01-01 RX ADMIN — ACETAMINOPHEN 650 MG: 325 TABLET, FILM COATED ORAL at 09:40

## 2022-01-01 RX ADMIN — PREGABALIN 25 MG: 25 CAPSULE ORAL at 08:36

## 2022-01-01 RX ADMIN — CARVEDILOL 3.12 MG: 3.12 TABLET, FILM COATED ORAL at 09:48

## 2022-01-01 RX ADMIN — ACETAMINOPHEN 650 MG: 325 TABLET, FILM COATED ORAL at 09:47

## 2022-01-01 RX ADMIN — FUROSEMIDE 40 MG: 40 TABLET ORAL at 08:26

## 2022-01-01 RX ADMIN — LOSARTAN POTASSIUM 25 MG: 25 TABLET, FILM COATED ORAL at 09:40

## 2022-01-01 RX ADMIN — HYDROCODONE BITARTRATE AND ACETAMINOPHEN 1 TABLET: 10; 325 TABLET ORAL at 10:23

## 2022-01-01 RX ADMIN — HYDROXYZINE HYDROCHLORIDE 25 MG: 25 TABLET ORAL at 00:02

## 2022-01-01 RX ADMIN — HYDROCODONE BITARTRATE AND ACETAMINOPHEN 1 TABLET: 10; 325 TABLET ORAL at 22:06

## 2022-01-01 RX ADMIN — HYDROCODONE BITARTRATE AND ACETAMINOPHEN 2 TABLET: 5; 325 TABLET ORAL at 06:14

## 2022-01-01 RX ADMIN — HYDROCODONE BITARTRATE AND ACETAMINOPHEN 1 TABLET: 10; 325 TABLET ORAL at 06:57

## 2022-01-01 RX ADMIN — FENTANYL CITRATE 50 MCG: 50 INJECTION, SOLUTION INTRAMUSCULAR; INTRAVENOUS at 10:00

## 2022-01-01 RX ADMIN — ACETAMINOPHEN 650 MG: 325 TABLET, FILM COATED ORAL at 09:10

## 2022-01-01 RX ADMIN — HYDROXYZINE HYDROCHLORIDE 50 MG: 50 TABLET, FILM COATED ORAL at 07:47

## 2022-01-01 RX ADMIN — FLUTICASONE PROPIONATE 1 SPRAY: 50 SPRAY, METERED NASAL at 09:17

## 2022-01-01 RX ADMIN — POTASSIUM CHLORIDE 20 MEQ: 1500 TABLET, EXTENDED RELEASE ORAL at 22:56

## 2022-01-01 RX ADMIN — HYDROMORPHONE HYDROCHLORIDE 2 MG: 2 TABLET ORAL at 18:41

## 2022-01-01 RX ADMIN — HYDROMORPHONE HYDROCHLORIDE 0.2 MG: 0.2 INJECTION, SOLUTION INTRAMUSCULAR; INTRAVENOUS; SUBCUTANEOUS at 06:25

## 2022-01-01 RX ADMIN — FUROSEMIDE 40 MG: 40 TABLET ORAL at 08:53

## 2022-01-01 RX ADMIN — FUROSEMIDE 20 MG: 10 INJECTION, SOLUTION INTRAMUSCULAR; INTRAVENOUS at 09:11

## 2022-01-01 RX ADMIN — ACETAMINOPHEN 1000 MG: 500 TABLET, FILM COATED ORAL at 10:15

## 2022-01-01 RX ADMIN — FUROSEMIDE 20 MG: 20 TABLET ORAL at 09:41

## 2022-01-01 RX ADMIN — PANTOPRAZOLE SODIUM 40 MG: 40 TABLET, DELAYED RELEASE ORAL at 16:37

## 2022-01-01 RX ADMIN — PANTOPRAZOLE SODIUM 40 MG: 40 TABLET, DELAYED RELEASE ORAL at 07:36

## 2022-01-01 RX ADMIN — ASPIRIN 81 MG: 81 TABLET, COATED ORAL at 18:11

## 2022-01-01 RX ADMIN — HYDROCODONE BITARTRATE AND ACETAMINOPHEN 2 TABLET: 5; 325 TABLET ORAL at 00:47

## 2022-01-01 RX ADMIN — HYDROCODONE BITARTRATE AND ACETAMINOPHEN 2 TABLET: 10; 325 TABLET ORAL at 06:52

## 2022-01-01 RX ADMIN — ASPIRIN 81 MG: 81 TABLET, COATED ORAL at 07:45

## 2022-01-01 RX ADMIN — ASPIRIN 81 MG: 81 TABLET, COATED ORAL at 10:24

## 2022-01-01 RX ADMIN — TOPICAL ANESTHETIC 0.5 ML: 200 SPRAY DENTAL; PERIODONTAL at 08:13

## 2022-01-01 RX ADMIN — FUROSEMIDE 40 MG: 10 INJECTION, SOLUTION INTRAMUSCULAR; INTRAVENOUS at 15:35

## 2022-01-01 RX ADMIN — ASPIRIN 81 MG: 81 TABLET, COATED ORAL at 09:48

## 2022-01-01 RX ADMIN — HYDROCODONE BITARTRATE AND ACETAMINOPHEN 2 TABLET: 5; 325 TABLET ORAL at 15:21

## 2022-01-01 RX ADMIN — FUROSEMIDE 20 MG: 10 INJECTION, SOLUTION INTRAMUSCULAR; INTRAVENOUS at 09:24

## 2022-01-01 RX ADMIN — FUROSEMIDE 20 MG: 20 TABLET ORAL at 09:47

## 2022-01-01 RX ADMIN — HYDROMORPHONE HYDROCHLORIDE 1 MG: 1 INJECTION, SOLUTION INTRAMUSCULAR; INTRAVENOUS; SUBCUTANEOUS at 09:35

## 2022-01-01 RX ADMIN — ASPIRIN 81 MG: 81 TABLET, COATED ORAL at 09:20

## 2022-01-01 RX ADMIN — HYDROCODONE BITARTRATE AND ACETAMINOPHEN 1 TABLET: 10; 325 TABLET ORAL at 23:31

## 2022-01-01 RX ADMIN — ENOXAPARIN SODIUM 40 MG: 40 INJECTION SUBCUTANEOUS at 14:21

## 2022-01-01 RX ADMIN — ACETAMINOPHEN 650 MG: 325 TABLET, FILM COATED ORAL at 09:22

## 2022-01-01 RX ADMIN — THERA TABS 1 TABLET: TAB at 08:53

## 2022-01-01 RX ADMIN — PANTOPRAZOLE SODIUM 40 MG: 40 TABLET, DELAYED RELEASE ORAL at 08:26

## 2022-01-01 RX ADMIN — HYDROMORPHONE HYDROCHLORIDE 0.3 MG: 1 INJECTION, SOLUTION INTRAMUSCULAR; INTRAVENOUS; SUBCUTANEOUS at 17:41

## 2022-01-01 RX ADMIN — LORAZEPAM 0.5 MG: 2 INJECTION INTRAMUSCULAR; INTRAVENOUS at 11:39

## 2022-01-01 RX ADMIN — ACETAMINOPHEN 1000 MG: 500 TABLET, FILM COATED ORAL at 23:38

## 2022-01-01 RX ADMIN — CARVEDILOL 3.12 MG: 3.12 TABLET, FILM COATED ORAL at 17:49

## 2022-01-01 RX ADMIN — CARVEDILOL 3.12 MG: 3.12 TABLET, FILM COATED ORAL at 18:43

## 2022-01-01 RX ADMIN — NITROGLYCERIN 0.4 MG: 0.4 TABLET SUBLINGUAL at 09:01

## 2022-01-01 RX ADMIN — ASPIRIN 81 MG: 81 TABLET, COATED ORAL at 08:42

## 2022-01-01 RX ADMIN — DICLOFENAC SODIUM 2 G: 10 GEL TOPICAL at 20:40

## 2022-01-01 RX ADMIN — POTASSIUM CHLORIDE 10 MEQ: 7.46 INJECTION, SOLUTION INTRAVENOUS at 12:34

## 2022-01-01 RX ADMIN — DICLOFENAC SODIUM 4 G: 10 GEL TOPICAL at 12:42

## 2022-01-01 RX ADMIN — HYDROXYZINE HYDROCHLORIDE 25 MG: 25 TABLET ORAL at 23:04

## 2022-01-01 RX ADMIN — FLUTICASONE PROPIONATE 2 SPRAY: 50 SPRAY, METERED NASAL at 13:45

## 2022-01-01 RX ADMIN — PROPOFOL 60 MG: 10 INJECTION, EMULSION INTRAVENOUS at 10:41

## 2022-01-01 RX ADMIN — HYDROMORPHONE HYDROCHLORIDE 0.5 MG: 1 INJECTION, SOLUTION INTRAMUSCULAR; INTRAVENOUS; SUBCUTANEOUS at 11:15

## 2022-01-01 RX ADMIN — GUAIFENESIN 600 MG: 600 TABLET, EXTENDED RELEASE ORAL at 21:56

## 2022-01-01 RX ADMIN — ENOXAPARIN SODIUM 40 MG: 40 INJECTION SUBCUTANEOUS at 16:38

## 2022-01-01 RX ADMIN — HYDROXYZINE HYDROCHLORIDE 25 MG: 25 TABLET ORAL at 08:52

## 2022-01-01 RX ADMIN — HYDROMORPHONE HYDROCHLORIDE 2 MG: 2 TABLET ORAL at 13:49

## 2022-01-01 RX ADMIN — HYDROCODONE BITARTRATE AND ACETAMINOPHEN 1 TABLET: 10; 325 TABLET ORAL at 17:49

## 2022-01-01 RX ADMIN — LIDOCAINE: 40 CREAM TOPICAL at 05:28

## 2022-01-01 RX ADMIN — DICLOFENAC SODIUM 4 G: 10 GEL TOPICAL at 21:17

## 2022-01-01 RX ADMIN — PREGABALIN 25 MG: 25 CAPSULE ORAL at 09:32

## 2022-01-01 RX ADMIN — HYDROMORPHONE HYDROCHLORIDE 0.3 MG: 1 INJECTION, SOLUTION INTRAMUSCULAR; INTRAVENOUS; SUBCUTANEOUS at 13:29

## 2022-01-01 RX ADMIN — HYDROMORPHONE HYDROCHLORIDE 0.5 MG: 1 INJECTION, SOLUTION INTRAMUSCULAR; INTRAVENOUS; SUBCUTANEOUS at 19:40

## 2022-01-01 RX ADMIN — CARVEDILOL 3.12 MG: 3.12 TABLET, FILM COATED ORAL at 08:25

## 2022-01-01 RX ADMIN — ENOXAPARIN SODIUM 40 MG: 40 INJECTION SUBCUTANEOUS at 14:59

## 2022-01-01 RX ADMIN — ACETAMINOPHEN 650 MG: 325 TABLET, FILM COATED ORAL at 02:59

## 2022-01-01 RX ADMIN — HYDROXYZINE HYDROCHLORIDE 25 MG: 25 TABLET ORAL at 15:46

## 2022-01-01 RX ADMIN — FUROSEMIDE 60 MG: 10 INJECTION, SOLUTION INTRAMUSCULAR; INTRAVENOUS at 10:26

## 2022-01-01 RX ADMIN — HYDROMORPHONE HYDROCHLORIDE 0.5 MG: 1 INJECTION, SOLUTION INTRAMUSCULAR; INTRAVENOUS; SUBCUTANEOUS at 08:13

## 2022-01-01 RX ADMIN — DICLOFENAC SODIUM 4 G: 10 GEL TOPICAL at 17:48

## 2022-01-01 RX ADMIN — ACETAMINOPHEN 650 MG: 325 TABLET, FILM COATED ORAL at 03:46

## 2022-01-01 RX ADMIN — HYDROMORPHONE HYDROCHLORIDE 0.5 MG: 1 INJECTION, SOLUTION INTRAMUSCULAR; INTRAVENOUS; SUBCUTANEOUS at 13:51

## 2022-01-01 RX ADMIN — HYDROMORPHONE HYDROCHLORIDE 0.3 MG: 1 INJECTION, SOLUTION INTRAMUSCULAR; INTRAVENOUS; SUBCUTANEOUS at 03:33

## 2022-01-01 RX ADMIN — ONDANSETRON 4 MG: 2 INJECTION INTRAMUSCULAR; INTRAVENOUS at 21:30

## 2022-01-01 RX ADMIN — SODIUM CHLORIDE 81 ML: 9 INJECTION, SOLUTION INTRAVENOUS at 11:09

## 2022-01-01 RX ADMIN — FENTANYL CITRATE 50 MCG: 50 INJECTION, SOLUTION INTRAMUSCULAR; INTRAVENOUS at 06:41

## 2022-01-01 RX ADMIN — CARVEDILOL 3.12 MG: 3.12 TABLET, FILM COATED ORAL at 18:23

## 2022-01-01 RX ADMIN — LOSARTAN POTASSIUM 25 MG: 25 TABLET, FILM COATED ORAL at 18:11

## 2022-01-01 RX ADMIN — HYDROCODONE BITARTRATE AND ACETAMINOPHEN 2 TABLET: 10; 325 TABLET ORAL at 22:35

## 2022-01-01 RX ADMIN — PANTOPRAZOLE SODIUM 40 MG: 40 TABLET, DELAYED RELEASE ORAL at 08:51

## 2022-01-01 RX ADMIN — CARVEDILOL 3.12 MG: 3.12 TABLET, FILM COATED ORAL at 10:15

## 2022-01-01 RX ADMIN — HYDROCODONE BITARTRATE AND ACETAMINOPHEN 1 TABLET: 10; 325 TABLET ORAL at 05:05

## 2022-01-01 RX ADMIN — FUROSEMIDE 40 MG: 40 TABLET ORAL at 07:36

## 2022-01-01 RX ADMIN — HYDROXYZINE HYDROCHLORIDE 50 MG: 50 TABLET, FILM COATED ORAL at 13:52

## 2022-01-01 RX ADMIN — HYDROCODONE BITARTRATE AND ACETAMINOPHEN 2 TABLET: 5; 325 TABLET ORAL at 06:08

## 2022-01-01 RX ADMIN — PREGABALIN 50 MG: 25 CAPSULE ORAL at 19:56

## 2022-01-01 RX ADMIN — ACETAMINOPHEN 650 MG: 325 TABLET, FILM COATED ORAL at 11:13

## 2022-01-01 RX ADMIN — HYDROCODONE BITARTRATE AND ACETAMINOPHEN 1 TABLET: 10; 325 TABLET ORAL at 10:37

## 2022-01-01 RX ADMIN — HYDROXYZINE HYDROCHLORIDE 10 MG: 10 TABLET ORAL at 11:21

## 2022-01-01 RX ADMIN — FENTANYL CITRATE 50 MCG: 0.05 INJECTION, SOLUTION INTRAMUSCULAR; INTRAVENOUS at 08:11

## 2022-01-01 RX ADMIN — ACETAMINOPHEN 500 MG: 500 TABLET ORAL at 08:25

## 2022-01-01 RX ADMIN — PREGABALIN 50 MG: 25 CAPSULE ORAL at 20:23

## 2022-01-01 RX ADMIN — HYDROMORPHONE HYDROCHLORIDE 0.5 MG: 1 INJECTION, SOLUTION INTRAMUSCULAR; INTRAVENOUS; SUBCUTANEOUS at 07:09

## 2022-01-01 RX ADMIN — CARVEDILOL 3.12 MG: 3.12 TABLET, FILM COATED ORAL at 17:20

## 2022-01-01 RX ADMIN — PANTOPRAZOLE SODIUM 40 MG: 40 TABLET, DELAYED RELEASE ORAL at 10:15

## 2022-01-01 RX ADMIN — HYDROCODONE BITARTRATE AND ACETAMINOPHEN 1 TABLET: 5; 325 TABLET ORAL at 01:28

## 2022-01-01 RX ADMIN — HYDROMORPHONE HYDROCHLORIDE 0.5 MG: 1 INJECTION, SOLUTION INTRAMUSCULAR; INTRAVENOUS; SUBCUTANEOUS at 01:25

## 2022-01-01 RX ADMIN — HYDROXYZINE HYDROCHLORIDE 25 MG: 25 TABLET ORAL at 20:23

## 2022-01-01 RX ADMIN — ACETAMINOPHEN 650 MG: 325 TABLET, FILM COATED ORAL at 10:23

## 2022-01-01 RX ADMIN — Medication 1 TABLET: at 18:38

## 2022-01-01 RX ADMIN — FLUTICASONE PROPIONATE 1 SPRAY: 50 SPRAY, METERED NASAL at 10:16

## 2022-01-01 RX ADMIN — CARVEDILOL 3.12 MG: 3.12 TABLET, FILM COATED ORAL at 09:23

## 2022-01-01 RX ADMIN — KETOROLAC TROMETHAMINE 15 MG: 15 INJECTION, SOLUTION INTRAMUSCULAR; INTRAVENOUS at 11:39

## 2022-01-01 RX ADMIN — PREGABALIN 25 MG: 25 CAPSULE ORAL at 07:35

## 2022-01-01 RX ADMIN — DICLOFENAC SODIUM 2 G: 10 GEL TOPICAL at 15:49

## 2022-01-01 RX ADMIN — SODIUM CHLORIDE 54 ML: 9 INJECTION, SOLUTION INTRAVENOUS at 07:45

## 2022-01-01 RX ADMIN — Medication 1 TABLET: at 17:54

## 2022-01-01 RX ADMIN — CARVEDILOL 3.12 MG: 3.12 TABLET, FILM COATED ORAL at 18:42

## 2022-01-01 RX ADMIN — HYDROCODONE BITARTRATE AND ACETAMINOPHEN 1 TABLET: 10; 325 TABLET ORAL at 03:39

## 2022-01-01 RX ADMIN — PANTOPRAZOLE SODIUM 40 MG: 40 TABLET, DELAYED RELEASE ORAL at 09:10

## 2022-01-01 RX ADMIN — ASPIRIN 81 MG: 81 TABLET, COATED ORAL at 13:51

## 2022-01-01 RX ADMIN — Medication 1 TABLET: at 07:36

## 2022-01-01 RX ADMIN — CARVEDILOL 3.12 MG: 3.12 TABLET, FILM COATED ORAL at 09:11

## 2022-01-01 RX ADMIN — METHOCARBAMOL 500 MG: 500 TABLET ORAL at 11:14

## 2022-01-01 RX ADMIN — HYDROCODONE BITARTRATE AND ACETAMINOPHEN 2 TABLET: 10; 325 TABLET ORAL at 18:15

## 2022-01-01 RX ADMIN — DICLOFENAC SODIUM 4 G: 10 GEL TOPICAL at 10:26

## 2022-01-01 RX ADMIN — HYDROCODONE BITARTRATE AND ACETAMINOPHEN 1 TABLET: 10; 325 TABLET ORAL at 09:48

## 2022-01-01 RX ADMIN — SODIUM CHLORIDE 50 ML: 9 INJECTION, SOLUTION INTRAVENOUS at 18:53

## 2022-01-01 RX ADMIN — ATORVASTATIN CALCIUM 40 MG: 40 TABLET, FILM COATED ORAL at 19:47

## 2022-01-01 RX ADMIN — ATORVASTATIN CALCIUM 40 MG: 40 TABLET, FILM COATED ORAL at 19:45

## 2022-01-01 RX ADMIN — CARVEDILOL 3.12 MG: 3.12 TABLET, FILM COATED ORAL at 18:02

## 2022-01-01 RX ADMIN — HYDROMORPHONE HYDROCHLORIDE 0.5 MG: 1 INJECTION, SOLUTION INTRAMUSCULAR; INTRAVENOUS; SUBCUTANEOUS at 11:41

## 2022-01-01 RX ADMIN — PREGABALIN 25 MG: 25 CAPSULE ORAL at 14:56

## 2022-01-01 RX ADMIN — PREGABALIN 25 MG: 25 CAPSULE ORAL at 09:28

## 2022-01-01 RX ADMIN — FUROSEMIDE 40 MG: 40 TABLET ORAL at 10:15

## 2022-01-01 RX ADMIN — ACETAMINOPHEN 650 MG: 325 TABLET, FILM COATED ORAL at 18:02

## 2022-01-01 RX ADMIN — PANTOPRAZOLE SODIUM 40 MG: 40 TABLET, DELAYED RELEASE ORAL at 19:46

## 2022-01-01 RX ADMIN — PANTOPRAZOLE SODIUM 40 MG: 40 TABLET, DELAYED RELEASE ORAL at 19:40

## 2022-01-01 RX ADMIN — SENNOSIDES AND DOCUSATE SODIUM 1 TABLET: 50; 8.6 TABLET ORAL at 10:15

## 2022-01-01 RX ADMIN — PANTOPRAZOLE SODIUM 40 MG: 40 TABLET, DELAYED RELEASE ORAL at 09:32

## 2022-01-01 RX ADMIN — PANTOPRAZOLE SODIUM 40 MG: 40 TABLET, DELAYED RELEASE ORAL at 20:58

## 2022-01-01 RX ADMIN — HYDROCODONE BITARTRATE AND ACETAMINOPHEN 1 TABLET: 10; 325 TABLET ORAL at 12:01

## 2022-01-01 RX ADMIN — ASPIRIN 81 MG: 81 TABLET, COATED ORAL at 08:53

## 2022-01-01 RX ADMIN — PREGABALIN 25 MG: 25 CAPSULE ORAL at 14:21

## 2022-01-01 RX ADMIN — HYDROXYZINE HYDROCHLORIDE 25 MG: 25 TABLET ORAL at 13:36

## 2022-01-01 RX ADMIN — HYDROCODONE BITARTRATE AND ACETAMINOPHEN 2 TABLET: 5; 325 TABLET ORAL at 17:48

## 2022-01-01 RX ADMIN — HYDROCODONE BITARTRATE AND ACETAMINOPHEN 1 TABLET: 10; 325 TABLET ORAL at 21:56

## 2022-01-01 RX ADMIN — HYDROCODONE BITARTRATE AND ACETAMINOPHEN 2 TABLET: 5; 325 TABLET ORAL at 11:57

## 2022-01-01 RX ADMIN — HYDROMORPHONE HYDROCHLORIDE 0.3 MG: 1 INJECTION, SOLUTION INTRAMUSCULAR; INTRAVENOUS; SUBCUTANEOUS at 03:59

## 2022-01-01 RX ADMIN — Medication 1 TABLET: at 18:02

## 2022-01-01 RX ADMIN — DICLOFENAC SODIUM 4 G: 10 GEL TOPICAL at 06:00

## 2022-01-01 RX ADMIN — HYDROCODONE BITARTRATE AND ACETAMINOPHEN 1 TABLET: 10; 325 TABLET ORAL at 16:36

## 2022-01-01 RX ADMIN — ACETAMINOPHEN 650 MG: 325 TABLET, FILM COATED ORAL at 01:25

## 2022-01-01 RX ADMIN — FUROSEMIDE 40 MG: 40 TABLET ORAL at 16:38

## 2022-01-01 RX ADMIN — LOSARTAN POTASSIUM 25 MG: 25 TABLET, FILM COATED ORAL at 09:23

## 2022-01-01 RX ADMIN — FUROSEMIDE 40 MG: 40 TABLET ORAL at 09:32

## 2022-01-01 RX ADMIN — MAGNESIUM SULFATE HEPTAHYDRATE 2 G: 40 INJECTION, SOLUTION INTRAVENOUS at 14:36

## 2022-01-01 RX ADMIN — PREGABALIN 50 MG: 25 CAPSULE ORAL at 21:07

## 2022-01-01 RX ADMIN — HYDROCODONE BITARTRATE AND ACETAMINOPHEN 2 TABLET: 5; 325 TABLET ORAL at 18:22

## 2022-01-01 RX ADMIN — PANTOPRAZOLE SODIUM 40 MG: 40 TABLET, DELAYED RELEASE ORAL at 19:55

## 2022-01-01 RX ADMIN — THERA TABS 1 TABLET: TAB at 13:52

## 2022-01-01 RX ADMIN — ACETAMINOPHEN 650 MG: 325 TABLET, FILM COATED ORAL at 17:39

## 2022-01-01 RX ADMIN — HYDROMORPHONE HYDROCHLORIDE 4 MG: 2 TABLET ORAL at 10:20

## 2022-01-01 RX ADMIN — FLUTICASONE PROPIONATE 1 SPRAY: 50 SPRAY, METERED NASAL at 09:53

## 2022-01-01 RX ADMIN — ATORVASTATIN CALCIUM 40 MG: 40 TABLET, FILM COATED ORAL at 19:55

## 2022-01-01 RX ADMIN — Medication 1 TABLET: at 18:23

## 2022-01-01 RX ADMIN — CARVEDILOL 3.12 MG: 3.12 TABLET, FILM COATED ORAL at 08:53

## 2022-01-01 RX ADMIN — FUROSEMIDE 40 MG: 40 TABLET ORAL at 13:51

## 2022-01-01 RX ADMIN — FENTANYL CITRATE 25 MCG: 0.05 INJECTION, SOLUTION INTRAMUSCULAR; INTRAVENOUS at 08:15

## 2022-01-01 RX ADMIN — ACETAMINOPHEN 650 MG: 325 TABLET, FILM COATED ORAL at 02:02

## 2022-01-01 RX ADMIN — HYDROCODONE BITARTRATE AND ACETAMINOPHEN 2 TABLET: 5; 325 TABLET ORAL at 11:51

## 2022-01-01 RX ADMIN — DICLOFENAC SODIUM 4 G: 10 GEL TOPICAL at 17:25

## 2022-01-01 RX ADMIN — HYDROXYZINE HYDROCHLORIDE 25 MG: 25 TABLET ORAL at 14:21

## 2022-01-01 RX ADMIN — ASPIRIN 81 MG: 81 TABLET, COATED ORAL at 07:35

## 2022-01-01 RX ADMIN — ASPIRIN 81 MG: 81 TABLET, COATED ORAL at 08:36

## 2022-01-01 RX ADMIN — Medication 1 TABLET: at 08:25

## 2022-01-01 RX ADMIN — HYDROMORPHONE HYDROCHLORIDE 2 MG: 2 TABLET ORAL at 16:38

## 2022-01-01 RX ADMIN — ACETAMINOPHEN 1000 MG: 500 TABLET, FILM COATED ORAL at 09:32

## 2022-01-01 RX ADMIN — ASPIRIN 81 MG: 81 TABLET, COATED ORAL at 09:41

## 2022-01-01 RX ADMIN — ACETAMINOPHEN 650 MG: 325 TABLET, FILM COATED ORAL at 17:49

## 2022-01-01 RX ADMIN — ENOXAPARIN SODIUM 40 MG: 40 INJECTION SUBCUTANEOUS at 13:53

## 2022-01-01 RX ADMIN — FLUTICASONE PROPIONATE 2 SPRAY: 50 SPRAY, METERED NASAL at 04:49

## 2022-01-01 RX ADMIN — FUROSEMIDE 20 MG: 20 TABLET ORAL at 10:23

## 2022-01-01 RX ADMIN — HYDROCODONE BITARTRATE AND ACETAMINOPHEN 1 TABLET: 10; 325 TABLET ORAL at 00:42

## 2022-01-01 RX ADMIN — ACETAMINOPHEN 1000 MG: 500 TABLET, FILM COATED ORAL at 09:27

## 2022-01-01 RX ADMIN — ENOXAPARIN SODIUM 40 MG: 40 INJECTION SUBCUTANEOUS at 13:49

## 2022-01-01 RX ADMIN — HYDROCODONE BITARTRATE AND ACETAMINOPHEN 1 TABLET: 10; 325 TABLET ORAL at 08:51

## 2022-01-01 RX ADMIN — LOSARTAN POTASSIUM 25 MG: 25 TABLET, FILM COATED ORAL at 09:48

## 2022-01-01 RX ADMIN — DICLOFENAC SODIUM 2 G: 10 GEL TOPICAL at 22:48

## 2022-01-01 RX ADMIN — HYDROCODONE BITARTRATE AND ACETAMINOPHEN 1 TABLET: 10; 325 TABLET ORAL at 15:35

## 2022-01-01 RX ADMIN — LOSARTAN POTASSIUM 25 MG: 25 TABLET, FILM COATED ORAL at 09:10

## 2022-01-01 RX ADMIN — ASPIRIN 81 MG: 81 TABLET, COATED ORAL at 09:32

## 2022-01-01 RX ADMIN — DICLOFENAC SODIUM 4 G: 10 GEL TOPICAL at 18:02

## 2022-01-01 RX ADMIN — MAGNESIUM SULFATE HEPTAHYDRATE 2 G: 40 INJECTION, SOLUTION INTRAVENOUS at 09:47

## 2022-01-01 RX ADMIN — HYDROCODONE BITARTRATE AND ACETAMINOPHEN 1 TABLET: 10; 325 TABLET ORAL at 10:17

## 2022-01-01 RX ADMIN — FUROSEMIDE 40 MG: 40 TABLET ORAL at 08:36

## 2022-01-01 RX ADMIN — HYDROCODONE BITARTRATE AND ACETAMINOPHEN 2 TABLET: 5; 325 TABLET ORAL at 08:59

## 2022-01-01 RX ADMIN — MIDAZOLAM HYDROCHLORIDE 1 MG: 1 INJECTION, SOLUTION INTRAMUSCULAR; INTRAVENOUS at 08:11

## 2022-01-01 RX ADMIN — FUROSEMIDE 40 MG: 10 INJECTION, SOLUTION INTRAMUSCULAR; INTRAVENOUS at 07:47

## 2022-01-01 RX ADMIN — PREGABALIN 25 MG: 25 CAPSULE ORAL at 21:18

## 2022-01-01 RX ADMIN — ACETAMINOPHEN 1000 MG: 500 TABLET, FILM COATED ORAL at 13:53

## 2022-01-01 RX ADMIN — IOPAMIDOL 57 ML: 755 INJECTION, SOLUTION INTRAVENOUS at 07:45

## 2022-01-01 RX ADMIN — CARVEDILOL 3.12 MG: 3.12 TABLET, FILM COATED ORAL at 17:54

## 2022-01-01 RX ADMIN — MAGNESIUM SULFATE HEPTAHYDRATE 4 G: 40 INJECTION, SOLUTION INTRAVENOUS at 22:57

## 2022-01-01 RX ADMIN — PREGABALIN 25 MG: 25 CAPSULE ORAL at 08:25

## 2022-01-01 RX ADMIN — HYDROCODONE BITARTRATE AND ACETAMINOPHEN 1 TABLET: 10; 325 TABLET ORAL at 00:01

## 2022-01-01 RX ADMIN — MENTHOL 1 PATCH: 205.5 PATCH TOPICAL at 08:28

## 2022-01-01 RX ADMIN — PANTOPRAZOLE SODIUM 40 MG: 40 TABLET, DELAYED RELEASE ORAL at 20:08

## 2022-01-01 RX ADMIN — CARVEDILOL 3.12 MG: 3.12 TABLET, FILM COATED ORAL at 08:36

## 2022-01-01 RX ADMIN — DICLOFENAC SODIUM 2 G: 10 GEL TOPICAL at 19:53

## 2022-01-01 RX ADMIN — HYDROMORPHONE HYDROCHLORIDE 1 MG: 1 INJECTION, SOLUTION INTRAMUSCULAR; INTRAVENOUS; SUBCUTANEOUS at 06:00

## 2022-01-01 RX ADMIN — FUROSEMIDE 40 MG: 40 TABLET ORAL at 18:18

## 2022-01-01 RX ADMIN — ACETAMINOPHEN 650 MG: 325 TABLET, FILM COATED ORAL at 17:48

## 2022-01-01 RX ADMIN — PANTOPRAZOLE SODIUM 40 MG: 40 TABLET, DELAYED RELEASE ORAL at 10:24

## 2022-01-01 RX ADMIN — Medication 1 TABLET: at 17:11

## 2022-01-01 RX ADMIN — HYDROCODONE BITARTRATE AND ACETAMINOPHEN 1 TABLET: 10; 325 TABLET ORAL at 23:38

## 2022-01-01 RX ADMIN — CARVEDILOL 3.12 MG: 3.12 TABLET, FILM COATED ORAL at 07:45

## 2022-01-01 RX ADMIN — HYDROCODONE BITARTRATE AND ACETAMINOPHEN 1 TABLET: 10; 325 TABLET ORAL at 21:18

## 2022-01-01 RX ADMIN — ACETAMINOPHEN 500 MG: 500 TABLET ORAL at 20:23

## 2022-01-01 RX ADMIN — MAGNESIUM SULFATE HEPTAHYDRATE 4 G: 40 INJECTION, SOLUTION INTRAVENOUS at 10:35

## 2022-01-01 RX ADMIN — DICLOFENAC SODIUM 4 G: 10 GEL TOPICAL at 06:39

## 2022-01-01 RX ADMIN — HYDROCODONE BITARTRATE AND ACETAMINOPHEN 2 TABLET: 5; 325 TABLET ORAL at 17:54

## 2022-01-01 RX ADMIN — DICLOFENAC SODIUM 4 G: 10 GEL TOPICAL at 09:55

## 2022-01-01 RX ADMIN — ACETAMINOPHEN 650 MG: 325 TABLET, FILM COATED ORAL at 17:20

## 2022-01-01 RX ADMIN — CARVEDILOL 3.12 MG: 3.12 TABLET, FILM COATED ORAL at 10:23

## 2022-01-01 RX ADMIN — ENOXAPARIN SODIUM 40 MG: 40 INJECTION SUBCUTANEOUS at 14:46

## 2022-01-01 RX ADMIN — HYDROCODONE BITARTRATE AND ACETAMINOPHEN 1 TABLET: 10; 325 TABLET ORAL at 15:38

## 2022-01-01 RX ADMIN — HYDROXYZINE HYDROCHLORIDE 25 MG: 25 TABLET ORAL at 08:25

## 2022-01-01 RX ADMIN — CARVEDILOL 3.12 MG: 3.12 TABLET, FILM COATED ORAL at 17:39

## 2022-01-01 RX ADMIN — HYDROXYZINE HYDROCHLORIDE 25 MG: 25 TABLET, FILM COATED ORAL at 03:24

## 2022-01-01 RX ADMIN — FUROSEMIDE 40 MG: 40 TABLET ORAL at 09:29

## 2022-01-01 RX ADMIN — FUROSEMIDE 40 MG: 10 INJECTION, SOLUTION INTRAMUSCULAR; INTRAVENOUS at 16:37

## 2022-01-01 RX ADMIN — DICLOFENAC SODIUM 2 G: 10 GEL TOPICAL at 20:43

## 2022-01-01 RX ADMIN — HYDROCODONE BITARTRATE AND ACETAMINOPHEN 1 TABLET: 10; 325 TABLET ORAL at 05:28

## 2022-01-01 RX ADMIN — HYDROCODONE BITARTRATE AND ACETAMINOPHEN 2 TABLET: 5; 325 TABLET ORAL at 12:13

## 2022-01-01 RX ADMIN — FLUTICASONE PROPIONATE 1 SPRAY: 50 SPRAY, METERED NASAL at 10:26

## 2022-01-01 RX ADMIN — FUROSEMIDE 20 MG: 10 INJECTION, SOLUTION INTRAMUSCULAR; INTRAVENOUS at 00:42

## 2022-01-01 RX ADMIN — THERA TABS 1 TABLET: TAB at 09:33

## 2022-01-01 RX ADMIN — HYDROMORPHONE HYDROCHLORIDE 4 MG: 2 TABLET ORAL at 11:23

## 2022-01-01 RX ADMIN — IOPAMIDOL 50 ML: 755 INJECTION, SOLUTION INTRAVENOUS at 18:53

## 2022-01-01 RX ADMIN — PREGABALIN 25 MG: 25 CAPSULE ORAL at 14:46

## 2022-01-01 RX ADMIN — PREGABALIN 25 MG: 25 CAPSULE ORAL at 07:46

## 2022-01-01 RX ADMIN — ASPIRIN 81 MG: 81 TABLET, COATED ORAL at 08:24

## 2022-01-01 RX ADMIN — HYDROCODONE BITARTRATE AND ACETAMINOPHEN 1 TABLET: 10; 325 TABLET ORAL at 09:24

## 2022-01-01 RX ADMIN — ATORVASTATIN CALCIUM 40 MG: 40 TABLET, FILM COATED ORAL at 19:54

## 2022-01-01 RX ADMIN — Medication 1 TABLET: at 17:48

## 2022-01-01 RX ADMIN — ACETAMINOPHEN 1000 MG: 500 TABLET, FILM COATED ORAL at 14:21

## 2022-01-01 RX ADMIN — PANTOPRAZOLE SODIUM 40 MG: 40 TABLET, DELAYED RELEASE ORAL at 07:46

## 2022-01-01 RX ADMIN — PREGABALIN 25 MG: 25 CAPSULE ORAL at 10:15

## 2022-01-01 RX ADMIN — HYDROMORPHONE HYDROCHLORIDE 0.3 MG: 1 INJECTION, SOLUTION INTRAMUSCULAR; INTRAVENOUS; SUBCUTANEOUS at 06:37

## 2022-01-01 RX ADMIN — KETOROLAC TROMETHAMINE 15 MG: 15 INJECTION, SOLUTION INTRAMUSCULAR; INTRAVENOUS at 12:32

## 2022-01-01 RX ADMIN — HYDROMORPHONE HYDROCHLORIDE 0.3 MG: 1 INJECTION, SOLUTION INTRAMUSCULAR; INTRAVENOUS; SUBCUTANEOUS at 22:54

## 2022-01-01 RX ADMIN — Medication 1 TABLET: at 18:22

## 2022-01-01 RX ADMIN — ASPIRIN 81 MG: 81 TABLET, COATED ORAL at 09:29

## 2022-01-01 RX ADMIN — THERA TABS 1 TABLET: TAB at 09:28

## 2022-01-01 RX ADMIN — ATORVASTATIN CALCIUM 40 MG: 40 TABLET, FILM COATED ORAL at 19:40

## 2022-01-01 RX ADMIN — ATORVASTATIN CALCIUM 40 MG: 40 TABLET, FILM COATED ORAL at 20:08

## 2022-01-01 RX ADMIN — ATORVASTATIN CALCIUM 40 MG: 40 TABLET, FILM COATED ORAL at 20:03

## 2022-01-01 RX ADMIN — PREGABALIN 25 MG: 25 CAPSULE ORAL at 08:53

## 2022-01-01 RX ADMIN — CARVEDILOL 3.12 MG: 3.12 TABLET, FILM COATED ORAL at 09:33

## 2022-01-01 RX ADMIN — HYDROCODONE BITARTRATE AND ACETAMINOPHEN 2 TABLET: 10; 325 TABLET ORAL at 02:44

## 2022-01-01 RX ADMIN — LOSARTAN POTASSIUM 25 MG: 25 TABLET, FILM COATED ORAL at 08:42

## 2022-01-01 RX ADMIN — CARVEDILOL 3.12 MG: 3.12 TABLET, FILM COATED ORAL at 07:36

## 2022-01-01 RX ADMIN — HYDROXYZINE HYDROCHLORIDE 25 MG: 25 TABLET, FILM COATED ORAL at 21:56

## 2022-01-01 RX ADMIN — PREGABALIN 25 MG: 25 CAPSULE ORAL at 16:10

## 2022-01-01 RX ADMIN — HYDROXYZINE HYDROCHLORIDE 25 MG: 25 TABLET ORAL at 08:23

## 2022-01-01 RX ADMIN — DICLOFENAC SODIUM 4 G: 10 GEL TOPICAL at 19:37

## 2022-01-01 RX ADMIN — ENOXAPARIN SODIUM 40 MG: 40 INJECTION SUBCUTANEOUS at 15:37

## 2022-01-01 RX ADMIN — DICLOFENAC SODIUM 4 G: 10 GEL TOPICAL at 02:29

## 2022-01-01 RX ADMIN — HYDROCODONE BITARTRATE AND ACETAMINOPHEN 1 TABLET: 10; 325 TABLET ORAL at 11:18

## 2022-01-01 RX ADMIN — ACETAMINOPHEN 500 MG: 500 TABLET ORAL at 14:59

## 2022-01-01 RX ADMIN — PREGABALIN 25 MG: 25 CAPSULE ORAL at 21:08

## 2022-01-01 RX ADMIN — HYDROXYZINE HYDROCHLORIDE 25 MG: 25 TABLET ORAL at 02:09

## 2022-01-01 RX ADMIN — LOSARTAN POTASSIUM 25 MG: 25 TABLET, FILM COATED ORAL at 10:23

## 2022-01-01 RX ADMIN — DICLOFENAC SODIUM 4 G: 10 GEL TOPICAL at 13:34

## 2022-01-01 RX ADMIN — THERA TABS 1 TABLET: TAB at 08:24

## 2022-01-01 RX ADMIN — ACETAMINOPHEN 650 MG: 325 TABLET, FILM COATED ORAL at 02:17

## 2022-01-01 RX ADMIN — THERA TABS 1 TABLET: TAB at 10:15

## 2022-01-01 RX ADMIN — HYDROMORPHONE HYDROCHLORIDE 0.3 MG: 1 INJECTION, SOLUTION INTRAMUSCULAR; INTRAVENOUS; SUBCUTANEOUS at 23:45

## 2022-01-01 RX ADMIN — ACETAMINOPHEN 500 MG: 500 TABLET ORAL at 19:56

## 2022-01-01 RX ADMIN — HYDROXYZINE HYDROCHLORIDE 25 MG: 25 TABLET ORAL at 08:36

## 2022-01-01 RX ADMIN — FUROSEMIDE 20 MG: 20 TABLET ORAL at 08:42

## 2022-01-01 RX ADMIN — HYDROXYZINE HYDROCHLORIDE 25 MG: 25 TABLET, FILM COATED ORAL at 06:18

## 2022-01-01 RX ADMIN — HYDROCODONE BITARTRATE AND ACETAMINOPHEN 1 TABLET: 10; 325 TABLET ORAL at 19:37

## 2022-01-01 RX ADMIN — PANTOPRAZOLE SODIUM 40 MG: 40 TABLET, DELAYED RELEASE ORAL at 21:08

## 2022-01-01 RX ADMIN — FLUTICASONE PROPIONATE 1 SPRAY: 50 SPRAY, METERED NASAL at 19:11

## 2022-01-01 RX ADMIN — PANTOPRAZOLE SODIUM 40 MG: 40 TABLET, DELAYED RELEASE ORAL at 08:53

## 2022-01-01 RX ADMIN — PREGABALIN 50 MG: 25 CAPSULE ORAL at 20:08

## 2022-01-01 RX ADMIN — HYDROXYZINE HYDROCHLORIDE 25 MG: 25 TABLET, FILM COATED ORAL at 09:23

## 2022-01-01 RX ADMIN — ATORVASTATIN CALCIUM 40 MG: 40 TABLET, FILM COATED ORAL at 19:46

## 2022-01-01 RX ADMIN — ACETAMINOPHEN 1000 MG: 500 TABLET, FILM COATED ORAL at 20:07

## 2022-01-01 RX ADMIN — NITROGLYCERIN 0.4 MG: 0.4 TABLET SUBLINGUAL at 23:17

## 2022-01-01 RX ADMIN — HYDROCODONE BITARTRATE AND ACETAMINOPHEN 1 TABLET: 10; 325 TABLET ORAL at 16:12

## 2022-01-01 RX ADMIN — PANTOPRAZOLE SODIUM 40 MG: 40 TABLET, DELAYED RELEASE ORAL at 08:36

## 2022-01-01 RX ADMIN — FLUTICASONE PROPIONATE 1 SPRAY: 50 SPRAY, METERED NASAL at 09:54

## 2022-01-01 RX ADMIN — HYDROMORPHONE HYDROCHLORIDE 0.5 MG: 1 INJECTION, SOLUTION INTRAMUSCULAR; INTRAVENOUS; SUBCUTANEOUS at 22:12

## 2022-01-01 RX ADMIN — HYDROXYZINE HYDROCHLORIDE 25 MG: 25 TABLET ORAL at 14:11

## 2022-01-01 RX ADMIN — CARVEDILOL 3.12 MG: 3.12 TABLET, FILM COATED ORAL at 09:40

## 2022-01-01 RX ADMIN — HYDROXYZINE HYDROCHLORIDE 25 MG: 25 TABLET ORAL at 06:46

## 2022-01-01 RX ADMIN — HYDROMORPHONE HYDROCHLORIDE 0.5 MG: 1 INJECTION, SOLUTION INTRAMUSCULAR; INTRAVENOUS; SUBCUTANEOUS at 06:40

## 2022-01-01 RX ADMIN — HYDROCODONE BITARTRATE AND ACETAMINOPHEN 2 TABLET: 5; 325 TABLET ORAL at 12:22

## 2022-01-01 RX ADMIN — DOXYLAMINE SUCCINATE 50 MG: 25 TABLET ORAL at 00:15

## 2022-01-01 RX ADMIN — CARVEDILOL 3.12 MG: 3.12 TABLET, FILM COATED ORAL at 18:11

## 2022-01-01 RX ADMIN — HYDROXYZINE HYDROCHLORIDE 25 MG: 25 TABLET ORAL at 21:47

## 2022-01-01 RX ADMIN — CARVEDILOL 3.12 MG: 3.12 TABLET, FILM COATED ORAL at 08:42

## 2022-01-01 RX ADMIN — PREGABALIN 50 MG: 25 CAPSULE ORAL at 19:41

## 2022-01-01 RX ADMIN — DICLOFENAC SODIUM 4 G: 10 GEL TOPICAL at 16:37

## 2022-01-01 RX ADMIN — PREGABALIN 25 MG: 25 CAPSULE ORAL at 13:53

## 2022-01-01 RX ADMIN — HYDROCODONE BITARTRATE AND ACETAMINOPHEN 2 TABLET: 5; 325 TABLET ORAL at 20:40

## 2022-01-01 RX ADMIN — HYDROMORPHONE HYDROCHLORIDE 0.3 MG: 1 INJECTION, SOLUTION INTRAMUSCULAR; INTRAVENOUS; SUBCUTANEOUS at 04:29

## 2022-01-01 RX ADMIN — HYDROMORPHONE HYDROCHLORIDE 0.3 MG: 1 INJECTION, SOLUTION INTRAMUSCULAR; INTRAVENOUS; SUBCUTANEOUS at 02:16

## 2022-01-01 RX ADMIN — ASPIRIN 81 MG: 81 TABLET, COATED ORAL at 16:37

## 2022-01-01 RX ADMIN — HYDROMORPHONE HYDROCHLORIDE 2 MG: 2 TABLET ORAL at 09:54

## 2022-01-01 RX ADMIN — PANTOPRAZOLE SODIUM 40 MG: 40 TABLET, DELAYED RELEASE ORAL at 09:23

## 2022-01-01 RX ADMIN — NITROGLYCERIN 0.4 MG: 0.4 TABLET SUBLINGUAL at 23:12

## 2022-01-01 RX ADMIN — HYDROCODONE BITARTRATE AND ACETAMINOPHEN 1 TABLET: 5; 325 TABLET ORAL at 02:23

## 2022-01-01 RX ADMIN — DICLOFENAC SODIUM 4 G: 10 GEL TOPICAL at 07:45

## 2022-01-01 RX ADMIN — DICLOFENAC SODIUM 2 G: 10 GEL TOPICAL at 14:59

## 2022-01-01 RX ADMIN — PANTOPRAZOLE SODIUM 40 MG: 40 TABLET, DELAYED RELEASE ORAL at 21:55

## 2022-01-01 RX ADMIN — DICLOFENAC SODIUM 2 G: 10 GEL TOPICAL at 15:37

## 2022-01-01 RX ADMIN — ATORVASTATIN CALCIUM 40 MG: 40 TABLET, FILM COATED ORAL at 19:37

## 2022-01-01 RX ADMIN — ACETAMINOPHEN 650 MG: 325 TABLET, FILM COATED ORAL at 10:15

## 2022-01-01 RX ADMIN — HYDROCODONE BITARTRATE AND ACETAMINOPHEN 1 TABLET: 10; 325 TABLET ORAL at 13:52

## 2022-01-01 RX ADMIN — HYDROCODONE BITARTRATE AND ACETAMINOPHEN 2 TABLET: 5; 325 TABLET ORAL at 00:01

## 2022-01-01 RX ADMIN — HYDROMORPHONE HYDROCHLORIDE 1 MG: 1 INJECTION, SOLUTION INTRAMUSCULAR; INTRAVENOUS; SUBCUTANEOUS at 01:41

## 2022-01-01 RX ADMIN — DICLOFENAC SODIUM 4 G: 10 GEL TOPICAL at 22:06

## 2022-01-01 RX ADMIN — PANTOPRAZOLE SODIUM 40 MG: 40 TABLET, DELAYED RELEASE ORAL at 20:03

## 2022-01-01 RX ADMIN — Medication 1 TABLET: at 09:32

## 2022-01-01 RX ADMIN — Medication 1 TABLET: at 08:53

## 2022-01-01 RX ADMIN — ATORVASTATIN CALCIUM 40 MG: 40 TABLET, FILM COATED ORAL at 20:24

## 2022-01-01 RX ADMIN — CARVEDILOL 3.12 MG: 3.12 TABLET, FILM COATED ORAL at 18:22

## 2022-01-01 RX ADMIN — DICLOFENAC SODIUM 4 G: 10 GEL TOPICAL at 14:22

## 2022-01-01 RX ADMIN — HYDROXYZINE HYDROCHLORIDE 25 MG: 25 TABLET ORAL at 14:57

## 2022-01-01 RX ADMIN — HYDROCODONE BITARTRATE AND ACETAMINOPHEN 1 TABLET: 10; 325 TABLET ORAL at 11:36

## 2022-01-01 RX ADMIN — HYDROMORPHONE HYDROCHLORIDE 0.5 MG: 1 INJECTION, SOLUTION INTRAMUSCULAR; INTRAVENOUS; SUBCUTANEOUS at 18:09

## 2022-01-01 RX ADMIN — ATORVASTATIN CALCIUM 40 MG: 40 TABLET, FILM COATED ORAL at 19:50

## 2022-01-01 RX ADMIN — HYDROXYZINE HYDROCHLORIDE 25 MG: 25 TABLET ORAL at 21:08

## 2022-01-01 RX ADMIN — HYDROXYZINE HYDROCHLORIDE 10 MG: 10 TABLET ORAL at 02:44

## 2022-01-01 RX ADMIN — HYDROCODONE BITARTRATE AND ACETAMINOPHEN 2 TABLET: 5; 325 TABLET ORAL at 06:17

## 2022-01-01 RX ADMIN — HYDROXYZINE HYDROCHLORIDE 25 MG: 25 TABLET, FILM COATED ORAL at 00:42

## 2022-01-01 RX ADMIN — Medication 1 TABLET: at 10:15

## 2022-01-01 RX ADMIN — HYDROXYZINE HYDROCHLORIDE 25 MG: 25 TABLET, FILM COATED ORAL at 18:02

## 2022-01-01 RX ADMIN — HYDROMORPHONE HYDROCHLORIDE 0.5 MG: 1 INJECTION, SOLUTION INTRAMUSCULAR; INTRAVENOUS; SUBCUTANEOUS at 01:34

## 2022-01-01 RX ADMIN — HYDROMORPHONE HYDROCHLORIDE 0.5 MG: 1 INJECTION, SOLUTION INTRAMUSCULAR; INTRAVENOUS; SUBCUTANEOUS at 22:27

## 2022-01-01 RX ADMIN — PANTOPRAZOLE SODIUM 40 MG: 40 TABLET, DELAYED RELEASE ORAL at 00:01

## 2022-01-01 RX ADMIN — HYDROCODONE BITARTRATE AND ACETAMINOPHEN 1 TABLET: 10; 325 TABLET ORAL at 21:38

## 2022-01-01 RX ADMIN — HYDROCODONE BITARTRATE AND ACETAMINOPHEN 2 TABLET: 5; 325 TABLET ORAL at 00:12

## 2022-01-01 RX ADMIN — HYDROMORPHONE HYDROCHLORIDE 0.5 MG: 1 INJECTION, SOLUTION INTRAMUSCULAR; INTRAVENOUS; SUBCUTANEOUS at 23:04

## 2022-01-01 RX ADMIN — PREGABALIN 25 MG: 25 CAPSULE ORAL at 20:03

## 2022-01-01 RX ADMIN — Medication 1 TABLET: at 09:28

## 2022-01-01 RX ADMIN — HYDROMORPHONE HYDROCHLORIDE 0.5 MG: 1 INJECTION, SOLUTION INTRAMUSCULAR; INTRAVENOUS; SUBCUTANEOUS at 23:56

## 2022-01-01 RX ADMIN — HYDROCODONE BITARTRATE AND ACETAMINOPHEN 1 TABLET: 10; 325 TABLET ORAL at 16:48

## 2022-01-01 RX ADMIN — IOPAMIDOL 58 ML: 755 INJECTION, SOLUTION INTRAVENOUS at 11:00

## 2022-01-01 ASSESSMENT — ACTIVITIES OF DAILY LIVING (ADL)
ADLS_ACUITY_SCORE: 30
ADLS_ACUITY_SCORE: 34
PATIENT'S_PREFERRED_MEANS_OF_COMMUNICATION: VERBAL
ADLS_ACUITY_SCORE: 32
ADLS_ACUITY_SCORE: 32
WEAR_GLASSES_OR_BLIND: YES
ADLS_ACUITY_SCORE: 32
ADLS_ACUITY_SCORE: 36
TRANSFERRING: 1-->ASSISTANCE (EQUIPMENT/PERSON) NEEDED
ADLS_ACUITY_SCORE: 36
ADLS_ACUITY_SCORE: 34
ADLS_ACUITY_SCORE: 30
ADLS_ACUITY_SCORE: 32
ADLS_ACUITY_SCORE: 44
ADLS_ACUITY_SCORE: 38
ADLS_ACUITY_SCORE: 36
ADLS_ACUITY_SCORE: 35
ADLS_ACUITY_SCORE: 34
ADLS_ACUITY_SCORE: 30
PREVIOUS_RESPONSIBILITIES: MEDICATION MANAGEMENT
ADLS_ACUITY_SCORE: 40
ADLS_ACUITY_SCORE: 42
ADLS_ACUITY_SCORE: 35
TRANSFERRING: 1-->ASSISTANCE (EQUIPMENT/PERSON) NEEDED (NOT DEVELOPMENTALLY APPROPRIATE)
ADLS_ACUITY_SCORE: 34
ADLS_ACUITY_SCORE: 34
ADLS_ACUITY_SCORE: 42
ADLS_ACUITY_SCORE: 34
ADLS_ACUITY_SCORE: 38
ADLS_ACUITY_SCORE: 35
ADLS_ACUITY_SCORE: 34
ADLS_ACUITY_SCORE: 32
ADLS_ACUITY_SCORE: 45
ADLS_ACUITY_SCORE: 38
ADLS_ACUITY_SCORE: 29
ADLS_ACUITY_SCORE: 28
ADLS_ACUITY_SCORE: 34
ADLS_ACUITY_SCORE: 37
ADLS_ACUITY_SCORE: 44
ADLS_ACUITY_SCORE: 38
ADLS_ACUITY_SCORE: 32
ADLS_ACUITY_SCORE: 30
ADLS_ACUITY_SCORE: 37
ADLS_ACUITY_SCORE: 44
ADLS_ACUITY_SCORE: 29
ADLS_ACUITY_SCORE: 32
ADLS_ACUITY_SCORE: 42
ADLS_ACUITY_SCORE: 42
ADLS_ACUITY_SCORE: 30
ADLS_ACUITY_SCORE: 38
ADLS_ACUITY_SCORE: 38
ADLS_ACUITY_SCORE: 30
ADLS_ACUITY_SCORE: 44
ADLS_ACUITY_SCORE: 30
ADLS_ACUITY_SCORE: 29
ADLS_ACUITY_SCORE: 28
ADLS_ACUITY_SCORE: 44
ADLS_ACUITY_SCORE: 28
DOING_ERRANDS_INDEPENDENTLY_DIFFICULTY: YES
ADLS_ACUITY_SCORE: 30
ADLS_ACUITY_SCORE: 34
ADLS_ACUITY_SCORE: 34
ADLS_ACUITY_SCORE: 35
ADLS_ACUITY_SCORE: 30
ADLS_ACUITY_SCORE: 34
DESCRIBE_HEARING_LOSS: BILATERAL HEARING LOSS
ADLS_ACUITY_SCORE: 38
CHANGE_IN_FUNCTIONAL_STATUS_SINCE_ONSET_OF_CURRENT_ILLNESS/INJURY: YES
ADLS_ACUITY_SCORE: 30
ADLS_ACUITY_SCORE: 32
ADLS_ACUITY_SCORE: 35
ADLS_ACUITY_SCORE: 30
ADLS_ACUITY_SCORE: 35
NUMBER_OF_TIMES_PATIENT_HAS_FALLEN_WITHIN_LAST_SIX_MONTHS: 2
ADLS_ACUITY_SCORE: 38
ADLS_ACUITY_SCORE: 36
VISION_MANAGEMENT: GLASSES
ADLS_ACUITY_SCORE: 30
ADLS_ACUITY_SCORE: 28
ADLS_ACUITY_SCORE: 38
ADLS_ACUITY_SCORE: 29
TOILETING_ISSUES: NO
ADLS_ACUITY_SCORE: 28
ADLS_ACUITY_SCORE: 34
ADLS_ACUITY_SCORE: 34
ADLS_ACUITY_SCORE: 32
ADLS_ACUITY_SCORE: 36
ADLS_ACUITY_SCORE: 30
ADLS_ACUITY_SCORE: 36
ADLS_ACUITY_SCORE: 36
ADLS_ACUITY_SCORE: 42
ADLS_ACUITY_SCORE: 36
ADLS_ACUITY_SCORE: 36
ADLS_ACUITY_SCORE: 38
ADLS_ACUITY_SCORE: 28
ADLS_ACUITY_SCORE: 29
ADLS_ACUITY_SCORE: 30
ADLS_ACUITY_SCORE: 32
ADLS_ACUITY_SCORE: 35
FALL_HISTORY_WITHIN_LAST_SIX_MONTHS: YES
ADLS_ACUITY_SCORE: 32
ADLS_ACUITY_SCORE: 30
ADLS_ACUITY_SCORE: 42
ADLS_ACUITY_SCORE: 30
ADLS_ACUITY_SCORE: 32
ADLS_ACUITY_SCORE: 28
ADLS_ACUITY_SCORE: 30
ADLS_ACUITY_SCORE: 36
ADLS_ACUITY_SCORE: 39
DRESSING/BATHING_DIFFICULTY: NO
WERE_AUXILIARY_AIDS_OFFERED?: YES
ADLS_ACUITY_SCORE: 37
ADLS_ACUITY_SCORE: 32
ADLS_ACUITY_SCORE: 34
ADLS_ACUITY_SCORE: 35
ADLS_ACUITY_SCORE: 35
ADLS_ACUITY_SCORE: 40
ADLS_ACUITY_SCORE: 34
CONCENTRATING,_REMEMBERING_OR_MAKING_DECISIONS_DIFFICULTY: NO
ADLS_ACUITY_SCORE: 42
ADLS_ACUITY_SCORE: 42
ADLS_ACUITY_SCORE: 38
ADLS_ACUITY_SCORE: 36
ADLS_ACUITY_SCORE: 28
ADLS_ACUITY_SCORE: 42
ADLS_ACUITY_SCORE: 30
PREVIOUS_RESPONSIBILITIES: MEAL PREP;MEDICATION MANAGEMENT;FINANCES
ADLS_ACUITY_SCORE: 42
ADLS_ACUITY_SCORE: 30
ADLS_ACUITY_SCORE: 38
ADLS_ACUITY_SCORE: 30
ADLS_ACUITY_SCORE: 39
ADLS_ACUITY_SCORE: 38
ADLS_ACUITY_SCORE: 32
ADLS_ACUITY_SCORE: 36
DIFFICULTY_EATING/SWALLOWING: NO
ADLS_ACUITY_SCORE: 35
ADLS_ACUITY_SCORE: 30
ADLS_ACUITY_SCORE: 35
DIFFICULTY_COMMUNICATING: NO
ADLS_ACUITY_SCORE: 32
ADLS_ACUITY_SCORE: 34
ADLS_ACUITY_SCORE: 36
ADLS_ACUITY_SCORE: 30
ADLS_ACUITY_SCORE: 36
ADLS_ACUITY_SCORE: 30
ADLS_ACUITY_SCORE: 36
ADLS_ACUITY_SCORE: 40
ADLS_ACUITY_SCORE: 36
ADLS_ACUITY_SCORE: 38
ADLS_ACUITY_SCORE: 34
ADLS_ACUITY_SCORE: 45
ADLS_ACUITY_SCORE: 30
ADLS_ACUITY_SCORE: 38
ADLS_ACUITY_SCORE: 32
ADLS_ACUITY_SCORE: 40
HEARING_DIFFICULTY_OR_DEAF: YES
ADLS_ACUITY_SCORE: 30
ADLS_ACUITY_SCORE: 34
ADLS_ACUITY_SCORE: 36
ADLS_ACUITY_SCORE: 29
ADLS_ACUITY_SCORE: 35
ADLS_ACUITY_SCORE: 36
ADLS_ACUITY_SCORE: 30
ADLS_ACUITY_SCORE: 32
WALKING_OR_CLIMBING_STAIRS_DIFFICULTY: YES
ADLS_ACUITY_SCORE: 38
ADLS_ACUITY_SCORE: 29
ADLS_ACUITY_SCORE: 30
ADLS_ACUITY_SCORE: 38
ADLS_ACUITY_SCORE: 35
ADLS_ACUITY_SCORE: 34
ADLS_ACUITY_SCORE: 34
ADLS_ACUITY_SCORE: 39
ADLS_ACUITY_SCORE: 34
ADLS_ACUITY_SCORE: 36
ADLS_ACUITY_SCORE: 38
ADLS_ACUITY_SCORE: 44
ADLS_ACUITY_SCORE: 29
ADLS_ACUITY_SCORE: 34
ADLS_ACUITY_SCORE: 38
ADLS_ACUITY_SCORE: 30
ADLS_ACUITY_SCORE: 29
ADLS_ACUITY_SCORE: 30
WALKING_OR_CLIMBING_STAIRS: AMBULATION DIFFICULTY, REQUIRES EQUIPMENT
ADLS_ACUITY_SCORE: 44
ADLS_ACUITY_SCORE: 38
ADLS_ACUITY_SCORE: 36
ADLS_ACUITY_SCORE: 39
ADLS_ACUITY_SCORE: 35
ADLS_ACUITY_SCORE: 36
ADLS_ACUITY_SCORE: 36
ADLS_ACUITY_SCORE: 34
ADLS_ACUITY_SCORE: 38
ADLS_ACUITY_SCORE: 29
ADLS_ACUITY_SCORE: 29
ADLS_ACUITY_SCORE: 38
ADLS_ACUITY_SCORE: 38
THE_FOLLOWING_AIDS_WERE_PROVIDED;: PATIENT DECLINED OFFER OF AUXILIARY AIDS

## 2022-01-01 ASSESSMENT — ENCOUNTER SYMPTOMS
COUGH: 0
FEVER: 0
DIARRHEA: 1
VOMITING: 0
COUGH: 0
DIARRHEA: 1
NAUSEA: 1
BACK PAIN: 1
SHORTNESS OF BREATH: 0
CHILLS: 0
ABDOMINAL PAIN: 1
FEVER: 0

## 2022-01-01 NOTE — PROGRESS NOTES
Subjective     Gosia Alonzo is a 87 year old female who presents to clinic today for the following health issues:    [facility]       Hospital Follow-up Visit:    Hospital/Nursing Home/IP Rehab Facility: [facility]   Date of Admission: 2/15/2020  Date of Discharge: 2/26/2020  Reason(s) for Admission: Rehab after GI bleeding/debilitation            Problems taking medications regularly:  None       Medication changes since discharge: None       Problems adhering to non-medication therapy:  None    Summary of hospitalization:  [facility] discharge summary reviewed  Diagnostic Tests/Treatments reviewed.  Follow up needed: none  Other Healthcare Providers Involved in Patient s Care:         Specialist appointment - GI and cardiology  Update since discharge: improved.     Post Discharge Medication Reconciliation: discharge medications reconciled and changed, per note/orders (see AVS).  Plan of care communicated with patient and daughter     Coding guidelines for this visit:  Type of Medical   Decision Making Face-to-Face Visit       within 7 Days of discharge Face-to-Face Visit        within 14 days of discharge   Moderate Complexity [id number]   High Complexity [id number]          Patient present with daughter.     Patient was hospitalized 2/11-2/15 for upper GI bleed due to duodenal bleeding ulcers. She was then at [facility] 2/15-2/26. She had presented with weakness and diarrhea. Hemoglobin was 8.0 on admission and dropped to 7.2. Stool occult test was positive. Abdominal CT scan showed duodenitis with possible duodenal ulcer. Upper GI endoscopy on 2/11 showed 2 large non-bleeding duodenal ulcers with adherent clot and medium sized hiatal hernia.     Patient was given 1 unit of RBC's on 2/11. She was recommended pantoprazole 40 mg BID for 2 months, then once daily indefinitely. Aspirin was held temporarily during hospital stay. There was mention of a GI follow up 6 weeks after discharge, but there was  no mention of this in GI notes.     Patient also tested positive for C. Diff. She was to be on vancomycin until 3/6/2020, but she has been off it for 2 days now.     Today, she reports feeling much better. She has 1 soft stool a day. Denies any black or bloody stool. She is going to PT and doing home exercises to improve strength. She continues to work on gaining weight to return to her baseline. Hemoglobin on 2/24/2020 was 9.8.    CAD  Has an appointment with Maribel TAPIA CNP with cardiology on 3/24/2020.     Diabetes    Lab Results   Component Value Date    A1C 6.0 10/31/2019    A1C 6.2 01/09/2019    A1C 6.8 07/14/2017    A1C 6.2 01/11/2017    A1C 6.0 10/31/2016     Not on any medications for diabetes control.     Hyperlipidemia    Recent Labs   Lab Test 10/31/19  1617 10/01/18  1026  11/05/15  1143 05/26/15  0951   CHOL 186 139   < > 156 203*   HDL 57 48*   < > 56 53   LDL 88 67   < > 70 107   TRIG 204* 119   < > 150 217*   CHOLHDLRATIO  --   --   --  2.8 3.8    < > = values in this interval not displayed.     Taking atorvastatin 40 mg daily.     Past/recent records reviewed and discussed for:  -Patient would prefer to not follow up 5/2020 if she is seeing cardiology shortly beforehand. She would like to reduce the number of doctor appointments she has to make.   -Bumped lower right leg on walker. She developed a scab, but it fell off and would like to have the area inspected today.       Reviewed and updated as needed this visit by Provider         Review of Systems   No dyspnea or cough. No chest discomfort, dizziness or palpitations. No diarrhea, abdominal pain or rectal bleeding.   No acute problems with vision or speech, lateralizing weakness or paresthesias.    ROS: as above or negative for Respiratory, CV, GI, Derm, heme, endocrine, neuro systems.    This document serves as a record of the services and decisions personally performed and made by Michael Londono MD. It was created on his behalf by  "Rosalind Lainez, a trained medical scribe. The creation of this document is based on the provider's statements to the medical scribe.  Rosalind Lainez March 4, 2020 2:54 PM         Objective    /60 (BP Location: Right arm, Patient Position: Sitting, Cuff Size: Adult Regular)   Pulse 93   Temp 98.4  F (36.9  C) (Oral)   Resp 14   Ht 1.651 m (5' 5\")   Wt 48.8 kg (107 lb 9.6 oz)   SpO2 98%   BMI 17.91 kg/m    Body mass index is 17.91 kg/m .     Physical Exam   GENERAL: healthy, alert and no distress  RESP: lungs clear to auscultation - no rales, rhonchi or wheezes  CV: regular rate and rhythm, normal S1 S2, no S3 or S4, no murmur, click or rub, no peripheral edema and peripheral pulses strong  MS: no gross musculoskeletal defects noted, no edema  SKIN: 3x4 cm in size scabbed, healing, mid right anterior tibial lesion. no suspicious lesions or rashes  NEURO: Normal strength and tone, mentation intact and speech normal  PSYCH: mentation appears normal, affect normal/bright    Diagnostic Test Results:  Labs reviewed in Epic  No results found for this or any previous visit (from the past 24 hour(s)).        Assessment & Plan     (D62) Anemia due to blood loss, acute  (primary encounter diagnosis)  Comment: Update CBC. Patient prefers to update labs during cardiology visit.  Plan: CBC with platelets          (A04.72) C. difficile colitis  Comment: Improved. Now finished with vancomycin. Discussed if diarrhea should reoccur, vancomycin will need to be resumed and tapered at a slower rate (see patient instructions below)  Plan:     (E11.22,  N18.3) Type 2 diabetes mellitus with stage 3 chronic kidney disease, without long-term current use of insulin (H)  Comment: Update A1c when able.   Plan: Hemoglobin A1c          (L97.901) Ulcer of lower extremity, limited to breakdown of skin, unspecified laterality (H)  Comment: Continue pantoprazole for 2 months total. Follow up with GI as mentioned in hospital note   Plan: "     (I50.22) Chronic systolic heart failure (H)  (I25.5) Ischemic cardiomyopathy  Comment: Stable. Follow up with cardiology as planned 3/24  Plan:     (E78.5) Hyperlipidemia LDL goal <100  Comment: Update LDL. Patient prefers to do this during cardiology visit.   Plan: Comprehensive metabolic panel, Lipid panel         reflex to direct LDL Fasting          (E44.1) Mild protein-calorie malnutrition (H)  Comment: No further weight loss. Continue working on weight gain  Plan:     (I10) Essential hypertension  Comment: BP at target. Continue current meds.  Plan:        FUTURE APPOINTMENTS:       - Follow-up visit in 2-7 months    Patient Instructions   The hospitalist suggested that you see the GI specialist back in the office in six weeks after discharge. The GI specialist did not say this in any of their notes.   For stomach ulcers, they usually repeat an endoscopy in six weeks to assure healing. This is not always done with Duodenal ulcers, which is what you had.     You will need to stay on twice daily pantoprazole for a minimum of two months, then once daily forever.     For the C Dificile colitis, some patients will note recurrence of diarrhea after stopping vancomycin. If this happens, we should resume vancomycin four times a day, then each week go down by one dose per day (for example, four times a day for one week, then three times a day for one week, etc).     Ask cardiology to check your hemoglobin when they check other labs.   We will need to check A1c and cholesterol soon. This can be done with the cardiology labs, or it can be done with me in May 2020.     See me back in around May of 2020. If all of the labs look fine and you are fine, this may be stretched out to no later than October 2020.       The information in this document, created by the medical scribe for me, accurately reflects the services I personally performed and the decisions made by me. I have reviewed and approved this document for  accuracy prior to leaving the patient care area.  March 4, 2020 3:19 PM    Michael Londono MD,   [facility]         car seat testing

## 2022-02-14 ENCOUNTER — TELEPHONE (OUTPATIENT)
Dept: INTERNAL MEDICINE | Facility: CLINIC | Age: 87
End: 2022-02-14
Payer: MEDICARE

## 2022-02-14 NOTE — TELEPHONE ENCOUNTER
Call placed to patient regarding itching after cortisone shots.    November 12- Shingles  2 weeks ago- received cortisone shots  Last night- awake from itching on left sided back pain. Allergic to gabapentin- alters mental status.  Has used Benedryl and lidocaine lotion- helps for 5 minutes but does not control itch.    Pt requesting something else to help control itch and discomfort.

## 2022-02-14 NOTE — TELEPHONE ENCOUNTER
Patient calls to say she is post shingles and then had demetrius injections in Knee and shldr which activated the itch.     She stated she is allergic to gabapentin and is asking for something to aleve the itching.     Best number to call  [phone]

## 2022-02-14 NOTE — TELEPHONE ENCOUNTER
How much benadryl is she taking? Is she using lotion or pills? she can take 50 mg 2 tabs po every 4 hrs.

## 2022-02-14 NOTE — TELEPHONE ENCOUNTER
Patient is using 50 mg BID. Advised to increase to every 4 hours. Will call if there is no relief.

## 2022-03-17 DIAGNOSIS — B02.9 HERPES ZOSTER INFECTION OF THORACIC REGION: ICD-10-CM

## 2022-03-17 NOTE — TELEPHONE ENCOUNTER
PT has increased pain/itching since most recent knee injection due to her shingles. She states that the hydrocodone was the only medication that seemed to improve this symptom last month, benadryl q4h did not help.  Wondering what PCP recommends. Advised that she may need an appt. Selected pharmacy if prescribing.  -Mery Ashby

## 2022-03-18 RX ORDER — HYDROXYZINE HYDROCHLORIDE 25 MG/1
25 TABLET, FILM COATED ORAL EVERY 6 HOURS PRN
Qty: 60 TABLET | Refills: 1 | Status: SHIPPED | OUTPATIENT
Start: 2022-03-18 | End: 2022-01-01

## 2022-03-18 NOTE — TELEPHONE ENCOUNTER
"Call to patient. Patient informed of Dr. Londono's below response. Patient states: \"My shingles are still going since November. It's itching and painful. I don't want hydrocodone.\"    Patient states she wants Hydroxyzine (Atarax). Pharmacy verified.     Beth DURON RN   [facility]                  "

## 2022-03-18 NOTE — TELEPHONE ENCOUNTER
I'm not able to prescribe hydrocodone, because she has a pain contract with Dr Castro of [facility] who prescribes hydrocodone for her.

## 2022-03-18 NOTE — TELEPHONE ENCOUNTER
Call to patient. Patient informed refill for Hydroxyzine has been sent in to the pharmacy. Patient verbalized understanding.     Beth DURON RN   [facility]

## 2022-03-18 NOTE — TELEPHONE ENCOUNTER
Pending Prescriptions:                       Disp   Refills    hydrOXYzine (ATARAX) 25 MG tablet         60 tab*1            Sig: Take 1 tablet (25 mg) by mouth every 6 hours as           needed for itching or other (And adjuvant pain)      Prescription approved per [facility] Refill Protocol.      Beth DURON RN   [facility]

## 2022-07-19 PROBLEM — E87.6 HYPOKALEMIA: Status: ACTIVE | Noted: 2022-01-01

## 2022-07-19 PROBLEM — R07.9 CHEST PAIN, UNSPECIFIED TYPE: Status: ACTIVE | Noted: 2021-11-10

## 2022-07-19 PROBLEM — I50.9 ACUTE ON CHRONIC CONGESTIVE HEART FAILURE, UNSPECIFIED HEART FAILURE TYPE (H): Status: ACTIVE | Noted: 2022-01-01

## 2022-07-19 PROBLEM — G89.29 OTHER CHRONIC PAIN: Status: ACTIVE | Noted: 2022-01-01

## 2022-07-19 NOTE — ED NOTES
Bed: ED35  Expected date: 7/19/22  Expected time: 8:17 AM  Means of arrival:   Comments:  [facility]- 89yo

## 2022-07-19 NOTE — ED PROVIDER NOTES
"  History   Chief Complaint:  Chest Pain and Diarrhea       HPI   Gosia Alonzo is a 89 year old female with history of heart attack who presents with chest pain and diarrhea. The patient reports that she has been having diarrhea with black stool for the last 5 days and also chest pain that is waxing and waning since yesterday. She reports taking pain medication to alleviate the chest pain. She states having similar chest pain in the past when she had heart attack twice in the past. She also reports poking a hole in the leg about 2 days ago with a pants dresser tool that caused swelling in her legs too. She also reports having breathing issue due to the chest pain and compares the pain to be like \" somebody sitting on her chest\". She denies fever, cough, blood in the stool, history of blood clot, but she does indicate having shoulder pain and also the use of baby aspirin.    Review of Systems   Constitutional: Negative for fever.   Respiratory: Negative for cough.    Cardiovascular: Positive for chest pain and leg swelling.   Gastrointestinal: Positive for diarrhea.   Musculoskeletal:        + shoulder pain   All other systems reviewed and are negative.    Allergies:  Codeine  Escitalopram Oxalate  Esomeprazole Magnesium Trihydrate  Imdur [Isosorbide]  Meperidine Hcl  Morphine Hcl  Oxycodone  Pentazocine  Propoxyphene Hcl  Sumatriptan Succinate    Medications:  Tylenol  Lipitor  Coreg  Flexeril  Unisom  flonase  Lasix  Neurontin  Mucinex  Norco  Cozaar  Atarax  Protonix  Valtrex    Past Medical History:     Migraine   Allergic rhinitis  Vasomotor rhinitis  Osteoporosis  Hiatal hernia  Macrocytosis without anemia  TSH deficiency  Anemia  CAD  CKD  Type 2 diabetes  Anxiety  GIB  STEMI  Paroxysmal atrial fibrillation  Ischemic cardiomyopathy  Osteoarthritis of left knee  Osteoarthritis of shoulder region  Chronic systolic heart failure  Protein calorie malnutrition  Acute GI bleeding  Chest pain  Herpes zoster " infection     Past Surgical History:    Angiogram  Appendectomy  Back surgery  Colonoscopy  Esophagoscopy  Head and neck surgery  Orthopedic surgery  Phacoemulsification clear cornea with standard  Knee surgery    Family History:    C.A.D - father  Cancer - mother  Hypertension - sister    Social History:  The patient presents to the ED alone.    Physical Exam     Patient Vitals for the past 24 hrs:   BP Temp Temp src Pulse Resp SpO2 Weight   07/19/22 1550 -- -- -- -- -- 95 % --   07/19/22 1548 -- -- -- -- -- 92 % --   07/19/22 1545 132/74 -- -- 114 -- -- --   07/19/22 1500 -- -- -- -- -- 97 % --   07/19/22 1445 113/69 -- -- 110 -- 97 % --   07/19/22 1430 (!) 130/101 -- -- (!) 125 -- 98 % --   07/19/22 1315 -- -- -- 114 -- 97 % --   07/19/22 1300 (!) 122/105 -- -- 57 -- -- --   07/19/22 1245 114/83 -- -- 119 -- 97 % --   07/19/22 1200 117/73 -- -- (!) 122 -- 96 % --   07/19/22 1145 126/69 -- -- 115 -- 97 % --   07/19/22 1130 126/67 -- -- 100 -- 96 % --   07/19/22 1115 129/64 -- -- 108 -- 97 % --   07/19/22 1030 (!) 122/90 -- -- 112 14 95 % --   07/19/22 1015 121/58 -- -- (!) 125 12 97 % --   07/19/22 1000 93/65 -- -- (!) 121 14 96 % --   07/19/22 0945 115/80 -- -- 108 -- -- --   07/19/22 0915 101/69 -- -- (!) 121 18 96 % --   07/19/22 0900 (!) 139/97 -- -- 110 (!) 35 97 % --   07/19/22 0845 -- -- -- 116 11 98 % --   07/19/22 0831 -- -- -- -- -- 96 % --   07/19/22 0827 (!) 141/88 98.4  F (36.9  C) Oral (!) 125 18 -- 49 kg (108 lb)       Physical Exam  General: Nontoxic. Resting comfortably  Head:  Scalp, face, and head appear normal  Eyes:  Pupils are equal, round    Conjunctivae non-injected and sclerae white  ENT:    The external nose is normal    Pinnae are normal  Neck:  Normal range of motion    There is no rigidity noted    Trachea is in the midline  CV:  Tachycardic rate, regular rhythm    Normal S1/S2, no S3/S4    No murmur or rub. Radial pulses 2+ bilaterally.  Resp:  Lungs are clear and equal  bilaterally  There is no tachypnea    No increased work of breathing    No rales, wheezing, or rhonchi  GI:  Abdomen is soft, no rigidity or guarding    No distension, or mass    No tenderness or rebound tenderness   MS:  Normal muscular tone    Symmetric motor strength    Significant pitting edema bilateral lower extremities.  Skin:  Recent appearing anterior tibial wounds to the left lower extremity without erythema, induration, purulence or bleeding. No other rash or acute skin lesions noted  Neuro: Awake and alert  Speech is normal and fluent  Moves all extremities spontaneously  Psych:  Normal affect. Appropriate interactions.      Emergency Department Course   ECG  ECG:  ECG taken at 832, ECG read at 0835  Sinus tachycardia with occasional premature ventricular complexes  Nonspecific T wave abnormality and PAC's  Abnormal ECG  Rate 130 bpm. NY interval 164 ms. QRS duration 82 ms. QT/QTc 302/444 ms. P-R-T axes 39 25 73.      Imaging:  US Abdomen Limited (RUQ)   Final Result   IMPRESSION:     1. Cholelithiasis and gallbladder sludge, without sonographic evidence   of acute cholecystitis.   2. 2 cm hypoechoic/anechoic partially exophytic area arising from the   lower pole of right kidney, likely represent mildly complex cyst   versus less likely solid renal mass. Recommend follow-up ultrasound in   three months to assess for interval change. Other evaluation options   including CT or MRI renal mass protocol.   3. Small amount of fluid in the right upper quadrant.      THOMAS ARTEAGA MD            SYSTEM ID:  [id number]      CT Chest Pulmonary Embolism w Contrast   Final Result   IMPRESSION:    1. No evidence of pulmonary embolism.   2. Small to moderate bilateral pleural effusions and associated   basilar atelectasis/consolidation.   3. Moderate-sized hiatal hernia.   4. Small amount of fluid in the upper abdomen of indeterminate   etiology.   5. Mild diffuse gallbladder wall thickening, nonspecific, could be  due   to presence of ascites. Can be further evaluated with right upper   quadrant abdominal ultrasound.   6. Diffuse osteopenia with multilevel degenerative changes of the   spine. Multiple mild compression deformities of the thoracic spine,   slightly worsened in T8 as compared to 11/9/2021 exam.      THOMAS ARTEAGA MD            SYSTEM ID:  [id number]      XR Chest 2 Views   Final Result   IMPRESSION: AP view of the chest was obtained. Mildly enlarged cardiac   silhouette. Small bilateral pleural effusions and associated basilar   atelectasis/consolidation. No significant pneumothorax. Postsurgical   changes of right shoulder arthroplasty. Significant degenerative   changes of the left shoulder joints. Multiple surgical clips in the   right breast/axilla.      THOMAS ARTEAGA MD            SYSTEM ID:  [id number]        Report per radiology    Laboratory:  Labs Ordered and Resulted from Time of ED Arrival to Time of ED Departure   D DIMER QUANTITATIVE - Abnormal       Result Value    D-Dimer Quantitative 1.97 (*)    BASIC METABOLIC PANEL - Abnormal    Sodium 138      Potassium 3.1 (*)     Chloride 110 (*)     Carbon Dioxide (CO2) 19 (*)     Anion Gap 9      Urea Nitrogen 18      Creatinine 0.65      Calcium 7.0 (*)     Glucose 114 (*)     GFR Estimate 84     NT PROBNP INPATIENT - Abnormal    N terminal Pro BNP Inpatient 6,367 (*)    CBC WITH PLATELETS AND DIFFERENTIAL - Abnormal    WBC Count 9.8      RBC Count 3.25 (*)     Hemoglobin 9.6 (*)     Hematocrit 31.7 (*)     MCV 98      MCH 29.5      MCHC 30.3 (*)     RDW 15.3 (*)     Platelet Count 247      % Neutrophils 73      % Lymphocytes 15      % Monocytes 9      % Eosinophils 1      % Basophils 1      % Immature Granulocytes 1      NRBCs per 100 WBC 0      Absolute Neutrophils 7.1      Absolute Lymphocytes 1.5      Absolute Monocytes 0.9      Absolute Eosinophils 0.1      Absolute Basophils 0.1      Absolute Immature Granulocytes 0.1      Absolute NRBCs 0.0      HEPATIC FUNCTION PANEL - Abnormal    Bilirubin Total 0.6      Bilirubin Direct <0.1      Protein Total 6.0 (*)     Albumin 2.3 (*)     Alkaline Phosphatase 143      AST 37      ALT 16     MAGNESIUM - Abnormal    Magnesium 1.0 (*)    TROPONIN I - Normal    Troponin I High Sensitivity 33     COVID-19 VIRUS (CORONAVIRUS) BY PCR - Normal    SARS CoV2 PCR Negative     LIPASE - Normal    Lipase 153     TROPONIN I - Normal    Troponin I High Sensitivity 36          Procedures      Emergency Department Course:             Reviewed:  I reviewed nursing notes, vitals and past medical history    Assessments:   I obtained history and examined the patient as noted above.    I rechecked the patient and explained findings.       Consults:  ED Course as of 07/19/22 1559   Tue Jul 19, 2022 1559 Case discussed with Dr. Patrick, hospitalist who accepts the patient for admission.        Interventions:  Medications   nitroGLYcerin (NITROSTAT) sublingual tablet 0.4 mg (0.4 mg Sublingual Given 7/19/22 0901)   ondansetron (ZOFRAN) injection 4 mg (has no administration in time range)   HYDROcodone-acetaminophen (NORCO) 5-325 MG per tablet 2 tablet (2 tablets Oral Given 7/19/22 0859)   furosemide (LASIX) injection 60 mg (60 mg Intravenous Given 7/19/22 1026)   Ct Scan Flush (81 mLs Intravenous Given 7/19/22 1109)   iopamidol (ISOVUE-370) solution 500 mL (58 mLs Intravenous Given 7/19/22 1100)   potassium chloride 10 mEq in 100 mL sterile water intermittent infusion (premix) (0 mEq Intravenous Stopped 7/19/22 1433)   ketorolac (TORADOL) injection 15 mg (15 mg Intravenous Given 7/19/22 1232)   magnesium sulfate 2 g in water intermittent infusion (0 g Intravenous Stopped 7/19/22 1551)   HYDROcodone-acetaminophen (NORCO) 5-325 MG per tablet 2 tablet (2 tablets Oral Given 7/19/22 1521)        Disposition:  The patient was admitted to the hospital under the care of Dr. Patrick.     Impression & Plan       Medical Decision Making:  Gosia GUITERREZ  Cheri is a 89 year old female with complex past medical history as noted above who presents for evaluation of chest pain, shortness of breath and exacerbation of her chronic pain in any other sites of her body.  On my evaluation she is hemodynamically stable and afebrile.  EKG reveals sinus tachycardia with PVCs and PACs.  No evidence of acute ischemic changes.  No fever, infectious symptoms or fever.  Patient has evidence of volume overload with significant bilateral pitting edema.  Initial troponin is within normal limits.  Repeat troponin not significantly elevated.  D-dimer was elevated therefore CT of the chest was obtained.  This was negative for pulmonary emboli but does reveal small to moderate bilateral pleural effusions with associated atelectasis.  In the abdominal images a small amount of ascites was also visualized.  Patient has multiple chronic compression fractures of the spine which is related to her chronic pain.  No new trauma, falls, or injuries.  CT of the chest revealed possible gallbladder wall thickening and pericholecystic fluid therefore right upper quadrant ultrasound was obtained.  This showed gallbladder sludge and gallstones but no evidence of acute cholecystitis.  No elevated LFTs, lipase.  No evidence of infection, acute cholecystitis or cholangitis clinically.  Patient was treated for CHF exacerbation with volume overload with IV Lasix.  Electrolyte repletion was provided.  Patient was continued on her home [place] and Toradol was provided for pain control.  Given the patient's volume overload/CHF exacerbation and uncontrolled pain she will require admission to the hospital for ongoing monitoring evaluation and treatment.  The case was discussed with the hospitalist and the patient was admitted in stable condition.  Acute coronary syndrome is felt to be unlikely given the above clinical picture and ED work-up.    Diagnosis:    ICD-10-CM    1. Chest pain, unspecified type  R07.9    2.  Acute on chronic congestive heart failure, unspecified heart failure type (H)  I50.9    3. Hypokalemia  E87.6    4. Other chronic pain  G89.29        Scribe Disclosure:  I, RONEN VAUGHN, am serving as a scribe at 8:34 AM on 7/19/2022 to document services personally performed by Michael Morley MD, based on my observations and the provider's statements to me.              Michael Morley MD  07/19/22 0668
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
## 2022-07-19 NOTE — ED TRIAGE NOTES
"Pt BIBA due to chest pain getting worse last night, \"feels like an elephant is on my chest.\" Pt endorses diarrhea as well, medics reports dark stools. 500ml of fluid en route along with 4 of zofran. . H/o MIs,       "

## 2022-07-19 NOTE — ED NOTES
[facility]  ED Nurse Handoff Report    Gosia Alonzo is a 89 year old female   ED Chief complaint: Chest Pain and Diarrhea  . ED Diagnosis:   Final diagnoses:   None     Allergies:   Allergies   Allergen Reactions     Codeine      GI UPSET     Escitalopram Oxalate      fatigue     Esomeprazole Magnesium Trihydrate      HA     Imdur [Isosorbide]      Headache       Meperidine Hcl      N/V     Morphine Hcl      HIVES     Oxycodone      (percodan) GI UPSET     Pentazocine      (talwin)  HALLUCINATIONS     Propoxyphene Hcl      STOMACH UPSET     Sumatriptan Succinate      chest pain       Code Status: previous  Activity level - Baseline/Home:  Assist X 1. Activity Level - Current:   Assist X 2. Lift room needed: No. Bariatric: No   Needed: No   Isolation: No. Infection: Not Applicable.     Vital Signs:   Vitals:    07/19/22 1200 07/19/22 1245 07/19/22 1300 07/19/22 1315   BP: 117/73 114/83 (!) 122/105    Pulse: (!) 122 119 57 114   Resp:       Temp:       TempSrc:       SpO2: 96% 97%  97%   Weight:           Cardiac Rhythm:  ,      Pain level:    Patient confused: No. Patient Falls Risk: Yes.   Elimination Status: Has voided   Patient Report - Initial Complaint: chest pain. Focused Assessment: cardiac, respiratory    Tests Performed: labs, ct, ultras sound. Abnormal Results: K+, mag.   Treatments provided: electrolyte replacement  Family Comments: n/a  OBS brochure/video discussed/provided to patient:  No  ED Medications:   Medications   nitroGLYcerin (NITROSTAT) sublingual tablet 0.4 mg (0.4 mg Sublingual Given 7/19/22 0901)   ondansetron (ZOFRAN) injection 4 mg (has no administration in time range)   magnesium sulfate 2 g in water intermittent infusion (has no administration in time range)   HYDROcodone-acetaminophen (NORCO) 5-325 MG per tablet 2 tablet (2 tablets Oral Given 7/19/22 0859)   furosemide (LASIX) injection 60 mg (60 mg Intravenous Given 7/19/22 1026)   Ct Scan Flush (81 mLs  Intravenous Given 7/19/22 1109)   iopamidol (ISOVUE-370) solution 500 mL (58 mLs Intravenous Given 7/19/22 1100)   potassium chloride 10 mEq in 100 mL sterile water intermittent infusion (premix) (10 mEq Intravenous New Bag 7/19/22 1234)   ketorolac (TORADOL) injection 15 mg (15 mg Intravenous Given 7/19/22 1232)     Drips infusing:  Yes  For the majority of the shift, the patient's behavior Green. Interventions performed were none.    Sepsis treatment initiated: No     Patient tested for COVID 19 prior to admission: YES    ED Nurse Name/Phone Number: Alessandra Lopez RN,   2:16 PM    RECEIVING UNIT ED HANDOFF REVIEW    Above ED Nurse Handoff Report was reviewed: Yes  Reviewed by: Vinicio Emmanuel RN on July 19, 2022 at 4:41 PM

## 2022-07-19 NOTE — PHARMACY-ADMISSION MEDICATION HISTORY
Admission medication history interview status for this patient is complete. See [facility] admission navigator for allergy information, prior to admission medications and immunization status.     Medication history interview done, indicate source(s): Patient  Medication history resources (including written lists, pill bottles, clinic record):Epic List, Sure scripts  Pharmacy: [facility]    Changes made to PTA medication list:  Added: -  Changed: Furosemide from scheduled to prn  Reported as Not Taking: Flexeril, ferrous, gabapentin, valtrex  Removed: Flexeril, ferrous, gabapentin, valtrex    Actions taken by pharmacist (provider contacted, etc):None     Additional medication history information:None    Medication reconciliation/reorder completed by provider prior to medication history?  N    Prior to Admission medications    Medication Sig Last Dose Taking? Auth Provider Long Term End Date   acetaminophen (TYLENOL) 325 MG tablet Take 2 tablets (650 mg) by mouth every 6 hours as needed for mild pain Unknown at Unknown time Yes Kd Malagon, DO     aspirin EC 81 MG EC tablet Take 1 tablet (81 mg) by mouth daily 7/18/2022 at am Yes Radha Umanzor PAPatriciaC No    atorvastatin (LIPITOR) 40 MG tablet TAKE 1 TABLET BY MOUTH ONE TIME DAILY 7/18/2022 at pm Yes Michael Londono MD Yes    carvedilol (COREG) 3.125 MG tablet Take 1 tablet (3.125 mg) by mouth 2 times daily (with meals) 7/19/2022 at x1 Yes Hiren Collins MD Yes    doxylamine (UNISOM) 25 MG TABS tablet Take 50 mg by mouth At Bedtime 7/17/2022 at Unknown time Yes Unknown, Entered By History     fluticasone (FLONASE) 50 MCG/ACT nasal spray Spray 1 spray into both nostrils daily 7/18/2022 at pm Yes Reported, Patient     furosemide (LASIX) 20 MG tablet Take 1 tablet (20 mg) by mouth daily Add additional 20mg for increased edema  Patient taking differently: Take 20 mg by mouth daily as needed Add additional 20mg for increased edema Past Week at Unknown  time Yes Hiren Collins MD Yes    guaiFENesin (MUCINEX) 600 MG 12 hr tablet Take 600 mg by mouth as needed for congestion Reported on 4/11/2017 7/19/2022 at Unknown time Yes Reported, Patient     HYDROcodone-acetaminophen (NORCO)  MG per tablet Take 1 tablet by mouth every 6 hours as needed for severe pain 7/19/2022 at Unknown time Yes Kd Malagon,      hydrOXYzine (ATARAX) 25 MG tablet TAKE ONE TABLET BY MOUTH EVERY SIX HOURS AS NEEDED FOR ITCHING OR ADJUVANT PAIN 7/17/2022 at Unknown time Yes Michael Londono MD     lidocaine (LMX4) 4 % external cream Apply topically 3 times daily as needed for mild pain Unknown at Unknown time Yes Sav Matson MD     losartan (COZAAR) 25 MG tablet Take 1 tablet (25 mg) by mouth daily 7/18/2022 at am Yes Hiren Collins MD Yes    pantoprazole (PROTONIX) 40 MG EC tablet TAKE 1 TABLET BY MOUTH TWICE DAILY 7/18/2022 at Unknown time Yes Michael Londono MD

## 2022-07-20 NOTE — PLAN OF CARE
Goal Outcome Evaluation:  A & O, needing prompting to move more in bed as sacrum/coccyx area reddened but blanches. Offer repositioning q2h. Assist of 2 with walker.  VSS. Given Atarax for increased anxiety.  See flowsheets for edema/dressing on both feet/legs. Using Purewick. O2 taken off, and O2 sat = 96% on RA.  WOC changed dressings. K+, Mg and Phos WNL, recheck in AM.  Tele: with PVC's Plan: LAURAD

## 2022-07-20 NOTE — CONSULTS
"[facility]- [facility].O.[facility]    Received CORE Clinic Consult from Dr Patrick.    Reviewed Estephania's chart and note they are admitted forevaluation of diarrhea, shortness of breath, and chest discomfort. Admit for Acute exacerbation of chronic systolic and diastolic congestive heart failure, Bilateral pleural effusions, Ascites by CT scan, Recent diarrhea, Report of melena, Hypokalemia and Hypomagnesemia Echocardiogram on 7/20/2022 shows LVEF 35-40%.  This is their first admission(s) in the past year for concerns of heart failure.    BNP 6,367  HGB 8.7    Given above information, Estephania  meets criteria for CORE Clinic as patients EF is =/<40%.   She is followed by Dr Collins (last 9/15/2021) and has seen Megan Ortiz CNP (last 1/2/2018)    Met with Estephania and her daughter Wen at bedside to discuss CORE Clinic consult request.  Patient/family confirm they plan to enroll in CORE Clinic.    Patient's primary insurance:  [facility]    Pt reports the following HF symptoms prior to admission:  \"chest pressure and horrific back pain. 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 I have compression fractures. I also had horrible black diarrhea.\"   Daughter states it was a \"multitude of things that brought her in. Sever back pain, diarrhea, low potassium, low magnesium and shortness of breath\".     Living situation:  Alone in [facility]. She has 4 children that check in with her often. Daughter in law also helps.     Med set up:  Self. Keeps meds in bottles and has them  morning and evening. Declines interest in pill box.     Scale available at home:  Yes, does weigh daily \"except this week\"  We did discuss morning weights after she uses restroom.     Barriers to HF follow-up:  [place], mobility due to back pain. \"I hate being a nuance to my kids\"   Uses wheeled walker.       CM/HF education topics we reviewed:  1. Low sodium - Does not use extra salt. Uses pepper more. Has poor appetite, \"dr" "told me I can eat whatever taste good to me but I do watch what I eat.\"  Daughter in law goes grocery shopping for her.   2. She is not on a Fluid Restriction  3. Daily weights - does weigh daily  4. Symptoms of HF to be reported to CORE RN  5. Activity, medication compliance     Education materials provided:    1. Low sodium food and drink handout  2. Low sodium food product examples  3. Already prepared low salt meal delivery services  4. HF stoplight tool  5. Guide to HF booklet      I have arranged a lab appointment and CORE visit with Megan Ortiz CNP on 8/11/22 and Dr Collins 10/18, see below.       Instructed Estephania to call CORE RN at [phone] with questions/concerns prior to this visit.  Specifically instructed them to call RN with weight gain greater than 2 lbs overnight, and/or 5 lbs in a week, and/or worsening SOB/edema.     Future Appointments   Date Time Provider Department Center   7/21/2022  3:00 PM [facility]   7/22/2022 11:15 AM Gisell Prado OT [facility]   8/11/2022  1:00 PM RU LAB [facility]   8/11/2022  1:50 PM Megan Ortiz APRN CNP [facility]   10/18/2022  1:45 PM Hiren Collins MD [facility]       Estephania voices understanding and denies further questions or concerns at this time.      Please call with any further questions.    Lorna Plaza RN  CORE Clinic Care Coordinator  [facility]  [phone] Phone  [phone] Fax        "

## 2022-07-20 NOTE — PROGRESS NOTES
[facility]    Medicine Progress Note - Hospitalist Service    Date of Admission:  7/19/2022  Date of Service: 07/20/2022    Assessment & Plan          Gosia Alonzo is an 89 year old female with multiple medical problems.  She has history of coronary artery disease, NSTEMI, LAD stent, ischemic cardiomyopathy with systolic and diastolic congestive heart failure (last echo in 9/21 with EF of 45 to 50% and grade 1 diastolic congestive heart failure), paroxysmal atrial fibrillation, pericarditis, type 2 diabetes, hypertension, dyslipidemia, migraines, diverticular disease, previous GI bleed, GERD, breast cancer, osteoporosis, and chronic pain.  She has chronic back and joint pain.  She has had several orthopedic and spine surgeries and gets regular injections of her spine and joints.  She lives independently in a townhouse in [place].  She presented to the [facility] emergency department today (7/19/2022) for evaluation of diarrhea, shortness of breath, and chest discomfort.  She reported having diarrhea with black stools for the last 4 days.  She said that this exacerbated her chronic pain, she became anxious, and this caused her shortness of breath.  She denied abdominal pain, nausea, vomiting, cough, fevers, chills.  Emergency department evaluation showed no fever.  Heart rate was in the 110's.  She was not hypoxic.  Laboratory evaluation showed potassium 3.1, chloride 110, bicarb 19, calcium 7, magnesium 1.0, albumin 2.3, BNP 6367, normal LFTs, troponin 36, white blood cells 9.8, hemoglobin 9.6, platelets 247, D-dimer 1.97, and negative COVID-19 testing.  Chest x-ray showed small bilateral pleural effusions and associated bibasilar atelectasis or consolidation.  CT of chest was obtained and showed similar findings of small bilateral pleural effusions and associated by basilar atelectasis or consolidation. It also showed no pulmonary embolism, hiatal hernia, fluid in the  abdomen, and gallbladder wall thickening possibly artifact of abdominal fluid.  Gosia was given Lasix 60 mg IV for suspected congestive heart failure.     Problem list    Shortness of breath and chest pain  Acute exacerbation of chronic systolic and diastolic congestive heart failure  Bilateral pleural effusions  Ascites by CT scan  Suspect some component of shortness of breath chest pain due to anxiety  -On exam she does not look overtly volume overloaded but there are objective signs of volume overload with elevated BNP, fluid in the abdomen, and pleural effusions.  -Most recent echo was in 9/21 and showed ejection fraction 45 to 50% and grade 1 diastolic congestive heart failure  Plan:  -Diurese cautiously with Lasix 20 mg IV twice daily  -Monitor daily weights as well as intake/output  -Daily basic metabolic panel  -Monitor on telemetry  -Echocardiogram -> The left ventricle is normal in size. Proximal septal thickening is noted. Left ventricular systolic function is moderately reduced. The visual ejection fraction is 35-40%. Left ventricular diastolic function is abnormal. There is severe hypokinesis of the mid anteroseptal/inferoseptal walls and the apical septal/inferior walls. In direct comparison to the previous study dated 09/10/2021, the findings appear similar.  -Hydroxyzine for anxiety    Recent diarrhea  Report of melena  History of diverticular disease  History of GERD  -Patient has not had any diarrhea since last night.  She reports melena for the last 4 days.  -Monitor for signs of GI blood loss  -Continue prior to admission twice daily Protonix  -Avoid anticoagulants until clear that there is no GI bleeding  -[facility] consulted -> Hgb 9.6 -> 8.7 despite diuresis, had recent melena    Hypokalemia  Hypomagnesemia  -Replace per electrolyte replacement protocols    Chronic leg wounds  -These do not look acutely infected although there is some chronic erythema of the bilateral lower  legs  -WOC    Chronic pain  -She has chronic pain involving her back, joints, etc.  -She has intolerance to many pain medications  Plan:  -Resume prior to admission Tylenol but will schedule.  Resume prior to admission Norco 1 tablet 4 times a day as needed  -Resume prior to admission hydroxyzine (she is requesting refill on discharge)  -Voltaren gel and Lidoderm patches for use as needed  -offered pain consult and patient declined    Coronary artery disease  Previous NSTEMI  Previous LAD stent  History of pericarditis  Paroxysmal atrial fibrillation  Dyslipidemia  Ischemic cardiomyopathy  Diastolic congestive heart failure  -Resume prior to admission aspirin, Coreg, losartan, and Lipitor  -Repeated echocardiogram -> see above  -Doubt coronary ischemia as cause of acute congestive heart failure    Type 2 diabetes, diet controlled  - If blood glucose is elevated NovoLog sliding scale insulin can be added     Diet: Combination Diet Regular Diet Adult; 2 gm NA Diet    DVT Prophylaxis: Pneumatic Compression Devices  Manzano Catheter: Not present  Central Lines: None  Cardiac Monitoring: ACTIVE order. Indication: Acute decompensated heart failure (48 hours)  Code Status: No CPR- Pre-arrest intubation OK      Disposition Plan      Expected Discharge Date: 07/22/2022        Discharge Comments: pending chest pain        The patient's care was discussed with the Bedside Nurse and Patient.    Chester Rogers MD  Hospitalist Service  [facility]  Securely message with the Vocera Web Console (learn more here)  Text page via Cl[employer] Paging/Directory         Clinically Significant Risk Factors Present on Admission             # Hypoalbuminemia: Albumin = 2.3 g/dL (Ref range: 3.4 - 5.0 g/dL) on admission, will monitor as appropriate     # Hypertension: home medication list includes antihypertensive(s)      # Moderate Malnutrition: based on nutrition assessment      ______________________________________________________________________    Interval History     Reports recent melena, none today  No CP/ SOB  No nausea / vomiting or abdominal pain  No fevers or chills    Data reviewed today: I reviewed all medications, new labs and imaging results over the last 24 hours. I personally reviewed no images or EKG's today.    Physical Exam   Vital Signs: Temp: 98.1  F (36.7  C) (Pt Just drank ice water) Temp src: Oral BP: 121/68 Pulse: 86   Resp: 18 SpO2: 96 % O2 Device: None (Room air) Oxygen Delivery: 3 LPM  Weight: 107 lbs 8 oz    GENERAL:  Comfortable. Cooperative.  HEART:  Regular rate and rhythm. No JVD. Pulses normal. No edema.  LUNGS:  Clear to auscultation, normal Respiratory effort.  ABDOMEN:  Soft, no hepatosplenomegaly, normal bowel sounds.  EXTREMETIES: No clubbing, cyanosis or ischemia.  Bilateral lower extremities with chronic wounds anteriorly.  There are some associated erythema that is chronic.  There are some darker discoloration of the toes which is normal.  Her daughter-in-law tells me that the doctor discoloration usually extends up the legs further.  SKIN:  Dry to touch, No rash.       Data   Recent Labs   Lab 07/20/22  0622 07/20/22  0351 07/19/22  2354 07/19/22  2352 07/19/22  0836   WBC 6.1  --   --   --  9.8   HGB 8.7*  --  9.5*  --  9.6*   MCV 99  --   --   --  98     --   --   --  247     --   --  136 138   POTASSIUM 3.9 3.7  --  3.6 3.1*   CHLORIDE 109  --   --  107 110*   CO2 22  --   --  23 19*   BUN 19  --   --  19 18   CR 0.72  --   --  0.72 0.65   ANIONGAP 6  --   --  6 9   LIGIA 7.8*  --   --  7.4* 7.0*   *  --   --  135* 114*   ALBUMIN  --   --   --   --  2.3*   PROTTOTAL  --   --   --   --  6.0*   BILITOTAL  --   --   --   --  0.6   ALKPHOS  --   --   --   --  143   ALT  --   --   --   --  16   AST  --   --   --   --  37   LIPASE  --   --   --   --  153     Recent Results (from the past 24 hour(s))   Echocardiogram Complete    Result Value    LVEF  35-40%    [facility]    [id number]  [id number]  [id number]  [id number]^VALERIO^NITZA^TANNER     [facility]  Echocardiography Laboratory  [address] [place] [address]     Name: MILAGRO ELY  MRN: [id number]  : 1932  Study Date: 2022 12:58 PM  Age: 89 yrs  Gender: Female  Patient Location: [facility]  Reason For Study: Heart Failure  Ordering Physician: NITZA LUO  Performed By: Sonal Hernandez     BSA: 1.5 m2  Height: 65 in  Weight: 107 lb  HR: 97  ______________________________________________________________________________  Procedure  Complete Portable Echo Adult.  ______________________________________________________________________________  Interpretation Summary     1. The left ventricle is normal in size. Proximal septal thickening is noted.  Left ventricular systolic function is moderately reduced. The visual ejection  fraction is 35-40%. Left ventricular diastolic function is abnormal. There is  severe hypokinesis of the mid anteroseptal/inferoseptal walls and the apical  septal/inferior walls.  2. The right ventricle is mildly dilated. The right ventricular systolic  function is normal.  3. The left atrium is severely dilated. The right atrium is mildly dilated.  There is no color Doppler evidence of an atrial shunt.  4. Mild tricupsid regurgitation. Right ventricular systolic pressure is  elevated, consistent with moderate pulmonary hypertension.  5. No pericardial effusion.  6. In direct comparison to the previous study dated 09/10/2021, the findings  appear similar.  ______________________________________________________________________________  Left Ventricle  The left ventricle is normal in size. Proximal septal thickening is noted.  Left ventricular systolic function is moderately reduced. The visual ejection  fraction is 35-40%. Left ventricular diastolic function is abnormal. There is  severe hypokinesis of the mid  anteroseptal/inferoseptal walls and the apical  septal/inferior walls.     Right Ventricle  The right ventricle is mildly dilated. The right ventricular systolic function  is normal.     Atria  The left atrium is severely dilated. The right atrium is mildly dilated. There  is no color Doppler evidence of an atrial shunt.     Mitral Valve  There is trace mitral regurgitation.     Tricuspid Valve  There is mild (1+) tricuspid regurgitation. The right ventricular systolic  pressure is approximated at 37.7 mmHg plus the right atrial pressure. IVC  diameter and respiratory changes fall into an intermediate range suggesting an  RA pressure of 8 mmHg. Right ventricular systolic pressure is elevated,  consistent with moderate pulmonary hypertension.     Aortic Valve  The aortic valve is not well visualized. There is trace aortic regurgitation.  No aortic stenosis is present.     Pulmonic Valve  There is no pulmonic valvular stenosis.     Vessels  The aortic root is normal size. Dilation of the inferior vena cava is present  with normal respiratory variation in diameter.     Pericardium  There is no pericardial effusion.     Rhythm  Sinus rhythm was noted.  ______________________________________________________________________________  MMode/2D Measurements & Calculations  IVSd: 0.63 cm  LVIDd: 4.6 cm  LVIDs: 3.8 cm  LVPWd: 0.71 cm  FS: 17.2 %  LV mass(C)d: 93.1 grams  LV mass(C)dI: 61.4 grams/m2  Ao root diam: 2.7 cm  LA dimension: 2.8 cm  LA/Ao: 1.0  LVOT diam: 1.7 cm  LVOT area: 2.3 cm2  LA Volume (BP): 79.5 ml  LA Volume Index (BP): 52.3 ml/m2  RWT: 0.31     Doppler Measurements & Calculations  MV E max leela: 94.1 cm/sec  MV A max leela: 86.4 cm/sec  MV E/A: 1.1  MV dec time: 0.17 sec  LV V1 max P.1 mmHg  LV V1 max: 113.0 cm/sec  LV V1 VTI: 20.8 cm  SV(LVOT): 47.2 ml  SI(LVOT): 31.1 ml/m2  PA acc time: 0.06 sec  TR max leela: 307.0 cm/sec  TR max P.7 mmHg  E/E' avg: 10.2  Lateral E/e': 8.1  Medial E/e': 12.3      ______________________________________________________________________________  Report approved by: Isabel Cardozo 07/20/2022 02:29 PM           Medications     Continuing ACE inhibitor/ARB/ARNI from home medication list OR ACE inhibitor/ARB order already placed during this visit       - MEDICATION INSTRUCTIONS -         acetaminophen  650 mg Oral Q8H     aspirin  81 mg Oral Daily     atorvastatin  40 mg Oral QPM     carvedilol  3.125 mg Oral BID w/meals     diclofenac  2-4 g Topical 4x Daily     fluticasone  1 spray Both Nostrils Daily     furosemide  20 mg Intravenous Q8H     lidocaine  1-2 patch Transdermal Q24H     lidocaine   Transdermal Q8H NKECHI     losartan  25 mg Oral Daily     pantoprazole  40 mg Oral BID     sodium chloride (PF)  3 mL Intracatheter Q8H

## 2022-07-20 NOTE — CONSULTS
[facility] Nurse Inpatient Assessment     Consulted for: Bilateral legs    Patient History (according to provider note(s):      Gosia Alonzo is an 89 year old female with multiple medical problems.  She has history of coronary artery disease, NSTEMI, LAD stent, ischemic cardiomyopathy with systolic and diastolic congestive heart failure (last echo in 9/21 with EF of 45 to 50% and grade 1 diastolic congestive heart failure), paroxysmal atrial fibrillation, pericarditis, type 2 diabetes, hypertension, dyslipidemia, migraines, diverticular disease, previous GI bleed, GERD, breast cancer, osteoporosis, and chronic pain.  She has chronic back and joint pain.  She has had several orthopedic and spine surgeries and gets regular injections of her spine and joints.  She lives independently in a townhouse in [place].  She presented to the [facility] emergency department today (7/19/2022) for evaluation of diarrhea, shortness of breath, and chest discomfort.    Areas Assessed:      Areas visualized during today's visit: Focused: BLE    Wound location: Left shin  Last photo: 7/20/22    Wound due to: Trauma  Wound history/plan of care: Patient reports she accidentally dropped something on leg about a week ago causing wounds.  Wound base: Superior shin wound is 1.5x1.5cm, 20% fibrin, 80% flap of skin. Inferior shin wound is 1.5x1.5cm, 50% dermis, 50% dry drainage.  Lateral calf is 1x0.5cm, 100% dry drainage.       Palpation of the wound bed: normal      Drainage: small     Description of drainage: serosanguinous     Measurements (length x width x depth, in cm): see above      Tunneling: N/A     Undermining: N/A  Periwound skin: Scar tissue      Color: pink      Temperature: normal   Odor: none  Pain: mild  Pain interventions prior to dressing change: patient tolerated well  Treatment goal: Heal   STATUS: initial assessment  Supplies ordered: supplies stored on unit     Wound  location: Right shin  Last photo: 7/20/22    Wound due to: Trauma  Wound history/plan of care: Patient reports injuring this a couple years ago, reports the scab comes off it bleeds and wound starts again.  Wound base: 100 % dry drainage     Palpation of the wound bed: firm      Drainage: none     Description of drainage: none     Measurements (length x width x depth, in cm): 3  x 1.7  cm      Tunneling: N/A     Undermining: N/A  Periwound skin: Intact      Color: normal and consistent with surrounding tissue      Temperature: normal   Odor: none  Pain: denies , none  Pain interventions prior to dressing change: N/A  Treatment goal: Heal   STATUS: initial assessment  Supplies ordered: supplies stored on unit    Wound location: Left heel  Last photo: 7/20/22    Wound due to: Trauma  Wound history/plan of care: Patient reports accidentally cutting this a couple weeks ago.  Wound base: 100 % dermis     Palpation of the wound bed: normal      Drainage: scant     Description of drainage: serosanguinous     Measurements (length x width x depth, in cm): 0.2  x 0.7 cm      Tunneling: N/A     Undermining: N/A  Periwound skin: Dry/scaly      Color: normal and consistent with surrounding tissue      Temperature: normal   Odor: none  Pain: moderate  Pain interventions prior to dressing change: patient tolerated well  Treatment goal: Heal   STATUS: initial assessment  Supplies ordered: supplies stored on unit       Treatment Plan:     Bilateral leg wound(s): Daily    1. Cleanse with wound cleanser and dry  2. Apply Sween cream to wounds and surrounding dry skin  3. Apply adaptic to wound  4. Cover with gauze and wrap with kerlix     Left heel wound(s): Every 3 days    1. Cleanse with wound cleanser and dry  2. Apply Mepilex 4x4  3. Float heels at all times with pillows under calves    Orders: Written    RECOMMEND PRIMARY TEAM ORDER: None, at this time  Education provided: plan of care  Discussed plan of care with: Patient and  Nurse  WOC nurse follow-up plan: weekly  Notify WOC if wound(s) deteriorate.  Nursing to notify the Provider(s) and re-consult the WOC Nurse if new skin concern.    DATA:     Current support surface: Standard  Atmos Air mattress  BMI: Body mass index is 17.89 kg/m .   Active diet order: Orders Placed This Encounter      Combination Diet Regular Diet Adult; 2 gm NA Diet     Output: I/O last 3 completed shifts:  In: 170 [P.O.:120; IV Piggyback:50]  Out: -      Labs: Recent Labs   Lab 07/20/22  0622 07/19/22  2354 07/19/22  0836   ALBUMIN  --   --  2.3*   HGB 8.7*   < > 9.6*   WBC 6.1  --  9.8    < > = values in this interval not displayed.     Pressure injury risk assessment:   Sensory Perception: 3-->slightly limited  Moisture: 3-->occasionally moist  Activity: 1-->bedfast  Mobility: 2-->very limited  Nutrition: 2-->probably inadequate  Friction and Shear: 2-->potential problem  Froylan Score: 13    Jonathan Munoz RN CWOCN  Dept. Pager: [phone]  Dept. Office Number: [phone]

## 2022-07-20 NOTE — PLAN OF CARE
/65 (BP Location: Right arm)   Pulse 111   Temp 98.6  F (37  C) (Oral)   Resp 14   Wt 49 kg (108 lb)   LMP  (LMP Unknown)   SpO2 97%   BMI 17.97 kg/m      PT arrived on unit @1800, admission and assessment completed. PT has scattered bruising, skin tear to L shin/knee, large recurrent scab to R knee/shin. Edema +1/+2 to BLE. LS clear/dim. Tele. C/o chronic back/knee pain, given norco with relief. Nausea w/ dry heaving this shift with relief from zofran. A&Ox4, hard of hearing. K&mag replaced. Will continue to monitor.

## 2022-07-20 NOTE — PROVIDER NOTIFICATION
Hospitalist paged    Pt having chest pain 7/10. Nitro being given. Please advise if you would like any additional interventions.Thanks!

## 2022-07-20 NOTE — PROGRESS NOTES
"Cross Cover    Responding to RRT    Called for patient with CP 7/10 escalated to 8/10, VSS given 2 NTG with improvement but not resolution. Known hx of CAF with most recent cath in 1/2016 2/2 STEMI due to acutely occluded LAD, cx with only 20-30 % plaque, mild dz in the obtuse marginals RCA non-dominant and without significant dz    No associated n/v/d/sob.  Worse with deep breathing.  Describes as constant with like a \"hum\", some radiation into left should and tingling down left arm.  Denies anxiety.     Difficulty with EKG machine but finally able to get a EKG which shows ST at 111 with PVCs and without ST elevation.      Stat bmp/trop/cbc,   Had complained of what sounded like melena pta, none noticed here and diarrhea has resolved.  Chronically on asa 81      if hgb stable will start low intensity heparin gtt, if still with CP will trial nitroglycerin patch  Ordered prn hydromorphone/lorazepam      Reassessment    reassessed 15 minutes later to see how she was doing after appropriate interventions with ntg x 3, hydromorphone.  Biggest complaint now was where na IV infiltrated and where a new one was placed    Pain had improved but still present now seems upper left chest radiating straight back to the scapula.  Feels like muscle pain to her with cramping    I had her lay flat to roll over in bed which lead to all kinds of pain-thoracic due to compression fx, right hip due to arthritis.  She also has pain ttp in the left chest and scapula which seems to recreate/accelerate the pain that lead up to the RRT    It's quite challenging to tease out what might be going on.    I'm less convinced this is cardiac but still think important to r/o  Trial aqua K pad, diclofenac gel/prn diazepam     Will check in again in 30 mins of so    Await labs    It is very difficult to tease out what pain           "

## 2022-07-20 NOTE — CONSULTS
Care Management Initial Consult    General Information  Assessment completed with: Patient, Children, Estephania and Wen  Type of CM/SW Visit: Initial Assessment    Primary Care Provider verified and updated as needed: Yes   Readmission within the last 30 days:        Reason for Consult: discharge planning, care coordination/care conference           Communication Assessment  Patient's communication style: spoken language (English or Bilingual)    Hearing Difficulty or Deaf: yes   Wear Glasses or Blind: yes    Cognitive  Cognitive/Neuro/Behavioral: WDL                      Living Environment:   People in home: alone     Current living Arrangements: house, other (see comments) (*Handicap accessible per pt)      Able to return to prior arrangements: yes       Family/Social Support:  Care provided by: self  Provides care for: no one  Marital Status:   Children          Description of Support System: Supportive, Involved    Support Assessment: Patient communicates needs well met    Current Resources:   Patient receiving home care services: No     Community Resources: None  Equipment currently used at home: walker, rolling      Employment/Financial:         Financial Concerns: No concerns identified           Lifestyle & Psychosocial Needs:  Social Determinants of Health     Tobacco Use: Low Risk      Smoking Tobacco Use: Never Smoker     Smokeless Tobacco Use: Never Used   Alcohol Use: Not on file   Financial Resource Strain: Not on file   Food Insecurity: Not on file   Transportation Needs: Not on file   Physical Activity: Not on file   Stress: Not on file   Social Connections: Not on file   Intimate Partner Violence: Not on file   Depression: Not at risk     PHQ-2 Score: 0   Housing Stability: Not on file              Care Management Follow Up    Length of Stay (days): 1    Expected Discharge Date: 07/22/2022     Concerns to be Addressed: care coordination/care conferences, discharge planning     Patient plan of  care discussed at interdisciplinary rounds: Yes    Anticipated Discharge Disposition: Home, Home Care     Anticipated Discharge Services: None  Anticipated Discharge DME: None    Patient/family educated on Medicare website which has current facility and service quality ratings: yes  Education Provided on the Discharge Plan:  Yes  Patient/Family in Agreement with the Plan: yes    Referrals Placed by CM/SW:  None  Private pay costs discussed: Not applicable    Additional Information:  Care management acknowledges consult for CHF and discharge planning.  Met with patient and daughter Wen at bedside. Daughter Seema was listening in on speaker phone.   Patient lives alone in a handicap accessible townhouse with no steps. Patient does not have any services. Patient manages medications independently. Patient uses a walker for mobility. Family assists with transportation and shopping.   Patient states weighs self at home. Heart failure education added to the AVS for further review upon discharge.   Patient and family expressed interest in home care nursing. Discussed skilled home care, choice of agency, and insurance coverage. Patient understanding and agreeable. Pt/family was provided with the Medicare Compare list for Home Care.  Discussed associated Medicare star ratings to assist with choice for referrals/discharge planning Yes    Education was given to pt/family that star ratings are updated/maintained by Medicare and can be reviewed by visiting www.medicare.gov Yes  Patient and family will review home care agency options and give choices to care management.     Care management will follow up for arranging home care and follow for further plan and recommendations.       Mery Villalobos, RN      Mery Villalobos RN Case Manager  Inpatient Care Coordination  [facility]   [phone]

## 2022-07-20 NOTE — DISCHARGE INSTRUCTIONS
The patient has received a copy of the Provation  report the doctor has written and discharge instructions have been discussed with the patient and responsible adult.  All questions were addressed and answered prior to patient discharge.     Bilateral leg wound(s): Daily    1. Cleanse with wound cleanser and dry  2. Apply Sween cream to wounds and surrounding dry skin  3. Apply adaptic to wound  4. Cover with gauze and wrap with kerlix     Left heel wound(s): Every 3 days    1. Cleanse with wound cleanser and dry  2. Apply Mepilex 4x4  3. Float heels at all times with pillows under calves

## 2022-07-20 NOTE — CONSULTS
"CLINICAL NUTRITION SERVICES  -  ASSESSMENT NOTE      Recommendations:   - Continue diet as ordered.  Encouraged consistent meals while acutely admitted, self-selection of protein.  - Declined oral supplement offering.     MALNUTRITION:  % Weight Loss:  Weight loss does not meet criteria for malnutrition --> previous wt loss  % Intake:  <75% for >/= 3 months (moderate malnutrition)  Subcutaneous Fat Loss:  Orbital region mild to moderate depletion and Upper arm region moderate to severe depletion --> not using as indicator with only 1 region present   Muscle Loss:  Temporal region moderate depletion, Clavicle bone region moderate to severe depletion and Acromion bone region moderate to severe depletion  Fluid Retention: Trace to mild, LEs --> potential to further mask wt trends    Malnutrition Diagnosis: Moderate malnutrition  In Context of:  Acute illness or injury with underlying chronic illness or disease          REASON FOR ASSESSMENT  Gosia Alonzo is a 89 year old female seen by Registered Dietitian for Admission Nutrition Risk Screen for positive and Provider Order - Nutrition Education - 2 gram Na diet.    PMH of: CAD, NSTEMI, cardiomyopathy, CHF, DMII, breast CA, chronic pain.      Admit 2/2: SOB.    NUTRITION HISTORY  - Information obtained from patient and chart.  - Diet at home: Feels like she already follows a low sodium diet, to her this means no added salt.    - Usual intakes: Meals BID-TID.  Makes her own meals.  - Barriers to PO intakes: Describes chronically decreased appetite with pain.  Likely meeting <75% needs for period of months based on description and BMI.  Downplays PO intakes with this writer, references she's \"eating great\" in the hospital.    - Use of oral supplements: None.  - Chewing/swallowing issues: Denied.  - Allergies: NKFA.      CURRENT NUTRITION ORDERS  Diet Order:     2 gram Na    Current Intake/Tolerance:  Limited timeframe since admit.      Obtained from " "Chart/Interdisciplinary Team:  - WOCN consulted, documentation of leg wounds but unclear if pressure component  - Stooling patterns reviewed    ANTHROPOMETRICS  Height: 5' 5\"  Weight: 107 lbs 8 oz  Body mass index is 17.89 kg/m .  Weight Status:  Underweight BMI <18.5  Weight History:  Wt Readings from Last 10 Encounters:   07/20/22 48.8 kg (107 lb 8 oz)   12/09/21 49 kg (108 lb)   11/09/21 54 kg (119 lb)   09/15/21 55.8 kg (123 lb)   06/15/21 56.7 kg (125 lb)   06/07/21 54.3 kg (119 lb 9.6 oz)   03/04/20 48.8 kg (107 lb 9.6 oz)   02/25/20 52.9 kg (116 lb 11.2 oz)   02/16/20 54 kg (119 lb)   02/12/20 52.7 kg (116 lb 3.2 oz)     - Wt stable when compared to 12/2021 trends, had previously lost. Does have trace to mild, LE edema.      LABS  Labs reviewed.      MEDICATIONS  Medications reviewed.      ASSESSED NUTRITION NEEDS PER APPROVED PRACTICE GUIDELINES:    Dosing Weight 49 kg   Estimated Energy Needs: 30-35 Kcal/Kg  Justification: repletion as able  Estimated Protein Needs: 1.2-1.5 g pro/Kg  Justification: Repletion and preservation of lean body mass  Estimated Fluid Needs: per MD      NUTRITION DIAGNOSIS:  Inadequate oral intake related to chronically decreased appetite as evidenced by previous wt loss, low BMI, fat/muscle loss, suspect possible further masking of wt trends w/ edema.    NUTRITION INTERVENTIONS  Recommendations / Nutrition Prescription  See above.      Implementation  Nutrition education: Provided education on 2 gram Na diet, handout provided.  Encouraged intake.  Declined oral supplement offering.    Nutrition Goals  Patient to consume at least 50-75% of meals TID while acutely admitted.       MONITORING AND EVALUATION:  Progress towards goals will be monitored and evaluated per protocol and Practice Guidelines          Radha Chinchilla RDN, LD  Clinical Dietitian  3rd floor/ICU: [phone]  All other floors: [phone]  Weekend/holiday: [phone]  Office: [phone]  "

## 2022-07-20 NOTE — PLAN OF CARE
[id number]    Pt alert and oriented x4. Ax2. RRT called by previous RN for chest pain. Nitroglycerin given x3. EKG sinus tachycardia. Trops 34 and 31. Pt was pretty anxious, but did sleep for a few hours overnight. Pt has not had any BM's this shift. Pt also c/o back pain, leg pain and pain from IV insertion. Will continue to monitor.

## 2022-07-21 NOTE — PROGRESS NOTES
Care Management Follow Up    Length of Stay (days): 2    Expected Discharge Date: 07/22/2022     Concerns to be Addressed: care coordination/care conferences, discharge planning     Patient plan of care discussed at interdisciplinary rounds: Yes    Anticipated Discharge Disposition: Home, Home Care     Anticipated Discharge Services: None  Anticipated Discharge DME: None    Patient/family educated on Medicare website which has current facility and service quality ratings: yes  Education Provided on the Discharge Plan: Yes   Patient/Family in Agreement with the Plan: yes    Referrals Placed by CM/SW:  None  Private pay costs discussed: Not applicable    Additional Information:  Care management following for discharge planning. Anticipate with home care RN/OT.  Met with patient and son Gary at bedside. Patient will review home care agencies with son Gary and daughter Seema and will let care management know of home care choice.     Update 1600: Followed up with patient and daughter Seema. They prefer [facility]. Referral sent to [facility] for HC RN/OT.       Mery Villalobos, RN      Mery Villalobos RN Case Manager  Inpatient Care Coordination  [facility]   [phone]

## 2022-07-21 NOTE — TELEPHONE ENCOUNTER
Called Wen with some appt options. Made appts for Estephania to enroll into [facility] at next opening with [facility]: 8/11 and Dr Collins 10/18.   Mailing out reminders per request.     Orders placed.    Future Appointments   Date Time Provider Department Center   7/21/2022  3:00 PM Denise Armstrong, PT [facility]   7/22/2022 11:15 AM Gisell Prado OT [facility] [place] [facility]   8/11/2022  1:00 PM RU LAB [facility] [place] [facility]   8/11/2022  1:50 PM Megan Ortiz APRN CNP [facility]   10/18/2022  1:45 PM Hiren Collins MD [facility]       Lorna Plaza, JULIAN 11:30 AM 07/21/22      
patient

## 2022-07-21 NOTE — CONSULTS
GASTROENTEROLOGY CONSULTATION      Gosia Alonzo  [address] [facility] [id number]  89 year old female     Admission Date/Time: 7/19/2022  Primary Care Provider: Michael Londono     We were asked to see the patient in consultation by Dr. Rogers for evaluation of Melena.    CC:Melena      HPI:  Gosia Alonzo is a 89 year old female with PMH of coronary artery disease, NSTEMI, LAD stent, ischemic cardiomyopathy with systolic and diastolic congestive heart failure (last echo in 9/21 with EF of 45 to 50% and grade 1 diastolic congestive heart failure), paroxysmal atrial fibrillation, pericarditis, type 2 diabetes, hypertension, dyslipidemia, migraines, diverticular disease, previous GI bleed, GERD, breast cancer, osteoporosis, and chronic pain. She presented to ED 07/19/22 for evaluation of diarrhea, Shortness of breath and chest discomfort.  At that time she had reported having diarrhea with several episodes of melena.  Labs on presentation to the ED revealed Hemoglobin 9.6 along with Calcium 7, Magnesium 1.0, Albumin 2.3, BNP 6367, normal LFTs, troponin 36.     CXR on admit with small bilateral pleural effusions and associated bibasilar atelectasis. CT chest pulmonary embolism was negative for PE but showed small to moderate bilateral pleural effusions and associated basilar atelectasis/ consolidation. Moderate-sized hiatal hernia and a small amount of fluid in the upper abdomen. Mild diffuse gallbladder wall thickening was also noted along with osteopenia with multilevel degenerative changes of the spine.     Subsequent abdominal ultrasound revealed cholelithiasis and gallbladder sludge without sonographic evidence of  Acute cholecystitis and a small amount of fluid in the right upper quadrant.       Patient's hemoglobin has trended downward since admission 9.6--> 8.7 despite diuresis with Lasix 20mg Q8h.  She does take Aspirin 81mg daily and Protonix 40mg BID as an outpatient which she has  continued during hospitalization.   BUN and kidney function noted to be wnl on labs this morning.     Patient reports she was on a course of prednisone prior to hospitalization for back pain which she reports she discontinued prior to presenting to the hospital. She had 2 episodes of melena with diarrhea prior to presenting to the hospital on 07/19/22. She denies any nausea, vomiting, abdominal pain or hematochezia. Notes she did have some brown loose stool in between episodes of melena. Has not had any bowel movements since admission. Denies any SOB this morning. Denies any use of NSAIDs other than her daily Aspirin.     Notably, patient did have an episode of melena 2 years ago as well.  EGD at that time noted  2 large non-bleeding duodenal ulcers with adherent clot. She was advised to continue PPI BID and to continue indefinitely while on aspirin.      PAST MEDICAL HISTORY:  Patient Active Problem List    Diagnosis Date Noted     Other chronic pain 07/19/2022     Priority: Medium     Hypokalemia 07/19/2022     Priority: Medium     Acute on chronic congestive heart failure, unspecified heart failure type (H) 07/19/2022     Priority: Medium     Acute bilateral thoracic back pain 11/10/2021     Priority: Medium     Chest pain, unspecified type 11/10/2021     Priority: Medium     Herpes zoster infection of thoracic region 11/10/2021     Priority: Medium     Acute GI bleeding 02/12/2020     Priority: Medium     Chronic systolic heart failure (H) 01/09/2019     Priority: Medium     Protein-calorie malnutrition (H) 01/09/2019     Priority: Medium     Left shoulder pain 09/06/2017     Priority: Medium     Paroxysmal atrial fibrillation (H) 10/26/2016     Priority: Medium     Osteoarthritis of left knee 10/26/2016     Priority: Medium     Osteoarthritis of shoulder region 10/26/2016     Priority: Medium     Other iron deficiency anemia 04/21/2016     Priority: Medium     Ischemic cardiomyopathy      Priority: Medium      7/14/2017 - EF 30-35% by echo  5/17/2016 - EF 35-40% by echo  2/16/2016 - EF 20-25% by echo  1/9/2016 - EF 25-30% by echo  12/29/2015 - EF 25-30% by echo  8/10/2015 - EF 55-60% by echo         STEMI involving left anterior descending coronary artery (H) 01/09/2016     Priority: Medium     Health Care Home 01/04/2016     Priority: Medium     Pt not active in clinic care coordination at this time.  Status:  [facility] clinic  Care Coordinator:  Teena Mg    See Letters for [facility] Care Plan  Date:  January 4, 2016           GIB (gastrointestinal bleeding) 01/04/2016     Priority: Medium     Anxiety 12/26/2015     Priority: Medium     Type 2 diabetes mellitus with diabetic nephropathy (H) 10/25/2015     Priority: Medium     CAD (coronary artery disease)      Priority: Medium     Cath 12/2015: aspiration thrombectomy and CAMILA to mid and prox LAD. Cath 1/9/16: In-stent restenosis. Aspiration thrombectomy and PTCA to mLAD.       CKD (chronic kidney disease), stage 3 (moderate)      Priority: Medium     Anemia 11/26/2014     Priority: Medium     Advanced directives, counseling/discussion 01/04/2012     Priority: Medium     Pt received HCD and will return when complete.     Wilton Story MA         Type 2 diabetes mellitus with stage 3 chronic kidney disease (H) 08/05/2011     Priority: Medium     TSH deficiency 02/16/2010     Priority: Medium     HYPERLIPIDEMIA LDL GOAL <100 02/10/2010     Priority: Medium     Macrocytosis without anemia 01/08/2010     Priority: Medium     Essential hypertension      Priority: Medium     Borderline LAE and LVH on echo 1/09       Osteoporosis      Priority: Medium     Hiatal hernia      Priority: Medium     VASOMOTOR RHINITIS      Priority: Medium     Versus sphenopalatine neuralgia  Dr. Wilson       Esophageal reflux 10/27/2004     Priority: Medium     Hiatal Hernia, Schatski's Ring       R upper extremity edema, postop 01/08/2004     Priority: Medium     Migraine 12/13/2002     Priority:  Medium     Zomig 2.5 mg effective for unilateral migraines, though causes nausea  Excedrin migraine works well for non-migraine headaches   Relpax not helpful  Maxalt trial 6/7/2007   Problem list name updated by automated process. Provider to review       Allergic rhinitis 12/13/2002     Priority: Medium     Problem list name updated by automated process. Provider to review            ROS: A comprehensive ten point review of systems was negative aside from those in mentioned in the HPI.       MEDICATIONS:   Prior to Admission medications    Medication Sig Start Date End Date Taking? Authorizing Provider   acetaminophen (TYLENOL) 325 MG tablet Take 2 tablets (650 mg) by mouth every 6 hours as needed for mild pain 2/15/20  Yes Kd Malagon, DO   aspirin EC 81 MG EC tablet Take 1 tablet (81 mg) by mouth daily 1/12/16  Yes Radha Umanzor PA-C   atorvastatin (LIPITOR) 40 MG tablet TAKE 1 TABLET BY MOUTH ONE TIME DAILY 5/4/22  Yes Michael Londono MD   carvedilol (COREG) 3.125 MG tablet Take 1 tablet (3.125 mg) by mouth 2 times daily (with meals) 10/26/21  Yes Hiren Collins MD   doxylamine (UNISOM) 25 MG TABS tablet Take 50 mg by mouth At Bedtime   Yes Unknown, Entered By History   fluticasone (FLONASE) 50 MCG/ACT nasal spray Spray 1 spray into both nostrils daily   Yes Reported, Patient   furosemide (LASIX) 20 MG tablet Take 1 tablet (20 mg) by mouth daily Add additional 20mg for increased edema  Patient taking differently: Take 20 mg by mouth daily as needed Add additional 20mg for increased edema 12/27/21  Yes Hiren Collins MD   guaiFENesin (MUCINEX) 600 MG 12 hr tablet Take 600 mg by mouth as needed for congestion Reported on 4/11/2017   Yes Reported, Patient   HYDROcodone-acetaminophen (NORCO)  MG per tablet Take 1 tablet by mouth every 6 hours as needed for severe pain 2/15/20  Yes Kd Malagon, DO   hydrOXYzine (ATARAX) 25 MG tablet TAKE ONE TABLET BY MOUTH EVERY SIX  HOURS AS NEEDED FOR ITCHING OR ADJUVANT PAIN 5/4/22  Yes Michael Londono MD   lidocaine (LMX4) 4 % external cream Apply topically 3 times daily as needed for mild pain 11/12/21  Yes Sav Matson MD   losartan (COZAAR) 25 MG tablet Take 1 tablet (25 mg) by mouth daily 10/25/21  Yes Hiren Collins MD   pantoprazole (PROTONIX) 40 MG EC tablet TAKE 1 TABLET BY MOUTH TWICE DAILY 5/18/22  Yes Michael Londono MD        ALLERGIES:   Allergies   Allergen Reactions     Codeine      GI UPSET     Escitalopram Oxalate      fatigue     Esomeprazole Magnesium Trihydrate      HA     Imdur [Isosorbide]      Headache       Meperidine Hcl      N/V     Morphine Hcl      HIVES     Oxycodone      (percodan) GI UPSET     Pentazocine      (talwin)  HALLUCINATIONS     Propoxyphene Hcl      STOMACH UPSET     Sumatriptan Succinate      chest pain        SOCIAL HISTORY:  Social History     Tobacco Use     Smoking status: Never Smoker     Smokeless tobacco: Never Used   Vaping Use     Vaping Use: Never used   Substance Use Topics     Alcohol use: No     Alcohol/week: 0.0 standard drinks     Comment: rarely     Drug use: No        FAMILY HISTORY:  Family History   Problem Relation Age of Onset     C.A.D. Father         MI 56     Cancer Mother         pancreatic CA     Cancer Brother         CA colon 58     Hypertension Sister         PHYSICAL EXAM:   /75 (BP Location: Right arm)   Pulse 95   Temp 98.2  F (36.8  C) (Oral)   Resp 18   Wt 48.8 kg (107 lb 8 oz)   LMP  (LMP Unknown)   SpO2 98%   BMI 17.89 kg/m       PHYSICAL EXAM:  General: alert, oriented, NAD  SKIN: multiple ecchymoses, dressings on b/l legs  HEAD: Normocephalic. No masses, lesions, tenderness or abnormalities  NECK: Neck supple. No adenopathy. Thyroid symmetric, normal size.  EYES: No scleral icterus  RESPIRATORY: CTABL  CARDIOVASCULAR: Regular rate and rhythm  GASTROINTESTINAL: +BS, soft, NT, ND,  no masses/guarding/rebound  JOINT/EXTREMITIES: no LE  edema noted   PSYCH: no abnormal anxiety/depression  LYMPH: No anterior cervical, posterior cervical, or supraclavicular adenopathy     LABS:  I reviewed the patient's new clinical lab test results.   Recent Labs   Lab Test 07/21/22  1051 07/20/22  0622 07/19/22  2354 07/19/22  0836 01/10/16  0630 01/09/16  1341 01/05/16  0520 01/04/16  1940 08/10/15  1713 08/10/15  1259   WBC 7.3 6.1  --  9.8   < > 10.2  --  9.3   < > 7.1   HGB 8.7* 8.7* 9.5* 9.6*   < > 10.5*   < > 8.3*   < > 11.2*   MCV 98 99  --  98   < > 87  --  92   < > 98    189  --  247   < > 294  --  266   < > 303   INR  --   --   --   --   --  0.95  --  1.10  --  0.98    < > = values in this interval not displayed.     Recent Labs   Lab Test 07/21/22  0644 07/20/22  0622 07/20/22  0351 07/19/22  2352 07/19/22  0836    137  --  136 138   POTASSIUM 4.1  4.1 3.9 3.7 3.6 3.1*   CHLORIDE 107 109  --  107 110*   CO2  --  22  --  23 19*   BUN  --  19  --  19 18   ANIONGAP  --  6  --  6 9   LIGIA  --  7.8*  --  7.4* 7.0*     Recent Labs   Lab Test 07/19/22  0836 11/23/21  0137 11/09/21  2159 06/10/21  1445 03/11/20  1537 02/11/20  2018 02/11/20  1904 01/12/20  1631 12/11/19  1654   ALBUMIN 2.3* 2.8*  --  3.1*   < >  --  2.5*   < >  --    BILITOTAL 0.6 0.5  --  0.5   < >  --  0.4   < >  --    ALT 16 18  --  13   < >  --  11   < >  --    AST 37 24  --  20   < >  --  16   < >  --    ALKPHOS 143 243*  --  216*   < >  --  92   < >  --    PROTEIN  --   --   --   --   --  Negative  --   --  30*   LIPASE 153  --  106  --   --   --  173   < >  --     < > = values in this interval not displayed.        IMAGING  I personally reviewed the patient's new imaging results.     CONSULTATION ASSESSMENT AND PLAN:        Active Problems:    Gosia Alonzo is a 89 year old female with PMH of coronary artery disease, NSTEMI, LAD stent, ischemic cardiomyopathy with systolic and diastolic congestive heart failure (last echo in 9/21 with EF of 45 to 50% and grade 1  diastolic congestive heart failure), paroxysmal atrial fibrillation, pericarditis, type 2 diabetes, hypertension, dyslipidemia, migraines, diverticular disease, previous GI bleed, GERD, breast cancer, osteoporosis, and chronic pain. She presented to ED 07/19/22 for evaluation of diarrhea, Shortness of breath and chest discomfort.  At that time she had reported having diarrhea with several episodes of melena.  Labs on presentation to the ED revealed Hemoglobin 9.6 along with Calcium 7, Magnesium 1.0, Albumin 2.3, BNP 6367, normal LFTs, troponin 36. Hemoglobin has continued to trend downward despite diuresis however no further melena. Given patient's significant cardiac history she is higher risk for upper endoscopy, however, she has improved from a cardiopulmonary standpoint since admission and certainly she has risk factors for peptic ulcers including aspirin use and recent use of steroids. We will proceed with EGD tomorrow to assess further and of course to rule out any possibility of malignancy.       Recommendations:  -NPO @ [place] for EGD tomorrow   -Continue to monitor stool, Hgb  -Transfuse PRN   -Continue Pantoprazole 40mg BID    Discussed with Dr. Clement     Total time spent:  Approximately, 35 minutes was spent providing patient care, including patient evaluation, reviewing documentation/test results, and . Thank you for asking us to participate in the care of this patient.      Teena Gil PA-C   [facility] ([facility])  Office: [phone]

## 2022-07-21 NOTE — PLAN OF CARE
Orientation:  A&O x4 [place]  VS: /75 (BP Location: Right arm)   Pulse 95   Temp 98.2  F (36.8  C) (Oral)   Resp 18   Wt 48.8 kg (107 lb 8 oz)   LMP  (LMP Unknown)   SpO2 98%   BMI 17.89 kg/m    Pain:  C/o chronic pain. See mar for meds given.   Tele:  SR  Activity: Ax1. Refused repos.    Resp:  LS dim.  GI:  WDL  : External cath  Skin:  Multiple wounds noted. New wound suspected on coccyx. Meplex applied. Repos suggested to pt, but pt declined.  Lines: SL  Other:  Pt was NPO during shift.  Plan:  Will continue to monitor.

## 2022-07-21 NOTE — PLAN OF CARE
Temp: 98.2  F (36.8  C) Temp src: Oral BP: 129/75 Pulse: 95   Resp: 18 SpO2: 98 % O2 Device: None (Room air)       Orientation:  x4 very pleasant, Son's on bedside, one after another  VS: stable.   Pain:  yes, Noroc PRN given with great relieve   Tele:  SR  Activity:  in bed this shift. Offered several time to ambulate her, she refused due to possible EGD   Resp:   LS clear. No SOB reported   GI:  No BM this shift. Was NPO for possible EGD - scheduled for tomorrow - ate late lunch with great appetite   : Purewick in place, good urine output   Notable Labs:  Mag 1.4 - replaced, recheck 1.8 - recheck again tomorrow. Phose and K+ in range, recheck tomorrow   Skin:  bruised arms, no adjustment needed. Wound care done per order.   Lines: PIV left pulled due to occlusion and phlebitis, right PIV patent   Other:  EGD is scheduled for tomorrow morning. NPO at might night   Plan:   Continue with plan of care

## 2022-07-21 NOTE — PROGRESS NOTES
[facility]    Medicine Progress Note - Hospitalist Service    Date of Admission:  7/19/2022  Date of Service: 07/21/2022    Assessment & Plan          Gosia Alonzo is an 89 year old female with multiple medical problems.  She has history of coronary artery disease, NSTEMI, LAD stent, ischemic cardiomyopathy with systolic and diastolic congestive heart failure (last echo in 9/21 with EF of 45 to 50% and grade 1 diastolic congestive heart failure), paroxysmal atrial fibrillation, pericarditis, type 2 diabetes, hypertension, dyslipidemia, migraines, diverticular disease, previous GI bleed, GERD, breast cancer, osteoporosis, and chronic pain.  She has chronic back and joint pain.  She has had several orthopedic and spine surgeries and gets regular injections of her spine and joints.  She lives independently in a townhouse in [place].  She presented to the [facility] emergency department today (7/19/2022) for evaluation of diarrhea, shortness of breath, and chest discomfort.  She reported having diarrhea with black stools for the last 4 days.  She said that this exacerbated her chronic pain, she became anxious, and this caused her shortness of breath.  She denied abdominal pain, nausea, vomiting, cough, fevers, chills.  Emergency department evaluation showed no fever.  Heart rate was in the 110's.  She was not hypoxic.  Laboratory evaluation showed potassium 3.1, chloride 110, bicarb 19, calcium 7, magnesium 1.0, albumin 2.3, BNP 6367, normal LFTs, troponin 36, white blood cells 9.8, hemoglobin 9.6, platelets 247, D-dimer 1.97, and negative COVID-19 testing.  Chest x-ray showed small bilateral pleural effusions and associated bibasilar atelectasis or consolidation.  CT of chest was obtained and showed similar findings of small bilateral pleural effusions and associated by basilar atelectasis or consolidation. It also showed no pulmonary embolism, hiatal hernia, fluid in the  abdomen, and gallbladder wall thickening possibly artifact of abdominal fluid.  Gosia was given Lasix 60 mg IV for suspected congestive heart failure.     Problem list    Shortness of breath and chest pain  Acute exacerbation of chronic systolic and diastolic congestive heart failure  Bilateral pleural effusions  Ascites by CT scan  Suspect some component of shortness of breath chest pain due to anxiety  -On exam she does not look overtly volume overloaded but there are objective signs of volume overload with elevated BNP, fluid in the abdomen, and pleural effusions.  -Most recent echo was in 9/21 and showed ejection fraction 45 to 50% and grade 1 diastolic congestive heart failure  Plan:  -Diurese cautiously with Lasix 20 mg IV twice daily -> resume PO Lasix 07/22  -Monitor daily weights as well as intake/output  -Daily basic metabolic panel  -Monitor on telemetry  -Echocardiogram -> The left ventricle is normal in size. Proximal septal thickening is noted. Left ventricular systolic function is moderately reduced. The visual ejection fraction is 35-40%. Left ventricular diastolic function is abnormal. There is severe hypokinesis of the mid anteroseptal/inferoseptal walls and the apical septal/inferior walls. In direct comparison to the previous study dated 09/10/2021, the findings appear similar.  -Hydroxyzine as needed for anxiety    Recent diarrhea  Report of melena  History of diverticular disease  History of GERD  -Patient has not had any diarrhea since last night.  She reports melena for the last 4 days.  -Monitor for signs of GI blood loss  -Continue prior to admission twice daily Protonix  -Avoid anticoagulants until clear that there is no GI bleeding  -[facility] consulted -> Hgb 9.6 -> 8.7 despite diuresis, had recent melena. Plan for EGD in AM   -NPO@ midnight     Hypokalemia  Hypomagnesemia  -Replace per electrolyte replacement protocols    Chronic leg wounds  -These do not look acutely infected although  there is some chronic erythema of the bilateral lower legs  -[facility]    Chronic pain  -She has chronic pain involving her back, joints, etc.  -She has intolerance to many pain medications  Plan:  -Resume prior to admission Tylenol but will schedule.  -Resume prior to admission Norco 1 tablet 4 times a day as needed  -Resume prior to admission hydroxyzine (she is requesting refill on discharge)  -Voltaren gel and Lidoderm patches for use as needed  -offered pain consult and patient declined    Coronary artery disease  Previous NSTEMI  Previous LAD stent  History of pericarditis  Paroxysmal atrial fibrillation  Dyslipidemia  Ischemic cardiomyopathy  Diastolic congestive heart failure  -Resume prior to admission aspirin, Coreg, losartan, and Lipitor  -Repeated echocardiogram -> see above  -Doubt coronary ischemia as cause of acute congestive heart failure    Type 2 diabetes, diet controlled  - If blood glucose is elevated NovoLog sliding scale insulin can be added     Diet: Diet  Regular Diet Adult  NPO per Anesthesia Guidelines for Procedure/Surgery Except for: Meds    DVT Prophylaxis: Pneumatic Compression Devices  Manzano Catheter: Not present  Central Lines: None  Cardiac Monitoring: ACTIVE order. Indication: Acute decompensated heart failure (48 hours)  Code Status: No CPR- Pre-arrest intubation OK      Disposition Plan      Expected Discharge Date: 07/22/2022,  3:00 PM    Destination: home with help/services  Discharge Comments: more diuresis, needs home services        The patient's care was discussed with the Bedside Nurse and Patient.    Chester Rogers MD  Hospitalist Service  [facility]  Securely message with the Vocera Web Console (learn more here)  Text page via [employer] Paging/Directory         Clinically Significant Risk Factors Present on Admission                  # Moderate Malnutrition: based on nutrition assessment      ______________________________________________________________________    Interval History     No acute events overnight  No melena / bloody stools today  No CP/ SOB  No nausea / vomiting or abdominal pain  No fevers or chills    Data reviewed today: I reviewed all medications, new labs and imaging results over the last 24 hours. I personally reviewed no images or EKG's today.    Physical Exam   Vital Signs: Temp: 98.2  F (36.8  C) Temp src: Oral BP: 129/75 Pulse: 95   Resp: 18 SpO2: 98 % O2 Device: None (Room air)    Weight: 107 lbs 8 oz    GENERAL:  Comfortable. Cooperative.  HEART:  Regular rate and rhythm. No JVD. Pulses normal. No edema.  LUNGS:  Clear to auscultation, normal Respiratory effort.  ABDOMEN:  Soft, no hepatosplenomegaly, normal bowel sounds.  EXTREMETIES: No clubbing, cyanosis or ischemia.  Bilateral lower extremities with chronic wounds anteriorly.  There are some associated erythema that is chronic.  There are some darker discoloration of the toes which is normal.   SKIN:  Dry to touch, No rash.  NEURO: A/Ox3. Gustavo, no focal deficits.        Data   Recent Labs   Lab 07/21/22  1051 07/21/22  0644 07/20/22  0622 07/20/22  0351 07/19/22  2354 07/19/22  2352 07/19/22  0836   WBC 7.3  --  6.1  --   --   --  9.8   HGB 8.7*  --  8.7*  --  9.5*  --  9.6*   MCV 98  --  99  --   --   --  98     --  189  --   --   --  247   NA  --  138 137  --   --  136 138   POTASSIUM  --  4.1  4.1 3.9 3.7  --  3.6 3.1*   CHLORIDE  --  107 109  --   --  107 110*   CO2  --  24 22  --   --  23 19*   BUN  --  25 19  --   --  19 18   CR  --  0.90 0.72  --   --  0.72 0.65   ANIONGAP  --  7 6  --   --  6 9   LIGIA  --  7.6* 7.8*  --   --  7.4* 7.0*   GLC  --  120* 114*  --   --  135* 114*   ALBUMIN  --   --   --   --   --   --  2.3*   PROTTOTAL  --   --   --   --   --   --  6.0*   BILITOTAL  --   --   --   --   --   --  0.6   ALKPHOS  --   --   --   --   --   --  143   ALT  --   --   --   --   --   --  16   AST  --    --   --   --   --   --  37   LIPASE  --   --   --   --   --   --  153     Recent Results (from the past 24 hour(s))   Echocardiogram Complete   Result Value    LVEF  35-40%    [facility]    [id number]  [id number]  [id number]  [id number]^VALERIO^NITZA^TANNER     [facility]  Echocardiography Laboratory  [address] [place] [address]     Name: MILAGRO ELY  MRN: [id number]  : 1932  Study Date: 2022 12:58 PM  Age: 89 yrs  Gender: Female  Patient Location: [facility]  Reason For Study: Heart Failure  Ordering Physician: NITZA LUO  Performed By: Sonal Hernandez     BSA: 1.5 m2  Height: 65 in  Weight: 107 lb  HR: 97  ______________________________________________________________________________  Procedure  Complete Portable Echo Adult.  ______________________________________________________________________________  Interpretation Summary     1. The left ventricle is normal in size. Proximal septal thickening is noted.  Left ventricular systolic function is moderately reduced. The visual ejection  fraction is 35-40%. Left ventricular diastolic function is abnormal. There is  severe hypokinesis of the mid anteroseptal/inferoseptal walls and the apical  septal/inferior walls.  2. The right ventricle is mildly dilated. The right ventricular systolic  function is normal.  3. The left atrium is severely dilated. The right atrium is mildly dilated.  There is no color Doppler evidence of an atrial shunt.  4. Mild tricupsid regurgitation. Right ventricular systolic pressure is  elevated, consistent with moderate pulmonary hypertension.  5. No pericardial effusion.  6. In direct comparison to the previous study dated 09/10/2021, the findings  appear similar.  ______________________________________________________________________________  Left Ventricle  The left ventricle is normal in size. Proximal septal thickening is noted.  Left ventricular systolic function is moderately reduced. The  visual ejection  fraction is 35-40%. Left ventricular diastolic function is abnormal. There is  severe hypokinesis of the mid anteroseptal/inferoseptal walls and the apical  septal/inferior walls.     Right Ventricle  The right ventricle is mildly dilated. The right ventricular systolic function  is normal.     Atria  The left atrium is severely dilated. The right atrium is mildly dilated. There  is no color Doppler evidence of an atrial shunt.     Mitral Valve  There is trace mitral regurgitation.     Tricuspid Valve  There is mild (1+) tricuspid regurgitation. The right ventricular systolic  pressure is approximated at 37.7 mmHg plus the right atrial pressure. IVC  diameter and respiratory changes fall into an intermediate range suggesting an  RA pressure of 8 mmHg. Right ventricular systolic pressure is elevated,  consistent with moderate pulmonary hypertension.     Aortic Valve  The aortic valve is not well visualized. There is trace aortic regurgitation.  No aortic stenosis is present.     Pulmonic Valve  There is no pulmonic valvular stenosis.     Vessels  The aortic root is normal size. Dilation of the inferior vena cava is present  with normal respiratory variation in diameter.     Pericardium  There is no pericardial effusion.     Rhythm  Sinus rhythm was noted.  ______________________________________________________________________________  MMode/2D Measurements & Calculations  IVSd: 0.63 cm  LVIDd: 4.6 cm  LVIDs: 3.8 cm  LVPWd: 0.71 cm  FS: 17.2 %  LV mass(C)d: 93.1 grams  LV mass(C)dI: 61.4 grams/m2  Ao root diam: 2.7 cm  LA dimension: 2.8 cm  LA/Ao: 1.0  LVOT diam: 1.7 cm  LVOT area: 2.3 cm2  LA Volume (BP): 79.5 ml  LA Volume Index (BP): 52.3 ml/m2  RWT: 0.31     Doppler Measurements & Calculations  MV E max leela: 94.1 cm/sec  MV A max leela: 86.4 cm/sec  MV E/A: 1.1  MV dec time: 0.17 sec  LV V1 max P.1 mmHg  LV V1 max: 113.0 cm/sec  LV V1 VTI: 20.8 cm  SV(LVOT): 47.2 ml  SI(LVOT): 31.1 ml/m2  PA acc  time: 0.06 sec  TR max leela: 307.0 cm/sec  TR max P.7 mmHg  E/E' avg: 10.2  Lateral E/e': 8.1  Medial E/e': 12.3     ______________________________________________________________________________  Report approved by: Isabel Cardozo 2022 02:29 PM           Medications     Continuing ACE inhibitor/ARB/ARNI from home medication list OR ACE inhibitor/ARB order already placed during this visit       - MEDICATION INSTRUCTIONS -         acetaminophen  650 mg Oral Q8H     aspirin  81 mg Oral Daily     atorvastatin  40 mg Oral QPM     carvedilol  3.125 mg Oral BID w/meals     diclofenac  2-4 g Topical 4x Daily     fluticasone  1 spray Both Nostrils Daily     furosemide  20 mg Intravenous Q8H     lidocaine  1-2 patch Transdermal Q24H     lidocaine   Transdermal Q8H [facility]     losartan  25 mg Oral Daily     pantoprazole  40 mg Oral BID     sodium chloride (PF)  3 mL Intracatheter Q8H

## 2022-07-21 NOTE — PROGRESS NOTES
07/21/22 0852   Quick Adds   Type of Visit Initial Occupational Therapy Evaluation   Living Environment   People in Home alone   Current Living Arrangements condominium   Home Accessibility wheelchair accessible   Transportation Anticipated family or friend will provide   Living Environment Comments Pt has a walk in shower with chair and grab bars. Raised toilet with grab bars   Self-Care   Usual Activity Tolerance moderate   Current Activity Tolerance fair   Equipment Currently Used at Home walker, rolling   Fall history within last six months no   Activity/Exercise/Self-Care Comment Pt daughter assists with shopping and errands, pt has a house keeper for cleaning and laundry   Instrumental Activities of Daily Living (IADL)   Previous Responsibilities meal prep;medication management;finances   IADL Comments Pt reports independence with medication management and meal preparation   General Information   Onset of Illness/Injury or Date of Surgery 07/19/22   Referring Physician Andres Patrick MD   Patient/Family Therapy Goal Statement (OT) Pt would like to return home   Additional Occupational Profile Info/Pertinent History of Current Problem 89 year old female with multiple medical problems.  She has history of coronary artery disease, NSTEMI, LAD stent, ischemic cardiomyopathy with systolic and diastolic congestive heart failure (last echo in 9/21 with EF of 45 to 50% and grade 1 diastolic congestive heart failure), paroxysmal atrial fibrillation, pericarditis, type 2 diabetes, hypertension, dyslipidemia, migraines, diverticular disease, previous GI bleed, GERD, breast cancer, osteoporosis, and chronic pain.  She has chronic back and joint pain.  She has had several orthopedic and spine surgeries and gets regular injections of her spine and joints.  She lives independently in a townhouse in [place].  She presented to the [facility] emergency department today (7/19/2022) for evaluation of  diarrhea, shortness of breath, and chest discomfort.  She reported having diarrhea with black stools for the last 4 days.  She said that this exacerbated her chronic pain, she became anxious, and this caused her shortness of breath.  She denied abdominal pain, nausea, vomiting, cough, fevers, chills.  Emergency department evaluation showed no fever.  Heart rate was in the 110's.  She was not hypoxic.  Laboratory evaluation showed potassium 3.1, chloride 110, bicarb 19, calcium 7, magnesium 1.0, albumin 2.3, BNP 6367, normal LFTs, troponin 36, white blood cells 9.8, hemoglobin 9.6, platelets 247, D-dimer 1.97, and negative COVID-19 testing.  Chest x-ray showed small bilateral pleural effusions and associated bibasilar atelectasis or consolidation.  CT of chest was obtained and showed similar findings of small bilateral pleural effusions and associated by basilar atelectasis or consolidation. It also showed no pulmonary embolism, hiatal hernia, fluid in the abdomen, and gallbladder wall thickening possibly artifact of abdominal fluid.  Gosia was given Lasix 60 mg IV for suspected congestive heart failure.   Existing Precautions/Restrictions fall;cardiac   Limitations/Impairments hearing   Cognitive Status Examination   Orientation Status orientation to person, place and time   Behavioral Issues uncooperative   Affect/Mental Status (Cognitive) WFL   Follows Commands WFL   Cognitive Status Comments Pt appears at her cognitive baseline   Visual Perception   Visual Impairment/Limitations corrective lenses for reading   Sensory   Sensory Quick Adds No deficits were identified   Pain Assessment   Patient Currently in Pain Yes, see Vital Sign flowsheet   Integumentary/Edema   Integumentary/Edema no deficits were identifed   Posture   Posture forward head position;protracted shoulders;kyphosis   Range of Motion Comprehensive   Comment, General Range of Motion Impaired trunk flexion due to pain and spinal precaution    Strength Comprehensive (MMT)   Comment, General Manual Muscle Testing (MMT) Assessment Impaired activity tolerance   Coordination   Coordination Comments Impaired   Bed Mobility   Comment (Bed Mobility) CGA and cues for spinal precautions   Transfers   Transfer Comments Pt refused stand this date reporting   Balance   Balance Comments Pt refused, unable to assess   Activities of Daily Living   BADL Assessment/Intervention bathing;lower body dressing;toileting   Bathing Assessment/Intervention   Comment, (Bathing) Poor tolerance for bathing due to pain   Lower Body Dressing Assessment/Training   Comment, (Lower Body Dressing) Poor tolerance for dressing due to pain   Toileting   Comment, (Toileting) Poor tolerance for toileting   Clinical Impression   Criteria for Skilled Therapeutic Interventions Met (OT) Yes, treatment indicated   OT Diagnosis Decline in ADL tolerance and independence due to back pain and fatigue   Influenced by the following impairments CHF, back pain   OT Problem List-Impairments impacting ADL problems related to;activity tolerance impaired;coordination;pain;strength   Assessment of Occupational Performance 3-5 Performance Deficits   Identified Performance Deficits Impaired tolerance for toileting dressing and bathing   Planned Therapy Interventions (OT) ADL retraining;home program guidelines;progressive activity/exercise   Clinical Decision Making Complexity (OT) low complexity   Anticipated Equipment Needs Upon Discharge (OT)   (TBD)   Risk & Benefits of therapy have been explained evaluation/treatment results reviewed;care plan/treatment goals reviewed;risks/benefits reviewed;current/potential barriers reviewed;participants voiced agreement with care plan;participants included;patient   OT Discharge Planning   OT Discharge Recommendation (DC Rec) home with assist;home with home care occupational therapy   OT Rationale for DC Rec Pt refused out of bed mobility  this date due to pain. Appears  "with medical management and pain control that patient will be able to discharge to home environment with home OT to progress ADL tolerance. Pt reports she \"will not go to one of those rehab facilities\"   OT Brief overview of current status Pt refused out of bed  mobility this date   Total Evaluation Time (Minutes)   Total Evaluation Time (Minutes) 8   OT Goals   Therapy Frequency (OT) 5 times/wk   OT Predicted Duration/Target Date for Goal Attainment 07/26/22   OT Goals Lower Body Dressing;Toilet Transfer/Toileting;Cardiac Phase 1   OT: Lower Body Dressing Modified independent;within precautions;including set-up/clothing retrieval;using adaptive equipment   OT: Toilet Transfer/Toileting Modified independent;toilet transfer;cleaning and garment management;using adaptive equipment;within precautions   OT: Understanding of cardiac education to maximize quality of life, condition management, and health outcomes Patient;Verbalize;Demonstrate   OT: Perform aerobic activity with stable cardiovascular response continuous;10 minutes;ambulation     "

## 2022-07-22 NOTE — PLAN OF CARE
A/Ox4. A2 w/ walker. R PIV SL. Purewick in place. RA. PRN IV dilaudid and Norco given for chronic pain. Tele: SR. MG/K/Phos protocol. BLE dressings on wounds, C/D/I. NPO all night. Pt moved down to EGD this AM. Continue POC.

## 2022-07-22 NOTE — PLAN OF CARE
VSS on RA. A/O x4, pleasant. BP 96/46, recheck 119/62. Shoulder pain 7/10, pain meds given per MAR, gel applied. Pt declined lidocaine patch. Purewick in place. NPO @ midnight for EGD tomorrow. Pt has BLE wounds, ARUN due to CDI bandages. Wound care done during day shift. Mepilex on sacrum intact, pt refuses to be turned on side. Pt refused getting out of bed today. Tele SR. K, Mag, and Phos rechecks in the AM. Continue POC, monitoring.
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
## 2022-07-22 NOTE — OP NOTE
Report called to JULIAN Cornelius. Pt tolerated procedure, VSS.  Findings  And plan discussed with RN.  Patient recovered in endo, returned to unit.

## 2022-07-22 NOTE — PROGRESS NOTES
07/22/22 1100   Quick Adds   Type of Visit Initial PT Evaluation   Living Environment   People in Home alone   Current Living Arrangements condominium   Home Accessibility wheelchair accessible   Transportation Anticipated family or friend will provide   Living Environment Comments Pt has a walk in shower with chair and grab bars. Raised toilet with grab bars   Self-Care   Usual Activity Tolerance moderate   Current Activity Tolerance fair   Equipment Currently Used at Home walker, rolling   Fall history within last six months no   Activity/Exercise/Self-Care Comment Pt daughter assists with shopping and errands, pt has a house keeper for cleaning and laundry   General Information   Onset of Illness/Injury or Date of Surgery 07/19/22   Referring Physician Reilly Clement MD   Patient/Family Therapy Goals Statement (PT) Pt is really not wanting to TCU and would rather DC home.   Pertinent History of Current Problem (include personal factors and/or comorbidities that impact the POC) Gosia Alonzo is a 89 year old female who was admitted on 7/19/2022  89 year old female with multiple medical problems.  She has history of coronary artery disease, NSTEMI, LAD stent, ischemic cardiomyopathy with systolic and diastolic congestive heart failure (last echo in 9/21 with EF of 45 to 50% and grade 1 diastolic congestive heart failure), paroxysmal atrial fibrillation, pericarditis, type 2 diabetes, hypertension, dyslipidemia, migraines, diverticular disease, previous GI bleed, GERD, breast cancer, osteoporosis, and chronic pain.  She has chronic back and joint pain.   [place] lives independently [place].  She presented to the [facility] emergency department today (7/19/2022) for evaluation of diarrhea, shortness of breath, and chest discomfort.  She reported having diarrhea with black stools for the last 4 days.  She said that this exacerbated her chronic pain, she became anxious, and  this caused her shortness of breath.  She denied abdominal pain, nausea, vomiting, cough, fevers, chills.  Emergency department evaluation showed no fever.  Heart rate was in the 110's.  She was not hypoxic.  Laboratory evaluation showed potassium 3.1, chloride 110, bicarb 19, calcium 7, magnesium 1.0, albumin 2.3, BNP 6367, normal LFTs, troponin 36, white blood cells 9.8, hemoglobin 9.6, platelets 247, D-dimer 1.97, and negative COVID-19 testing.  Chest x-ray showed small bilateral pleural effusions and associated bibasilar atelectasis or consolidation.  CT of chest was obtained and showed similar findings of small bilateral pleural effusions and associated by basilar atelectasis or consolidation. It also showed no pulmonary embolism, hiatal hernia, fluid in the abdomen, and gallbladder wall thickening possibly artifact of abdominal fluid.    She was admitted on IV diuresis with congestive heart failure exacerbation.   Existing Precautions/Restrictions fall   Cognition   Affect/Mental Status (Cognition) WFL   Orientation Status (Cognition) person;place   Pain Assessment   Patient Currently in Pain Yes, see Vital Sign flowsheet  (B shoulder pain)   Integumentary/Edema   Integumentary/Edema Comments B LE very significant muscle wasting   Posture    Posture Forward head position;Protracted shoulders   Range of Motion (ROM)   ROM Comment limited B shoulder ROM   Strength (Manual Muscle Testing)   Strength Comments Limited eccentric control   Bed Mobility   Comment, (Bed Mobility) increased time and effort for mobilizing to EOB, heavy use of rail, cga   Transfers   Comment, (Transfers) Min/mod A from lower bed   Gait/Stairs (Locomotion)   Comment, (Gait/Stairs) 5 feet, 4WW, flat foot ambulation, step through, no veering   Balance   Balance Comments high fall risk due to poor quad/hip strength   Clinical Impression   Criteria for Skilled Therapeutic Intervention Yes, treatment indicated   PT Diagnosis (PT) decreased  functional mobility   Influenced by the following impairments decreased strength, ROM, pain   Functional limitations due to impairments decreased bed mob, transfers, ambulation   Clinical Presentation (PT Evaluation Complexity) Stable/Uncomplicated   Clinical Presentation Rationale improving medically   Clinical Decision Making (Complexity) low complexity   Planned Therapy Interventions (PT) bed mobility training;gait training;patient/family education;transfer training;risk factor education;progressive activity/exercise;home program guidelines;strengthening   Risk & Benefits of therapy have been explained evaluation/treatment results reviewed;care plan/treatment goals reviewed;risks/benefits reviewed;current/potential barriers reviewed;participants included;patient   PT Discharge Planning   PT Discharge Recommendation (DC Rec) home with assist;home with home care physical therapy;Transitional Care Facility;Leaving home requires significant assistance  (ELIAZAR)   PT Rationale for DC Rec Pt currently needing min/mod A for basic mobility items, pt admant about DC home were home is set up. PT recommends family A x 1, until pt demoing ability to perform from higher heights consistently at home and perform bed mob inde with Home PT. If family not able to stay with pt recommend TCU. Pt would also benefit from pendent for emergencies and change of living situation.   PT Brief overview of current status Pt increased effort but CGA for supine to sit, min/mod A for bed mob, CGA for ambulation 60 feet   Total Evaluation Time   Total Evaluation Time (Minutes) 5   Physical Therapy Goals   PT Frequency Daily   PT Predicted Duration/Target Date for Goal Attainment 07/25/22   PT Goals Bed Mobility;Transfers;Gait   PT: Bed Mobility Independent;Supine to/from sit   PT: Transfers Modified independent;Sit to/from stand;Assistive device;Bed to/from chair   PT: Gait Modified independent;Rolling walker;100 feet

## 2022-07-22 NOTE — PLAN OF CARE
Shift Summary ([id number]):     A&Ox4. VSS on room air. PRN norco given for back pain. EGD completed today - see results. Hgb stable- AM recheck. Up w/ assist x1 GBW. Tele discontinued. PT/OT/WOC following. Discharge pending rehab services recommendations. Continue w/ POC.

## 2022-07-22 NOTE — TELEPHONE ENCOUNTER
Alka with [facility] calls to inform of a delay to start  of care to [facility] 28 2022 per patient request.     FYI only

## 2022-07-22 NOTE — PROGRESS NOTES
[facility]  Hospitalist Progress Note  Admit 7/19/2022  8:24 AM    Name: Gosia Alonzo    MRN: [id number]  Provider:  Weston David MD, Replaced by [facility]    Date of Service: 07/22/2022     Reason for Stay (Diagnosis): Acute exacerbation of chronic systolic and diastolic congestive heart failure         Summary of hospital stay & Assessment/Plan:   Summary of Stay: Gosia Alonzo is a 89 year old female who was admitted on 7/19/2022  89 year old female with multiple medical problems.  She has history of coronary artery disease, NSTEMI, LAD stent, ischemic cardiomyopathy with systolic and diastolic congestive heart failure (last echo in 9/21 with EF of 45 to 50% and grade 1 diastolic congestive heart failure), paroxysmal atrial fibrillation, pericarditis, type 2 diabetes, hypertension, dyslipidemia, migraines, diverticular disease, previous GI bleed, GERD, breast cancer, osteoporosis, and chronic pain.  She has chronic back and joint pain.   [place]  She presented to the [facility] emergency department today (7/19/2022) for evaluation of diarrhea, shortness of breath, and chest discomfort.  She reported having diarrhea with black stools for the last 4 days.  She said that this exacerbated her chronic pain, she became anxious, and this caused her shortness of breath.  She denied abdominal pain, nausea, vomiting, cough, fevers, chills.  Emergency department evaluation showed no fever.  Heart rate was in the 110's.  She was not hypoxic.  Laboratory evaluation showed potassium 3.1, chloride 110, bicarb 19, calcium 7, magnesium 1.0, albumin 2.3, BNP 6367, normal LFTs, troponin 36, white blood cells 9.8, hemoglobin 9.6, platelets 247, D-dimer 1.97, and negative COVID-19 testing.  Chest x-ray showed small bilateral pleural effusions and associated bibasilar atelectasis or consolidation.  CT of chest was obtained and showed similar findings of small  bilateral pleural effusions and associated by basilar atelectasis or consolidation. It also showed no pulmonary embolism, hiatal hernia, fluid in the abdomen, and gallbladder wall thickening possibly artifact of abdominal fluid.    She was admitted on IV diuresis with congestive heart failure exacerbation.    Problem list       Acute exacerbation of chronic systolic and diastolic congestive heart failure  Bilateral pleural effusions  Ascites by CT scan  Suspect some component of shortness of breath chest pain due to anxiety    -Most recent echo was in 9/21 and showed ejection fraction 45 to 50% and grade 1 diastolic congestive heart failure  -Echocardiogram -> The left ventricle is normal in size. Proximal septal thickening is noted. Left ventricular systolic function is moderately reduced. The visual ejection fraction is 35-40%. Left ventricular diastolic function is abnormal. There is severe hypokinesis of the mid anteroseptal/inferoseptal walls and the apical septal/inferior walls. In direct comparison to the previous study dated 09/10/2021, the findings appear similar.  Net IO Since Admission: -2,590 mL [07/22/22 1022]    Plan:  -Diurese cautiously with Lasix 20 mg IV twice daily -> resume PO Lasix 07/22  -Monitor daily weights as well as intake/output  -Hydroxyzine as needed for anxiety     Recent diarrhea-Report of melena  History of diverticular disease  History of GERD  -Patient has not had any diarrhea since last night.  She reports melena for the last 4 days.  -Monitor for signs of GI blood loss  -Continue prior to admission twice daily Protonix  -Avoid anticoagulants for now  -[facility] consulted -> Hgb 9.6 -> 8.7 despite diuresis, had recent melena.  He was seen by gastroenterology and EGD was done this morning which revealed no evidence of bleeding or ulcer GI did not recommend any further work-up except there is clear evidence of active bleeding or ongoing drop in  hemoglobin.    Hypokalemia  Hypomagnesemia  -Replace per electrolyte replacement protocols     Chronic leg wounds  -These do not look acutely infected although there is some chronic erythema of the bilateral lower legs  -WOC     Chronic pain  -She has chronic pain involving her back, joints, etc.  -She has intolerance to many pain medications  Plan:  -Resume prior to admission Tylenol but will schedule.  -Resume prior to admission Norco 1 tablet 4 times a day as needed  -Resume prior to admission hydroxyzine (she is requesting refill on discharge)  -Voltaren gel and Lidoderm patches for use as needed  -offered pain consult and patient declined     Coronary artery disease  Previous NSTEMI  Previous LAD stent  History of pericarditis  Paroxysmal atrial fibrillation  Dyslipidemia  Ischemic cardiomyopathy  Diastolic congestive heart failure  -Resume prior to admission aspirin, Coreg, losartan, and Lipitor  -Repeated echocardiogram -> see above  -Doubt coronary ischemia as cause of acute congestive heart failure     Type 2 diabetes, diet controlled  - If blood glucose is elevated NovoLog sliding scale insulin can be added            DVT Prophylaxis: Pneumatic Compression Devices  Code Status:  No CPR- Pre-arrest intubation OK    Disposition Plan     Expected discharge in 1-2 days patient lives independently needs evaluation by physical therapy which is pending this morning to see if she can go back to her current living, she is reluctant to go to TCU       Entered: Weston David MD 07/22/2022, 10:18 AM                 Interval History:         Today's plan detailed above discussed with nursing               Physical Exam:   Physical Exam   Temp: 98.3  F (36.8  C) Temp src: Oral BP: 114/66 Pulse: 84   Resp: 16 SpO2: 92 % O2 Device: None (Room air) Oxygen Delivery: 2 LPM  Vitals:    07/19/22 0827 07/20/22 0616 07/22/22 0453   Weight: 49 kg (108 lb) 48.8 kg (107 lb 8 oz) 53 kg (116 lb 14.4 oz)     I/O last 3  completed shifts:  In: -   Out: 1200 [Urine:1200]        GENERAL:  Comfortable.   PSYCH: pleasant, oriented, No acute distress.  EYES: PERRLA, Normal conjunctiva.  HEART:  Normal S1, S2 with no edema.  LUNGS:  Clear to auscultation, normal Respiratory effort.  ABDOMEN:  Soft, no hepatosplenomegaly, normal bowel sounds.  SKIN:  Dry to touch, No rash.    Medications     Continuing ACE inhibitor/ARB/ARNI from home medication list OR ACE inhibitor/ARB order already placed during this visit       - MEDICATION INSTRUCTIONS -         acetaminophen  650 mg Oral Q8H     aspirin  81 mg Oral Daily     atorvastatin  40 mg Oral QPM     carvedilol  3.125 mg Oral BID w/meals     diclofenac  2-4 g Topical 4x Daily     fluticasone  1 spray Both Nostrils Daily     furosemide  20 mg Oral Daily     lidocaine  1-2 patch Transdermal Q24H     lidocaine   Transdermal Q8H NKECHI     losartan  25 mg Oral Daily     pantoprazole  40 mg Oral BID     sodium chloride (PF)  3 mL Intracatheter Q8H     Data     -Data reviewed today:  I personally reviewed  all new labs and imaging results over the last 24 hours.    Recent Labs   Lab 07/22/22  0609 07/21/22  1051 07/20/22  0622   WBC 8.8 7.3 6.1   HGB 9.1* 8.7* 8.7*   HCT 29.7* 28.9* 29.2*   MCV 99 98 99    206 189     Recent Labs   Lab 07/22/22  0609 07/21/22  0644 07/20/22  0622    138 137   POTASSIUM 4.3 4.1  4.1 3.9   CHLORIDE 106 107 109   CO2 27 24 22   ANIONGAP 5 7 6   * 120* 114*   BUN 28 25 19   CR 0.85 0.90 0.72   GFRESTIMATED 65 61 79   LIGIA 8.0* 7.6* 7.8*       No results found for this or any previous visit (from the past 24 hour(s)).    This document was produced using voice recognition software

## 2022-07-23 NOTE — PROGRESS NOTES
Care Management Follow Up    Length of Stay (days): 4    Expected Discharge Date: 07/22/2022     Concerns to be Addressed: care coordination/care conferences, discharge planning     Patient plan of care discussed at interdisciplinary rounds: Yes    Anticipated Discharge Disposition: TCU     Referrals Placed by CM/SW:  TCU  Private pay costs discussed: private room/amenity fees    Additional Information:  CM following for discharge planning. Per MD today, patient is now willing to go to Transitional Care Unit on discharge. Met with patient and son, Gary, in room to discuss Transitional Care Unit on discharge. Patient is now agreeable to Transitional Care Unit. Son agrees to this plan as well. Patient states she has been to [facility] in the past. She would like any facility that has openings close to [facility]. Discussed with son and referrals were sent to [facility], [facility], [facility]. Patient is vaccinated for covid. She prefers private room if possible. Patient is eager to get out of hospital.     CM to follow up with patient and family with referral status.                 Marylin Becker RN BSN CM  Inpatient Care Coordination  [facility]  [phone]

## 2022-07-23 NOTE — PLAN OF CARE
Shift Summary ([id number]):     A&Ox4. VSS. Chronic back pain managed w/ prn norco and dilaudid. Encouraging activity and OOB as well as adequate nutritional intake. Up w/ Ax1 GBW. Drsg to BLE changed. L PIV SL. Hgb stable today - GI team signed off. Pt now agreeable to TCU at discharge; care coordination team assisting w/ placement.

## 2022-07-23 NOTE — PLAN OF CARE
Goal Outcome Evaluation:      Patient VSS, much concerns abut back pain medications given, patient has difficultly with ambulation in all states patient is heavy assist of one to ambulate.  Patient is alert and oriented, patient does not want to go to rehab facility no acute issues over NOC will monitor

## 2022-07-23 NOTE — PROGRESS NOTES
[facility]  Hospitalist Progress Note  Admit 7/19/2022  8:24 AM    Name: Gosia Alonzo    MRN: [id number]  Provider:  Weston David MD, formerly [facility]    Date of Service: 07/23/2022     Reason for Stay (Diagnosis): Acute exacerbation of chronic systolic and diastolic congestive heart failure         Summary of hospital stay & Assessment/Plan:   Summary of Stay: Gosia Alonzo is a 89 year old female who was admitted on 7/19/2022  89 year old female with multiple medical problems.  She has history of coronary artery disease, NSTEMI, LAD stent, ischemic cardiomyopathy with systolic and diastolic congestive heart failure (last echo in 9/21 with EF of 45 to 50% and grade 1 diastolic congestive heart failure), paroxysmal atrial fibrillation, pericarditis, type 2 diabetes, hypertension, dyslipidemia, migraines, diverticular disease, previous GI bleed, GERD, breast cancer, osteoporosis, and chronic pain.  She has chronic back and joint pain.   [place]  She presented to the [facility] emergency department today (7/19/2022) for evaluation of diarrhea, shortness of breath, and chest discomfort.  She reported having diarrhea with black stools for the last 4 days.  She said that this exacerbated her chronic pain, she became anxious, and this caused her shortness of breath.  She denied abdominal pain, nausea, vomiting, cough, fevers, chills.  Emergency department evaluation showed no fever.  Heart rate was in the 110's.  She was not hypoxic.  Laboratory evaluation showed potassium 3.1, chloride 110, bicarb 19, calcium 7, magnesium 1.0, albumin 2.3, BNP 6367, normal LFTs, troponin 36, white blood cells 9.8, hemoglobin 9.6, platelets 247, D-dimer 1.97, and negative COVID-19 testing.  Chest x-ray showed small bilateral pleural effusions and associated bibasilar atelectasis or consolidation.  CT of chest was obtained and showed similar findings of small  bilateral pleural effusions and associated by basilar atelectasis or consolidation. It also showed no pulmonary embolism, hiatal hernia, fluid in the abdomen, and gallbladder wall thickening possibly artifact of abdominal fluid.    She was admitted on IV diuresis with congestive heart failure exacerbation.    Problem list       Acute exacerbation of chronic systolic and diastolic congestive heart failure  Bilateral pleural effusions  Ascites by CT scan  Suspect some component of shortness of breath chest pain due to anxiety    -Most recent echo was in 9/21 and showed ejection fraction 45 to 50% and grade 1 diastolic congestive heart failure  -Echocardiogram -> The left ventricle is normal in size. Proximal septal thickening is noted. Left ventricular systolic function is moderately reduced. The visual ejection fraction is 35-40%. Left ventricular diastolic function is abnormal. There is severe hypokinesis of the mid anteroseptal/inferoseptal walls and the apical septal/inferior walls. In direct comparison to the previous study dated 09/10/2021, the findings appear similar.  Net IO Since Admission: -2,350 mL [07/23/22 1025]    Plan:  -Diuresed cautiously with Lasix 20 mg IV twice daily -> resume PO Lasix 07/22  -Monitor daily weights as well as intake/output  -Hydroxyzine as needed for anxiety     Recent diarrhea-Report of melena  History of diverticular disease  History of GERD  -Patient has not had any diarrhea since last night.  She reports melena for the last 4 days.  -Monitor for signs of GI blood loss  -Continue prior to admission twice daily Protonix  -Avoid anticoagulants for now  -[facility] consulted -> Hgb 9.6 -> 8.7 despite diuresis, had recent melena.  He was seen by gastroenterology and EGD was done this morning which revealed no evidence of bleeding or ulcer GI did not recommend any further work-up except there is clear evidence of active bleeding or ongoing drop in hemoglobin.    Hypokalemia-  Hypomagnesemia  -Replace per electrolyte replacement protocols     Chronic leg wounds  -These do not look acutely infected although there is some chronic erythema of the bilateral lower legs  -WOC     Chronic pain  -She has chronic pain involving her back, joints, etc.  -She has intolerance to many pain medications  Plan:  -Resume prior to admission Tylenol but will schedule.  -Resume prior to admission Norco 1 tablet 4 times a day as needed  -Resume prior to admission hydroxyzine (she is requesting refill on discharge)  -Voltaren gel and Lidoderm patches for use as needed  -offered pain consult and patient declined     Coronary artery disease  Previous NSTEMI  Previous LAD stent  History of pericarditis  Paroxysmal atrial fibrillation  Dyslipidemia  Ischemic cardiomyopathy  Diastolic congestive heart failure  -Resume prior to admission aspirin, Coreg, losartan, and Lipitor  -Repeated echocardiogram -> see above  -Doubt coronary ischemia as cause of acute congestive heart failure     Type 2 diabetes, diet controlled  - If blood glucose is elevated NovoLog sliding scale insulin can be added            DVT Prophylaxis: Pneumatic Compression Devices  Code Status:  No CPR- Pre-arrest intubation OK    Disposition Plan     Expected discharge to transitional care unit when bed is available, initially patient was very resistant to TCU however she was convinced after therapy and nursing staff expressed significant concern for her safety and need for assistance with mobility specially that she lives alone       Entered: Weston David MD 07/23/2022, 10:25 AM                 Interval History:       Today she finally agreed to TCU, spent 20 minutes discussing the concern that the staff has for her safety when she goes home specially that she lives alone and is requiring a lot of assistance.    Today's plan detailed above discussed with nursing               Physical Exam:   Physical Exam   Temp: 98.6  F (37  C) Temp src:  Oral BP: 124/75 Pulse: 84   Resp: 16 SpO2: 98 % O2 Device: None (Room air)    Vitals:    07/20/22 0616 07/22/22 0453 07/23/22 0553   Weight: 48.8 kg (107 lb 8 oz) 53 kg (116 lb 14.4 oz) 52.9 kg (116 lb 9.6 oz)     No intake/output data recorded.        GENERAL:  Comfortable.   PSYCH: pleasant, oriented, No acute distress.  EYES: PERRLA, Normal conjunctiva.  HEART:  Normal S1, S2 with no edema.  LUNGS:  Clear to auscultation, normal Respiratory effort.  ABDOMEN:  Soft, no hepatosplenomegaly, normal bowel sounds.  SKIN:  Dry to touch, No rash.    Medications     Continuing ACE inhibitor/ARB/ARNI from home medication list OR ACE inhibitor/ARB order already placed during this visit       - MEDICATION INSTRUCTIONS -         acetaminophen  650 mg Oral Q8H     aspirin  81 mg Oral Daily     atorvastatin  40 mg Oral QPM     carvedilol  3.125 mg Oral BID w/meals     diclofenac  2-4 g Topical 4x Daily     fluticasone  1 spray Both Nostrils Daily     furosemide  20 mg Oral Daily     lidocaine  1-2 patch Transdermal Q24H     lidocaine   Transdermal Q8H NKECHI     losartan  25 mg Oral Daily     pantoprazole  40 mg Oral BID     sodium chloride (PF)  3 mL Intracatheter Q8H     Data     -Data reviewed today:  I personally reviewed  all new labs and imaging results over the last 24 hours.    Recent Labs   Lab 07/23/22  0644 07/22/22  0609 07/21/22  1051 07/20/22  0622   WBC  --  8.8 7.3 6.1   HGB 9.2* 9.1* 8.7* 8.7*   HCT  --  29.7* 28.9* 29.2*   MCV  --  99 98 99   PLT  --  200 206 189     Recent Labs   Lab 07/23/22  0644 07/22/22  0609 07/21/22  0644 07/20/22  0622   NA  --  138 138 137   POTASSIUM 4.5 4.3 4.1  4.1 3.9   CHLORIDE  --  106 107 109   CO2  --  27 24 22   ANIONGAP  --  5 7 6   GLC  --  133* 120* 114*   BUN  --  28 25 19   CR  --  0.85 0.90 0.72   GFRESTIMATED  --  65 61 79   LIGIA  --  8.0* 7.6* 7.8*       No results found for this or any previous visit (from the past 24 hour(s)).    This document was produced using voice  recognition software

## 2022-07-23 NOTE — PROGRESS NOTES
[facility]  Gastroenterology Progress Note        Arnaldo Whitmore MD    Patient Name: Gosia Alonzo MRN# [id number]   YOB: 1932 Age: 89 year old      Date of Admission:  7/19/2022  Today's Date: 07/23/2022        Interval History:        Original GI consultation, hospital course and EGD from yesterday reviewed.  No evidence for bleeding on normal EGD.  Tolerates diet.  Normal brown bowel movement today.  No nausea no vomiting.  Hemoglobin stable.  Remains on pantoprazole.         Assessment and Plan:        Impression:  -History of melena with negative EGD.  No ongoing bleeding.  This has happened with steroids in the past and has resolved spontaneously.  No symptoms to suggest a need for colonoscopy right now.  Based on the patient's age would not proceed with further GI work-up unless anemia persists or bleeding recurs.  -Stable medical problems under the care of the patient's admitting team and outpatient primary care provider.    Recommendations:  -Diet as tolerated.  -Decrease pantoprazole to 40 mg once daily.  Would recommend she stay on this long-term.  -Okay to discharge home from a GI perspective if otherwise medically stable.  -We will no longer follow unless requested.  -Please call with any further questions.                  Physical Exam:        Vital Signs with Ranges  Temp:  [98  F (36.7  C)-98.6  F (37  C)] 98.6  F (37  C)  Pulse:  [83-95] 89  Resp:  [16] 16  BP: (114-149)/(56-72) 149/72  SpO2:  [92 %-98 %] 98 %  I/O's Last 24 hours  No intake/output data recorded.    Constitutional:  Alert and no distress.   HEENT: PERRL, EOMI, sclera nonicteric, conjunctiva pink and moist.  NECK: Supple, nontender. No lymphadenopathy, JVD or bruits noted.  PULMONARY: Clear to auscultation bilaterally.  CARDIOVASCULAR: S1, S2, no S3, no S4, no murmur, regular rate and rhythm  ABDOMEN: Soft, nontender, nondistended, no tympany, no organomegaly, no fullness, guarding or  rebound are noted.   NEURO: Alert and oriented to place, time and person. Neurological exam grossly nonfocal, CN II through XII are grossly intact. Detailed neurological exam is not performed.  EXT: No cyanosis, clubbing or edema.         Medications:          acetaminophen  650 mg Oral Q8H     aspirin  81 mg Oral Daily     atorvastatin  40 mg Oral QPM     carvedilol  3.125 mg Oral BID w/meals     diclofenac  2-4 g Topical 4x Daily     fluticasone  1 spray Both Nostrils Daily     furosemide  20 mg Oral Daily     lidocaine  1-2 patch Transdermal Q24H     lidocaine   Transdermal Q8H NKECHI     losartan  25 mg Oral Daily     pantoprazole  40 mg Oral BID     sodium chloride (PF)  3 mL Intracatheter Q8H     PRN Meds: calcium carbonate, Continuing ACE inhibitor/ARB/ARNI from home medication list OR ACE inhibitor/ARB order already placed during this visit, - MEDICATION INSTRUCTIONS -, diazepam, doxylamine, guaiFENesin, HYDROcodone-acetaminophen, HYDROmorphone, hydrOXYzine, lidocaine 4%, lidocaine (buffered or not buffered), melatonin, naloxone **OR** naloxone **OR** naloxone **OR** naloxone, ondansetron **OR** ondansetron, polyethylene glycol, prochlorperazine **OR** prochlorperazine **OR** prochlorperazine, senna-docusate **OR** senna-docusate, sodium chloride (PF)         Data:      All new lab and imaging data was reviewed.  .  Recent Labs   Lab Test 07/23/22  0644 07/22/22  0609 07/21/22  1051 07/20/22  0622 01/10/16  0630 01/09/16  1341 01/05/16  0520 01/04/16  1940 08/10/15  1713 08/10/15  1259   WBC  --  8.8 7.3 6.1   < > 10.2  --  9.3   < > 7.1   HGB 9.2* 9.1* 8.7* 8.7*   < > 10.5*   < > 8.3*   < > 11.2*   MCV  --  99 98 99   < > 87  --  92   < > 98   PLT  --  200 206 189   < > 294  --  266   < > 303   INR  --   --   --   --   --  0.95  --  1.10  --  0.98    < > = values in this interval not displayed.     Recent Labs   Lab Test 07/23/22  0644 07/22/22  0609 07/21/22  0644 07/20/22  0622   NA  --  138 138 137    POTASSIUM 4.5 4.3 4.1  4.1 3.9   CHLORIDE  --  106 107 109   CO2  --  27 24 22   BUN  --  28 25 19   CR  --  0.85 0.90 0.72   ANIONGAP  --  5 7 6     Recent Labs   Lab Test 07/19/22  0836 11/23/21  0137 11/09/21  2159 06/10/21  1445 03/11/20  1537 02/11/20  2018 02/11/20  1904 01/12/20  1631 12/11/19  1654   ALBUMIN 2.3* 2.8*  --  3.1*   < >  --  2.5*   < >  --    BILITOTAL 0.6 0.5  --  0.5   < >  --  0.4   < >  --    ALT 16 18  --  13   < >  --  11   < >  --    AST 37 24  --  20   < >  --  16   < >  --    ALKPHOS 143 243*  --  216*   < >  --  92   < >  --    PROTEIN  --   --   --   --   --  Negative  --   --  30*   LIPASE 153  --  106  --   --   --  173   < >  --     < > = values in this interval not displayed.            Arnaldo Whitmore MD, FACG  [facility]  [phone]

## 2022-07-24 NOTE — PROGRESS NOTES
[facility]  Hospitalist Progress Note  Name: Gosia Alonzo    MRN: [id number]  Physician:  Miguel Celestin DO, FHM (Text Page)  Securely message with the Vocera Web Console (learn more here)    Summary of Stay: 89 year old female with multiple medical problems.  She has history of coronary artery disease, NSTEMI, LAD stent, ischemic cardiomyopathy with systolic and diastolic congestive heart failure (last echo in 9/21 with EF of 45 to 50% and grade 1 diastolic congestive heart failure), paroxysmal atrial fibrillation, pericarditis, type 2 diabetes, hypertension, dyslipidemia, migraines, diverticular disease, previous GI bleed, GERD, breast cancer, osteoporosis, and chronic pain.  She has chronic back and joint pain.   She lives independently in a townhouse in [place].  She presented to the [facility] emergency department today (7/19/2022) for evaluation of diarrhea, shortness of breath, and chest discomfort.  She reported having diarrhea with black stools for the last 4 days.  She said that this exacerbated her chronic pain, she became anxious, and this caused her shortness of breath.  She denied abdominal pain, nausea, vomiting, cough, fevers, chills.  Emergency department evaluation showed no fever.  Heart rate was in the 110's.  She was not hypoxic.  Laboratory evaluation showed potassium 3.1, chloride 110, bicarb 19, calcium 7, magnesium 1.0, albumin 2.3, BNP 6367, normal LFTs, troponin 36, white blood cells 9.8, hemoglobin 9.6, platelets 247, D-dimer 1.97, and negative COVID-19 testing.  Chest x-ray showed small bilateral pleural effusions and associated bibasilar atelectasis or consolidation.  CT of chest was obtained and showed similar findings of small bilateral pleural effusions and associated by basilar atelectasis or consolidation. It also showed no pulmonary embolism, hiatal hernia, fluid in the abdomen, and gallbladder wall thickening possibly artifact of  abdominal fluid.    She was admitted on IV diuresis with congestive heart failure exacerbation.    Assessment & Plan    Acute exacerbation of chronic systolic and diastolic congestive heart failure  Bilateral pleural effusions  Ascites by CT scan  Suspect some component of shortness of breath chest pain due to anxiety:     -Most recent echo was in 9/21 and showed ejection fraction 45 to 50% and grade 1 diastolic congestive heart failure  -Echocardiogram -> The left ventricle is normal in size. Proximal septal thickening is noted. Left ventricular systolic function is moderately reduced. The visual ejection fraction is 35-40%. Left ventricular diastolic function is abnormal. There is severe hypokinesis of the mid anteroseptal/inferoseptal walls and the apical septal/inferior walls. In direct comparison to the previous study dated 09/10/2021, the findings appear similar.     Plan:  -Diuresed cautiously with Lasix 20 mg IV twice daily -> resume PO Lasix 07/22  -Monitor daily weights as well as intake/output  -Hydroxyzine as needed for anxiety     Recent diarrhea-Report of melena  History of diverticular disease  History of GERD  -Patient has not had any diarrhea since last night.  She reports melena for the last 4 days.  -Monitor for signs of GI blood loss  -Continue prior to admission twice daily Protonix  -Avoid anticoagulants for now  -[facility] consulted -> Hgb 9.6 -> 8.7 despite diuresis, had recent melena.  He was seen by gastroenterology and EGD was done this morning which revealed no evidence of bleeding or ulcer GI did not recommend any further work-up except there is clear evidence of active bleeding or ongoing drop in hemoglobin.     Hypokalemia- Hypomagnesemia  -Replace per electrolyte replacement protocols     Chronic leg wounds  -These do not look acutely infected although there is some chronic erythema of the bilateral lower legs  -WOC     Chronic pain  -She has chronic pain involving her back, joints,  etc.  -She has intolerance to many pain medications  Plan:  -Continue prior to admission Tylenol but will schedule.  -Continue prior to admission Norco 1 tablet 4 times a day as needed  -Continue prior to admission hydroxyzine (she is requesting refill on discharge)  -Voltaren gel and Lidoderm patches for use as needed  -Colleague offered pain consult and patient declined     Coronary artery disease  Previous NSTEMI  Previous LAD stent  History of pericarditis  Paroxysmal atrial fibrillation  Dyslipidemia  Ischemic cardiomyopathy  Diastolic congestive heart failure  -Aspirin, Coreg, losartan, and Lipitor  -Repeated echocardiogram -> see above  -Doubt coronary ischemia as cause of acute congestive heart failure     Type 2 diabetes, diet controlled  - If blood glucose is elevated NovoLog sliding scale insulin can be added     Deconditioning:  -  Therapy  -  Initially patient was resistant to TCU however she was convinced after therapy and nursing staff expressed significant concern for her safety and need for assistance with mobility specially that she lives alone  -  TCU/rehab planned       COVID Status:  COVID-19 PCR Results    COVID-19 PCR Results 11/10/21 7/19/22   SARS CoV2 PCR Negative Negative      Comments are available for some flowsheets but are not being displayed.         COVID-19 Antibody Results, Testing for Immunity    COVID-19 Antibody Results, Testing for Immunity   No data to display.            Diet: Diet  Regular Diet Adult    DVT Prophylaxis: Pneumatic Compression Devices  Manzano Catheter: Not present    Cardiac Monitoring: None    Code Status: No CPR- Pre-arrest intubation OK        Disposition Plan   Expected discharge when TCU/rehab bed found, possibly tomorrow.     Entered: Miguel Celestin, DO 07/24/2022, 6:44 PM       Interval History   Assumed care, history reviewed.  Ms. Alonzo steadily feeling better.  No major new complaints.    -Data reviewed today: I reviewed all new labs and imaging  reports over the last 24 hours. I personally reviewed no images or EKG's today.    Physical Exam   Temp: 98.4  F (36.9  C) Temp src: Oral BP: 132/55 Pulse: 97   Resp: 17 SpO2: 99 % O2 Device: None (Room air)    Vitals:    07/22/22 0453 07/23/22 0553 07/24/22 0419   Weight: 53 kg (116 lb 14.4 oz) 52.9 kg (116 lb 9.6 oz) 52.7 kg (116 lb 1.6 oz)     Vital Signs with Ranges  Temp:  [97.9  F (36.6  C)-98.4  F (36.9  C)] 98.4  F (36.9  C)  Pulse:  [93-97] 97  Resp:  [16-17] 17  BP: (114-160)/(55-71) 132/55  SpO2:  [98 %-99 %] 99 %  I/O last 3 completed shifts:  In: 200 [P.O.:200]  Out: -     GEN:  Alert, oriented x 3, appears comfortable, no overt distress.  HEENT:  Normocephalic/atraumatic, no scleral icterus, no nasal discharge, mouth moist.  CV:  Regular rate and rhythm, no loud murmur/rub.  LUNGS:  Clear to auscultation bilaterally without rales/rhonchi/wheezing/retractions.  Mildly decreased breath sounds bases.  Symmetric chest rise on inhalation noted.  ABD:  Active bowel sounds, soft, non-tender/non-distended.  No rebound/guarding/rigidity.  EXT:  Trace edema.  No cyanosis.  No acute joint synovitis noted.  SKIN:  Dry to touch, no exanthems noted in the visualized areas.    Medications     Continuing ACE inhibitor/ARB/ARNI from home medication list OR ACE inhibitor/ARB order already placed during this visit       - MEDICATION INSTRUCTIONS -         acetaminophen  650 mg Oral Q8H     aspirin  81 mg Oral Daily     atorvastatin  40 mg Oral QPM     carvedilol  3.125 mg Oral BID w/meals     diclofenac  2-4 g Topical 4x Daily     fluticasone  1 spray Both Nostrils Daily     furosemide  20 mg Oral Daily     lidocaine  1-2 patch Transdermal Q24H     lidocaine   Transdermal Q8H NKECHI     losartan  25 mg Oral Daily     pantoprazole  40 mg Oral QAM AC     sodium chloride (PF)  3 mL Intracatheter Q8H     Data     Recent Labs   Lab 07/23/22  0644 07/22/22  0609 07/21/22  1051 07/20/22  0622   WBC  --  8.8 7.3 6.1   HGB 9.2* 9.1*  8.7* 8.7*   HCT  --  29.7* 28.9* 29.2*   MCV  --  99 98 99   PLT  --  200 206 189     Recent Labs   Lab 07/24/22  0532 07/23/22  0644 07/22/22  0609 07/21/22  1305 07/21/22  0644 07/20/22  0622 07/19/22  1447 07/19/22  0836   NA  --   --  138  --  138 137   < > 138   POTASSIUM 4.7 4.5 4.3  --  4.1  4.1 3.9   < > 3.1*   CHLORIDE  --   --  106  --  107 109   < > 110*   CO2  --   --  27  --  24 22   < > 19*   ANIONGAP  --   --  5  --  7 6   < > 9   GLC  --   --  133*  --  120* 114*   < > 114*   BUN  --   --  28  --  25 19   < > 18   CR  --   --  0.85  --  0.90 0.72   < > 0.65   GFRESTIMATED  --   --  65  --  61 79   < > 84   LIGIA  --   --  8.0*  --  7.6* 7.8*   < > 7.0*   MAG 1.6 1.6 1.9   < > 1.4*  --    < > 1.0*   PHOS 3.1 2.9 3.2  --  3.2 3.4   < >  --    PROTTOTAL  --   --   --   --   --   --   --  6.0*   ALBUMIN  --   --   --   --   --   --   --  2.3*   BILITOTAL  --   --   --   --   --   --   --  0.6   ALKPHOS  --   --   --   --   --   --   --  143   AST  --   --   --   --   --   --   --  37   ALT  --   --   --   --   --   --   --  16    < > = values in this interval not displayed.       No results found for this or any previous visit (from the past 24 hour(s)).

## 2022-07-24 NOTE — PLAN OF CARE
Shift Summary (8029-5040):     A&Ox4. VSS. Chronic back and L shoulder pain - see MAR for pain regimen. L PIV SL. PT/OT/WOC following. BLE wounds; dressings changed. Discharge awaiting TCU bed availability.

## 2022-07-24 NOTE — PLAN OF CARE
"/57 (BP Location: Left arm, Patient Position: Supine, Cuff Size: Adult Small)   Pulse 80   Temp 97.9  F (36.6  C) (Oral)   Resp 16   Ht 1.651 m (5' 5\")   Wt 52.9 kg (116 lb 9.6 oz)   LMP  (LMP Unknown)   SpO2 98%   BMI 19.40 kg/m      A/O x 4. Pt complains of generalized pain PRN Norco and scheduled Tylenol given with some effect. Up with Assist x 1 GBW. Pt has purwick in overnight. Pt has PIV SL WDL. BLE dressings CDI. Plan discharge to TCU.     "

## 2022-07-24 NOTE — PROGRESS NOTES
[facility]  Hospitalist Progress Note  Name: Gosia Alonzo    MRN: [id number]  Physician:  Miguel Celestin DO, FHM (Text Page)  Securely message with the Vocera Web Console (learn more here)    Summary of Stay: 89 year old female with multiple medical problems.  She has history of coronary artery disease, NSTEMI, LAD stent, ischemic cardiomyopathy with systolic and diastolic congestive heart failure (last echo in 9/21 with EF of 45 to 50% and grade 1 diastolic congestive heart failure), paroxysmal atrial fibrillation, pericarditis, type 2 diabetes, hypertension, dyslipidemia, migraines, diverticular disease, previous GI bleed, GERD, breast cancer, osteoporosis, and chronic pain.  She has chronic back and joint pain.   She lives independently in a townhouse in [place].  She presented to the [facility] emergency department today (7/19/2022) for evaluation of diarrhea, shortness of breath, and chest discomfort.  She reported having diarrhea with black stools for the last 4 days.  She said that this exacerbated her chronic pain, she became anxious, and this caused her shortness of breath.  She denied abdominal pain, nausea, vomiting, cough, fevers, chills.  Emergency department evaluation showed no fever.  Heart rate was in the 110's.  She was not hypoxic.  Laboratory evaluation showed potassium 3.1, chloride 110, bicarb 19, calcium 7, magnesium 1.0, albumin 2.3, BNP 6367, normal LFTs, troponin 36, white blood cells 9.8, hemoglobin 9.6, platelets 247, D-dimer 1.97, and negative COVID-19 testing.  Chest x-ray showed small bilateral pleural effusions and associated bibasilar atelectasis or consolidation.  CT of chest was obtained and showed similar findings of small bilateral pleural effusions and associated by basilar atelectasis or consolidation. It also showed no pulmonary embolism, hiatal hernia, fluid in the abdomen, and gallbladder wall thickening possibly artifact of  abdominal fluid.    She was admitted on IV diuresis with congestive heart failure exacerbation.    Assessment & Plan    Acute exacerbation of chronic systolic and diastolic congestive heart failure  Bilateral pleural effusions  Ascites by CT scan  Suspect some component of shortness of breath chest pain due to anxiety:     -Most recent echo was in 9/21 and showed ejection fraction 45 to 50% and grade 1 diastolic congestive heart failure  -Echocardiogram -> The left ventricle is normal in size. Proximal septal thickening is noted. Left ventricular systolic function is moderately reduced. The visual ejection fraction is 35-40%. Left ventricular diastolic function is abnormal. There is severe hypokinesis of the mid anteroseptal/inferoseptal walls and the apical septal/inferior walls. In direct comparison to the previous study dated 09/10/2021, the findings appear similar.     Plan:  -Diuresed cautiously with Lasix 20 mg IV twice daily -> resume PO Lasix 07/22  -Monitor daily weights as well as intake/output  -Hydroxyzine as needed for anxiety     Recent diarrhea-Report of melena  History of diverticular disease  History of GERD  -Patient has not had any diarrhea since last night.  She reports melena for the last 4 days.  -Monitor for signs of GI blood loss  -Continue prior to admission twice daily Protonix  -Avoid anticoagulants for now  -[facility] consulted -> Hgb 9.6 -> 8.7 despite diuresis, had recent melena.  He was seen by gastroenterology and EGD was done this morning which revealed no evidence of bleeding or ulcer GI did not recommend any further work-up except there is clear evidence of active bleeding or ongoing drop in hemoglobin.     Hypokalemia- Hypomagnesemia  -Replace per electrolyte replacement protocols     Chronic leg wounds  -These do not look acutely infected although there is some chronic erythema of the bilateral lower legs  -WOC     Chronic pain  -She has chronic pain involving her back, joints,  etc.  -She has intolerance to many pain medications  Plan:  -Continue prior to admission Tylenol but will schedule.  -Continue prior to admission Norco 1 tablet 4 times a day as needed  -Continue prior to admission hydroxyzine (she is requesting refill on discharge)  -Voltaren gel and Lidoderm patches for use as needed  -Colleague offered pain consult and patient declined     Coronary artery disease  Previous NSTEMI  Previous LAD stent  History of pericarditis  Paroxysmal atrial fibrillation  Dyslipidemia  Ischemic cardiomyopathy  Diastolic congestive heart failure  -Aspirin, Coreg, losartan, and Lipitor  -Repeated echocardiogram -> see above  -Doubt coronary ischemia as cause of acute congestive heart failure     Type 2 diabetes, diet controlled  - If blood glucose is elevated NovoLog sliding scale insulin can be added     Deconditioning:  -  Therapy  -  Initially patient was resistant to TCU however she was convinced after therapy and nursing staff expressed significant concern for her safety and need for assistance with mobility specially that she lives alone  -  TCU/rehab planned       COVID Status:  COVID-19 PCR Results    COVID-19 PCR Results 11/10/21 7/19/22   SARS CoV2 PCR Negative Negative      Comments are available for some flowsheets but are not being displayed.         COVID-19 Antibody Results, Testing for Immunity    COVID-19 Antibody Results, Testing for Immunity   No data to display.            Diet: Diet  Regular Diet Adult    DVT Prophylaxis: Pneumatic Compression Devices  Manzano Catheter: Not present    Cardiac Monitoring: None    Code Status: No CPR- Pre-arrest intubation OK        Disposition Plan   Expected discharge when TCU/rehab bed found, possibly tomorrow.     Entered: Miguel Celestin, DO 07/24/2022, 6:44 PM       Interval History   Assumed care, history reviewed.  Ms. Alonzo steadily feeling better.  No major new complaints.  Expressed understanding/agreement for TCU/rehab  discharge.    -Data reviewed today: I reviewed all new labs and imaging reports over the last 24 hours. I personally reviewed no images or EKG's today.    Physical Exam   Temp: 98.4  F (36.9  C) Temp src: Oral BP: 132/55 Pulse: 97   Resp: 17 SpO2: 99 % O2 Device: None (Room air)    Vitals:    07/22/22 0453 07/23/22 0553 07/24/22 0419   Weight: 53 kg (116 lb 14.4 oz) 52.9 kg (116 lb 9.6 oz) 52.7 kg (116 lb 1.6 oz)     Vital Signs with Ranges  Temp:  [97.9  F (36.6  C)-98.4  F (36.9  C)] 98.4  F (36.9  C)  Pulse:  [93-97] 97  Resp:  [16-17] 17  BP: (114-160)/(55-71) 132/55  SpO2:  [98 %-99 %] 99 %  I/O last 3 completed shifts:  In: 200 [P.O.:200]  Out: -     GEN:  Alert, oriented x 3, appears comfortable, no overt distress.  HEENT:  Normocephalic/atraumatic, no scleral icterus, no nasal discharge, mouth moist.  CV:  Regular rate and rhythm, no loud murmur/rub.  LUNGS:  Clear to auscultation bilaterally without rales/rhonchi/wheezing/retractions.  Symmetric chest rise on inhalation noted.  ABD:  Active bowel sounds, soft, non-tender/non-distended.  No rebound/guarding/rigidity.  EXT:  Very trace LE edema.  No cyanosis.  No acute joint synovitis noted.  SKIN:  Dry to touch, no exanthems noted in the visualized areas.    Medications     Continuing ACE inhibitor/ARB/ARNI from home medication list OR ACE inhibitor/ARB order already placed during this visit       - MEDICATION INSTRUCTIONS -         acetaminophen  650 mg Oral Q8H     aspirin  81 mg Oral Daily     atorvastatin  40 mg Oral QPM     carvedilol  3.125 mg Oral BID w/meals     diclofenac  2-4 g Topical 4x Daily     fluticasone  1 spray Both Nostrils Daily     furosemide  20 mg Oral Daily     lidocaine  1-2 patch Transdermal Q24H     lidocaine   Transdermal Q8H NKECHI     losartan  25 mg Oral Daily     pantoprazole  40 mg Oral QAM AC     sodium chloride (PF)  3 mL Intracatheter Q8H     Data     Recent Labs   Lab 07/23/22  0644 07/22/22  0609 07/21/22  1054  07/20/22 0622   WBC  --  8.8 7.3 6.1   HGB 9.2* 9.1* 8.7* 8.7*   HCT  --  29.7* 28.9* 29.2*   MCV  --  99 98 99   PLT  --  200 206 189     Recent Labs   Lab 07/25/22  0617 07/24/22  0532 07/23/22  0644 07/22/22  0609 07/21/22  1305 07/21/22  0644 07/20/22  0622 07/19/22  1447 07/19/22  0836   NA  --   --   --  138  --  138 137   < > 138   POTASSIUM 4.6 4.7 4.5 4.3  --  4.1  4.1 3.9   < > 3.1*   CHLORIDE  --   --   --  106  --  107 109   < > 110*   CO2  --   --   --  27  --  24 22   < > 19*   ANIONGAP  --   --   --  5  --  7 6   < > 9   GLC  --   --   --  133*  --  120* 114*   < > 114*   BUN  --   --   --  28  --  25 19   < > 18   CR  --   --   --  0.85  --  0.90 0.72   < > 0.65   GFRESTIMATED  --   --   --  65  --  61 79   < > 84   LIGIA  --   --   --  8.0*  --  7.6* 7.8*   < > 7.0*   MAG 1.5* 1.6 1.6 1.9   < > 1.4*  --    < > 1.0*   PHOS 3.1 3.1 2.9 3.2  --  3.2 3.4   < >  --    PROTTOTAL  --   --   --   --   --   --   --   --  6.0*   ALBUMIN  --   --   --   --   --   --   --   --  2.3*   BILITOTAL  --   --   --   --   --   --   --   --  0.6   ALKPHOS  --   --   --   --   --   --   --   --  143   AST  --   --   --   --   --   --   --   --  37   ALT  --   --   --   --   --   --   --   --  16    < > = values in this interval not displayed.       No results found for this or any previous visit (from the past 24 hour(s)).

## 2022-07-25 NOTE — PLAN OF CARE
Physical Therapy Discharge Summary    Reason for therapy discharge:    Discharged to transitional care facility.    Progress towards therapy goal(s). See goals on Care Plan in [facility] electronic health record for goal details.  Goals not met.  Barriers to achieving goals:   discharge from facility.    Therapy recommendation(s):    Continued therapy is recommended.  Rationale/Recommendations:   .  PT to continue LE strengthening and progress safety and independence with all mobility  Goal Outcome Evaluation:

## 2022-07-25 NOTE — PROGRESS NOTES
Care Management Discharge Note    Discharge Date: 07/25/2022       Discharge Disposition: Home, Home Care    Discharge Services: None    Discharge DME: None    Discharge Transportation: family or friend will provide    Private pay costs discussed: Not applicable    PAS Confirmation Code:    Patient/family educated on Medicare website which has current facility and service quality ratings: yes    Education Provided on the Discharge Plan:    Persons Notified of Discharge Plans:   Patient/Family in Agreement with the Plan: yes    Handoff Referral Completed: No    Additional Information:  PAS completed 7/25/22 at 1059. [id number]    SHAE Light, [facility]  Emergency Room   [phone] (Please contact the SW on the floor in which the patient is staying on for questions or concerns).

## 2022-07-25 NOTE — PROGRESS NOTES
"SPIRITUAL HEALTH SERVICES Progress Note    Med Surg 3     I saw patient Estephania Alonzo per length of stay at . Estephania was oriented to SHS.    Estephania was just waiting to be discharged. Her bags were packed and she was ready to go.    Estephania said, \"God is good!\". She welcomed a prayer of thanksgiving.    No further SHS needed at this time.    Michael Bright MDIV  Intern   Phone: [phone]   "

## 2022-07-25 NOTE — PROGRESS NOTES
Care Management Discharge Note    Discharge Date: 07/25/2022       Discharge Disposition: Transitional Care    Discharge Services: None    Discharge DME: None    Discharge Transportation: family or friend will provide    Private pay costs discussed: private room/amenity fees [facility] is $45/day for private room fee. Discussed this with patient and family and they are agreeable to cost    PAS Confirmation Code:  [id number]  Patient/family educated on Medicare website which has current facility and service quality ratings: yes    Patient/Family in Agreement with the Plan: yes    Handoff Referral Completed: Yes    Additional Information:  RN CC Following for discharge planning to Transitional Care Unit. Patient has been accepted in to a private room at [facility] and [facility] for today. Met with patient at bedside to discuss plan of care and bed availability. She would like to accept the bed at [facility]. We discussed the private room fee of $45/day and she is agreeable. She is asking CM to speak to her DIL Seema about discharge plan. Call placed to Seema to update her on bed availability and to discuss transportation to tCU. Family will transport when patient is ready.    Updated [facility] that patient will pass on their bed. Updated [facility] that patient will accept their bed. Bed will be available at any time. Paged MD for orders and updated bedside RN. PAS completed.    ADDENDUM 1230:  MD discharge orders were faxed to [facility] via [employer]. Patient's family will be here at 1400 for transport to Transitional Care Unit. Transitional Care Unit updated on transport time.            Marylin Becker RN BSN CM  Inpatient Care Coordination  [facility]  [phone]

## 2022-07-25 NOTE — PLAN OF CARE
Pt discharged to TCU. Transported by family. Mg replaced per protocol today and will recheck at TCU. IV removed. Pt verbalizes understanding of discharge instructions and medications. Pt acknowledges receipt of all belongings.

## 2022-07-25 NOTE — PROGRESS NOTES
Feels well, will plan to d/c to TCU/rehab.  Mg a little low and replaced IV this AM.  Will request recheck at TCU/rehab.  Full d/c summary to follow.

## 2022-07-25 NOTE — PROGRESS NOTES
[facility] GERIATRICS    PRIMARY CARE PROVIDER AND CLINIC:  Michael Londono MD, MD, [address] / [facility] [address]  Chief Complaint   Patient presents with     Hospital F/U      [place] Medical Record Number:  [id number]  Place of Service where encounter took place:  [facility] ([facility]) [25]    Gosia Alonzo  is a 89 year old  (11/14/1932), admitted to the above facility from  [facility]. Hospital stay 7/19/22 through 7/25/22..   HPI:    PMH of CAD, CAMILA to LAD, ischemic cardiomyopathy, CHF, PAD, pericarditis, DMII, HTN, HLD, diverticular disease, GERD, migraines, chronic pain who presents to ED with diarrhea and chest pain.   Diarrhea  Hx of diverticular disease and GERD: diarrhea improved, EGD done with no evidence of bleeding ulcer, Hgb 9.6-8.7 stable   Acute on chronic combined CHF; bilateral pleural effusions, most recent echo EF 45-50% and grade 1 diastolic CHF. Diuresed with IV lasix 20mg BID and hydroxyzine prn for anxiety as some of the SOB thought to be anxiety r/t  Chronic pain: ongoing chronic back and joint pain. Continue PTA tylenol, offered pain consult and patient declined  On exam today: patient is resting in bed, states low back pain is rated 4/10 which is chronic, denies SOB, palpitations, cough, congestion, CP, states she slept fair last night and appetite is fair.     CODE STATUS/ADVANCE DIRECTIVES DISCUSSION:  No CPR- Pre-arrest intubation OK  DNR / DNI  ALLERGIES:   Allergies   Allergen Reactions     Codeine      GI UPSET     Escitalopram Oxalate      fatigue     Esomeprazole Magnesium Trihydrate      HA     Gabapentin Dizziness     Dizziness, confusion     Imdur [Isosorbide]      Headache       Meperidine Hcl      N/V     Morphine Hcl      HIVES     Oxycodone      (percodan) GI UPSET     Pentazocine      (talwin)  HALLUCINATIONS     Propoxyphene Hcl      STOMACH UPSET     Sumatriptan Succinate      chest pain      PAST MEDICAL HISTORY:    Past Medical History:   Diagnosis Date     Acute on chronic congestive heart failure, unspecified heart failure type (H) 7/19/2022     Allergic rhinitis, cause unspecified      Arthritis      CAD (coronary artery disease)     Cath 12/2015: aspiration thrombectomy and CAMILA to mid and prox LAD. Cath 1/9/16: ASA/ticargelor held due to LGI bleed and she thrombosed the Lad stent. Aspiration thrombectomy and PTCA to mLAD.     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     Ischemic cardiomyopathy      Migraine, unspecified, without mention of intractable migraine without mention of status migrainosus      NSTEMI (non-ST elevated myocardial infarction) (H) 08-10-15     Obesity, unspecified      Osteoporosis 11/08     Other and unspecified hyperlipidemia      Other iron deficiency anemia 4/21/2016     Other seborrheic keratosis      PAF (paroxysmal atrial fibrillation) (H)      Paroxysmal atrial fibrillation (H) 10/26/2016     Pericarditis age 15     PONV (postoperative nausea and vomiting)      Postop DVT right calf 1988     Pyogenic granuloma of skin and subcutaneous tissue      R upper extremity edema, postop     ? RSD     Solitary cyst of breast      TSH deficiency      Type 2 diabetes mellitus with diabetic nephropathy (H)      Type 2 diabetes mellitus with stage 3 chronic kidney disease (H)      Type 2 diabetes, HbA1c goal < 7% (H)      VASOMOTOR RHINITIS 2006    Dr. Wilson     Viral warts, unspecified        PAST SURGICAL HISTORY:   has a past surgical history that includes surgical history of -  (1/95); NONSPECIFIC PROCEDURE (surgery approx 1980); surgical history of -  (age 4); surgical history of -  (age 38); NONSPECIFIC PROCEDURE (1996); surgical history of -  (1/97); surgical history of -  (1988); surgical history of -  (6/97); surgical history of - ; surgical history of -  (8/97); surgical history of -  (11/98); surgical history of -  (4/88); surgical history of -  (10/99); surgical history of -  (1/04); surgical history of -  (4/05); Breast surgery; colonoscopy; appendectomy; Phacoemulsification clear cornea with standard intraocular lens implant (12/16/2013); angiogram (12/29/15); angiogram (01/09/16); Esophagoscopy, gastroscopy, duodenoscopy (EGD), combined (N/A, 2/12/2020); Head and neck surgery (01/1989); orthopedic surgery (01/1998); back surgery (01/1989); and Esophagoscopy, gastroscopy, duodenoscopy (EGD), combined (N/A, 7/22/2022).  FAMILY HISTORY: family history includes C.A.D. in her father; Cancer in her brother and mother; Hypertension in her sister.  SOCIAL HISTORY:   reports that she has never smoked. She has never used smokeless tobacco. She reports that she does not drink alcohol and does not use drugs.  Patient's living condition: lives alone    Post Discharge Medication Reconciliation Status: discharge medications reconciled, continue medications without change  Current Outpatient Medications   Medication Sig     acetaminophen (TYLENOL) 325 MG tablet Take 2 tablets (650 mg) by mouth every 6 hours as needed for mild pain     aspirin EC 81 MG EC tablet Take 1 tablet (81 mg) by mouth daily     atorvastatin (LIPITOR) 40 MG tablet TAKE 1 TABLET BY MOUTH ONE TIME DAILY     carvedilol (COREG) 3.125 MG tablet Take 1 tablet (3.125 mg) by mouth 2 times daily (with meals)     doxylamine (UNISOM) 25 MG TABS tablet Take 50 mg by mouth At Bedtime     fluticasone (FLONASE) 50 MCG/ACT nasal spray Spray 1  "spray into both nostrils daily     furosemide (LASIX) 20 MG tablet Take 1 tablet (20 mg) by mouth daily Add additional 20mg for increased edema     guaiFENesin (MUCINEX) 600 MG 12 hr tablet Take 600 mg by mouth as needed for congestion Reported on 4/11/2017     HYDROcodone-acetaminophen (NORCO)  MG per tablet Take 1 tablet by mouth every 6 hours as needed for severe pain     hydrOXYzine (ATARAX) 25 MG tablet TAKE ONE TABLET BY MOUTH EVERY SIX HOURS AS NEEDED FOR ITCHING OR ADJUVANT PAIN     lidocaine (LMX4) 4 % external cream Apply topically 3 times daily as needed for mild pain     losartan (COZAAR) 25 MG tablet Take 1 tablet (25 mg) by mouth daily     pantoprazole (PROTONIX) 40 MG EC tablet Take 1 tablet (40 mg) by mouth daily     No current facility-administered medications for this visit.       ROS:  10 point ROS of systems including Constitutional, Eyes, Respiratory, Cardiovascular, Gastroenterology, Genitourinary, Integumentary, Musculoskeletal, Psychiatric were all negative except for pertinent positives noted in my HPI.    Vitals:  /68   Pulse 90   Temp 98.1  F (36.7  C)   Resp 18   Ht 1.651 m (5' 5\")   Wt 52.6 kg (116 lb)   LMP  (LMP Unknown)   SpO2 94%   BMI 19.30 kg/m    Exam:  GENERAL APPEARANCE:  Alert, in no distress, thin  ENT:  Mouth and posterior oropharynx normal, moist mucous membranes, Teller  EYES:  EOM, conjunctivae, lids, pupils and irises normal, PERRL  RESP:  respiratory effort and palpation of chest normal, lungs clear to auscultation , no respiratory distress  CV:  Palpation and auscultation of heart done , regular rate and rhythm, no murmur, rub, or gallop, peripheral edema trace+ in RLE  ABDOMEN:  normal bowel sounds, soft, nontender, no hepatosplenomegaly or other masses  M/S:   patient resting in bed  SKIN:  Inspection of skin and subcutaneous tissue baseline, scattered vascular insufficiency wounds LE bilaterally  NEURO:   speach wnl  PSYCH:  affect and mood " normal    Lab/Diagnostic data:  Recent labs in [facility] reviewed by me today.  and   Most Recent 3 CBC's:Recent Labs   Lab Test 07/23/22  0644 07/22/22  0609 07/21/22  1051 07/20/22  0622   WBC  --  8.8 7.3 6.1   HGB 9.2* 9.1* 8.7* 8.7*   MCV  --  99 98 99   PLT  --  200 206 189     Most Recent 3 BMP's:Recent Labs   Lab Test 07/25/22  0617 07/24/22  0532 07/23/22  0644 07/22/22  0609 07/21/22  0644     --   --  138 138   POTASSIUM 4.6  4.6 4.7 4.5 4.3 4.1  4.1   CHLORIDE 106  --   --  106 107   CO2 24  --   --  27 24   BUN 25  --   --  28 25   CR 0.71  --   --  0.85 0.90   ANIONGAP 6  --   --  5 7   LIGIA 8.2*  --   --  8.0* 7.6*   *  --   --  133* 120*       ASSESSMENT/PLAN:    (I50.43) Acute on chronic combined systolic and diastolic congestive heart failure (H)  (primary encounter diagnosis)  (I25.10) Coronary artery disease involving native coronary artery of native heart without angina pectoris  (I25.5) Ischemic cardiomyopathy  (I10) Essential hypertension  (I48.0) Paroxysmal atrial fibrillation (H)  (R53.81) Physical deconditioning  Comment: acute/ongoing  Plan: PT and OT, daily weights, lasix 20mg QD, coreg 3.125mg BID, losartan 25mg QD, ASA 81mg QD  F/u with cardiology as directed    (F41.9) Anxiety  Comment: acute/ongoing  Plan: continue hydroxyzine 25mg q 6 hours prn, would benefit from ACP psychology    (E44.0) Moderate protein-calorie malnutrition (H)  Comment: acute/ongoing  Plan: dietician to follow    (K57.30) Diverticular disease of colon  (K21.9) Gastroesophageal reflux disease without esophagitis  Comment: acute/ongoing  Plan: continue protonix 40mg QD, monitor bowels    (N18.30) Stage 3 chronic kidney disease, unspecified whether stage 3a or 3b CKD (H)  Comment: acute/ongoing  Plan: BMP follow, avoid nephrotoxic agents    (G89.29) chronic pain  Ongoing  Plan: continue tylenol 650mg q 6 hours prn, norco 10/325mg q 6 hours prn    Orders:  BMP and Hgb On thursday      Total time spent with  patient visit at the skilled nursing facility was 35 min including patient visit and review of past records. Greater than 50% of total time spent with counseling and coordinating care due to discussed medications amd hospitalization and lab monitoring.     Electronically signed by:  Tonya Lynn Haase, APRN CNP

## 2022-07-25 NOTE — PLAN OF CARE
Goal Outcome Evaluation:      VS stable. GRIFFIN. Complained of pain in left shoulder and pain med's given. Slept on and off throughout the night.

## 2022-07-25 NOTE — DISCHARGE SUMMARY
[facility]  Discharge Summary  Hospitalist    Date of Admission:  7/19/2022  Date of Discharge:  7/25/2022  1:52 PM  Provider:  Miguel Celestin DO, [facility]    Discharge Diagnoses   1.  Acute exacerbation of systolic and diastolic CHF  2.  Concern of melena, s/p EGD  3.  Hypokalemia and Hypomagnesemia  4.  Chronic leg wounds present on admission  5.  Deconditioning    Other issues:  Incidental right kidney cyst, recommend consideration of follow-up imaging in 3 months.    Other medical issues:  Past Medical History:   Diagnosis Date     Acute on chronic congestive heart failure, unspecified heart failure type (H) 7/19/2022     Allergic rhinitis, cause unspecified      Arthritis      CAD (coronary artery disease)     Cath 12/2015: aspiration thrombectomy and CAMILA to mid and prox LAD. Cath 1/9/16: ASA/ticargelor held due to LGI bleed and she thrombosed the Lad stent. Aspiration thrombectomy and PTCA to mLAD.     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     Ischemic cardiomyopathy      Migraine, unspecified, without mention of intractable migraine without mention of status migrainosus      NSTEMI (non-ST elevated myocardial infarction) (H) 08-10-15     Obesity, unspecified      Osteoporosis 11/08     Other and unspecified hyperlipidemia      Other iron deficiency anemia 4/21/2016     Other seborrheic keratosis      PAF (paroxysmal atrial fibrillation) (H)      Paroxysmal atrial  fibrillation (H) 10/26/2016     Pericarditis age 15     PONV (postoperative nausea and vomiting)      Postop DVT right calf 1988     Pyogenic granuloma of skin and subcutaneous tissue      R upper extremity edema, postop     ? RSD     Solitary cyst of breast      TSH deficiency      Type 2 diabetes mellitus with diabetic nephropathy (H)      Type 2 diabetes mellitus with stage 3 chronic kidney disease (H)      Type 2 diabetes, HbA1c goal < 7% (H)      VASOMOTOR RHINITIS 2006    Dr. Wilson     Viral warts, unspecified        History of Present Illness   Gosia Alonzo is an 89 year old female who presented with loose dark stools, shortness of breath.  Please see the admission history and physical for full details.    Hospital Course   Gosia Alonzo was admitted on 7/19/2022.  The following problems were addressed during her hospitalization:    Acute exacerbation of chronic systolic and diastolic congestive heart failure:   -Most recent echo was in 9/21 and showed ejection fraction 45 to 50% and grade 1 diastolic congestive heart failure  -Echocardiogram -> The left ventricle is normal in size. Proximal septal thickening is noted. Left ventricular systolic function is moderately reduced. The visual ejection fraction is 35-40%. Left ventricular diastolic function is abnormal. There is severe hypokinesis of the mid anteroseptal/inferoseptal walls and the apical septal/inferior walls. In direct comparison to the previous study dated 09/10/2021, the findings appear similar.    Ms. Alonzo was diuresed with IV lasix then transitioned to oral.  She was not felt to have ACS/AMI.  Her respiratory/volume status improved.  She likely would benefit from outpatient CHF clinic after discharge from rehab.  She should have daily weights and watch her trend after discharge.  If steady increases diuretics should be increased and consider discussing with cardiology.     Recent diarrhea-Report of melena  History of  diverticular disease  History of GERD  -Patients loose stools quickly resolved.  Unclear if related to the CHF or whether she had another GI issue such as GI virus. [facility] consulted as Hgb 9.6 -> 8.7 despite diuresis, had recent melena.  She was seen by gastroenterology and EGD was done which revealed no evidence of bleeding or ulcer. GI did not recommend any further work-up except there is clear evidence of active bleeding or ongoing drop in hemoglobin in the future.     Hypokalemia- Hypomagnesemia  -Corrected during stay, given some additional magnesium before discharge for recent low.  Recommend outpatient recheck in follow-up.     Chronic leg wounds  -These do not look acutely infected although there is some chronic erythema of the bilateral lower legs  -WOC followed.  Recommend ongoing wound care after discharge.     Chronic pain  -She has chronic pain involving her back, joints, etc.  -She has intolerance to many pain medications.  She was contineud on tylenol, norco, hydroxyzine.  She was offered a pain team consult but didn't feel it was needed.  Chronic pain appeared stable toward discharge.     Coronary artery disease  Previous NSTEMI  Previous LAD stent  History of pericarditis  Paroxysmal atrial fibrillation  Dyslipidemia  Ischemic cardiomyopathy  Diastolic congestive heart failure  -Aspirin, Coreg, losartan, and Lipitor continued.    -Repeated echocardiogram -> see above  -Acute coronary syndrome not felt cause of recent CHF exacerbation.  See CHF discussion above.     Type 2 diabetes, diet controlled  - Glu overall controlled.  Recommend continued periodic glu monitoring at care center.  It sounds like she mainly just follows a low sodium diet so that is ordered at discharge.  If glu creeping up consider tightening diet further with more diabetic diet.     Deconditioning:  -  Therapy  -  Initially patient was resistant to TCU however she was convinced after therapy and nursing staff expressed significant  concern for her safety and need for assistance with mobility specially that she lives alone     Other issues:  Incidental right kidney appearing cyst.  Recommend considering repeat imaging as radiology recommended in 3 months at her outpatient providers discretion.    Significant Results and Procedures   EGD    Pending Results     Unresulted Labs Ordered in the Past 30 Days of this Admission     No orders found from 6/19/2022 to 7/20/2022.          Code Status   NO CPR, would want intubation       Primary Care Physician   Michael Londono MD      Alert, oriented, heart regular, lungs clear upper with mildly decreased breath sounds bases, abdomen non-tender.    Discharge Disposition   Rehab/TCU    Consultations This Hospital Stay   [facility] CLINIC EVALUATION IP CONSULT  OCCUPATIONAL THERAPY ADULT IP CONSULT  NUTRITION SERVICES ADULT IP CONSULT  CARE MANAGEMENT / SOCIAL WORK IP CONSULT  WOUND OSTOMY CONTINENCE NURSE  IP CONSULT  PHYSICAL THERAPY ADULT IP CONSULT  SOCIAL WORK IP CONSULT  GASTROENTEROLOGY IP CONSULT  PHYSICAL THERAPY ADULT IP CONSULT  OCCUPATIONAL THERAPY ADULT IP CONSULT    Time Spent on this Encounter   IMiguel DO, personally saw the patient today and spent greater than 30 minutes discharging this patient.    Discharge Orders      Activity    Your activity upon discharge: activity as tolerated     Reason for your hospital stay    You had shortness of breath/CHF     General info for SNF    Length of Stay Estimate: Short Term Care: Estimated # of Days <30  Condition at Discharge: Improving  Level of care:skilled   Rehabilitation Potential: Good  Admission H&P remains valid and up-to-date: Yes  Recent Chemotherapy: N/A  Use Nursing Home Standing Orders: Yes, but needs reassessment with care center provider when they round     Mantoux instructions    Give two-step Mantoux (PPD) Per Facility Policy Yes     Daily weights    Call Provider for weight gain of more than 2 pounds per day or 5 pounds per  week.     Brief Discharge Instructions    Bilateral leg wound(s): Daily    1. Cleanse with wound cleanser and dry  2. Apply Sween cream to wounds and surrounding dry skin  3. Apply adaptic to wound  4. Cover with gauze and wrap with kerlix      Left heel wound(s): Every 3 days    1. Cleanse with wound cleanser and dry  2. Apply Mepilex 4x4  3. Float heels at all times with pillows under calves     Follow-up and recommended labs and tests     Follow up with care center provider this week.  Recommend hemoglobin, magnesium level, basic metabolic panel in 1 week with results to the care center provider.  Also recommend ongoing wound care as separately ordered.  Also recommend patient follow-up with cardiology/CORE clinic in 2 weeks.     No CPR- Pre-arrest intubation OK     Physical Therapy Adult Consult    Evaluate and treat as clinically indicated.    Reason:  Deconditioning     Occupational Therapy Adult Consult    Evaluate and treat as clinically indicated.    Reason:  Deconditioning     Diet    Follow this diet upon discharge: Orders Placed This Encounter      Low sodium diet < 2 g     Discharge Medications   Current Discharge Medication List      CONTINUE these medications which have CHANGED    Details   furosemide (LASIX) 20 MG tablet Take 1 tablet (20 mg) by mouth daily Add additional 20mg for increased edema  Qty: 95 tablet, Refills: 2    Associated Diagnoses: Ischemic cardiomyopathy      HYDROcodone-acetaminophen (NORCO)  MG per tablet Take 1 tablet by mouth every 6 hours as needed for severe pain  Qty: 12 tablet, Refills: 0    Associated Diagnoses: Acute GI bleeding      pantoprazole (PROTONIX) 40 MG EC tablet Take 1 tablet (40 mg) by mouth daily  Qty: 180 tablet, Refills: 1    Associated Diagnoses: Acute GI bleeding; Gastroesophageal reflux disease with esophagitis, unspecified whether hemorrhage         CONTINUE these medications which have NOT CHANGED    Details   acetaminophen (TYLENOL) 325 MG tablet  Take 2 tablets (650 mg) by mouth every 6 hours as needed for mild pain    Associated Diagnoses: Acute GI bleeding      aspirin EC 81 MG EC tablet Take 1 tablet (81 mg) by mouth daily      atorvastatin (LIPITOR) 40 MG tablet TAKE 1 TABLET BY MOUTH ONE TIME DAILY  Qty: 90 tablet, Refills: 0    Associated Diagnoses: ST elevation myocardial infarction (STEMI) involving other coronary artery of anterior wall (H); Clostridium difficile diarrhea      carvedilol (COREG) 3.125 MG tablet Take 1 tablet (3.125 mg) by mouth 2 times daily (with meals)  Qty: 180 tablet, Refills: 3    Associated Diagnoses: Ischemic cardiomyopathy      doxylamine (UNISOM) 25 MG TABS tablet Take 50 mg by mouth At Bedtime      fluticasone (FLONASE) 50 MCG/ACT nasal spray Spray 1 spray into both nostrils daily      guaiFENesin (MUCINEX) 600 MG 12 hr tablet Take 600 mg by mouth as needed for congestion Reported on 4/11/2017      hydrOXYzine (ATARAX) 25 MG tablet TAKE ONE TABLET BY MOUTH EVERY SIX HOURS AS NEEDED FOR ITCHING OR ADJUVANT PAIN  Qty: 60 tablet, Refills: 0    Associated Diagnoses: Herpes zoster infection of thoracic region      lidocaine (LMX4) 4 % external cream Apply topically 3 times daily as needed for mild pain  Qty: 30 g, Refills: 0    Associated Diagnoses: Herpes zoster infection of thoracic region      losartan (COZAAR) 25 MG tablet Take 1 tablet (25 mg) by mouth daily  Qty: 90 tablet, Refills: 3    Associated Diagnoses: Coronary artery disease involving native coronary artery of native heart without angina pectoris             Allergies   Allergies   Allergen Reactions     Codeine      GI UPSET     Escitalopram Oxalate      fatigue     Esomeprazole Magnesium Trihydrate      HA     Gabapentin Dizziness     Dizziness, confusion     Imdur [Isosorbide]      Headache       Meperidine Hcl      N/V     Morphine Hcl      HIVES     Oxycodone      (percodan) GI UPSET     Pentazocine      (talwin)  HALLUCINATIONS     Propoxyphene Hcl       STOMACH UPSET     Sumatriptan Succinate      chest pain     Data   Recent Labs   Lab 07/23/22  0644 07/22/22  0609 07/21/22  1051   WBC  --  8.8 7.3   HGB 9.2* 9.1* 8.7*   HCT  --  29.7* 28.9*   MCV  --  99 98   PLT  --  200 206     Recent Labs   Lab 07/25/22  0617 07/24/22  0532 07/23/22  0644 07/22/22  0609 07/21/22  1305 07/21/22  0644     --   --  138  --  138   POTASSIUM 4.6  4.6 4.7 4.5 4.3  --  4.1  4.1   CHLORIDE 106  --   --  106  --  107   CO2 24  --   --  27  --  24   ANIONGAP 6  --   --  5  --  7   *  --   --  133*  --  120*   BUN 25  --   --  28  --  25   CR 0.71  --   --  0.85  --  0.90   GFRESTIMATED 81  --   --  65  --  61   LIGIA 8.2*  --   --  8.0*  --  7.6*   MAG 1.5* 1.6 1.6 1.9   < > 1.4*   PHOS 3.1 3.1 2.9 3.2  --  3.2    < > = values in this interval not displayed.     Results for orders placed or performed during the hospital encounter of 07/19/22   XR Chest 2 Views    Narrative    CHEST TWO VIEWS July 19, 2022 9:36 AM     HISTORY: Shortness of breath, chest pain.    COMPARISON: Chest x-ray on 11/23/2021.      Impression    IMPRESSION: AP view of the chest was obtained. Mildly enlarged cardiac  silhouette. Small bilateral pleural effusions and associated basilar  atelectasis/consolidation. No significant pneumothorax. Postsurgical  changes of right shoulder arthroplasty. Significant degenerative  changes of the left shoulder joints. Multiple surgical clips in the  right breast/axilla.    THOMAS ARTEAGA MD         SYSTEM ID:  [id number]   CT Chest Pulmonary Embolism w Contrast    Narrative    CT CHEST PULMONARY EMBOLISM WITH CONTRAST  7/19/2022 11:10 AM    HISTORY:  Chest pain, shortness of breath, elevated D-dimer.    TECHNIQUE: Scans obtained from the apices through the diaphragm with  IV contrast. 58 mL Isovue-370 IV injected. Radiation dose for this  scan was reduced using automated exposure control, adjustment of the  mA and/or kV according to patient size, or iterative  reconstruction  technique.    COMPARISON:  Chest x-ray on same day earlier and chest CT on  11/9/2021.    FINDINGS:  Chest/mediastinum: No evidence of pulmonary embolism. Mild  cardiomegaly. No significant pericardial effusion. No significant  mediastinal or hilar lymphadenopathy. Multiple calcified mediastinal  and hilar granulomas. Coronary artery stent and mild atherosclerotic  vascular calcification of the coronary arteries. Moderate-sized hiatal  hernia.    Lungs and pleura: Small to moderate bilateral pleural effusions and  associated basilar atelectasis/consolidation. No significant  pneumothorax.    Upper abdomen: Limited evaluation of the upper abdomen due to lack of  coverage and timing of contrast. Small amount of fluid in the upper  abdomen. Mild diffuse gallbladder wall thickening. 3 cm cystic focus  in the pancreatic tail area (series 8 image 238), not significantly  changed as compared to 11/9/2021. Multiple calcified splenic  granulomas. Few mildly enlarged upper abdominal lymph nodes,  indeterminate, could be reactive. Multiple splenules in the left upper  quadrant.    Bones and soft tissue: Postsurgical changes of right shoulder  arthroplasty. Diffuse osteopenia with multiple compression deformities  of the lower thoracic spine, slightly worsened in T8 as compared to  11/9/2021 exam.      Impression    IMPRESSION:   1. No evidence of pulmonary embolism.  2. Small to moderate bilateral pleural effusions and associated  basilar atelectasis/consolidation.  3. Moderate-sized hiatal hernia.  4. Small amount of fluid in the upper abdomen of indeterminate  etiology.  5. Mild diffuse gallbladder wall thickening, nonspecific, could be due  to presence of ascites. Can be further evaluated with right upper  quadrant abdominal ultrasound.  6. Diffuse osteopenia with multilevel degenerative changes of the  spine. Multiple mild compression deformities of the thoracic spine,  slightly worsened in T8 as compared  to 11/9/2021 exam.    THOMAS ARTEAGA MD         SYSTEM ID:  [id number]   US Abdomen Limited (RUQ)    Narrative    ULTRASOUND ABDOMEN LIMITED July 19, 2022 1:58 PM     HISTORY: Epigastric/chest pain, abnormal CT.    COMPARISON: CT abdomen/pelvis on 2/11/2020.    FINDINGS:    Gallbladder: Gallstone and gallbladder sludge. Borderline gallbladder  wall thickening measuring 3 mm. No pericholecystic fluid. Sonographic  Licona's sign is negative.    Bile ducts: CHD is normal diameter. No intrahepatic biliary  dilatation. The distal aspect of the common bile duct is obscured by  overlying bowel gas.    Liver: It demonstrates normal parenchymal echogenicity. No focal  hepatic mass is visualized.    Pancreas: Partially obscured by overlying bowel gas, but grossly  unremarkable.     Right kidney: No hydronephrosis or shadowing calculi. There is 2.0 x  1.9 x 1.8 cm hypoechoic/anechoic partially exophytic area arising from  the lower pole of the right kidney, could represent mildly complex  cyst versus less likely solid mass.    Additional: Small amount of free fluid in the right upper quadrant  along the inferior aspect of the liver edge.      Impression    IMPRESSION:    1. Cholelithiasis and gallbladder sludge, without sonographic evidence  of acute cholecystitis.  2. 2 cm hypoechoic/anechoic partially exophytic area arising from the  lower pole of right kidney, likely represent mildly complex cyst  versus less likely solid renal mass. Recommend follow-up ultrasound in  three months to assess for interval change. Other evaluation options  including CT or MRI renal mass protocol.  3. Small amount of fluid in the right upper quadrant.    THOMAS ARTEAGA MD         SYSTEM ID:  [id number]   Echocardiogram Complete     Value    LVEF  35-40%    Narrative    [id number]  [id number]  [id number]  [id number]^VALERIO^NITZA^TANNER     [facility]  Echocardiography Laboratory  [address] [place] [address]     Name:  MILAGRO ELY  MRN: [id number]  : 1932  Study Date: 2022 12:58 PM  Age: 89 yrs  Gender: Female  Patient Location: [facility]  Reason For Study: Heart Failure  Ordering Physician: NITZA LUO  Performed By: Sonal Hernandez     BSA: 1.5 m2  Height: 65 in  Weight: 107 lb  HR: 97  ______________________________________________________________________________  Procedure  Complete Portable Echo Adult.  ______________________________________________________________________________  Interpretation Summary     1. The left ventricle is normal in size. Proximal septal thickening is noted.  Left ventricular systolic function is moderately reduced. The visual ejection  fraction is 35-40%. Left ventricular diastolic function is abnormal. There is  severe hypokinesis of the mid anteroseptal/inferoseptal walls and the apical  septal/inferior walls.  2. The right ventricle is mildly dilated. The right ventricular systolic  function is normal.  3. The left atrium is severely dilated. The right atrium is mildly dilated.  There is no color Doppler evidence of an atrial shunt.  4. Mild tricupsid regurgitation. Right ventricular systolic pressure is  elevated, consistent with moderate pulmonary hypertension.  5. No pericardial effusion.  6. In direct comparison to the previous study dated 09/10/2021, the findings  appear similar.  ______________________________________________________________________________  Left Ventricle  The left ventricle is normal in size. Proximal septal thickening is noted.  Left ventricular systolic function is moderately reduced. The visual ejection  fraction is 35-40%. Left ventricular diastolic function is abnormal. There is  severe hypokinesis of the mid anteroseptal/inferoseptal walls and the apical  septal/inferior walls.     Right Ventricle  The right ventricle is mildly dilated. The right ventricular systolic function  is normal.     Atria  The left atrium is severely dilated. The  right atrium is mildly dilated. There  is no color Doppler evidence of an atrial shunt.     Mitral Valve  There is trace mitral regurgitation.     Tricuspid Valve  There is mild (1+) tricuspid regurgitation. The right ventricular systolic  pressure is approximated at 37.7 mmHg plus the right atrial pressure. IVC  diameter and respiratory changes fall into an intermediate range suggesting an  RA pressure of 8 mmHg. Right ventricular systolic pressure is elevated,  consistent with moderate pulmonary hypertension.     Aortic Valve  The aortic valve is not well visualized. There is trace aortic regurgitation.  No aortic stenosis is present.     Pulmonic Valve  There is no pulmonic valvular stenosis.     Vessels  The aortic root is normal size. Dilation of the inferior vena cava is present  with normal respiratory variation in diameter.     Pericardium  There is no pericardial effusion.     Rhythm  Sinus rhythm was noted.  ______________________________________________________________________________  MMode/2D Measurements & Calculations  IVSd: 0.63 cm  LVIDd: 4.6 cm  LVIDs: 3.8 cm  LVPWd: 0.71 cm  FS: 17.2 %  LV mass(C)d: 93.1 grams  LV mass(C)dI: 61.4 grams/m2  Ao root diam: 2.7 cm  LA dimension: 2.8 cm  LA/Ao: 1.0  LVOT diam: 1.7 cm  LVOT area: 2.3 cm2  LA Volume (BP): 79.5 ml  LA Volume Index (BP): 52.3 ml/m2  RWT: 0.31     Doppler Measurements & Calculations  MV E max leela: 94.1 cm/sec  MV A max leela: 86.4 cm/sec  MV E/A: 1.1  MV dec time: 0.17 sec  LV V1 max P.1 mmHg  LV V1 max: 113.0 cm/sec  LV V1 VTI: 20.8 cm  SV(LVOT): 47.2 ml  SI(LVOT): 31.1 ml/m2  PA acc time: 0.06 sec  TR max leela: 307.0 cm/sec  TR max P.7 mmHg  E/E' avg: 10.2  Lateral E/e': 8.1  Medial E/e': 12.3     ______________________________________________________________________________  Report approved by: Isabel Cardozo 2022 02:29 PM

## 2022-07-26 NOTE — LETTER
7/26/2022        RE: Gosia Alonzo  [address] [facility] [address]        [facility] GERIATRICS    PRIMARY CARE PROVIDER AND CLINIC:  Michael Londono MD, MD, [address] / [facility] [address]  Chief Complaint   Patient presents with     Hospital F/U      [facility] Medical Record Number:  [id number]  Place of Service where encounter took place:  [facility] (U) [25]    Gosia Alonzo  is a 89 year old  (11/14/1932), admitted to the above facility from  [facility]. Hospital stay 7/19/22 through 7/25/22..   HPI:    PMH of CAD, CAMILA to LAD, ischemic cardiomyopathy, CHF, PAD, pericarditis, DMII, HTN, HLD, diverticular disease, GERD, migraines, chronic pain who presents to ED with diarrhea and chest pain.   Diarrhea  Hx of diverticular disease and GERD: diarrhea improved, EGD done with no evidence of bleeding ulcer, Hgb 9.6-8.7 stable   Acute on chronic combined CHF; bilateral pleural effusions, most recent echo EF 45-50% and grade 1 diastolic CHF. Diuresed with IV lasix 20mg BID and hydroxyzine prn for anxiety as some of the SOB thought to be anxiety r/t  Chronic pain: ongoing chronic back and joint pain. Continue PTA tylenol, offered pain consult and patient declined  On exam today: patient is resting in bed, states low back pain is rated 4/10 which is chronic, denies SOB, palpitations, cough, congestion, CP, states she slept fair last night and appetite is fair.     CODE STATUS/ADVANCE DIRECTIVES DISCUSSION:  No CPR- Pre-arrest intubation OK  DNR / DNI  ALLERGIES:   Allergies   Allergen Reactions     Codeine      GI UPSET     Escitalopram Oxalate      fatigue     Esomeprazole Magnesium Trihydrate      HA     Gabapentin Dizziness     Dizziness, confusion     Imdur [Isosorbide]      Headache       Meperidine Hcl      N/V     Morphine Hcl      HIVES     Oxycodone      (percodan) GI UPSET     Pentazocine      (talwin)  HALLUCINATIONS      Propoxyphene Hcl      STOMACH UPSET     Sumatriptan Succinate      chest pain      PAST MEDICAL HISTORY:   Past Medical History:   Diagnosis Date     Acute on chronic congestive heart failure, unspecified heart failure type (H) 7/19/2022     Allergic rhinitis, cause unspecified      Arthritis      CAD (coronary artery disease)     Cath 12/2015: aspiration thrombectomy and CAMILA to mid and prox LAD. Cath 1/9/16: ASA/ticargelor held due to LGI bleed and she thrombosed the Lad stent. Aspiration thrombectomy and PTCA to mLAD.     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     Ischemic cardiomyopathy      Migraine, unspecified, without mention of intractable migraine without mention of status migrainosus      NSTEMI (non-ST elevated myocardial infarction) (H) 08-10-15     Obesity, unspecified      Osteoporosis 11/08     Other and unspecified hyperlipidemia      Other iron deficiency anemia 4/21/2016     Other seborrheic keratosis      PAF (paroxysmal atrial fibrillation) (H)      Paroxysmal atrial fibrillation (H) 10/26/2016     Pericarditis age 15     PONV (postoperative nausea and vomiting)      Postop DVT right calf 1988     Pyogenic granuloma of skin and subcutaneous tissue      R upper extremity edema, postop     ? RSD     Solitary cyst of breast      TSH deficiency      Type 2 diabetes mellitus with diabetic nephropathy (H)      Type 2 diabetes mellitus with stage 3 chronic kidney disease (H)      Type  2 diabetes, HbA1c goal < 7% (H)      VASOMOTOR RHINITIS 2006    Dr. Steve Begum warts, unspecified       PAST SURGICAL HISTORY:   has a past surgical history that includes surgical history of -  (1/95); NONSPECIFIC PROCEDURE (surgery approx 1980); surgical history of -  (age 4); surgical history of -  (age 38); NONSPECIFIC PROCEDURE (1996); surgical history of -  (1/97); surgical history of -  (1988); surgical history of -  (6/97); surgical history of - ; surgical history of -  (8/97); surgical history of -  (11/98); surgical history of -  (4/88); surgical history of -  (10/99); surgical history of -  (1/04); surgical history of -  (4/05); Breast surgery; colonoscopy; appendectomy; Phacoemulsification clear cornea with standard intraocular lens implant (12/16/2013); angiogram (12/29/15); angiogram (01/09/16); Esophagoscopy, gastroscopy, duodenoscopy (EGD), combined (N/A, 2/12/2020); Head and neck surgery (01/1989); orthopedic surgery (01/1998); back surgery (01/1989); and Esophagoscopy, gastroscopy, duodenoscopy (EGD), combined (N/A, 7/22/2022).  FAMILY HISTORY: family history includes C.A.D. in her father; Cancer in her brother and mother; Hypertension in her sister.  SOCIAL HISTORY:   reports that she has never smoked. She has never used smokeless tobacco. She reports that she does not drink alcohol and does not use drugs.  Patient's living condition: lives alone    Post Discharge Medication Reconciliation Status: discharge medications reconciled, continue medications without change  Current Outpatient Medications   Medication Sig     acetaminophen (TYLENOL) 325 MG tablet Take 2 tablets (650 mg) by mouth every 6 hours as needed for mild pain     aspirin EC 81 MG EC tablet Take 1 tablet (81 mg) by mouth daily     atorvastatin (LIPITOR) 40 MG tablet TAKE 1 TABLET BY MOUTH ONE TIME DAILY     carvedilol (COREG) 3.125 MG tablet Take 1 tablet (3.125 mg) by mouth 2 times daily (with meals)     doxylamine (UNISOM)  "25 MG TABS tablet Take 50 mg by mouth At Bedtime     fluticasone (FLONASE) 50 MCG/ACT nasal spray Spray 1 spray into both nostrils daily     furosemide (LASIX) 20 MG tablet Take 1 tablet (20 mg) by mouth daily Add additional 20mg for increased edema     guaiFENesin (MUCINEX) 600 MG 12 hr tablet Take 600 mg by mouth as needed for congestion Reported on 4/11/2017     HYDROcodone-acetaminophen (NORCO)  MG per tablet Take 1 tablet by mouth every 6 hours as needed for severe pain     hydrOXYzine (ATARAX) 25 MG tablet TAKE ONE TABLET BY MOUTH EVERY SIX HOURS AS NEEDED FOR ITCHING OR ADJUVANT PAIN     lidocaine (LMX4) 4 % external cream Apply topically 3 times daily as needed for mild pain     losartan (COZAAR) 25 MG tablet Take 1 tablet (25 mg) by mouth daily     pantoprazole (PROTONIX) 40 MG EC tablet Take 1 tablet (40 mg) by mouth daily     No current facility-administered medications for this visit.       ROS:  10 point ROS of systems including Constitutional, Eyes, Respiratory, Cardiovascular, Gastroenterology, Genitourinary, Integumentary, Musculoskeletal, Psychiatric were all negative except for pertinent positives noted in my HPI.    Vitals:  /68   Pulse 90   Temp 98.1  F (36.7  C)   Resp 18   Ht 1.651 m (5' 5\")   Wt 52.6 kg (116 lb)   LMP  (LMP Unknown)   SpO2 94%   BMI 19.30 kg/m    Exam:  GENERAL APPEARANCE:  Alert, in no distress, thin  ENT:  Mouth and posterior oropharynx normal, moist mucous membranes, [place]  EYES:  EOM, conjunctivae, lids, pupils and irises normal, PERRL  RESP:  respiratory effort and palpation of chest normal, lungs clear to auscultation , no respiratory distress  CV:  Palpation and auscultation of heart done , regular rate and rhythm, no murmur, rub, or gallop, peripheral edema trace+ in RLE  ABDOMEN:  normal bowel sounds, soft, nontender, no hepatosplenomegaly or other masses  M/S:   patient resting in bed  SKIN:  Inspection of skin and subcutaneous tissue baseline, " scattered vascular insufficiency wounds LE bilaterally  NEURO:   speach wnl  PSYCH:  affect and mood normal    Lab/Diagnostic data:  Recent labs in [facility] reviewed by me today.  and   Most Recent 3 CBC's:Recent Labs   Lab Test 07/23/22  0644 07/22/22  0609 07/21/22  1051 07/20/22  0622   WBC  --  8.8 7.3 6.1   HGB 9.2* 9.1* 8.7* 8.7*   MCV  --  99 98 99   PLT  --  200 206 189     Most Recent 3 BMP's:Recent Labs   Lab Test 07/25/22  0617 07/24/22  0532 07/23/22  0644 07/22/22  0609 07/21/22  0644     --   --  138 138   POTASSIUM 4.6  4.6 4.7 4.5 4.3 4.1  4.1   CHLORIDE 106  --   --  106 107   CO2 24  --   --  27 24   BUN 25  --   --  28 25   CR 0.71  --   --  0.85 0.90   ANIONGAP 6  --   --  5 7   LIGIA 8.2*  --   --  8.0* 7.6*   *  --   --  133* 120*       ASSESSMENT/PLAN:    (I50.43) Acute on chronic combined systolic and diastolic congestive heart failure (H)  (primary encounter diagnosis)  (I25.10) Coronary artery disease involving native coronary artery of native heart without angina pectoris  (I25.5) Ischemic cardiomyopathy  (I10) Essential hypertension  (I48.0) Paroxysmal atrial fibrillation (H)  (R53.81) Physical deconditioning  Comment: acute/ongoing  Plan: PT and OT, daily weights, lasix 20mg QD, coreg 3.125mg BID, losartan 25mg QD, ASA 81mg QD  F/u with cardiology as directed    (F41.9) Anxiety  Comment: acute/ongoing  Plan: continue hydroxyzine 25mg q 6 hours prn, would benefit from ACP psychology    (E44.0) Moderate protein-calorie malnutrition (H)  Comment: acute/ongoing  Plan: dietician to follow    (K57.30) Diverticular disease of colon  (K21.9) Gastroesophageal reflux disease without esophagitis  Comment: acute/ongoing  Plan: continue protonix 40mg QD, monitor bowels    (N18.30) Stage 3 chronic kidney disease, unspecified whether stage 3a or 3b CKD (H)  Comment: acute/ongoing  Plan: BMP follow, avoid nephrotoxic agents    (G89.29) chronic pain  Ongoing  Plan: continue tylenol 650mg q 6  hours prn, norco 10/325mg q 6 hours prn    Orders:  BMP and Hgb On thursday      Total time spent with patient visit at the skilled nursing facility was 35 min including patient visit and review of past records. Greater than 50% of total time spent with counseling and coordinating care due to discussed medications amd hospitalization and lab monitoring.     Electronically signed by:  Tonya Lynn Haase, APRN CNP                     Sincerely,        Tonya Lynn Haase, APRN CNP

## 2022-07-26 NOTE — PLAN OF CARE
Occupational Therapy Discharge Summary    Reason for therapy discharge:    Discharged to transitional care facility.    Progress towards therapy goal(s). See goals on Care Plan in [facility] electronic health record for goal details.  Goals not met.  Barriers to achieving goals:   discharge from facility.    Therapy recommendation(s):    Continued therapy is recommended.  Rationale/Recommendations:  OT Eval and treat as indicated at TCU.        **Pt not seen by discharging therapist on this date, note written based on previous treating therapist's notes and recommendations

## 2022-07-28 NOTE — LETTER
[facility]   To:             Please give to facility    From:   Allegra Gleason RN  Care Coordinator   [facility]   P: [phone] [email or web address]   Patient Name:  Gosia Alonzo   YOB: 1932     Admit date: 7/25/22      *Information Needed:  Please contact me when the patient will discharge (or if they will move to long term care)- include the discharge date, disposition, and main diagnosis   - If the patient is discharged with home care services, please provide the name of the agency    Also- Please inform me if a care conference is being held.   Phone, Fax or Email with information                   Thank you

## 2022-07-28 NOTE — LETTER
"    7/28/2022        RE: Gosia Alonzo  [address] [facility] [address]              [facility] GERIATRICS    PRIMARY CARE PROVIDER AND CLINIC:  Michael Londono MD, MD, [facility] [address] / [facility] [address]  Chief Complaint   Patient presents with     Hospital F/U      [facility] Medical Record Number:  [id number]  Place of Service where encounter took place:  [facility] (TCU)     Gosia Alonzo  is a 89 year old  (11/14/1932), admitted to the above facility from  [facility]. Hospital stay 7/19/22 through 7/25/22..     Hospital course was reviewed by me, is as per the hospital discharge summary and NP note    PMH of CAD, CAMILA to LAD, ischemic cardiomyopathy, CHF, PAD, pericarditis, DMII, HTN, HLD, diverticular disease, PUD migraines, chronic pain who was hospitalized for the evaluation of diarrhea, pt reported melena and dyspnea    Was felt to be in CHF, improved with IV lasix, transitioned to po diuretic (dischargd on PTA dose of lasix 20 mg daily)  Echo revealed EF 35-40% with severe hypokinesis of the mid anteroseptal/inferoseptal walls and the apical  septal/inferior walls, similar to 9/2021 Echo  CXR revealed small B pleural effusions    Etiology of diarrhea unclear, as loose stools resolved during hospitalization  Hgb 9-6-8.7.. EGD unremarkable (performed in view of history of bleeding ulcer)  No report of recent lower colon eval performed. Unclear if Pt has ever had a colonoscopy.  No bleeding noted during hospitalization     Chronic back pain was stable on PTA medications    Patient reports feeling \"fine \".  She is very anxious to discharge to home.  She denies abdominal pain nausea or vomiting.  Appetite is fair.  Stools are soft but formed.  Low back pain is stable.  She denies exertional chest pain, shortness of breath, dizziness, palpitations.    CODE STATUS/ADVANCE DIRECTIVES DISCUSSION:  No CPR- Pre-arrest intubation OK  DNR / " DNI  ALLERGIES:   Allergies   Allergen Reactions     Codeine      GI UPSET     Escitalopram Oxalate      fatigue     Esomeprazole Magnesium Trihydrate      HA     Gabapentin Dizziness     Dizziness, confusion     Imdur [Isosorbide]      Headache       Meperidine Hcl      N/V     Morphine Hcl      HIVES     Oxycodone      (percodan) GI UPSET     Pentazocine      (talwin)  HALLUCINATIONS     Propoxyphene Hcl      STOMACH UPSET     Sumatriptan Succinate      chest pain      PAST MEDICAL HISTORY:   Past Medical History:   Diagnosis Date     Acute on chronic congestive heart failure, unspecified heart failure type (H) 7/19/2022     Allergic rhinitis, cause unspecified      Arthritis      CAD (coronary artery disease)     Cath 12/2015: aspiration thrombectomy and CAMILA to mid and prox LAD. Cath 1/9/16: ASA/ticargelor held due to LGI bleed and she thrombosed the Lad stent. Aspiration thrombectomy and PTCA to mLAD.     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     Ischemic cardiomyopathy      Migraine, unspecified, without mention of intractable migraine without mention of status migrainosus      NSTEMI (non-ST elevated myocardial infarction) (H) 08-10-15     Obesity, unspecified      Osteoporosis 11/08     Other and unspecified hyperlipidemia      Other iron deficiency anemia 4/21/2016     Other seborrheic keratosis      PAF (paroxysmal atrial fibrillation) (H)      Paroxysmal atrial fibrillation (H)  10/26/2016     Pericarditis age 15     PONV (postoperative nausea and vomiting)      Postop DVT right calf 1988     Pyogenic granuloma of skin and subcutaneous tissue      R upper extremity edema, postop     ? RSD     Solitary cyst of breast      TSH deficiency      Type 2 diabetes mellitus with diabetic nephropathy (H)      Type 2 diabetes mellitus with stage 3 chronic kidney disease (H)      Type 2 diabetes, HbA1c goal < 7% (H)      VASOMOTOR RHINITIS 2006    Dr. Wilson     Viral warts, unspecified       PAST SURGICAL HISTORY:   has a past surgical history that includes surgical history of -  (1/95); NONSPECIFIC PROCEDURE (surgery approx 1980); surgical history of -  (age 4); surgical history of -  (age 38); NONSPECIFIC PROCEDURE (1996); surgical history of -  (1/97); surgical history of -  (1988); surgical history of -  (6/97); surgical history of - ; surgical history of -  (8/97); surgical history of -  (11/98); surgical history of -  (4/88); surgical history of -  (10/99); surgical history of -  (1/04); surgical history of -  (4/05); Breast surgery; colonoscopy; appendectomy; Phacoemulsification clear cornea with standard intraocular lens implant (12/16/2013); angiogram (12/29/15); angiogram (01/09/16); Esophagoscopy, gastroscopy, duodenoscopy (EGD), combined (N/A, 2/12/2020); Head and neck surgery (01/1989); orthopedic surgery (01/1998); back surgery (01/1989); and Esophagoscopy, gastroscopy, duodenoscopy (EGD), combined (N/A, 7/22/2022).  FAMILY HISTORY: family history includes C.A.D. in her father; Cancer in her brother and mother; Hypertension in her sister.  SOCIAL HISTORY:   reports that she has never smoked. She has never used smokeless tobacco. She reports that she does not drink alcohol and does not use drugs.  Patient's living condition: lives alone    Discharge medications were reviewed by me    Current Outpatient Medications   Medication Sig     acetaminophen (TYLENOL) 325 MG tablet Take 2 tablets  "(650 mg) by mouth every 6 hours as needed for mild pain     aspirin EC 81 MG EC tablet Take 1 tablet (81 mg) by mouth daily     atorvastatin (LIPITOR) 40 MG tablet TAKE 1 TABLET BY MOUTH ONE TIME DAILY     carvedilol (COREG) 3.125 MG tablet Take 1 tablet (3.125 mg) by mouth 2 times daily (with meals)     doxylamine (UNISOM) 25 MG TABS tablet Take 50 mg by mouth At Bedtime     fluticasone (FLONASE) 50 MCG/ACT nasal spray Spray 1 spray into both nostrils daily     furosemide (LASIX) 20 MG tablet Take 1 tablet (20 mg) by mouth daily Add additional 20mg for increased edema     guaiFENesin (MUCINEX) 600 MG 12 hr tablet Take 600 mg by mouth as needed for congestion Reported on 4/11/2017     HYDROcodone-acetaminophen (NORCO)  MG per tablet Take 1 tablet by mouth every 6 hours as needed for severe pain     hydrOXYzine (ATARAX) 25 MG tablet TAKE ONE TABLET BY MOUTH EVERY SIX HOURS AS NEEDED FOR ITCHING OR ADJUVANT PAIN     lidocaine (LMX4) 4 % external cream Apply topically 3 times daily as needed for mild pain     losartan (COZAAR) 25 MG tablet Take 1 tablet (25 mg) by mouth daily     pantoprazole (PROTONIX) 40 MG EC tablet Take 1 tablet (40 mg) by mouth daily     No current facility-administered medications for this visit.       ROS:  10 point ROS of systems including Constitutional, Eyes, Respiratory, Cardiovascular, Gastroenterology, Genitourinary, Integumentary, Musculoskeletal, Psychiatric were all negative except for pertinent positives noted in my HPI.    Vitals:  /78   Pulse 63   Temp 98.6  F (37  C)   Resp 17   Ht 1.651 m (5' 5\")   Wt 52.7 kg (116 lb 1.6 oz)   LMP  (LMP Unknown)   SpO2 96%   BMI 19.32 kg/m    Exam:  GENERAL APPEARANCE:  Alert, in no distress, thin, sitting at the bedside, head twisted to the right  ENT:oral mucosa moist  EYES: No eye redness or drainage  RESP:  RR 12, unlabored, clear B  CV:  RRR  ABDOMEN: soft, non-distended  M/S: Generalized loss of muscle mass.  Bruising over " lower extremities.  Small dressing over bilateral ankle area will open areas.  SKIN: Venous stasis changes bilaterally  NEURO:   Alert, fully oriented, pleasant.  Cranial nerves intact.  No tremors.  No focal weakness.  Gait was not assessed.    Lab/Diagnostic data:  Recent labs in [facility] reviewed by me today.      Latest Reference Range & Units 07/19/22 08:36   Sodium 133 - 144 mmol/L 138   Potassium 3.4 - 5.3 mmol/L 3.1 (L)   Chloride 94 - 109 mmol/L 110 (H)   Carbon Dioxide 20 - 32 mmol/L 19 (L)   Urea Nitrogen 7 - 30 mg/dL 18   Creatinine 0.52 - 1.04 mg/dL 0.65   GFR Estimate >60 mL/min/1.73m2 84   Calcium 8.5 - 10.1 mg/dL 7.0 (L)   Anion Gap 3 - 14 mmol/L 9   Magnesium 1.6 - 2.3 mg/dL 1.0 (L)   Albumin 3.4 - 5.0 g/dL 2.3 (L)   Protein Total 6.8 - 8.8 g/dL 6.0 (L)   Alkaline Phosphatase 40 - 150 U/L 143   ALT 0 - 50 U/L 16   AST 0 - 45 U/L 37   Bilirubin Direct 0.0 - 0.2 mg/dL <0.1   Bilirubin Total 0.2 - 1.3 mg/dL 0.6   Lipase 73 - 393 U/L 153     1/2022 Fe 26, TIBC 319    Most Recent 3 CBC's:  Recent Labs   Lab Test 07/23/22  0644 07/22/22  0609 07/21/22  1051 07/20/22  0622   WBC  --  8.8 7.3 6.1   HGB 9.2* 9.1* 8.7* 8.7*   MCV  --  99 98 99   PLT  --  200 206 189     Most Recent 3 BMP's:  Recent Labs   Lab Test 07/25/22  0617 07/24/22  0532 07/23/22  0644 07/22/22  0609 07/21/22  0644     --   --  138 138   POTASSIUM 4.6  4.6 4.7 4.5 4.3 4.1  4.1   CHLORIDE 106  --   --  106 107   CO2 24  --   --  27 24   BUN 25  --   --  28 25   CR 0.71  --   --  0.85 0.90   ANIONGAP 6  --   --  5 7   LIGIA 8.2*  --   --  8.0* 7.6*   *  --   --  133* 120*       ASSESSMENT/PLAN:    (I50.43) Acute on chronic combined systolic and diastolic congestive heart failure (H)  (primary encounter diagnosis)  (I25.10) Coronary artery disease involving native coronary artery of native heart without angina pectoris  (I25.5) Ischemic cardiomyopathy  (I10) Essential hypertension  (I48.0) Paroxysmal atrial fibrillation  (H)  (R53.81) Physical deconditioning  Comment: improved with IV lasix in hospital  Cause for exacerbation not clear.  Patient is now receiving prior to admission Lasix dose 20 mg daily  Plan: PT and OT, daily weights, lasix 20mg QD, coreg 3.125mg BID, losartan 25mg QD, ASA 81mg every day  Monitor BMP  F/u with cardiology as directed    Anemia normochromic normocytic etiology unclear  Report of melena prior to admission  History of upper GI bleed with unremarkable EGD during recent hospitalization  Unclear when patient is last had a lower colon evaluation.  Plan: Monitor hemoglobin.  Consider iron studies vs empiric Fe.  Defer further GI work-up to outpatient setting.  Continue PPI    (E44.0) Moderate protein-calorie malnutrition (H)  Comment: acute/ongoing  Etiology unclear.  Unclear if related to reported diarrhea prior to admission.  As above, patient reports improved appetite and resolution of loose stools.  Plan: dietician to follow.  Monitor GI status    (N18.30) Stage 3 chronic kidney disease, unspecified whether stage 3a or 3b CKD (H)  Comment: acute/ongoing  Plan: Monitor BMP    (G89.29) chronic pain, primarily low back  Ongoing, baseline per patient plan: continue tylenol 650mg q 6 hours prn, norco 10/325mg q 6 hours prn    Bilateral lower extremity ulcers, chronic in the setting of venous insufficiency  Plan: Continue current wound cares, outpatient follow-up      Andres Marcano MD                  Sincerely,        Andres Marcano MD

## 2022-07-28 NOTE — PROGRESS NOTES
"      [facility] GERIATRICS    PRIMARY CARE PROVIDER AND CLINIC:  Michael Londono MD, MD, [address] / [facility] [address]  Chief Complaint   Patient presents with     Hospital F/U      [facility] Medical Record Number:  [id number]  Place of Service where encounter took place:  [facility] ([facility])     Gosia Alonzo  is a 89 year old  (11/14/1932), admitted to the above facility from  [facility]. Hospital stay 7/19/22 through 7/25/22..     Hospital course was reviewed by me, is as per the hospital discharge summary and NP note    PMH of CAD, CAMILA to LAD, ischemic cardiomyopathy, CHF, PAD, pericarditis, DMII, HTN, HLD, diverticular disease, PUD migraines, chronic pain who was hospitalized for the evaluation of diarrhea, pt reported melena and dyspnea    Was felt to be in CHF, improved with IV lasix, transitioned to po diuretic (dischargd on PTA dose of lasix 20 mg daily)  Echo revealed EF 35-40% with severe hypokinesis of the mid anteroseptal/inferoseptal walls and the apical  septal/inferior walls, similar to 9/2021 Echo  CXR revealed small B pleural effusions    Etiology of diarrhea unclear, as loose stools resolved during hospitalization  Hgb 9-6-8.7.. EGD unremarkable (performed in view of history of bleeding ulcer)  No report of recent lower colon eval performed. Unclear if Pt has ever had a colonoscopy.  No bleeding noted during hospitalization     Chronic back pain was stable on PTA medications    Patient reports feeling \"fine \".  She is very anxious to discharge to home.  She denies abdominal pain nausea or vomiting.  Appetite is fair.  Stools are soft but formed.  Low back pain is stable.  She denies exertional chest pain, shortness of breath, dizziness, palpitations.    CODE STATUS/ADVANCE DIRECTIVES DISCUSSION:  No CPR- Pre-arrest intubation OK  DNR / DNI  ALLERGIES:   Allergies   Allergen Reactions     Codeine      GI UPSET     Escitalopram Oxalate      " fatigue     Esomeprazole Magnesium Trihydrate      HA     Gabapentin Dizziness     Dizziness, confusion     Imdur [Isosorbide]      Headache       Meperidine Hcl      N/V     Morphine Hcl      HIVES     Oxycodone      (percodan) GI UPSET     Pentazocine      (talwin)  HALLUCINATIONS     Propoxyphene Hcl      STOMACH UPSET     Sumatriptan Succinate      chest pain      PAST MEDICAL HISTORY:   Past Medical History:   Diagnosis Date     Acute on chronic congestive heart failure, unspecified heart failure type (H) 7/19/2022     Allergic rhinitis, cause unspecified      Arthritis      CAD (coronary artery disease)     Cath 12/2015: aspiration thrombectomy and CAMILA to mid and prox LAD. Cath 1/9/16: ASA/ticargelor held due to LGI bleed and she thrombosed the Lad stent. Aspiration thrombectomy and PTCA to mLAD.     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     Ischemic cardiomyopathy      Migraine, unspecified, without mention of intractable migraine without mention of status migrainosus      NSTEMI (non-ST elevated myocardial infarction) (H) 08-10-15     Obesity, unspecified      Osteoporosis 11/08     Other and unspecified hyperlipidemia      Other iron deficiency anemia 4/21/2016     Other seborrheic keratosis      PAF (paroxysmal atrial fibrillation) (H)      Paroxysmal atrial fibrillation (H) 10/26/2016     Pericarditis age 15     PONV (postoperative nausea and vomiting)      Postop DVT right calf 1988      Pyogenic granuloma of skin and subcutaneous tissue      R upper extremity edema, postop     ? RSD     Solitary cyst of breast      TSH deficiency      Type 2 diabetes mellitus with diabetic nephropathy (H)      Type 2 diabetes mellitus with stage 3 chronic kidney disease (H)      Type 2 diabetes, HbA1c goal < 7% (H)      VASOMOTOR RHINITIS 2006    Dr. Wilson     Viral warts, unspecified       PAST SURGICAL HISTORY:   has a past surgical history that includes surgical history of -  (1/95); NONSPECIFIC PROCEDURE (surgery approx 1980); surgical history of -  (age 4); surgical history of -  (age 38); NONSPECIFIC PROCEDURE (1996); surgical history of -  (1/97); surgical history of -  (1988); surgical history of -  (6/97); surgical history of - ; surgical history of -  (8/97); surgical history of -  (11/98); surgical history of -  (4/88); surgical history of -  (10/99); surgical history of -  (1/04); surgical history of -  (4/05); Breast surgery; colonoscopy; appendectomy; Phacoemulsification clear cornea with standard intraocular lens implant (12/16/2013); angiogram (12/29/15); angiogram (01/09/16); Esophagoscopy, gastroscopy, duodenoscopy (EGD), combined (N/A, 2/12/2020); Head and neck surgery (01/1989); orthopedic surgery (01/1998); back surgery (01/1989); and Esophagoscopy, gastroscopy, duodenoscopy (EGD), combined (N/A, 7/22/2022).  FAMILY HISTORY: family history includes C.A.D. in her father; Cancer in her brother and mother; Hypertension in her sister.  SOCIAL HISTORY:   reports that she has never smoked. She has never used smokeless tobacco. She reports that she does not drink alcohol and does not use drugs.  Patient's living condition: lives alone    Discharge medications were reviewed by me    Current Outpatient Medications   Medication Sig     acetaminophen (TYLENOL) 325 MG tablet Take 2 tablets (650 mg) by mouth every 6 hours as needed for mild pain     aspirin EC 81 MG EC tablet Take 1 tablet (81 mg) by  "mouth daily     atorvastatin (LIPITOR) 40 MG tablet TAKE 1 TABLET BY MOUTH ONE TIME DAILY     carvedilol (COREG) 3.125 MG tablet Take 1 tablet (3.125 mg) by mouth 2 times daily (with meals)     doxylamine (UNISOM) 25 MG TABS tablet Take 50 mg by mouth At Bedtime     fluticasone (FLONASE) 50 MCG/ACT nasal spray Spray 1 spray into both nostrils daily     furosemide (LASIX) 20 MG tablet Take 1 tablet (20 mg) by mouth daily Add additional 20mg for increased edema     guaiFENesin (MUCINEX) 600 MG 12 hr tablet Take 600 mg by mouth as needed for congestion Reported on 4/11/2017     HYDROcodone-acetaminophen (NORCO)  MG per tablet Take 1 tablet by mouth every 6 hours as needed for severe pain     hydrOXYzine (ATARAX) 25 MG tablet TAKE ONE TABLET BY MOUTH EVERY SIX HOURS AS NEEDED FOR ITCHING OR ADJUVANT PAIN     lidocaine (LMX4) 4 % external cream Apply topically 3 times daily as needed for mild pain     losartan (COZAAR) 25 MG tablet Take 1 tablet (25 mg) by mouth daily     pantoprazole (PROTONIX) 40 MG EC tablet Take 1 tablet (40 mg) by mouth daily     No current facility-administered medications for this visit.       ROS:  10 point ROS of systems including Constitutional, Eyes, Respiratory, Cardiovascular, Gastroenterology, Genitourinary, Integumentary, Musculoskeletal, Psychiatric were all negative except for pertinent positives noted in my HPI.    Vitals:  /78   Pulse 63   Temp 98.6  F (37  C)   Resp 17   Ht 1.651 m (5' 5\")   Wt 52.7 kg (116 lb 1.6 oz)   LMP  (LMP Unknown)   SpO2 96%   BMI 19.32 kg/m    Exam:  GENERAL APPEARANCE:  Alert, in no distress, thin, sitting at the bedside, head twisted to the right  ENT:oral mucosa moist  EYES: No eye redness or drainage  RESP:  RR 12, unlabored, clear B  CV:  RRR  ABDOMEN: soft, non-distended  M/S: Generalized loss of muscle mass.  Bruising over lower extremities.  Small dressing over bilateral ankle area will open areas.  SKIN: Venous stasis changes " bilaterally  NEURO:   Alert, fully oriented, pleasant.  Cranial nerves intact.  No tremors.  No focal weakness.  Gait was not assessed.    Lab/Diagnostic data:  Recent labs in [facility] reviewed by me today.      Latest Reference Range & Units 07/19/22 08:36   Sodium 133 - 144 mmol/L 138   Potassium 3.4 - 5.3 mmol/L 3.1 (L)   Chloride 94 - 109 mmol/L 110 (H)   Carbon Dioxide 20 - 32 mmol/L 19 (L)   Urea Nitrogen 7 - 30 mg/dL 18   Creatinine 0.52 - 1.04 mg/dL 0.65   GFR Estimate >60 mL/min/1.73m2 84   Calcium 8.5 - 10.1 mg/dL 7.0 (L)   Anion Gap 3 - 14 mmol/L 9   Magnesium 1.6 - 2.3 mg/dL 1.0 (L)   Albumin 3.4 - 5.0 g/dL 2.3 (L)   Protein Total 6.8 - 8.8 g/dL 6.0 (L)   Alkaline Phosphatase 40 - 150 U/L 143   ALT 0 - 50 U/L 16   AST 0 - 45 U/L 37   Bilirubin Direct 0.0 - 0.2 mg/dL <0.1   Bilirubin Total 0.2 - 1.3 mg/dL 0.6   Lipase 73 - 393 U/L 153     1/2022 Fe 26, TIBC 319    Most Recent 3 CBC's:  Recent Labs   Lab Test 07/23/22  0644 07/22/22  0609 07/21/22  1051 07/20/22  0622   WBC  --  8.8 7.3 6.1   HGB 9.2* 9.1* 8.7* 8.7*   MCV  --  99 98 99   PLT  --  200 206 189     Most Recent 3 BMP's:  Recent Labs   Lab Test 07/25/22  0617 07/24/22  0532 07/23/22  0644 07/22/22  0609 07/21/22  0644     --   --  138 138   POTASSIUM 4.6  4.6 4.7 4.5 4.3 4.1  4.1   CHLORIDE 106  --   --  106 107   CO2 24  --   --  27 24   BUN 25  --   --  28 25   CR 0.71  --   --  0.85 0.90   ANIONGAP 6  --   --  5 7   LIGIA 8.2*  --   --  8.0* 7.6*   *  --   --  133* 120*       ASSESSMENT/PLAN:    (I50.43) Acute on chronic combined systolic and diastolic congestive heart failure (H)  (primary encounter diagnosis)  (I25.10) Coronary artery disease involving native coronary artery of native heart without angina pectoris  (I25.5) Ischemic cardiomyopathy  (I10) Essential hypertension  (I48.0) Paroxysmal atrial fibrillation (H)  (R53.81) Physical deconditioning  Comment: improved with IV lasix in hospital  Cause for exacerbation not clear.   Patient is now receiving prior to admission Lasix dose 20 mg daily  Plan: PT and OT, daily weights, lasix 20mg QD, coreg 3.125mg BID, losartan 25mg QD, ASA 81mg every day  Monitor BMP  F/u with cardiology as directed    Anemia normochromic normocytic etiology unclear  Report of melena prior to admission  History of upper GI bleed with unremarkable EGD during recent hospitalization  Unclear when patient is last had a lower colon evaluation.  Plan: Monitor hemoglobin.  Consider iron studies vs empiric Fe.  Defer further GI work-up to outpatient setting.  Continue PPI    (E44.0) Moderate protein-calorie malnutrition (H)  Comment: acute/ongoing  Etiology unclear.  Unclear if related to reported diarrhea prior to admission.  As above, patient reports improved appetite and resolution of loose stools.  Plan: dietician to follow.  Monitor GI status    (N18.30) Stage 3 chronic kidney disease, unspecified whether stage 3a or 3b CKD (H)  Comment: acute/ongoing  Plan: Monitor BMP    (G89.29) chronic pain, primarily low back  Ongoing, baseline per patient plan: continue tylenol 650mg q 6 hours prn, norco 10/325mg q 6 hours prn    Bilateral lower extremity ulcers, chronic in the setting of venous insufficiency  Plan: Continue current wound cares, outpatient follow-up      Andres Marcano MD

## 2022-07-29 NOTE — PROGRESS NOTES
Clinic Care Coordination Contact  Care Coordination Transition Communication         Clinical Data: Patient was hospitalized at [facility] from 7/19 to 7/25 with diagnosis of  Acute exacerbation of systolic and diastolic CHF.     Transition to Facility:              Facility Name: [facility])               Contact name and phone number/fax: [phone]    Plan: RN/SW Care Coordinator will await notification from facility staff informing RN/SW Care Coordinator of patient's discharge plans/needs. RN/SW Care Coordinator will review chart and outreach to facility staff every 4 weeks and as needed.     Allegra Gleason RN Care Coordinator  [facility]  Email: [email or web address]  Phone: [phone]

## 2022-08-01 NOTE — LETTER
8/1/2022        RE: Gosia Alonzo  [address] [address] [address]        [facility] GERIATRICS    Chief Complaint   Patient presents with     Nursing Home Acute     HPI:  Gosia Alonzo is a 89 year old  (11/14/1932), who is being seen today for an episodic care visit at: [facility] ([facility]) [25].     Background:    This is an 89-year-old female, with a past medical history significant for ischemic cardiomyopathy with EF 35-40% on 7/20/22, coronary artery disease s/p CAMILA to LAD, diverticular disease, GERD, hypokalemia, hypomagnesemia, chronic leg wounds, chronic pain, and type 2 diabetes mellitus, who was admitted to [facility] 7/19/22 through 7/25/22 for an acute heart failure exacerbation requiring IV diuresis. Also reported dark and loose stools. EGD revealed no evidence of bleeding or ulcer. A TCU stay was recommended for ongoing physical rehabilitation.    Today's concern is:     Chest Pain, Not Cardiac in Nature, with Chronic Pain. On 7/31/22, patient reported chest pain to staff that improved with Hydroxyzine. Today, patient reports she continues to have pain in her mid chest. Improved with pain medication. Not worsened by eating. Just received a massage from the massage therapist. Denies shortness of breath. Would like to stay on top of her pain with her Norco. Feels she is getting it after the pain has already started. Would also like her [place] given at different times than her Tylenol to space them out better.     Ischemic Cardiomyopathy with EF 35-40% on 7/20/22, Coronary Artery Disease S/P CAMILA to LAD, Hypertension, and Hyperlipidemia. Upon review of weights since TCU admission, 114.7 lbs on 7/26/22 -> 99.1 lbs on 8/1/22. Upon review of blood pressures over the past 5 days, systolic range from 121-157, most < 140. Diastolic range from 59-78.     Allergies, and PMH/PSH reviewed in EPIC today.  REVIEW OF SYSTEMS:  4 point ROS including  "Respiratory, CV, GI and , other than that noted in the HPI,  is negative    Objective:   BP (!) 143/73   Pulse 88   Temp 98.1  F (36.7  C)   Resp 16   Ht 1.651 m (5' 5\")   Wt 45.4 kg (100 lb)   LMP  (LMP Unknown)   SpO2 95%   BMI 16.64 kg/m    GENERAL APPEARANCE:  Alert, in no distress  ENT:  Mouth and posterior oropharynx normal, moist mucous membranes  EYES:  EOM, conjunctivae, lids, pupils and irises normal  RESP:  respiratory effort and palpation of chest normal, lungs clear to auscultation , no respiratory distress  CV:  Palpation and auscultation of heart done , regular rate and rhythm, no murmur, rub, or gallop  ABDOMEN:  normal bowel sounds, soft, nontender, no hepatosplenomegaly or other masses  PSYCH:  oriented to person and place    Labs done in SNF are in [place] EPIC. Please refer to them using [facility] [facility] Everywhere.    Assessment/Plan:  Chest Pain, Not Cardiac in Nature, with Chronic Pain. Midsternum and reproducible upon palpation. Improved with Hydroxyzine and Norco. Will schedule Acetaminophen 650 mg PO TID. Also on Lidocaine cream. Had massage today as well.    Ischemic Cardiomyopathy with EF 35-40% on 7/20/22, Coronary Artery Disease S/P CAMILA to LAD, Hypertension, and Hyperlipidemia. Follow-up with Cardiology on 10/18/22 as scheduled. Weights trending down since TCU admission. Monitor daily. Continue Aspirin, Atorvastatin, Carvedilol, Furosemide, and Losartan as ordered.    Paroxysmal Atrial Fibrillation. Monitor heart rate daily. Taking Aspirin and Carvedilol.    Anemia with History of Upper GI Bleed. With reported melena and loose stools prior to hospital admission. EGD on 7/22/22 unremarkable. Today, Hemoglobin 9.5, which is trending up slightly from hospital discharge. Monitor periodically. Continue Ferrous Sulfate as ordered. Is also on Pantroprazole.    Bilateral Lower Extremity Ulcers. Did not visualize during today's visit. Facility staff to monitor. Follow-up outpatient as " indicated.    Physical Deconditioning. Secondary to recent hospitalization and co-morbidities. Physical and Occupational Therapy ordered.    Orders:  Acetaminophen 650 mg PO TID    Electronically signed by: WILLIE Arrington CNP           Sincerely,        WILLIE Arrington CNP

## 2022-08-01 NOTE — PROGRESS NOTES
[facility] GERIATRICS    Chief Complaint   Patient presents with     Nursing Home Acute     HPI:  Gosia Alonzo is a 89 year old  (11/14/1932), who is being seen today for an episodic care visit at: [facility] ([facility]) [25].     Background:    This is an 89-year-old female, with a past medical history significant for ischemic cardiomyopathy with EF 35-40% on 7/20/22, coronary artery disease s/p CAMILA to LAD, diverticular disease, GERD, hypokalemia, hypomagnesemia, chronic leg wounds, chronic pain, and type 2 diabetes mellitus, who was admitted to [facility] 7/19/22 through 7/25/22 for an acute heart failure exacerbation requiring IV diuresis. Also reported dark and loose stools. EGD revealed no evidence of bleeding or ulcer. A TCU stay was recommended for ongoing physical rehabilitation.    Today's concern is:     Chest Pain, Not Cardiac in Nature, with Chronic Pain. On 7/31/22, patient reported chest pain to staff that improved with Hydroxyzine. Today, patient reports she continues to have pain in her mid chest. Improved with pain medication. Not worsened by eating. Just received a massage from the massage therapist. Denies shortness of breath. Would like to stay on top of her pain with her Norco. Feels she is getting it after the pain has already started. Would also like her [place] given at different times than her Tylenol to space them out better.     Ischemic Cardiomyopathy with EF 35-40% on 7/20/22, Coronary Artery Disease S/P CAMILA to LAD, Hypertension, and Hyperlipidemia. Upon review of weights since TCU admission, 114.7 lbs on 7/26/22 -> 99.1 lbs on 8/1/22. Upon review of blood pressures over the past 5 days, systolic range from 121-157, most < 140. Diastolic range from 59-78.     Allergies, and PMH/PSH reviewed in EPIC today.  REVIEW OF SYSTEMS:  4 point ROS including Respiratory, CV, GI and , other than that noted in the HPI,  is negative    Objective:   BP (!) 143/73   " Pulse 88   Temp 98.1  F (36.7  C)   Resp 16   Ht 1.651 m (5' 5\")   Wt 45.4 kg (100 lb)   LMP  (LMP Unknown)   SpO2 95%   BMI 16.64 kg/m    GENERAL APPEARANCE:  Alert, in no distress  ENT:  Mouth and posterior oropharynx normal, moist mucous membranes  EYES:  EOM, conjunctivae, lids, pupils and irises normal  RESP:  respiratory effort and palpation of chest normal, lungs clear to auscultation , no respiratory distress  CV:  Palpation and auscultation of heart done , regular rate and rhythm, no murmur, rub, or gallop  ABDOMEN:  normal bowel sounds, soft, nontender, no hepatosplenomegaly or other masses  PSYCH:  oriented to person and place    Labs done in SNF are in [place] EPIC. Please refer to them using [email or web address] Everywhere.    Assessment/Plan:  Chest Pain, Not Cardiac in Nature, with Chronic Pain. Midsternum and reproducible upon palpation. Improved with Hydroxyzine and Norco. Will schedule Acetaminophen 650 mg PO TID. Also on Lidocaine cream. Had massage today as well.    Ischemic Cardiomyopathy with EF 35-40% on 7/20/22, Coronary Artery Disease S/P CAMILA to LAD, Hypertension, and Hyperlipidemia. Follow-up with Cardiology on 10/18/22 as scheduled. Weights trending down since TCU admission. Monitor daily. Continue Aspirin, Atorvastatin, Carvedilol, Furosemide, and Losartan as ordered.    Paroxysmal Atrial Fibrillation. Monitor heart rate daily. Taking Aspirin and Carvedilol.    Anemia with History of Upper GI Bleed. With reported melena and loose stools prior to hospital admission. EGD on 7/22/22 unremarkable. Today, Hemoglobin 9.5, which is trending up slightly from hospital discharge. Monitor periodically. Continue Ferrous Sulfate as ordered. Is also on Pantroprazole.    Bilateral Lower Extremity Ulcers. Did not visualize during today's visit. Facility staff to monitor. Follow-up outpatient as indicated.    Abnormal Imaging. Incidental finding on 7/19/22 abdominal ultrasound of right kidney cyst versus " mass. Follow-up outpatient.     Physical Deconditioning. Secondary to recent hospitalization and co-morbidities. Physical and Occupational Therapy ordered.    Orders:  Acetaminophen 650 mg PO TID    Electronically signed by: WILLIE Arrington CNP

## 2022-08-03 NOTE — PROGRESS NOTES
[facility] GERIATRICS DISCHARGE SUMMARY  PATIENT'S NAME: Gosia Alonzo  YOB: 1932  MEDICAL RECORD NUMBER:  [id number]  Place of Service where encounter took place:  [facility]) [25]    PRIMARY CARE PROVIDER AND CLINIC RESPONSIBLE AFTER TRANSFER:   Michael Londono MD, MD, [address] / [facility] [address]    [facility] Provider     Transferring providers: Tonya Lynn Haase, APRN CNP, Dr. Andres Marcano MD  Recent Hospitalization/ED:  [facility] Hospital stay 7/19/22 to 7/25/22.  Date of SNF Admission: July 25, 2022  Date of SNF (anticipated) Discharge: August 04, 2022  Discharged to: previous independent home  Cognitive Scores: BIMS: 15/15 and Short blessed: 0/28  Physical Function: Ambulating 150 ft with RW  DME: Walker    CODE STATUS/ADVANCE DIRECTIVES DISCUSSION:  No CPR- Pre-arrest intubation OK   ALLERGIES: Codeine, Escitalopram oxalate, Esomeprazole magnesium trihydrate, Gabapentin, Imdur [isosorbide], Meperidine hcl, Morphine hcl, Oxycodone, Pentazocine, Propoxyphene hcl, and Sumatriptan succinate    NURSING FACILITY COURSE   Medication Changes/Rationale:     See assessment and plan    Summary of nursing facility stay:   Patient progressed in therapy to walking up to 150 feet using a 4ww with SBA, SBA with ADL's has stated she is ready to go home tomorrow, lives in a apt alone will have home PT, OT, RN, HHA and SW through [facility].     Discharge Medications:    Current Outpatient Medications   Medication Sig Dispense Refill     acetaminophen (TYLENOL) 325 MG tablet Take 650 mg by mouth 3 times daily       aspirin EC 81 MG EC tablet Take 1 tablet (81 mg) by mouth daily       atorvastatin (LIPITOR) 40 MG tablet TAKE 1 TABLET BY MOUTH ONE TIME DAILY 90 tablet 0     carvedilol (COREG) 3.125 MG tablet Take 1 tablet (3.125 mg) by mouth 2 times daily (with meals) 180 tablet 3     doxylamine (UNISOM) 25 MG TABS tablet Take 50 mg by mouth At Bedtime        ferrous sulfate (FEROSUL) 325 (65 Fe) MG tablet Take 325 mg by mouth daily (with breakfast)       fluticasone (FLONASE) 50 MCG/ACT nasal spray Spray 1 spray into both nostrils daily       furosemide (LASIX) 20 MG tablet Take 1 tablet (20 mg) by mouth daily Add additional 20mg for increased edema 95 tablet 2     HYDROcodone-acetaminophen (NORCO)  MG per tablet Take 1 tablet by mouth every 6 hours as needed for severe pain 6 tablet 0     hydrOXYzine (ATARAX) 25 MG tablet TAKE ONE TABLET BY MOUTH EVERY SIX HOURS AS NEEDED FOR ITCHING OR ADJUVANT PAIN 60 tablet 0     lidocaine (LMX4) 4 % external cream Apply topically 3 times daily as needed for mild pain 30 g 0     losartan (COZAAR) 25 MG tablet Take 1 tablet (25 mg) by mouth daily 90 tablet 3     pantoprazole (PROTONIX) 40 MG EC tablet Take 1 tablet (40 mg) by mouth daily 180 tablet 1        Post Medication Reconciliation Status:      Controlled medications:   not applicable/none     Past Medical History:   Past Medical History:   Diagnosis Date     Acute on chronic congestive heart failure, unspecified heart failure type (H) 7/19/2022     Allergic rhinitis, cause unspecified      Arthritis      CAD (coronary artery disease)     Cath 12/2015: aspiration thrombectomy and CAMILA to mid and prox LAD. Cath 1/9/16: ASA/ticargelor held due to LGI bleed and she thrombosed the Lad stent. Aspiration thrombectomy and PTCA to mLAD.     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     Ischemic cardiomyopathy      Migraine, unspecified, without mention of intractable migraine without mention of status migrainosus      NSTEMI (non-ST elevated myocardial infarction) (H) 08-10-15     Obesity, unspecified      Osteoporosis 11/08     Other and unspecified hyperlipidemia      Other iron deficiency anemia 4/21/2016     Other seborrheic keratosis      PAF (paroxysmal atrial fibrillation) (H)      Paroxysmal atrial fibrillation (H) 10/26/2016     Pericarditis age 15     PONV (postoperative nausea and vomiting)      Postop DVT right calf 1988     Pyogenic granuloma of skin and subcutaneous tissue      R upper extremity edema, postop     ? RSD     Solitary cyst of breast      TSH deficiency      Type 2 diabetes mellitus with diabetic nephropathy (H)      Type 2 diabetes mellitus with stage 3 chronic kidney disease (H)      Type 2 diabetes, HbA1c goal < 7% (H)      VASOMOTOR RHINITIS 2006    Dr. Wilson     Viral warts, unspecified      Physical Exam:   Vitals: BP (!) 140/72   Pulse 88   Temp 97.3  F (36.3  C)   Resp 16   Ht 1.651 m (5' 5\")   Wt 45.8 kg (101 lb)   LMP  (LMP Unknown)   SpO2 94%   BMI 16.81 kg/m    BMI: Body mass index is 16.81 kg/m .  GENERAL APPEARANCE:  Alert, in no distress  ENT:  Mouth and posterior oropharynx normal, moist mucous membranes, [place]  EYES:  EOM, conjunctivae, lids, pupils and irises normal, PERRL  RESP:  respiratory effort and palpation of chest normal, lungs clear to auscultation , no respiratory distress  CV:  Palpation and auscultation of heart done , regular rate and rhythm, no murmur, rub, or gallop, peripheral edema trace+ in RLE  ABDOMEN:  normal bowel sounds, soft, nontender, no hepatosplenomegaly or other masses  M/S:   patient resting in bed  SKIN:  Inspection of skin and subcutaneous tissue baseline  NEURO:   speech wnl  PSYCH:  affect and mood normal     SNF labs: Recent labs in [facility] reviewed by me today.  and   Most Recent 3 " CBC's:Recent Labs   Lab Test 07/23/22  0644 07/22/22  0609 07/21/22  1051 07/20/22  0622   WBC  --  8.8 7.3 6.1   HGB 9.2* 9.1* 8.7* 8.7*   MCV  --  99 98 99   PLT  --  200 206 189     Most Recent 3 BMP's:Recent Labs   Lab Test 07/25/22  0617 07/24/22  0532 07/23/22  0644 07/22/22  0609 07/21/22  0644     --   --  138 138   POTASSIUM 4.6  4.6 4.7 4.5 4.3 4.1  4.1   CHLORIDE 106  --   --  106 107   CO2 24  --   --  27 24   BUN 25  --   --  28 25   CR 0.71  --   --  0.85 0.90   ANIONGAP 6  --   --  5 7   LIGIA 8.2*  --   --  8.0* 7.6*   *  --   --  133* 120*       ASSESSMENT/PLAN:     (I50.43) Acute on chronic combined systolic and diastolic congestive heart failure (H)  (primary encounter diagnosis)  (I25.10) Coronary artery disease involving native coronary artery of native heart without angina pectoris  (I25.5) Ischemic cardiomyopathy  (I10) Essential hypertension  (I48.0) Paroxysmal atrial fibrillation (H)  (R53.81) Physical deconditioning  Comment: acute/ongoing  Plan: DC to home with home PT, OT, RN, HHA and SW through [facility] ,continue daily weights, lasix 20mg QD, coreg 3.125mg BID, losartan 25mg QD, ASA 81mg QD  F/u with cardiology as directed     (F41.9) Anxiety  Comment: acute/ongoing  Plan: continue hydroxyzine 25mg q 6 hours prn, would benefit from OP psychology, Home SW for community resources     (E44.0) Moderate protein-calorie malnutrition (H)  Comment: acute/ongoing  Plan: f/u with PCP     (K57.30) Diverticular disease of colon  (K21.9) Gastroesophageal reflux disease without esophagitis  Comment: acute/ongoing  Plan: continue protonix 40mg QD, monitor bowels     (N18.30) Stage 3 chronic kidney disease, unspecified whether stage 3a or 3b CKD (H)  Comment: acute/ongoing  Plan: avoid nephrotoxic agents     (G89.29) chronic pain  Ongoing  Plan: continue tylenol 650mg q 6 hours prn, norco 10/325mg q 6 hours prn (patient takes and has at home)       DISCHARGE PLAN:    Follow up labs: No labs  orders/due    Medical Follow Up:      Follow up with primary care provider in 1 weeks    [facility] scheduled appointments:     Future Appointments   Date Time Provider Department Center   8/11/2022 11:30 AM Teresita Mathews MD [facility]   10/18/2022  1:45 PM Hiren Collins MD [facility] PSA CLIN         Discharge Services: Home Care:  Occupational Therapy, Physical Therapy, Registered Nurse, Home Health Aide and     Discharge Instructions Verbalized to Patient at Discharge:     None    TOTAL DISCHARGE TIME:   Greater than 30 minutes  Electronically signed by:  Tonya Lynn Haase, APRN CNP

## 2022-08-03 NOTE — LETTER
8/3/2022        RE: Gosia Alonzo  [address] [facility] [address]        [facility] GERIATRICS DISCHARGE SUMMARY  PATIENT'S NAME: Gosia Alonzo  YOB: 1932  MEDICAL RECORD NUMBER:  [id number]  Place of Service where encounter took place:  [facility]) [25]    PRIMARY CARE PROVIDER AND CLINIC RESPONSIBLE AFTER TRANSFER:   Michael Londono MD, MD, [address] / [facility] [address]    [facility] Provider     Transferring providers: Tonya Lynn Haase, APRN CNP, Dr. Andres Marcano MD  Recent Hospitalization/ED:  [facility] Hospital stay 7/19/22 to 7/25/22.  Date of SNF Admission: July 25, 2022  Date of SNF (anticipated) Discharge: August 04, 2022  Discharged to: previous independent home  Cognitive Scores: BIMS: 15/15 and Short blessed: 0/28  Physical Function: Ambulating 150 ft with RW  DME: Walker    CODE STATUS/ADVANCE DIRECTIVES DISCUSSION:  No CPR- Pre-arrest intubation OK   ALLERGIES: Codeine, Escitalopram oxalate, Esomeprazole magnesium trihydrate, Gabapentin, Imdur [isosorbide], Meperidine hcl, Morphine hcl, Oxycodone, Pentazocine, Propoxyphene hcl, and Sumatriptan succinate    NURSING FACILITY COURSE   Medication Changes/Rationale:     See assessment and plan    Summary of nursing facility stay:   Patient progressed in therapy to walking up to 150 feet using a 4ww with SBA, SBA with ADL's has stated she is ready to go home tomorrow, lives in a apt alone will have home PT, OT, RN, HHA and SW through [facility].     Discharge Medications:    Current Outpatient Medications   Medication Sig Dispense Refill     acetaminophen (TYLENOL) 325 MG tablet Take 650 mg by mouth 3 times daily       aspirin EC 81 MG EC tablet Take 1 tablet (81 mg) by mouth daily       atorvastatin (LIPITOR) 40 MG tablet TAKE 1 TABLET BY MOUTH ONE TIME DAILY 90 tablet 0     carvedilol (COREG) 3.125 MG tablet Take 1 tablet (3.125 mg) by mouth 2 times daily (with  meals) 180 tablet 3     doxylamine (UNISOM) 25 MG TABS tablet Take 50 mg by mouth At Bedtime       ferrous sulfate (FEROSUL) 325 (65 Fe) MG tablet Take 325 mg by mouth daily (with breakfast)       fluticasone (FLONASE) 50 MCG/ACT nasal spray Spray 1 spray into both nostrils daily       furosemide (LASIX) 20 MG tablet Take 1 tablet (20 mg) by mouth daily Add additional 20mg for increased edema 95 tablet 2     HYDROcodone-acetaminophen (NORCO)  MG per tablet Take 1 tablet by mouth every 6 hours as needed for severe pain 6 tablet 0     hydrOXYzine (ATARAX) 25 MG tablet TAKE ONE TABLET BY MOUTH EVERY SIX HOURS AS NEEDED FOR ITCHING OR ADJUVANT PAIN 60 tablet 0     lidocaine (LMX4) 4 % external cream Apply topically 3 times daily as needed for mild pain 30 g 0     losartan (COZAAR) 25 MG tablet Take 1 tablet (25 mg) by mouth daily 90 tablet 3     pantoprazole (PROTONIX) 40 MG EC tablet Take 1 tablet (40 mg) by mouth daily 180 tablet 1        Post Medication Reconciliation Status:      Controlled medications:   not applicable/none     Past Medical History:   Past Medical History:   Diagnosis Date     Acute on chronic congestive heart failure, unspecified heart failure type (H) 7/19/2022     Allergic rhinitis, cause unspecified      Arthritis      CAD (coronary artery disease)     Cath 12/2015: aspiration thrombectomy and CAMILA to mid and prox LAD. Cath 1/9/16: ASA/ticargelor held due to LGI bleed and she thrombosed the Lad stent. Aspiration thrombectomy and PTCA to mLAD.     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     Ischemic cardiomyopathy      Migraine, unspecified, without mention of intractable migraine without mention of status migrainosus      NSTEMI (non-ST elevated myocardial infarction) (H) 08-10-15     Obesity, unspecified      Osteoporosis 11/08     Other and unspecified hyperlipidemia      Other iron deficiency anemia 4/21/2016     Other seborrheic keratosis      PAF (paroxysmal atrial fibrillation) (H)      Paroxysmal atrial fibrillation (H) 10/26/2016     Pericarditis age 15     PONV (postoperative nausea and vomiting)      Postop DVT right calf 1988     Pyogenic granuloma of skin and subcutaneous tissue      R upper extremity edema, postop     ? RSD     Solitary cyst of breast      TSH deficiency      Type 2 diabetes mellitus with diabetic nephropathy (H)      Type 2 diabetes mellitus with stage 3 chronic kidney disease (H)      Type 2 diabetes, HbA1c goal < 7% (H)      VASOMOTOR RHINITIS 2006    Dr. Wilson     Viral warts, unspecified      Physical Exam:   Vitals: BP (!) 140/72   Pulse 88   Temp 97.3  F (36.3  C)   Resp 16   Ht 1.651 m (5' 5\")   Wt 45.8 kg (101 lb)   LMP  (LMP Unknown)   SpO2 94%   BMI 16.81 kg/m    BMI: Body mass index is 16.81 kg/m .  GENERAL APPEARANCE:  Alert, in no distress  ENT:  Mouth and posterior oropharynx normal, moist mucous membranes, [place]  EYES:  EOM, conjunctivae, lids, pupils and irises normal, PERRL  RESP:  respiratory effort and palpation of chest normal, lungs clear to auscultation , no respiratory distress  CV:  Palpation and auscultation of heart done , regular rate and rhythm, no murmur, rub, or gallop, peripheral edema trace+ in RLE  ABDOMEN:  normal bowel sounds, soft, nontender, no hepatosplenomegaly or other masses  M/S:   patient resting in bed  SKIN:  Inspection of skin and subcutaneous tissue baseline  NEURO:   speech wnl  PSYCH:  affect and " mood normal     SNF labs: Recent labs in [facility] reviewed by me today.  and   Most Recent 3 CBC's:Recent Labs   Lab Test 07/23/22  0644 07/22/22  0609 07/21/22  1051 07/20/22  0622   WBC  --  8.8 7.3 6.1   HGB 9.2* 9.1* 8.7* 8.7*   MCV  --  99 98 99   PLT  --  200 206 189     Most Recent 3 BMP's:Recent Labs   Lab Test 07/25/22  0617 07/24/22  0532 07/23/22  0644 07/22/22  0609 07/21/22  0644     --   --  138 138   POTASSIUM 4.6  4.6 4.7 4.5 4.3 4.1  4.1   CHLORIDE 106  --   --  106 107   CO2 24  --   --  27 24   BUN 25  --   --  28 25   CR 0.71  --   --  0.85 0.90   ANIONGAP 6  --   --  5 7   LIGIA 8.2*  --   --  8.0* 7.6*   *  --   --  133* 120*       ASSESSMENT/PLAN:     (I50.43) Acute on chronic combined systolic and diastolic congestive heart failure (H)  (primary encounter diagnosis)  (I25.10) Coronary artery disease involving native coronary artery of native heart without angina pectoris  (I25.5) Ischemic cardiomyopathy  (I10) Essential hypertension  (I48.0) Paroxysmal atrial fibrillation (H)  (R53.81) Physical deconditioning  Comment: acute/ongoing  Plan: DC to home with home PT, OT, RN, HHA and SW through [facility] ,continue daily weights, lasix 20mg QD, coreg 3.125mg BID, losartan 25mg QD, ASA 81mg QD  F/u with cardiology as directed     (F41.9) Anxiety  Comment: acute/ongoing  Plan: continue hydroxyzine 25mg q 6 hours prn, would benefit from OP psychology, Home SW for community resources     (E44.0) Moderate protein-calorie malnutrition (H)  Comment: acute/ongoing  Plan: f/u with PCP     (K57.30) Diverticular disease of colon  (K21.9) Gastroesophageal reflux disease without esophagitis  Comment: acute/ongoing  Plan: continue protonix 40mg QD, monitor bowels     (N18.30) Stage 3 chronic kidney disease, unspecified whether stage 3a or 3b CKD (H)  Comment: acute/ongoing  Plan: avoid nephrotoxic agents     (G89.29) chronic pain  Ongoing  Plan: continue tylenol 650mg q 6 hours prn, norco 10/325mg q 6 hours  prn (patient takes and has at home)       DISCHARGE PLAN:    Follow up labs: No labs orders/due    Medical Follow Up:      Follow up with primary care provider in 1 weeks    [facility] scheduled appointments:     Future Appointments   Date Time Provider Department [place]   8/11/2022 11:30 AM Teresita Mathews MD [facility]   10/18/2022  1:45 PM Hiren Collins MD [facility] PSA CLIN         Discharge Services: Home Care:  Occupational Therapy, Physical Therapy, Registered Nurse, Home Health Aide and     Discharge Instructions Verbalized to Patient at Discharge:     None    TOTAL DISCHARGE TIME:   Greater than 30 minutes  Electronically signed by:  Tonya Lynn Haase, APRN CNP                     Sincerely,        Tonya Lynn Haase, APRN CNP

## 2022-08-05 NOTE — TELEPHONE ENCOUNTER
Alka with [facility] Care calls to inform delay to start of care until 8-7-2022 per patient request. FYI only no need for call back.      Best number to call back if there are any questions [phone] ask for [place].

## 2022-08-10 NOTE — TELEPHONE ENCOUNTER
Cecilia, from [facility], calling in for Verbal Orders:     SKILLED NURSING   1wk for 3wks   1 for ever other week for 6wks     PHYSICAL THERAPY [place]  OCCUPATIONAL THERAPY [place]   [place]    Phone#: [phone]  Okay to [facility].

## 2022-08-10 NOTE — TELEPHONE ENCOUNTER
Order/Referral Request    Who is requesting: Celia washburn [facility]    Orders being requested: skilled nursing, OT and PT evaluate and treat            Has this been discussed with Provider: No        Does patient have an appointment scheduled?: No    Where to send orders: Ok to call and  [phone]

## 2022-08-10 NOTE — TELEPHONE ENCOUNTER
Patient is calling because she saw on the news and also heard from her neighbors that effective 8/1/22 she should be able to take her norco every four hours instead of every six hours for her pain. She wants to confirm this is correct.  She said she had a fall about 3 days ago and the EMT came to examine her and told her she was fine.

## 2022-08-10 NOTE — TELEPHONE ENCOUNTER
Attempted to contact patient. Left voice message to call back to discuss.     Paola Shepard RN  [facility]

## 2022-08-11 NOTE — TELEPHONE ENCOUNTER
Fax received from [facility] 08/11/22 for review and signature.  Put in Dr. Londono's yellow folder.

## 2022-08-15 NOTE — TELEPHONE ENCOUNTER
Fax received from [facility] 07/07/22 for review and signature.  Put in Dr. Londono's yellow folder.

## 2022-08-15 NOTE — LETTER
[facility] CARE COORDINATION  [address]  [place] [facility] [address]    August 16, 2022    Gosia Alonzo  [address] [address] [facility] [address]      Dear Gosia,        I am a clinic care coordinator who works with Michael Londono MD, MD with the [facility]. I wanted to introduce myself and provide you with my contact information for you to be able to call me with any questions or concerns. Below is a description of clinic care coordination and how I can further assist you.       The clinic care coordination team is made up of a registered nurse, , financial resource worker and community health worker who understand the health care system. The goal of clinic care coordination is to help you manage your health and improve access to the health care system. Our team works alongside your provider to assist you in determining your health and social needs. We can help you obtain health care and community resources, providing you with necessary information and education. We can work with you through any barriers and develop a care plan that helps coordinate and strengthen the communication between you and your care team.    Please feel free to contact me with any questions or concerns regarding care coordination and what we can offer.      We are focused on providing you with the highest-quality healthcare experience possible.    Sincerely,     Allegra Gleason RN Care Coordinator  [facility]  Email: [email or web address]  Phone: [phone]

## 2022-08-15 NOTE — PROGRESS NOTES
Clinic Care Coordination Contact  [facility]/Voicemail       Clinical Data: Care Coordinator Outreach  Outreach attempted x 1.  Left message on patient's voicemail with call back information and requested return call.  Plan: RN Care Coordinator will try to reach patient again in 1-2 business days.    Allegra Gleason RN Care Coordinator  [facility]  Email: [email or web address]  Phone: [phone]

## 2022-08-15 NOTE — TELEPHONE ENCOUNTER
Per chart, patient taking Norco 10-325mg Take 1 tablet by mouth every 6 hours as needed for severe pain .  Last fill 7/28/22 #6 tabs.    Please see message below.    Refer patient to pain clinic or will primary care provider take over refilling Norco?    Please advise, thanks.
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
## 2022-08-15 NOTE — TELEPHONE ENCOUNTER
Vanessa, from [facility], calling in for Verbal Orders:     OCCUPATIONAL THERAPY   1wk for 2wks then 1wk every other week     Phone#: [phone]  [facility] to [facility].

## 2022-08-16 NOTE — PROGRESS NOTES
Clinic Care Coordination Contact  [facility]/Voicemail       Clinical Data: Care Coordinator Outreach  Outreach attempted x 2.  Left message on patient's voicemail with call back information and requested return call.  Plan: Care Coordinator will send care coordination introduction letter with care coordinator contact information and explanation of care coordination services via mail. Care Coordinator will do no further outreaches at this time.    Allegra Gleason RN Care Coordinator  [facility]  Email: [email or web address]  Phone: [phone]

## 2022-08-17 NOTE — TELEPHONE ENCOUNTER
Patient calls and adds that Ortho has been giving her 120 tabs for a 30 day supply, is picking up her next refill from ortho on Monday. She was told that Ortho cannot prescribe medication any more unless she has surgery. She states they will wait to hear back from Dr. Londono regarding this. She does have appointment with Dr. Londono on 10/21/22.    Paola Shepard RN  [facility]

## 2022-08-17 NOTE — TELEPHONE ENCOUNTER
Patient calls back. Informed her that directions for [place] can vary from every 4 hours to every 6 hours and up to the provider how often she can take this.     Paola Shepard RN  [facility]

## 2022-08-19 NOTE — TELEPHONE ENCOUNTER
Donna from [email or web address] ([phone]) calls for verbal orders.  PT 1xwk for 3wks.  OK to leave a detailed message.

## 2022-08-20 NOTE — TELEPHONE ENCOUNTER
I am willing to take over prescribing hydrocodone at current dose and #tabs/month, so long as I am the only provider prescribing this for her.     Please advise pt.

## 2022-08-23 NOTE — TELEPHONE ENCOUNTER
Donna called back, Verbal orders given with readback. Donna had no further questions or orders.    Luke MICHAEL RN   [facility]

## 2022-08-23 NOTE — TELEPHONE ENCOUNTER
[facility] S[facility] 08/19/22 for review and signature.  Put in Dr. Londono's yellow folder.

## 2022-08-24 NOTE — TELEPHONE ENCOUNTER
Patient states that there are no more affordable options for Baclofen and Fenofibrate so she is just going to stay with meds as ordered. FRANKLYN Ventura R.N.

## 2022-08-29 NOTE — TELEPHONE ENCOUNTER
Patient's daughter in law calling again to see if Dr. Londono can prescribe Norco for patient.    She confirmed patient gets #120 tabs every 30 days.    Advised her of primary care provider's message below.  No further questions.

## 2022-09-01 NOTE — TELEPHONE ENCOUNTER
Certified FACE TO FACE Encounter 08/03/22; received via fax. Orders/Forms in your mailbox to be signed.

## 2022-09-01 NOTE — TELEPHONE ENCOUNTER
SKILLED NURSING (PT/OT/SN/SLP) 08/19/22; received via fax.     Early OCCUPATIONAL THERAPY Discharged per patient request 08/22/22; received via fax.     Orders/Forms in your mailbox to be signed.

## 2022-09-07 NOTE — TELEPHONE ENCOUNTER
Pending Prescriptions:                       Disp   Refills    atorvastatin (LIPITOR) 40 MG tablet [Phar*90 tab*0            Sig: TAKE 1 TABLET BY MOUTH ONE TIME DAILY    Prescription approved per [facility] Refill Protocol.    Next 5 appointments (look out 90 days)    Oct 21, 2022  2:00 PM  (Arrive by 1:40 PM)  Provider Visit with Michael Londono MD  [facility] ([facility] ) [facility] [place] [facility] [id number] [phone]

## 2022-09-11 PROBLEM — I50.43 ACUTE ON CHRONIC COMBINED SYSTOLIC AND DIASTOLIC CONGESTIVE HEART FAILURE (H): Status: ACTIVE | Noted: 2022-01-01

## 2022-09-11 PROBLEM — S22.030A COMPRESSION FRACTURE OF T3 VERTEBRA, INITIAL ENCOUNTER (H): Status: ACTIVE | Noted: 2022-01-01

## 2022-09-11 PROBLEM — K92.1 MELENA: Status: ACTIVE | Noted: 2022-01-01
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
## 2022-09-11 NOTE — ED TRIAGE NOTES
Patient arrived by EMS for chest pain and weakness x 1 day. Diarrhea x 3 days.  Patient states she lives alone and family came and checked on her and thought she should be brought in to hospital.  EMS Glucose 137: Gave  mg and NTG 1- dropped BP from 160 systolic to 103 systolic. Edema in feet bilat. Back pain, left shoulder and chest pain  #22 left hand IV placed with fluid  ml bolus started.  ABC intact alert and no distress.     Triage Assessment     Row Name 09/11/22 3614       Triage Assessment (Adult)    Airway WDL WDL       Respiratory WDL    Respiratory WDL WDL       Cognitive/Neuro/Behavioral WDL    Cognitive/Neuro/Behavioral WDL WDL       [place] Coma Scale    Best Eye Response 4-->(E4) spontaneous    Best Motor Response 6-->(M6) obeys commands    Best Verbal Response 5-->(V5) oriented    [place] Coma Scale Score 15

## 2022-09-12 NOTE — PLAN OF CARE
A&O. Anxious at times. VSS. C/o chest/back and L shoulder pain- Lidocaine patch in place and PRN Norco and Atarax given x1. Tele SR. Bedrest. Blanchable redness to coccyx and heels- declines repositioning. C/o left heel burning- mepilex in place and elevated on pillow. +2/3 BLE edema. Purewick in place. Plan for thoracentesis today. Neurosurgery/ortho surgery/pain team consulted.
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
## 2022-09-12 NOTE — PROGRESS NOTES
Pt was in Radiology today for a bilateral thoracentesis thoracentesis. Procedure performed by BRAYDON Musa . There were no complications during procedure and vitals remained stable throughout. Pt tolerated procedure well, 250 cc's of clear sourav colored fluid was removed from left pleural space. Next the right side was drained and 200 cc'c of clear sourav fluid was removed.  Pt then was given written and verbal instructions. Pt left department in stable and satisfactory condition with US technologist .

## 2022-09-12 NOTE — PROGRESS NOTES
Cross cover page regarding patient requesting her 10 mg Norco tablets as opposed to the 5 mg tablets that are ordered.  She takes 10 mg tablets at home so I have changed the order to reflect this.  She can receive one 5 mg Norco tablet now as she received a 5 mg tablet 1 hour before.  I spoke to patient's RN to confirm plan.

## 2022-09-12 NOTE — CONSULTS
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 8.0 - 23.0 mg/dL    Chloride 103 98 - 107 mmol/L    Carbon Dioxide (CO2) 23 22 - 29 mmol/L    Anion Gap 9 7 - 15 mmol/L    Glucose 92 70 - 99 mg/dL    GFR Estimate 62 >60 mL/min/1.73m2    Calcium 8.1 (L) 8.8 - 10.2 mg/dL   CBC with platelets   Result Value Ref Range    WBC Count 7.4 4.0 - 11.0 10e3/uL    RBC Count 3.21 (L) 3.80 - 5.20 10e6/uL    Hemoglobin 9.6 (L) 11.7 - 15.7 g/dL    Hematocrit 30.8 (L) 35.0 - 47.0 %    MCV 96 78 - 100 fL    MCH 29.9 26.5 - 33.0 pg    MCHC 31.2 (L) 31.5 - 36.5 g/dL    RDW 16.3 (H) 10.0 - 15.0 %    Platelet Count 289 150 - 450 10e3/uL   Cell count with differential fluid    Narrative    The following orders were created for panel order Cell count with differential fluid.  Procedure                               Abnormality         Status                     ---------                               -----------         ------                     Cell Count Body Fluid[[id number]]        Abnormal            Final result               Differential Body Fluid[[id number]]                          Final result                 Please view results for these tests on the individual orders.   Pleural fluid Aerobic Bacterial  Culture Routine with Gram Stain    Specimen: Pleural Cavity; Pleural fluid   Result Value Ref Range    Gram Stain Result No organisms seen     Gram Stain Result 3+ WBC seen     Narrative    Gram Stain quantification of host cells and microbiological organisms was done on a cytocentrifuged preparation.     Glucose fluid   Result Value Ref Range    Glucose Fluid Source Pleural Cavity, Left     Glucose fluid 116 mg/dL    Narrative    No reference ranges have been established. This result should be interpreted in the context of the patient's clinical condition and compared to simultaneous measurement in the patient's blood. This is a lab developed test. It has not been cleared or approved by the FDA. FDA clearance is not required for clinical use.   Lactate dehydrogenase fluid   Result Value Ref Range    LD Fluid Source Pleural Cavity, Left     Lactate dehydrogenase fluid 82 U/L    Narrative    No reference ranges have been established. This result should be interpreted in the context of the patient's clinical condition and compared to simultaneous measurement in the patient's blood. This is a lab developed test. It has not been cleared or approved by the FDA. FDA clearance is not required for clinical use.   Protein fluid   Result Value Ref Range    Protein Fluid Source Pleural Cavity, Left     Protein Total Fluid 2.4 g/dL    Narrative    No reference ranges have been established. This result should be interpreted in the context of the patient's clinical condition and compared to simultaneous measurement in the patient's blood. This is a lab developed test. It has not been cleared or approved by the FDA. FDA clearance is not required for clinical use.   Cell Count Body Fluid   Result Value Ref Range    Color Yellow Colorless, Yellow    Clarity Hazy (A) Clear, Bloody    Total Nucleated Cells 349 /uL    Cell Count Fluid Source Pleural Cavity, Left     Narrative    No reference ranges have been established.  This  result  should be interpreted in the context of the patient's clinical condition and   compared to simultaneous measurement in the patient's blood.         Differential Body Fluid   Result Value Ref Range    % Neutrophils      % Lymphocytes 85 %    % Monocyte/Macrophages 15 %    Narrative    No reference ranges have been established. This result should be interpreted in the context of the patient's clinical condition and compared to simultaneous measurement in the patient's blood.   US Thoracentesis Bilateral    Narrative    ULTRASOUND GUIDED THORACENTESIS  9/12/2022 11:17 AM     HISTORY: SOB, bilateral pleural effusion    FINDINGS: Limited ultrasound was performed to evaluate for the  presence and best approach for drainage of a pleural effusion. An  image is archived. Written and oral informed consent was obtained. A  pause for the cause procedure to verify the correct patient and  correct procedure.     The skin overlying the right chest posteriorly was prepped and draped  in the usual sterile fashion. The subcutaneous tissues were  anesthetized with 10 mL 1% Lidocaine. Under direct ultrasound guidance  a catheter was advanced into the pleural space and 200 mL of  sourav  colored fluid was drained. The catheter was removed and a sterile  dressing was applied.     The skin overlying the left chest posteriorly was prepped and draped  in the usual sterile fashion. The subcutaneous tissues were  anesthetized with 10 mL 1% Lidocaine. Under direct ultrasound guidance  a catheter was advanced into the pleural space and 250 mL of  sourav  colored fluid was drained. The catheter was removed and a sterile  dressing was applied.     Patient was monitored by nurse under my direct supervision throughout  the exam. Ultrasound images were permanently stored.  There were no  immediate complications. Patient left the ultrasound suite in  satisfactory condition.      Impression    IMPRESSION: Technically successful bilateral  thoracentesis without  immediate complications.    DANA CELIS DO         SYSTEM ID:  [id number]

## 2022-09-12 NOTE — TELEPHONE ENCOUNTER
Pending Prescriptions:                       Disp   Refills    hydrOXYzine (ATARAX) 25 MG tablet [Pharma*60 tab*0            Sig: TAKE ONE TABLET BY MOUTH EVERY SIX HOURS AS           NEEDED FOR ITCHING OR ADJUVANT PAIN      Prescription approved per [facility] Refill Protocol.

## 2022-09-12 NOTE — PROGRESS NOTES
[facility]    Medicine Progress Note - Hospitalist Service    Date of Admission:  9/11/2022    Assessment & Plan          89-year-old female with a history of coronary artery disease with prior LAD stenting, ischemic cardiomyopathy with EF 45% to 50%, chronic systolic and diastolic CHF, paroxysmal atrial fibrillation, diabetes mellitus, hypertension, hyperlipidemia, GI bleeding, GERD, breast cancer, osteoporosis and chronic pain, presents to [facility] for chest and back pain as well as shortness of breath.    Plan      1. Back pain    Secondary to new moderate compression deformity of T3 and mild compression deformity at L1 and L4.  There are several other old compression deformities.    Neurosurgery consulted.  Recommend osteoporosis management.  We will check PTH and start on bisphosphonates.    Pain management consulted as patient is on chronic opiates.    PT/OT/social work consulted for placement.    2. Left shoulder and knee pain    Likely due to osteoarthritis.    She was scheduled for knee and shoulder steroid injection, consulted [facility] to perform it inpatient.    3. Possible right superior and inferior pubic rami fracture.    X-ray shows new irregularity in the area, may represent fracture.  Orthopedics consult as above.    4. Acute on chronic diastolic and systolic heart failure with ischemic cardiomyopathy.    Prior EF of 40 to 45%, reported weight gain and lower extremity edema.  BNP 5316.  Underwent bilateral thoracentesis and only 250 mL was removed from each side.  Fluid appears transudative.    Given Lasix 60 mg IV in the ED.    Diuresis with Lasix 40 mg IV twice daily.    5. Essential hypertension    Continue carvedilol and losartan.    6. Coronary artery disease with prior NSTEMI and LAD stent    Troponins are flat, EKG without acute changes.    7. Diet-controlled diabetes mellitus.    8. Chronic anemia    Has been stable.    9. GERD.      Continue Protonix          Diet: Combination Diet Regular Diet Adult    DVT Prophylaxis: Enoxaparin (Lovenox) SQ  Manzano Catheter: Not present  Central Lines: None  Cardiac Monitoring: ACTIVE order. Indication: Acute decompensated heart failure (48 hours)  Code Status: No CPR- Pre-arrest intubation OK      Disposition Plan         The patient's care was discussed with the Patient.    Eddie Mora MD  Hospitalist Service  [facility]  Securely message with the Vocera Web Console (learn more here)  Text page via Check-Cap Paging/Directory         Clinically Significant Risk Factors Present on Admission             # Hypoalbuminemia: Albumin = 2.8 g/dL (Ref range: 3.5 - 5.2 g/dL) on admission, will monitor as appropriate     # Hypertension: home medication list includes antihypertensive(s)  # Acute systolic heart failure: last echo with EF <40% and receiving IV diuretics         ______________________________________________________________________    Interval History   Shortness of breath is better after thoracentesis.    Data reviewed today: I reviewed all medications, new labs and imaging results over the last 24 hours.    Physical Exam   Vital Signs: Temp: 97.8  F (36.6  C) Temp src: Oral BP: 127/70 Pulse: 85   Resp: 20 SpO2: 99 % O2 Device: None (Room air)    Weight: 115 lbs 4.8 oz  General Appearance: Complains of pain all over but oriented x3..  Eyes: No icterus  HEENT: Moist mucosa  Respiratory: Clear to auscultation  Cardiovascular: S1 and S2 is normal  GI: Soft and nontender  Lymph/Hematologic: Not examined  Genitourinary: Not examined  Skin: No rash  Musculoskeletal: Bilateral lower extremity edema with some redness, appears chronic.  Neurologic: Nonfocal  Psychiatric: Normal mood      Data   Recent Labs   Lab 09/12/22  0632 09/11/22  2034 09/11/22  1804   WBC 7.4  --  8.1   HGB 9.6*  --  10.4*   MCV 96  --  95     --  373   INR  --   --  0.94   *  --  133*   POTASSIUM 4.2  --  4.0   CHLORIDE 103  --   101   CO2 23  --  21*   BUN 18.8  --  17.0   CR 0.89  --  0.76   ANIONGAP 9  --  11   LIGIA 8.1*  --  8.5*   GLC 92  --  120*   ALBUMIN  --   --  2.8*   PROTTOTAL  --  5.7* 6.5   BILITOTAL  --   --  0.3   ALKPHOS  --   --  261*   ALT  --   --  16   AST  --   --  31   LIPASE  --   --  21     Recent Results (from the past 24 hour(s))   CT Chest/Abdomen/Pelvis w Contrast    Narrative    EXAM: CT CHEST/ABDOMEN/PELVIS W CONTRAST  LOCATION: [facility]  DATE/TIME: 9/11/2022 7:06 PM    INDICATION: Chest pain that radiates through to the back  COMPARISON: 07/19/2022, 11/09/2021, 02/11/2020  TECHNIQUE: CT scan of the chest, abdomen, and pelvis was performed following injection of IV contrast. Multiplanar reformats were obtained. Dose reduction techniques were used.   CONTRAST: 50mL Isovue 370    FINDINGS:   LUNGS AND PLEURA: There are moderate bilateral pleural effusions which appear free-flowing. There is associated mild compressive atelectasis in the lower lobes. Multiple benign calcified granulomas bilaterally.    MEDIASTINUM/AXILLAE: Normal heart size. No pericardial effusions. Mild calcified plaque in the thoracic aorta. Benign calcified hilar and mediastinal lymph nodes. No enlarged nodes. Large hiatal hernia without obstruction. Prior right mastectomy.   Ruptured right implant.    CORONARY ARTERY CALCIFICATION: Extensive    HEPATOBILIARY: Mild surface nodularity of the liver suggests cirrhosis. No focal hepatic lesions. Few benign calcified granulomas.    PANCREAS: There is a 2.8 cm low density cyst in the tail the pancreas which is unchanged from 11/09/2021 but new from 02/11/2020. Mild atrophy of the pancreas. No acute inflammatory changes.    SPLEEN: Multiple benign calcified granulomas in the spleen.    ADRENAL GLANDS: Normal.    KIDNEYS/BLADDER: There is a 1.3 cm hyperechoic dense cyst or enhancing mass in the mid right kidney posterior laterally on series 2 image 142 which has increased in  size from 02/11/2021 and measured 0.8 cm. A similar appearing 0.6 cm hyperdense cyst or   enhancing masses present in the left kidney lower pole posteriorly on the same image and is unchanged in size from 2020. Few additional benign cysts in both kidneys.    BOWEL: Large sliding-type hiatal hernia. No obstruction or wall thickening. Colonic diverticulosis without evidence of active inflammation. Bowel loops are normal caliber. Small amount of free fluid in the peritoneal space.    LYMPH NODES: Normal.    VASCULATURE: Mild calcified plaque in the abdominal aorta.    PELVIC ORGANS: Hysterectomy. The ovaries are normal in size.    MUSCULOSKELETAL: Bones are demineralized. There is a moderate compression deformity of T3 which is new from 07/19/2022. A mild compression deformity of L1 was not included on 07/19/2022 but is new from 11/09/2021. A mild compression deformity of L4 is   new from 2020. Numerous other compression deformities in the thoracic and lumbar spine are unchanged. Right shoulder replacement. Postoperative changes in the proximal right femur. Old healed bilateral rib fractures. Old healed pelvic insufficiency   fractures.      Impression    IMPRESSION:  1.  A moderate compression deformity of T3 is new from 07/19/2022. A mild compression deformity of L1 and a mild compression deformity of L4 are not visualized on the most recent comparison exam but are at least new from 11/9/2021 and 2/11/2020,   respectively. Numerous additional compression deformities are not significant changed.  2.  Moderate bilateral pleural effusions, free-flowing with associated mild compressive atelectasis.  3.   Mild surface nodularity of the liver which may indicate cirrhosis  4.  Small amount of peritoneal free fluid, possibly due to ascites.  5.  1.3 cm right renal and 0.8 cm left renal hyperdense cyst versus enhancing masses. The right lesion demonstrates a mild increase in size since 2020 and the left is stable. Regardless  of etiology, these are likely of low clinical significance in a   patient of this age given the slow growth rate.  6.  2.8 cm pancreatic tail cyst, unchanged from 11/09/2021. This is likely also of doubtful clinical significance given the patient's age. No specific imaging follow-up is recommended.  7.  Large hiatal hernia without obstruction.   XR Femur Right 2 Views    Narrative    EXAM: XR FEMUR RIGHT 2 VIEWS  LOCATION: [facility]  DATE/TIME: 9/11/2022 7:15 PM    INDICATION: thigh pain after a fall  COMPARISON: None.      Impression    IMPRESSION: Exam is limited due to extremely low bone mineral density. There is irregularity in the superior and inferior right pubic rami, which may represent fractures. Recommend correlation with cross-sectional imaging.    Right femoral and knee prosthesis is in place. There is mild lucency around the tibial and distal femoral components, which could be due to underlying osteopenia, however loosening is not entirely excluded. Recommend correlation with prior radiographs if   available vascular calcifications.      NOTE: ABNORMAL REPORT    THE DICTATION ABOVE DESCRIBES AN ABNORMALITY FOR WHICH FOLLOW-UP IS NEEDED.    US Thoracentesis Bilateral    Narrative    ULTRASOUND GUIDED THORACENTESIS  9/12/2022 11:17 AM     HISTORY: SOB, bilateral pleural effusion    FINDINGS: Limited ultrasound was performed to evaluate for the  presence and best approach for drainage of a pleural effusion. An  image is archived. Written and oral informed consent was obtained. A  pause for the cause procedure to verify the correct patient and  correct procedure.     The skin overlying the right chest posteriorly was prepped and draped  in the usual sterile fashion. The subcutaneous tissues were  anesthetized with 10 mL 1% Lidocaine. Under direct ultrasound guidance  a catheter was advanced into the pleural space and 200 mL of  sourav  colored fluid was drained. The catheter was  removed and a sterile  dressing was applied.     The skin overlying the left chest posteriorly was prepped and draped  in the usual sterile fashion. The subcutaneous tissues were  anesthetized with 10 mL 1% Lidocaine. Under direct ultrasound guidance  a catheter was advanced into the pleural space and 250 mL of  sourav  colored fluid was drained. The catheter was removed and a sterile  dressing was applied.     Patient was monitored by nurse under my direct supervision throughout  the exam. Ultrasound images were permanently stored.  There were no  immediate complications. Patient left the ultrasound suite in  satisfactory condition.      Impression    IMPRESSION: Technically successful bilateral thoracentesis without  immediate complications.    DANA CELIS DO         SYSTEM ID:  [id number]     Medications       diclofenac  4 g Topical 4x Daily     enoxaparin ANTICOAGULANT  30 mg Subcutaneous Q24H     furosemide  40 mg Intravenous BID     lidocaine  2 patch Transdermal Q24h    And     lidocaine   Transdermal Q8H NKECHI     pregabalin  25 mg Oral BID     sodium chloride (PF)  3 mL Intracatheter Q8H
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
## 2022-09-12 NOTE — ED NOTES
[facility]  ED Nurse Handoff Report    Gosia Alonzo is a 89 year old female   ED Chief complaint: Chest Pain and Generalized Weakness  . ED Diagnosis:   Final diagnoses:   Compression fracture of T3 vertebra, initial encounter (H)   Acute on chronic combined systolic and diastolic congestive heart failure (H)   Melena   Pleural effusions     Allergies:   Allergies   Allergen Reactions     Codeine      GI UPSET     Escitalopram Oxalate      fatigue     Esomeprazole Magnesium Trihydrate      HA     Gabapentin Dizziness     Dizziness, confusion     Imdur [Isosorbide]      Headache       Meperidine Hcl      N/V     Morphine Hcl      HIVES     Oxycodone      (percodan) GI UPSET     Pentazocine      (talwin)  HALLUCINATIONS     Propoxyphene Hcl      STOMACH UPSET     Sumatriptan Succinate      chest pain       Code Status: DNR / DNI  Activity level - Baseline/Home:  Stand by Assist. Activity Level - Current:   Assist X 1. Lift room needed: No. Bariatric: No   Needed: No   Isolation: No. Infection: Not Applicable.     Vital Signs:   Vitals:    09/11/22 2000 09/11/22 2145 09/11/22 2200 09/11/22 2230   BP: 126/65 (!) 142/72 134/76 119/67   Pulse: 98 96 98 94   Resp:   16 16   Temp:       TempSrc:       SpO2: 94%  97% 95%       Cardiac Rhythm:  ,      Pain level:    Patient confused: No. Patient Falls Risk: Yes.   Elimination Status: Has voided   Patient Report - Initial Complaint: Patient arrived by EMS for chest pain and weakness x 1 day. Diarrhea x 3 days.  Patient states she lives alone and family came and checked on her and thought she should be brought in to hospital. Focused Assessment:   Gastrointestinal ED        Details:   Gastrointestinal - Gastrointestinal WDL: GI symptoms  GI Signs/Symptoms: diarrhea          18:44 Musculoskeletal ED      Details:   Musculoskeletal (Adult) - Musculoskeletal WDL: all  General Mobility: generalized weakness          18:41 Cardiac ED      Details:    Cardiac (Adult) - Cardiac WDL: chest pain (Pt presents with chest pain that radiates into her back that has been ongoing for 1x day with associated generalized weakness. )            Tests Performed: Labs and imaging. Abnormal Results:   Labs Ordered and Resulted from Time of ED Arrival to Time of ED Departure   COMPREHENSIVE METABOLIC PANEL - Abnormal       Result Value    Sodium 133 (*)     Potassium 4.0      Creatinine 0.76      Urea Nitrogen 17.0      Chloride 101      Carbon Dioxide (CO2) 21 (*)     Anion Gap 11      Glucose 120 (*)     Calcium 8.5 (*)     Protein Total 6.5      Albumin 2.8 (*)     Bilirubin Total 0.3      Alkaline Phosphatase 261 (*)     AST 31      ALT 16      GFR Estimate 74     TROPONIN T, HIGH SENSITIVITY - Abnormal    Troponin T, High Sensitivity 28 (*)    CBC WITH PLATELETS AND DIFFERENTIAL - Abnormal    WBC Count 8.1      RBC Count 3.49 (*)     Hemoglobin 10.4 (*)     Hematocrit 33.1 (*)     MCV 95      MCH 29.8      MCHC 31.4 (*)     RDW 16.1 (*)     Platelet Count 373      % Neutrophils 63      % Lymphocytes 23      % Monocytes 11      % Eosinophils 1      % Basophils 1      % Immature Granulocytes 1      NRBCs per 100 WBC 0      Absolute Neutrophils 5.1      Absolute Lymphocytes 1.9      Absolute Monocytes 0.9      Absolute Eosinophils 0.1      Absolute Basophils 0.1      Absolute Immature Granulocytes 0.1      Absolute NRBCs 0.0     TROPONIN T, HIGH SENSITIVITY - Abnormal    Troponin T, High Sensitivity 27 (*)    NT PROBNP INPATIENT - Abnormal    N terminal Pro BNP Inpatient 5,316 (*)    PROTEIN TOTAL - Abnormal    Protein Total 5.7 (*)    INR - Normal    INR 0.94     ACETAMINOPHEN LEVEL - Normal    Acetaminophen 13.6     LIPASE - Normal    Lipase 21     COVID-19 VIRUS (CORONAVIRUS) BY PCR   GLUCOSE FLUID   LACTATE DEHYDROGENASE FLUID   LACTATE DEHYDROGENASE   PROTEIN FLUID   AEROBIC BACTERIAL CULTURE ROUTINE   CELL COUNT WITH DIFFERENTIAL FLUID     XR Femur Right 2 Views   Final  Result   IMPRESSION: Exam is limited due to extremely low bone mineral density. There is irregularity in the superior and inferior right pubic rami, which may represent fractures. Recommend correlation with cross-sectional imaging.      Right femoral and knee prosthesis is in place. There is mild lucency around the tibial and distal femoral components, which could be due to underlying osteopenia, however loosening is not entirely excluded. Recommend correlation with prior radiographs if    available vascular calcifications.         NOTE: ABNORMAL REPORT      THE DICTATION ABOVE DESCRIBES AN ABNORMALITY FOR WHICH FOLLOW-UP IS NEEDED.       CT Chest/Abdomen/Pelvis w Contrast   Final Result   IMPRESSION:   1.  A moderate compression deformity of T3 is new from 07/19/2022. A mild compression deformity of L1 and a mild compression deformity of L4 are not visualized on the most recent comparison exam but are at least new from 11/9/2021 and 2/11/2020,    respectively. Numerous additional compression deformities are not significant changed.   2.  Moderate bilateral pleural effusions, free-flowing with associated mild compressive atelectasis.   3.   Mild surface nodularity of the liver which may indicate cirrhosis   4.  Small amount of peritoneal free fluid, possibly due to ascites.   5.  1.3 cm right renal and 0.8 cm left renal hyperdense cyst versus enhancing masses. The right lesion demonstrates a mild increase in size since 2020 and the left is stable. Regardless of etiology, these are likely of low clinical significance in a    patient of this age given the slow growth rate.   6.  2.8 cm pancreatic tail cyst, unchanged from 11/09/2021. This is likely also of doubtful clinical significance given the patient's age. No specific imaging follow-up is recommended.   7.  Large hiatal hernia without obstruction.      US Thoracentesis    (Results Pending)   .   Treatments provided: See MAR  Family Comments: Son at bedside  OBS  brochure/video discussed/provided to patient:  No  ED Medications:   Medications   hydrOXYzine (ATARAX) tablet 25 mg (has no administration in time range)     Or   hydrOXYzine (ATARAX) tablet 50 mg (has no administration in time range)   HYDROcodone-acetaminophen (NORCO) 5-325 MG per tablet 1 tablet (has no administration in time range)   Lidocaine (LIDOCARE) 4 % Patch 2 patch (has no administration in time range)     And   lidocaine patch in PLACE (has no administration in time range)   diclofenac (VOLTAREN) 1 % topical gel 4 g (has no administration in time range)   lidocaine 1 % 0.1-1 mL (has no administration in time range)   lidocaine (LMX4) cream (has no administration in time range)   sodium chloride (PF) 0.9% PF flush 3 mL (has no administration in time range)   sodium chloride (PF) 0.9% PF flush 3 mL (has no administration in time range)   acetaminophen (TYLENOL) tablet 650 mg (has no administration in time range)     Or   acetaminophen (TYLENOL) Suppository 650 mg (has no administration in time range)   melatonin tablet 3 mg (has no administration in time range)   senna-docusate (SENOKOT-S/PERICOLACE) 8.6-50 MG per tablet 1 tablet (has no administration in time range)     Or   senna-docusate (SENOKOT-S/PERICOLACE) 8.6-50 MG per tablet 2 tablet (has no administration in time range)   polyethylene glycol (MIRALAX) Packet 17 g (has no administration in time range)   bisacodyl (DULCOLAX) suppository 10 mg (has no administration in time range)   sodium phosphate (FLEET ENEMA) 1 enema (has no administration in time range)   ondansetron (ZOFRAN ODT) ODT tab 4 mg (has no administration in time range)     Or   ondansetron (ZOFRAN) injection 4 mg (has no administration in time range)   HYDROmorphone (DILAUDID) injection 0.2 mg (has no administration in time range)   iopamidol (ISOVUE-370) solution 500 mL (50 mLs Intravenous Given 9/11/22 1853)   for CT scan flush use (50 mLs Intravenous Given 9/11/22 1853)    HYDROcodone-acetaminophen (NORCO) 5-325 MG per tablet 2 tablet (2 tablets Oral Given 9/11/22 2040)   furosemide (LASIX) injection 60 mg (60 mg Intravenous Given 9/11/22 2143)     Drips infusing:  No  For the majority of the shift, the patient's behavior Green. Interventions performed were N/A.    Sepsis treatment initiated: No     Patient tested for COVID 19 prior to admission: YES    ED Nurse Name/Phone Number: Deb Acevedo RN,   10:59 PM    RECEIVING UNIT ED HANDOFF REVIEW    Above ED Nurse Handoff Report was reviewed: Yes  Reviewed by: Brigette Conte RN on September 12, 2022 at 12:46 AM

## 2022-09-12 NOTE — PLAN OF CARE
A/O. Reports 9/10 pain. PRN Norco given with a decrease in pain. Thoracentesis done this morning. Purewick in place. Mepilex applied to coccyx for protection. BLE +2/3 edema, red and xena. Pt. On bedrest.

## 2022-09-12 NOTE — PROGRESS NOTES
[facility]  Patient is currently open to home care services with [facility]. The patient is currently receiving SN services.  [facility]  and team have been notified of patient admission.  [facility] liaison will continue to follow patient during stay.  If appropriate provide orders to resume home care at time of discharge.  Pt was requesting to be discharged from home care at her next visit.

## 2022-09-12 NOTE — PHARMACY-ADMISSION MEDICATION HISTORY
Admission medication history interview status for this patient is complete. See [facility] admission navigator for allergy information, prior to admission medications and immunization status.     Medication history interview done, indicate source(s): Patient  Medication history resources (including written lists, pill bottles, clinic record):None  Pharmacy: [facility]     Changes made to PTA medication list:  Added: aspercreme  Changed: acetaminophen, Flonase, Unisom to as needed  Reported as Not Taking: none  Removed: lidocaine cream    Actions taken by pharmacist (provider contacted, etc):Left note to provider that med rec is complete.      Additional medication history information:Patient states that she doesn't take ferrous sulfate regularly    Medication reconciliation/reorder completed by provider prior to medication history?  N   (Y/N)     Prior to Admission medications    Medication Sig Last Dose Taking? Auth Provider Long Term End Date   acetaminophen (TYLENOL) 500 MG tablet Take 1,000 mg by mouth 3 times daily as needed  at prn Yes Reported, Patient     aspirin EC 81 MG EC tablet Take 1 tablet (81 mg) by mouth daily 9/11/2022 at am Yes Radha Umanzor, PA-C No    atorvastatin (LIPITOR) 40 MG tablet TAKE 1 TABLET BY MOUTH ONE TIME DAILY  Patient taking differently: Take 40 mg by mouth every evening 9/10/2022 at pm Yes Michael Londono MD Yes    carvedilol (COREG) 3.125 MG tablet Take 1 tablet (3.125 mg) by mouth 2 times daily (with meals) 9/11/2022 at am Yes Hiren Collins MD Yes    doxylamine (UNISOM) 25 MG TABS tablet Take 25 mg by mouth nightly as needed  at prn Yes Unknown, Entered By History     ferrous sulfate (FEROSUL) 325 (65 Fe) MG tablet Take 325 mg by mouth daily (with breakfast) Past Month at Unknown time Yes Reported, Patient     fluticasone (FLONASE) 50 MCG/ACT nasal spray Spray 2 sprays into both nostrils every evening 9/10/2022 Yes Reported, Patient     furosemide (LASIX) 20 MG  tablet Take 1 tablet (20 mg) by mouth daily Add additional 20mg for increased edema 9/10/2022 Yes Chester Rogers MD Yes    HYDROcodone-acetaminophen (NORCO)  MG per tablet Take 1 tablet by mouth every 4 to 6 hours as needed for severe pain  at prn Yes Lori Kline APRN CNP     hydrOXYzine (ATARAX) 25 MG tablet TAKE ONE TABLET BY MOUTH EVERY SIX HOURS AS NEEDED FOR ITCHING OR ADJUVANT PAIN  at prn Yes Michael Londono MD     losartan (COZAAR) 25 MG tablet Take 1 tablet (25 mg) by mouth daily 9/10/2022 Yes Hiren Collins MD Yes    pantoprazole (PROTONIX) 40 MG EC tablet Take 1 tablet (40 mg) by mouth daily 9/10/2022 Yes Miguel Celestin,      Trolamine Salicylate (ASPERCREME EX) Externally apply topically daily as needed  at prn Yes Unknown, Entered By History

## 2022-09-13 NOTE — PLAN OF CARE
Temp: 98.4  F (36.9  C) Temp src: Oral BP: 117/67 Pulse: 91   Resp: 18 SpO2: 97 % O2 Device: None (Room air)       Orientation:  x4  VS: stable, tachycardic at times   Pain:  pain on left shoulder and upper chest  Tele:  SR  Activity:  bedrest, turned and wait shifted   Resp:   LS clear   GI:  no bm this shift   : purewick in place   Skin:  right lateral ankle, left heal and buttock are red and sore, Mepilex applied on all places   Lines: PIV SL and patent   Other: family at bedside    Plan:   continue with plan of care

## 2022-09-13 NOTE — PROGRESS NOTES
[facility]    Medicine Progress Note - Hospitalist Service    Date of Admission:  9/11/2022    Assessment & Plan          89-year-old female with a history of coronary artery disease with prior LAD stenting, ischemic cardiomyopathy with EF 45% to 50%, chronic systolic and diastolic CHF, paroxysmal atrial fibrillation, diabetes mellitus, hypertension, hyperlipidemia, GI bleeding, GERD, breast cancer, osteoporosis and chronic pain, presents to [facility] for chest and back pain as well as shortness of breath.    Plan      1. Back pain    Secondary to new moderate compression deformity of T3 and mild compression deformity at L1 and L4.  There are several other old compression deformities.  MRI confirmed these to be acute fractures.    Neurosurgery consulted.  Defer to neurosurgery after brace is needed.  Recommend osteoporosis management.  We will check PTH and start on bisphosphonates as outpatient.    Pain management consulted as patient is on chronic opiates.    PT/OT/social work consulted for placement.    2. Left shoulder and knee pain    Likely due to osteoarthritis.    She was scheduled for knee and shoulder steroid injection, consulted [facility] and they cannot perform it inpatient.    3. Possible right superior and inferior pubic rami fracture.    X-ray shows new irregularity in the area, may represent fracture.  Orthopedics consult as above.    4. Acute on chronic diastolic and systolic heart failure with ischemic cardiomyopathy.    Prior EF of 40 to 45%, reported weight gain and lower extremity edema.  BNP 5316.  Underwent bilateral thoracentesis and only 250 mL was removed from each side.  Fluid appears transudative.    Given Lasix 60 mg IV in the ED.    Diuresis with Lasix 40 mg IV twice daily.    5. Essential hypertension    Continue carvedilol and losartan.    6. Coronary artery disease with prior NSTEMI and LAD stent    Troponins are flat, EKG without acute  changes.    7. Diet-controlled diabetes mellitus.    8. Chronic anemia    Has been stable.    9. GERD.      Continue Protonix         Diet: Combination Diet Regular Diet Adult    DVT Prophylaxis: Enoxaparin (Lovenox) SQ  Manzano Catheter: Not present  Central Lines: None  Cardiac Monitoring: ACTIVE order. Indication: Acute decompensated heart failure (48 hours)  Code Status: No CPR- Pre-arrest intubation OK      Disposition Plan      Expected Discharge Date: 09/14/2022              The patient's care was discussed with the Patient.    Eddie Mora MD  Hospitalist Service  [facility]  Securely message with the Vocera Web Console (learn more here)  Text page via [employer] Paging/Directory         Clinically Significant Risk Factors Present on Admission                      ______________________________________________________________________    Interval History   Shortness of breath is better after thoracentesis.    Data reviewed today: I reviewed all medications, new labs and imaging results over the last 24 hours.    Physical Exam   Vital Signs: Temp: 98  F (36.7  C) Temp src: Oral BP: 126/79 Pulse: 77   Resp: 18 SpO2: 94 % O2 Device: None (Room air)    Weight: 114 lbs 12.8 oz  General Appearance: Complains of pain all over but oriented x3..  Eyes: No icterus  HEENT: Moist mucosa  Respiratory: Clear to auscultation  Cardiovascular: S1 and S2 is normal  GI: Soft and nontender  Lymph/Hematologic: Not examined  Genitourinary: Not examined  Skin: No rash  Musculoskeletal: Bilateral lower extremity edema with some redness, appears chronic.  Neurologic: Nonfocal  Psychiatric: Normal mood      Data   Recent Labs   Lab 09/12/22  0632 09/11/22  2034 09/11/22  1804   WBC 7.4  --  8.1   HGB 9.6*  --  10.4*   MCV 96  --  95     --  373   INR  --   --  0.94   *  --  133*   POTASSIUM 4.2  --  4.0   CHLORIDE 103  --  101   CO2 23  --  21*   BUN 18.8  --  17.0   CR 0.89  --  0.76   ANIONGAP 9  --  11    LIGIA 8.1*  --  8.5*   GLC 92  --  120*   ALBUMIN  --   --  2.8*   PROTTOTAL  --  5.7* 6.5   BILITOTAL  --   --  0.3   ALKPHOS  --   --  261*   ALT  --   --  16   AST  --   --  31   LIPASE  --   --  21     Recent Results (from the past 24 hour(s))   MR Thoracic Spine w/o Contrast    Narrative    MRI OF THE THORACIC SPINE WITHOUT CONTRAST  9/13/2022 11:20 AM     COMPARISON: CT thoracic spine 11/10/2021.    HISTORY: Thoracic fracture.    TECHNIQUE: Multiplanar, multisequence MRI images of the thoracic spine  were acquired without gadolinium IV contrast.      Impression    IMPRESSION: Normal alignment. Interval development of moderate  compression fracture deformity of the T3 vertebral body with  associated bone marrow edema suggesting this is recent. Further  compression fracture deformity of the T8 vertebral body with bone  marrow edema suggesting this is recent. Further compression fracture  deformity of the T12 vertebral body with associated bone marrow edema  suggesting recent fracture. Chronic appearing compression fracture  deformities of T6 and T11 again noted. No spinal canal stenosis.     ANDER RAY MD         SYSTEM ID:  [id number]     Medications       aspirin  81 mg Oral Daily     atorvastatin  40 mg Oral QPM     carvedilol  3.125 mg Oral BID w/meals     diclofenac  4 g Topical 4x Daily     enoxaparin ANTICOAGULANT  40 mg Subcutaneous Q24H     furosemide  40 mg Intravenous BID     lidocaine  2 patch Transdermal Q24h    And     lidocaine   Transdermal Q8H NKECHI     pantoprazole  40 mg Oral Daily     pregabalin  25 mg Oral BID     sodium chloride (PF)  3 mL Intracatheter Q8H

## 2022-09-13 NOTE — PROGRESS NOTES
"I stopped by to meet with patient about tlso bracing. Patient refusing at this time, said \"has so many issues and doesn't want to deal with a brace\".  I told her that I would inform neurosurgery.  Please call orthotics if questions.  Thanks, Amadeo Saavedra.  "

## 2022-09-13 NOTE — PROGRESS NOTES
Ortho consult was received - discussed with original ordering physician that routine injections will not be done inpatient and she needs to reschedule her appointment    Елена SALDIVAR

## 2022-09-13 NOTE — PLAN OF CARE
Goal Outcome Evaluation:    A&Ox4, hard of hearing. Bedrest. Regular diet. Pure wick in place. PIV SL. C/o severe chest, shoulder, back pain. PO Norco administered. Bilateral LE edema, generalized bruising. TELE: NSR.

## 2022-09-13 NOTE — PROGRESS NOTES
[facility]  Pain Management Progress Note  Text Page     Assessment & Plan   Gosia Alonzo is a 89 year old female who was admitted on 9/11/2022.     PLAN:   1)  Opioids:  - Norco 10 - 325 mg to  mg prn moderate to severe pain  Opioids Treatment Goal:   -Improvement in function  -Participate in PT  -Management of acute pain during hsopitalization, expected 3-7 days needed at DC then return to chronic dose.     2)Non-opioid multimodal medication therapy  -Topical:Voltaren 1% Topical Gel four times daily, Lidocaine patch  -N-SAIDS:Avoid due to intolerance  -Muscle Relaxants:Methocarbamol 250 mg every 6 hours prn  -Adjuvants:Acetaminophen 650 mg every 6 hours prn (caution with concurrent dosing of NORCO), Pregabalin 25 mg two times daily, Hydroxyzine 25 - 50 mg every 8 hours prn  -Antidepresants/anxiolytics:Hydroxyzine as above.     3)  Non-medication interventions  Positioning, Heating pad PRN, ICE, Relaxation, Distraction with family at the bedside     4)  Constipation Prophylaxis   Senna-S 1-2 tablets BID, Polyethylene Glycol 1 packet daily prn     5) Medication Risk reduction strategies   -monitor for sedation   -Capnography per protocol   -narcan for opioid reversal      6)  Pain Education  -Opioid safe use, storage and disposal information included in DC AVS     7)  DC Planning   Discussed goal of Opioid therapy as above with patient and family  Recommend short supply of opioids only (3-7days) per CDC guidelines for acute pain management.   Continued outpatient management of pain per [facility] and primary care provider  Disposition: pending  Support systems: family  Outpatient Referrals:  pain clinic for ongoing management  The following risk factors have been identified for unintentional overdose: patient is on multiple sedating medications  and patient is > 65 years old. Discharge with intra-nasal naloxone if discharged to home with opioids  >40 mg MME/day.  Plan for  education prior to discharge.     ASSESSMENT  1)  Acute on chronic back and joint pain now s/p fall with multiple vertebral compression fractures.     2)  Patient with chronic left shoulder and knee pain, on chronic opioid therapy managed by Dr. Castro ([facility])  Baseline 40-60 mg Daily Morphine Equivalent as dispensed and as reported daily use.  Patient has an expected opioid tolerance.      Patient's opioid use in past 24 hours: 0.2 mg IV hydromorphone, 40 mg hydrocodone = 44 mg Daily Morphine Equivalent     3)  Risk factors for opioid related harms  -High opioid dose (>50 MME/day)  - Age > 65 years old     4)  Opioid induced side-effects:  -Constipation - none reported currently  -Sedation - none reported currently, patient is high risk for side effect.     5)  Other/Related:    -Deconditioning - multiple episodes of weakness with recent TCU stay for which she did not return to baseline functional capacity.    WILLIE Preston CNP  Pain Management and Palliative Care  [facility]  Pgr: [phone]    Time Spent on this Encounter   Total unit/floor time 25 minutes, time consisted of the following, examination of the patient, reviewing the record and completing documentation. >50% of time spent in counseling and coordination of care.  Time spend counseling with patient consisted of the following topics, symptom management.  Time spent in coordination of care with Bedside Nurse Denise.       Interval History   Patient with stable functional status overnight.  Pain is tolerable and patient feels she has stable pain management.  Patient tolerating mobilization to chair and appetite fair.  Denies questions or concerns.    Review of Systems    CONSTITUTIONAL: NEGATIVE for fever, chills, change in weight  ENT/MOUTH: NEGATIVE for ear, mouth and throat problems  RESP: NEGATIVE for significant cough or SOB  CV: NEGATIVE for chest pain, palpitations or peripheral edema    Physical Exam    Temp:  [97.6  F (36.4  C)-98.4  F (36.9  C)] 98.2  F (36.8  C)  Pulse:  [] 92  Resp:  [18-22] 20  BP: ()/(66-85) 131/85  SpO2:  [95 %-100 %] 96 %  114 lbs 12.8 oz  GEN:  Alert, oriented x 3, appears comfortable, No apparent distress.  CV:  +3 DP/PT pulses bilaterally; no edema bilateral lower extremeties.  RESP: Symmetric chest rise on inhalation noted.  Normal respiratory effort.  ABD:  Rounded, soft, non-tender/non-distended.  +BS  EXT:  Edema & pulses as noted above.  Color, moisture and sensation intact x 4.      SKIN:  Dry to touch, no exanthems noted in the visualized areas.    NEURO: No focal deficits  PAIN BEHAVIOR: Cooperative  Psych:  Normal affect.  Calm, cooperative, conversant appropriately.    Medications       aspirin  81 mg Oral Daily     atorvastatin  40 mg Oral QPM     carvedilol  3.125 mg Oral BID w/meals     diclofenac  4 g Topical 4x Daily     enoxaparin ANTICOAGULANT  40 mg Subcutaneous Q24H     furosemide  40 mg Intravenous BID     lidocaine  2 patch Transdermal Q24h    And     lidocaine   Transdermal Q8H NKECHI     pantoprazole  40 mg Oral Daily     pregabalin  25 mg Oral BID     sodium chloride (PF)  3 mL Intracatheter Q8H       Data   Results for orders placed or performed during the hospital encounter of 09/11/22 (from the past 24 hour(s))   Cell count with differential fluid    Narrative    The following orders were created for panel order Cell count with differential fluid.  Procedure                               Abnormality         Status                     ---------                               -----------         ------                     Cell Count Body Fluid[[id number]]        Abnormal            Final result               Differential Body Fluid[[id number]]                          Final result                 Please view results for these tests on the individual orders.   Pleural fluid Aerobic Bacterial Culture Routine with Gram Stain    Specimen: Pleural Cavity;  Pleural fluid   Result Value Ref Range    Gram Stain Result No organisms seen     Gram Stain Result 3+ WBC seen     Narrative    Gram Stain quantification of host cells and microbiological organisms was done on a cytocentrifuged preparation.     Glucose fluid   Result Value Ref Range    Glucose Fluid Source Pleural Cavity, Left     Glucose fluid 116 mg/dL    Narrative    No reference ranges have been established. This result should be interpreted in the context of the patient's clinical condition and compared to simultaneous measurement in the patient's blood. This is a lab developed test. It has not been cleared or approved by the FDA. FDA clearance is not required for clinical use.   Lactate dehydrogenase fluid   Result Value Ref Range    LD Fluid Source Pleural Cavity, Left     Lactate dehydrogenase fluid 82 U/L    Narrative    No reference ranges have been established. This result should be interpreted in the context of the patient's clinical condition and compared to simultaneous measurement in the patient's blood. This is a lab developed test. It has not been cleared or approved by the FDA. FDA clearance is not required for clinical use.   Protein fluid   Result Value Ref Range    Protein Fluid Source Pleural Cavity, Left     Protein Total Fluid 2.4 g/dL    Narrative    No reference ranges have been established. This result should be interpreted in the context of the patient's clinical condition and compared to simultaneous measurement in the patient's blood. This is a lab developed test. It has not been cleared or approved by the FDA. FDA clearance is not required for clinical use.   Cell Count Body Fluid   Result Value Ref Range    Color Yellow Colorless, Yellow    Clarity Hazy (A) Clear, Bloody    Total Nucleated Cells 349 /uL    Cell Count Fluid Source Pleural Cavity, Left     Narrative    No reference ranges have been established.  This result  should be interpreted in the context of the patient's  clinical condition and   compared to simultaneous measurement in the patient's blood.         Differential Body Fluid   Result Value Ref Range    % Neutrophils      % Lymphocytes 85 %    % Monocyte/Macrophages 15 %    Narrative    No reference ranges have been established. This result should be interpreted in the context of the patient's clinical condition and compared to simultaneous measurement in the patient's blood.   US Thoracentesis Bilateral    Narrative    ULTRASOUND GUIDED THORACENTESIS  9/12/2022 11:17 AM     HISTORY: SOB, bilateral pleural effusion    FINDINGS: Limited ultrasound was performed to evaluate for the  presence and best approach for drainage of a pleural effusion. An  image is archived. Written and oral informed consent was obtained. A  pause for the cause procedure to verify the correct patient and  correct procedure.     The skin overlying the right chest posteriorly was prepped and draped  in the usual sterile fashion. The subcutaneous tissues were  anesthetized with 10 mL 1% Lidocaine. Under direct ultrasound guidance  a catheter was advanced into the pleural space and 200 mL of  sourav  colored fluid was drained. The catheter was removed and a sterile  dressing was applied.     The skin overlying the left chest posteriorly was prepped and draped  in the usual sterile fashion. The subcutaneous tissues were  anesthetized with 10 mL 1% Lidocaine. Under direct ultrasound guidance  a catheter was advanced into the pleural space and 250 mL of  sourav  colored fluid was drained. The catheter was removed and a sterile  dressing was applied.     Patient was monitored by nurse under my direct supervision throughout  the exam. Ultrasound images were permanently stored.  There were no  immediate complications. Patient left the ultrasound suite in  satisfactory condition.      Impression    IMPRESSION: Technically successful bilateral thoracentesis without  immediate complications.    DANA CELIS,           SYSTEM ID:  [id number]

## 2022-09-13 NOTE — PROGRESS NOTES
09/13/22 0852   Quick Adds   Type of Visit Initial Occupational Therapy Evaluation   Living Environment   People in Home alone   Current Living Arrangements house  ([facility])   Home Accessibility wheelchair accessible   Living Environment Comments Pt lives alone in [facility], accessible, walk in shower w/ shower bench (has been sponge bathing), RTS w/ rails.   Self-Care   Usual Activity Tolerance moderate   Current Activity Tolerance fair   Equipment Currently Used at Home walker, rolling;raised toilet seat;grab bar, toilet;grab bar, tub/shower;shower chair   Fall history within last six months yes   Number of times patient has fallen within last six months 1   Activity/Exercise/Self-Care Comment Pt reports mod I in all ADLs (sponge bathes), med mgmt, and mobility tasks with 4ww. Pt uses reacher/sock aide for LB dressing, washes hair in sink. Mod I for light meal prep/microwave. Pt has assist for cleaning, laundry, grocery shopping.   Instrumental Activities of Daily Living (IADL)   Previous Responsibilities medication management   IADL Comments Has assist for majority of IADLs   General Information   Onset of Illness/Injury or Date of Surgery 09/11/22   Referring Physician Gary Amaya,    Patient/Family Therapy Goal Statement (OT) Pt's goal is to d/c home; declines TCU and HH   Additional Occupational Profile Info/Pertinent History of Current Problem Per chart: Pt is an 89 year old female admitted with back pain and SOB, Secondary to new moderate compression deformity of T3 and mild compression deformity at L1 and L4.  There are several other old compression deformities.   Existing Precautions/Restrictions fall;spinal  (Bedrest w/ bathroom privileges)   Cognitive Status Examination   Orientation Status orientation to person, place and time   Visual Perception   Visual Impairment/Limitations corrective lenses full-time   Sensory   Sensory Quick Adds No deficits were identified   Pain Assessment    Patient Currently in Pain Yes, see Vital Sign flowsheet  (back pain, L shoulder pain, R thigh pain)   Range of Motion Comprehensive   Comment, General Range of Motion Minimal AROM in L shoulder (baseline) due to arthritis; RUE WFL   Strength Comprehensive (MMT)   Comment, General Manual Muscle Testing (MMT) Assessment Generalized weakness   Bed Mobility   Bed Mobility supine-sit;sit-supine   Supine-Sit [place] (Bed Mobility) minimum assist (75% patient effort)   Sit-Supine [place] (Bed Mobility) minimum assist (75% patient effort)   Transfers   Transfers bed-chair transfer;sit-stand transfer;toilet transfer;shower transfer   Transfer Skill: Bed to Chair/Chair to Bed   Bed-Chair [place] (Transfers) contact guard   Assistive Device (Bed-Chair Transfers) rolling walker   Sit-Stand Transfer   Sit-Stand [place] (Transfers) minimum assist (75% patient effort)   Assistive Device (Sit-Stand Transfers) walker, 4-wheeled   Shower Transfer   [place] Level (Shower Transfer) unable to assess   Toilet Transfer   [place] Level (Toilet Transfer) unable to assess   Balance   Balance Comments CGA while using 4ww   Activities of Daily Living   BADL Assessment/Intervention upper body dressing;lower body dressing;grooming;toileting   Upper Body Dressing Assessment/Training   [place] Level (Upper Body Dressing) minimum assist (75% patient effort)   Lower Body Dressing Assessment/Training   [place] Level (Lower Body Dressing) moderate assist (50% patient effort)   Grooming Assessment/Training   [place] Level (Grooming) contact guard assist   Toileting   [place] Level (Toileting) moderate assist (50% patient effort)   Clinical Impression   Criteria for Skilled Therapeutic Interventions Met (OT) Yes, treatment indicated   OT Diagnosis Impaired ADLs, IADLs and mobility tasks   OT Problem List-Impairments impacting ADL problems related to;activity tolerance impaired;balance;strength;pain    ADL comments/analysis Pt below baseline level of functioning   Assessment of Occupational Performance 5 or more Performance Deficits   Identified Performance Deficits Toileting, dressing, grooming, transfers, IADLs   Planned Therapy Interventions (OT) ADL retraining;IADL retraining;strengthening;transfer training;home program guidelines;progressive activity/exercise   Clinical Decision Making Complexity (OT) moderate complexity   Risk & Benefits of therapy have been explained evaluation/treatment results reviewed;risks/benefits reviewed;care plan/treatment goals reviewed;current/potential barriers reviewed;participants voiced agreement with care plan;participants included;patient   OT Discharge Planning   OT Discharge Recommendation (DC Rec) home with assist;home with home care occupational therapy;Leaving home requires significant assistance;Leaving home requires significant taxing effort   OT Rationale for DC Rec Pt mobilizing up to chair with nursing, appears likely close to baseline level of functioning. Pt declining/denying need for therapy services post discharge, agreeable while IP. Recommend ongoing skilled OT while IP and in HH setting (pt declining) to improve strength, functional activity tolerance, balance and safety needed for daily tasks. Has all needed DME/AE and accessible home environment. Fall prevention strategies discussed. Likely would benefit from more supportive living environment (senior living), however pt declining. Discussed potential for increased daily check ins from family.   Total Evaluation Time (Minutes)   Total Evaluation Time (Minutes) 10   OT Goals   Therapy Frequency (OT) Daily   OT Predicted Duration/Target Date for Goal Attainment 09/17/22   OT Goals Hygiene/Grooming;Upper Body Dressing;Lower Body Dressing;Toilet Transfer/Toileting   OT: Hygiene/Grooming supervision/stand-by assist;using adaptive equipment;within precautions;while standing   OT: Upper Body Dressing Supervision/stand-by  assist;using adaptive equipment;within precautions   OT: Lower Body Dressing Supervision/stand-by assist;using adaptive equipment;within precautions   OT: Toilet Transfer/Toileting Supervision/stand-by assist;toilet transfer;cleaning and garment management;using adaptive equipment;within precautions

## 2022-09-13 NOTE — PROGRESS NOTES
"Neurosurgery Progress     Dx:     Compression deformity of T3 is new from 07/19/2022.    Neuro stable.     TODAY'S PLAN:     Based on her physical exam, we do feel that it would be in her best interest to obtain a thoracic MRI to further assess our concern for any concerning pathology. We will hold off on any bracing until we are able to review the images. In the interim she does not need to be on bedrest and can certainly work with physical therapy.        In the event that patient's symptoms worsen or change we would appreciate being contacted. We did discuss signs of a worsening problem that she should seek being evaluated.     We did review the above information with the patient whom agrees with the plan and did verbalize understanding.   ________________________________________________________________     Ms. Alonzo overall feels well but does have some thoracic pain. Mainly concerned about \"fluid in her chest\", left shoulder, and left knee pain. Tolerating regular diet without n/v.     /85 (BP Location: Right arm, Patient Position: Semi-Yeager's, Cuff Size: Adult Small)   Pulse 92   Temp 98.2  F (36.8  C) (Oral)   Resp 20   Ht 5' 5\" (1.651 m)   Wt 114 lb 12.8 oz (52.1 kg)   LMP  (LMP Unknown)   SpO2 96%   BMI 19.10 kg/m       Pt sitting up in chair. Appears comfortable and in no apparent distress, moving all extremities. Left arm, specifically glenohumeral joint, is weak with significantly reduced range of motion. LLE normal.   Right upper and lower extremities with appropriate strength. DTR's WNL.   CN II-XII intact, alert and appropriate with conversation and following commands.   Spine is non tender to palpation throughout. Green's and Babinski sign neg. Sensation intact throughout. Acceptable ROM.   Calves soft and non-tender bilaterally.     All pertinent labs and updated imaging reviewed in EPIC.     Nathanael Harkins PA-C   Neurosurgery   [phone] (P)     Reviewed with Dr. Guardado. "

## 2022-09-13 NOTE — CONSULTS
Care Management Initial Consult    General Information  Assessment completed with: Patient, Patient  Type of CM/SW Visit: Initial Assessment    Primary Care Provider verified and updated as needed: No   Readmission within the last 30 days: no previous admission in last 30 days      Reason for Consult: discharge planning  Advance Care Planning:            Communication Assessment  Patient's communication style: spoken language (English or Bilingual)    Hearing Difficulty or Deaf: yes   Wear Glasses or Blind: yes    Cognitive  Cognitive/Neuro/Behavioral: WDL  Level of Consciousness: alert     Orientation: oriented x 4             Living Environment:   People in home: alone     Current living Arrangements: house, other (see comments)      Able to return to prior arrangements: yes       Family/Social Support:  Care provided by: self  Provides care for: no one     Children          Description of Support System: Supportive    Support Assessment: Adequate family and caregiver support, Patient communicates needs well met    Current Resources:   Patient receiving home care services: No     Community Resources: None  Equipment currently used at home: walker, rolling, raised toilet seat, grab bar, toilet, grab bar, tub/shower, shower chair  Supplies currently used at home: None    Employment/Financial:  Employment Status:          Financial Concerns:             Lifestyle & Psychosocial Needs:  Social Determinants of Health     Tobacco Use: Low Risk      Smoking Tobacco Use: Never Smoker     Smokeless Tobacco Use: Never Used   Alcohol Use: Not on file   Financial Resource Strain: Not on file   Food Insecurity: Not on file   Transportation Needs: Not on file   Physical Activity: Not on file   Stress: Not on file   Social Connections: Not on file   Intimate Partner Violence: Not on file   Depression: Not at risk     PHQ-2 Score: 0   Housing Stability: Not on file       Care Management Discharge Note    Discharge Date:  09/14/2022       Discharge Disposition: Home    Discharge Services: None    Discharge DME: None    Discharge Transportation:  family    PAS Confirmation Code:    Patient/family educated on Medicare website which has current facility and service quality ratings: no    Patient/Family in Agreement with the Plan: yes    Additional Information:  SW met with patient at bedside. Patient has a high urr of 24%.     Patient lives alone in a town home. Patient reports her last home care visit was last week. She is being recommended for home care again. SW explained insurance coverage. Patient says she does not want to do home care anymore. Patient has support through her family. She has been able to do her own laundry. She uses a walker. Patient's family will transport at discharge.     Patient declines having other needs at this time.     SHAE Light, [facility]  Emergency Room   [phone]-Please contact the SW on the floor in which the patient is staying for any questions or concerns

## 2022-09-14 NOTE — PROVIDER NOTIFICATION
Per tele tech- pt had 28 beats of v-tach, pt laying in bed, pt states no SOB or CP. will continue to monitor.

## 2022-09-14 NOTE — PLAN OF CARE
Goal Outcome Evaluation:    Plan of Care Reviewed With: patient     Overall Patient Progress: improving  Patient admitted with Back pain. Neuro surgery following. MRI of thoracic completed today. Also has left shoulder pain. Plan for outpatient follow up for injection.  Sid is controlled with norco. CHF, was on IV lasix now oral. Alert, oriented, up with walker and stand by. Alarms on at all times. Possible discharge 1-2 days. Continue POC

## 2022-09-14 NOTE — PROGRESS NOTES
Care Management Discharge Note    Discharge Date: 09/14/2022       Discharge Disposition: Home    Discharge Services: Home Care  Discharge DME: None    Discharge Transportation: family or friend will provide    Private pay costs discussed: Not applicable    Patient/Family in Agreement with the Plan: yes    Handoff Referral Completed: No    Additional Information:  Patient discharging home with resumption of home care services. Patient open with [facility]. [facility] aware of patient discharging today and will follow for home care needs SN PT.     Patient a SB#3. OP CC referral ordered.     Kelin Hutchins RN Case Manager  Inpatient Care Coordination   [facility]   [phone]          Kelin Hutchins RN

## 2022-09-14 NOTE — PROGRESS NOTES
Cross cover notified of patient with 20 beat run nonsustained V. tach.  Asymptomatic.  Currently on IV diuretics for acute on chronic CHF.  EF 40-45%.  History of CAD with previous LAD stent.  Potassium 3.5 this afternoon.  On low-dose carvedilol which she has been receiving.  -Stat check potassium and magnesium labs

## 2022-09-14 NOTE — PROGRESS NOTES
"Neurosurgery Progress     Dx:     Compression deformity of T3 is new from 07/19/2022.    Neuro stable.     TODAY'S PLAN:     She has refused a brace. She is mostly concerned with her left shoulder and knee pain. She is waiting on an Ortho consult.    -Advance activity as tolerated.  -Continue supportive and symptomatic treatment.  -Start or continue physical therapy.  -Pain control measures.  -Advance diet as tolerated.  -Routine wound care.    In the event that patient's symptoms worsen or change we would appreciate being contacted. We did discuss signs of a worsening problem that she should seek being evaluated.     We did review the above information with the patient whom agrees with the plan and did verbalize understanding.   ________________________________________________________________     Mr. Alonzo overall feels well and denies any significant discomfort regarding her back.     /80 (BP Location: Left arm)   Pulse 83   Temp 98  F (36.7  C) (Oral)   Resp 18   Ht 5' 5\" (1.651 m)   Wt 114 lb 12.8 oz (52.1 kg)   LMP  (LMP Unknown)   SpO2 95%   BMI 19.10 kg/m       Pt in bed. Appears comfortable and in no apparent distress, moving all extremities. LUE weak with reduced ROM.   CN II-XII intact, alert and appropriate with conversation and following commands.   DTR's WNL. Spine is non tender to palpation throughout. Sensation intact throughout. Acceptable ROM.   Calves soft and non-tender bilaterally.     All pertinent labs and updated imaging reviewed in [facility].     MRI OF THE THORACIC SPINE WITHOUT CONTRAST  9/13/2022 11:20 AM     Normal alignment. Interval development of moderate compression fracture deformity of the T3 vertebral body with associated bone marrow edema suggesting this is recent. Further compression fracture deformity of the T8 vertebral body with bone marrow edema suggesting this is recent. Further compression fracture  deformity of the T12 vertebral body with associated bone marrow " edema suggesting recent fracture. Chronic appearing compression fracture deformities of T6 and T11 again noted. No spinal canal stenosis.     Nathanael Harkins PA-C   Neurosurgery   [phone] (P)

## 2022-09-14 NOTE — PLAN OF CARE
Goal Outcome Evaluation:    Plan of Care Reviewed With: patient, daughter     Overall Patient Progress: improving     Patient alert, oriented, up with stand by and walker. Discharging today to home. Admitted with acute CHF, back pain and shoulder pain. Is on home lasix dose. Started Voltaren gel and pregabalin for the back and shoulder. Neuro surg consulted. Not a surgery candidate and patient declining the TLSO. Follow up with ortho for injection to the left shoulder. Patient to discharge home via family car. Daughter at bedside for discharge. Both members state understanding. All questions answered.  Discharge complete.

## 2022-09-14 NOTE — PLAN OF CARE
"Plan of care: 1900-0700  Presentation/Diagnosis: pt admitted with CHF, pleural effusion, and compression fx  Orientation: A&O  Labs/Protocols: K replacement protocol. K-3.5. Hgb- 9.3  Vitals: /82 (BP Location: Right arm)   Pulse 93   Temp 98.4  F (36.9  C) (Oral)   Resp 18   Ht 1.651 m (5' 5\")   Wt 52.1 kg (114 lb 12.8 oz)   LMP  (LMP Unknown)   SpO2 95%   BMI 19.10 kg/m     Pain: pt rating pain 7/10 , norco q4h given, lidocaine cream bilat heel pain.   LDAs: No PIV access, purewick in use  Diet: regular, tolerating well  Activity: Ax1 w/gb  Consults/following: OT/PT, pain management, neurosurgery following.  Plan: discharge TBD. Pain management. Will continue to monitor and provide supportive measures.        "

## 2022-09-14 NOTE — PROGRESS NOTES
09/14/22 1100   Quick Adds   Type of Visit Initial PT Evaluation       Present no   Living Environment   People in Home alone   Current Living Arrangements house   Home Accessibility no concerns   Transportation Anticipated family or friend will provide   Living Environment Comments Patient lives alone in a single level townhouse with a level entrance.  Patient has a walk-in shower and a raised toilet seat.  Patient daughter or daughter-in-law can assist with transportation.   Self-Care   Usual Activity Tolerance moderate   Current Activity Tolerance moderate   Equipment Currently Used at Home walker, rolling;raised toilet seat;grab bar, toilet;grab bar, tub/shower;shower chair   Fall history within last six months yes   Number of times patient has fallen within last six months 1  (Slid out of lift chair)   Activity/Exercise/Self-Care Comment Patient reports modified independence with ADLs and household mobility.  She uses a 4WW at baseline.  Patient's daughter or daughter-in-law assist with groceries.  Her family is quite supportive.  Patient is also close to her neighbors, who check in on her and take out her trash.   General Information   Onset of Illness/Injury or Date of Surgery 09/11/22   Referring Physician Gary Amaya, DO   Patient/Family Therapy Goals Statement (PT) Patient would like to discharge home.   Pertinent History of Current Problem (include personal factors and/or comorbidities that impact the POC) 89-year-old female with a history of coronary artery disease with prior LAD stenting, ischemic cardiomyopathy with EF 45% to 50%, chronic systolic and diastolic CHF, paroxysmal atrial fibrillation, diabetes mellitus, hypertension, hyperlipidemia, GI bleeding, GERD, breast cancer, osteoporosis and chronic pain, presents [facility] for chest and back pain as well as shortness of breath.   Existing Precautions/Restrictions fall  (Patient refusing TLSO  brace.)   Cognition   Affect/Mental Status (Cognition) WFL   Orientation Status (Cognition) oriented x 3   Follows Commands (Cognition) WFL   Pain Assessment   Patient Currently in Pain Yes, see Vital Sign flowsheet  (reports right buttocks/thigh discomfort)   Integumentary/Edema   Integumentary/Edema Comments Age-related skin baseline, lower extremities dry   Posture    Posture Forward head position;Protracted shoulders;Kyphosis   Range of Motion (ROM)   Range of Motion ROM is WFL   Strength (Manual Muscle Testing)   Strength (Manual Muscle Testing) Able to perform R SLR;Able to perform L SLR;Deficits observed during functional mobility   Bed Mobility   Comment, (Bed Mobility) Patient completes sit > supine transfer with modified independence, utilizes upper extremity support on bed rail with HOB flat.   Transfers   Comment, (Transfers) Patient completes sit <> stand transfer from reclining chair with SBA.  Patient appropriately applying brakes on 4WW during transfer.   Gait/Stairs (Locomotion)   Distance in Feet (Required for LE Total Joints) 15' eval + 85' treatment   Comment, (Gait/Stairs) Patient ambulates 15' in room with 4WW and SBA.  Patient demonstrates step-through gait pattern with decreased step length and slowed gait speed.  Patient with forward flexed head and trunk posture, which she states is her baseline.  No overt loss of balance noted with rolling walker.   Balance   Balance Comments Impaired dynamic balance, baseline use of walker   Sensory Examination   Sensory Perception patient reports no sensory changes   Clinical Impression   Criteria for Skilled Therapeutic Intervention Yes, treatment indicated   PT Diagnosis (PT) Impaired functional mobility   Influenced by the following impairments Back pain with compression fractures, right thigh/buttock pain, impaired balance, generalized weakness and deconditioning, decreased activity tolerance   Functional limitations due to impairments Impaired  independence with bed mobility, transfers, and ambulation   Clinical Presentation (PT Evaluation Complexity) Stable/Uncomplicated   Clinical Presentation Rationale Clinical judgement, PMH, social support   Clinical Decision Making (Complexity) low complexity   Planned Therapy Interventions (PT) balance training;bed mobility training;cryotherapy;gait training;home exercise program;neuromuscular re-education;patient/family education;orthotic fitting/training;postural re-education;strengthening;transfer training;progressive activity/exercise   Anticipated Equipment Needs at Discharge (PT)   (Patient owns a 4WW.)   Risk & Benefits of therapy have been explained evaluation/treatment results reviewed;care plan/treatment goals reviewed;risks/benefits reviewed;participants voiced agreement with care plan;participants included;patient   PT Discharge Planning   PT Discharge Recommendation (DC Rec) home with assist;home with home care physical therapy   PT Rationale for DC Rec Patient presents below modified independent ADL and mobility baseline, currently needing SBA for transfers and ambulation with rolling walker.  Patient lives in an accessible [facility] and has supportive family and neighbors who are willing and able to assist as needed at discharge.  Although patient would benefit from  PT to address remaining strength and balance deficits, she recently completed home care and is not interested in this service at discharge.  Patient owns a rolling walker and will continue to utilize this AD at discharge.  Patient plans to have her family assist with transportation at discharge.   Plan of Care Review   Plan of Care Reviewed With patient   Total Evaluation Time   Total Evaluation Time (Minutes) 10   Physical Therapy Goals   PT Frequency Daily   PT Predicted Duration/Target Date for Goal Attainment 09/16/22   PT Goals Bed Mobility;Transfers;Gait   PT: Bed Mobility Independent;Supine to/from sit;Rolling;Within precautions    PT: Transfers Modified independent;Sit to/from stand;Bed to/from chair;Assistive device;Within precautions   PT: Gait Modified independent;100 feet;Rolling walker

## 2022-09-14 NOTE — DISCHARGE SUMMARY
[facility]  Hospitalist Discharge Summary      Date of Admission:  9/11/2022  Date of Discharge:  9/14/2022  Discharging Provider: Eddie Mora MD  Discharge Service: Hospitalist Service    Discharge Diagnoses       Back pain due to multiple compression fractures.    Left shoulder and knee pain, osteoarthritis.    Possible right superior and inferior pubic rami fractures.    Acute on chronic diastolic and systolic heart failure with history of ischemic cardiomyopathy.    Essential hypertension.    History of coronary artery disease with prior NSTEMI and LAD stent.    Diet-controlled diabetes mellitus.    Chronic anemia    GERD.      Follow-ups Needed After Discharge   Follow-up Appointments     Follow-up and recommended labs and tests       Follow up with primary care provider, Michael Londono MD, within 7 days   for hospital follow- up.  No follow up labs or test are needed.             Unresulted Labs Ordered in the Past 30 Days of this Admission     Date and Time Order Name Status Description    9/14/2022  8:02 AM Vitamin D Deficiency In process     9/11/2022 10:15 PM Pleural fluid Aerobic Bacterial Culture Routine with Gram Stain Preliminary       These results will be followed up by Eddie Mora      Discharge Disposition   Discharged to home  Condition at discharge: Stable    Hospital Course   89-year-old female with a history of coronary artery disease with prior LAD stenting, ischemic cardiomyopathy with EF 45% to 50%, chronic systolic and diastolic CHF, paroxysmal atrial fibrillation, diabetes mellitus, hypertension, hyperlipidemia, GI bleeding, GERD, breast cancer, osteoporosis and chronic pain, presents to [facility] for chest and back pain as well as shortness of breath.     Plan        1. Back pain    Secondary to new moderate compression deformity of T3 and mild compression deformity at L1 and L4.  There are several other old compression deformities.  MRI confirmed  these to be acute fractures.    Neurosurgery consulted.   Brace was recommended but patient refused to use it.  Recommend osteoporosis management.  PTH was normal, vitamin D pending.  Start on bisphosphonates as outpatient.    Pain management consulted as patient is on chronic opiates.    PT/OT/social work consulted for placement, patient does not want to go to TCU and will discharge home.     2. Left shoulder and knee pain    Likely due to osteoarthritis.    She was scheduled for knee and shoulder steroid injection, consulted [facility] and they cannot perform it inpatient.  Advised patient to do outpatient.     3. Possible right superior and inferior pubic rami fracture.    X-ray shows new irregularity in the area, may represent fracture.    No intervention needed as patient does not have much pain there.     4. Acute on chronic diastolic and systolic heart failure with ischemic cardiomyopathy.    Prior EF of 40 to 45%, reported weight gain and lower extremity edema.  BNP 5316.  Underwent bilateral thoracentesis and only 250 mL was removed from each side.  Fluid appears transudative.    Given Lasix 60 mg IV in the ED.    Was with Lasix 40 mg IV twice daily and will discharge with Lasix 40 mg p.o. daily.     5. Essential hypertension    Continue carvedilol and losartan.     6. Coronary artery disease with prior NSTEMI and LAD stent    Troponins are flat, EKG without acute changes.     7. Diet-controlled diabetes mellitus.     8. Chronic anemia    Has been stable.     9. GERD.      Continue Protonix      Consultations This Hospital Stay   NEUROSURGERY IP CONSULT  PAIN MANAGEMENT ADULT IP CONSULT  ORTHOPEDIC SURGERY IP CONSULT  CARE MANAGEMENT / SOCIAL WORK IP CONSULT  PHYSICAL THERAPY ADULT IP CONSULT  OCCUPATIONAL THERAPY ADULT IP CONSULT  ORTHOSIS BRACE IP CONSULT  ORTHOSIS BRACE IP CONSULT  ORTHOPEDIC SURGERY IP CONSULT    Code Status   No CPR- Pre-arrest intubation OK    Time Spent on this Encounter   Eddie XAVIER  MD, personally saw the patient today and spent greater than 30 minutes discharging this patient.       Eddie Mora MD  [place] [address] [facility]  [address]  [facility] [address]  Phone: [phone]  Fax: [phone]  ______________________________________________________________________    Physical Exam   Vital Signs: Temp: 98  F (36.7  C) Temp src: Oral BP: 137/80 Pulse: 83   Resp: 18 SpO2: 95 % O2 Device: None (Room air)    Weight: 114 lbs 12.8 oz  General Appearance: Complains of pain all over but oriented x3..  Eyes: No icterus  HEENT: Moist mucosa  Respiratory: Clear to auscultation  Cardiovascular: S1 and S2 is normal  GI: Soft and nontender  Lymph/Hematologic: Not examined  Genitourinary: Not examined  Skin: No rash  Musculoskeletal: Bilateral lower extremity edema with some redness, appears chronic.  Neurologic: Nonfocal  Psychiatric: Normal mood          Primary Care Physician   Michael Londono MD    Discharge Orders      Primary Care - Care Coordination Referral      Home Care Referral      Reason for your hospital stay    Uncontrolled pain     Follow-up and recommended labs and tests     Follow up with primary care provider, Michael Londono MD, within 7 days for hospital follow- up.  No follow up labs or test are needed.     Activity    Your activity upon discharge: activity as tolerated     Diet    Follow this diet upon discharge: Orders Placed This Encounter      Combination Diet Regular Diet Adult       Significant Results and Procedures   Most Recent 3 CBC's:Recent Labs   Lab Test 09/13/22  1711 09/12/22  0632 09/11/22  1804   WBC 7.5 7.4 8.1   HGB 9.3* 9.6* 10.4*   MCV 97 96 95    289 373     Most Recent 3 BMP's:Recent Labs   Lab Test 09/14/22  0635 09/13/22  1556 09/12/22  0632 09/11/22  1804     --  135* 133*   POTASSIUM 3.7 3.5 4.2 4.0   CHLORIDE 102  --  103 101   CO2 26  --  23 21*   BUN 25.1*  --  18.8 17.0   CR 0.80  --  0.89 0.76   ANIONGAP 8   --  9 11   LIGIA 7.6*  --  8.1* 8.5*   *  --  92 120*     Most Recent 2 LFT's:Recent Labs   Lab Test 09/11/22  1804 07/19/22  0836   AST 31 37   ALT 16 16   ALKPHOS 261* 143   BILITOTAL 0.3 0.6   ,   Results for orders placed or performed during the hospital encounter of 09/11/22   CT Chest/Abdomen/Pelvis w Contrast    Narrative    EXAM: CT CHEST/ABDOMEN/PELVIS W CONTRAST  LOCATION: [facility]  DATE/TIME: 9/11/2022 7:06 PM    INDICATION: Chest pain that radiates through to the back  COMPARISON: 07/19/2022, 11/09/2021, 02/11/2020  TECHNIQUE: CT scan of the chest, abdomen, and pelvis was performed following injection of IV contrast. Multiplanar reformats were obtained. Dose reduction techniques were used.   CONTRAST: 50mL Isovue 370    FINDINGS:   LUNGS AND PLEURA: There are moderate bilateral pleural effusions which appear free-flowing. There is associated mild compressive atelectasis in the lower lobes. Multiple benign calcified granulomas bilaterally.    MEDIASTINUM/AXILLAE: Normal heart size. No pericardial effusions. Mild calcified plaque in the thoracic aorta. Benign calcified hilar and mediastinal lymph nodes. No enlarged nodes. Large hiatal hernia without obstruction. Prior right mastectomy.   Ruptured right implant.    CORONARY ARTERY CALCIFICATION: Extensive    HEPATOBILIARY: Mild surface nodularity of the liver suggests cirrhosis. No focal hepatic lesions. Few benign calcified granulomas.    PANCREAS: There is a 2.8 cm low density cyst in the tail the pancreas which is unchanged from 11/09/2021 but new from 02/11/2020. Mild atrophy of the pancreas. No acute inflammatory changes.    SPLEEN: Multiple benign calcified granulomas in the spleen.    ADRENAL GLANDS: Normal.    KIDNEYS/BLADDER: There is a 1.3 cm hyperechoic dense cyst or enhancing mass in the mid right kidney posterior laterally on series 2 image 142 which has increased in size from 02/11/2021 and measured 0.8 cm. A  similar appearing 0.6 cm hyperdense cyst or   enhancing masses present in the left kidney lower pole posteriorly on the same image and is unchanged in size from 2020. Few additional benign cysts in both kidneys.    BOWEL: Large sliding-type hiatal hernia. No obstruction or wall thickening. Colonic diverticulosis without evidence of active inflammation. Bowel loops are normal caliber. Small amount of free fluid in the peritoneal space.    LYMPH NODES: Normal.    VASCULATURE: Mild calcified plaque in the abdominal aorta.    PELVIC ORGANS: Hysterectomy. The ovaries are normal in size.    MUSCULOSKELETAL: Bones are demineralized. There is a moderate compression deformity of T3 which is new from 07/19/2022. A mild compression deformity of L1 was not included on 07/19/2022 but is new from 11/09/2021. A mild compression deformity of L4 is   new from 2020. Numerous other compression deformities in the thoracic and lumbar spine are unchanged. Right shoulder replacement. Postoperative changes in the proximal right femur. Old healed bilateral rib fractures. Old healed pelvic insufficiency   fractures.      Impression    IMPRESSION:  1.  A moderate compression deformity of T3 is new from 07/19/2022. A mild compression deformity of L1 and a mild compression deformity of L4 are not visualized on the most recent comparison exam but are at least new from 11/9/2021 and 2/11/2020,   respectively. Numerous additional compression deformities are not significant changed.  2.  Moderate bilateral pleural effusions, free-flowing with associated mild compressive atelectasis.  3.   Mild surface nodularity of the liver which may indicate cirrhosis  4.  Small amount of peritoneal free fluid, possibly due to ascites.  5.  1.3 cm right renal and 0.8 cm left renal hyperdense cyst versus enhancing masses. The right lesion demonstrates a mild increase in size since 2020 and the left is stable. Regardless of etiology, these are likely of low  clinical significance in a   patient of this age given the slow growth rate.  6.  2.8 cm pancreatic tail cyst, unchanged from 11/09/2021. This is likely also of doubtful clinical significance given the patient's age. No specific imaging follow-up is recommended.  7.  Large hiatal hernia without obstruction.   XR Femur Right 2 Views    Narrative    EXAM: XR FEMUR RIGHT 2 VIEWS  LOCATION: [facility]  DATE/TIME: 9/11/2022 7:15 PM    INDICATION: thigh pain after a fall  COMPARISON: None.      Impression    IMPRESSION: Exam is limited due to extremely low bone mineral density. There is irregularity in the superior and inferior right pubic rami, which may represent fractures. Recommend correlation with cross-sectional imaging.    Right femoral and knee prosthesis is in place. There is mild lucency around the tibial and distal femoral components, which could be due to underlying osteopenia, however loosening is not entirely excluded. Recommend correlation with prior radiographs if   available vascular calcifications.      NOTE: ABNORMAL REPORT    THE DICTATION ABOVE DESCRIBES AN ABNORMALITY FOR WHICH FOLLOW-UP IS NEEDED.    US Thoracentesis Bilateral    Narrative    ULTRASOUND GUIDED THORACENTESIS  9/12/2022 11:17 AM     HISTORY: SOB, bilateral pleural effusion    FINDINGS: Limited ultrasound was performed to evaluate for the  presence and best approach for drainage of a pleural effusion. An  image is archived. Written and oral informed consent was obtained. A  pause for the cause procedure to verify the correct patient and  correct procedure.     The skin overlying the right chest posteriorly was prepped and draped  in the usual sterile fashion. The subcutaneous tissues were  anesthetized with 10 mL 1% Lidocaine. Under direct ultrasound guidance  a catheter was advanced into the pleural space and 200 mL of  sourav  colored fluid was drained. The catheter was removed and a sterile  dressing was  applied.     The skin overlying the left chest posteriorly was prepped and draped  in the usual sterile fashion. The subcutaneous tissues were  anesthetized with 10 mL 1% Lidocaine. Under direct ultrasound guidance  a catheter was advanced into the pleural space and 250 mL of  sourav  colored fluid was drained. The catheter was removed and a sterile  dressing was applied.     Patient was monitored by nurse under my direct supervision throughout  the exam. Ultrasound images were permanently stored.  There were no  immediate complications. Patient left the ultrasound suite in  satisfactory condition.      Impression    IMPRESSION: Technically successful bilateral thoracentesis without  immediate complications.    DANA CELIS DO         SYSTEM ID:  [id number]   MR Thoracic Spine w/o Contrast    Narrative    MRI OF THE THORACIC SPINE WITHOUT CONTRAST  9/13/2022 11:20 AM     COMPARISON: CT thoracic spine 11/10/2021.    HISTORY: Thoracic fracture.    TECHNIQUE: Multiplanar, multisequence MRI images of the thoracic spine  were acquired without gadolinium IV contrast.      Impression    IMPRESSION: Normal alignment. Interval development of moderate  compression fracture deformity of the T3 vertebral body with  associated bone marrow edema suggesting this is recent. Further  compression fracture deformity of the T8 vertebral body with bone  marrow edema suggesting this is recent. Further compression fracture  deformity of the T12 vertebral body with associated bone marrow edema  suggesting recent fracture. Chronic appearing compression fracture  deformities of T6 and T11 again noted. No spinal canal stenosis.     ANDER RAY MD         SYSTEM ID:  [id number]       Discharge Medications   Current Discharge Medication List      START taking these medications    Details   alendronate (FOSAMAX) 70 MG tablet Take 1 tablet (70 mg) by mouth every 7 days  Qty: 4 tablet, Refills: 0    Associated Diagnoses: Age-related  osteoporosis with current pathological fracture with delayed healing, subsequent encounter      calcium carb-cholecalciferol (OS-LIGIA) 500-200 MG-UNIT tablet Take 1 tablet by mouth 2 times daily (with meals)  Qty: 100 tablet, Refills: 3    Associated Diagnoses: Age-related osteoporosis with current pathological fracture with delayed healing, subsequent encounter      Lidocaine (LIDOCARE) 4 % Patch Place 2 patches onto the skin every 24 hours To prevent lidocaine toxicity, patient should be patch free for 12 hrs daily.  Qty: 30 patch, Refills: 1    Associated Diagnoses: Compression fracture of T3 vertebra, initial encounter (H)      polyethylene glycol (MIRALAX) 17 GM/Dose powder Take 17 g by mouth daily  Qty: 510 g, Refills: 0    Associated Diagnoses: Compression fracture of T3 vertebra, initial encounter (H)      pregabalin (LYRICA) 25 MG capsule Take 1 capsule (25 mg) by mouth 2 times daily  Qty: 60 capsule, Refills: 0    Associated Diagnoses: Compression fracture of T3 vertebra, initial encounter (H)      senna-docusate (SENOKOT-S/PERICOLACE) 8.6-50 MG tablet Take 1 tablet by mouth 2 times daily as needed for constipation  Qty: 100 tablet, Refills: 0    Associated Diagnoses: Compression fracture of T3 vertebra, initial encounter (H)         CONTINUE these medications which have CHANGED    Details   furosemide (LASIX) 20 MG tablet Take 2 tablets (40 mg) by mouth daily Add additional 20mg for increased edema  Qty: 95 tablet, Refills: 2    Associated Diagnoses: Ischemic cardiomyopathy      HYDROcodone-acetaminophen (NORCO)  MG per tablet Take 1 tablet by mouth every 4 hours as needed for moderate to severe pain Every 4 to 6 hours as needed for severe pain  Qty: 15 tablet, Refills: 0    Associated Diagnoses: Acute GI bleeding         CONTINUE these medications which have NOT CHANGED    Details   acetaminophen (TYLENOL) 500 MG tablet Take 1,000 mg by mouth 3 times daily as needed      aspirin EC 81 MG EC tablet  Take 1 tablet (81 mg) by mouth daily      atorvastatin (LIPITOR) 40 MG tablet TAKE 1 TABLET BY MOUTH ONE TIME DAILY  Qty: 90 tablet, Refills: 0    Associated Diagnoses: ST elevation myocardial infarction (STEMI) involving other coronary artery of anterior wall (H); Clostridium difficile diarrhea      carvedilol (COREG) 3.125 MG tablet Take 1 tablet (3.125 mg) by mouth 2 times daily (with meals)  Qty: 180 tablet, Refills: 3    Associated Diagnoses: Ischemic cardiomyopathy      doxylamine (UNISOM) 25 MG TABS tablet Take 25 mg by mouth nightly as needed      ferrous sulfate (FEROSUL) 325 (65 Fe) MG tablet Take 325 mg by mouth daily (with breakfast)      fluticasone (FLONASE) 50 MCG/ACT nasal spray Spray 2 sprays into both nostrils every evening      pantoprazole (PROTONIX) 40 MG EC tablet Take 1 tablet (40 mg) by mouth daily  Qty: 180 tablet, Refills: 1    Associated Diagnoses: Acute GI bleeding; Gastroesophageal reflux disease with esophagitis, unspecified whether hemorrhage      Trolamine Salicylate (ASPERCREME EX) Externally apply topically daily as needed      hydrOXYzine (ATARAX) 25 MG tablet TAKE ONE TABLET BY MOUTH EVERY SIX HOURS AS NEEDED FOR ITCHING OR ADJUVANT PAIN  Qty: 60 tablet, Refills: 1    Associated Diagnoses: Herpes zoster infection of thoracic region         STOP taking these medications       losartan (COZAAR) 25 MG tablet Comments:   Reason for Stopping:             Allergies   Allergies   Allergen Reactions     Codeine      GI UPSET     Escitalopram Oxalate      fatigue     Esomeprazole Magnesium Trihydrate      HA     Gabapentin Dizziness     Dizziness, confusion     Imdur [Isosorbide]      Headache       Meperidine Hcl      N/V     Morphine Hcl      HIVES     Oxycodone      (percodan) GI UPSET     Pentazocine      (talwin)  HALLUCINATIONS     Propoxyphene Hcl      STOMACH UPSET     Sumatriptan Succinate      chest pain

## 2022-09-15 NOTE — PLAN OF CARE
Physical Therapy Discharge Summary    Reason for therapy discharge:    Discharged to home with home therapy.    Progress towards therapy goal(s). See goals on Care Plan in [facility] electronic health record for goal details.  Goals partially met.  Barriers to achieving goals:   discharge from facility.    Therapy recommendation(s):    Continued therapy is recommended.  Rationale/Recommendations:  to continue progressing strength, activity tolerance, improve balance, and increase overall safety and independence with functional mobility. .

## 2022-09-15 NOTE — PLAN OF CARE
"Occupational Therapy Discharge Summary    Reason for therapy discharge:    Discharged to home with home therapy.    Progress towards therapy goal(s). See goals on Care Plan in [facility] electronic health record for goal details.  Goals not met.  Barriers to achieving goals:   discharge from facility.    Therapy recommendation(s):    Continued therapy is recommended.  Rationale/Recommendations:  Per treating OT - \"Pt continues to appear close to baseline level of functioning. Pt declining/denying need for therapy services post discharge, agreeable while IP. Recommend ongoing skilled OT while IP and in HH setting (pt declining) to improve strength, functional activity tolerance, balance and safety needed for daily tasks. Has all needed DME/AE and accessible home environment. Fall prevention strategies discussed. Likely would benefit from more supportive living environment ([facility]), however pt declining. Discussed potential for increased daily check ins from family\".    **This patient was not seen by writing OT, information for discharge summary taken from treating OT's notes.**    "

## 2022-09-15 NOTE — TELEPHONE ENCOUNTER
Patient daughter in law calls to ask for Dr Londono to take over approving her hydrocodone. Up until now it had been her Ortrhopedic Dr from [facility].     She will not have enough meds to get her appointment with [facility]-.     Best number to call back [phone] Matilde ok to to communicate.

## 2022-09-15 NOTE — PROGRESS NOTES
Clinic Care Coordination Contact  [facility]: Post-Discharge Note  SITUATION                                                      Admission:    Admission Date: 09/11/22   Reason for Admission:   Back pain due to multiple compression fractures.    Left shoulder and knee pain, osteoarthritis.    Possible right superior and inferior pubic rami fractures.    Acute on chronic diastolic and systolic heart failure with history of ischemic cardiomyopathy.  Discharge:   Discharge Date: 09/14/22  Discharge Diagnosis:   Back pain due to multiple compression fractures.    Left shoulder and knee pain, osteoarthritis.    Possible right superior and inferior pubic rami fractures.    Acute on chronic diastolic and systolic heart failure with history of ischemic cardiomyopathy.    BACKGROUND                                                      Per hospital discharge summary and inpatient provider notes:    Gosia Alonzo is a 89-year-old female with a history of coronary artery disease, non-STEMI, requiring LAD stent, ischemic cardiomyopathy with chronic systolic and diastolic CHF with most recent ejection fraction 45-50%, paroxysmal atrial fibrillation, pericarditis, type 2 diabetes mellitus, hypertension, hyperlipidemia, diverticular disease, GI bleeding, GERD, breast cancer, severe osteoporosis and chronic pain, presented to [facility] for evaluation of back pain.  History is obtained from my discussion with the patient and her son at the bedside.  I also discussed the case with the ED physician, Dr. Solomon.  The electronic medical record was also reviewed.     The patient follows with orthopedic and spine surgery regularly.  She actually has steroid injection scheduled for her left shoulder and left knee tomorrow.  [place]  She recently discontinued her nursing services.  At baseline, she ambulates with a walker and her son visits frequently.  She reports  "compliance with her oral diuretics, but has noticed some weight gain.  She notices some lower extremity edema.  She also reports worsening shortness of breath over the course of the past week.  She admits to some chest pain, but more exquisitely some upper back pain.  She does report a fall somewhat recently.  She did not have any syncope or loss of consciousness.  Essentially due to worsening chest and upper back pain, she comes to the Emergency Department for evaluation.     Here in the hospital, her temperature is 98.3, heart rate 92, blood pressure 117/63, respiratory rate 20, and oxygen saturation 94% on room air.  Labs showed normal basic metabolic panel, liver function tests, lipase, troponin with the exception of bicarbonate 21, calcium 8.5, sodium 133, albumin 2.8, alkaline phosphatase 261.  Glucose 120.  Troponin is 28 and 27 on recheck.  CBC notable for hemoglobin of 10.4.  INR 0.9.  Right femur x-ray shows irregularity in the superior and inferior pubic rami on the right, which may represent fractures.  There is also mild lucency around the tibial and distal femoral components, which could be due to osteopenia; however, loosening is not excluded.  CT of the chest, abdomen and pelvis shows moderate compression deformity of T3, which is new and mild compression deformity at L1 and L4, which might be new.  There is also moderate bilateral pleural effusions with associated compressive atelectasis.  Imaging findings may represent also cirrhosis.  There is a 0.3 cm right renal and 0.8 cm left renal cyst versus enhancing mass, which is likely of little clinical significance.  There is also 2.8 cm pancreatic tail cyst, which is unchanged and a large hiatal hernia without obstruction.  The patient is being admitted for further management.    ASSESSMENT      Discharge Assessment  How are you doing now that you are home?: \"Doing really good\"  How are your symptoms? (Red Flag symptoms escalate to triage hotline per " guidelines): Improved  Do you feel your condition is stable enough to be safe at home until your provider visit?: Yes  Does the patient have their discharge instructions? : Yes  Does the patient have questions regarding their discharge instructions? : No  Were you started on any new medications or were there changes to any of your previous medications? : Yes  Does the patient have all of their medications?: Yes  Do you have questions regarding any of your medications? : No  Do you have all of your needed medical supplies or equipment (DME)?  (i.e. oxygen tank, CPAP, cane, etc.): Yes  Discharge follow-up appointment scheduled within 14 calendar days? : Yes  Discharge Follow Up Appointment Date: 09/22/22  Discharge Follow Up Appointment Scheduled with?: Primary Care Provider    Post-op ([facility] Only)  If the patient had a surgery or procedure, do they have any questions for a nurse?: No    PLAN                                                      Outpatient Plan:    Follow-up and recommended labs and tests      Follow up with primary care provider, Michael Londono MD, within 7 days   for hospital follow- up.  No follow up labs or test are needed.     Future Appointments   Date Time Provider Department Center   9/22/2022  2:00 PM Raman Morales NP [facility]   10/18/2022  1:45 PM Hiren Collins MD [facility]   10/21/2022  2:00 PM Michael Londono MD [facility]         For any urgent concerns, please contact our 24 hour nurse triage line: [phone] ([phone])         SALVATORE Vieyra  [phone]  [facility]

## 2022-09-19 NOTE — TELEPHONE ENCOUNTER
Daughter calling, patient is out of her pain medication now.  Dr. Castro will no longer be able to prescribe Norco as she is not a candidate for any surgical intervention and that primary care provider needs to take over prescribing. They did give her a short term supply which is now gone.    Patient has an appt with Raman Veloz this Thursday 9/22.    There are 2 sets of directions for Norco, she has been taking a total of 4 tabs per day. FRANKLYN Ventura R.N.

## 2022-09-19 NOTE — TELEPHONE ENCOUNTER
I filled prescription for a week. Will speak with patient at appointment on Thursday.    Raman Morales NP

## 2022-09-22 NOTE — PROGRESS NOTES
Assessment & Plan     Hospital discharge follow-up  Patient has several compression fractures that were noted in hospital. She also had a pleural effusion. In hospital. Today she has some edema but overall feels well besides for pain.  - CBC with Platelets and Reflex to Iron Studies; Future  - Pain Management  Referral; Future  - CBC with Platelets and Reflex to Iron Studies  - Iron & Iron Binding Capacity  - Ferritin    Osteoporosis- Hospitalized with new fracture of vertebrae     Other chronic pain  On chronic opioids. Family concerned that if given too large of a supply she may take them inappropriately . Will refill one week at a time. Daughter in law is one of the primary caregivers and she agrees with plan.  - HYDROcodone-acetaminophen (NORCO)  MG per tablet; Take 1 tablet by mouth every 4 hours as needed for moderate to severe pain Every 4 to 6 hours as needed for severe pain  - Pain Management  Referral; Future    Encounter for long-term (current) use of medications  Med reviewed    Pre-diabetes  update  - HEMOGLOBIN A1C; Future  - HEMOGLOBIN A1C    Other iron deficiency anemia  Labs ordered  - CBC with Platelets and Reflex to Iron Studies; Future  - CBC with Platelets and Reflex to Iron Studies  - Iron & Iron Binding Capacity  - Ferritin    Coronary artery disease involving native coronary artery of native heart without angina pectoris  Labs ordered for cardiology  - BNP-N terminal pro; Future  - TSH with free T4 reflex; Future  - Basic metabolic panel  (Ca, Cl, CO2, Creat, Gluc, K, Na, BUN); Future  - BNP-N terminal pro  - TSH with free T4 reflex  - Basic metabolic panel  (Ca, Cl, CO2, Creat, Gluc, K, Na, BUN)    Chronic HFrEF (heart failure with reduced ejection fraction) (H)  Lab ordered for cardiology  - BNP-N terminal pro; Future  - BNP-N terminal pro    Ischemic cardiomyopathy  Ordered for cardiology appointment upcoming  - BNP-N terminal pro; Future  - BNP-N terminal  "pro    Need for prophylactic vaccination and inoculation against influenza  - INFLUENZA, QUAD, HIGH DOSE, PF, 65YR + (FLUZONE HD)    TSH deficiency  For cardioogy  - TSH with free T4 reflex; Future  - TSH with free T4 reflex      Patient WBC elevated to 15 on CBC it is unclear why- I will ask that she gets it redrawn today to make sure it is not getting higher.     Return for lab redraw.    Raman Morales NP  [facility] JUNE Best is a 89 year old accompanied by her daughter in law , presenting for the following health issues:  Hospital F/U and Imm/Inj (Flu Shot)      HPI     Patient feels better since leaving hospital. She is having severe pain still and requesting norco q4 hours. She has some leg swelling which is not abnormal, and purple legs which is normal for her as well. She states that when she raises her legs they go back to normal color, and swelling is diminished as well    Family has concern that if she has a month supply of pain medication that she will take them more frequently and run out at the end of the month      Post Discharge Outreach 9/15/2022   Admission Date 9/11/2022   Reason for Admission   Back pain due to multiple compression fractures.    Left shoulder and knee pain, osteoarthritis.    Possible right superior and inferior pubic rami fractures.    Acute on chronic diastolic and systolic heart failure with history of ischemic cardiomyopathy.   Discharge Date 9/14/2022   Discharge Diagnosis   Back pain due to multiple compression fractures.    Left shoulder and knee pain, osteoarthritis.    Possible right superior and inferior pubic rami fractures.    Acute on chronic diastolic and systolic heart failure with history of ischemic cardiomyopathy.   How are you doing now that you are home? \"Doing really good\"   How are your symptoms? (Red Flag symptoms escalate to triage hotline per guidelines) Improved   Do you feel your condition is stable enough to be " safe at home until your provider visit? Yes   Does the patient have their discharge instructions?  Yes   Does the patient have questions regarding their discharge instructions?  No   Were you started on any new medications or were there changes to any of your previous medications?  Yes   Does the patient have all of their medications? Yes   Do you have questions regarding any of your medications?  No   Do you have all of your needed medical supplies or equipment (DME)?  (i.e. oxygen tank, CPAP, cane, etc.) Yes   Discharge follow-up appointment scheduled within 14 calendar days?  Yes   Discharge Follow Up Appointment Date 9/22/2022   Discharge Follow Up Appointment Scheduled with? Primary Care Provider     Hospital Follow-up Visit:    Hospital/Nursing Home/IP Rehab Facility: [facility]  Date of Admission: 9/11/22  Date of Discharge: 9/14/22  Reason(s) for Admission: Melena, pleural effusion, systolic & diastolic congestive heart failure, acute on chronic combined, compression fracture of T3 vertebra    Was your hospitalization related to COVID-19? No   Problems taking medications regularly:  None  Medication changes since discharge: Took patient off losartan and increased lasix from 20 mg to 40 mg.  Problems adhering to non-medication therapy:  None    Summary of hospitalization:  [facility] discharge summary reviewed  Diagnostic Tests/Treatments reviewed.  Follow up needed: Cardiology  Other Healthcare Providers Involved in Patient s Care:         None  Update since discharge: improved- still having pain.     Post Medication Reconciliation Status: Discharge medications reconciled, continue medications without change      Plan of care communicated with patient    Review of Systems   Constitutional, HEENT, cardiovascular, pulmonary, GI, , musculoskeletal, neuro, skin, endocrine and psych systems are negative, except as otherwise noted.      Objective    /65 (BP Location:  "Left arm, Patient Position: Sitting, Cuff Size: Adult Regular)   Pulse 96   Temp 98.4  F (36.9  C) (Oral)   Ht 1.651 m (5' 5\")   Wt 51 kg (112 lb 6.4 oz)   LMP  (LMP Unknown)   SpO2 95%   BMI 18.70 kg/m    Body mass index is 18.7 kg/m .  Physical Exam   GENERAL: alert and no distress  EYES: Eyes grossly normal to inspection, PERRL and conjunctivae and sclerae normal  HENT: nose and mouth without ulcers or lesions  NECK: no adenopathy, no asymmetry, masses, or scars and thyroid normal to palpation  RESP: lungs clear to auscultation - no rales, rhonchi or wheezes  BREAST: normal without masses, tenderness or nipple discharge and no palpable axillary masses or adenopathy  CV: regular rate and rhythm, normal S1 S2, no S3 or S4, no murmur, click or rub, no peripheral edema and peripheral pulses strong  ABDOMEN: soft, nontender, no hepatosplenomegaly, no masses  MS: no gross musculoskeletal defects noted, no edema  SKIN: no suspicious lesions or rashes, purple legs, edema bilateral  NEURO: Normal strength and tone, mentation intact and speech normal  PSYCH: mentation appears normal, affect normal/bright  "

## 2022-09-23 NOTE — TELEPHONE ENCOUNTER
Called cell phone number, this is for her daughter-in-law Seema who accompanied patient to appointment yesterday. Patient reported to Seema that she was experiencing diarrhea yesterday and having swelling in her legs, but other than that, no complaints. She states to wait a little while to call patient's home number, better to call after 8:30-9 am in the morning.     Contacted patient and informed her of elevated WBC count, she was not feeling well yesterday and did have one episode of diarrhea yesterday morning. No further diarrhea since then. She also bumped her swollen leg with her grabber that caused her to start having drainage from the leg, she covered this with a bandage. She was having a lot of pain as well. Informed patient that we would need her to come back today for repeat lab, recommended [facility] lab as they do not require an appointment and are open until 7 pm today. Patient states she will have to wait for one of her children to bring her so it will likely not be until after 4 pm.    Paola Shepard RN  [facility]

## 2022-09-23 NOTE — TELEPHONE ENCOUNTER
Please call patient or family member and ask if patient is feeling well or feels sick in anyway. Her white blood cell count is elevated on her blood work which could indicate infection or inflammation. From our appointment yesterday I would not know where infection would be from. I ordered another blood test to recheck if they could come in today for lab appointment. If she is not feeling well over the weekend she would need to be seen in ER.    Raman Morales NP

## 2022-09-27 NOTE — TELEPHONE ENCOUNTER
ESTEFANI Stephenson calls for refill. Hopes to  Friday. Seema is trying to request this on a weekly basis and have it ready for  each Friday.     The patient was having difficulty remembering to take it or taking too much when she received it in a months qty.   The family is hoping that by getting it filled weekly, the patient can more easily see what she has taken and needs to take.       Best number to call back [phone] Seema JARA on consent to communicate

## 2022-09-28 PROBLEM — S42.401A CLOSED FRACTURE OF RIGHT ELBOW, INITIAL ENCOUNTER: Status: ACTIVE | Noted: 2022-01-01

## 2022-09-28 PROBLEM — S42.292A OTHER CLOSED DISPLACED FRACTURE OF PROXIMAL END OF LEFT HUMERUS, INITIAL ENCOUNTER: Status: ACTIVE | Noted: 2022-01-01

## 2022-09-28 NOTE — H&P
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 compression with mild height loss and superior endplate sclerosis  slightly more conspicuous compared to the prior CT, age indeterminate  (recent fracture not excluded).    T6 mild vertebral body height loss, unchanged.    T7 compression fracture with mild height loss, similar compared to the  prior exams.    T8 compression fracture with mild height loss, unchanged. Subacute T9  compression fracture with moderate height loss, similar compared to  the prior MRI.    T10, T11, and T12 compression fractures with mild, mild, and moderate  height loss, unchanged.    Subacute L1 superior endplate compression fracture with mild height  loss, similar compared to the prior MRI.    Superimposed degenerative change with disc bulging and buckling  fractured vertebral bodies contribute to mild spinal canal stenosis at  multiple levels. Multiple foraminal stenoses which are moderate to  severe involving the mid to lower thoracic spine, unchanged. Scattered  vascular calcifications. Hiatal hernia. Calcified mediastinal lymph  nodes. Stable nonspecific pulmonary opacities. Lateral pleural  effusions. Splenic calcifications.      Impression    IMPRESSION:  1. Multiple subacute and chronic thoracic  compression fractures as  detailed which are overall similar compared to the prior MRI.  2. Questionable increased sclerosis involving the T5 superior endplate  (recent fracture not excluded).    ABBIE SELBY MD         SYSTEM ID:  [id number]   XR Shoulder Left G/E 3 Views    Narrative    XR SHOULDER LEFT G/E 3 VIEWS 9/28/2022 8:13 AM     HISTORY: shoulder pain, trauma    COMPARISON: 2/10/2018      Impression    IMPRESSION: Acute fracture of the proximal humeral metaphysis with  extension into the surgical neck. The humeral shaft is displaced  laterally and proximally migrated. Chronic osseous volume loss and  remodeling of the glenohumeral joint, acromion and distal clavicle  consistent with rotator cuff arthropathy. Osteopenia. Calcified  granulomatous disease in the chest.     MERT COLLINS MD         SYSTEM ID:  [id number]   XR Elbow Left 2 Views    Narrative    XR ELBOW LEFT 2 VIEWS 9/28/2022 8:16 AM     HISTORY: Pain following injury.    COMPARISON: None.      Impression    IMPRESSION: Osteopenia. Subtle slightly impacted fracture of the  supracondylar region of the distal humerus. Mild hypertrophic changes  in the coronoid.     MERT COLLINS MD         SYSTEM ID:  [id number]         Kae Perry PA-C    This patient was seen and discussed with Dr. Mora who agrees with the current plans as outlined above.

## 2022-09-28 NOTE — PROGRESS NOTES
Respiratory Therapy Note    An ETCO2 monitor was placed on the Pt with 15 Lpm Oxymask placed over.  The Ambu bag, suction, and airways were setup and present in the room  but not needed.  Pt was able to maintain her airway throughout the procedure with only minor stimulation needed. ETCO2 levels were maintained between 31-38 mmHg.         September 28, 2022 10:43 AM  Michael Howe, RT

## 2022-09-28 NOTE — CONSULTS
Consult Date: 09/28/2022    DIAGNOSIS:  Left proximal humerus fracture.    HISTORY OF PRESENT ILLNESS:  Ms. Alonzo is a very pleasant 89-year-old female with a history of bilateral lower extremity edema, who presented to the emergency room at [facility] after a syncopal episode she had in the bathroom, falling and having a left proximal humerus fracture.  I was asked to evaluate for definitive care.    PAST MEDICAL AND SURGICAL HISTORY:  Significant for chronic congestive heart failure, allergic rhinitis, arthritis, coronary artery disease, chronic kidney disease, diabetes, diverticulitis, edema, gastroesophageal reflux, gastritis, intestinal bleeding, hiatal hernia, hypertension, previously infected right total knee arthroplasty, ischemic cardiomyopathy, migraine, non-ST elevated MI, obesity, osteoporosis, hyperlipidemia, TSH deficiency, breast cyst, postop DVT in the right calf.    MEDICATIONS ON ADMISSION:  1.  Tylenol.  2.  Fosamax.   3.  Atorvastatin.  4.  Carvedilol.  5.  Calcium carbonate.  6.  Dicyclomine.  7.  Ferrous sulfate.  8.  Flonase.  9.  Lasix.  10.  Norco.   11.  Hydrochlorothiazide.   12.  Pantoprazole.   13.  Pregabalin.   14.  Senna.  15.  Hydroxyzine.    ALLERGIES:  CODEINE, ESCITALOPRAM, ESOMEPRAZOLE, GABAPENTIN, IMDUR, MEPERIDINE, MORPHINE, OXYCODONE, PENTAZOCINE, PROPOXYPHENE, SUMATRIPTAN.    FAMILY HISTORY:  Noncontributory.    SOCIAL HISTORY:  She lives at home.    REVIEW OF SYSTEMS:  A 10-point review of systems is positive only for left shoulder pain and bilateral lower extremities edema.    PHYSICAL EXAMINATION:  She is lying in hospital cart in some moderate distress due to the discomfort in her left arm.  She has some facial lacerations, but otherwise is alert, oriented, and conversive.  She is somewhat hard of hearing.  Right and left upper extremities:  Right upper extremity has a well-healed surgical incision over her shoulder.  Otherwise, skin is clean, dry, and  intact.  Palpable radial pulses intact bilaterally.  Radial, ulnar, median nerve sensation function intact bilaterally.  Left upper extremity is painful with range of motion of the left shoulder with some crepitus, but skin is clean, dry, and intact.  No significant pain or swelling around her left elbow and full range of motion of the left wrist.  Right and left lower extremities, she has bilateral pitting edema.  Some skin breakdown in both legs with mild serous drainage.  Otherwise, she has full function of EHL, FHL, tibialis anterior, gastroc soleus all fire.  Superficial femoral, or deep peroneus saphenous, tibial, sural nerves sensation intact bilaterally.  Palpable dorsalis pedis pulses.    IMAGING:  X-rays of her left shoulder show an oblique fracture of the left proximal humerus moderately displaced, but otherwise well aligned.  X-rays of the left elbow are poor, although there is some concern regarding an impaction fracture or osteoarthritic change.    CT scan of her spine shows a subacute L1 compression fracture and a compression fracture deformity of L4, multiple subacute or chronic thoracic compression fractures, questionable T5 superior endplate fracture.  Head CT and cervical spine CT show no evidence of intracranial bleed.  No evidence of cervical spine fracture.    LABORATORY DATA:  White blood cell count 12.3, hemoglobin 9.3, glucose 162.    IMPRESSION AND PLAN:  I had a long discussion with Ms. Alonzo regarding her left proximal humerus fracture and possible left elbow injury.  It is difficult to determine from the x-rays whether or not there is actually a left elbow fracture or not.  I think my suspicion is not given on her exam, but the proximal humerus could very well be a distracting injury.  As far as the proximal humerus goes, there is an oblique fracture line at the surgical neck.  This is minimally displaced, nonangulated.  I think would manage well with conservative management, which is  what she would desire.  We will obtain a CT scan to assess this.  If this is reasonably well aligned, then I think a trial of conservative management is appropriate.  She does have severe rotator cuff arthropathy of that left shoulder, and it is questionable how much motion she actually has in that shoulder pre-injury.  She understands all this and is happy with this plan of care.  We will obtain a CT scan and progress from there.    Mitesh Sanchez MD        D: 2022   T: 2022   MT: [id number]    Name:     MILAGRO ELY  MRN:      [id number]        Account:      [id number]   :      1932           Consult Date: 2022     Document: [id number]    cc:  Hina Spivey MD

## 2022-09-28 NOTE — PHARMACY-ADMISSION MEDICATION HISTORY
Admission medication history interview status for this patient is complete. See [facility] admission navigator for allergy information, prior to admission medications and immunization status.     Medication history interview done, indicate source(s): Patient  Medication history resources (including written lists, pill bottles, clinic record):None  [facility]: [facility]    Changes made to PTA medication list:  Added: multivitamin, dulcolax  Changed: Voltaren gel QID --> QID prn, Flonase daily --> daily prn, Protonix 40 mg daily --> 40 mg bid, Lyrica 25 mg bid --> bid prn  Reported as Not Taking: lidocaine patch, alendronate, Miralax, senna-docusate  Removed: ferrous sulfate    Actions taken by pharmacist (provider contacted, etc):None     Additional medication history information: Family states Fosamax is new rx, pt has not started taking yet. Family states pt did not tolerate iron tabs, had stomach cramps. Family state losartan was dc'd when Lasix dose was increased, TBD if re-starting losartan.     Medication reconciliation/reorder completed by provider prior to medication history?  N   (Y/N)       Prior to Admission medications    Medication Sig Last Dose Taking? Auth Provider Long Term End Date   acetaminophen (TYLENOL) 500 MG tablet Take 1,000 mg by mouth 3 times daily as needed prn at prn Yes Reported, Patient     aspirin EC 81 MG EC tablet Take 1 tablet (81 mg) by mouth daily 9/27/2022 at am Yes Radha Umanzor PAPatriciaC No    atorvastatin (LIPITOR) 40 MG tablet TAKE 1 TABLET BY MOUTH ONE TIME DAILY  Patient taking differently: Take 40 mg by mouth every evening 9/27/2022 at pm Yes Michael Londono MD Yes    bisacodyl (DULCOLAX) 5 MG EC tablet Take 5 mg by mouth daily as needed for constipation prn at prn Yes Unknown, Entered By History     calcium carb-cholecalciferol (OS-LIGIA) 500-200 MG-UNIT tablet Take 1 tablet by mouth 2 times daily (with meals) 9/27/2022 at x2 Yes Eddie Mora MD     carvedilol (COREG)  3.125 MG tablet Take 1 tablet (3.125 mg) by mouth 2 times daily (with meals) 9/27/2022 at x2 Yes Hiren Collins MD Yes    diclofenac (VOLTAREN) 1 % topical gel Apply topically 4 times daily as needed for moderate pain prn at prn Yes Reported, Patient     doxylamine (UNISOM) 25 MG TABS tablet Take 25 mg by mouth nightly as needed for sleep prn at prn Yes Unknown, Entered By History     fluticasone (FLONASE) 50 MCG/ACT nasal spray Spray 2 sprays into both nostrils daily as needed for rhinitis or allergies prn at prn Yes Reported, Patient     furosemide (LASIX) 20 MG tablet Take 2 tablets (40 mg) by mouth daily Add additional 20mg for increased edema 9/27/2022 at am Yes Eddie Mora MD Yes    HYDROcodone-acetaminophen (NORCO)  MG per tablet Take 1 tablet by mouth every 4 hours as needed for moderate to severe pain Every 4 to 6 hours as needed for severe pain prn at prn Yes Raman Morales, NP     hydrOXYzine (ATARAX) 25 MG tablet TAKE ONE TABLET BY MOUTH EVERY SIX HOURS AS NEEDED FOR ITCHING OR ADJUVANT PAIN prn at prn Yes Michael Londono MD     multivitamin, therapeutic (THERA-VIT) TABS tablet Take 1 tablet by mouth daily 9/27/2022 at am Yes Unknown, Entered By History     pantoprazole (PROTONIX) 40 MG EC tablet Take 1 tablet (40 mg) by mouth daily  Patient taking differently: Take 40 mg by mouth 2 times daily 9/27/2022 at x2 Yes Miguel Celestin DO     pregabalin (LYRICA) 25 MG capsule Take 1 capsule (25 mg) by mouth 2 times daily  Patient taking differently: Take 25 mg by mouth 2 times daily as needed prn at prn Yes Eddie Mora MD Yes    Trolamine Salicylate (ASPERCREME EX) Externally apply topically daily as needed prn at prn Yes Unknown, Entered By History     alendronate (FOSAMAX) 70 MG tablet Take 1 tablet (70 mg) by mouth every 7 days  Patient not taking: Reported on 9/28/2022 Not Taking at New rx - has not started taking yet  Eddie Mora MD Yes    Lidocaine (LIDOCARE) 4 % Patch Place 2  patches onto the skin every 24 hours To prevent lidocaine toxicity, patient should be patch free for 12 hrs daily.  Patient not taking: Reported on 9/28/2022 Not Taking at Unknown time  Eddie Mora MD     polyethylene glycol (MIRALAX) 17 GM/Dose powder Take 17 g by mouth daily  Patient not taking: Reported on 9/28/2022 Not Taking at Unknown time  Eddie Mora MD     senna-docusate (SENOKOT-S/PERICOLACE) 8.6-50 MG tablet Take 1 tablet by mouth 2 times daily as needed for constipation  Patient not taking: Reported on 9/28/2022 Not Taking at Unknown time  Eddie Mora MD     losartan (COZAAR) 25 MG tablet Take 1 tablet (25 mg) by mouth daily   Hiren Collins MD Yes 9/14/22

## 2022-09-28 NOTE — CONSULTS
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    IMPRESSION:   1. Partially visualized mildly displaced comminuted fracture of the  proximal aspect of the left humerus, better seen on same day left  shoulder x-ray. Please refer to concurrently performed thoracic and  lumbar spine CT for findings related to the thoracolumbar spine.  2. Small left pleural effusion and associated basilar  atelectasis/consolidation.  3. Scattered pulmonary nodules including a 4 mm right upper lobe  nodule, not significantly changed as compared to 7/19/2022 exam.  4. 3 cm cystic lesion in the pancreatic tail area, not significantly  changed as compared to 7/19/2022 exam, indeterminate, could represent  sidebranch intraductal papillary mucinous neoplasm versus other  pancreatic cystic lesions, this can be further evaluated with  contrast-enhanced MRI/MRCP for better characterization.  5. Small amount of free fluid in the pelvis, decreased in size as  compared to 9/11/2022 exam.  6. 1.3 cm hyperattenuating partially exophytic right renal lesion,  indeterminate, could represent hemorrhagic/proteinaceous cyst versus  small renal neoplasm. This can be further evaluated with renal  ultrasound to confirm cystic nature.  7. Large hiatal hernia.     THOMAS ARTEAGA MD          SYSTEM ID:  [id number]   Lumbar spine CT w/o contrast    Narrative    CT LUMBAR SPINE WITHOUT CONTRAST  9/28/2022 8:07 AM     HISTORY: Back pain, trauma.     TECHNIQUE: Axial images of the lumbar spine were obtained without  intravenous contrast. Multiplanar reformations were performed.   Radiation dose for this scan was reduced using automated exposure  control, adjustment of the mA and/or kV according to patient size, or  iterative reconstruction technique.    COMPARISON: Abdomen and pelvis CT 9/11/2022.    FINDINGS: Diffuse osseous demineralization.    Subacute L1 fracture with mild height loss, slightly evolved compared  to the prior exam.    L4 superior endplate compression fracture with mild height loss and  superior endplate sclerosis, unchanged compared to prior exam (age  indeterminate, presumably chronic, MRI could be performed).    L5 compression deformity with mild height loss, likely chronic,  unchanged.    Fusion from L4 through the sacrum. Multilevel degenerative change.  Moderate spinal canal stenosis at L2-3. Mild foraminal stenoses  bilaterally at L2-3.    Scattered vascular calcifications. Bilateral sacroiliac joint  degenerative change. Sclerosis involving left sacral ala along the  sacroiliac joint which is similar compared to the prior exam,  presumably degenerative.    Sigmoid diverticulosis. Small volume free fluid layering within the  pelvis. Scattered vascular calcifications.       Impression    IMPRESSION:    1. Subacute L1 compression fracture with mild height loss, slightly  evolved.  2. Age-indeterminate compression deformity at L4, unchanged.  3. Degenerative and postoperative changes.     ABBIE SELBY MD         SYSTEM ID:  [id number]   CT Thoracic Spine w/o Contrast    Narrative    CT THORACIC SPINE WITHOUT CONTRAST September 28, 2022 8:07 AM     HISTORY: Back pain.      TECHNIQUE: Axial images of the thoracic spine were obtained without  intravenous contrast. Multiplanar  reformations were performed.  Radiation dose for this scan was reduced using automated exposure  control, adjustment of the mA and/or kV according to patient size, or  iterative reconstruction technique.    COMPARISON: Thoracic spine MRI 9/13/2022, thoracic spine CT  11/10/2021.    FINDINGS: Diffuse osseous demineralization.    Subacute T4 compression fracture with moderate height loss, similar  compared to the prior MRI.    T5 compression with mild height loss and superior endplate sclerosis  slightly more conspicuous compared to the prior CT, age indeterminate  (recent fracture not excluded).    T6 mild vertebral body height loss, unchanged.    T7 compression fracture with mild height loss, similar compared to the  prior exams.    T8 compression fracture with mild height loss, unchanged. Subacute T9  compression fracture with moderate height loss, similar compared to  the prior MRI.    T10, T11, and T12 compression fractures with mild, mild, and moderate  height loss, unchanged.    Subacute L1 superior endplate compression fracture with mild height  loss, similar compared to the prior MRI.    Superimposed degenerative change with disc bulging and buckling  fractured vertebral bodies contribute to mild spinal canal stenosis at  multiple levels. Multiple foraminal stenoses which are moderate to  severe involving the mid to lower thoracic spine, unchanged. Scattered  vascular calcifications. Hiatal hernia. Calcified mediastinal lymph  nodes. Stable nonspecific pulmonary opacities. Lateral pleural  effusions. Splenic calcifications.      Impression    IMPRESSION:  1. Multiple subacute and chronic thoracic compression fractures as  detailed which are overall similar compared to the prior MRI.  2. Questionable increased sclerosis involving the T5 superior endplate  (recent fracture not excluded).    ABBIE SELBY MD         SYSTEM ID:  [id number]   XR Shoulder Left G/E 3 Views    Narrative    XR SHOULDER LEFT G/E 3  VIEWS 9/28/2022 8:13 AM     HISTORY: shoulder pain, trauma    COMPARISON: 2/10/2018      Impression    IMPRESSION: Acute fracture of the proximal humeral metaphysis with  extension into the surgical neck. The humeral shaft is displaced  laterally and proximally migrated. Chronic osseous volume loss and  remodeling of the glenohumeral joint, acromion and distal clavicle  consistent with rotator cuff arthropathy. Osteopenia. Calcified  granulomatous disease in the chest.     MERT COLLINS MD         SYSTEM ID:  [id number]   XR Elbow Left 2 Views    Narrative    XR ELBOW LEFT 2 VIEWS 9/28/2022 8:16 AM     HISTORY: Pain following injury.    COMPARISON: None.      Impression    IMPRESSION: Osteopenia. Subtle slightly impacted fracture of the  supracondylar region of the distal humerus. Mild hypertrophic changes  in the coronoid.     MERT COLLINS MD         SYSTEM ID:  [id number]   Asymptomatic COVID-19 Virus (Coronavirus) by PCR Nasopharyngeal    Specimen: Nasopharyngeal; Swab   Result Value Ref Range    SARS CoV2 PCR Negative Negative    Narrative    Testing was performed using the Xpert Xpress SARS-CoV-2 Assay on the   [email or web address]. Additional information about   this Emergency Use Authorization (EUA) assay can be found via the Lab   Guide. This test should be ordered for the detection of SARS-CoV-2 in   individuals who meet SARS-CoV-2 clinical and/or epidemiological   criteria. Test performance is unknown in asymptomatic patients. This   test is for in vitro diagnostic use under the FDA EUA for   laboratories certified under CLIA to perform high complexity testing.   This test has not been FDA cleared or approved. A negative result   does not rule out the presence of PCR inhibitors in the specimen or   target RNA in concentration below the limit of detection for the   assay. The possibility of a false negative should be considered if   the patient's recent exposure or clinical  presentation suggests   COVID-19. This test was validated by the [facility]. This laboratory is certified under the Clinical Laboratory Improvement Amendments of 1988 (CLIA-88) as qualified to perform high complexity laboratory testing.
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
## 2022-09-28 NOTE — ED TRIAGE NOTES
Here for syncope. Stated was sitting on the toilet leaning forward to wrap her legs that are swollen, then suddenly had a syncope episode, feel forward on the floor. Sustain skin tear to left lower jaw area and left shoulder pain. Ems gave fentanyl intramuscularly. ABCs intact.     Triage Assessment     Row Name 09/28/22 0604       Triage Assessment (Adult)    Airway WDL WDL       Respiratory WDL    Respiratory WDL WDL       Cardiac WDL    Cardiac WDL WDL

## 2022-09-28 NOTE — PLAN OF CARE
Goal Outcome Evaluation:      Pt arrived from ER on a cart, assisted into bed with sliding board. SL in right AC. Left arm in splint with ace wrap and in a sling. CMS to her left hand intact. VSS, afeb, A&O times 4. Does have lots of skin issues: skin tears to left jaw and left hand steristripped in ER. Legs have edema, weepy areas, dry, scaley areas.Pt c/o pain at a 7, requested pain meds. PO Dilaudid given with her other scheduled meds. Lunch ordered since pt c/o being hungry. Purewick in place for urine. Ortho consulted as well as PT/OT/Palliative care. Family here for transfer then went home.

## 2022-09-29 NOTE — CONSULTS
Care Management Initial Consult    General Information  Assessment completed with: Patient, Family, Patient; Daughter in Law  Type of CM/SW Visit: Initial Assessment    Primary Care Provider verified and updated as needed: Yes   Readmission within the last 30 days:           Advance Care Planning:            Communication Assessment  Patient's communication style:      Hearing Difficulty or Deaf: yes   Wear Glasses or Blind: yes    Cognitive  Cognitive/Neuro/Behavioral: WDL                      Living Environment:   People in home:       Current living Arrangements:        Able to return to prior arrangements:         Family/Social Support:  Care provided by: child(wanda)  Provides care for:    Marital Status:   Children          Description of Support System: Involved, Supportive    Support Assessment: Adequate family and caregiver support    Current Resources:   Patient receiving home care services: No     Community Resources: None  Equipment currently used at home: cane, straight, walker, rolling, dressing device  Supplies currently used at home:      Employment/Financial:  Employment Status:          Financial Concerns:             Lifestyle & Psychosocial Needs:  Social Determinants of Health     Tobacco Use: Low Risk      Smoking Tobacco Use: Never Smoker     Smokeless Tobacco Use: Never Used   Alcohol Use: Not on file   Financial Resource Strain: Not on file   Food Insecurity: Not on file   Transportation Needs: Not on file   Physical Activity: Not on file   Stress: Not on file   Social Connections: Not on file   Intimate Partner Violence: Not on file   Depression: Not at risk     PHQ-2 Score: 0   Housing Stability: Not on file       Functional Status:  Prior to admission patient needed assistance:   Dependent ADLs:: Ambulation-walker     Assesssment of Functional Status: Not at baseline with ADL Functioning    Mental Health Status:          Chemical Dependency Status:                 Values/Beliefs:  Spiritual, Cultural Beliefs, Uatsdin Practices, Values that affect care:                 Additional Information:    Met with pt at bedside to provide resources and to discuss discharge planning. Provided resources for [facility] for pt in which she states her daughter in law has been doing most of the searching and request this writer call her. Placed resources in pt room and informed that they are there if she would like to consider longterm. Pt explained that she comes from home and has been to TCU before. Pt explained that she would not go back to [facility] if TCU recommended.    Spoke with pt daughter in law per pt request. Explained that resources were left in pt room. Pt daughter in law explained that she has been working to find longterm for pt for some time and has struggled to get pt to agree. Currently pt daughter in law and son live close by and provide cares for pt as needed. Pt daughter in law explained that is TCU is recommended, they would like referrals sent to [facility], [facility], and [facility]. Pt daughter in law would like [facility] upon dishcharge if it would be safer for pt. Reviewed out of pocket cost for [facility] transport, $81.80 for base rate and $5.26 per mile to the destination. Spoke with pt/family, they expressed understanding and are agreeable to this. Pt would likely want a private or semi-private room upon discharge. Explained that this writer will check in with pt/family once therapy recommendations are in.     SHAE Duggan, [facility]  Inpatient Care Coordination  Ortho/Spine Unit  [phone]  Christa Garcia, [facility]

## 2022-09-29 NOTE — PROGRESS NOTES
[facility]  Palliative Care Progress Note  Text Page     Assessment & Plan   Recommendations:  1. Goals of Care- No CPR- Do NOT Intubate  Hospitalization goals discussed with patient at the bedside.  Goal for adequate pain management today.  Goal for recovery and discussion of disposition plans after stabilization and therapy evaluations.   Decisional Capacity- Intact. Patient does not have an advance directive. Per  informed consent policy next of kin should be involved if patient becomes unable.  Next of kin includes Daughter Wen, sons Bala and Gary.  Seema Alonzo, daughter in law is primary contact for cares.  POLST none on file. Reasonable to complete prior to discharge.  - follow up goals of care discussion in the coming days     2. Pain   Acute on chronic pain due to chronic compression fractures in lumbar spine, left shoulder and left knee pain.  She presents with acute pain s/p fall and findings of left sided humerus fracture.   Patient's opioid use in the past 24 hours: 2 mg PO dilaudid, 4.5 mg IV dilaudid, 80 mg hydrocodone = 178 mg oral morphine equivalents    Multimodal pain management plan:  - acetaminophen 1000 mg every 8 hours  - Lyrica 25 mg at 0800 and 1400, 50 mg at 2000 daily  - voltaren gel to left shoulder four times daily  - lidocaine patch apply to left shoulder, 12 hours on/12 hours off rotation  - hydroxyzine 10 mg every 6 hours prn pain adjuvant  - hydromorphone 0.5 - 1 mg IV every 2 hours prn severe, breakthrough pain  - hydromorphone 2-4 mg every 3 hours prn moderate to severe pain  - positioning, ice, heat, mindfulness per nursing assessment     3. Dyspnea  Chronic, recurrent issue due to episodes of HF exacerbation with fluid overload.    - management per primary team     4. Generalized weakness  Deconditioning due to recent hospitalization, ongoing pain.  Likely contributed to by chronic medical conditions.  - assist with mobility per nursing  assessment  - PT/OT assessments for safe discharge planning.     5. Spiritual Care  Oriented to Spiritual Health as part of Palliative Care team.  Spiritual Background: Taoist     6. Care Planning  Appreciate Care of interdisciplinary team.  Disposition plan pending     Medical Decision Making and Goals of Care:  Discussed on September 29, 2022 with WILLIE Preston CNP:   Met with Estephania at the bedside and reviewed symptom management.  Reviewed her pain overnight and the limited effect of hydrocodone.  Discussed opiate rotation to higher dose of PO hydromorphone.  Also discussed increase of pregabalin as noted in plan.  She verbalized understanding and agreement to this plan.    Reviewed her potential need for placement into an assisted apartment facility.  She verbalized understanding, but her displeasure with this plan as she wants to be in her own home.  She acknowledged her worry about being on her own and agreed to explore this during her hospital stay.  Plans regarding rehabilitation and recommendations are pending.    WILLIE Preston CNP  Pain Management and Palliative Care  [facility]  Pgr: [phone]      Time Spent on this Encounter   Total unit/floor time 35 minutes, time consisted of the following, examination of the patient, reviewing the record and completing documentation. >50% of time spent in counseling and coordination of care, Bedside Nurse Anne and Hospitalist Abby.  Time spend counseling with patient consisted of the following topics, goals of care, care planning for discharge and symptom management.    Review of Systems    CONSTITUTIONAL: NEGATIVE for fever, chills, change in weight  ENT/MOUTH: NEGATIVE for ear, mouth and throat problems  RESP: NEGATIVE for significant cough or SOB  CV: NEGATIVE for chest pain, palpitations or peripheral edema    Physical Exam   Temp:  [97.1  F (36.2  C)-98.8  F (37.1  C)] 97.1  F (36.2  C)  Pulse:  []  91  Resp:  [16-20] 18  BP: (100-159)/() 131/77  SpO2:  [70 %-100 %] 97 %  0 lbs 0 oz  Exam:  GEN:  Alert, oriented x 3, lying in bed and occasional facial grimace.  CV:  RRR, S1, S2; no murmurs or other irregularities noted.  +3 DP/PT pulses bilatererally; trace edema BLE.  RESP:  Symmetric chest rise on inhalation noted.  Normal respiratory effort.  ABD:  Rounded, soft, non-tender/non-distended.  +BS  EXT:  Edema & pulses as noted above.  CMS intact x 4.      SKIN:  Dry to touch, lacerations on face and left hand with steri-strips, areas of bruising on right hand, face, arms, areas of chronic discoloration and weeping of bilateral lower extremities.    PAIN BEHAVIOR: Cooperative  Psych:  Normal affect.  Calm, cooperative, conversant appropriately.     Medications       acetaminophen  1,000 mg Oral TID     aspirin  81 mg Oral Daily     atorvastatin  40 mg Oral QPM     calcium carbonate-vitamin D  1 tablet Oral BID w/meals     carvedilol  3.125 mg Oral BID w/meals     enoxaparin ANTICOAGULANT  40 mg Subcutaneous Q24H     furosemide  40 mg Oral Daily     multivitamin, therapeutic  1 tablet Oral Daily     pantoprazole  40 mg Oral BID     pregabalin  25 mg Oral TID     sodium chloride (PF)  3 mL Intracatheter Q8H       Data   Results for orders placed or performed during the hospital encounter of 09/28/22 (from the past 24 hour(s))   Asymptomatic COVID-19 Virus (Coronavirus) by PCR Nasopharyngeal    Specimen: Nasopharyngeal; Swab   Result Value Ref Range    SARS CoV2 PCR Negative Negative    Narrative    Testing was performed using the Xpert Xpress SARS-CoV-2 Assay on the   [employer]. Additional information about   this Emergency Use Authorization (EUA) assay can be found via the Lab   Guide. This test should be ordered for the detection of SARS-CoV-2 in   individuals who meet SARS-CoV-2 clinical and/or epidemiological   criteria. Test performance is unknown in asymptomatic patients.  This   test is for in vitro diagnostic use under the FDA EUA for   laboratories certified under CLIA to perform high complexity testing.   This test has not been FDA cleared or approved. A negative result   does not rule out the presence of PCR inhibitors in the specimen or   target RNA in concentration below the limit of detection for the   assay. The possibility of a false negative should be considered if   the patient's recent exposure or clinical presentation suggests   COVID-19. This test was validated by the [facility]. This laboratory is certified under the Clinical Laboratory Improvement Amendments of 1988 (CLIA-88) as qualified to perform high complexity laboratory testing.     Basic metabolic panel   Result Value Ref Range    Sodium 134 (L) 136 - 145 mmol/L    Potassium 4.6 3.4 - 5.3 mmol/L    Chloride 100 98 - 107 mmol/L    Carbon Dioxide (CO2) 25 22 - 29 mmol/L    Anion Gap 9 7 - 15 mmol/L    Urea Nitrogen 26.2 (H) 8.0 - 23.0 mg/dL    Creatinine 0.75 0.51 - 0.95 mg/dL    Calcium 8.4 (L) 8.8 - 10.2 mg/dL    Glucose 208 (H) 70 - 99 mg/dL    GFR Estimate 76 >60 mL/min/1.73m2   CBC with platelets   Result Value Ref Range    WBC Count 11.5 (H) 4.0 - 11.0 10e3/uL    RBC Count 2.87 (L) 3.80 - 5.20 10e6/uL    Hemoglobin 8.5 (L) 11.7 - 15.7 g/dL    Hematocrit 28.0 (L) 35.0 - 47.0 %    MCV 98 78 - 100 fL    MCH 29.6 26.5 - 33.0 pg    MCHC 30.4 (L) 31.5 - 36.5 g/dL    RDW 16.1 (H) 10.0 - 15.0 %    Platelet Count 280 150 - 450 10e3/uL

## 2022-09-29 NOTE — TELEPHONE ENCOUNTER
Daughter in law calling to have refill deleted due to patient falling.  Patient is in the hospital.    Lulu Gonzalez RN on 9/29/2022 at 2:26 PM

## 2022-09-29 NOTE — PLAN OF CARE
Goal Outcome Evaluation:    Plan of Care Reviewed With: patient, daughter, son     Overall Patient Progress: no change     Afeb, vss, cms intact to her left hand, splint with ace wrap and sling remain on left arm. Down for repeat CT scan of the shoulder and elbow per ortho surgeon, better views this time with IV Dilaudid given better the film. PA read the CT and thought no surgery just conservative treatment. Pain nurse here and changed her pain meds. Med seems to last only 1-2 hours then very painful again. External cath in place and she put out 300cc clear yellow urine. Eating well. Family all here to visit. Plans pending

## 2022-09-29 NOTE — TELEPHONE ENCOUNTER
Daughter in Law called stating that patient is in the hospital and does not need the refill.    Refill deleted.  Lulu Gonzalez RN on 9/29/2022 at 2:25 PM

## 2022-09-29 NOTE — PROGRESS NOTES
Ortho treatment plan    Patient was reviewed and discussed with Dr. Sanchez    CT shows non-displaced fracture of the radial head non-displaced and proximal humerus transverse surgical neck fracture mild displacement    Non-operative treatment for the radial head fracture  Continue splint  ROM of hand and fingers as tolerated, close to body ADLs only with humerus fracture    Non-operative treatment at this time for the left proximal humerus fracture   Continue sling  Pain medication as needed, limit narcotics as able  Non-WB right UE  Follow-up with Dr. Vale Vazquez or Roni GUTIERREZ in 1-2 weeks for xr and recheck [phone]    Елена SALDIVAR

## 2022-09-29 NOTE — PLAN OF CARE
"Pt in moderate to severe pain most of the night. Norco x1, atarax, dilaudid x3. Scheduled lyrica due this morning. Pt describes pain as \"burning\" in left arm. CMS intact. Multiple bruises throughout body, arms, face, legs, hands. LEELA LE are very swollen and near weeping and some weeping in areas of the LLE. Elevated on pillows with chux.   A/O, calls appropriately. Purewick to reduce activity. IV leaking, working on replacement. Ortho involved, CT today. POC TBD  "

## 2022-09-29 NOTE — PLAN OF CARE
Goal Outcome Evaluation:    A&Ox4. Pain managed with Narco and atarax. Patient very painful with any movement. Is using a pure wick. Has a splint and sling on left hand. Will continue to offer supportive care.

## 2022-09-29 NOTE — PLAN OF CARE
A&Ox4. Bedrest.   VSS. 02 - 97% on RA. LS - clear, diminished at bases, shallow.  Cool feet with bilateral weak pulses.  Voiding adequately with purewick.   Narco taken prn for pain.   IV - SL.

## 2022-09-29 NOTE — PROGRESS NOTES
[facility]    Medicine Progress Note - Hospitalist Service    Date of Admission:  9/28/2022    Assessment & Plan          89-year-old female with history of diastolic/systolic heart failure, ischemic cardiomyopathy, paroxysmal atrial fibrillation and chronic back pain due to compression fractures, hypertension, osteoarthritis, coronary artery disease with prior NSTEMI/LAD stent, diabetes mellitus type 2, anemia and GERD who was initially admitted 9/11-9/14 due to pain from tracking/lumbar compression fractures and left shoulder and right knee pain with possible right superior and inferior rami fracture and CHF exacerbation.    She sustained another fall/syncope while bandaging her lower extremity which is quite painful.  And had both distal and proximal left humerus fracture.  She did have bleeding from a skin laceration on her chin and Steri-Strips were applied.  CT head is negative.      1. Distal and proximal left humerus fracture    Seen by orthopedics, nonoperative treatment advised.    Continues splint, range of motion of hand and fingers as tolerated and close to body ADLs only.    Nonweightbearing right upper extremity.    2. Acute on chronic pain.    Takes Norco as needed and Lyrica at home. Known moderate compression fracture deformity of T3 and mild compression deformity at L1 and L4 with several old compression deformities known , brace was recommended but patient refused.     Palliative care consult appreciated Multimodal pain management as below.  o Acetaminophen 1000 mg every 8 hours  o Lyrica 25 mg at 0800 and 1400, 50 mg at 2000 daily  o Voltaren gel to left shoulder four times daily  o Lidocaine patch apply to left shoulder, 12 hours on/12 hours off rotation  o hydroxyzine 10 mg every 6 hours prn pain adjuvant  o Hydromorphone 0.5 - 1 mg IV every 2 hours prn severe, breakthrough pain  o Hydromorphone 2-4 mg every 3 hours prn moderate to severe pain  o positioning, ice, heat,  mindfulness per nursing assessment    3. Syncope.    Likely vasovagal, pain mediated while bending her lower extremities.    EKG showed sinus rhythm with PACs and hemodynamically stable.    4. Chronic diastolic/systolic heart failure with ischemic cardiomyopathy with    Baseline EF of 35 to 40%.    Patient reports increased of weeping from her lower extremities but does not look volume overloaded.  Continue Lasix 40mg p.o. daily but will give an additional dose today    5. Other medical conditions below    Essential hypertension.    History of CAD/NSTEMI with LAD stent.  Continue aspirin Lipitor and carvedilol.    Diet-controlled diabetes mellitus.    Chronic anemia with hemoglobin around 7.    GERD, continue Protonix.       Diet: Combination Diet Regular Diet Adult    DVT Prophylaxis: Enoxaparin (Lovenox) SQ  Manzano Catheter: Not present  Central Lines: None  Cardiac Monitoring: ACTIVE order. Indication: Syncope- low cardiac risk (24 hours)  Code Status: No CPR- Do NOT Intubate      Disposition Plan     Expected Discharge Date: 09/30/2022                The patient's care was discussed with the Patient, RN and.    Eddie Mora MD  Hospitalist Service  [facility]  Securely message with the Vocera Web Console (learn more here)  Text page via [employer] Paging/Directory         Clinically Significant Risk Factors Present on Admission                     ______________________________________________________________________    Interval History   Pain is better controlled today.    Data reviewed today: I reviewed all medications, new labs and imaging results over the last 24 hours    Physical Exam   Vital Signs: Temp: 97  F (36.1  C) Temp src: Temporal BP: 139/51 Pulse: 95   Resp: 18 SpO2: 95 % O2 Device: None (Room air)    Weight: 0 lbs 0 oz  General Appearance: Pain is better controlled today..  Eyes: No icterus  HEENT: Moist mucosa  Respiratory: Clear to auscultation  Cardiovascular: S1 and S2 is  normal  GI: Soft and nontender  Lymph/Hematologic: Not examined  Genitourinary: Not examined  Skin: No rash  Musculoskeletal: Left arm is in a sling.  Bilateral lower extremity edema, unchanged from prior.  Neurologic: Nonfocal  Psychiatric: Normal mood      Data   Recent Labs   Lab 09/29/22  0820 09/28/22  0651 09/23/22  1638 09/22/22  1526   WBC 11.5* 12.3* 12.0* 15.6*   HGB 8.5* 9.3* 9.8* 11.0*   MCV 98 97 93 93    319 392 530*   * 138  --  136   POTASSIUM 4.6 3.8  --  4.2   CHLORIDE 100 100  --  102   CO2 25 27  --  23   BUN 26.2* 25.1*  --  49*   CR 0.75 0.81  --  0.96   ANIONGAP 9 11  --  11   LIGIA 8.4* 8.7*  --  8.5   * 162*  --  145*     No results found for this or any previous visit (from the past 24 hour(s)).  Medications       acetaminophen  1,000 mg Oral TID     aspirin  81 mg Oral Daily     atorvastatin  40 mg Oral QPM     calcium carbonate-vitamin D  1 tablet Oral BID w/meals     carvedilol  3.125 mg Oral BID w/meals     enoxaparin ANTICOAGULANT  40 mg Subcutaneous Q24H     furosemide  40 mg Oral Once     furosemide  40 mg Oral Daily     multivitamin, therapeutic  1 tablet Oral Daily     pantoprazole  40 mg Oral BID     pregabalin  25 mg Oral BID    And     pregabalin  50 mg Oral Daily at 8 pm     sodium chloride (PF)  3 mL Intracatheter Q8H

## 2022-09-30 NOTE — PROGRESS NOTES
09/30/22 1545   Living Environment   People in Home alone   Current Living Arrangements house  (single level townhouse)   Home Accessibility no concerns   Living Environment Comments walk-in shower, RTS, grab bars in bathroom,  States that she has a cleaning lady every 3 weeks and some family member coming in daily,  Family helps with transportation, shopping, laundry.  Pt currently dressing, cooking, and taking showers herself   Self-Care   Usual Activity Tolerance moderate   Current Activity Tolerance poor   Equipment Currently Used at Home cane, straight;walker, rolling;lift device   Fall history within last six months yes   Number of times patient has fallen within last six months 2   Activity/Exercise/Self-Care Comment more difficulty managing cares recently; family appears supportive; family looking at nursing home but patient hasn't been receptive   General Information   Onset of Illness/Injury or Date of Surgery 09/28/22   Referring Physician Kae Perry PA-C   Patient/Family Therapy Goals Statement (PT) not stated   Pertinent History of Current Problem (include personal factors and/or comorbidities that impact the POC) per chart: 89-year-old female with history of diastolic/systolic heart failure, ischemic cardiomyopathy, paroxysmal atrial fibrillation and chronic back pain due to compression fractures, hypertension, osteoarthritis, coronary artery disease with prior NSTEMI/LAD stent, diabetes mellitus type 2, anemia and GERD who was initially admitted 9/11-9/14 due to pain from tracking/lumbar compression fractures and left shoulder and right knee pain with possible right superior and inferior rami fracture and CHF exacerbation.She sustained another fall/syncope while bandaging her lower extremity which is quite painful.  And had both distal and proximal left humerus fracture.  She did have bleeding from a skin laceration on her chin and Steri-Strips were applied.  CT head is negative.See medical  record for further information   Existing Precautions/Restrictions fall;other (see comments)  (refusing TLSO brace)   Cognition   Safety Deficit (Cognition) insight into deficits/self-awareness;judgment;awareness of need for assistance;at risk behavior observed;ability to follow commands;safety precautions follow-through/compliance;safety precautions awareness   Cognitive Status Comments appears impaired   Pain Assessment   Patient Currently in Pain Yes, see Vital Sign flowsheet  (primarily pain in L UE reported; has pain in legs with getting to EOB)   Integumentary/Edema   Integumentary/Edema Comments multiple areas of bruising in multiple areas on her body   Posture    Posture Comments leaning heavily backward at times and then forward flexed at trunk at times   Range of Motion (ROM)   ROM Comment L UE limited by recent fracture and pain; LE's limited by pain; R shoulder limited by deficits prior   Strength (Manual Muscle Testing)   Strength Comments significant functional weakness; further limited by pain and patient   Bed Mobility   Comment, (Bed Mobility) max A x 1-2  for supine to sit   Transfers   Comment, (Transfers) declined; very anxious and holding breath   Gait/Stairs (Locomotion)   Comment, (Gait/Stairs) unable currently   Balance   Balance Comments impaired sitting balance; leans heavily backward at times secondary to patient anxiety   Clinical Impression   Criteria for Skilled Therapeutic Intervention Yes, treatment indicated   PT Diagnosis (PT) impaired functional mobility   Influenced by the following impairments L humerus fx; pain; poor activity tolerance; very anxious; poor safety and judgement; functional weakness; decreased ROM; impaired balance   Functional limitations due to impairments impaired independence with mobility and cares secondary to above deficits   Clinical Presentation (PT Evaluation Complexity) Evolving/Changing   Clinical Presentation Rationale clinical judgement; level of  assist   Clinical Decision Making (Complexity) moderate complexity   Planned Therapy Interventions (PT) balance training;bed mobility training;cryotherapy;gait training;ROM (range of motion);strengthening;transfer training   Anticipated Equipment Needs at Discharge (PT) wheelchair;lift device;walker, dominik   Risk & Benefits of therapy have been explained evaluation/treatment results reviewed;care plan/treatment goals reviewed;risks/benefits reviewed;current/potential barriers reviewed;participants voiced agreement with care plan;patient;participants included;daughter   PT Discharge Planning   PT Discharge Recommendation (DC Rec) Transitional Care Facility   PT Rationale for DC Rec Patient significantly below reported baseline level of function; currently unable to tolerate standing or taking steps; not safe to return home alone and would benefit from TCU to maximize return to PLOF; May benefit from transition to intermediate/more supportive living environment after   PT Brief overview of current status A x 2 to sit at EOB; lift dependent for any OOB mobility currently   Plan of Care Review   Plan of Care Reviewed With patient;daughter   Total Evaluation Time   Total Evaluation Time (Minutes) 15   Physical Therapy Goals   PT Frequency 5x/week   PT Predicted Duration/Target Date for Goal Attainment 10/07/22   PT Goals Bed Mobility;Transfers;Gait   PT: Bed Mobility Moderate assist;Supine to/from sit   PT: Transfers Moderate assist;Bed to/from chair;Sit to/from stand;Assistive device   PT: Gait Moderate assist;Dominik walker;10 feet

## 2022-09-30 NOTE — PROGRESS NOTES
09/30/22 0844   Quick Adds   Type of Visit Initial Occupational Therapy Evaluation   Living Environment   People in Home alone   Current Living Arrangements house  (single level townhouse)   Home Accessibility no concerns   Transportation Anticipated family or friend will provide;other (see comments)  ([facility])   Living Environment Comments walk-in shower, RTS, grab bars in bathroom,  States that she has a cleaning lady every 3 weeks and some family member coming in daily,  Family helps with transportation, shopping, laundry.  Pt currently dressing, cooking, and taking showers herself   Self-Care   Usual Activity Tolerance moderate   Current Activity Tolerance poor   Equipment Currently Used at Home cane, straight;walker, rolling;lift device  (pt has a lift chair)   Fall history within last six months yes   Number of times patient has fallen within last six months 2   Activity/Exercise/Self-Care Comment family and neighbors appear very supportive.  Family starting to look into [facility] options, pt not willing to accept this yet   General Information   Onset of Illness/Injury or Date of Surgery 09/28/22   Referring Physician Kae Perry PA-C   Patient/Family Therapy Goal Statement (OT) pt would like to consider home health aide, especially for dudley ramsey. Willing to have TCU stay if needed.  Not convinced about [facility] option   Additional Occupational Profile Info/Pertinent History of Current Problem Pt is an 89 christie old female admitted after a mechanical fall at home with compression fractures of the spine and a left nondisplaced radial head humeral fracture and a mildly displaced priximal humerus fracture.  Currently wrapped and with a sling, surgery not being considered at this time.  Pt had an earlier admission this month for back pain and compression fractures.  PMH includes chronic diastrolic/systolic HF, hx ischemic cardiomyopathy, paroxysmal Afib, chronic back pain, HTN, OA, Hx CAD with prioir NSTEMI and LAD  stent, DM2 diet controlled, asthma, GERD   Performance Patterns (Routines, Roles, Habits) Pt has been independent with mobility around the houw using a 4WW.   Existing Precautions/Restrictions fall;spinal   Limitations/Impairments hearing   Left Lower Extremity (Weight-bearing Status) non weight-bearing (NWB)   General Observations and Info pt in bed eating breakfast.  Willing to participate in a limited eval   Cognitive Status Examination   Orientation Status orientation to person, place and time   Visual Perception   Visual Impairment/Limitations corrective lenses full-time   Sensory   Sensory Quick Adds No deficits were identified   Pain Assessment   Patient Currently in Pain Yes, see Vital Sign flowsheet   Range of Motion Comprehensive   General Range of Motion upper extremity range of motion deficits identified   Comment, General Range of Motion pt has some limitation with right shoulder flexion due to rotator cuff injuries but is WFL.  Not able to test left UE.  Has movement of left thumb and first 2 fingers   Strength Comprehensive (MMT)   General Manual Muscle Testing (MMT) Assessment neck/trunk strength deficits identified   Comment, General Manual Muscle Testing (MMT) Assessment generalized UE weakness and deconditioning   Coordination   Upper Extremity Coordination No deficits were identified   Bed Mobility   Comment (Bed Mobility) Pt declined any bed mobility as she was eating.  Will check on this next session.  Pt attempted to lift her head and shoulders off the bed and was very limited.  Will likely need A of at least one for bed mobility   Clinical Impression   Criteria for Skilled Therapeutic Interventions Met (OT) Yes, treatment indicated   OT Diagnosis decreased independence in ADLS and IADLS   OT Problem List-Impairments impacting ADL problems related to;activity tolerance impaired;fear & anxiety;range of motion (ROM);strength;pain;post-surgical precautions   ADL comments/analysis Pt is below her  usual baseline with increased pain and weakness.   Assessment of Occupational Performance 5 or more Performance Deficits   Identified Performance Deficits dressing, bathing, toileting, funcional mobility, household chores   Planned Therapy Interventions (OT) ADL retraining;strengthening;progressive activity/exercise   Clinical Decision Making Complexity (OT) moderate complexity   Risk & Benefits of therapy have been explained evaluation/treatment results reviewed;care plan/treatment goals reviewed;patient   OT Discharge Planning   OT Discharge Recommendation (DC Rec) Transitional Care Facility   OT Rationale for DC Rec Pt has a great deal of support at home but also appears to be in significant pain limiting activity.  Is able to use her dominant hand but would need to do everything one handed as she recovers.  Sgnificantly below her usual baseline, would benefit from skilled OT to increase endruance and strength for ADLS, teach one handed techniques.  Currently is willing to participate in rehab at a TCU setting, is hesitant but willing to consider move to retirement as her family is looking into options.  Pt would need to return to her baseline of independent dressing, cooking, and showeing to return home.  Has been using walker for mobility, would need to consider other AD for the short tterm.   Total Evaluation Time (Minutes)   Total Evaluation Time (Minutes) 10   OT Goals   Therapy Frequency (OT) Daily   OT Predicted Duration/Target Date for Goal Attainment 10/07/22   OT Goals Hygiene/Grooming   OT: Hygiene/Grooming minimal assist;using adaptive equipment;within precautions   OT: Upper Body Dressing Minimal assist;using adaptive equipment;within precautions;including set-up/clothing retrieval   OT: Lower Body Dressing Supervision/stand-by assist;using adaptive equipment;within precautions;including set-up/clothing retrieval   OT: Toilet Transfer/Toileting Minimal assist;toilet transfer;cleaning and garment  management;using adaptive equipment;within precautions

## 2022-09-30 NOTE — PROGRESS NOTES
Care Management Follow Up    Length of Stay (days): 2    Expected Discharge Date: 09/30/2022     Concerns to be Addressed:       Patient plan of care discussed at interdisciplinary rounds: Yes    Anticipated Discharge Disposition:       Anticipated Discharge Services:    Anticipated Discharge DME:      Patient/family educated on Medicare website which has current facility and service quality ratings:    Education Provided on the Discharge Plan:    Patient/Family in Agreement with the Plan:      Referrals Placed by CM/SW:    Private pay costs discussed: transportation costs    Additional Information:    Attempted to meet with pt at bedside, pt was asleep. Spoke with pt daughter in law over the phone to discuss TCU. PT recommends TCU, pt/family is aware and agreeable. Sent referrals to [facility], [facility], and [facility] per family request. Pt daughter in law explained that they received resources provided in pts room from yesterday and have been having discussions with pt about safe discharge plan. SW following.     SHAE Duggan, KRYSTLE  Inpatient Care Coordination  Ortho/Spine Unit  [phone]  Christa Garcia, [facility]

## 2022-09-30 NOTE — PLAN OF CARE
Goal Outcome Evaluation:    Aox3. Able to communicate needs well. VSS. On RA. No fever noted during shift. Reported pain on left shoulder, ribs and humerus. Sling and ace wrap in place. PRN atarax x2 and 0.5mg IV Dilaudid x1 given. CMS intact. Scattered bruises noted. Voiding via pure-wick. Stayed in bed for the night. Slept through the entire shift. Calm and pleasant with care. Will continue to provide supportive care.

## 2022-09-30 NOTE — PLAN OF CARE
Goal Outcome Evaluation:      Pt alert and oriented x3, vss, afebrile. Skin dry to touch, bruised, lacerations on face covered with steri strips. Pain managed with IV and PO dilaudid. IV SL, pure wick in place, incontinence care, discharge plans pending. will continue to monitor.

## 2022-09-30 NOTE — TELEPHONE ENCOUNTER
Can we call daughter in law and make sure that they did not want to reschedule at pain clinic?    Raman Morales NP      September 28, 2022  Re: Gsoia Alonzo  YOB: 1932     Dear Colleague,     Thank you for your referral to the [facility] [place]. Patient's daughter in law cancelled upcoming appointment..       If you have any questions or concerns, please contact our office at Dept: [phone].           Sincerely,  [facility] [place]

## 2022-09-30 NOTE — PROGRESS NOTES
[facility]    Medicine Progress Note - Hospitalist Service    Date of Admission:  9/28/2022    Assessment & Plan          89-year-old female with history of diastolic/systolic heart failure, ischemic cardiomyopathy, paroxysmal atrial fibrillation and chronic back pain due to compression fractures, hypertension, osteoarthritis, coronary artery disease with prior NSTEMI/LAD stent, diabetes mellitus type 2, anemia and GERD who was initially admitted 9/11-9/14 due to pain from tracking/lumbar compression fractures and left shoulder and right knee pain with possible right superior and inferior rami fracture and CHF exacerbation.    She sustained another fall/syncope while bandaging her lower extremity which is quite painful.  And had both distal and proximal left humerus fracture.  She did have bleeding from a skin laceration on her chin and Steri-Strips were applied.  CT head is negative.      1. Distal and proximal left humerus fracture    Seen by orthopedics, nonoperative treatment advised.    Continue splint, range of motion of hand and fingers as tolerated and close to body ADLs only.    Nonweightbearing right upper extremity.    2. Acute on chronic pain.    Takes Norco as needed and Lyrica at home. Known moderate compression fracture deformity of T3 and mild compression deformity at L1 and L4 with several old compression deformities known , brace was recommended but patient refused.     Palliative care consult appreciated Multimodal pain management as below.  o Acetaminophen 1000 mg every 8 hours  o Lyrica 25 mg at 0800 and 1400, 50 mg at 2000 daily  o Voltaren gel to left shoulder four times daily  o Lidocaine patch apply to left shoulder, 12 hours on/12 hours off rotation  o hydroxyzine 10 mg every 6 hours prn pain adjuvant  o Hydromorphone 0.5 - 1 mg IV every 2 hours prn severe, breakthrough pain  o Hydromorphone 2-4 mg every 3 hours prn moderate to severe pain  o positioning, ice, heat,  "mindfulness per nursing assessment    3. Syncope.    Likely vasovagal, pain mediated while bending her lower extremities.    EKG showed sinus rhythm with PACs and hemodynamically stable.    4. Chronic diastolic/systolic heart failure with ischemic cardiomyopathy with    Baseline EF of 35 to 40%.    Patient reports increased of weeping from her lower extremities but does not look volume overloaded.  Continue Lasix 40mg p.o. daily, got an additional dose on 9/29.    5. Other medical conditions below    Essential hypertension.    History of CAD/NSTEMI with LAD stent.  Continue aspirin Lipitor and carvedilol.    Diet-controlled diabetes mellitus.    Chronic anemia with hemoglobin around 7-8    GERD, continue Protonix.       Diet: Combination Diet Regular Diet Adult    DVT Prophylaxis: Enoxaparin (Lovenox) SQ  Manzano Catheter: Not present  Central Lines: None  Cardiac Monitoring: ACTIVE order. Indication: Syncope- low cardiac risk (24 hours)  Code Status: No CPR- Do NOT Intubate      Disposition Plan      Expected Discharge Date: 10/01/2022      Destination: nursing home          The patient's care was discussed with the Patient, RN and.    Eddie Mora MD  Hospitalist Service  [facility]  Securely message with the Vocera Web Console (learn more here)  Text page via [employer] Paging/Directory         Clinically Significant Risk Factors Present on Admission               # Overweight: Estimated body mass index is 26.28 kg/m  as calculated from the following:    Height as of this encounter: 1.651 m (5' 5\").    Weight as of this encounter: 71.6 kg (157 lb 14.4 oz).        ______________________________________________________________________    Interval History   Pain is better controlled today.    Data reviewed today: I reviewed all medications, new labs and imaging results over the last 24 hours    Physical Exam   Vital Signs: Temp: 98.3  F (36.8  C) Temp src: Temporal BP: 139/43 Pulse: 79   Resp: 18 " SpO2: 96 % O2 Device: None (Room air)    Weight: 157 lbs 14.4 oz  General Appearance: Pain is better controlled today..  Eyes: No icterus  HEENT: Moist mucosa  Respiratory: Clear to auscultation  Cardiovascular: S1 and S2 is normal  GI: Soft and nontender  Lymph/Hematologic: Not examined  Genitourinary: Not examined  Skin: No rash  Musculoskeletal: Left arm is in a sling.  Bilateral lower extremity edema, unchanged from prior.  Neurologic: Nonfocal  Psychiatric: Normal mood      Data   Recent Labs   Lab 09/30/22  0641 09/29/22  0820 09/28/22  0651   WBC 9.6 11.5* 12.3*   HGB 8.4* 8.5* 9.3*   MCV 97 98 97    280 319    134* 138   POTASSIUM 4.0 4.6 3.8   CHLORIDE 101 100 100   CO2 23 25 27   BUN 30.1* 26.2* 25.1*   CR 0.70 0.75 0.81   ANIONGAP 12 9 11   LIGIA 8.4* 8.4* 8.7*   * 208* 162*     No results found for this or any previous visit (from the past 24 hour(s)).  Medications       acetaminophen  1,000 mg Oral TID     aspirin  81 mg Oral Daily     atorvastatin  40 mg Oral QPM     calcium carbonate-vitamin D  1 tablet Oral BID w/meals     carvedilol  3.125 mg Oral BID w/meals     enoxaparin ANTICOAGULANT  40 mg Subcutaneous Q24H     furosemide  40 mg Oral Daily     multivitamin, therapeutic  1 tablet Oral Daily     pantoprazole  40 mg Oral BID     pregabalin  25 mg Oral BID    And     pregabalin  50 mg Oral Daily at 8 pm     sodium chloride (PF)  3 mL Intracatheter Q8H

## 2022-09-30 NOTE — PLAN OF CARE
Patient vital signs are at baseline: Yes  Patient able to ambulate as they were prior to admission or with assist devices provided by therapies during their stay:  No,  Reason:  Pt. Unable to ambulate due to pain. Lift/green sheet under pt.   Patient MUST void prior to discharge:  Yes  Patient able to tolerate oral intake:  Yes  Pain has adequate pain control using Oral analgesics:  No,  Reason:  Pain also managed with IV dilaudid.      A&Ox4, pleasant. Sling on right arm, NWB. [place]. Lacerations from fall on right chin, knee, hand with scattered ecchymosis.  Pain in right arm and back. Tolerating regular diet. Peripheral IV saline locked. Purewick in place. Pending TCU placement.

## 2022-09-30 NOTE — TELEPHONE ENCOUNTER
Spoke to Seema, patient fell a few days ago and admitted to hospital with arm fractured in 2 places. They have not been able to repair arm and needs to see ortho in about 3 weeks for follow-up to decide if she needs surgery. Seema thinks patient will go to TCU after discharge so she cancelled pain clinic appointment.     Paoal Shepard RN  [facility]

## 2022-10-01 NOTE — PLAN OF CARE
Goal Outcome Evaluation:    Pt is a/o, up with A2 and sera steady. VSS. Pain control was issue. Pt stated the oral dilaudid does not work and she only wanted IV. Educated pt and family that we need to get pain controlled with oral pain meds. Pt stated norco works for her for pain. Javier NIEVES. Up to chair with A2 and sera steady. Voiding per purewick. Appetite good. Dressed wound on righ knee and left wrist. Pt needing lots of encouragement. Discharge pending, most likely TCU.    Javier NIEVES  1100  Pt states her diuladid does not work for her pain. She said Norco 1-2 pills has worked for her in the past. Can you change it? thanks

## 2022-10-01 NOTE — PLAN OF CARE
Pt A/O x4. VSS and afebrile. Given Oral dilaudid x1 and IV dilaudid x2 for left rib and  shoulder pain. Left arm sling in place. Up Ax2 with lift. Log rolls to right side with brief change. Pure wick in place.  scattered bruises noted. Plan is TCU placement.  Will continue to monitor.

## 2022-10-01 NOTE — PLAN OF CARE
Goal Outcome Evaluation:        Patient vital signs are at baseline: Yes  Patient able to ambulate as they were prior to admission or with assist devices provided by therapies during their stay:  no, pt was bed rest, refused to get up and walk in room.  Patient MUST void prior to discharge :yes, pt is using external catheter.  Patient able to tolerate oral intake:  Yes  Pain has adequate pain control using Oral analgesics:  Yes  Does patient have an identified :  Yes  Has goal D/C date and time been discussed with patient:  Yes  to TCU.          Pt is A&OX3, [place]. Able to communicate needs. Bruise and ecchymosis to UEs, LEs, face. C/o pain 7-8/10.Refused oral dilaudid. Requested IV dilaudid.PT is A-2 with gate belt and walker. She is on Tele monitoring.

## 2022-10-01 NOTE — PROGRESS NOTES
[facility]    Hospitalist Progress Note    Date of Service (when I saw the patient): 10/01/2022  Provider:  Sony Wynn MD   Text Page  7am - 6PM     Medicine Progress Note - Hospitalist Service     Date of Admission:  9/28/2022     Assessment & Plan           89-year-old female with history of diastolic/systolic heart failure, ischemic cardiomyopathy, paroxysmal atrial fibrillation and chronic back pain due to compression fractures, hypertension, osteoarthritis, coronary artery disease with prior NSTEMI/LAD stent, diabetes mellitus type 2, anemia and GERD who was initially admitted 9/11-9/14 due to pain from tracking/lumbar compression fractures and left shoulder and right knee pain with possible right superior and inferior rami fracture and CHF exacerbation.     She sustained another fall/syncope while bandaging her lower extremity which is quite painful.  And had both distal and proximal left humerus fracture.  She did have bleeding from a skin laceration on her chin and Steri-Strips were applied.  CT head is negative.        1. Distal and proximal left humerus fracture    Seen by orthopedics, nonoperative treatment advised.    Continue splint, range of motion of hand and fingers as tolerated and close to body ADLs only.    Nonweightbearing right upper extremity.     2. Acute on chronic pain.    Takes Norco as needed and Lyrica at home. Known moderate compression fracture deformity of T3 and mild compression deformity at L1 and L4 with several old compression deformities known , brace was recommended but patient refused.     Palliative care consult appreciated Multimodal pain management as below.  ? Acetaminophen 500 mg every 8 hours  ? Lyrica 25 mg at 0800 and 1400, 50 mg at 2000 daily  ? Voltaren gel to left shoulder four times daily  ? Lidocaine patch apply to left shoulder, 12 hours on/12 hours off rotation  ? hydroxyzine 10 mg every 6 hours prn pain adjuvant  ? Resume Norco as PTA  due to persistent pain   ? Add Atarax prn   ? positioning, ice, heat, mindfulness per nursing assessment     3. Syncope.    Likely vasovagal, pain mediated while bending her lower extremities.    EKG showed sinus rhythm with PACs and hemodynamically stable.     4. Chronic diastolic/systolic heart failure with ischemic cardiomyopathy with    Baseline EF of 35 to 40%.    Patient reports increased of weeping from her lower extremities but does not look volume overloaded.  Continue Lasix 40mg p.o. daily, got an additional dose on 9/29.     5. Other medical conditions below    Essential hypertension.    History of CAD/NSTEMI with LAD stent.  Continue aspirin Lipitor and carvedilol.    Diet-controlled diabetes mellitus.    Chronic anemia with hemoglobin around 7-8    GERD, continue Protonix.     Diet: Combination Diet Regular Diet Adult    Manzano Catheter: Not present  Central Lines: None  Cardiac Monitoring: ACTIVE order. Indication: Syncope- low cardiac risk (24 hours)     DVT Prophylaxis: Enoxaparin (Lovenox) SQ  Code Status: No CPR- Do NOT Intubate    Disposition: Expected discharge to TCU once bed available.    Interval History   Patient is not finding relief with the current pain regimen, she is asking to discontinue Dilaudid and going back to [place] as prior to admission.  Current dose of acetaminophen should be reduced in consequence.  She does not have any other concerns.    -Data reviewed today: I reviewed all new labs and imaging results over the last 24 hours.    Physical Exam   Temp: 97.7  F (36.5  C) Temp src: Temporal BP: (Abnormal) 141/58 Pulse: 89   Resp: 18 SpO2: 97 % O2 Device: None (Room air)    Vitals:    09/30/22 0610 10/01/22 0621   Weight: 71.6 kg (157 lb 14.4 oz) 71.8 kg (158 lb 4.8 oz)     Vital Signs with Ranges  Temp:  [97.7  F (36.5  C)-98.5  F (36.9  C)] 97.7  F (36.5  C)  Pulse:  [] 89  Resp:  [18-20] 18  BP: (110-141)/(54-58) 141/58  SpO2:  [95 %-97 %] 97 %  I/O last 3 completed  shifts:  In: 480 [P.O.:480]  Out: 400 [Urine:400]    GEN:  Alert, oriented x 3, appears comfortable, NAD.  HEENT:  Normocephalic/atraumatic, no scleral icterus, no nasal discharge, mouth moist.  CV:  Regular rate and rhythm, no murmur or JVD.  S1 + S2 noted, no S3 or S4.  LUNGS:  Clear to auscultation bilaterally without rales/rhonchi/wheezing/retractions.  Symmetric chest rise on inhalation noted.  ABD:  Active bowel sounds, soft, non-tender/non-distended.  No rebound/guarding/rigidity.  EXT: Left upper extremity is splinted.  No edema or cyanosis.  No joint synovitis noted.  SKIN:  Dry to touch, no exanthems noted in the visualized areas.       Medications       acetaminophen  1,000 mg Oral TID     aspirin  81 mg Oral Daily     atorvastatin  40 mg Oral QPM     calcium carbonate-vitamin D  1 tablet Oral BID w/meals     carvedilol  3.125 mg Oral BID w/meals     enoxaparin ANTICOAGULANT  40 mg Subcutaneous Q24H     furosemide  40 mg Oral Daily     multivitamin, therapeutic  1 tablet Oral Daily     pantoprazole  40 mg Oral BID     pregabalin  25 mg Oral BID    And     pregabalin  50 mg Oral Daily at 8 pm     sodium chloride (PF)  3 mL Intracatheter Q8H       Data   Recent Labs   Lab 10/01/22  0626 09/30/22  0641 09/29/22  0820   WBC 11.2* 9.6 11.5*   HGB 8.8* 8.4* 8.5*   MCV 97 97 98    253 280    136 134*   POTASSIUM 4.3 4.0 4.6   CHLORIDE 102 101 100   CO2 24 23 25   BUN 31.3* 30.1* 26.2*   CR 0.75 0.70 0.75   ANIONGAP 13 12 9   LIGIA 8.5* 8.4* 8.4*   * 132* 208*       No results found for this or any previous visit (from the past 24 hour(s)).      Securely message with the Vocera Web Console (learn more here)  Text page via [facility] Paging/Directory        Disclaimer: This note consists of symbols derived from keyboarding, dictation and/or voice recognition software. As a result, there may be errors in the script that have gone undetected. Please consider this when interpreting information found in  this chart.

## 2022-10-02 NOTE — PLAN OF CARE
Goal Outcome Evaluation:     Pt is A&OX3, able to make needs known. LUE has splint and Ace warps on.No WBA to LUE. Pt rates her L/ side pain 7/10 and takes  PRN Norco 2 tabs for pain .  Skin has multiple bruises, ecchymosis to LUE ,  LLE, chest, ,scab to L/ side off face. IV to RUE SL.BS active X4. Edema to LEs. Pt is A-2  with sera steady.

## 2022-10-02 NOTE — PLAN OF CARE
Goal Outcome Evaluation:      A & O x4, able to make needs known. Tolerating regular diet. Assist 1-2 w/sera steady. Left arm in sling, strap is undone around her neck while she is laying down in bed due to discomfort. Multiple skin issues (abrasions, bruises, lacerations). Purewick in place overnight. One incontinent BM this shift. PRN Norco/atarax/voltaren cream for pain management.

## 2022-10-02 NOTE — PROGRESS NOTES
[facility]    Hospitalist Progress Note    Date of Service (when I saw the patient): 10/02/2022  Provider:  Sony Wynn MD   Text Page  7am - 6PM     Medicine Progress Note - Hospitalist Service     Date of Admission:  9/28/2022     Assessment & Plan           89-year-old female with history of diastolic/systolic heart failure, ischemic cardiomyopathy, paroxysmal atrial fibrillation and chronic back pain due to compression fractures, hypertension, osteoarthritis, coronary artery disease with prior NSTEMI/LAD stent, diabetes mellitus type 2, anemia and GERD who was initially admitted 9/11-9/14 due to pain from tracking/lumbar compression fractures and left shoulder and right knee pain with possible right superior and inferior rami fracture and CHF exacerbation.     She sustained another fall/syncope while bandaging her lower extremity which is quite painful.  And had both distal and proximal left humerus fracture.  She did have bleeding from a skin laceration on her chin and Steri-Strips were applied.  CT head is negative.        1. Distal and proximal left humerus fracture    Seen by orthopedics, nonoperative treatment advised.    Continue splint, range of motion of hand and fingers as tolerated and close to body ADLs only.    Nonweightbearing right upper extremity.     2. Acute on chronic pain.    Takes Norco as needed and Lyrica at home. Known moderate compression fracture deformity of T3 and mild compression deformity at L1 and L4 with several old compression deformities known , brace was recommended but patient refused.     Palliative care consult appreciated Multimodal pain management as below.  ? Acetaminophen 500 mg every 8 hours  ? Lyrica 25 mg at 0800 and 1400, 50 mg at 2000 daily  ? Voltaren gel to left shoulder four times daily  ? Lidocaine patch apply to left shoulder, 12 hours on/12 hours off rotation  ? hydroxyzine 10 mg every 6 hours prn pain adjuvant  ? Resume Norco as PTA  due to persistent pain   ? Add Atarax prn   ? positioning, ice, heat, mindfulness per nursing assessment     3. Syncope.    Likely vasovagal, pain mediated while bending her lower extremities.    EKG showed sinus rhythm with PACs and hemodynamically stable.     4. Chronic diastolic/systolic heart failure with ischemic cardiomyopathy with    Baseline EF of 35 to 40%.    Patient reports increased of weeping from her lower extremities but does not look volume overloaded.  Continue Lasix 40mg p.o. daily, got an additional dose on 9/29.     5. Other medical conditions below    Essential hypertension.    History of CAD/NSTEMI with LAD stent.  Continue aspirin Lipitor and carvedilol.    Diet-controlled diabetes mellitus.    Chronic anemia with hemoglobin around 7-8    GERD, continue Protonix.     Diet: Combination Diet Regular Diet Adult    Manzano Catheter: Not present  Central Lines: None  Cardiac Monitoring: ACTIVE order. Indication: Syncope- low cardiac risk (24 hours)     DVT Prophylaxis: Enoxaparin (Lovenox) SQ  Code Status: No CPR- Do NOT Intubate    Disposition: Expected discharge to TCU once bed available.    Interval History   Patient is not finding relief with the current pain regimen, will alternate Norco with Tylenol/Atarax.  Current dose of acetaminophen less than 4 gr. Will order Menthol patch. She does not have any other concerns.    -Data reviewed today: I reviewed all new labs and imaging results over the last 24 hours.    Physical Exam   Temp: 98.4  F (36.9  C) Temp src: Temporal BP: 132/51 Pulse: 83   Resp: 18 SpO2: 96 % O2 Device: None (Room air)    Vitals:    09/30/22 0610 10/01/22 0621   Weight: 71.6 kg (157 lb 14.4 oz) 71.8 kg (158 lb 4.8 oz)     Vital Signs with Ranges  Temp:  [97.3  F (36.3  C)-98.6  F (37  C)] 98.4  F (36.9  C)  Pulse:  [81-93] 83  Resp:  [16-18] 18  BP: (117-132)/(51-55) 132/51  SpO2:  [96 %-97 %] 96 %  I/O last 3 completed shifts:  In: 480 [P.O.:480]  Out: 400 [Urine:400]    GEN:   Alert, oriented x 3, appears comfortable, NAD.  HEENT:  Normocephalic/atraumatic, no scleral icterus, no nasal discharge, mouth moist.  CV:  Regular rate and rhythm, no murmur or JVD.  S1 + S2 noted, no S3 or S4.  LUNGS:  Clear to auscultation bilaterally without rales/rhonchi/wheezing/retractions.  Symmetric chest rise on inhalation noted.  ABD:  Active bowel sounds, soft, non-tender/non-distended.  No rebound/guarding/rigidity.  EXT: Left upper extremity is splinted.  No edema or cyanosis.  No joint synovitis noted.  SKIN:  Dry to touch, no exanthems noted in the visualized areas.       Medications       acetaminophen  500 mg Oral TID     aspirin  81 mg Oral Daily     atorvastatin  40 mg Oral QPM     calcium carbonate-vitamin D  1 tablet Oral BID w/meals     carvedilol  3.125 mg Oral BID w/meals     enoxaparin ANTICOAGULANT  40 mg Subcutaneous Q24H     furosemide  40 mg Oral Daily     hydrOXYzine  25 mg Oral Q6H     menthol   Transdermal Q8H     multivitamin, therapeutic  1 tablet Oral Daily     pantoprazole  40 mg Oral BID     pregabalin  25 mg Oral BID    And     pregabalin  50 mg Oral Daily at 8 pm     sodium chloride (PF)  3 mL Intracatheter Q8H       Data   Recent Labs   Lab 10/02/22  0701 10/01/22  0626 09/30/22  0641   WBC 10.2 11.2* 9.6   HGB 8.1* 8.8* 8.4*   MCV 98 97 97    245 253    139 136   POTASSIUM 4.2 4.3 4.0   CHLORIDE 104 102 101   CO2 21* 24 23   BUN 33.7* 31.3* 30.1*   CR 0.72 0.75 0.70   ANIONGAP 11 13 12   LIGIA 8.4* 8.5* 8.4*   * 140* 132*       No results found for this or any previous visit (from the past 24 hour(s)).      Securely message with the Vocera Web Console (learn more here)  Text page via [facility] Paging/Directory        Disclaimer: This note consists of symbols derived from keyboarding, dictation and/or voice recognition software. As a result, there may be errors in the script that have gone undetected. Please consider this when interpreting information found in  this chart.

## 2022-10-03 NOTE — PLAN OF CARE
"  Patient vital signs are at baseline: No,  Reason:  **high B.P.*  Patient able to ambulate as they were prior to admission or with assist devices provided by therapies during their stay:  No,  Reason:  Too painful/week use dileep study   Patient MUST void prior to discharge:  Yes  Patient able to tolerate oral intake:  Yes  Pain has adequate pain control using Oral analgesics:  Yes  Does patient have an identified :  No,  Reason:  need TCU placement.  Has goal D/C date and time been discussed with patient:  No,  Reason:  TBD,  Pt. A&O, has a lot of pain in left arm/shoulder, back, chest, PRN Nar[place], schedule atarax, lyrica, was given, pt. Use pure-wick, voiding adequately,wears sling on left arm. Plane to go to TCU.    BP (!) 173/137 (BP Location: Right arm, Cuff Size: Adult Small)   Pulse 99   Temp 97.6  F (36.4  C) (Oral)   Resp 20   Ht 1.651 m (5' 5\")   Wt 72 kg (158 lb 12.8 oz)   LMP  (LMP Unknown)   SpO2 95%   BMI 26.43 kg/m       "

## 2022-10-03 NOTE — PROGRESS NOTES
Care Management Follow Up    Length of Stay (days): 5    Expected Discharge Date: 10/03/2022     Concerns to be Addressed:       Patient plan of care discussed at interdisciplinary rounds: Yes    Anticipated Discharge Disposition:       Anticipated Discharge Services:    Anticipated Discharge DME:      Patient/family educated on Medicare website which has current facility and service quality ratings:    Education Provided on the Discharge Plan:    Patient/Family in Agreement with the Plan:      Referrals Placed by CM/SW:    Private pay costs discussed: transportation costs    Additional Information:    Pt has been declined from [facility] due to bed availability. Left  for [facility]. Spoke with pt daughter in law who was accepting of this writer sending additional referrals to [facility], [facility], and [facility]. Referrals sent.     Addendum    Received VM that pt has been accepted to [facility] in [facility] to a private room. Left  for [facility] inquiring about time for admission tomorrow. Awaiting call back. Met with pt/family at bedside who are accepting of this placement.     SHAE Duggan, [facility]  Inpatient Care Coordination  Ortho/Spine Unit  [phone]  Christa Garcia, [facility]

## 2022-10-03 NOTE — PLAN OF CARE
Pt a/o x4. VSS. C/o pain left arm, chest, back, norco, Tylenol and atarax given (give atarax and tylenol at same time). Icy hot patch left shoulder. Refused to get out of bed. Splint and sling on left arm. Non weight bearing left arm. Voiding using purwick. BM x1. Legs on pillows. Skin bruised, skin tears left arm and right knee. Right foot swollen. Encourage movement and continue pain management.

## 2022-10-03 NOTE — PLAN OF CARE
[id number]    Pt alert and oriented x4. Not oob this shift. Norco, Tylenol, Atarax and voltaren gel for pain. VSS. Sling in place on L arm. Purewick in place with good output. Tele: SR w/PVC's and PAC's. Discharge to TCU when placement is found. Will continue to monitor.

## 2022-10-03 NOTE — PROGRESS NOTES
[facility]    Hospitalist Progress Note    Date of Service (when I saw the patient): 10/03/2022  Provider:  Sony Wynn MD   Text Page  7am - 6PM     Medicine Progress Note - Hospitalist Service     Date of Admission:  9/28/2022     Assessment & Plan           89-year-old female with history of diastolic/systolic heart failure, ischemic cardiomyopathy, paroxysmal atrial fibrillation and chronic back pain due to compression fractures, hypertension, osteoarthritis, coronary artery disease with prior NSTEMI/LAD stent, diabetes mellitus type 2, anemia and GERD who was initially admitted 9/11-9/14 due to pain from tracking/lumbar compression fractures and left shoulder and right knee pain with possible right superior and inferior rami fracture and CHF exacerbation.     She sustained another fall/syncope while bandaging her lower extremity which is quite painful.  And had both distal and proximal left humerus fracture.  She did have bleeding from a skin laceration on her chin and Steri-Strips were applied.  CT head is negative.        1. Distal and proximal left humerus fracture    Seen by orthopedics, nonoperative treatment advised.    Continue splint, range of motion of hand and fingers as tolerated and close to body ADLs only.    Nonweightbearing left upper extremity.     2. Acute on chronic pain.    Takes Norco as needed and Lyrica at home. Known moderate compression fracture deformity of T3 and mild compression deformity at L1 and L4 with several old compression deformities known , brace was recommended but patient refused.     Palliative care consult appreciated Multimodal pain management as below.  ? Acetaminophen 500 mg every 8 hours  ? Lyrica 25 mg at 0800 and 1400, 50 mg at 2000 daily  ? Voltaren gel to left shoulder four times daily  ? Lidocaine patch apply to left shoulder, 12 hours on/12 hours off rotation  ? Resume [place] as PTA due to persistent pain   ? Add Atarax  yulisa  ? positioning, ice, heat, mindfulness per nursing assessment     3. Syncope.    Likely vasovagal, pain mediated while bending her lower extremities.    EKG showed sinus rhythm with PACs and hemodynamically stable.     4. Chronic diastolic/systolic heart failure with ischemic cardiomyopathy with    Baseline EF of 35 to 40%.    Patient reports increased of weeping from her lower extremities but does not look volume overloaded.  Continue Lasix 40mg p.o. daily, got an additional dose on 9/29.     5. Other medical conditions below    Essential hypertension.    History of CAD/NSTEMI with LAD stent.  Continue aspirin Lipitor and carvedilol.    Diet-controlled diabetes mellitus.    Chronic anemia with hemoglobin around 7-8    GERD, continue Protonix.     Diet: Combination Diet Regular Diet Adult    Manzano Catheter: Not present  Central Lines: None  Cardiac Monitoring: ACTIVE order. Indication: Syncope- low cardiac risk (24 hours)     DVT Prophylaxis: Enoxaparin (Lovenox) SQ  Code Status: No CPR- Do NOT Intubate    Disposition: Expected discharge to TCU once bed available.    Interval History   Patient is having breakfast, she apparently had a better night and pain control. Plan is to Norco with Tylenol/Atarax.  Current total dose of acetaminophen less than 4 gr.  Menthol patch applied. She does not have any other concerns.    -Data reviewed today: I reviewed all new labs and imaging results over the last 24 hours.    Physical Exam   Temp: 97.6  F (36.4  C) Temp src: Oral BP: 131/54 Pulse: 85   Resp: 20 SpO2: 98 % O2 Device: None (Room air)    Vitals:    09/30/22 0610 10/01/22 0621 10/03/22 0615   Weight: 71.6 kg (157 lb 14.4 oz) 71.8 kg (158 lb 4.8 oz) 72 kg (158 lb 12.8 oz)     Vital Signs with Ranges  Temp:  [97.1  F (36.2  C)-98.4  F (36.9  C)] 97.6  F (36.4  C)  Pulse:  [77-90] 85  Resp:  [16-20] 20  BP: ()/(44-57) 131/54  SpO2:  [96 %-98 %] 98 %  I/O last 3 completed shifts:  In: -   Out: 700  [Urine:700]    GEN:  Alert, oriented x 3, appears comfortable, NAD.  HEENT:  Normocephalic/atraumatic, no scleral icterus, no nasal discharge, mouth moist.  CV:  Regular rate and rhythm, no murmur or JVD.  S1 + S2 noted, no S3 or S4.  LUNGS:  Clear to auscultation bilaterally without rales/rhonchi/wheezing/retractions.  Symmetric chest rise on inhalation noted.  ABD:  Active bowel sounds, soft, non-tender/non-distended.  No rebound/guarding/rigidity.  EXT: Left upper extremity is splinted.  No edema or cyanosis.  No joint synovitis noted.  SKIN:  Dry to touch, no exanthems noted in the visualized areas.       Medications       acetaminophen  500 mg Oral TID     aspirin  81 mg Oral Daily     atorvastatin  40 mg Oral QPM     calcium carbonate-vitamin D  1 tablet Oral BID w/meals     carvedilol  3.125 mg Oral BID w/meals     enoxaparin ANTICOAGULANT  40 mg Subcutaneous Q24H     furosemide  40 mg Oral Daily     hydrOXYzine  25 mg Oral Q6H     menthol   Transdermal Q8H     multivitamin, therapeutic  1 tablet Oral Daily     pantoprazole  40 mg Oral BID     pregabalin  25 mg Oral BID    And     pregabalin  50 mg Oral Daily at 8 pm     sodium chloride (PF)  3 mL Intracatheter Q8H       Data   Recent Labs   Lab 10/03/22  0637 10/02/22  0701 10/01/22  0626   WBC 11.0 10.2 11.2*   HGB 8.1* 8.1* 8.8*   MCV 97 98 97    248 245    136 139   POTASSIUM 4.0 4.2 4.3   CHLORIDE 103 104 102   CO2 24 21* 24   BUN 35.6* 33.7* 31.3*   CR 0.74 0.72 0.75   ANIONGAP 10 11 13   LIGIA 8.3* 8.4* 8.5*   * 142* 140*       No results found for this or any previous visit (from the past 24 hour(s)).      Securely message with the Vocera Web Console (learn more here)  Text page via [facility] Paging/Directory        Disclaimer: This note consists of symbols derived from keyboarding, dictation and/or voice recognition software. As a result, there may be errors in the script that have gone undetected. Please consider this when interpreting  information found in this chart.

## 2022-10-04 NOTE — PROGRESS NOTES
Care Management Discharge Note    Discharge Date: 10/04/2022       Discharge Disposition: Skilled Nursing Facility    Discharge Services: None    Discharge DME: None    Discharge Transportation: family or friend will provide, other (see comments) ([facility])    Private pay costs discussed: transportation costs  Reviewed out of pocket cost for [facility] stretcher transport, $1117.00 for base rate and $26.06 per mile to the destination. Pt/family expressed understanding and are agreeable to this.      Patient requires stretcher transportation due to displaced fracture of left humerus.    Stretcher transportation has been arranged for 1830. Patient and bedside nurse notified of transportation time.    Ambulance PCS form completed and placed in patient chart. Ambulance PCS form should be given to transportation team.    PAS Confirmation Code: [id number]  Patient/family educated on Medicare website which has current facility and service quality ratings: yes    Education Provided on the Discharge Plan:    Persons Notified of Discharge Plans: pt, facility, MD, family  Patient/Family in Agreement with the Plan: yes    Handoff Referral Completed: yes    Additional Information:    Pt will discharge via stretcher to  TCU at 1830. PAS completed. Orders will be in basket once available.     SHAE Duggan, SW  Inpatient Care Coordination  Ortho/Spine Unit  [phone]  Christa Garcia, SW

## 2022-10-04 NOTE — PLAN OF CARE
Pt A/Ox4, VSS on RA. SBP WDL. Pt able to get back to bed with Sandra Steady and assist of 2, but it was extremely painful and difficult for staff and patient. May need a lift in the future. Pain at 8/10 at beginning of shift, scheduled Tylenol and Atarax given. Voiding per external cath, 500 out this shift. BM this shift, incontinent. Plan to go to TCU when stable and placement is found.

## 2022-10-04 NOTE — PROGRESS NOTES
Care Management Follow Up    Length of Stay (days): 6    Expected Discharge Date: 10/04/2022     Concerns to be Addressed:       Patient plan of care discussed at interdisciplinary rounds: Yes    Anticipated Discharge Disposition:       Anticipated Discharge Services:    Anticipated Discharge DME:      Patient/family educated on Medicare website which has current facility and service quality ratings:    Education Provided on the Discharge Plan:    Patient/Family in Agreement with the Plan:      Referrals Placed by CM/SW:    Private pay costs discussed: transportation costs    Additional Information:    Pt will discharge to [facility] TCU at 1600 via [facility] if medically ready.     Addendum    Updated that pt will need stretcher ride due to discomfort. Changed ride to stretcher at 1830. Left  updating facility. Called to update family.     SHAE Duggan, [facility]  Inpatient Care Coordination  Ortho/Spine Unit  [phone]  Christa Garcia, [facility]

## 2022-10-04 NOTE — PLAN OF CARE
Pt is alert and oriented x4, lift for transfers. Pt pain to L shoulder, and ribs, prn Norco given x2. Sling to LUE, dressing to L forearm and R shin. Pt reported thick phlegm that stock in her throat, requested her nasal spay and Mucinex, pt has no Mucinex listed on her home meds. Order for nasal spray requested per home direction. Pt used spray, stated she feels better. Pt used pure wick overnight. Plan to discharge to TCU. Waiting on placement.

## 2022-10-04 NOTE — PROGRESS NOTES
Clinic Care Coordination Contact  Care Team Conversations    Situation: RN Clinical Product Navigator following patient through TCU care progression.     Background: Patient admitted for the 2nd time in 30 days, initially due to multiple compression fractures; readmitted due to fall with distal and proximal humerus fracture.     Assessment: Lives alone in a single-level [facility]; has walk-in shower, RTS, grab bars in bathroom,  States that she has a cleaning lady every 3 weeks and some family member coming in daily,  Family helps with transportation, shopping, laundry.  Pt currently dressing, cooking, and taking showers herself  more difficulty managing cares recently; family appears supportive; family looking at long-term but patient hasn't been receptive.   Patient will be transitioning to [place] [facility] TCU.       Plan/Recommendations: RN Clinical Product Navigator will send TCU Care Team Care Management hand in communication and request involvement in discharge planning and coordination of care.       Mervat Capps RN  Clinical Product Navigator

## 2022-10-04 NOTE — PROGRESS NOTES
Care Management Discharge Note    Discharge Date: 10/04/2022       Discharge Disposition: Skilled Nursing Facility      Additional Information:  [facility] faxed orders to TCU.     SHAE Light, [facility]  Emergency Room   [phone]-Please contact the SW on the floor in which the patient is staying for any questions or concerns

## 2022-10-04 NOTE — DISCHARGE SUMMARY
[facility]    Discharge Summary  Hospitalist    Date of Admission:  9/28/2022  Date of Discharge:  10/4/2022  Provider:  Sony Wynn MD  Date of Service (when I last saw the patient): 10/04/22    Discharge Diagnoses   Distal and proximal left humeral fracture  Acute on chronic pain  Syncope likely vasovagal  History of chronic diastolic/systolic heart failure and ischemic cardiomyopathy LVEF 35-40%  Essential hypertension.  History of coronary artery disease/non-STEMI/LAD stent  Diet-controlled diabetes mellitus.  Chronic anemia with baseline hemoglobin around 7-8  Gastroesophageal reflux disease  Severe generalized muscle deconditioning.    Other medical issues:  Past Medical History:   Diagnosis Date     Acute on chronic congestive heart failure, unspecified heart failure type (H) 7/19/2022     Allergic rhinitis, cause unspecified      Arthritis      CAD (coronary artery disease)     Cath 12/2015: aspiration thrombectomy and CAMILA to mid and prox LAD. Cath 1/9/16: ASA/ticargelor held due to LGI bleed and she thrombosed the Lad stent. Aspiration thrombectomy and PTCA to mLAD.     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     Ischemic cardiomyopathy      Migraine, unspecified, without mention of intractable migraine without mention of status migrainosus      NSTEMI (non-ST elevated myocardial infarction) (H) 08-10-15     Obesity, unspecified      Osteoporosis 11/08     Other  and unspecified hyperlipidemia      Other iron deficiency anemia 4/21/2016     Other seborrheic keratosis      PAF (paroxysmal atrial fibrillation) (H)      Paroxysmal atrial fibrillation (H) 10/26/2016     Pericarditis age 15     PONV (postoperative nausea and vomiting)      Postop DVT right calf 1988     Pyogenic granuloma of skin and subcutaneous tissue      R upper extremity edema, postop     ? RSD     Solitary cyst of breast      TSH deficiency      Type 2 diabetes mellitus with diabetic nephropathy (H)      Type 2 diabetes mellitus with stage 3 chronic kidney disease (H)      Type 2 diabetes, HbA1c goal < 7% (H)      VASOMOTOR RHINITIS 2006    Dr. Wilson     Viral warts, unspecified        History of Present Illness   Gosia Alonzo is an 89 year old female who presented with left broken arm.  Please see the admission history and physical for full details.    Hospital Course   89-year-old female with history of diastolic/systolic heart failure, ischemic cardiomyopathy, paroxysmal atrial fibrillation and chronic back pain due to compression fractures, hypertension, osteoarthritis, coronary artery disease with prior NSTEMI/LAD stent, diabetes mellitus type 2, anemia and GERD who was initially admitted 9/11-9/14 due to pain from tracking/lumbar compression fractures and left shoulder and right knee pain with possible right superior and inferior rami fracture and CHF exacerbation.     She sustained another fall/syncope while bandaging her lower extremity which is quite painful.  And had both distal and proximal left humerus fracture.  She did have bleeding from a skin laceration on her chin and Steri-Strips were applied.  CT head is negative.        1. Distal and proximal left humerus fracture    Seen by orthopedics, nonoperative treatment advised.    Continue splint, range of motion of hand and fingers as tolerated and close to body ADLs only.    Nonweightbearing left upper extremity.     2. Acute on  chronic pain.    Takes Norco as needed and Lyrica at home. Known moderate compression fracture deformity of T3 and mild compression deformity at L1 and L4 with several old compression deformities known , brace was recommended but patient refused.     Palliative care consult appreciated Multimodal pain management as below.  ? Acetaminophen 500 mg every 8 hours  ? Lyrica 25 mg at 0800 and 1400, 50 mg at 2000 daily  ? Voltaren gel to left shoulder four times daily  ? Lidocaine patch apply to left shoulder, 12 hours on/12 hours off rotation  ? Resume [place] as PTA due to persistent pain   ? Add Atarax yulisa  ? positioning, ice, heat, mindfulness per nursing assessment     3. Syncope.    Likely vasovagal, pain mediated while bending her lower extremities.    EKG showed sinus rhythm with PACs and hemodynamically stable.     4. Chronic diastolic/systolic heart failure with ischemic cardiomyopathy with    Baseline EF of 35 to 40%.    Patient reports increased of weeping from her lower extremities but does not look volume overloaded.  Continue Lasix 40mg p.o. daily, got an additional dose on 9/29.     5. Other medical conditions below    Essential hypertension.    History of CAD/NSTEMI with LAD stent.  Continue aspirin Lipitor and carvedilol.    Diet-controlled diabetes mellitus.    Chronic anemia with hemoglobin around 7-8    GERD, continue Protonix.    Severe, generalized muscle deconditioning-       # Discharge Pain Plan:     - During her hospitalization, Gosia experienced pain due to humerus fracture and vertebral compression fracture.  The pain plan for discharge was discussed with Gosia and the plan was created in a collaborative fashion.    - Chronic/continued opioids: Norco  - Pharmacologic adjuvants:  Acetaminophen, Pregabalin (Lyrica) and Atarax  - Follow-up: [facility] health care provider and PCP      Significant Results and Procedures   SEE BELOW     Pending Results      Unresulted Labs Ordered in the Past 30 Days  of this Admission     No orders found from 8/29/2022 to 9/29/2022.          Code Status   DNR / DNI       Primary Care Physician   Michael Londono MD    GEN:  Alert, oriented x 3, appears comfortable, NAD.  HEENT:  Normocephalic/atraumatic, no scleral icterus, no nasal discharge, mouth moist.  CV:  Regular rate and rhythm, no murmur or JVD.  S1 + S2 noted, no S3 or S4.  LUNGS:  Clear to auscultation bilaterally without rales/rhonchi/wheezing/retractions.  Symmetric chest rise on inhalation noted.  ABD:  Active bowel sounds, soft, non-tender/non-distended.  No rebound/guarding/rigidity.  EXT:  No edema or cyanosis.  No joint synovitis noted.  SKIN:  Dry to touch, no exanthems noted in the visualized areas.     Discharge Disposition   Discharged to rehabilitation facility    Consultations This Hospital Stay   CARE MANAGEMENT / SOCIAL WORK IP CONSULT  PHYSICAL THERAPY ADULT IP CONSULT  OCCUPATIONAL THERAPY ADULT IP CONSULT  ORTHOPEDIC SURGERY IP CONSULT  PALLIATIVE CARE ADULT IP CONSULT    Time Spent on this Encounter   I, Sony Wynn MD, personally saw the patient today and spent greater than 30 minutes discharging this patient.     Discharge Orders   No discharge procedures on file.  Discharge Medications   Current Discharge Medication List      START taking these medications    Details   !! hydrOXYzine (ATARAX) 25 MG tablet Take 1 tablet (25 mg) by mouth every 6 hours for 5 days  Qty: 20 tablet, Refills: 0    Associated Diagnoses: Acute pain of left shoulder      melatonin 1 MG TABS tablet Take 1 tablet (1 mg) by mouth nightly as needed for sleep    Associated Diagnoses: Acute pain of left shoulder       !! - Potential duplicate medications found. Please discuss with provider.      CONTINUE these medications which have CHANGED    Details   acetaminophen (TYLENOL) 500 MG tablet Take 1 tablet (500 mg) by mouth 3 times daily    Comments: GIVE WITH ATARAX EVERY TIME  Associated Diagnoses: Acute pain of left  shoulder      HYDROcodone-acetaminophen (NORCO)  MG per tablet Take 1 tablet by mouth every 4 hours as needed for moderate to severe pain Every 4 to 6 hours as needed for severe pain  Qty: 40 tablet, Refills: 0    Associated Diagnoses: Other chronic pain      pregabalin (LYRICA) 25 MG capsule Take 1 capsule (25 mg) by mouth 2 times daily for 8 days  Qty: 15 capsule, Refills: 0    Associated Diagnoses: Compression fracture of T3 vertebra, initial encounter (H)         CONTINUE these medications which have NOT CHANGED    Details   aspirin EC 81 MG EC tablet Take 1 tablet (81 mg) by mouth daily      atorvastatin (LIPITOR) 40 MG tablet TAKE 1 TABLET BY MOUTH ONE TIME DAILY  Qty: 90 tablet, Refills: 0    Associated Diagnoses: ST elevation myocardial infarction (STEMI) involving other coronary artery of anterior wall (H); Clostridium difficile diarrhea      bisacodyl (DULCOLAX) 5 MG EC tablet Take 5 mg by mouth daily as needed for constipation      calcium carb-cholecalciferol (OS-LIGIA) 500-200 MG-UNIT tablet Take 1 tablet by mouth 2 times daily (with meals)  Qty: 100 tablet, Refills: 3    Associated Diagnoses: Age-related osteoporosis with current pathological fracture with delayed healing, subsequent encounter      carvedilol (COREG) 3.125 MG tablet Take 1 tablet (3.125 mg) by mouth 2 times daily (with meals)  Qty: 180 tablet, Refills: 3    Associated Diagnoses: Ischemic cardiomyopathy      diclofenac (VOLTAREN) 1 % topical gel Apply topically 4 times daily as needed for moderate pain      doxylamine (UNISOM) 25 MG TABS tablet Take 25 mg by mouth nightly as needed for sleep      fluticasone (FLONASE) 50 MCG/ACT nasal spray Spray 2 sprays into both nostrils daily as needed for rhinitis or allergies      furosemide (LASIX) 20 MG tablet Take 2 tablets (40 mg) by mouth daily Add additional 20mg for increased edema  Qty: 95 tablet, Refills: 2    Associated Diagnoses: Ischemic cardiomyopathy      !! hydrOXYzine (ATARAX)  25 MG tablet TAKE ONE TABLET BY MOUTH EVERY SIX HOURS AS NEEDED FOR ITCHING OR ADJUVANT PAIN  Qty: 60 tablet, Refills: 1    Associated Diagnoses: Herpes zoster infection of thoracic region      multivitamin, therapeutic (THERA-VIT) TABS tablet Take 1 tablet by mouth daily      pantoprazole (PROTONIX) 40 MG EC tablet Take 1 tablet (40 mg) by mouth daily  Qty: 180 tablet, Refills: 1    Associated Diagnoses: Acute GI bleeding; Gastroesophageal reflux disease with esophagitis, unspecified whether hemorrhage      Trolamine Salicylate (ASPERCREME EX) Externally apply topically daily as needed      alendronate (FOSAMAX) 70 MG tablet Take 1 tablet (70 mg) by mouth every 7 days  Qty: 4 tablet, Refills: 0    Associated Diagnoses: Age-related osteoporosis with current pathological fracture with delayed healing, subsequent encounter      Lidocaine (LIDOCARE) 4 % Patch Place 2 patches onto the skin every 24 hours To prevent lidocaine toxicity, patient should be patch free for 12 hrs daily.  Qty: 30 patch, Refills: 1    Associated Diagnoses: Compression fracture of T3 vertebra, initial encounter (H)      polyethylene glycol (MIRALAX) 17 GM/Dose powder Take 17 g by mouth daily  Qty: 510 g, Refills: 0    Associated Diagnoses: Compression fracture of T3 vertebra, initial encounter (H)      senna-docusate (SENOKOT-S/PERICOLACE) 8.6-50 MG tablet Take 1 tablet by mouth 2 times daily as needed for constipation  Qty: 100 tablet, Refills: 0    Associated Diagnoses: Compression fracture of T3 vertebra, initial encounter (H)       !! - Potential duplicate medications found. Please discuss with provider.        Allergies   Allergies   Allergen Reactions     Codeine      GI UPSET     Escitalopram Oxalate      fatigue     Esomeprazole Magnesium Trihydrate      HA     Gabapentin Dizziness     Dizziness, confusion     Imdur [Isosorbide]      Headache       Meperidine Hcl      N/V     Morphine Hcl      HIVES     Oxycodone      (percodan) GI UPSET      Pentazocine      (talwin)  HALLUCINATIONS     Propoxyphene Hcl      STOMACH UPSET     Sumatriptan Succinate      chest pain     Data   Most Recent 3 CBC's:Recent Labs   Lab Test 10/04/22  0616 10/03/22  0637 10/02/22  0701   WBC 10.4 11.0 10.2   HGB 7.8* 8.1* 8.1*   MCV 96 97 98    275 248      Most Recent 3 BMP's:  Recent Labs   Lab Test 10/04/22  0616 10/03/22  0637 10/02/22  0701    137 136   POTASSIUM 4.1 4.0 4.2   CHLORIDE 103 103 104   CO2 25 24 21*   BUN 31.7* 35.6* 33.7*   CR 0.79 0.74 0.72   ANIONGAP 10 10 11   LIGIA 8.4* 8.3* 8.4*   * 143* 142*     Most Recent 2 LFT's:  Recent Labs   Lab Test 09/11/22  1804 07/19/22  0836   AST 31 37   ALT 16 16   ALKPHOS 261* 143   BILITOTAL 0.3 0.6     Most Recent INR's and Anticoagulation Dosing History:  Anticoagulation Dose History     Recent Dosing and Labs Latest Ref Rng & Units 8/25/2005 8/29/2005 9/1/2005 8/10/2015 1/4/2016 1/9/2016 9/11/2022    INR 0.85 - 1.15 1.30(H) 2.26(H) 2.02(H) 0.98 1.10 0.95 0.94        Most Recent 3 Troponin's:  Recent Labs   Lab Test 11/23/21  0137 11/10/21  0750 11/10/21  0420 11/09/21  2159 02/11/20  1904 09/06/17  0619 09/06/17  0228   TROPI  --   --   --   --  <0.015 <0.015 <0.015   TROPONIN <0.015 <0.015 <0.015   < >  --   --   --     < > = values in this interval not displayed.     Most Recent Cholesterol Panel:  Recent Labs   Lab Test 09/10/21  1419   CHOL 153   LDL 79   HDL 51   TRIG 113     Most Recent 6 Bacteria Isolates From Any Culture (See EPIC Reports for Culture Details):  Recent Labs   Lab Test 01/04/16  2305   CULT No MRSA isolated     Most Recent TSH, T4 and A1c Labs:  Recent Labs   Lab Test 09/22/22  1526 05/26/15  0951 11/19/14  1024   TSH 1.12   < > 0.22*   T4  --   --  1.03   A1C 6.4*   < > 6.1*    < > = values in this interval not displayed.     Results for orders placed or performed during the hospital encounter of 09/28/22   Head CT w/o contrast    Narrative    CT SCAN OF THE HEAD WITHOUT  CONTRAST September 28, 2022 8:04 AM     HISTORY: Fall, head injury.    TECHNIQUE: Axial images of the head and coronal reformations without  IV contrast material. Radiation dose for this scan was reduced using  automated exposure control, adjustment of the mA and/or kV according  to patient size, or iterative reconstruction technique.    COMPARISON: None.    FINDINGS: No evidence of hemorrhage. Volume loss and white matter  hypoattenuation likely represent chronic small vessel ischemic change.  No acute osseous abnormality. Questionable small right parietal scalp  contusion.      Impression    IMPRESSION: No acute intracranial abnormality.    ABBIE SELBY MD         SYSTEM ID:  [id number]   Cervical spine CT w/o contrast    Narrative    CT CERVICAL SPINE WITHOUT CONTRAST September 28, 2022 8:04 AM     HISTORY: Fall, neck pain.     TECHNIQUE: Axial images of the cervical spine were obtained without  intravenous contrast. Multiplanar reformations were performed.  Radiation dose for this scan was reduced using automated exposure  control, adjustment of the mA and/or kV according to patient size, or  iterative reconstruction technique.    COMPARISON: Cervical spine CT 2/10/2018.    FINDINGS: Limited exam due to diffuse osseous demineralization and  streak artifact related to a high riding shoulder prosthesis. Fusion  change from C2 through C7 with ankylosis. No cervical spine fracture  is identified. Mild spinal canal stenosis at C1 ring. No high-grade  stenoses. Thoracic compression fractures are present. Vascular  calcifications. Presumed chronic scarring and atelectasis at the lung  apices with apical calcifications. Vascular calcifications.      Impression    IMPRESSION:   1. No evidence of acute fracture or subluxation in the cervical spine.  2. Upper thoracic compression fractures (refer to thoracic spine  report).    ABBIE SELBY MD         SYSTEM ID:  [id number]   CT Chest/Abdomen/Pelvis w Contrast     Narrative    CT CHEST/ABDOMEN/PELVIS WITH CONTRAST  9/28/2022 8:06 AM    HISTORY: Fall, left chest wall pain, left arm pain.    TECHNIQUE: CT scan obtained of the chest, abdomen, and pelvis with IV  contrast. 57mL Isovue-370 IV injected. Radiation dose for this scan  was reduced using automated exposure control, adjustment of the mA  and/or kV according to patient size, or iterative reconstruction  technique.    COMPARISON: CT chest, abdomen and pelvis on 9/11/2022.    FINDINGS:  Chest/mediastinum: Enlarged thyroid gland. No cardiomegaly or  significant pericardial effusion. Large hiatal hernia. Extensive  atherosclerotic vascular calcification of the coronary arteries.  Multiple calcified mediastinal and hilar granulomas. No significant  mediastinal or hilar lymphadenopathy. Ruptured right breast implant.    Lung/pleura: Small left pleural effusion. No significant left pleural  effusion. No significant pneumothorax. Bibasilar pulmonary opacities,  likely atelectasis. Scattered pulmonary nodules including a 4 mm right  upper lobe nodule (series 4 image 27) not significantly changed as  compared to 7/19/2022 exam.    Abdomen/pelvis:    Hepatobiliary: Scattered calcified hepatic granulomas. No suspicious  focal hepatic lesion. The gallbladder is unremarkable.    Pancreas: There is 3 cm cystic lesion in the pancreatic tail area  (series 4 image 121), not significantly changed as compared to  7/19/2020 exam, remains indeterminate.    Spleen: No splenomegaly. Multiple calcified splenic granulomas.    Adrenal glands: No adrenal nodules.    Kidneys: No radiodense kidney/ureteral stones or hydronephrosis. There  is 1.3 cm hyperattenuating partially exophytic lesion arising from the  posterolateral aspect of the interpolar region of the right kidney  (series 4 image 148), indeterminate, could represent  hemorrhagic/proteinaceous cyst versus small renal neoplasm.    Bowel: No abnormally dilated bowel loops. Colonic  diverticulosis  without diverticulitis.    Peritoneum: Small amount of free fluid in the pelvis of indeterminate  etiology, decreased in size as compared to 9/11/2022 exam.    Pelvic organs: The uterus is not visualized, likely surgically absent.    Vascular: Moderate atherosclerotic vascular calcification of the  abdominal aorta and iliac vessels. Prominent vasculature in the right  mid abdomen/upper quadrant, not significantly changed as compared to  9/11/2022 exam, remains of indeterminate etiology.    Lymph nodes: No significant abdominopelvic lymphadenopathy.    Bones and soft tissue: Postsurgical changes of right shoulder  arthroplasty and right femoral ORIF. Numerous compression deformities  of the visualized thoracolumbar spine, not significantly changed as  compared to 9/11/2022 exam. There is partially visualized mildly  displaced comminuted fracture of the proximal aspect of the left  humerus (series 4 image 8). Old nondisplaced fracture of the lateral  aspect of the left 10th rib (series 4 image 29).      Impression    IMPRESSION:   1. Partially visualized mildly displaced comminuted fracture of the  proximal aspect of the left humerus, better seen on same day left  shoulder x-ray. Please refer to concurrently performed thoracic and  lumbar spine CT for findings related to the thoracolumbar spine.  2. Small left pleural effusion and associated basilar  atelectasis/consolidation.  3. Scattered pulmonary nodules including a 4 mm right upper lobe  nodule, not significantly changed as compared to 7/19/2022 exam.  4. 3 cm cystic lesion in the pancreatic tail area, not significantly  changed as compared to 7/19/2022 exam, indeterminate, could represent  sidebranch intraductal papillary mucinous neoplasm versus other  pancreatic cystic lesions, this can be further evaluated with  contrast-enhanced MRI/MRCP for better characterization.  5. Small amount of free fluid in the pelvis, decreased in size as  compared  to 9/11/2022 exam.  6. 1.3 cm hyperattenuating partially exophytic right renal lesion,  indeterminate, could represent hemorrhagic/proteinaceous cyst versus  small renal neoplasm. This can be further evaluated with renal  ultrasound to confirm cystic nature.  7. Large hiatal hernia.     HTOMAS ARTEAGA MD         SYSTEM ID:  [id number]   CT Thoracic Spine w/o Contrast    Narrative    CT THORACIC SPINE WITHOUT CONTRAST September 28, 2022 8:07 AM     HISTORY: Back pain.      TECHNIQUE: Axial images of the thoracic spine were obtained without  intravenous contrast. Multiplanar reformations were performed.  Radiation dose for this scan was reduced using automated exposure  control, adjustment of the mA and/or kV according to patient size, or  iterative reconstruction technique.    COMPARISON: Thoracic spine MRI 9/13/2022, thoracic spine CT  11/10/2021.    FINDINGS: Diffuse osseous demineralization.    Subacute T4 compression fracture with moderate height loss, similar  compared to the prior MRI.    T5 compression with mild height loss and superior endplate sclerosis  slightly more conspicuous compared to the prior CT, age indeterminate  (recent fracture not excluded).    T6 mild vertebral body height loss, unchanged.    T7 compression fracture with mild height loss, similar compared to the  prior exams.    T8 compression fracture with mild height loss, unchanged. Subacute T9  compression fracture with moderate height loss, similar compared to  the prior MRI.    T10, T11, and T12 compression fractures with mild, mild, and moderate  height loss, unchanged.    Subacute L1 superior endplate compression fracture with mild height  loss, similar compared to the prior MRI.    Superimposed degenerative change with disc bulging and buckling  fractured vertebral bodies contribute to mild spinal canal stenosis at  multiple levels. Multiple foraminal stenoses which are moderate to  severe involving the mid to lower thoracic spine,  unchanged. Scattered  vascular calcifications. Hiatal hernia. Calcified mediastinal lymph  nodes. Stable nonspecific pulmonary opacities. Lateral pleural  effusions. Splenic calcifications.      Impression    IMPRESSION:  1. Multiple subacute and chronic thoracic compression fractures as  detailed which are overall similar compared to the prior MRI.  2. Questionable increased sclerosis involving the T5 superior endplate  (recent fracture not excluded).    ABBIE SELBY MD         SYSTEM ID:  [id number]   Lumbar spine CT w/o contrast    Narrative    CT LUMBAR SPINE WITHOUT CONTRAST  9/28/2022 8:07 AM     HISTORY: Back pain, trauma.     TECHNIQUE: Axial images of the lumbar spine were obtained without  intravenous contrast. Multiplanar reformations were performed.   Radiation dose for this scan was reduced using automated exposure  control, adjustment of the mA and/or kV according to patient size, or  iterative reconstruction technique.    COMPARISON: Abdomen and pelvis CT 9/11/2022.    FINDINGS: Diffuse osseous demineralization.    Subacute L1 fracture with mild height loss, slightly evolved compared  to the prior exam.    L4 superior endplate compression fracture with mild height loss and  superior endplate sclerosis, unchanged compared to prior exam (age  indeterminate, presumably chronic, MRI could be performed).    L5 compression deformity with mild height loss, likely chronic,  unchanged.    Fusion from L4 through the sacrum. Multilevel degenerative change.  Moderate spinal canal stenosis at L2-3. Mild foraminal stenoses  bilaterally at L2-3.    Scattered vascular calcifications. Bilateral sacroiliac joint  degenerative change. Sclerosis involving left sacral ala along the  sacroiliac joint which is similar compared to the prior exam,  presumably degenerative.    Sigmoid diverticulosis. Small volume free fluid layering within the  pelvis. Scattered vascular calcifications.       Impression    IMPRESSION:    1.  Subacute L1 compression fracture with mild height loss, slightly  evolved.  2. Age-indeterminate compression deformity at L4, unchanged.  3. Degenerative and postoperative changes.     ABBIE SELBY MD         SYSTEM ID:  [id number]   XR Shoulder Left G/E 3 Views    Narrative    XR SHOULDER LEFT G/E 3 VIEWS 9/28/2022 8:13 AM     HISTORY: shoulder pain, trauma    COMPARISON: 2/10/2018      Impression    IMPRESSION: Acute fracture of the proximal humeral metaphysis with  extension into the surgical neck. The humeral shaft is displaced  laterally and proximally migrated. Chronic osseous volume loss and  remodeling of the glenohumeral joint, acromion and distal clavicle  consistent with rotator cuff arthropathy. Osteopenia. Calcified  granulomatous disease in the chest.     MERT COLLINS MD         SYSTEM ID:  [id number]   XR Elbow Left 2 Views    Narrative    XR ELBOW LEFT 2 VIEWS 9/28/2022 8:16 AM     HISTORY: Pain following injury.    COMPARISON: None.      Impression    IMPRESSION: Osteopenia. Subtle slightly impacted fracture of the  supracondylar region of the distal humerus. Mild hypertrophic changes  in the coronoid.     MERT COLLINS MD         SYSTEM ID:  [id number]   CT Elbow Left w/o Contrast    Narrative    CT ELBOW LEFT WITHOUT CONTRAST September 29, 2022 10:22 AM    INDICATION: 89-year-old patient with left-sided elbow pain. Concern  for fracture.    COMPARISON: 9/28/2022 radiographs.    TECHNIQUE: Noncontrast. Axial, sagittal and coronal thin-section  reconstruction. Dose reduction techniques were used.   CONTRAST: None.    FINDINGS:     BONES:  -No fracture.  -Bone demineralization.    JOINTS:  -Normal elbow joint alignment.  -Mild ulnohumeral degenerative arthrosis.  -Small elbow joint effusion.    MUSCULATURE AND SOFT TISSUES:  -No focal muscle atrophy or enlargement.  -No soft tissue fluid collection is evident.      Impression    IMPRESSION:  1.  No fracture or joint malalignment.  2.  Mild  left elbow degenerative arthrosis.  3.  Small left elbow joint effusion.      TIAGO MEDINA MD         SYSTEM ID:  [id number]   CT Shoulder Left w/o Contrast    Narrative    CT SHOULDER LEFT WITHOUT CONTRAST 9/29/2022 10:21 AM    INDICATION: 89-year-old patient with left shoulder pain and proximal  humerus fracture.  COMPARISON: 9/28/2022 radiographs.    TECHNIQUE: Noncontrast. Axial, sagittal and coronal thin-section  reconstruction. Dose reduction techniques were used.   CONTRAST: None.    FINDINGS:     BONES:  -Acute oblique fracture of the left humerus surgical neck-proximal  metaphysis. There is 10 mm of lateral displacement and mild  comminution.  -Bone demineralization.  -Old compression fractures in the thoracic spine.     JOINTS:  -Advanced glenohumeral arthrosis. Marked resorption of the superior  and posterior glenoid. Flattening and remodeling of the humeral head.  Resorption and/or previous debridement of the undersurface of the  acromion and distal clavicle. Mild subchondral sclerosis and marginal  osteophytosis.  -No joint malalignment.    MUSCULATURE AND SOFT TISSUES:  -Advanced rotator cuff muscle atrophy.  -Mild deltoid muscle fatty atrophy.  -Subcutaneous edema about the shoulder.    OTHER:  -Small left pleural effusion.  -Mild dependent atelectasis in the left lung.  -Gastroesophageal hiatal hernia.  -Lower lung and splenic calcified granulomata.      Impression    IMPRESSION:  1.  Acute mildly displaced oblique fracture of the left humerus  surgical neck-proximal metaphysis.  2.  Advanced glenohumeral degenerative arthrosis and rotator cuff  arthropathy. This includes extensive resorptive changes in the glenoid  and in the undersurface of the distal clavicle and acromion.  3.  Advanced rotator cuff muscle fatty atrophy.  4.  Bone demineralization.      TIAGO MEDINA MD         SYSTEM ID:  [id number]     Disclaimer: This note consists of symbols derived from keyboarding, dictation and/or voice  recognition software. As a result, there may be errors in the script that have gone undetected. Please consider this when interpreting information found in this chart.
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
## 2022-10-05 NOTE — PLAN OF CARE
Goal Outcome Evaluation:     Patient vital signs are at baseline: Yes  Patient able to ambulate as they were prior to admission or with assist devices provided by therapies during their stay:  No,  Reason:  refuses OOB  Patient MUST void prior to discharge:  Yes  Patient able to tolerate oral intake:  Yes  Pain has adequate pain control using Oral analgesics:  Yes  Does patient have an identified :  Yes  Has goal D/C date and time been discussed with patient:  Yes pt to discharge to [facility] this adelina via stretcher when one is available

## 2022-10-05 NOTE — PLAN OF CARE
Occupational Therapy Discharge Summary    Reason for therapy discharge:    Discharged to transitional care facility.    Progress towards therapy goal(s). See goals on Care Plan in [facility] electronic health record for goal details.  Goals partially met.  Barriers to achieving goals:   discharge from facility.    Therapy recommendation(s):    Continued therapy is recommended.  Rationale/Recommendations:  as pt is not at prior functional baseline and would benefit from ongoing OT at TCU setting.

## 2022-10-05 NOTE — PROGRESS NOTES
[facility] GERIATRICS    PRIMARY CARE PROVIDER AND CLINIC:  Michael Londono MD, MD, [address] / [facility] [address]  Chief Complaint   Patient presents with     Hospital F/U      [facility] Medical Record Number:  [id number]  Place of Service where encounter took place:  [facility] (TCU) [[id number]]    Gosia Alonzo  is a 89 year old  (11/14/1932), admitted to the above facility from  [facility]. Hospital stay 09/28/22 through 10/04/22.  HPI:    89 year old female PMH diastolic/systolic heart failure, ischemic cardiomyopathy, PAF and chronic back pain due to compression fractures, HTN, OA, CAD with prior NSTEMI/LAD stent, DM type 2, anemia and GERD initially hospitalized 9/11-9/14 due to pain from thoracic/lumbar compression fractures and left shoulder and right knee pain with possible right superior and inferior rami fracture and CHF exacerbation. Fell subsequently, noted to have distal and proximal left humerus fracture.  Ortho: nonoperative treatment, in sling, NWB left arm. Acute on chronic pain: tylenol, Lyrica, Voltaren gel, lidocaine patch, Norco, atarax.  Syncope felt to be likely vasovagal. CHF with EF 35-40%, on lasix. HTN, CAD with NSTEMI and LAD stent: on statin, carvedilol. DM2 diet controlled. Anemia chronic Hgb 7-8. GERD on PPI. To TCU for rehab.     Patient seen for initial TCU visit. Reports moderate pain, discussed many pain medications available. No headaches, dizziness, chest pain, dyspnea, bowel or bladder issues. Since admission BP range 100-121/54-68 and sats 96% room air. BG range 136-154 since admission. Temp 99.3 on admit, normotensive since. Reports PTA lives independently in a town home and is up with walker. Reviewed code status - DNR/DNI confirmed, completed POLST.     CODE STATUS/ADVANCE DIRECTIVES DISCUSSION:  No CPR- Do NOT Intubate  DNR / DNI  ALLERGIES:   Allergies   Allergen Reactions     Codeine      GI UPSET     Escitalopram  Oxalate      fatigue     Esomeprazole Magnesium Trihydrate      HA     Gabapentin Dizziness     Dizziness, confusion     Imdur [Isosorbide]      Headache       Meperidine Hcl      N/V     Morphine Hcl      HIVES     Oxycodone      (percodan) GI UPSET     Pentazocine      (talwin)  HALLUCINATIONS     Propoxyphene Hcl      STOMACH UPSET     Sumatriptan Succinate      chest pain      PAST MEDICAL HISTORY:   Past Medical History:   Diagnosis Date     Acute on chronic congestive heart failure, unspecified heart failure type (H) 7/19/2022     Allergic rhinitis, cause unspecified      Arthritis      CAD (coronary artery disease)     Cath 12/2015: aspiration thrombectomy and CAMILA to mid and prox LAD. Cath 1/9/16: ASA/ticargelor held due to LGI bleed and she thrombosed the Lad stent. Aspiration thrombectomy and PTCA to mLAD.     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     Ischemic cardiomyopathy      Migraine, unspecified, without mention of intractable migraine without mention of status migrainosus      NSTEMI (non-ST elevated myocardial infarction) (H) 08-10-15     Obesity, unspecified      Osteoporosis 11/08     Other and unspecified hyperlipidemia      Other iron deficiency anemia 4/21/2016     Other seborrheic keratosis      PAF (paroxysmal atrial fibrillation) (H)      Paroxysmal atrial fibrillation (H) 10/26/2016     Pericarditis age 15     PONV (postoperative nausea and vomiting)      Postop DVT  right calf 1988     Pyogenic granuloma of skin and subcutaneous tissue      R upper extremity edema, postop     ? RSD     Solitary cyst of breast      TSH deficiency      Type 2 diabetes mellitus with diabetic nephropathy (H)      Type 2 diabetes mellitus with stage 3 chronic kidney disease (H)      Type 2 diabetes, HbA1c goal < 7% (H)      VASOMOTOR RHINITIS 2006    Dr. Wilson     Viral warts, unspecified       PAST SURGICAL HISTORY:   has a past surgical history that includes surgical history of -  (1/95); NONSPECIFIC PROCEDURE (surgery approx 1980); surgical history of -  (age 4); surgical history of -  (age 38); NONSPECIFIC PROCEDURE (1996); surgical history of -  (1/97); surgical history of -  (1988); surgical history of -  (6/97); surgical history of - ; surgical history of -  (8/97); surgical history of -  (11/98); surgical history of -  (4/88); surgical history of -  (10/99); surgical history of -  (1/04); surgical history of -  (4/05); Breast surgery; colonoscopy; appendectomy; Phacoemulsification clear cornea with standard intraocular lens implant (12/16/2013); angiogram (12/29/15); angiogram (01/09/16); Esophagoscopy, gastroscopy, duodenoscopy (EGD), combined (N/A, 2/12/2020); Head and neck surgery (01/1989); orthopedic surgery (01/1998); back surgery (01/1989); and Esophagoscopy, gastroscopy, duodenoscopy (EGD), combined (N/A, 7/22/2022).  FAMILY HISTORY: family history includes C.A.D. in her father; Cancer in her brother and mother; Hypertension in her sister.  SOCIAL HISTORY:   reports that she has never smoked. She has never used smokeless tobacco. She reports that she does not drink alcohol and does not use drugs.  Patient's living condition: lives alone    Post Discharge Medication Reconciliation Status:   Post Medication Reconciliation Status: Discharge medications reconciled, continue medications without change    Current Outpatient Medications   Medication Sig     acetaminophen (TYLENOL) 500 MG  tablet Take 1 tablet (500 mg) by mouth 3 times daily     alendronate (FOSAMAX) 70 MG tablet Take 1 tablet (70 mg) by mouth every 7 days     aspirin EC 81 MG EC tablet Take 1 tablet (81 mg) by mouth daily     atorvastatin (LIPITOR) 40 MG tablet TAKE 1 TABLET BY MOUTH ONE TIME DAILY (Patient taking differently: Take 40 mg by mouth every evening)     bisacodyl (DULCOLAX) 5 MG EC tablet Take 5 mg by mouth daily as needed for constipation     calcium carb-cholecalciferol (OS-LIGIA) 500-200 MG-UNIT tablet Take 1 tablet by mouth 2 times daily (with meals)     carvedilol (COREG) 3.125 MG tablet Take 1 tablet (3.125 mg) by mouth 2 times daily (with meals)     diclofenac (VOLTAREN) 1 % topical gel Apply topically 4 times daily as needed for moderate pain     doxylamine (UNISOM) 25 MG TABS tablet Take 25 mg by mouth nightly as needed for sleep     fluticasone (FLONASE) 50 MCG/ACT nasal spray Spray 2 sprays into both nostrils daily as needed for rhinitis or allergies     furosemide (LASIX) 20 MG tablet Take 2 tablets (40 mg) by mouth daily Add additional 20mg for increased edema     HYDROcodone-acetaminophen (NORCO)  MG per tablet Take 1 tablet by mouth every 4 hours as needed for moderate to severe pain Every 4 to 6 hours as needed for severe pain     hydrOXYzine (ATARAX) 25 MG tablet Take 1 tablet (25 mg) by mouth every 6 hours for 5 days     hydrOXYzine (ATARAX) 25 MG tablet TAKE ONE TABLET BY MOUTH EVERY SIX HOURS AS NEEDED FOR ITCHING OR ADJUVANT PAIN     Lidocaine (LIDOCARE) 4 % Patch Place 2 patches onto the skin every 24 hours To prevent lidocaine toxicity, patient should be patch free for 12 hrs daily.     melatonin 1 MG TABS tablet Take 1 tablet (1 mg) by mouth nightly as needed for sleep     multivitamin, therapeutic (THERA-VIT) TABS tablet Take 1 tablet by mouth daily     pantoprazole (PROTONIX) 40 MG EC tablet Take 1 tablet (40 mg) by mouth daily (Patient taking differently: Take 40 mg by mouth 2 times daily)  "    polyethylene glycol (MIRALAX) 17 GM/Dose powder Take 17 g by mouth daily     pregabalin (LYRICA) 25 MG capsule Take 1 capsule (25 mg) by mouth 2 times daily for 8 days     senna-docusate (SENOKOT-S/PERICOLACE) 8.6-50 MG tablet Take 1 tablet by mouth 2 times daily as needed for constipation     Trolamine Salicylate (ASPERCREME EX) Externally apply topically daily as needed     No current facility-administered medications for this visit.       ROS:  10 point ROS of systems including Constitutional, Eyes, Respiratory, Cardiovascular, Gastroenterology, Genitourinary, Integumentary, Musculoskeletal, Psychiatric were all negative except for pertinent positives noted in my HPI.    Vitals:  /68   Pulse 81   Temp 98.4  F (36.9  C)   Resp 18   Ht 1.651 m (5' 5\")   LMP  (LMP Unknown)   SpO2 96%   BMI 26.48 kg/m    Exam:  GENERAL APPEARANCE:  Alert, in no distress, pleasant, cooperative, oriented x self place and recent events, frail  EYES:  EOM, lids, pupils and irises normal, sclera clear and conjunctiva normal, no discharge or mattering on lids or lashes noted  ENT:  Mouth normal, moist mucous membranes, nose normal without drainage or crusting, external ears without lesions, hearing acuity intact  NECK: supple, symmetrical, trachea midline  RESP:  respiratory effort normal, no chest wall tenderness, no respiratory distress, Lung sounds clear, patient is on room air  CV:  Auscultation of heart done, rate and rhythm controlled and regular today, no murmur, no rub or gallop. Edema +1 pedal, rubor for lower legs  ABDOMEN:  normal bowel sounds, soft, nontender, no palpable masses.  M/S:   Gait and station unsafe without assistance, no tenderness or swelling of the joints; left arm in sling, digits normal  NEURO: cranial nerves 2-12 grossly intact, no facial asymmetry, no speech deficits and able to follow directions, moves all extremities symmetrically  PSYCH:  insight and judgement and memory appears intact, " affect and mood normal     Lab/Diagnostic data:  Most Recent 3 CBC's:  Recent Labs   Lab Test 10/04/22  0616 10/03/22  0637 10/02/22  0701   WBC 10.4 11.0 10.2   HGB 7.8* 8.1* 8.1*   MCV 96 97 98    275 248     Most Recent 3 BMP's:  Recent Labs   Lab Test 10/04/22  0616 10/03/22  0637 10/02/22  0701    137 136   POTASSIUM 4.1 4.0 4.2   CHLORIDE 103 103 104   CO2 25 24 21*   BUN 31.7* 35.6* 33.7*   CR 0.79 0.74 0.72   ANIONGAP 10 10 11   LIGIA 8.4* 8.3* 8.4*   * 143* 142*       ASSESSMENT/PLAN:  Closed fracture of proximal end of left humerus with routine healing, unspecified fracture morphology, subsequent encounter  Other closed displaced fracture of proximal end of left humerus, initial encounter  Compression fracture of thoracic vertebra with routine healing, unspecified thoracic vertebral level, subsequent encounter  Compression fracture of lumbar vertebra with routine healing, unspecified lumbar vertebral level, subsequent encounter  Osteoarthritis, unspecified osteoarthritis type, unspecified site  Physical deconditioning  Acute on chronic, ongoing. Continue tylenol 500 mg TID, fosamax 70 mg weekly, calcium/vit d 500-200 1 tab BID, Voltaren gel 1% QID PRN pain, Norco  mg 1 tab every 4 hrs PRN, atarax 25 mg every 6 hrs PRN and every 6 hrs scheduled x 5 days, lidocaine 4% 2 patches TD daily, Lyrica 25 mg BID x 8 days, Aspercreme daily PRN. Staff to update provider if not effective. Therapies as ordered and f/u progress next visit. Ortho f/u as recommended.     Chronic combined systolic and diastolic congestive heart failure (H)  Coronary artery disease involving native coronary artery of native heart without angina pectoris  Ischemic cardiomyopathy  Hypertension, unspecified type  PAF (paroxysmal atrial fibrillation) (H)  Chronic, appears stable, asymptomatic and compensated. Continue asa 81 mg daily, Lipitor 40 mg daily, coreg 3.125 mg BID, lasix 40 mg daily, monitor vs and wt and review  ranges next visit. Cardiology f/u per home routine.     Stage 3 chronic kidney disease, unspecified whether stage 3 a or 3b CKD (H)  Chronic. Recent Cr 0.7-0.8 range. Avoid nephrotoxins. BMP check 10/10 and f/u results.     Type 2 diabetes mellitus with diabetic nephropathy, without long-term current use of insulin (H)  Chronic, diet control. No need for routine BG checks. Recent A1C 6.4 on 9/22/22.     Chronic anemia  Chronic, stable. MVI daily, CBC check 10/10. Hgb 7-8 range recent check, 7.8 on 10/4.     Gastroesophageal reflux disease without esophagitis  No symptoms, continue PTA Protonix 40 mg BID, monitor.     Constipation  Managed well with Miralax daily, senna s PRN.     Advanced directives, counseling/discussion  Reviewed and completed POLST: DNR/DNI desired.     Orders:  1. DNR/DNI  2. CBC and BMP 10/10 diagnosis anemia    Total unit/floor time of 38 minutes spent consisted of the following: examination of patient, reviewing the record including pertinent labs and imaging. More than 50% of this time was spent in coordination of care with the patient, nursing staff, and other healthcare providers. This time was spent on discussing the care plan including labs, discharge/safe placement, and specialty follow up. Additional specific discussion included review pain medications, management of constipation, weight bearing restrictions, follow up labs, expected TCU course/routine/discharge planning and clarification of meds/orders with nursing for a total of over 20 min.      Electronically signed by:  WILLIE Beaver CNP

## 2022-10-05 NOTE — PLAN OF CARE
Physical Therapy Discharge Summary    Reason for therapy discharge:    Discharged to transitional care facility.    Progress towards therapy goal(s). See goals on Care Plan in [facility] electronic health record for goal details.  Goals not met.  Barriers to achieving goals:   discharge from facility.    Therapy recommendation(s):    Continued therapy is recommended.  Rationale/Recommendations:  TCU per prior PT recommendation.
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
## 2022-10-13 NOTE — PROGRESS NOTES
"[facility] GERIATRICS    Chief Complaint   Patient presents with     RECHECK     HPI:  Gosia Alonzo is a 89 year old  (11/14/1932), who is being seen today for an episodic care visit at: [facility] (TCU) [[id number]].     Per recent TCU provider progress notes:   89 year old female PMH diastolic/systolic heart failure, ischemic cardiomyopathy, PAF and chronic back pain due to compression fractures, HTN, OA, CAD with prior NSTEMI/LAD stent, DM type 2, anemia and GERD initially hospitalized 9/11-9/14 due to pain from thoracic/lumbar compression fractures and left shoulder and right knee pain with possible right superior and inferior rami fracture and CHF exacerbation. Fell subsequently, noted to have distal and proximal left humerus fracture.  Ortho: nonoperative treatment, in sling, NWB left arm. Acute on chronic pain: tylenol, Lyrica, Voltaren gel, lidocaine patch, Norco, atarax.  Syncope felt to be likely vasovagal. CHF with EF 35-40%, on lasix. HTN, CAD with NSTEMI and LAD stent: on statin, carvedilol. DM2 diet controlled. Anemia chronic Hgb 7-8. GERD on PPI. To TCU for rehab.     Today's concern is: seen for f/u pain, mobility, c/o nausea. No headaches, chest pain, dyspnea, bowel or bladder issues. Pain in back, arm not well managed patient/family request pain medications be scheduled, addition of Voltaren gel to joints. Requires min assist with bed mobility. BP range 109-132/57-73 and sats 96% room air.     Allergies, and PMH/PSH reviewed in [facility] today.  REVIEW OF SYSTEMS:  4 point ROS including Respiratory, CV, GI and , other than that noted in the HPI,  is negative    Objective:   /73   Pulse 90   Temp 98.2  F (36.8  C)   Resp 18   Ht 1.651 m (5' 5\")   Wt 53.4 kg (117 lb 12.8 oz)   LMP  (LMP Unknown)   SpO2 96%   BMI 19.60 kg/m    GENERAL APPEARANCE:  Alert, in no distress, pleasant, cooperative, oriented x self place and recent events, frail  EYES:  EOM, lids, pupils and irises " normal, sclera clear and conjunctiva normal, no discharge or mattering on lids or lashes noted  ENT:  Mouth normal, moist mucous membranes, nose normal without drainage or crusting, external ears without lesions, hearing acuity intact  RESP:  respiratory effort normal, no chest wall tenderness, no respiratory distress, Lung sounds clear, patient is on room air  CV:  Auscultation of heart done, rate and rhythm controlled and regular today, no murmur, no rub or gallop. Edema +1 pedal  ABDOMEN:  normal bowel sounds, soft, nontender, no palpable masses.  M/S:   Gait and station unsafe without assistance, no tenderness or swelling of the joints; left arm in sling, digits normal  NEURO: cranial nerves 2-12 grossly intact, no facial asymmetry, no speech deficits and able to follow directions, moves all extremities symmetrically  PSYCH:  insight and judgement and memory appears intact, affect and mood normal     10/9/22 WBC 8.3, HGB 8.2,   Most Recent 3 CBC's:Recent Labs   Lab Test 10/04/22  0616 10/03/22  0637 10/02/22  0701   WBC 10.4 11.0 10.2   HGB 7.8* 8.1* 8.1*   MCV 96 97 98    275 248     10/9/22: Na 140, K 4.0, BUN 36, Cr 0.75  Most Recent 3 BMP's:Recent Labs   Lab Test 10/04/22  0616 10/03/22  0637 10/02/22  0701    137 136   POTASSIUM 4.1 4.0 4.2   CHLORIDE 103 103 104   CO2 25 24 21*   BUN 31.7* 35.6* 33.7*   CR 0.79 0.74 0.72   ANIONGAP 10 10 11   LIGIA 8.4* 8.3* 8.4*   * 143* 142*       Assessment/Plan:  Minimally displaced left proximal humerus fracture, subsequent encounter  L1, L4, and multiple thoracic spine compression fractures, subsequent encounter  Age-related osteoporosis with current pathologic fractures  Physical deconditioning  Acute, ongoing. Nonsurgical management per Ortho. NWB LUE. Stop tylenol. Change Norco to 5-325 mg take 2 tabs TID and TID PRN, continue pregabalin 25 mg twice daily, lidocaine patch, diclofenac topical gel to painful joints. Continue alendronate 70  mg every Wednesday calcium and vitamin D supplements. Therapies as ordered and f/u with progress. Follow-up with Ortho as directed.     CAD, s/p CAMILA  Ischemic cardiomyopathy  Chronic HFrEF  Moderate pulmonary hypertension  Essential hypertension  Dyslipidemia  History of PAF  History of right calf DVT   Chronic, stable.   Continue aspirin 81 mg daily, atorvastatin 40 mg daily, carvedilol 3.125 mg twice daily, and furosemide 40 mg daily. Not on AC due to fall risk. Monitor vs, wt and review ranges next visit. Cardiology f/u as recommended.     DM type II, diet-controlled  Chronic, diet controlled. No routine BG checks.     CKD stage 3  Baseline creatinine around 0.8, stable at 0.75 on 10/9. Avoid nephrotoxins. Monitor BMP PRN.     GERD  No symptoms. Continue pantoprazole 40 mg daily    Acute on chronic anemia  Baseline hemoglobin 9-11. Hgb 8.2 on last check 10/9. Monitor hemoglobin periodically    Slow transit constipation  Nausea  Acute on chronic. Continue the bowel regimen, PRN Zofran. Staff to update provider if not effective.   956}  Post Medication Reconciliation Status: Discharge medications reconciled and changed, see notes/orders    Orders:  Zofran 4 mg PO TID PRN nausea  Stop tylenol  Change Norco to 5-325 mg tabs take 2 tabs PO TID and TID PRN pain  Start Voltaren gel 1% 2 gm to painful joints TID diagnosis pain    Electronically signed by: WILLIE Beaver CNP

## 2022-10-18 NOTE — PROGRESS NOTES
"[facility] GERIATRICS    Chief Complaint   Patient presents with     RECHECK     HPI:  Gosia Alonzo is a 89 year old  (11/14/1932), who is being seen today for an episodic care visit at: [facility] (TCU) [[id number]].     Per recent TCU provider progress notes:   89 year old female PMH diastolic/systolic heart failure, ischemic cardiomyopathy, PAF and chronic back pain due to compression fractures, HTN, OA, CAD with prior NSTEMI/LAD stent, DM type 2, anemia and GERD initially hospitalized 9/11-9/14 due to pain from thoracic/lumbar compression fractures and left shoulder and right knee pain with possible right superior and inferior rami fracture and CHF exacerbation. Fell subsequently, noted to have distal and proximal left humerus fracture.  Ortho: nonoperative treatment, in sling, NWB left arm. Acute on chronic pain: tylenol, Lyrica, Voltaren gel, lidocaine patch, Norco, atarax.  Syncope felt to be likely vasovagal. CHF with EF 35-40%, on lasix. HTN, CAD with NSTEMI and LAD stent: on statin, carvedilol. DM2 diet controlled. Anemia chronic Hgb 7-8. GERD on PPI. To TCU for rehab.     Today's concern is: seen for f/u pain, mobility, bowel function. No headaches, chest pain, dyspnea, bowel or bladder issues. Pain in back, arm not well managed despite recently scheduled Norco. Discussed adding scheduled atarax, patient states this was her home routine. Sit to stand with mod A in therapies. BP range 100-152/55-75 and sats 97% room air.     Allergies, and PMH/PSH reviewed in EPIC today.  REVIEW OF SYSTEMS:  4 point ROS including Respiratory, CV, GI and , other than that noted in the HPI,  is negative    Objective:   BP (!) 152/75   Pulse 74   Temp 97.9  F (36.6  C)   Resp 18   Ht 1.651 m (5' 5\")   Wt 53.5 kg (117 lb 14.4 oz)   LMP  (LMP Unknown)   SpO2 97%   BMI 19.62 kg/m    GENERAL APPEARANCE:  Alert, in no distress, pleasant, cooperative, oriented x self place and recent events, frail  EYES:  " EOM, lids, pupils and irises normal, sclera clear and conjunctiva normal, no discharge or mattering on lids or lashes noted  ENT:  Mouth normal, moist mucous membranes, nose normal without drainage or crusting, external ears without lesions, hearing acuity intact  RESP:  respiratory effort normal, no chest wall tenderness, no respiratory distress, Lung sounds clear, patient is on room air  CV:  Auscultation of heart done, rate and rhythm controlled and regular today, no murmur, no rub or gallop. Edema +1 pedal  ABDOMEN:  normal bowel sounds, soft, nontender, no palpable masses.  M/S:   Gait and station unsafe without assistance, no tenderness or swelling of the joints; left arm in sling, digits normal  NEURO: cranial nerves 2-12 grossly intact, no facial asymmetry, no speech deficits and able to follow directions, moves all extremities symmetrically  PSYCH:  insight and judgement and memory appears intact, affect and mood normal     10/9/22 WBC 8.3, HGB 8.2,   Most Recent 3 CBC's:  Recent Labs   Lab Test 10/04/22  0616 10/03/22  0637 10/02/22  0701   WBC 10.4 11.0 10.2   HGB 7.8* 8.1* 8.1*   MCV 96 97 98    275 248     10/9/22: Na 140, K 4.0, BUN 36, Cr 0.75  Most Recent 3 BMP's:  Recent Labs   Lab Test 10/04/22  0616 10/03/22  0637 10/02/22  0701    137 136   POTASSIUM 4.1 4.0 4.2   CHLORIDE 103 103 104   CO2 25 24 21*   BUN 31.7* 35.6* 33.7*   CR 0.79 0.74 0.72   ANIONGAP 10 10 11   LIGIA 8.4* 8.3* 8.4*   * 143* 142*       Assessment/Plan:  Minimally displaced left proximal humerus fracture, subsequent encounter  L1, L4, and multiple thoracic spine compression fractures, subsequent encounter  Age-related osteoporosis with current pathologic fractures  Physical deconditioning  Acute, ongoing. Nonsurgical management per Ortho. NWB LUE. Stopped tylenol. Changed Norco to 5-325 mg take 2 tabs TID and TID PRN, continue pregabalin 25 mg twice daily, lidocaine patch, diclofenac topical gel to  painful joints. Continue alendronate 70 mg every Wednesday calcium and vitamin D supplements. Change atarax to 25 mg QID scheduled. Therapies as ordered and f/u with progress. Follow-up with Ortho as directed.     CAD, s/p CAMILA  Ischemic cardiomyopathy  Chronic HFrEF  Moderate pulmonary hypertension  Essential hypertension  Dyslipidemia  History of PAF  History of right calf DVT   Chronic, stable. Continue aspirin 81 mg daily, atorvastatin 40 mg daily, carvedilol 3.125 mg twice daily, and furosemide 40 mg daily. Not on AC due to fall risk. Monitor vs, wt and review ranges next visit. Cardiology f/u as recommended.     DM type II, diet-controlled  Chronic, diet controlled. No routine BG checks.     CKD stage 3  Baseline creatinine around 0.8, stable at 0.75 on 10/9. Avoid nephrotoxins. Monitor BMP PRN.     GERD  No symptoms. Continue pantoprazole 40 mg daily    Acute on chronic anemia  Baseline hemoglobin 9-11. Hgb 8.2 on last check 10/9. Monitor hemoglobin periodically    Slow transit constipation  Nausea  Well managed. Continue the bowel regimen, PRN Zofran.   956}  Post Medication Reconciliation Status: Discharge medications reconciled and changed, see notes/orders    Orders:  Change atrax to 25 mg PO QID diagnosis pain    Electronically signed by: WILLIE Beaver CNP

## 2022-10-19 NOTE — PROGRESS NOTES
WOUND VISIT AT [facility]    ASSESSMENT:   1. Full thickness ulcer of right knee with fat layer exposed     PLAN/DISCUSSION:   1. Wound debrided today  2. Wound care plan: dress with plurogel and foam dressing, change daily    HISTORY OF PRESENT ILLNESS:   Gosia Alonzo is a 89 year old female who is seen at [facility] today. Was recently hospitalized at [facility] from 9/28-10/4 after a fall causing humeral fractures. Pmhx notable for diastolic/systolic heart failure, ischemic cardiomyopathy, PAF and chronic back pain due to compression fractures, HTN, OA, CAD with prior NSTEMI/LAD stent, DM type 2, anemia and GERD. Nursing notes a poor healing R knee wound. They have been dressing with xeroform and gauze.      PHYSICAL EXAM:  GENERAL: Patient is alert and oriented and in no acute distress, very frail with muscle wasting  INTEGUMENTARY: quarter sized wound over right patella with eschar and slough    PROCEDURE (knee): Patient was determined to be capable of making their own medical decisions and informed consent was obtained. Using a 15 blade a selective debridement of non-viable tissue was performed of <20 cm. Hemostasis was achieved with pressure. The patient tolerated the procedure well.      MDM: 30 minutes were spent on the date of the visit reviewing previous chart notes, evaluating patient and developing the treatment plan, this excludes any time spent on procedures.     Electronically signed by Marli Don PA-C on October 18, 2022

## 2022-10-25 NOTE — PROGRESS NOTES
"[facility] GERIATRICS    Chief Complaint   Patient presents with     RECHECK     HPI:  Gosia Alonzo is a 89 year old  (11/14/1932), who is being seen today for an episodic care visit at: [facility] (TCU) [[id number]].     Per recent TCU provider progress notes:   89 year old female PMH diastolic/systolic heart failure, ischemic cardiomyopathy, PAF and chronic back pain due to compression fractures, HTN, OA, CAD with prior NSTEMI/LAD stent, DM type 2, anemia and GERD initially hospitalized 9/11-9/14 due to pain from thoracic/lumbar compression fractures and left shoulder and right knee pain with possible right superior and inferior rami fracture and CHF exacerbation. Fell subsequently, noted to have distal and proximal left humerus fracture.  Ortho: nonoperative treatment, in sling, NWB left arm. Acute on chronic pain: tylenol, Lyrica, Voltaren gel, lidocaine patch, Norco, atarax.  Syncope felt to be likely vasovagal. CHF with EF 35-40%, on lasix. HTN, CAD with NSTEMI and LAD stent: on statin, carvedilol. DM2 diet controlled. Anemia chronic Hgb 7-8. GERD on PPI. To TCU for rehab.     Today's concern is: seen for f/u pain, mobility, bowel function. No headaches, chest pain, dyspnea. Pain in back, arm not well managed despite recently scheduled Norco. Changed atarax back to PRN per patient requests. She also asks to resume twice daily Lyrica as feels this was helpful. Reported nausea and emesis today, not feeling well. Encouraged to ask for Zofran. Transfers with mod A in therapies. BP range 106-146/59-83 and sats 99% room air. SLUMS 22/30. .     Allergies, and PMH/PSH reviewed in EPIC today.  REVIEW OF SYSTEMS:  4 point ROS including Respiratory, CV, GI and , other than that noted in the HPI,  is negative    Objective:   BP (!) 146/74   Pulse 83   Temp 99  F (37.2  C)   Resp 18   Ht 1.651 m (5' 5\")   Wt 53.5 kg (117 lb 14.4 oz)   LMP  (LMP Unknown)   SpO2 99%   BMI 19.62 kg/m    GENERAL " APPEARANCE:  Alert, in no distress, pleasant, cooperative, oriented x self place and recent events, frail  EYES:  EOM, lids, pupils and irises normal, sclera clear and conjunctiva normal, no discharge or mattering on lids or lashes noted  ENT:  Mouth normal, moist mucous membranes, nose normal without drainage or crusting, external ears without lesions, hearing acuity intact  RESP:  respiratory effort normal, no chest wall tenderness, no respiratory distress, Lung sounds clear, patient is on room air  CV:  Auscultation of heart done, rate and rhythm controlled and regular today, no murmur, no rub or gallop. Edema +1 pedal  ABDOMEN:  normal bowel sounds, soft, nontender, no palpable masses.  M/S:   Gait and station unsafe without assistance, no tenderness or swelling of the joints; left arm in sling, digits normal  NEURO: cranial nerves 2-12 grossly intact, no facial asymmetry, no speech deficits and able to follow directions, moves all extremities symmetrically  PSYCH:  insight and judgement and memory appears intact, affect and mood normal     10/9/22 WBC 8.3, HGB 8.2,   Most Recent 3 CBC's:  Recent Labs   Lab Test 10/04/22  0616 10/03/22  0637 10/02/22  0701   WBC 10.4 11.0 10.2   HGB 7.8* 8.1* 8.1*   MCV 96 97 98    275 248     10/9/22: Na 140, K 4.0, BUN 36, Cr 0.75  Most Recent 3 BMP's:  Recent Labs   Lab Test 10/04/22  0616 10/03/22  0637 10/02/22  0701    137 136   POTASSIUM 4.1 4.0 4.2   CHLORIDE 103 103 104   CO2 25 24 21*   BUN 31.7* 35.6* 33.7*   CR 0.79 0.74 0.72   ANIONGAP 10 10 11   LIGIA 8.4* 8.3* 8.4*   * 143* 142*       Assessment/Plan:  Minimally displaced left proximal humerus fracture, subsequent encounter  L1, L4, and multiple thoracic spine compression fractures, subsequent encounter  Age-related osteoporosis with current pathologic fractures  Physical deconditioning  Acute, ongoing. Nonsurgical management per Ortho. NWB LUE. Stopped tylenol. Changed Norco to 5-325 mg  take 2 tabs TID and TID PRN, resume pregabalin 25 mg twice daily, lidocaine patch, diclofenac topical gel to painful joints. Continue alendronate 70 mg every Wednesday calcium and vitamin D supplements. Change atarax to 25 mg QID ORN. Therapies as ordered and f/u with progress. Follow-up with Ortho as directed.     CAD, s/p CAMILA  Ischemic cardiomyopathy  Chronic HFrEF  Moderate pulmonary hypertension  Essential hypertension  Dyslipidemia  History of PAF  History of right calf DVT   Chronic, stable. Continue aspirin 81 mg daily, atorvastatin 40 mg daily, carvedilol 3.125 mg twice daily, and furosemide 40 mg daily. Not on AC due to fall risk. Monitor vs, wt and review ranges next visit. Cardiology f/u as recommended.     DM type II, diet-controlled  Chronic, diet controlled. No routine BG checks.     CKD stage 3  Baseline creatinine around 0.8, stable at 0.75 on 10/9. Avoid nephrotoxins. Monitor BMP PRN.     GERD  No symptoms. Continue pantoprazole 40 mg daily    Acute on chronic anemia  Baseline hemoglobin 9-11. Hgb 8.2 on last check 10/9. Monitor hemoglobin periodically    Slow transit constipation  Well managed. Continue the bowel regimen, PRN Zofran.     Nausea  Ongoing. Encourage zofran use and check CBC and BMP on 10/26.   956}  Post Medication Reconciliation Status: Discharge medications reconciled and changed, see notes/orders    Orders:  Change atrax to 25 mg PO QID PRN per patient request  Lyrica 25 mg PO BID diagnosis pain  CBC and BMP 10/26 diagnosis malaise    Electronically signed by: WILLIE Beaver CNP

## 2022-10-27 NOTE — PROGRESS NOTES
Clinical Product Navigator RN reviewed chart; patient on payer product coverage.  Review results: reviewed record; patient remains at [facility] receiving skilled care. No discharge date or disposition set.     RN CPN will continue to monitor and be available to patient and care team.     Mervat Capps RN/Clinical Product Navigator

## 2022-11-03 NOTE — PROGRESS NOTES
[facility] GERIATRICS    Chief Complaint   Patient presents with     RECHECK     HPI:  Gosia Alonzo is a 89 year old  (11/14/1932), who is being seen today for an episodic care visit at: [facility] (TCU) [[id number]].     Per recent TCU provider progress notes:   89 year old female PMH diastolic/systolic heart failure, ischemic cardiomyopathy, PAF and chronic back pain due to compression fractures, HTN, OA, CAD with prior NSTEMI/LAD stent, DM type 2, anemia and GERD initially hospitalized 9/11-9/14 due to pain from thoracic/lumbar compression fractures and left shoulder and right knee pain with possible right superior and inferior rami fracture and CHF exacerbation. Fell subsequently, noted to have distal and proximal left humerus fracture.  Ortho: nonoperative treatment, in sling, NWB left arm. Acute on chronic pain: tylenol, Lyrica, Voltaren gel, lidocaine patch, Norco, atarax.  Syncope felt to be likely vasovagal. CHF with EF 35-40%, on lasix. HTN, CAD with NSTEMI and LAD stent: on statin, carvedilol. DM2 diet controlled. Anemia chronic Hgb 7-8. GERD on PPI. To TCU for rehab.     Today's concern is: seen for f/u pain, mobility, bowel function, c/o dry eyes and blurry vision. No headaches, chest pain, dyspnea. Pain in back, arm improved. Continues Norco scheduled and PRN, PRN atarax, resumed twice daily Lyrica. Reported nausea occasionally, uses PRN Zofran. Occasional dry eyes and blurry vision, family brought in moisturizing eye drops.  Transfers with mod A in therapies. SLUMS 22/30. BP range 117-150/58-80 and sats 97% room air.    Allergies, and PMH/PSH reviewed in EPIC today.  REVIEW OF SYSTEMS:  4 point ROS including Respiratory, CV, GI and , other than that noted in the HPI,  is negative    Objective:   /58   Pulse 82   Temp 98.3  F (36.8  C)   Resp 18   Wt 49.4 kg (109 lb)   LMP  (LMP Unknown)   SpO2 98%   BMI 18.14 kg/m    GENERAL APPEARANCE:  Alert, in no distress,  cooperative. Frail  ENT:  Mouth normal, moist mucous membranes, normal hearing acuity  EYES:  Conjunctiva and lids normal  RESP:  no respiratory distress, on room air  CV: no LE edema  NEURO:   No facial asymmetry, speech clear  PSYCH:  oriented X 3, affect and mood normal     10/26/22 WBC 7.5, Hgb 8.9 and   10/9/22 WBC 8.3, HGB 8.2,   Most Recent 3 CBC's:  Recent Labs   Lab Test 10/04/22  0616 10/03/22  0637 10/02/22  0701   WBC 10.4 11.0 10.2   HGB 7.8* 8.1* 8.1*   MCV 96 97 98    275 248     10/26/22 , K 4.0, BUN 30 and Cr 0.67  10/9/22: Na 140, K 4.0, BUN 36, Cr 0.75  Most Recent 3 BMP's:  Recent Labs   Lab Test 10/04/22  0616 10/03/22  0637 10/02/22  0701    137 136   POTASSIUM 4.1 4.0 4.2   CHLORIDE 103 103 104   CO2 25 24 21*   BUN 31.7* 35.6* 33.7*   CR 0.79 0.74 0.72   ANIONGAP 10 10 11   LIGIA 8.4* 8.3* 8.4*   * 143* 142*       Assessment/Plan:  Minimally displaced left proximal humerus fracture, subsequent encounter  L1, L4, and multiple thoracic spine compression fractures, subsequent encounter  Age-related osteoporosis with current pathologic fractures  Physical deconditioning  Acute, ongoing. Nonsurgical management per Ortho. NWB LUE. Stopped tylenol. Changed Norco to 5-325 mg take 2 tabs TID and TID PRN, resumed pregabalin 25 mg twice daily, lidocaine patch, diclofenac topical gel to painful joints. Continue alendronate 70 mg every Wednesday calcium and vitamin D supplements. Changed atarax to 25 mg QID ORN. Therapies as ordered and f/u with progress. Follow-up with Ortho as directed.     CAD, s/p CAMILA  Ischemic cardiomyopathy  Chronic HFrEF  Moderate pulmonary hypertension  Essential hypertension  Dyslipidemia  History of PAF  History of right calf DVT   Chronic, stable. Continue aspirin 81 mg daily, atorvastatin 40 mg daily, carvedilol 3.125 mg twice daily, and furosemide 40 mg daily. Not on AC due to fall risk. Monitor vs, wt and review ranges next visit.  Cardiology f/u as recommended.     DM type II, diet-controlled  Chronic, diet controlled. No routine BG checks.     CKD stage 3  Baseline creatinine around 0.8, stable last check at 0.67 on 10/26. Avoid nephrotoxins. Monitor BMP PRN.     GERD  No symptoms. Continue pantoprazole 40 mg daily    Acute on chronic anemia  Baseline hemoglobin 9-11. Hgb 8.2 on 10/9 and 8.9 on 10/26. Monitor hemoglobin periodically.     Slow transit constipation  Well managed. Continue the bowel regimen.     Nausea  Ongoing off and on. Encourage Zofran use PRN.     Dry eyes  New complaint, family brought in Clear Eyes drops will write OK to use home supply.   956}  Post Medication Reconciliation Status: Discharge medications reconciled and changed, see notes/orders    Orders:  Clear Eyes drops 2 gtts each eye every 4 hrs PRN dryness. May keep at bedside, use own supply and self administer    Electronically signed by: WILLIE Beaver CNP

## 2022-11-09 NOTE — PROGRESS NOTES
Relevant Problems   No relevant active problems       Anesthetic History   No history of anesthetic complications            Review of Systems / Medical History  Patient summary reviewed, nursing notes reviewed and pertinent labs reviewed    Pulmonary        Sleep apnea: CPAP           Neuro/Psych         Psychiatric history     Cardiovascular  Within defined limits                Exercise tolerance: >4 METS     GI/Hepatic/Renal  Within defined limits              Endo/Other  Within defined limits           Other Findings              Physical Exam    Airway  Mallampati: II      Mouth opening: Normal     Cardiovascular  Regular rate and rhythm,  S1 and S2 normal,  no murmur, click, rub, or gallop  Rhythm: regular  Rate: normal         Dental    Dentition: Caps/crowns     Pulmonary  Breath sounds clear to auscultation               Abdominal         Other Findings            Anesthetic Plan    ASA: 2, emergent  Anesthesia type: total IV anesthesia and general - backup          Induction: Intravenous  Anesthetic plan and risks discussed with: Patient and Spouse [facility] GERIATRICS    Chief Complaint   Patient presents with     RECHECK     HPI:  Gosia Alonzo is a 89 year old  (11/14/1932), who is being seen today for an episodic care visit at: [facility] (TCU) [[id number]].     Per recent TCU provider progress notes:   89 year old female PMH diastolic/systolic heart failure, ischemic cardiomyopathy, PAF and chronic back pain due to compression fractures, HTN, OA, CAD with prior NSTEMI/LAD stent, DM type 2, anemia and GERD initially hospitalized 9/11-9/14 due to pain from thoracic/lumbar compression fractures and left shoulder and right knee pain with possible right superior and inferior rami fracture and CHF exacerbation. Fell subsequently, noted to have distal and proximal left humerus fracture.  Ortho: nonoperative treatment, in sling, NWB left arm. Acute on chronic pain: tylenol, Lyrica, Voltaren gel, lidocaine patch, Norco, atarax.  Syncope felt to be likely vasovagal. CHF with EF 35-40%, on lasix. HTN, CAD with NSTEMI and LAD stent: on statin, carvedilol. DM2 diet controlled. Anemia chronic Hgb 7-8. GERD on PPI. To TCU for rehab.     Today's concern is: seen for f/u pain, mobility, bowel function. No headaches, chest pain, dyspnea. Pain in back, arm not well managed, asks to change scheduled Norco frequency. Continues PRN atarax, resumed twice daily Lyrica. No nausea.  Walker 51 ft with FWW in therapies. SLUMS 22/30. BP range 105-154/53-81 and sats 98% room air.    Allergies, and PMH/PSH reviewed in [facility] today.  REVIEW OF SYSTEMS:  4 point ROS including Respiratory, CV, GI and , other than that noted in the HPI,  is negative    Objective:   /65   Pulse 80   Temp 97.6  F (36.4  C)   Resp 18   Wt 52.1 kg (114 lb 12.8 oz)   LMP  (LMP Unknown)   SpO2 98%   BMI 19.10 kg/m    GENERAL APPEARANCE:  Alert, in no distress, cooperative. Frail  ENT:  Mouth normal, moist mucous membranes, normal hearing acuity  EYES:  Conjunctiva and lids  normal  RESP:  no respiratory distress, on room air  CV: no LE edema  NEURO:   No facial asymmetry, speech clear  PSYCH:  oriented X 3, affect and mood normal     10/26/22 WBC 7.5, Hgb 8.9 and   10/9/22 WBC 8.3, HGB 8.2,   Most Recent 3 CBC's:  Recent Labs   Lab Test 10/04/22  0616 10/03/22  0637 10/02/22  0701   WBC 10.4 11.0 10.2   HGB 7.8* 8.1* 8.1*   MCV 96 97 98    275 248     10/26/22 , K 4.0, BUN 30 and Cr 0.67  10/9/22: Na 140, K 4.0, BUN 36, Cr 0.75  Most Recent 3 BMP's:  Recent Labs   Lab Test 10/04/22  0616 10/03/22  0637 10/02/22  0701    137 136   POTASSIUM 4.1 4.0 4.2   CHLORIDE 103 103 104   CO2 25 24 21*   BUN 31.7* 35.6* 33.7*   CR 0.79 0.74 0.72   ANIONGAP 10 10 11   LIGIA 8.4* 8.3* 8.4*   * 143* 142*       Assessment/Plan:  Minimally displaced left proximal humerus fracture, subsequent encounter  L1, L4, and multiple thoracic spine compression fractures, subsequent encounter  Age-related osteoporosis with current pathologic fractures  Physical deconditioning  Acute, ongoing. Nonsurgical management per Ortho. NWB LUE. Stopped tylenol. Change Norco to 5-325 mg take 2 tabs QID and QID PRN, resumed pregabalin 25 mg twice daily, lidocaine patch, diclofenac topical gel to painful joints. Continue alendronate 70 mg every Wednesday calcium and vitamin D supplements. Changed atarax to 25 mg QID ORN. Therapies as ordered and f/u with progress. Follow-up with Ortho as directed.     CAD, s/p CAMILA  Ischemic cardiomyopathy  Chronic HFrEF  Moderate pulmonary hypertension  Essential hypertension  Dyslipidemia  History of PAF  History of right calf DVT   Chronic, stable. Continue aspirin 81 mg daily, atorvastatin 40 mg daily, carvedilol 3.125 mg twice daily, and furosemide 40 mg daily. Not on AC due to fall risk. Monitor vs, wt and review ranges next visit. Cardiology f/u as recommended.     DM type II, diet-controlled  Chronic, diet controlled. No routine BG checks.     CKD  stage 3  Baseline creatinine around 0.8, stable last check at 0.67 on 10/26. Avoid nephrotoxins. Monitor BMP PRN.     GERD  No symptoms. Continue pantoprazole 40 mg daily    Acute on chronic anemia  Baseline hemoglobin 9-11. Hgb 8.2 on 10/9 and 8.9 on 10/26. Monitor hemoglobin periodically.     Slow transit constipation  Well managed. Continue the bowel regimen.     Post Medication Reconciliation Status: Discharge medications reconciled and changed, see notes/orders    Orders:  Stop tylenol  Change Norco 5/325 to 2 tabs every 6 hrs and BID PRN pain    Electronically signed by: WILLIE Beaver CNP

## 2022-11-14 NOTE — PROGRESS NOTES
Clinical Product Navigator RN reviewed chart; patient on payer product coverage.  Review results: remains at TCU receiving daily skilled therapy, no discharge date yet noted.     Will continue following TCU admission and help collaborate for any post-TCU care navigation needs.      Mervat Capps RN/Clinical Product Navigator

## 2022-11-16 NOTE — PROGRESS NOTES
[facility] GERIATRICS    Chief Complaint   Patient presents with     RECHECK     HPI:  Gosia Alonzo is a 89 year old  (11/14/1932), who is being seen today for an episodic care visit at: [facility] (TCU) [[id number]].     Per recent TCU provider progress notes:   89 year old female PMH diastolic/systolic heart failure, ischemic cardiomyopathy, PAF and chronic back pain due to compression fractures, HTN, OA, CAD with prior NSTEMI/LAD stent, DM type 2, anemia and GERD initially hospitalized 9/11-9/14 due to pain from thoracic/lumbar compression fractures and left shoulder and right knee pain with possible right superior and inferior rami fracture and CHF exacerbation. Fell subsequently, noted to have distal and proximal left humerus fracture.  Ortho: nonoperative treatment, in sling, NWB left arm. Acute on chronic pain: tylenol, Lyrica, Voltaren gel, lidocaine patch, Norco, atarax.  Syncope felt to be likely vasovagal. CHF with EF 35-40%, on lasix. HTN, CAD with NSTEMI and LAD stent: on statin, carvedilol. DM2 diet controlled. Anemia chronic Hgb 7-8. GERD on PPI. To TCU for rehab.     Today's concern is: seen for f/u pain, mobility, bowel function. No headaches, chest pain, dyspnea. Pain in back, arm managed better with increase in scheduled Norco frequency. Continues PRN atarax, resumed twice daily Lyrica. No nausea.  Walked 120 ft with FWW in therapies. SLUMS 22/30. BP range 108-137/59-70 and sats 96% room air.    Allergies, and PMH/PSH reviewed in [facility] today.  REVIEW OF SYSTEMS:  4 point ROS including Respiratory, CV, GI and , other than that noted in the HPI,  is negative    Objective:   /59   Pulse 81   Temp 98.6  F (37  C)   Resp 18   Wt 51.5 kg (113 lb 9.6 oz)   LMP  (LMP Unknown)   SpO2 96%   BMI 18.90 kg/m    GENERAL APPEARANCE:  Alert, in no distress, cooperative. Frail  ENT:  Mouth normal, moist mucous membranes, normal hearing acuity  EYES:  Conjunctiva and lids normal  RESP:   no respiratory distress, on room air  CV: no LE edema  NEURO:   No facial asymmetry, speech clear  PSYCH:  oriented X 3, affect and mood normal     10/26/22 WBC 7.5, Hgb 8.9 and   10/9/22 WBC 8.3, HGB 8.2,   Most Recent 3 CBC's:  Recent Labs   Lab Test 10/04/22  0616 10/03/22  0637 10/02/22  0701   WBC 10.4 11.0 10.2   HGB 7.8* 8.1* 8.1*   MCV 96 97 98    275 248     10/26/22 , K 4.0, BUN 30 and Cr 0.67  10/9/22: Na 140, K 4.0, BUN 36, Cr 0.75  Most Recent 3 BMP's:  Recent Labs   Lab Test 10/04/22  0616 10/03/22  0637 10/02/22  0701    137 136   POTASSIUM 4.1 4.0 4.2   CHLORIDE 103 103 104   CO2 25 24 21*   BUN 31.7* 35.6* 33.7*   CR 0.79 0.74 0.72   ANIONGAP 10 10 11   LIGIA 8.4* 8.3* 8.4*   * 143* 142*       Assessment/Plan:  Minimally displaced left proximal humerus fracture, subsequent encounter  L1, L4, and multiple thoracic spine compression fractures, subsequent encounter  Age-related osteoporosis with current pathologic fractures  Physical deconditioning  Acute, ongoing but improved. Nonsurgical management per Ortho. NWB LUE. Stopped tylenol. Changed Norco to 5-325 mg take 2 tabs QID and QID PRN, resumed pregabalin 25 mg twice daily, lidocaine patch, diclofenac topical gel to painful joints. Continue alendronate 70 mg every Wednesday calcium and vitamin D supplements. Changed atarax to 25 mg QID ORN. Therapies as ordered and f/u with progress. Follow-up with Ortho as directed.     CAD, s/p CAMILA  Ischemic cardiomyopathy  Chronic HFrEF  Moderate pulmonary hypertension  Essential hypertension  Dyslipidemia  History of PAF  History of right calf DVT   Chronic, stable. Continue aspirin 81 mg daily, atorvastatin 40 mg daily, carvedilol 3.125 mg twice daily, and furosemide 40 mg daily. Not on AC due to fall risk. Monitor vs, wt and review ranges next visit. Cardiology f/u as recommended.     DM type II, diet-controlled  Chronic, diet controlled. No routine BG checks.     CKD  stage 3  Baseline creatinine around 0.8, stable last check at 0.67 on 10/26. Avoid nephrotoxins. Monitor BMP PRN.     GERD  No symptoms. Continue pantoprazole 40 mg daily    Acute on chronic anemia  Baseline hemoglobin 9-11. Hgb 8.2 on 10/9 and 8.9 on 10/26. Monitor hemoglobin periodically.     Slow transit constipation  Well managed. Continue the bowel regimen.     Post Medication Reconciliation Status: Discharge medications reconciled, continue medications without change    Orders:  No new orders    Electronically signed by: WILLIE Beaver CNP

## 2022-12-06 NOTE — PROGRESS NOTES
[facility] GERIATRICS DISCHARGE SUMMARY  PATIENT'S NAME: Gosia Alonzo  YOB: 1932  MEDICAL RECORD NUMBER:  [id number]  Place of Service where encounter took place:  [facility] (TCU) [[id number]]    PRIMARY CARE PROVIDER AND CLINIC RESPONSIBLE AFTER TRANSFER:   Michael Londono MD, MD, [address] [facility] [address]    [facility] Provider     Transferring providers: WILLIE Beaver CNP, Dr. Miguel MD  Recent Hospitalization/ED:  [facility] Hospital stay 9/28/22 to 10/4/22.  Date of SNF Admission: 10/4/22  Date of SNF (anticipated) Discharge: 12/9/22  Discharged to: new assisted living for patient  Cognitive Scores: SLUMS: 22/30  Physical Function: Ambulating 100 ft with 4ww  DME: No new DME needed    CODE STATUS/ADVANCE DIRECTIVES DISCUSSION:  No CPR- Do NOT Intubate   ALLERGIES: Codeine, Escitalopram oxalate, Esomeprazole magnesium trihydrate, Gabapentin, Imdur [isosorbide], Meperidine hcl, Morphine hcl, Oxycodone, Pentazocine, Propoxyphene hcl, and Sumatriptan succinate    NURSING FACILITY COURSE   Medication Changes/Rationale:     Mucinex BID due to congestion    Change Norco to 5/325 2 tabs every 4 hrs PRN at discharge    Changed hydroxyzine to PRN spasms    Stopped tylenol    Added Lyrica due to pain    Added Zofran due to nausea    Per recent TCU provider progress notes:   89 year old female PMH diastolic/systolic heart failure, ischemic cardiomyopathy, PAF and chronic back pain due to compression fractures, HTN, OA, CAD with prior NSTEMI/LAD stent, DM type 2, anemia and GERD initially hospitalized 9/11-9/14 due to pain from thoracic/lumbar compression fractures and left shoulder and right knee pain with possible right superior and inferior rami fracture and CHF exacerbation. Fell subsequently, noted to have distal and proximal left humerus fracture.  Ortho: nonoperative treatment, in sling, NWB left arm. Acute on chronic pain: tylenol,  Lyrica, Voltaren gel, lidocaine patch, Norco, atarax.  Syncope felt to be likely vasovagal. CHF with EF 35-40%, on lasix. HTN, CAD with NSTEMI and LAD stent: on statin, carvedilol. DM2 diet controlled. Anemia chronic Hgb 7-8. GERD on PPI. To TCU for rehab.     Patient seen for discharge visit. Noted new right LE hematoma, intact, covered with foam dressing. No other new complaints. BP range /60-76 and sats 97% on room air. She is going to [facility] with services as listed below.     Summary of nursing facility stay:   Minimally displaced left proximal humerus fracture, subsequent encounter  L1, L4, and multiple thoracic spine compression fractures, subsequent encounter  Age-related osteoporosis with current pathologic fractures  Physical deconditioning  Acute, improved. Nonsurgical management per Ortho. NWB LUE. Stopped tylenol. Changed Norco to 5-325 mg take 2 tabs QID and QID PRN, resumed pregabalin 25 mg twice daily, lidocaine patch, diclofenac topical gel to painful joints. Continue alendronate 70 mg every Wednesday calcium and vitamin D supplements. Changed atarax to 25 mg QID ORN. Follow-up with Ortho as directed. To [facility] with home care as ordered below.     CAD, s/p CAMILA  Ischemic cardiomyopathy  Chronic HFrEF  Moderate pulmonary hypertension  Essential hypertension  Dyslipidemia  History of PAF  History of right calf DVT   Chronic, stable. Continue aspirin 81 mg daily, atorvastatin 40 mg daily, carvedilol 3.125 mg twice daily, and furosemide 40 mg daily. Not on AC due to fall risk. Monitor vs, wt and review ranges next PCP visit. Cardiology f/u as recommended.     DM type II, diet-controlled  Chronic, diet controlled. No routine BG checks.     CKD stage 3  Baseline creatinine around 0.8, stable last check at 0.67 on 10/26. Avoid nephrotoxins. Monitor BMP PRN.     GERD  No symptoms. Continue pantoprazole 40 mg daily    Acute on chronic anemia  Baseline hemoglobin 9-11. Hgb 8.2 on 10/9 and 8.9 on 10/26.  Monitor hemoglobin periodically.     Slow transit constipation  Well managed. Continue the bowel regimen.     Discharge Medications:  MED REC REQUIRED  Post Medication Reconciliation Status:  Discharge medications reconciled, continue medications without change    Current Outpatient Medications   Medication Sig Dispense Refill     alendronate (FOSAMAX) 70 MG tablet Take 1 tablet (70 mg) by mouth every 7 days 4 tablet 0     aspirin EC 81 MG EC tablet Take 1 tablet (81 mg) by mouth daily       atorvastatin (LIPITOR) 40 MG tablet TAKE 1 TABLET BY MOUTH ONE TIME DAILY (Patient taking differently: Take 40 mg by mouth every evening) 90 tablet 0     bisacodyl (DULCOLAX) 5 MG EC tablet Take 5 mg by mouth daily as needed for constipation       calcium carb-cholecalciferol (OS-LIGIA) 500-200 MG-UNIT tablet Take 1 tablet by mouth 2 times daily (with meals) 100 tablet 3     carvedilol (COREG) 3.125 MG tablet Take 1 tablet (3.125 mg) by mouth 2 times daily (with meals) 180 tablet 3     diclofenac (VOLTAREN) 1 % topical gel Apply topically 4 times daily as needed for moderate pain       fluticasone (FLONASE) 50 MCG/ACT nasal spray Spray 2 sprays into both nostrils daily as needed for rhinitis or allergies       furosemide (LASIX) 20 MG tablet Take 2 tablets (40 mg) by mouth daily Add additional 20mg for increased edema 95 tablet 2     guaiFENesin (MUCINEX) 600 MG 12 hr tablet Take 1,200 mg by mouth 2 times daily       Hyprom-Naphaz-Polysorb-Zn Sulf (CLEAR EYES COMPLETE) SOLN Apply 2 drops to eye every 4 hours as needed       multivitamin, therapeutic (THERA-VIT) TABS tablet Take 1 tablet by mouth daily       pantoprazole (PROTONIX) 40 MG EC tablet TAKE 1 TABLET BY MOUTH TWICE DAILY 180 tablet 0     polyethylene glycol (MIRALAX) 17 GM/Dose powder Take 17 g by mouth daily 510 g 0     senna-docusate (SENOKOT-S/PERICOLACE) 8.6-50 MG tablet Take 1 tablet by mouth 2 times daily as needed for constipation 100 tablet 0     Trolamine  Salicylate (ASPERCREME EX) Externally apply topically daily as needed       HYDROcodone-acetaminophen (NORCO) 5-325 MG tablet Take 1-2 tablets by mouth 4 times daily as needed for severe pain (7-10) Begin substantial taper ASAP to previous chronic directions of one tablet BID prn. 60 tablet 0     hydrOXYzine (ATARAX) 25 MG tablet Take 1 tablet (25 mg) by mouth 4 times daily as needed 30 tablet 0     Lidocaine (LIDOCARE) 4 % Patch Place 2 patches onto the skin every 24 hours To prevent lidocaine toxicity, patient should be patch free for 12 hrs daily. 30 patch 0     ondansetron (ZOFRAN) 4 MG tablet Take 1 tablet (4 mg) by mouth every 8 hours as needed for nausea 30 tablet 0     pregabalin (LYRICA) 25 MG capsule Take 1 capsule (25 mg) by mouth 2 times daily 60 capsule 0        Controlled medications:   Norco 5/325 mg tabs #15 sent to pharmacy no refills     Past Medical History:   Past Medical History:   Diagnosis Date     Acute on chronic congestive heart failure, unspecified heart failure type (H) 7/19/2022     Allergic rhinitis, cause unspecified      Arthritis      CAD (coronary artery disease)     Cath 12/2015: aspiration thrombectomy and CAMILA to mid and prox LAD. Cath 1/9/16: ASA/ticargelor held due to LGI bleed and she thrombosed the Lad stent. Aspiration thrombectomy and PTCA to mLAD.     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     Ischemic cardiomyopathy      Migraine, unspecified, without  mention of intractable migraine without mention of status migrainosus      NSTEMI (non-ST elevated myocardial infarction) (H) 08-10-15     Obesity, unspecified      Osteoporosis 11/08     Other and unspecified hyperlipidemia      Other iron deficiency anemia 4/21/2016     Other seborrheic keratosis      PAF (paroxysmal atrial fibrillation) (H)      Paroxysmal atrial fibrillation (H) 10/26/2016     Pericarditis age 15     PONV (postoperative nausea and vomiting)      Postop DVT right calf 1988     Pyogenic granuloma of skin and subcutaneous tissue      R upper extremity edema, postop     ? RSD     Solitary cyst of breast      TSH deficiency      Type 2 diabetes mellitus with diabetic nephropathy (H)      Type 2 diabetes mellitus with stage 3 chronic kidney disease (H)      Type 2 diabetes, HbA1c goal < 7% (H)      VASOMOTOR RHINITIS 2006    Dr. Wilson     Viral warts, unspecified      Physical Exam:   Vitals: /61   Pulse 75   Temp 98.2  F (36.8  C)   Resp 18   Wt 51.3 kg (113 lb)   LMP  (LMP Unknown)   SpO2 98%   BMI 18.80 kg/m    BMI: Body mass index is 18.8 kg/m .  GENERAL APPEARANCE:  Alert, in no distress, cooperative. Frail  ENT:  Mouth normal, moist mucous membranes, normal hearing acuity  EYES:  Conjunctiva and lids normal  RESP:  no respiratory distress, on room air  CV: no LE edema  NEURO:   No facial asymmetry, speech clear  PSYCH:  oriented X 3, affect and mood normal     SNF labs:   Most Recent 3 CBC's:Recent Labs   Lab Test 10/04/22  0616 10/03/22  0637 10/02/22  0701   WBC 10.4 11.0 10.2   HGB 7.8* 8.1* 8.1*   MCV 96 97 98    275 248     Most Recent 3 BMP's:Recent Labs   Lab Test 10/04/22  0616 10/03/22  0637 10/02/22  0701    137 136   POTASSIUM 4.1 4.0 4.2   CHLORIDE 103 103 104   CO2 25 24 21*   BUN 31.7* 35.6* 33.7*   CR 0.79 0.74 0.72   ANIONGAP 10 10 11   LIGIA 8.4* 8.3* 8.4*   * 143* 142*       DISCHARGE PLAN:    Follow up labs: No labs orders/due    Medical Follow  Up:      Follow up with primary care provider in 2-3 weeks  Follow up with specialist ortho as recommended     Current [place] scheduled appointments:   No future appointments.     Discharge Services: Home Care:  Occupational Therapy, Physical Therapy, Registered Nurse, Home Health Aide and provider of choice [facility]     Discharge Instructions Verbalized to Patient at Discharge:     None    TOTAL DISCHARGE TIME:   Greater than 30 minutes  Electronically signed by:  WILLIE Beaver CNP     Documentation of Face to Face and Certification for Home Health Services    I certify that services are/were furnished while this patient was under the care of a physician and that a physician or an allowed non-physician practitioner (NPP), had a face-to-face encounter that meets the physician face-to-face encounter requirements. The encounter was in whole, or in part, related to the primary reason for home health. The patient is confined to his/her home and needs intermittent skilled nursing, physical therapy, speech-language pathology, or the continued need for occupational therapy. A plan of care has been established by a physician and is periodically reviewed by a physician.  Date of Face-to-Face Encounter: 12/6/2022.    I certify that, based on my findings, the following services are medically necessary home health services: Nursing, Occupational Therapy, Physical Therapy and HHA.    My clinical findings support the need for the above skilled services because: Requires assistance of another person or specialized equipment to access medical services because patient: Is unable to exit home safely on own due to: weakness and pain..    Patient to re-establish plan of care with their PCP within 7-10 days after leaving the facility to reestablish care.  Medicare certified PECOS provider: WILLIE Beaver CNP   Date: December 6, 2022

## 2022-12-09 NOTE — TELEPHONE ENCOUNTER
Prior Authorization Approval    Authorization Effective Date: 9/10/2022  Authorization Expiration Date: 12/9/2023  Medication: Ondansetron 4mg tablet  Approved Dose/Quantity:    Reference #:     Insurance Company: [employer] Part D - Phone [phone] Fax [phone]  Expected CoPay:       CoPay Card Available:      Foundation Assistance Needed:    Which Pharmacy is filling the prescription (Not needed for infusion/clinic administered):    Pharmacy Notified: Yes  Patient Notified:

## 2022-12-09 NOTE — TELEPHONE ENCOUNTER
Central Prior Authorization Team   Phone: [phone]    PA Initiation    Medication: Ondansetron 4mg tablet  Insurance Company: [employer] Part D - Phone [phone] Fax [phone]  Pharmacy Filling the Rx:  [facility] - [address], [place], [address]      Filling Pharmacy Phone:   [phone]    Filling Pharmacy Fax:  [phone]  Start Date: 12/9/2022

## 2022-12-09 NOTE — TELEPHONE ENCOUNTER
Prior Authorization Retail Medication Request    Medication/Dose: Ondansetron 4mg tablet  ICD code (if different than what is on RX):  R11.0  Previously Tried and Failed:  Protonix  Rationale:  Take 4mg every 8 hours as needed.      Quantity:  90 tabs/30 days    Insurance Name:  Medicare  Insurance ID:  [id number]     Secondary Insurance Name:  Blue [employer] of [place]  Secondary Insurance ID:  [id number]       Pharmacy Information (if different than what is on RX)  Name:  [facility] - [address] [place] [address] [address], [facility], [address]    Phone: [phone]    Fax: [phone]

## 2022-12-09 NOTE — TELEPHONE ENCOUNTER
Central Prior Authorization Team   Phone: [phone]    PA Initiation    Medication: Diclofenac 1% gel  Insurance Company: [employer] - Phone [phone] Fax [phone]  Pharmacy Filling the Rx:   [facility] - [address]  [address], [facility], [address]      Filling Pharmacy Phone: [phone]  Filling Pharmacy Fax:    Start Date: 12/9/2022

## 2022-12-09 NOTE — TELEPHONE ENCOUNTER
Prior Authorization Retail Medication Request    Medication/Dose: Diclofenac 1% gel  ICD code (if different than what is on RX):  M17.12  Previously Tried and Failed:  Trolamine, Lyrica, Norco,  Lidocaine 4% patch, Gabapentin, Codeine, Oxycodone, Morphine  Rationale:  Apply 2g to left knee four times daily as needed.      Quantity:  240g/month    Insurance Name:  Medicare  Insurance ID:  [id number]     Secondary Insurance Name:  Blue Cross and [employer] [place]    Secondary Insurance ID:  [id number]       Pharmacy Information (if different than what is on RX)  [facility]  [facility] [address], [facility] [place] [facility], [address]    Phone: [phone]    Fax: [phone]

## 2022-12-09 NOTE — TELEPHONE ENCOUNTER
Prior Authorization Approval    Authorization Effective Date: 9/10/2022  Authorization Expiration Date: 12/9/2023  Medication: Diclofenac 1% gel  Approved Dose/Quantity:    Reference #:     Insurance Company: Sayda [employer] - Phone [phone] Fax [phone]  Expected CoPay:       CoPay Card Available:      Foundation Assistance Needed:    Which Pharmacy is filling the prescription (Not needed for infusion/clinic administered):    Pharmacy Notified: Yes  Patient Notified: Yes

## 2022-12-13 NOTE — TELEPHONE ENCOUNTER
LORI Stephenson advised rx approved and MD message.  Patient has an appt with the facility provider the end of Dec.  FRANKLYN Ventura R.N.

## 2022-12-13 NOTE — PROGRESS NOTES
S-(situation): TCU Discharge    B-(background): Patient was inpatient at [facility]  from 9/28-10/4 and [facility].    A-(assessment): Patient was discharged to [facility] on 12/09/22.    R-(recommendations/plan): Care Coordination and MTM referral placed.  CPN will monitor for any newly identified care navigation needs in 1 month.    Mervat Capps RN  Clinical Product Navigator   Ambulatory Care Coordination  [phone]

## 2022-12-13 NOTE — TELEPHONE ENCOUNTER
E-prescribed Rx for #60 tabs with these directions (1-2 tabs QID prn).     I have not prescribed her opioids for over a year, and my understanding is that another provider will assume responsibility of taper and further refills.    If I must assume responsibility for additional refills I will begin a rapid taper to previous dosing of one tablet BID).     Please advise caller.

## 2022-12-13 NOTE — TELEPHONE ENCOUNTER
Discharged Fri. 12/9/22 from TCU, was only given 2 days supply of Norco, needs new rx for 30 days.   Per discharge summary from [facility]:    NURSING FACILITY COURSE   Medication Changes/Rationale:     mucinex BID due to congetion    Change norco to 5/325 2 tabs every 4 hrs PRN at discharge    Changed hydroxyzine to PRN spasms    Stopped tylenol    Added lyrica due to pain    Added zofran due to nausea    Patient has moved to [facility] and they will soon take over ordering and dispensing all meds but they want family to assist in getting the pain medication until patient is fully set up for services there as she is in a lot of pain. FRANKLYN Ventura R.N.      Send to [facility] in [facility]

## 2022-12-13 NOTE — LETTER
[facility] CARE COORDINATION  [address]  [place] [address]    December 13, 2022    Gosia Alonzo  [address] [facility] [address]    Dear Ann,    I am a clinic care coordinator who works with Michael Londono MD, MD with the [facility]. I wanted to thank you for spending the time to talk with me.  Below is a description of clinic care coordination and how I can further assist you.       The clinic care coordination team is made up of a registered nurse, , financial resource worker and community health worker who understand the health care system. The goal of clinic care coordination is to help you manage your health and improve access to the health care system. Our team works alongside your provider to assist you in determining your health and social needs. We can help you obtain health care and community resources, providing you with necessary information and education. We can work with you through any barriers and develop a care plan that helps coordinate and strengthen the communication between you and your care team.    Please feel free to contact me with any questions or concerns regarding care coordination and what we can offer.      We are focused on providing you with the highest-quality healthcare experience possible.    Sincerely,      Teena Vicente RN, BSN, PHN  Primary Care / Care Coordinator   [facility]  E-mail [email or web address]   [phone]

## 2022-12-13 NOTE — PROGRESS NOTES
"Clinic Care Coordination Contact  Care Team Conversations    RNCC reached out to patient and daughter, Wen,answered the phone stating she doesn't have time to talk.  Wen reports the patient is \"ok\" and [facility] and [facility] are trying to \"work out\" patients pain and pain medication.  Wen reports her mom is adjusting to retirement \"the best she can\" and [facility] has not reached out to patient due to \"documentation issues\" with TCU.  [facility] phone number given, [phone] for RN/PT/OT/HHA services.  Wen politely ask to end the phone call.  No further care coordination outreaches at this time.     Teena Vicente RN, BSN, PHN  Primary Care / Care Coordinator   [facility]  E-mail [email or web address]   [phone]      "

## 2022-12-16 NOTE — LETTER
[place] [address], [address]  [place], [place]  [address]                                            TEL:[phone]  FAX:[phone]      Gosia Alonzo  [address] [facility] [address]      December 20, 2022    Dear Gosia,    We are concerned about your health care.  We recently provided you with a medication refill.  Many medications require routine follow-up with your provider.      At this time we ask that: You come to your clinic for fasting labs for medication monitoring.        Your prescription: Has been refilled for 1 month so you may have time for the above noted follow-up.      Thank you,      Michael Londono M.D.

## 2022-12-19 NOTE — TELEPHONE ENCOUNTER
Pending Prescriptions:                       Disp   Refills    atorvastatin (LIPITOR) 40 MG tablet        90 tab*0        Sig: Take 1 tablet (40 mg) by mouth daily    Routing refill request to provider for review/approval because:  Labs not current:  lipid    Please advise, thanks.

## 2022-12-20 NOTE — TELEPHONE ENCOUNTER
E-prescribed Rx for 30-day supply.   Last lipid panel obtained in 9/2021 by cardiology.   Lipid panel needs to be updated. Future orders entered.  Please advise pt.

## 2023-01-01 ENCOUNTER — PATIENT OUTREACH (OUTPATIENT)
Dept: CARE COORDINATION | Facility: CLINIC | Age: 88
End: 2023-01-01
Payer: MEDICARE

## 2023-01-01 ENCOUNTER — TELEPHONE (OUTPATIENT)
Dept: CARDIOLOGY | Facility: CLINIC | Age: 88
End: 2023-01-01

## 2023-01-01 ENCOUNTER — APPOINTMENT (OUTPATIENT)
Dept: CT IMAGING | Facility: CLINIC | Age: 88
End: 2023-01-01
Attending: EMERGENCY MEDICINE
Payer: MEDICARE

## 2023-01-01 ENCOUNTER — TELEPHONE (OUTPATIENT)
Dept: INTERNAL MEDICINE | Facility: CLINIC | Age: 88
End: 2023-01-01

## 2023-01-01 ENCOUNTER — APPOINTMENT (OUTPATIENT)
Dept: ULTRASOUND IMAGING | Facility: CLINIC | Age: 88
DRG: 291 | End: 2023-01-01
Attending: INTERNAL MEDICINE
Payer: MEDICARE

## 2023-01-01 ENCOUNTER — APPOINTMENT (OUTPATIENT)
Dept: GENERAL RADIOLOGY | Facility: CLINIC | Age: 88
DRG: 469 | End: 2023-01-01
Attending: EMERGENCY MEDICINE
Payer: MEDICARE

## 2023-01-01 ENCOUNTER — TELEPHONE (OUTPATIENT)
Dept: INTERNAL MEDICINE | Facility: CLINIC | Age: 88
End: 2023-01-01
Payer: MEDICARE

## 2023-01-01 ENCOUNTER — APPOINTMENT (OUTPATIENT)
Dept: NUCLEAR MEDICINE | Facility: CLINIC | Age: 88
DRG: 393 | End: 2023-01-01
Attending: STUDENT IN AN ORGANIZED HEALTH CARE EDUCATION/TRAINING PROGRAM
Payer: MEDICARE

## 2023-01-01 ENCOUNTER — APPOINTMENT (OUTPATIENT)
Dept: GENERAL RADIOLOGY | Facility: CLINIC | Age: 88
End: 2023-01-01
Attending: EMERGENCY MEDICINE
Payer: MEDICARE

## 2023-01-01 ENCOUNTER — APPOINTMENT (OUTPATIENT)
Dept: GENERAL RADIOLOGY | Facility: CLINIC | Age: 88
DRG: 186 | End: 2023-01-01
Attending: INTERNAL MEDICINE
Payer: MEDICARE

## 2023-01-01 ENCOUNTER — MEDICAL CORRESPONDENCE (OUTPATIENT)
Dept: HEALTH INFORMATION MANAGEMENT | Facility: CLINIC | Age: 88
End: 2023-01-01

## 2023-01-01 ENCOUNTER — HOSPITAL ENCOUNTER (INPATIENT)
Facility: CLINIC | Age: 88
LOS: 3 days | Discharge: HOME HEALTH | DRG: 186 | End: 2023-01-19
Attending: EMERGENCY MEDICINE | Admitting: INTERNAL MEDICINE
Payer: MEDICARE

## 2023-01-01 ENCOUNTER — APPOINTMENT (OUTPATIENT)
Dept: GENERAL RADIOLOGY | Facility: CLINIC | Age: 88
DRG: 469 | End: 2023-01-01
Attending: STUDENT IN AN ORGANIZED HEALTH CARE EDUCATION/TRAINING PROGRAM
Payer: MEDICARE

## 2023-01-01 ENCOUNTER — DOCUMENTATION ONLY (OUTPATIENT)
Dept: ORTHOPEDICS | Facility: CLINIC | Age: 88
End: 2023-01-01
Payer: MEDICARE

## 2023-01-01 ENCOUNTER — APPOINTMENT (OUTPATIENT)
Dept: CT IMAGING | Facility: CLINIC | Age: 88
DRG: 393 | End: 2023-01-01
Attending: EMERGENCY MEDICINE
Payer: MEDICARE

## 2023-01-01 ENCOUNTER — PATIENT OUTREACH (OUTPATIENT)
Dept: CARE COORDINATION | Facility: CLINIC | Age: 88
End: 2023-01-01

## 2023-01-01 ENCOUNTER — HOSPITAL ENCOUNTER (INPATIENT)
Facility: CLINIC | Age: 88
LOS: 4 days | Discharge: HOME HEALTH | DRG: 393 | End: 2023-02-28
Attending: EMERGENCY MEDICINE | Admitting: INTERNAL MEDICINE
Payer: MEDICARE

## 2023-01-01 ENCOUNTER — APPOINTMENT (OUTPATIENT)
Dept: GENERAL RADIOLOGY | Facility: CLINIC | Age: 88
DRG: 291 | End: 2023-01-01
Attending: EMERGENCY MEDICINE
Payer: MEDICARE

## 2023-01-01 ENCOUNTER — LAB REQUISITION (OUTPATIENT)
Dept: LAB | Facility: CLINIC | Age: 88
End: 2023-01-01
Payer: MEDICARE

## 2023-01-01 ENCOUNTER — APPOINTMENT (OUTPATIENT)
Dept: CT IMAGING | Facility: CLINIC | Age: 88
DRG: 469 | End: 2023-01-01
Attending: ORTHOPAEDIC SURGERY
Payer: MEDICARE

## 2023-01-01 ENCOUNTER — ANESTHESIA (OUTPATIENT)
Dept: SURGERY | Facility: CLINIC | Age: 88
DRG: 469 | End: 2023-01-01
Payer: MEDICARE

## 2023-01-01 ENCOUNTER — HOSPITAL ENCOUNTER (INPATIENT)
Facility: CLINIC | Age: 88
LOS: 2 days | Discharge: NURSING, REHAB OR LONG-TERM CARE | DRG: 291 | End: 2023-02-21
Attending: EMERGENCY MEDICINE | Admitting: INTERNAL MEDICINE
Payer: MEDICARE

## 2023-01-01 ENCOUNTER — APPOINTMENT (OUTPATIENT)
Dept: CT IMAGING | Facility: CLINIC | Age: 88
DRG: 469 | End: 2023-01-01
Attending: EMERGENCY MEDICINE
Payer: MEDICARE

## 2023-01-01 ENCOUNTER — APPOINTMENT (OUTPATIENT)
Dept: ULTRASOUND IMAGING | Facility: CLINIC | Age: 88
End: 2023-01-01
Attending: EMERGENCY MEDICINE
Payer: MEDICARE

## 2023-01-01 ENCOUNTER — HOSPITAL ENCOUNTER (INPATIENT)
Facility: CLINIC | Age: 88
LOS: 5 days | DRG: 469 | End: 2023-03-19
Attending: EMERGENCY MEDICINE | Admitting: STUDENT IN AN ORGANIZED HEALTH CARE EDUCATION/TRAINING PROGRAM
Payer: MEDICARE

## 2023-01-01 ENCOUNTER — APPOINTMENT (OUTPATIENT)
Dept: GENERAL RADIOLOGY | Facility: CLINIC | Age: 88
DRG: 469 | End: 2023-01-01
Attending: INTERNAL MEDICINE
Payer: MEDICARE

## 2023-01-01 ENCOUNTER — APPOINTMENT (OUTPATIENT)
Dept: CARDIOLOGY | Facility: CLINIC | Age: 88
DRG: 186 | End: 2023-01-01
Attending: INTERNAL MEDICINE
Payer: MEDICARE

## 2023-01-01 ENCOUNTER — APPOINTMENT (OUTPATIENT)
Dept: GENERAL RADIOLOGY | Facility: CLINIC | Age: 88
DRG: 393 | End: 2023-01-01
Attending: INTERNAL MEDICINE
Payer: MEDICARE

## 2023-01-01 ENCOUNTER — APPOINTMENT (OUTPATIENT)
Dept: CT IMAGING | Facility: CLINIC | Age: 88
DRG: 186 | End: 2023-01-01
Attending: INTERNAL MEDICINE
Payer: MEDICARE

## 2023-01-01 ENCOUNTER — APPOINTMENT (OUTPATIENT)
Dept: CT IMAGING | Facility: CLINIC | Age: 88
DRG: 186 | End: 2023-01-01
Attending: EMERGENCY MEDICINE
Payer: MEDICARE

## 2023-01-01 ENCOUNTER — ANESTHESIA EVENT (OUTPATIENT)
Dept: SURGERY | Facility: CLINIC | Age: 88
DRG: 469 | End: 2023-01-01
Payer: MEDICARE

## 2023-01-01 ENCOUNTER — APPOINTMENT (OUTPATIENT)
Dept: GENERAL RADIOLOGY | Facility: CLINIC | Age: 88
DRG: 469 | End: 2023-01-01
Attending: ORTHOPAEDIC SURGERY
Payer: MEDICARE

## 2023-01-01 ENCOUNTER — APPOINTMENT (OUTPATIENT)
Dept: GENERAL RADIOLOGY | Facility: CLINIC | Age: 88
DRG: 393 | End: 2023-01-01
Attending: EMERGENCY MEDICINE
Payer: MEDICARE

## 2023-01-01 ENCOUNTER — APPOINTMENT (OUTPATIENT)
Dept: ULTRASOUND IMAGING | Facility: CLINIC | Age: 88
DRG: 393 | End: 2023-01-01
Attending: INTERNAL MEDICINE
Payer: MEDICARE

## 2023-01-01 ENCOUNTER — HOSPITAL ENCOUNTER (EMERGENCY)
Facility: CLINIC | Age: 88
Discharge: HOME | End: 2023-02-13
Attending: EMERGENCY MEDICINE | Admitting: EMERGENCY MEDICINE
Payer: MEDICARE

## 2023-01-01 ENCOUNTER — APPOINTMENT (OUTPATIENT)
Dept: MRI IMAGING | Facility: CLINIC | Age: 88
DRG: 291 | End: 2023-01-01
Attending: INTERNAL MEDICINE
Payer: MEDICARE

## 2023-01-01 VITALS
HEART RATE: 83 BPM | SYSTOLIC BLOOD PRESSURE: 121 MMHG | WEIGHT: 108.2 LBS | TEMPERATURE: 98 F | BODY MASS INDEX: 18.01 KG/M2 | OXYGEN SATURATION: 97 % | RESPIRATION RATE: 18 BRPM | DIASTOLIC BLOOD PRESSURE: 69 MMHG

## 2023-01-01 VITALS
WEIGHT: 116.8 LBS | HEART RATE: 61 BPM | BODY MASS INDEX: 19.44 KG/M2 | SYSTOLIC BLOOD PRESSURE: 126 MMHG | TEMPERATURE: 98.3 F | RESPIRATION RATE: 16 BRPM | OXYGEN SATURATION: 94 % | DIASTOLIC BLOOD PRESSURE: 73 MMHG

## 2023-01-01 VITALS
HEIGHT: 65 IN | OXYGEN SATURATION: 93 % | RESPIRATION RATE: 16 BRPM | WEIGHT: 114.6 LBS | HEART RATE: 69 BPM | TEMPERATURE: 98.9 F | SYSTOLIC BLOOD PRESSURE: 101 MMHG | BODY MASS INDEX: 19.09 KG/M2 | DIASTOLIC BLOOD PRESSURE: 48 MMHG

## 2023-01-01 VITALS
WEIGHT: 126.54 LBS | BODY MASS INDEX: 21.08 KG/M2 | HEIGHT: 65 IN | HEART RATE: 124 BPM | SYSTOLIC BLOOD PRESSURE: 111 MMHG | TEMPERATURE: 98.2 F | OXYGEN SATURATION: 96 % | RESPIRATION RATE: 24 BRPM | DIASTOLIC BLOOD PRESSURE: 66 MMHG

## 2023-01-01 VITALS
DIASTOLIC BLOOD PRESSURE: 74 MMHG | TEMPERATURE: 97.7 F | RESPIRATION RATE: 12 BRPM | HEART RATE: 92 BPM | OXYGEN SATURATION: 100 % | SYSTOLIC BLOOD PRESSURE: 119 MMHG

## 2023-01-01 DIAGNOSIS — I50.21 ACUTE HFREF (HEART FAILURE WITH REDUCED EJECTION FRACTION) (H): ICD-10-CM

## 2023-01-01 DIAGNOSIS — S72.402A CLOSED FRACTURE OF DISTAL END OF LEFT FEMUR, UNSPECIFIED FRACTURE MORPHOLOGY, INITIAL ENCOUNTER (H): ICD-10-CM

## 2023-01-01 DIAGNOSIS — G89.29 OTHER CHRONIC PAIN: ICD-10-CM

## 2023-01-01 DIAGNOSIS — S72.492A OTHER CLOSED FRACTURE OF DISTAL END OF LEFT FEMUR, INITIAL ENCOUNTER (H): Primary | ICD-10-CM

## 2023-01-01 DIAGNOSIS — R06.02 SHORTNESS OF BREATH: ICD-10-CM

## 2023-01-01 DIAGNOSIS — R07.9 CHEST PAIN, UNSPECIFIED TYPE: ICD-10-CM

## 2023-01-01 DIAGNOSIS — J90 PLEURAL EFFUSION: ICD-10-CM

## 2023-01-01 DIAGNOSIS — N18.30 CHRONIC KIDNEY DISEASE, STAGE 3 UNSPECIFIED (H): ICD-10-CM

## 2023-01-01 DIAGNOSIS — R06.02 SOB (SHORTNESS OF BREATH): ICD-10-CM

## 2023-01-01 DIAGNOSIS — Z53.9 DIAGNOSIS NOT YET DEFINED: Primary | ICD-10-CM

## 2023-01-01 DIAGNOSIS — F41.9 ANXIETY: Primary | ICD-10-CM

## 2023-01-01 DIAGNOSIS — K80.20 CALCULUS OF GALLBLADDER WITHOUT CHOLECYSTITIS WITHOUT OBSTRUCTION: ICD-10-CM

## 2023-01-01 DIAGNOSIS — S42.202D CLOSED FRACTURE OF PROXIMAL END OF LEFT HUMERUS WITH ROUTINE HEALING, UNSPECIFIED FRACTURE MORPHOLOGY, SUBSEQUENT ENCOUNTER: ICD-10-CM

## 2023-01-01 DIAGNOSIS — R94.5 ABNORMAL RESULTS OF LIVER FUNCTION STUDIES: ICD-10-CM

## 2023-01-01 DIAGNOSIS — J90 RECURRENT RIGHT PLEURAL EFFUSION: ICD-10-CM

## 2023-01-01 DIAGNOSIS — I25.10 CORONARY ARTERY DISEASE INVOLVING NATIVE CORONARY ARTERY OF NATIVE HEART WITHOUT ANGINA PECTORIS: Primary | ICD-10-CM

## 2023-01-01 DIAGNOSIS — S42.412A LEFT SUPRACONDYLAR HUMERUS FRACTURE, CLOSED, INITIAL ENCOUNTER: ICD-10-CM

## 2023-01-01 DIAGNOSIS — K52.9 COLITIS: ICD-10-CM

## 2023-01-01 LAB
ABO/RH(D): NORMAL
ABO/RH(D): NORMAL
ALBUMIN BODY FLUID SOURCE: NORMAL
ALBUMIN FLD-MCNC: 2.1 G/DL
ALBUMIN SERPL BCG-MCNC: 2.6 G/DL (ref 3.5–5.2)
ALBUMIN SERPL BCG-MCNC: 2.6 G/DL (ref 3.5–5.2)
ALBUMIN SERPL BCG-MCNC: 2.7 G/DL (ref 3.5–5.2)
ALBUMIN SERPL BCG-MCNC: 2.9 G/DL (ref 3.5–5.2)
ALBUMIN SERPL BCG-MCNC: 2.9 G/DL (ref 3.5–5.2)
ALBUMIN SERPL BCG-MCNC: 3 G/DL (ref 3.5–5.2)
ALBUMIN UR-MCNC: 10 MG/DL
ALP SERPL-CCNC: 238 U/L (ref 35–104)
ALP SERPL-CCNC: 263 U/L (ref 35–104)
ALP SERPL-CCNC: 283 U/L (ref 35–104)
ALP SERPL-CCNC: 285 U/L (ref 35–104)
ALP SERPL-CCNC: 293 U/L (ref 35–104)
ALP SERPL-CCNC: 320 U/L (ref 35–104)
ALP SERPL-CCNC: 322 U/L (ref 35–104)
ALP SERPL-CCNC: 327 U/L (ref 35–104)
ALT SERPL W P-5'-P-CCNC: 12 U/L (ref 10–35)
ALT SERPL W P-5'-P-CCNC: 13 U/L (ref 10–35)
ALT SERPL W P-5'-P-CCNC: 14 U/L (ref 10–35)
ALT SERPL W P-5'-P-CCNC: 15 U/L (ref 10–35)
ALT SERPL W P-5'-P-CCNC: 20 U/L (ref 10–35)
ALT SERPL W P-5'-P-CCNC: 21 U/L (ref 10–35)
ANA SER QL IF: NEGATIVE
ANION GAP SERPL CALCULATED.3IONS-SCNC: 10 MMOL/L (ref 7–15)
ANION GAP SERPL CALCULATED.3IONS-SCNC: 11 MMOL/L (ref 7–15)
ANION GAP SERPL CALCULATED.3IONS-SCNC: 11 MMOL/L (ref 7–15)
ANION GAP SERPL CALCULATED.3IONS-SCNC: 12 MMOL/L (ref 7–15)
ANION GAP SERPL CALCULATED.3IONS-SCNC: 13 MMOL/L (ref 7–15)
ANION GAP SERPL CALCULATED.3IONS-SCNC: 13 MMOL/L (ref 7–15)
ANION GAP SERPL CALCULATED.3IONS-SCNC: 14 MMOL/L (ref 7–15)
ANION GAP SERPL CALCULATED.3IONS-SCNC: 16 MMOL/L (ref 7–15)
ANION GAP SERPL CALCULATED.3IONS-SCNC: 7 MMOL/L (ref 7–15)
ANION GAP SERPL CALCULATED.3IONS-SCNC: 7 MMOL/L (ref 7–15)
ANION GAP SERPL CALCULATED.3IONS-SCNC: 9 MMOL/L (ref 7–15)
ANTIBODY SCREEN: NEGATIVE
ANTIBODY SCREEN: NEGATIVE
APPEARANCE FLD: ABNORMAL
APPEARANCE UR: CLEAR
AST SERPL W P-5'-P-CCNC: 33 U/L (ref 10–35)
AST SERPL W P-5'-P-CCNC: 33 U/L (ref 10–35)
AST SERPL W P-5'-P-CCNC: 39 U/L (ref 10–35)
AST SERPL W P-5'-P-CCNC: 40 U/L (ref 10–35)
AST SERPL W P-5'-P-CCNC: 42 U/L (ref 10–35)
AST SERPL W P-5'-P-CCNC: 44 U/L (ref 10–35)
AST SERPL W P-5'-P-CCNC: 44 U/L (ref 10–35)
AST SERPL W P-5'-P-CCNC: 45 U/L (ref 10–35)
ATRIAL RATE - MUSE: 71 BPM
ATRIAL RATE - MUSE: 81 BPM
ATRIAL RATE - MUSE: 88 BPM
ATRIAL RATE - MUSE: 98 BPM
BACTERIA BLD CULT: NO GROWTH
BACTERIA BLD CULT: NO GROWTH
BACTERIA PLR CULT: NO GROWTH
BACTERIA PLR CULT: NORMAL
BASE EXCESS BLDV CALC-SCNC: -0.8 MMOL/L (ref -7.7–1.9)
BASOPHILS # BLD AUTO: 0.1 10E3/UL (ref 0–0.2)
BASOPHILS NFR BLD AUTO: 1 %
BASOPHILS NFR BLD AUTO: 2 %
BILIRUB DIRECT SERPL-MCNC: <0.2 MG/DL (ref 0–0.3)
BILIRUB SERPL-MCNC: 0.3 MG/DL
BILIRUB SERPL-MCNC: 0.3 MG/DL
BILIRUB SERPL-MCNC: 0.4 MG/DL
BILIRUB SERPL-MCNC: 0.5 MG/DL
BILIRUB SERPL-MCNC: 0.8 MG/DL
BILIRUB UR QL STRIP: NEGATIVE
BLD PROD TYP BPU: NORMAL
BLOOD COMPONENT TYPE: NORMAL
BUN SERPL-MCNC: 15.3 MG/DL (ref 8–23)
BUN SERPL-MCNC: 15.9 MG/DL (ref 8–23)
BUN SERPL-MCNC: 17 MG/DL (ref 8–23)
BUN SERPL-MCNC: 17.9 MG/DL (ref 8–23)
BUN SERPL-MCNC: 19.5 MG/DL (ref 8–23)
BUN SERPL-MCNC: 19.8 MG/DL (ref 8–23)
BUN SERPL-MCNC: 20.2 MG/DL (ref 8–23)
BUN SERPL-MCNC: 21.5 MG/DL (ref 8–23)
BUN SERPL-MCNC: 22 MG/DL (ref 8–23)
BUN SERPL-MCNC: 22.4 MG/DL (ref 8–23)
BUN SERPL-MCNC: 23.4 MG/DL (ref 8–23)
BUN SERPL-MCNC: 24.3 MG/DL (ref 8–23)
BUN SERPL-MCNC: 24.4 MG/DL (ref 8–23)
BUN SERPL-MCNC: 26.5 MG/DL (ref 8–23)
BUN SERPL-MCNC: 26.6 MG/DL (ref 8–23)
BUN SERPL-MCNC: 28 MG/DL (ref 8–23)
BUN SERPL-MCNC: 28.3 MG/DL (ref 8–23)
BUN SERPL-MCNC: 30.6 MG/DL (ref 8–23)
BUN SERPL-MCNC: 37.8 MG/DL (ref 8–23)
BUN SERPL-MCNC: 42.8 MG/DL (ref 8–23)
C COLI+JEJUNI+LARI FUSA STL QL NAA+PROBE: NOT DETECTED
C PARVUM AG STL QL IA: NEGATIVE
CALCIUM SERPL-MCNC: 7.5 MG/DL (ref 8.2–9.6)
CALCIUM SERPL-MCNC: 7.5 MG/DL (ref 8.2–9.6)
CALCIUM SERPL-MCNC: 7.6 MG/DL (ref 8.2–9.6)
CALCIUM SERPL-MCNC: 7.7 MG/DL (ref 8.2–9.6)
CALCIUM SERPL-MCNC: 7.7 MG/DL (ref 8.2–9.6)
CALCIUM SERPL-MCNC: 7.8 MG/DL (ref 8.2–9.6)
CALCIUM SERPL-MCNC: 7.9 MG/DL (ref 8.2–9.6)
CALCIUM SERPL-MCNC: 8 MG/DL (ref 8.2–9.6)
CALCIUM SERPL-MCNC: 8 MG/DL (ref 8.2–9.6)
CALCIUM SERPL-MCNC: 8.1 MG/DL (ref 8.2–9.6)
CALCIUM SERPL-MCNC: 8.1 MG/DL (ref 8.2–9.6)
CALCIUM SERPL-MCNC: 8.2 MG/DL (ref 8.2–9.6)
CALCIUM SERPL-MCNC: 8.2 MG/DL (ref 8.2–9.6)
CALCIUM SERPL-MCNC: 8.3 MG/DL (ref 8.2–9.6)
CALCIUM SERPL-MCNC: 8.4 MG/DL (ref 8.2–9.6)
CALCIUM SERPL-MCNC: 8.6 MG/DL (ref 8.2–9.6)
CALPROTECTIN STL-MCNT: 481 MG/KG (ref 0–49.9)
CANCER AG19-9 SERPL IA-ACNC: 214 U/ML
CELL COUNT BODY FLUID SOURCE: ABNORMAL
CHLORIDE SERPL-SCNC: 100 MMOL/L (ref 98–107)
CHLORIDE SERPL-SCNC: 100 MMOL/L (ref 98–107)
CHLORIDE SERPL-SCNC: 101 MMOL/L (ref 98–107)
CHLORIDE SERPL-SCNC: 102 MMOL/L (ref 98–107)
CHLORIDE SERPL-SCNC: 103 MMOL/L (ref 98–107)
CHLORIDE SERPL-SCNC: 104 MMOL/L (ref 98–107)
CHLORIDE SERPL-SCNC: 105 MMOL/L (ref 98–107)
CHLORIDE SERPL-SCNC: 98 MMOL/L (ref 98–107)
CHLORIDE SERPL-SCNC: 99 MMOL/L (ref 98–107)
CODING SYSTEM: NORMAL
COLOR FLD: YELLOW
COLOR UR AUTO: YELLOW
CREAT SERPL-MCNC: 0.64 MG/DL (ref 0.51–0.95)
CREAT SERPL-MCNC: 0.65 MG/DL (ref 0.51–0.95)
CREAT SERPL-MCNC: 0.65 MG/DL (ref 0.51–0.95)
CREAT SERPL-MCNC: 0.68 MG/DL (ref 0.51–0.95)
CREAT SERPL-MCNC: 0.69 MG/DL (ref 0.51–0.95)
CREAT SERPL-MCNC: 0.69 MG/DL (ref 0.51–0.95)
CREAT SERPL-MCNC: 0.71 MG/DL (ref 0.51–0.95)
CREAT SERPL-MCNC: 0.73 MG/DL (ref 0.51–0.95)
CREAT SERPL-MCNC: 0.74 MG/DL (ref 0.51–0.95)
CREAT SERPL-MCNC: 0.78 MG/DL (ref 0.51–0.95)
CREAT SERPL-MCNC: 0.8 MG/DL (ref 0.51–0.95)
CREAT SERPL-MCNC: 0.85 MG/DL (ref 0.51–0.95)
CREAT SERPL-MCNC: 0.85 MG/DL (ref 0.51–0.95)
CREAT SERPL-MCNC: 0.86 MG/DL (ref 0.51–0.95)
CREAT SERPL-MCNC: 0.87 MG/DL (ref 0.51–0.95)
CREAT SERPL-MCNC: 0.89 MG/DL (ref 0.51–0.95)
CREAT SERPL-MCNC: 0.91 MG/DL (ref 0.51–0.95)
CREAT SERPL-MCNC: 1.02 MG/DL (ref 0.51–0.95)
CREAT SERPL-MCNC: 1.02 MG/DL (ref 0.51–0.95)
CREAT SERPL-MCNC: 1.22 MG/DL (ref 0.51–0.95)
CREAT SERPL-MCNC: 1.29 MG/DL (ref 0.51–0.95)
CROSSMATCH: NORMAL
CRP SERPL-MCNC: 152.32 MG/L
D DIMER PPP FEU-MCNC: 3.14 UG/ML FEU (ref 0–0.5)
DEPRECATED CALCIDIOL+CALCIFEROL SERPL-MC: 16 UG/L (ref 20–75)
DEPRECATED HCO3 PLAS-SCNC: 17 MMOL/L (ref 22–29)
DEPRECATED HCO3 PLAS-SCNC: 20 MMOL/L (ref 22–29)
DEPRECATED HCO3 PLAS-SCNC: 21 MMOL/L (ref 22–29)
DEPRECATED HCO3 PLAS-SCNC: 22 MMOL/L (ref 22–29)
DEPRECATED HCO3 PLAS-SCNC: 23 MMOL/L (ref 22–29)
DEPRECATED HCO3 PLAS-SCNC: 23 MMOL/L (ref 22–29)
DEPRECATED HCO3 PLAS-SCNC: 24 MMOL/L (ref 22–29)
DEPRECATED HCO3 PLAS-SCNC: 25 MMOL/L (ref 22–29)
DEPRECATED HCO3 PLAS-SCNC: 26 MMOL/L (ref 22–29)
DEPRECATED HCO3 PLAS-SCNC: 27 MMOL/L (ref 22–29)
DEPRECATED HCO3 PLAS-SCNC: 27 MMOL/L (ref 22–29)
DIASTOLIC BLOOD PRESSURE - MUSE: NORMAL MMHG
EC STX1 GENE STL QL NAA+PROBE: NOT DETECTED
EC STX2 GENE STL QL NAA+PROBE: NOT DETECTED
EOSINOPHIL # BLD AUTO: 0.1 10E3/UL (ref 0–0.7)
EOSINOPHIL # BLD AUTO: 0.2 10E3/UL (ref 0–0.7)
EOSINOPHIL # BLD AUTO: 0.3 10E3/UL (ref 0–0.7)
EOSINOPHIL NFR BLD AUTO: 1 %
EOSINOPHIL NFR BLD AUTO: 2 %
EOSINOPHIL NFR BLD AUTO: 4 %
EOSINOPHIL NFR BLD AUTO: 5 %
EOSINOPHIL NFR FLD MANUAL: 1 %
ERYTHROCYTE [DISTWIDTH] IN BLOOD BY AUTOMATED COUNT: 14.5 % (ref 10–15)
ERYTHROCYTE [DISTWIDTH] IN BLOOD BY AUTOMATED COUNT: 14.8 % (ref 10–15)
ERYTHROCYTE [DISTWIDTH] IN BLOOD BY AUTOMATED COUNT: 14.9 % (ref 10–15)
ERYTHROCYTE [DISTWIDTH] IN BLOOD BY AUTOMATED COUNT: 16.1 % (ref 10–15)
ERYTHROCYTE [DISTWIDTH] IN BLOOD BY AUTOMATED COUNT: 17.6 % (ref 10–15)
ERYTHROCYTE [DISTWIDTH] IN BLOOD BY AUTOMATED COUNT: 17.9 % (ref 10–15)
ERYTHROCYTE [DISTWIDTH] IN BLOOD BY AUTOMATED COUNT: 18.2 % (ref 10–15)
ERYTHROCYTE [DISTWIDTH] IN BLOOD BY AUTOMATED COUNT: 18.3 % (ref 10–15)
ERYTHROCYTE [DISTWIDTH] IN BLOOD BY AUTOMATED COUNT: 18.4 % (ref 10–15)
ERYTHROCYTE [DISTWIDTH] IN BLOOD BY AUTOMATED COUNT: 18.5 % (ref 10–15)
ERYTHROCYTE [DISTWIDTH] IN BLOOD BY AUTOMATED COUNT: 18.7 % (ref 10–15)
ERYTHROCYTE [DISTWIDTH] IN BLOOD BY AUTOMATED COUNT: 18.9 % (ref 10–15)
FERRITIN SERPL-MCNC: 57 NG/ML (ref 11–328)
FLUAV RNA SPEC QL NAA+PROBE: NEGATIVE
FLUBV RNA RESP QL NAA+PROBE: NEGATIVE
G LAMBLIA AG STL QL IA: NEGATIVE
GFR SERPL CREATININE-BSD FRML MDRD: 39 ML/MIN/1.73M2
GFR SERPL CREATININE-BSD FRML MDRD: 42 ML/MIN/1.73M2
GFR SERPL CREATININE-BSD FRML MDRD: 52 ML/MIN/1.73M2
GFR SERPL CREATININE-BSD FRML MDRD: 52 ML/MIN/1.73M2
GFR SERPL CREATININE-BSD FRML MDRD: 60 ML/MIN/1.73M2
GFR SERPL CREATININE-BSD FRML MDRD: 61 ML/MIN/1.73M2
GFR SERPL CREATININE-BSD FRML MDRD: 63 ML/MIN/1.73M2
GFR SERPL CREATININE-BSD FRML MDRD: 64 ML/MIN/1.73M2
GFR SERPL CREATININE-BSD FRML MDRD: 65 ML/MIN/1.73M2
GFR SERPL CREATININE-BSD FRML MDRD: 65 ML/MIN/1.73M2
GFR SERPL CREATININE-BSD FRML MDRD: 70 ML/MIN/1.73M2
GFR SERPL CREATININE-BSD FRML MDRD: 72 ML/MIN/1.73M2
GFR SERPL CREATININE-BSD FRML MDRD: 76 ML/MIN/1.73M2
GFR SERPL CREATININE-BSD FRML MDRD: 78 ML/MIN/1.73M2
GFR SERPL CREATININE-BSD FRML MDRD: 80 ML/MIN/1.73M2
GFR SERPL CREATININE-BSD FRML MDRD: 82 ML/MIN/1.73M2
GFR SERPL CREATININE-BSD FRML MDRD: 83 ML/MIN/1.73M2
GGT SERPL-CCNC: 400 U/L (ref 5–36)
GLUCOSE BLDC GLUCOMTR-MCNC: 100 MG/DL (ref 70–99)
GLUCOSE BLDC GLUCOMTR-MCNC: 104 MG/DL (ref 70–99)
GLUCOSE BLDC GLUCOMTR-MCNC: 110 MG/DL (ref 70–99)
GLUCOSE BLDC GLUCOMTR-MCNC: 116 MG/DL (ref 70–99)
GLUCOSE BLDC GLUCOMTR-MCNC: 117 MG/DL (ref 70–99)
GLUCOSE BLDC GLUCOMTR-MCNC: 118 MG/DL (ref 70–99)
GLUCOSE BLDC GLUCOMTR-MCNC: 120 MG/DL (ref 70–99)
GLUCOSE BLDC GLUCOMTR-MCNC: 123 MG/DL (ref 70–99)
GLUCOSE BLDC GLUCOMTR-MCNC: 126 MG/DL (ref 70–99)
GLUCOSE BLDC GLUCOMTR-MCNC: 128 MG/DL (ref 70–99)
GLUCOSE BLDC GLUCOMTR-MCNC: 129 MG/DL (ref 70–99)
GLUCOSE BLDC GLUCOMTR-MCNC: 129 MG/DL (ref 70–99)
GLUCOSE BLDC GLUCOMTR-MCNC: 135 MG/DL (ref 70–99)
GLUCOSE BLDC GLUCOMTR-MCNC: 140 MG/DL (ref 70–99)
GLUCOSE BLDC GLUCOMTR-MCNC: 143 MG/DL (ref 70–99)
GLUCOSE BLDC GLUCOMTR-MCNC: 154 MG/DL (ref 70–99)
GLUCOSE BLDC GLUCOMTR-MCNC: 154 MG/DL (ref 70–99)
GLUCOSE BLDC GLUCOMTR-MCNC: 157 MG/DL (ref 70–99)
GLUCOSE BLDC GLUCOMTR-MCNC: 159 MG/DL (ref 70–99)
GLUCOSE BLDC GLUCOMTR-MCNC: 161 MG/DL (ref 70–99)
GLUCOSE BLDC GLUCOMTR-MCNC: 170 MG/DL (ref 70–99)
GLUCOSE BLDC GLUCOMTR-MCNC: 174 MG/DL (ref 70–99)
GLUCOSE BLDC GLUCOMTR-MCNC: 176 MG/DL (ref 70–99)
GLUCOSE BLDC GLUCOMTR-MCNC: 194 MG/DL (ref 70–99)
GLUCOSE BLDC GLUCOMTR-MCNC: 195 MG/DL (ref 70–99)
GLUCOSE BLDC GLUCOMTR-MCNC: 199 MG/DL (ref 70–99)
GLUCOSE BLDC GLUCOMTR-MCNC: 206 MG/DL (ref 70–99)
GLUCOSE BLDC GLUCOMTR-MCNC: 207 MG/DL (ref 70–99)
GLUCOSE BLDC GLUCOMTR-MCNC: 94 MG/DL (ref 70–99)
GLUCOSE BLDC GLUCOMTR-MCNC: 97 MG/DL (ref 70–99)
GLUCOSE BLDC GLUCOMTR-MCNC: 98 MG/DL (ref 70–99)
GLUCOSE BODY FLUID SOURCE: NORMAL
GLUCOSE FLD-MCNC: 147 MG/DL
GLUCOSE SERPL-MCNC: 104 MG/DL (ref 70–99)
GLUCOSE SERPL-MCNC: 105 MG/DL (ref 70–99)
GLUCOSE SERPL-MCNC: 110 MG/DL (ref 70–99)
GLUCOSE SERPL-MCNC: 110 MG/DL (ref 70–99)
GLUCOSE SERPL-MCNC: 111 MG/DL (ref 70–99)
GLUCOSE SERPL-MCNC: 113 MG/DL (ref 70–99)
GLUCOSE SERPL-MCNC: 118 MG/DL (ref 70–99)
GLUCOSE SERPL-MCNC: 120 MG/DL (ref 70–99)
GLUCOSE SERPL-MCNC: 128 MG/DL (ref 70–99)
GLUCOSE SERPL-MCNC: 131 MG/DL (ref 70–99)
GLUCOSE SERPL-MCNC: 132 MG/DL (ref 70–99)
GLUCOSE SERPL-MCNC: 132 MG/DL (ref 70–99)
GLUCOSE SERPL-MCNC: 137 MG/DL (ref 70–99)
GLUCOSE SERPL-MCNC: 137 MG/DL (ref 70–99)
GLUCOSE SERPL-MCNC: 142 MG/DL (ref 70–99)
GLUCOSE SERPL-MCNC: 143 MG/DL (ref 70–99)
GLUCOSE SERPL-MCNC: 147 MG/DL (ref 70–99)
GLUCOSE SERPL-MCNC: 159 MG/DL (ref 70–99)
GLUCOSE SERPL-MCNC: 162 MG/DL (ref 70–99)
GLUCOSE SERPL-MCNC: 186 MG/DL (ref 70–99)
GLUCOSE UR STRIP-MCNC: NEGATIVE MG/DL
GRAM STAIN RESULT: NORMAL
GRAM STAIN RESULT: NORMAL
HBV SURFACE AG SERPL QL IA: NONREACTIVE
HCO3 BLDV-SCNC: 25 MMOL/L (ref 21–28)
HCT VFR BLD AUTO: 21 % (ref 35–47)
HCT VFR BLD AUTO: 26.6 % (ref 35–47)
HCT VFR BLD AUTO: 28.6 % (ref 35–47)
HCT VFR BLD AUTO: 29 % (ref 35–47)
HCT VFR BLD AUTO: 29.3 % (ref 35–47)
HCT VFR BLD AUTO: 29.4 % (ref 35–47)
HCT VFR BLD AUTO: 29.5 % (ref 35–47)
HCT VFR BLD AUTO: 29.8 % (ref 35–47)
HCT VFR BLD AUTO: 30.4 % (ref 35–47)
HCT VFR BLD AUTO: 30.7 % (ref 35–47)
HCT VFR BLD AUTO: 30.8 % (ref 35–47)
HCT VFR BLD AUTO: 30.8 % (ref 35–47)
HCT VFR BLD AUTO: 31.5 % (ref 35–47)
HCV AB SERPL QL IA: NONREACTIVE
HGB BLD-MCNC: 10.3 G/DL (ref 11.7–15.7)
HGB BLD-MCNC: 6.6 G/DL (ref 11.7–15.7)
HGB BLD-MCNC: 7.4 G/DL (ref 11.7–15.7)
HGB BLD-MCNC: 7.6 G/DL (ref 11.7–15.7)
HGB BLD-MCNC: 8.5 G/DL (ref 11.7–15.7)
HGB BLD-MCNC: 8.7 G/DL (ref 11.7–15.7)
HGB BLD-MCNC: 8.7 G/DL (ref 11.7–15.7)
HGB BLD-MCNC: 8.8 G/DL (ref 11.7–15.7)
HGB BLD-MCNC: 8.9 G/DL (ref 11.7–15.7)
HGB BLD-MCNC: 9 G/DL (ref 11.7–15.7)
HGB BLD-MCNC: 9 G/DL (ref 11.7–15.7)
HGB BLD-MCNC: 9.1 G/DL (ref 11.7–15.7)
HGB BLD-MCNC: 9.1 G/DL (ref 11.7–15.7)
HGB BLD-MCNC: 9.2 G/DL (ref 11.7–15.7)
HGB BLD-MCNC: 9.3 G/DL (ref 11.7–15.7)
HGB BLD-MCNC: 9.3 G/DL (ref 11.7–15.7)
HGB BLD-MCNC: 9.4 G/DL (ref 11.7–15.7)
HGB BLD-MCNC: 9.5 G/DL (ref 11.7–15.7)
HGB BLD-MCNC: 9.6 G/DL (ref 11.7–15.7)
HGB BLD-MCNC: 9.6 G/DL (ref 11.7–15.7)
HGB BLD-MCNC: 9.8 G/DL (ref 11.7–15.7)
HGB BLD-MCNC: 9.9 G/DL (ref 11.7–15.7)
HGB UR QL STRIP: NEGATIVE
HOLD SPECIMEN: NORMAL
IMM GRANULOCYTES # BLD: 0 10E3/UL
IMM GRANULOCYTES # BLD: 0.1 10E3/UL
IMM GRANULOCYTES NFR BLD: 0 %
IMM GRANULOCYTES NFR BLD: 1 %
INR PPP: 1.06 (ref 0.85–1.15)
INR PPP: 1.07 (ref 0.85–1.15)
INR PPP: 1.13 (ref 0.85–1.15)
INTERPRETATION ECG - MUSE: NORMAL
IRON BINDING CAPACITY (ROCHE): 213 UG/DL (ref 240–430)
IRON SATN MFR SERPL: 15 % (ref 15–46)
IRON SERPL-MCNC: 31 UG/DL (ref 37–145)
ISSUE DATE AND TIME: NORMAL
KETONES UR STRIP-MCNC: NEGATIVE MG/DL
LACTATE SERPL-SCNC: 1 MMOL/L (ref 0.7–2)
LACTOFERRIN STL QL IA: POSITIVE
LD BODY BODY FLUID SOURCE: NORMAL
LDH FLD L TO P-CCNC: 437 U/L
LDH SERPL L TO P-CCNC: 258 U/L (ref 0–250)
LEUKOCYTE ESTERASE UR QL STRIP: NEGATIVE
LIPASE SERPL-CCNC: 28 U/L (ref 13–60)
LIPASE SERPL-CCNC: 28 U/L (ref 13–60)
LIPASE SERPL-CCNC: 31 U/L (ref 13–60)
LVEF ECHO: NORMAL
LYMPHOCYTES # BLD AUTO: 1.3 10E3/UL (ref 0.8–5.3)
LYMPHOCYTES # BLD AUTO: 1.4 10E3/UL (ref 0.8–5.3)
LYMPHOCYTES # BLD AUTO: 1.7 10E3/UL (ref 0.8–5.3)
LYMPHOCYTES # BLD AUTO: 1.8 10E3/UL (ref 0.8–5.3)
LYMPHOCYTES # BLD AUTO: 1.8 10E3/UL (ref 0.8–5.3)
LYMPHOCYTES # BLD AUTO: 2 10E3/UL (ref 0.8–5.3)
LYMPHOCYTES # BLD AUTO: 2 10E3/UL (ref 0.8–5.3)
LYMPHOCYTES # BLD AUTO: 2.5 10E3/UL (ref 0.8–5.3)
LYMPHOCYTES NFR BLD AUTO: 12 %
LYMPHOCYTES NFR BLD AUTO: 24 %
LYMPHOCYTES NFR BLD AUTO: 27 %
LYMPHOCYTES NFR BLD AUTO: 27 %
LYMPHOCYTES NFR BLD AUTO: 31 %
LYMPHOCYTES NFR BLD AUTO: 32 %
LYMPHOCYTES NFR BLD AUTO: 36 %
LYMPHOCYTES NFR BLD AUTO: 42 %
LYMPHOCYTES NFR FLD MANUAL: 6 %
MAGNESIUM SERPL-MCNC: 1.1 MG/DL (ref 1.7–2.3)
MAGNESIUM SERPL-MCNC: 1.3 MG/DL (ref 1.7–2.3)
MAGNESIUM SERPL-MCNC: 1.4 MG/DL (ref 1.7–2.3)
MAGNESIUM SERPL-MCNC: 1.5 MG/DL (ref 1.7–2.3)
MAGNESIUM SERPL-MCNC: 1.5 MG/DL (ref 1.7–2.3)
MAGNESIUM SERPL-MCNC: 1.8 MG/DL (ref 1.7–2.3)
MAGNESIUM SERPL-MCNC: 1.9 MG/DL (ref 1.7–2.3)
MAGNESIUM SERPL-MCNC: 2 MG/DL (ref 1.7–2.3)
MAGNESIUM SERPL-MCNC: 2.2 MG/DL (ref 1.7–2.3)
MAGNESIUM SERPL-MCNC: 2.3 MG/DL (ref 1.7–2.3)
MAGNESIUM SERPL-MCNC: 2.6 MG/DL (ref 1.7–2.3)
MAGNESIUM SERPL-MCNC: 2.6 MG/DL (ref 1.7–2.3)
MCH RBC QN AUTO: 27.7 PG (ref 26.5–33)
MCH RBC QN AUTO: 28 PG (ref 26.5–33)
MCH RBC QN AUTO: 28 PG (ref 26.5–33)
MCH RBC QN AUTO: 28.2 PG (ref 26.5–33)
MCH RBC QN AUTO: 28.3 PG (ref 26.5–33)
MCH RBC QN AUTO: 28.4 PG (ref 26.5–33)
MCH RBC QN AUTO: 28.4 PG (ref 26.5–33)
MCH RBC QN AUTO: 28.5 PG (ref 26.5–33)
MCH RBC QN AUTO: 28.5 PG (ref 26.5–33)
MCH RBC QN AUTO: 28.7 PG (ref 26.5–33)
MCH RBC QN AUTO: 28.8 PG (ref 26.5–33)
MCH RBC QN AUTO: 29.1 PG (ref 26.5–33)
MCH RBC QN AUTO: 29.4 PG (ref 26.5–33)
MCHC RBC AUTO-ENTMCNC: 29.8 G/DL (ref 31.5–36.5)
MCHC RBC AUTO-ENTMCNC: 29.9 G/DL (ref 31.5–36.5)
MCHC RBC AUTO-ENTMCNC: 29.9 G/DL (ref 31.5–36.5)
MCHC RBC AUTO-ENTMCNC: 30.3 G/DL (ref 31.5–36.5)
MCHC RBC AUTO-ENTMCNC: 30.4 G/DL (ref 31.5–36.5)
MCHC RBC AUTO-ENTMCNC: 30.5 G/DL (ref 31.5–36.5)
MCHC RBC AUTO-ENTMCNC: 30.5 G/DL (ref 31.5–36.5)
MCHC RBC AUTO-ENTMCNC: 30.9 G/DL (ref 31.5–36.5)
MCHC RBC AUTO-ENTMCNC: 31 G/DL (ref 31.5–36.5)
MCHC RBC AUTO-ENTMCNC: 31.3 G/DL (ref 31.5–36.5)
MCHC RBC AUTO-ENTMCNC: 31.4 G/DL (ref 31.5–36.5)
MCHC RBC AUTO-ENTMCNC: 31.5 G/DL (ref 31.5–36.5)
MCHC RBC AUTO-ENTMCNC: 32 G/DL (ref 31.5–36.5)
MCHC RBC AUTO-ENTMCNC: 32.2 G/DL (ref 31.5–36.5)
MCHC RBC AUTO-ENTMCNC: 35 G/DL (ref 31.5–36.5)
MCV RBC AUTO: 84 FL (ref 78–100)
MCV RBC AUTO: 89 FL (ref 78–100)
MCV RBC AUTO: 91 FL (ref 78–100)
MCV RBC AUTO: 92 FL (ref 78–100)
MCV RBC AUTO: 93 FL (ref 78–100)
MCV RBC AUTO: 94 FL (ref 78–100)
MCV RBC AUTO: 95 FL (ref 78–100)
MCV RBC AUTO: 95 FL (ref 78–100)
MITOCHONDRIA M2 IGG SER-ACNC: 1.6 U/ML
MONOCYTES # BLD AUTO: 0.6 10E3/UL (ref 0–1.3)
MONOCYTES # BLD AUTO: 0.7 10E3/UL (ref 0–1.3)
MONOCYTES # BLD AUTO: 0.7 10E3/UL (ref 0–1.3)
MONOCYTES # BLD AUTO: 0.8 10E3/UL (ref 0–1.3)
MONOCYTES # BLD AUTO: 1.2 10E3/UL (ref 0–1.3)
MONOCYTES NFR BLD AUTO: 10 %
MONOCYTES NFR BLD AUTO: 11 %
MONOCYTES NFR BLD AUTO: 11 %
MONOCYTES NFR BLD AUTO: 12 %
MONOCYTES NFR BLD AUTO: 13 %
MONOCYTES NFR BLD AUTO: 14 %
MONOS+MACROS NFR FLD MANUAL: 4 %
MRSA DNA SPEC QL NAA+PROBE: NEGATIVE
MUCOUS THREADS #/AREA URNS LPF: PRESENT /LPF
NEUTROPHILS # BLD AUTO: 2.3 10E3/UL (ref 1.6–8.3)
NEUTROPHILS # BLD AUTO: 2.7 10E3/UL (ref 1.6–8.3)
NEUTROPHILS # BLD AUTO: 3 10E3/UL (ref 1.6–8.3)
NEUTROPHILS # BLD AUTO: 3.2 10E3/UL (ref 1.6–8.3)
NEUTROPHILS # BLD AUTO: 3.3 10E3/UL (ref 1.6–8.3)
NEUTROPHILS # BLD AUTO: 3.4 10E3/UL (ref 1.6–8.3)
NEUTROPHILS # BLD AUTO: 3.6 10E3/UL (ref 1.6–8.3)
NEUTROPHILS # BLD AUTO: 8.8 10E3/UL (ref 1.6–8.3)
NEUTROPHILS NFR BLD AUTO: 39 %
NEUTROPHILS NFR BLD AUTO: 47 %
NEUTROPHILS NFR BLD AUTO: 49 %
NEUTROPHILS NFR BLD AUTO: 50 %
NEUTROPHILS NFR BLD AUTO: 53 %
NEUTROPHILS NFR BLD AUTO: 55 %
NEUTROPHILS NFR BLD AUTO: 61 %
NEUTROPHILS NFR BLD AUTO: 75 %
NEUTS BAND NFR FLD MANUAL: 89 %
NITRATE UR QL: NEGATIVE
NOROV GI+II ORF1-ORF2 JNC STL QL NAA+PR: NOT DETECTED
NRBC # BLD AUTO: 0 10E3/UL
NRBC BLD AUTO-RTO: 0 /100
NT-PROBNP SERPL-MCNC: 1554 PG/ML (ref 0–1800)
NT-PROBNP SERPL-MCNC: 1656 PG/ML (ref 0–1800)
NT-PROBNP SERPL-MCNC: 2254 PG/ML (ref 0–1800)
NT-PROBNP SERPL-MCNC: 2347 PG/ML (ref 0–1800)
O2/TOTAL GAS SETTING VFR VENT: 2 %
P AXIS - MUSE: -28 DEGREES
P AXIS - MUSE: 54 DEGREES
P AXIS - MUSE: NORMAL DEGREES
P AXIS - MUSE: NORMAL DEGREES
PATH REPORT.COMMENTS IMP SPEC: NORMAL
PATH REPORT.COMMENTS IMP SPEC: NORMAL
PATH REPORT.FINAL DX SPEC: NORMAL
PATH REPORT.GROSS SPEC: NORMAL
PATH REPORT.MICROSCOPIC SPEC OTHER STN: NORMAL
PATH REPORT.RELEVANT HX SPEC: NORMAL
PCO2 BLDV: 44 MM HG (ref 40–50)
PH BLDV: 7.36 [PH] (ref 7.32–7.43)
PH BODY FLUID SOURCE: NORMAL
PH FLD: 8 PH
PH UR STRIP: 5.5 [PH] (ref 5–7)
PHOSPHATE SERPL-MCNC: 2.6 MG/DL (ref 2.5–4.5)
PHOSPHATE SERPL-MCNC: 4 MG/DL (ref 2.5–4.5)
PLATELET # BLD AUTO: 141 10E3/UL (ref 150–450)
PLATELET # BLD AUTO: 150 10E3/UL (ref 150–450)
PLATELET # BLD AUTO: 165 10E3/UL (ref 150–450)
PLATELET # BLD AUTO: 231 10E3/UL (ref 150–450)
PLATELET # BLD AUTO: 236 10E3/UL (ref 150–450)
PLATELET # BLD AUTO: 243 10E3/UL (ref 150–450)
PLATELET # BLD AUTO: 244 10E3/UL (ref 150–450)
PLATELET # BLD AUTO: 245 10E3/UL (ref 150–450)
PLATELET # BLD AUTO: 247 10E3/UL (ref 150–450)
PLATELET # BLD AUTO: 248 10E3/UL (ref 150–450)
PLATELET # BLD AUTO: 253 10E3/UL (ref 150–450)
PLATELET # BLD AUTO: 256 10E3/UL (ref 150–450)
PLATELET # BLD AUTO: 266 10E3/UL (ref 150–450)
PLATELET # BLD AUTO: 284 10E3/UL (ref 150–450)
PLATELET # BLD AUTO: 300 10E3/UL (ref 150–450)
PO2 BLDV: 21 MM HG (ref 25–47)
POTASSIUM SERPL-SCNC: 3.5 MMOL/L (ref 3.4–5.3)
POTASSIUM SERPL-SCNC: 3.7 MMOL/L (ref 3.4–5.3)
POTASSIUM SERPL-SCNC: 3.7 MMOL/L (ref 3.4–5.3)
POTASSIUM SERPL-SCNC: 3.8 MMOL/L (ref 3.4–5.3)
POTASSIUM SERPL-SCNC: 3.9 MMOL/L (ref 3.4–5.3)
POTASSIUM SERPL-SCNC: 4 MMOL/L (ref 3.4–5.3)
POTASSIUM SERPL-SCNC: 4.1 MMOL/L (ref 3.4–5.3)
POTASSIUM SERPL-SCNC: 4.2 MMOL/L (ref 3.4–5.3)
POTASSIUM SERPL-SCNC: 4.3 MMOL/L (ref 3.4–5.3)
POTASSIUM SERPL-SCNC: 4.5 MMOL/L (ref 3.4–5.3)
POTASSIUM SERPL-SCNC: 4.5 MMOL/L (ref 3.4–5.3)
POTASSIUM SERPL-SCNC: 5 MMOL/L (ref 3.4–5.3)
POTASSIUM SERPL-SCNC: 5.1 MMOL/L (ref 3.4–5.3)
PR INTERVAL - MUSE: 170 MS
PR INTERVAL - MUSE: 186 MS
PR INTERVAL - MUSE: NORMAL MS
PR INTERVAL - MUSE: NORMAL MS
PROCALCITONIN SERPL IA-MCNC: 0.6 NG/ML
PROT FLD-MCNC: 3.8 G/DL
PROT SERPL-MCNC: 6.2 G/DL (ref 6.4–8.3)
PROT SERPL-MCNC: 6.3 G/DL (ref 6.4–8.3)
PROT SERPL-MCNC: 6.4 G/DL (ref 6.4–8.3)
PROT SERPL-MCNC: 6.5 G/DL (ref 6.4–8.3)
PROT SERPL-MCNC: 6.6 G/DL (ref 6.4–8.3)
PROT SERPL-MCNC: 6.7 G/DL (ref 6.4–8.3)
PROT SERPL-MCNC: 6.8 G/DL (ref 6.4–8.3)
PROT SERPL-MCNC: 6.8 G/DL (ref 6.4–8.3)
PROTEIN BODY FLUID SOURCE: NORMAL
PTH-INTACT SERPL-MCNC: 99 PG/ML (ref 15–65)
QRS DURATION - MUSE: 72 MS
QRS DURATION - MUSE: 74 MS
QRS DURATION - MUSE: 86 MS
QRS DURATION - MUSE: 86 MS
QT - MUSE: 364 MS
QT - MUSE: 370 MS
QT - MUSE: 376 MS
QT - MUSE: 416 MS
QTC - MUSE: 440 MS
QTC - MUSE: 452 MS
QTC - MUSE: 454 MS
QTC - MUSE: 485 MS
R AXIS - MUSE: -10 DEGREES
R AXIS - MUSE: -12 DEGREES
R AXIS - MUSE: 1 DEGREES
R AXIS - MUSE: 35 DEGREES
RBC # BLD AUTO: 2.3 10E6/UL (ref 3.8–5.2)
RBC # BLD AUTO: 2.92 10E6/UL (ref 3.8–5.2)
RBC # BLD AUTO: 3.14 10E6/UL (ref 3.8–5.2)
RBC # BLD AUTO: 3.17 10E6/UL (ref 3.8–5.2)
RBC # BLD AUTO: 3.2 10E6/UL (ref 3.8–5.2)
RBC # BLD AUTO: 3.23 10E6/UL (ref 3.8–5.2)
RBC # BLD AUTO: 3.23 10E6/UL (ref 3.8–5.2)
RBC # BLD AUTO: 3.25 10E6/UL (ref 3.8–5.2)
RBC # BLD AUTO: 3.26 10E6/UL (ref 3.8–5.2)
RBC # BLD AUTO: 3.28 10E6/UL (ref 3.8–5.2)
RBC # BLD AUTO: 3.28 10E6/UL (ref 3.8–5.2)
RBC # BLD AUTO: 3.31 10E6/UL (ref 3.8–5.2)
RBC # BLD AUTO: 3.33 10E6/UL (ref 3.8–5.2)
RBC # BLD AUTO: 3.37 10E6/UL (ref 3.8–5.2)
RBC # BLD AUTO: 3.5 10E6/UL (ref 3.8–5.2)
RBC URINE: 1 /HPF
RETICS # AUTO: 0.06 10E6/UL (ref 0.03–0.1)
RETICS/RBC NFR AUTO: 2.7 % (ref 0.5–2)
RSV RNA SPEC NAA+PROBE: NEGATIVE
RVA NSP5 STL QL NAA+PROBE: NOT DETECTED
SA TARGET DNA: POSITIVE
SALMONELLA SP RPOD STL QL NAA+PROBE: NOT DETECTED
SARS-COV-2 RNA RESP QL NAA+PROBE: NEGATIVE
SHIGELLA SP+EIEC IPAH STL QL NAA+PROBE: NOT DETECTED
SMA IGG SER-ACNC: 64 UNITS
SMOOTH MUSCLE IGG TITR SER: NORMAL {TITER}
SODIUM SERPL-SCNC: 134 MMOL/L (ref 136–145)
SODIUM SERPL-SCNC: 135 MMOL/L (ref 136–145)
SODIUM SERPL-SCNC: 136 MMOL/L (ref 136–145)
SODIUM SERPL-SCNC: 137 MMOL/L (ref 136–145)
SODIUM SERPL-SCNC: 138 MMOL/L (ref 136–145)
SODIUM SERPL-SCNC: 140 MMOL/L (ref 136–145)
SODIUM SERPL-SCNC: 140 MMOL/L (ref 136–145)
SP GR UR STRIP: 1.02 (ref 1–1.03)
SPECIMEN EXPIRATION DATE: NORMAL
SPECIMEN EXPIRATION DATE: NORMAL
SQUAMOUS EPITHELIAL: 1 /HPF
SYSTOLIC BLOOD PRESSURE - MUSE: NORMAL MMHG
T AXIS - MUSE: 37 DEGREES
T AXIS - MUSE: 61 DEGREES
T AXIS - MUSE: 65 DEGREES
T AXIS - MUSE: 75 DEGREES
TROPONIN T SERPL HS-MCNC: 23 NG/L
TROPONIN T SERPL HS-MCNC: 25 NG/L
TROPONIN T SERPL HS-MCNC: 26 NG/L
TROPONIN T SERPL HS-MCNC: 27 NG/L
TROPONIN T SERPL HS-MCNC: 29 NG/L
TROPONIN T SERPL HS-MCNC: 30 NG/L
UNIT ABO/RH: NORMAL
UNIT NUMBER: NORMAL
UNIT STATUS: NORMAL
UNIT TYPE ISBT: 2800
UNIT TYPE ISBT: 2800
UNIT TYPE ISBT: 8400
UPPER GI ENDOSCOPY: NORMAL
UROBILINOGEN UR STRIP-MCNC: NORMAL MG/DL
V CHOL+PARA RFBL+TRKH+TNAA STL QL NAA+PR: NOT DETECTED
VENTRICULAR RATE- MUSE: 107 BPM
VENTRICULAR RATE- MUSE: 71 BPM
VENTRICULAR RATE- MUSE: 85 BPM
VENTRICULAR RATE- MUSE: 88 BPM
WBC # BLD AUTO: 10 10E3/UL (ref 4–11)
WBC # BLD AUTO: 10.4 10E3/UL (ref 4–11)
WBC # BLD AUTO: 11.7 10E3/UL (ref 4–11)
WBC # BLD AUTO: 13.3 10E3/UL (ref 4–11)
WBC # BLD AUTO: 15.8 10E3/UL (ref 4–11)
WBC # BLD AUTO: 5.5 10E3/UL (ref 4–11)
WBC # BLD AUTO: 5.7 10E3/UL (ref 4–11)
WBC # BLD AUTO: 5.7 10E3/UL (ref 4–11)
WBC # BLD AUTO: 5.9 10E3/UL (ref 4–11)
WBC # BLD AUTO: 5.9 10E3/UL (ref 4–11)
WBC # BLD AUTO: 6.3 10E3/UL (ref 4–11)
WBC # BLD AUTO: 6.5 10E3/UL (ref 4–11)
WBC # BLD AUTO: 6.6 10E3/UL (ref 4–11)
WBC # BLD AUTO: 7.1 10E3/UL (ref 4–11)
WBC # BLD AUTO: 7.4 10E3/UL (ref 4–11)
WBC # FLD AUTO: 8089 /UL
WBC URINE: 1 /HPF
Y ENTERO RECN STL QL NAA+PROBE: NOT DETECTED

## 2023-01-01 PROCEDURE — 86381 MITOCHONDRIAL ANTIBODY EACH: CPT | Performed by: INTERNAL MEDICINE

## 2023-01-01 PROCEDURE — 85610 PROTHROMBIN TIME: CPT | Performed by: EMERGENCY MEDICINE

## 2023-01-01 PROCEDURE — G1010 CDSM STANSON: HCPCS

## 2023-01-01 PROCEDURE — 82310 ASSAY OF CALCIUM: CPT | Performed by: STUDENT IN AN ORGANIZED HEALTH CARE EDUCATION/TRAINING PROGRAM

## 2023-01-01 PROCEDURE — 250N000011 HC RX IP 250 OP 636: Performed by: INTERNAL MEDICINE

## 2023-01-01 PROCEDURE — 250N000013 HC RX MED GY IP 250 OP 250 PS 637: Performed by: EMERGENCY MEDICINE

## 2023-01-01 PROCEDURE — 88342 IMHCHEM/IMCYTCHM 1ST ANTB: CPT | Mod: 26 | Performed by: PATHOLOGY

## 2023-01-01 PROCEDURE — C9113 INJ PANTOPRAZOLE SODIUM, VIA: HCPCS | Performed by: INTERNAL MEDICINE

## 2023-01-01 PROCEDURE — 96376 TX/PRO/DX INJ SAME DRUG ADON: CPT

## 2023-01-01 PROCEDURE — 250N000011 HC RX IP 250 OP 636: Performed by: STUDENT IN AN ORGANIZED HEALTH CARE EDUCATION/TRAINING PROGRAM

## 2023-01-01 PROCEDURE — 250N000012 HC RX MED GY IP 250 OP 636 PS 637: Performed by: STUDENT IN AN ORGANIZED HEALTH CARE EDUCATION/TRAINING PROGRAM

## 2023-01-01 PROCEDURE — 85018 HEMOGLOBIN: CPT | Performed by: INTERNAL MEDICINE

## 2023-01-01 PROCEDURE — 86923 COMPATIBILITY TEST ELECTRIC: CPT | Performed by: STUDENT IN AN ORGANIZED HEALTH CARE EDUCATION/TRAINING PROGRAM

## 2023-01-01 PROCEDURE — 85025 COMPLETE CBC W/AUTO DIFF WBC: CPT | Performed by: STUDENT IN AN ORGANIZED HEALTH CARE EDUCATION/TRAINING PROGRAM

## 2023-01-01 PROCEDURE — 36415 COLL VENOUS BLD VENIPUNCTURE: CPT | Performed by: STUDENT IN AN ORGANIZED HEALTH CARE EDUCATION/TRAINING PROGRAM

## 2023-01-01 PROCEDURE — 83735 ASSAY OF MAGNESIUM: CPT | Performed by: INTERNAL MEDICINE

## 2023-01-01 PROCEDURE — 84145 PROCALCITONIN (PCT): CPT | Performed by: EMERGENCY MEDICINE

## 2023-01-01 PROCEDURE — 250N000011 HC RX IP 250 OP 636: Performed by: EMERGENCY MEDICINE

## 2023-01-01 PROCEDURE — 99223 1ST HOSP IP/OBS HIGH 75: CPT | Performed by: NURSE PRACTITIONER

## 2023-01-01 PROCEDURE — P9045 ALBUMIN (HUMAN), 5%, 250 ML: HCPCS | Performed by: NURSE ANESTHETIST, CERTIFIED REGISTERED

## 2023-01-01 PROCEDURE — 36415 COLL VENOUS BLD VENIPUNCTURE: CPT | Performed by: EMERGENCY MEDICINE

## 2023-01-01 PROCEDURE — C1713 ANCHOR/SCREW BN/BN,TIS/BN: HCPCS | Performed by: STUDENT IN AN ORGANIZED HEALTH CARE EDUCATION/TRAINING PROGRAM

## 2023-01-01 PROCEDURE — 85025 COMPLETE CBC W/AUTO DIFF WBC: CPT | Performed by: EMERGENCY MEDICINE

## 2023-01-01 PROCEDURE — 76705 ECHO EXAM OF ABDOMEN: CPT

## 2023-01-01 PROCEDURE — 71045 X-RAY EXAM CHEST 1 VIEW: CPT

## 2023-01-01 PROCEDURE — 96375 TX/PRO/DX INJ NEW DRUG ADDON: CPT

## 2023-01-01 PROCEDURE — 250N000013 HC RX MED GY IP 250 OP 250 PS 637: Performed by: INTERNAL MEDICINE

## 2023-01-01 PROCEDURE — 87506 IADNA-DNA/RNA PROBE TQ 6-11: CPT | Performed by: INTERNAL MEDICINE

## 2023-01-01 PROCEDURE — 36415 COLL VENOUS BLD VENIPUNCTURE: CPT | Performed by: INTERNAL MEDICINE

## 2023-01-01 PROCEDURE — 85014 HEMATOCRIT: CPT | Performed by: INTERNAL MEDICINE

## 2023-01-01 PROCEDURE — 250N000011 HC RX IP 250 OP 636: Performed by: ANESTHESIOLOGY

## 2023-01-01 PROCEDURE — 250N000013 HC RX MED GY IP 250 OP 250 PS 637: Performed by: STUDENT IN AN ORGANIZED HEALTH CARE EDUCATION/TRAINING PROGRAM

## 2023-01-01 PROCEDURE — 80048 BASIC METABOLIC PNL TOTAL CA: CPT | Performed by: INTERNAL MEDICINE

## 2023-01-01 PROCEDURE — 710N000011 HC RECOVERY PHASE 1, LEVEL 3, PER MIN: Performed by: STUDENT IN AN ORGANIZED HEALTH CARE EDUCATION/TRAINING PROGRAM

## 2023-01-01 PROCEDURE — 83615 LACTATE (LD) (LDH) ENZYME: CPT | Performed by: INTERNAL MEDICINE

## 2023-01-01 PROCEDURE — 120N000001 HC R&B MED SURG/OB

## 2023-01-01 PROCEDURE — 99222 1ST HOSP IP/OBS MODERATE 55: CPT | Performed by: INTERNAL MEDICINE

## 2023-01-01 PROCEDURE — 82977 ASSAY OF GGT: CPT | Mod: ORL | Performed by: FAMILY MEDICINE

## 2023-01-01 PROCEDURE — 99285 EMERGENCY DEPT VISIT HI MDM: CPT | Mod: 25

## 2023-01-01 PROCEDURE — 93005 ELECTROCARDIOGRAM TRACING: CPT

## 2023-01-01 PROCEDURE — 83690 ASSAY OF LIPASE: CPT | Performed by: EMERGENCY MEDICINE

## 2023-01-01 PROCEDURE — 83993 ASSAY FOR CALPROTECTIN FECAL: CPT | Performed by: INTERNAL MEDICINE

## 2023-01-01 PROCEDURE — 86923 COMPATIBILITY TEST ELECTRIC: CPT | Performed by: EMERGENCY MEDICINE

## 2023-01-01 PROCEDURE — 71275 CT ANGIOGRAPHY CHEST: CPT | Mod: MA

## 2023-01-01 PROCEDURE — P9604 ONE-WAY ALLOW PRORATED TRIP: HCPCS | Mod: ORL | Performed by: FAMILY MEDICINE

## 2023-01-01 PROCEDURE — 71046 X-RAY EXAM CHEST 2 VIEWS: CPT

## 2023-01-01 PROCEDURE — 74176 CT ABD & PELVIS W/O CONTRAST: CPT | Mod: MA

## 2023-01-01 PROCEDURE — 86803 HEPATITIS C AB TEST: CPT | Performed by: INTERNAL MEDICINE

## 2023-01-01 PROCEDURE — 99222 1ST HOSP IP/OBS MODERATE 55: CPT | Performed by: STUDENT IN AN ORGANIZED HEALTH CARE EDUCATION/TRAINING PROGRAM

## 2023-01-01 PROCEDURE — 85018 HEMOGLOBIN: CPT | Performed by: STUDENT IN AN ORGANIZED HEALTH CARE EDUCATION/TRAINING PROGRAM

## 2023-01-01 PROCEDURE — 258N000003 HC RX IP 258 OP 636: Performed by: EMERGENCY MEDICINE

## 2023-01-01 PROCEDURE — 87340 HEPATITIS B SURFACE AG IA: CPT | Performed by: INTERNAL MEDICINE

## 2023-01-01 PROCEDURE — 250N000011 HC RX IP 250 OP 636: Performed by: NURSE ANESTHETIST, CERTIFIED REGISTERED

## 2023-01-01 PROCEDURE — G0378 HOSPITAL OBSERVATION PER HR: HCPCS

## 2023-01-01 PROCEDURE — 99239 HOSP IP/OBS DSCHRG MGMT >30: CPT | Performed by: INTERNAL MEDICINE

## 2023-01-01 PROCEDURE — 258N000003 HC RX IP 258 OP 636: Performed by: STUDENT IN AN ORGANIZED HEALTH CARE EDUCATION/TRAINING PROGRAM

## 2023-01-01 PROCEDURE — 258N000001 HC RX 258: Performed by: STUDENT IN AN ORGANIZED HEALTH CARE EDUCATION/TRAINING PROGRAM

## 2023-01-01 PROCEDURE — 87329 GIARDIA AG IA: CPT | Performed by: INTERNAL MEDICINE

## 2023-01-01 PROCEDURE — 85045 AUTOMATED RETICULOCYTE COUNT: CPT | Performed by: STUDENT IN AN ORGANIZED HEALTH CARE EDUCATION/TRAINING PROGRAM

## 2023-01-01 PROCEDURE — 0QRC0JZ REPLACEMENT OF LEFT LOWER FEMUR WITH SYNTHETIC SUBSTITUTE, OPEN APPROACH: ICD-10-PCS | Performed by: STUDENT IN AN ORGANIZED HEALTH CARE EDUCATION/TRAINING PROGRAM

## 2023-01-01 PROCEDURE — 84484 ASSAY OF TROPONIN QUANT: CPT | Performed by: EMERGENCY MEDICINE

## 2023-01-01 PROCEDURE — 96365 THER/PROPH/DIAG IV INF INIT: CPT

## 2023-01-01 PROCEDURE — 83735 ASSAY OF MAGNESIUM: CPT | Performed by: STUDENT IN AN ORGANIZED HEALTH CARE EDUCATION/TRAINING PROGRAM

## 2023-01-01 PROCEDURE — 86923 COMPATIBILITY TEST ELECTRIC: CPT | Performed by: ANESTHESIOLOGY

## 2023-01-01 PROCEDURE — 82945 GLUCOSE OTHER FLUID: CPT | Performed by: INTERNAL MEDICINE

## 2023-01-01 PROCEDURE — 87205 SMEAR GRAM STAIN: CPT | Performed by: INTERNAL MEDICINE

## 2023-01-01 PROCEDURE — 250N000013 HC RX MED GY IP 250 OP 250 PS 637: Performed by: NURSE PRACTITIONER

## 2023-01-01 PROCEDURE — P9016 RBC LEUKOCYTES REDUCED: HCPCS | Performed by: EMERGENCY MEDICINE

## 2023-01-01 PROCEDURE — 99233 SBSQ HOSP IP/OBS HIGH 50: CPT | Performed by: INTERNAL MEDICINE

## 2023-01-01 PROCEDURE — 73552 X-RAY EXAM OF FEMUR 2/>: CPT | Mod: 50

## 2023-01-01 PROCEDURE — 250N000013 HC RX MED GY IP 250 OP 250 PS 637: Performed by: HOSPITALIST

## 2023-01-01 PROCEDURE — 999N000179 XR SURGERY CARM FLUORO LESS THAN 5 MIN W STILLS: Mod: TC

## 2023-01-01 PROCEDURE — 87328 CRYPTOSPORIDIUM AG IA: CPT | Performed by: INTERNAL MEDICINE

## 2023-01-01 PROCEDURE — 250N000009 HC RX 250: Performed by: EMERGENCY MEDICINE

## 2023-01-01 PROCEDURE — 255N000002 HC RX 255 OP 636: Performed by: INTERNAL MEDICINE

## 2023-01-01 PROCEDURE — 258N000003 HC RX IP 258 OP 636: Performed by: INTERNAL MEDICINE

## 2023-01-01 PROCEDURE — 85610 PROTHROMBIN TIME: CPT | Performed by: INTERNAL MEDICINE

## 2023-01-01 PROCEDURE — 250N000009 HC RX 250: Performed by: RADIOLOGY

## 2023-01-01 PROCEDURE — 85379 FIBRIN DEGRADATION QUANT: CPT | Performed by: EMERGENCY MEDICINE

## 2023-01-01 PROCEDURE — 343N000001 HC RX 343: Performed by: EMERGENCY MEDICINE

## 2023-01-01 PROCEDURE — G0179 MD RECERTIFICATION HHA PT: HCPCS | Performed by: INTERNAL MEDICINE

## 2023-01-01 PROCEDURE — 83605 ASSAY OF LACTIC ACID: CPT | Performed by: EMERGENCY MEDICINE

## 2023-01-01 PROCEDURE — 99207 PR APP CREDIT; MD BILLING SHARED VISIT: CPT | Performed by: PHYSICIAN ASSISTANT

## 2023-01-01 PROCEDURE — 96361 HYDRATE IV INFUSION ADD-ON: CPT

## 2023-01-01 PROCEDURE — 88341 IMHCHEM/IMCYTCHM EA ADD ANTB: CPT | Mod: 26 | Performed by: PATHOLOGY

## 2023-01-01 PROCEDURE — 99232 SBSQ HOSP IP/OBS MODERATE 35: CPT | Performed by: STUDENT IN AN ORGANIZED HEALTH CARE EDUCATION/TRAINING PROGRAM

## 2023-01-01 PROCEDURE — 36416 COLLJ CAPILLARY BLOOD SPEC: CPT | Performed by: STUDENT IN AN ORGANIZED HEALTH CARE EDUCATION/TRAINING PROGRAM

## 2023-01-01 PROCEDURE — 32554 ASPIRATE PLEURA W/O IMAGING: CPT | Performed by: INTERNAL MEDICINE

## 2023-01-01 PROCEDURE — P9603 ONE-WAY ALLOW PRORATED MILES: HCPCS | Mod: ORL | Performed by: FAMILY MEDICINE

## 2023-01-01 PROCEDURE — 200N000001 HC R&B ICU

## 2023-01-01 PROCEDURE — G0500 MOD SEDAT ENDO SERVICE >5YRS: HCPCS | Performed by: INTERNAL MEDICINE

## 2023-01-01 PROCEDURE — 43235 EGD DIAGNOSTIC BRUSH WASH: CPT | Performed by: INTERNAL MEDICINE

## 2023-01-01 PROCEDURE — 360N000084 HC SURGERY LEVEL 4 W/ FLUORO, PER MIN: Performed by: STUDENT IN AN ORGANIZED HEALTH CARE EDUCATION/TRAINING PROGRAM

## 2023-01-01 PROCEDURE — 86015 ACTIN ANTIBODY EACH: CPT | Performed by: INTERNAL MEDICINE

## 2023-01-01 PROCEDURE — 73620 X-RAY EXAM OF FOOT: CPT | Mod: RT

## 2023-01-01 PROCEDURE — 999N000157 HC STATISTIC RCP TIME EA 10 MIN

## 2023-01-01 PROCEDURE — 86901 BLOOD TYPING SEROLOGIC RH(D): CPT | Performed by: EMERGENCY MEDICINE

## 2023-01-01 PROCEDURE — 87075 CULTR BACTERIA EXCEPT BLOOD: CPT | Performed by: INTERNAL MEDICINE

## 2023-01-01 PROCEDURE — 88305 TISSUE EXAM BY PATHOLOGIST: CPT | Mod: TC | Performed by: INTERNAL MEDICINE

## 2023-01-01 PROCEDURE — 24535 CLTX SPRCNDYLR HUMERAL FX W/: CPT | Mod: LT

## 2023-01-01 PROCEDURE — 999N000065 XR FEMUR PORT LEFT 2 VIEWS: Mod: LT

## 2023-01-01 PROCEDURE — 83735 ASSAY OF MAGNESIUM: CPT | Performed by: PHYSICIAN ASSISTANT

## 2023-01-01 PROCEDURE — 370N000017 HC ANESTHESIA TECHNICAL FEE, PER MIN: Performed by: STUDENT IN AN ORGANIZED HEALTH CARE EDUCATION/TRAINING PROGRAM

## 2023-01-01 PROCEDURE — 250N000009 HC RX 250: Performed by: NURSE ANESTHETIST, CERTIFIED REGISTERED

## 2023-01-01 PROCEDURE — 80053 COMPREHEN METABOLIC PANEL: CPT | Performed by: EMERGENCY MEDICINE

## 2023-01-01 PROCEDURE — 74183 MRI ABD W/O CNTR FLWD CNTR: CPT | Mod: MG

## 2023-01-01 PROCEDURE — 85027 COMPLETE CBC AUTOMATED: CPT | Performed by: INTERNAL MEDICINE

## 2023-01-01 PROCEDURE — 99238 HOSP IP/OBS DSCHRG MGMT 30/<: CPT | Performed by: INTERNAL MEDICINE

## 2023-01-01 PROCEDURE — 250N000009 HC RX 250: Performed by: ANESTHESIOLOGY

## 2023-01-01 PROCEDURE — 99232 SBSQ HOSP IP/OBS MODERATE 35: CPT | Performed by: INTERNAL MEDICINE

## 2023-01-01 PROCEDURE — C1776 JOINT DEVICE (IMPLANTABLE): HCPCS | Performed by: STUDENT IN AN ORGANIZED HEALTH CARE EDUCATION/TRAINING PROGRAM

## 2023-01-01 PROCEDURE — 258N000003 HC RX IP 258 OP 636: Performed by: NURSE ANESTHETIST, CERTIFIED REGISTERED

## 2023-01-01 PROCEDURE — 74178 CT ABD&PLV WO CNTR FLWD CNTR: CPT | Mod: MG

## 2023-01-01 PROCEDURE — 99221 1ST HOSP IP/OBS SF/LOW 40: CPT | Performed by: SURGERY

## 2023-01-01 PROCEDURE — 99233 SBSQ HOSP IP/OBS HIGH 50: CPT | Performed by: STUDENT IN AN ORGANIZED HEALTH CARE EDUCATION/TRAINING PROGRAM

## 2023-01-01 PROCEDURE — 94640 AIRWAY INHALATION TREATMENT: CPT

## 2023-01-01 PROCEDURE — P9016 RBC LEUKOCYTES REDUCED: HCPCS | Performed by: STUDENT IN AN ORGANIZED HEALTH CARE EDUCATION/TRAINING PROGRAM

## 2023-01-01 PROCEDURE — 85027 COMPLETE CBC AUTOMATED: CPT | Performed by: STUDENT IN AN ORGANIZED HEALTH CARE EDUCATION/TRAINING PROGRAM

## 2023-01-01 PROCEDURE — 99152 MOD SED SAME PHYS/QHP 5/>YRS: CPT

## 2023-01-01 PROCEDURE — 999N000141 HC STATISTIC PRE-PROCEDURE NURSING ASSESSMENT: Performed by: STUDENT IN AN ORGANIZED HEALTH CARE EDUCATION/TRAINING PROGRAM

## 2023-01-01 PROCEDURE — 0W993ZZ DRAINAGE OF RIGHT PLEURAL CAVITY, PERCUTANEOUS APPROACH: ICD-10-PCS | Performed by: INTERNAL MEDICINE

## 2023-01-01 PROCEDURE — 88305 TISSUE EXAM BY PATHOLOGIST: CPT | Mod: 26 | Performed by: PATHOLOGY

## 2023-01-01 PROCEDURE — 250N000011 HC RX IP 250 OP 636: Performed by: NURSE PRACTITIONER

## 2023-01-01 PROCEDURE — 2W3MX1Z IMMOBILIZATION OF LEFT LOWER EXTREMITY USING SPLINT: ICD-10-PCS | Performed by: EMERGENCY MEDICINE

## 2023-01-01 PROCEDURE — 84484 ASSAY OF TROPONIN QUANT: CPT | Performed by: INTERNAL MEDICINE

## 2023-01-01 PROCEDURE — 87040 BLOOD CULTURE FOR BACTERIA: CPT | Performed by: INTERNAL MEDICINE

## 2023-01-01 PROCEDURE — 999N000065 XR KNEE PORT LEFT 1/2 VIEWS: Mod: LT

## 2023-01-01 PROCEDURE — 86850 RBC ANTIBODY SCREEN: CPT | Performed by: EMERGENCY MEDICINE

## 2023-01-01 PROCEDURE — 96374 THER/PROPH/DIAG INJ IV PUSH: CPT

## 2023-01-01 PROCEDURE — 99223 1ST HOSP IP/OBS HIGH 75: CPT | Mod: AI | Performed by: INTERNAL MEDICINE

## 2023-01-01 PROCEDURE — 89050 BODY FLUID CELL COUNT: CPT | Performed by: INTERNAL MEDICINE

## 2023-01-01 PROCEDURE — 86301 IMMUNOASSAY TUMOR CA 19-9: CPT | Performed by: INTERNAL MEDICINE

## 2023-01-01 PROCEDURE — 80053 COMPREHEN METABOLIC PANEL: CPT | Mod: ORL | Performed by: FAMILY MEDICINE

## 2023-01-01 PROCEDURE — 36415 COLL VENOUS BLD VENIPUNCTURE: CPT | Mod: ORL | Performed by: FAMILY MEDICINE

## 2023-01-01 PROCEDURE — 84100 ASSAY OF PHOSPHORUS: CPT | Performed by: STUDENT IN AN ORGANIZED HEALTH CARE EDUCATION/TRAINING PROGRAM

## 2023-01-01 PROCEDURE — 250N000025 HC SEVOFLURANE, PER MIN: Performed by: STUDENT IN AN ORGANIZED HEALTH CARE EDUCATION/TRAINING PROGRAM

## 2023-01-01 PROCEDURE — 84132 ASSAY OF SERUM POTASSIUM: CPT | Performed by: STUDENT IN AN ORGANIZED HEALTH CARE EDUCATION/TRAINING PROGRAM

## 2023-01-01 PROCEDURE — 250N000012 HC RX MED GY IP 250 OP 636 PS 637: Performed by: INTERNAL MEDICINE

## 2023-01-01 PROCEDURE — 87637 SARSCOV2&INF A&B&RSV AMP PRB: CPT | Performed by: EMERGENCY MEDICINE

## 2023-01-01 PROCEDURE — 80048 BASIC METABOLIC PNL TOTAL CA: CPT | Performed by: EMERGENCY MEDICINE

## 2023-01-01 PROCEDURE — 80053 COMPREHEN METABOLIC PANEL: CPT | Performed by: INTERNAL MEDICINE

## 2023-01-01 PROCEDURE — 78227 HEPATOBIL SYST IMAGE W/DRUG: CPT | Mod: MG

## 2023-01-01 PROCEDURE — 88112 CYTOPATH CELL ENHANCE TECH: CPT | Mod: TC | Performed by: INTERNAL MEDICINE

## 2023-01-01 PROCEDURE — P9016 RBC LEUKOCYTES REDUCED: HCPCS | Performed by: ANESTHESIOLOGY

## 2023-01-01 PROCEDURE — 93306 TTE W/DOPPLER COMPLETE: CPT | Mod: 26 | Performed by: INTERNAL MEDICINE

## 2023-01-01 PROCEDURE — 71275 CT ANGIOGRAPHY CHEST: CPT | Mod: MF

## 2023-01-01 PROCEDURE — 0SRD0J9 REPLACEMENT OF LEFT KNEE JOINT WITH SYNTHETIC SUBSTITUTE, CEMENTED, OPEN APPROACH: ICD-10-PCS | Performed by: STUDENT IN AN ORGANIZED HEALTH CARE EDUCATION/TRAINING PROGRAM

## 2023-01-01 PROCEDURE — 999N000208 ECHOCARDIOGRAM COMPLETE

## 2023-01-01 PROCEDURE — 83550 IRON BINDING TEST: CPT | Performed by: STUDENT IN AN ORGANIZED HEALTH CARE EDUCATION/TRAINING PROGRAM

## 2023-01-01 PROCEDURE — 88112 CYTOPATH CELL ENHANCE TECH: CPT | Mod: 26 | Performed by: PATHOLOGY

## 2023-01-01 PROCEDURE — 82310 ASSAY OF CALCIUM: CPT | Performed by: EMERGENCY MEDICINE

## 2023-01-01 PROCEDURE — 83880 ASSAY OF NATRIURETIC PEPTIDE: CPT | Performed by: EMERGENCY MEDICINE

## 2023-01-01 PROCEDURE — 999N000127 HC STATISTIC PERIPHERAL IV START W US GUIDANCE

## 2023-01-01 PROCEDURE — 82306 VITAMIN D 25 HYDROXY: CPT | Performed by: STUDENT IN AN ORGANIZED HEALTH CARE EDUCATION/TRAINING PROGRAM

## 2023-01-01 PROCEDURE — 82728 ASSAY OF FERRITIN: CPT | Performed by: STUDENT IN AN ORGANIZED HEALTH CARE EDUCATION/TRAINING PROGRAM

## 2023-01-01 PROCEDURE — 81001 URINALYSIS AUTO W/SCOPE: CPT | Performed by: EMERGENCY MEDICINE

## 2023-01-01 PROCEDURE — 80048 BASIC METABOLIC PNL TOTAL CA: CPT | Performed by: STUDENT IN AN ORGANIZED HEALTH CARE EDUCATION/TRAINING PROGRAM

## 2023-01-01 PROCEDURE — 272N000706 US THORACENTESIS

## 2023-01-01 PROCEDURE — 73060 X-RAY EXAM OF HUMERUS: CPT | Mod: LT

## 2023-01-01 PROCEDURE — G0180 MD CERTIFICATION HHA PATIENT: HCPCS | Performed by: INTERNAL MEDICINE

## 2023-01-01 PROCEDURE — 99223 1ST HOSP IP/OBS HIGH 75: CPT | Mod: AI | Performed by: STUDENT IN AN ORGANIZED HEALTH CARE EDUCATION/TRAINING PROGRAM

## 2023-01-01 PROCEDURE — 87641 MR-STAPH DNA AMP PROBE: CPT | Performed by: INTERNAL MEDICINE

## 2023-01-01 PROCEDURE — 86038 ANTINUCLEAR ANTIBODIES: CPT | Performed by: INTERNAL MEDICINE

## 2023-01-01 PROCEDURE — 83880 ASSAY OF NATRIURETIC PEPTIDE: CPT | Performed by: PHYSICIAN ASSISTANT

## 2023-01-01 PROCEDURE — 0PSGXZZ REPOSITION LEFT HUMERAL SHAFT, EXTERNAL APPROACH: ICD-10-PCS | Performed by: EMERGENCY MEDICINE

## 2023-01-01 PROCEDURE — 99285 EMERGENCY DEPT VISIT HI MDM: CPT | Mod: 25,CS

## 2023-01-01 PROCEDURE — 82803 BLOOD GASES ANY COMBINATION: CPT | Performed by: INTERNAL MEDICINE

## 2023-01-01 PROCEDURE — 96374 THER/PROPH/DIAG INJ IV PUSH: CPT | Mod: 59

## 2023-01-01 PROCEDURE — 83986 ASSAY PH BODY FLUID NOS: CPT | Performed by: INTERNAL MEDICINE

## 2023-01-01 PROCEDURE — 86140 C-REACTIVE PROTEIN: CPT | Performed by: INTERNAL MEDICINE

## 2023-01-01 PROCEDURE — 29505 APPLICATION LONG LEG SPLINT: CPT | Mod: LT

## 2023-01-01 PROCEDURE — 86256 FLUORESCENT ANTIBODY TITER: CPT | Performed by: INTERNAL MEDICINE

## 2023-01-01 PROCEDURE — 255N000002 HC RX 255 OP 636: Performed by: EMERGENCY MEDICINE

## 2023-01-01 PROCEDURE — 84157 ASSAY OF PROTEIN OTHER: CPT | Performed by: INTERNAL MEDICINE

## 2023-01-01 PROCEDURE — C9803 HOPD COVID-19 SPEC COLLECT: HCPCS

## 2023-01-01 PROCEDURE — 250N000009 HC RX 250: Performed by: INTERNAL MEDICINE

## 2023-01-01 PROCEDURE — 99233 SBSQ HOSP IP/OBS HIGH 50: CPT | Performed by: NURSE PRACTITIONER

## 2023-01-01 PROCEDURE — 272N000001 HC OR GENERAL SUPPLY STERILE: Performed by: STUDENT IN AN ORGANIZED HEALTH CARE EDUCATION/TRAINING PROGRAM

## 2023-01-01 PROCEDURE — 83630 LACTOFERRIN FECAL (QUAL): CPT | Performed by: INTERNAL MEDICINE

## 2023-01-01 PROCEDURE — 82565 ASSAY OF CREATININE: CPT | Performed by: STUDENT IN AN ORGANIZED HEALTH CARE EDUCATION/TRAINING PROGRAM

## 2023-01-01 PROCEDURE — 82248 BILIRUBIN DIRECT: CPT | Performed by: EMERGENCY MEDICINE

## 2023-01-01 PROCEDURE — 80048 BASIC METABOLIC PNL TOTAL CA: CPT | Mod: ORL | Performed by: FAMILY MEDICINE

## 2023-01-01 PROCEDURE — A9537 TC99M MEBROFENIN: HCPCS | Performed by: EMERGENCY MEDICINE

## 2023-01-01 PROCEDURE — 0DJ08ZZ INSPECTION OF UPPER INTESTINAL TRACT, VIA NATURAL OR ARTIFICIAL OPENING ENDOSCOPIC: ICD-10-PCS | Performed by: INTERNAL MEDICINE

## 2023-01-01 PROCEDURE — 82042 OTHER SOURCE ALBUMIN QUAN EA: CPT | Performed by: INTERNAL MEDICINE

## 2023-01-01 PROCEDURE — A9585 GADOBUTROL INJECTION: HCPCS | Performed by: EMERGENCY MEDICINE

## 2023-01-01 PROCEDURE — 73600 X-RAY EXAM OF ANKLE: CPT | Mod: RT

## 2023-01-01 PROCEDURE — 83970 ASSAY OF PARATHORMONE: CPT | Performed by: STUDENT IN AN ORGANIZED HEALTH CARE EDUCATION/TRAINING PROGRAM

## 2023-01-01 PROCEDURE — 250N000009 HC RX 250: Performed by: STUDENT IN AN ORGANIZED HEALTH CARE EDUCATION/TRAINING PROGRAM

## 2023-01-01 DEVICE — DISTAL FEMORAL COMPONENT
Type: IMPLANTABLE DEVICE | Site: KNEE | Status: FUNCTIONAL
Brand: GMRS

## 2023-01-01 DEVICE — MRH KNEE TIBIAL BASEPLATE
Type: IMPLANTABLE DEVICE | Site: KNEE | Status: FUNCTIONAL
Brand: MRH

## 2023-01-01 DEVICE — BUCK FEMORAL CEMENT RESTRICTOR 25MM
Type: IMPLANTABLE DEVICE | Site: KNEE | Status: FUNCTIONAL
Brand: BUCK

## 2023-01-01 DEVICE — MRH KNEE BUMPER INSERT
Type: IMPLANTABLE DEVICE | Site: KNEE | Status: FUNCTIONAL
Brand: MRH

## 2023-01-01 DEVICE — MODULAR ROTATING HINGE KNEE-AXLE
Type: IMPLANTABLE DEVICE | Site: KNEE | Status: FUNCTIONAL
Brand: MRH

## 2023-01-01 DEVICE — MRH KNEE TIBIAL SLEEVE
Type: IMPLANTABLE DEVICE | Site: KNEE | Status: FUNCTIONAL
Brand: MRH

## 2023-01-01 DEVICE — BONE CEMENT SIMPLEX FULL DOSE 6191-1-001: Type: IMPLANTABLE DEVICE | Site: KNEE | Status: FUNCTIONAL

## 2023-01-01 DEVICE — TITANIUM FLUTED STEM
Type: IMPLANTABLE DEVICE | Site: KNEE | Status: FUNCTIONAL
Brand: DURACON

## 2023-01-01 DEVICE — MRH KNEE TIBIAL INSERT
Type: IMPLANTABLE DEVICE | Site: KNEE | Status: FUNCTIONAL
Brand: MRH

## 2023-01-01 DEVICE — MRH KNEE FEMORAL BUSHING
Type: IMPLANTABLE DEVICE | Site: KNEE | Status: FUNCTIONAL
Brand: MRH

## 2023-01-01 DEVICE — MRH TIBIAL ROTATING COMPONENT
Type: IMPLANTABLE DEVICE | Site: KNEE | Status: FUNCTIONAL
Brand: MRH

## 2023-01-01 RX ORDER — LIDOCAINE 40 MG/G
CREAM TOPICAL
Status: DISCONTINUED | OUTPATIENT
Start: 2023-01-01 | End: 2023-01-01 | Stop reason: HOSPADM

## 2023-01-01 RX ORDER — POLYETHYLENE GLYCOL 3350 17 G/17G
17 POWDER, FOR SOLUTION ORAL DAILY
Status: DISCONTINUED | OUTPATIENT
Start: 2023-01-01 | End: 2023-01-01

## 2023-01-01 RX ORDER — CEFAZOLIN SODIUM/WATER 2 G/20 ML
2 SYRINGE (ML) INTRAVENOUS SEE ADMIN INSTRUCTIONS
Status: DISCONTINUED | OUTPATIENT
Start: 2023-01-01 | End: 2023-01-01

## 2023-01-01 RX ORDER — LOPERAMIDE HCL 2 MG
2 CAPSULE ORAL 4 TIMES DAILY PRN
Status: DISCONTINUED | OUTPATIENT
Start: 2023-01-01 | End: 2023-01-01 | Stop reason: HOSPADM

## 2023-01-01 RX ORDER — PANTOPRAZOLE SODIUM 40 MG/1
40 TABLET, DELAYED RELEASE ORAL 2 TIMES DAILY
Status: DISCONTINUED | OUTPATIENT
Start: 2023-01-01 | End: 2023-01-01 | Stop reason: HOSPADM

## 2023-01-01 RX ORDER — VANCOMYCIN HYDROCHLORIDE 125 MG/1
125 CAPSULE ORAL 4 TIMES DAILY
Qty: 14 CAPSULE | Refills: 0 | Status: SHIPPED | OUTPATIENT
Start: 2023-01-01 | End: 2023-01-01

## 2023-01-01 RX ORDER — ATORVASTATIN CALCIUM 40 MG/1
40 TABLET, FILM COATED ORAL EVERY EVENING
Status: DISCONTINUED | OUTPATIENT
Start: 2023-01-01 | End: 2023-01-01

## 2023-01-01 RX ORDER — CEFAZOLIN SODIUM 1 G/3ML
1 INJECTION, POWDER, FOR SOLUTION INTRAMUSCULAR; INTRAVENOUS EVERY 8 HOURS
Status: COMPLETED | OUTPATIENT
Start: 2023-01-01 | End: 2023-01-01

## 2023-01-01 RX ORDER — NICOTINE POLACRILEX 4 MG
15-30 LOZENGE BUCCAL
Status: DISCONTINUED | OUTPATIENT
Start: 2023-01-01 | End: 2023-01-01 | Stop reason: HOSPADM

## 2023-01-01 RX ORDER — ESMOLOL HYDROCHLORIDE 10 MG/ML
INJECTION INTRAVENOUS PRN
Status: DISCONTINUED | OUTPATIENT
Start: 2023-01-01 | End: 2023-01-01

## 2023-01-01 RX ORDER — CARVEDILOL 3.12 MG/1
3.12 TABLET ORAL 2 TIMES DAILY WITH MEALS
Status: DISCONTINUED | OUTPATIENT
Start: 2023-01-01 | End: 2023-01-01 | Stop reason: HOSPADM

## 2023-01-01 RX ORDER — MAGNESIUM SULFATE HEPTAHYDRATE 40 MG/ML
4 INJECTION, SOLUTION INTRAVENOUS ONCE
Status: COMPLETED | OUTPATIENT
Start: 2023-01-01 | End: 2023-01-01

## 2023-01-01 RX ORDER — HYDROCODONE BITARTRATE AND ACETAMINOPHEN 5; 325 MG/1; MG/1
1 TABLET ORAL ONCE
Status: COMPLETED | OUTPATIENT
Start: 2023-01-01 | End: 2023-01-01

## 2023-01-01 RX ORDER — AMOXICILLIN 250 MG
1 CAPSULE ORAL 2 TIMES DAILY
Status: DISCONTINUED | OUTPATIENT
Start: 2023-01-01 | End: 2023-01-01 | Stop reason: HOSPADM

## 2023-01-01 RX ORDER — PREGABALIN 25 MG/1
25 CAPSULE ORAL 3 TIMES DAILY
Status: DISCONTINUED | OUTPATIENT
Start: 2023-01-01 | End: 2023-01-01

## 2023-01-01 RX ORDER — IPRATROPIUM BROMIDE AND ALBUTEROL SULFATE 2.5; .5 MG/3ML; MG/3ML
3 SOLUTION RESPIRATORY (INHALATION) ONCE
Status: COMPLETED | OUTPATIENT
Start: 2023-01-01 | End: 2023-01-01

## 2023-01-01 RX ORDER — VANCOMYCIN HYDROCHLORIDE 1 G/20ML
INJECTION, POWDER, LYOPHILIZED, FOR SOLUTION INTRAVENOUS PRN
Status: DISCONTINUED | OUTPATIENT
Start: 2023-01-01 | End: 2023-01-01 | Stop reason: HOSPADM

## 2023-01-01 RX ORDER — HYDROMORPHONE HYDROCHLORIDE 1 MG/ML
0.5 INJECTION, SOLUTION INTRAMUSCULAR; INTRAVENOUS; SUBCUTANEOUS ONCE
Status: COMPLETED | OUTPATIENT
Start: 2023-01-01 | End: 2023-01-01

## 2023-01-01 RX ORDER — HYDROMORPHONE HYDROCHLORIDE 2 MG/1
4 TABLET ORAL
Status: DISCONTINUED | OUTPATIENT
Start: 2023-01-01 | End: 2023-01-01

## 2023-01-01 RX ORDER — ONDANSETRON 4 MG/1
4 TABLET, ORALLY DISINTEGRATING ORAL EVERY 6 HOURS PRN
Status: DISCONTINUED | OUTPATIENT
Start: 2023-01-01 | End: 2023-01-01 | Stop reason: HOSPADM

## 2023-01-01 RX ORDER — FUROSEMIDE 10 MG/ML
40 INJECTION INTRAMUSCULAR; INTRAVENOUS ONCE
Status: COMPLETED | OUTPATIENT
Start: 2023-01-01 | End: 2023-01-01

## 2023-01-01 RX ORDER — ATROPINE SULFATE 0.1 MG/ML
1 INJECTION INTRAVENOUS
Status: DISCONTINUED | OUTPATIENT
Start: 2023-01-01 | End: 2023-01-01

## 2023-01-01 RX ORDER — BISACODYL 5 MG
5 TABLET, DELAYED RELEASE (ENTERIC COATED) ORAL DAILY PRN
Status: DISCONTINUED | OUTPATIENT
Start: 2023-01-01 | End: 2023-01-01 | Stop reason: HOSPADM

## 2023-01-01 RX ORDER — ONDANSETRON 2 MG/ML
4 INJECTION INTRAMUSCULAR; INTRAVENOUS EVERY 30 MIN PRN
Status: DISCONTINUED | OUTPATIENT
Start: 2023-01-01 | End: 2023-01-01 | Stop reason: HOSPADM

## 2023-01-01 RX ORDER — HYDROCODONE BITARTRATE AND ACETAMINOPHEN 5; 325 MG/1; MG/1
2 TABLET ORAL 4 TIMES DAILY PRN
Qty: 1 TABLET | Refills: 0 | Status: ON HOLD
Start: 2023-01-01 | End: 2023-01-01

## 2023-01-01 RX ORDER — DIPHENHYDRAMINE HYDROCHLORIDE 50 MG/ML
25-50 INJECTION INTRAMUSCULAR; INTRAVENOUS
Status: DISCONTINUED | OUTPATIENT
Start: 2023-01-01 | End: 2023-01-01

## 2023-01-01 RX ORDER — ENOXAPARIN SODIUM 100 MG/ML
30 INJECTION SUBCUTANEOUS EVERY 24 HOURS
Status: DISCONTINUED | OUTPATIENT
Start: 2023-01-01 | End: 2023-01-01

## 2023-01-01 RX ORDER — TRANEXAMIC ACID 650 MG/1
1950 TABLET ORAL ONCE
Status: CANCELLED | OUTPATIENT
Start: 2023-01-01 | End: 2023-01-01

## 2023-01-01 RX ORDER — SERTRALINE HYDROCHLORIDE 25 MG/1
25 TABLET, FILM COATED ORAL DAILY
Qty: 30 TABLET | Refills: 0 | Status: SHIPPED | OUTPATIENT
Start: 2023-01-01

## 2023-01-01 RX ORDER — NALOXONE HYDROCHLORIDE 0.4 MG/ML
0.2 INJECTION, SOLUTION INTRAMUSCULAR; INTRAVENOUS; SUBCUTANEOUS
Status: DISCONTINUED | OUTPATIENT
Start: 2023-01-01 | End: 2023-01-01 | Stop reason: HOSPADM

## 2023-01-01 RX ORDER — ONDANSETRON 2 MG/ML
4 INJECTION INTRAMUSCULAR; INTRAVENOUS EVERY 6 HOURS PRN
Status: DISCONTINUED | OUTPATIENT
Start: 2023-01-01 | End: 2023-01-01 | Stop reason: HOSPADM

## 2023-01-01 RX ORDER — CALCIUM CARBONATE 500 MG/1
2 TABLET, CHEWABLE ORAL PRN
COMMUNITY

## 2023-01-01 RX ORDER — HYDROMORPHONE HCL IN WATER/PF 6 MG/30 ML
0.2 PATIENT CONTROLLED ANALGESIA SYRINGE INTRAVENOUS
Status: COMPLETED | OUTPATIENT
Start: 2023-01-01 | End: 2023-01-01

## 2023-01-01 RX ORDER — GADOBUTROL 604.72 MG/ML
5.5 INJECTION INTRAVENOUS ONCE
Status: COMPLETED | OUTPATIENT
Start: 2023-01-01 | End: 2023-01-01

## 2023-01-01 RX ORDER — NALOXONE HYDROCHLORIDE 0.4 MG/ML
0.4 INJECTION, SOLUTION INTRAMUSCULAR; INTRAVENOUS; SUBCUTANEOUS
Status: DISCONTINUED | OUTPATIENT
Start: 2023-01-01 | End: 2023-01-01 | Stop reason: HOSPADM

## 2023-01-01 RX ORDER — LIDOCAINE 4 G/G
1-2 PATCH TOPICAL
Status: DISCONTINUED | OUTPATIENT
Start: 2023-01-01 | End: 2023-01-01 | Stop reason: HOSPADM

## 2023-01-01 RX ORDER — HYDROXYZINE HYDROCHLORIDE 25 MG/1
25 TABLET, FILM COATED ORAL 4 TIMES DAILY PRN
Status: DISCONTINUED | OUTPATIENT
Start: 2023-01-01 | End: 2023-01-01

## 2023-01-01 RX ORDER — FUROSEMIDE 10 MG/ML
10 INJECTION INTRAMUSCULAR; INTRAVENOUS ONCE
Status: COMPLETED | OUTPATIENT
Start: 2023-01-01 | End: 2023-01-01

## 2023-01-01 RX ORDER — SODIUM CHLORIDE, SODIUM LACTATE, POTASSIUM CHLORIDE, CALCIUM CHLORIDE 600; 310; 30; 20 MG/100ML; MG/100ML; MG/100ML; MG/100ML
INJECTION, SOLUTION INTRAVENOUS CONTINUOUS
Status: DISCONTINUED | OUTPATIENT
Start: 2023-01-01 | End: 2023-01-01 | Stop reason: HOSPADM

## 2023-01-01 RX ORDER — BUPIVACAINE HYDROCHLORIDE AND EPINEPHRINE 5; 5 MG/ML; UG/ML
INJECTION, SOLUTION PERINEURAL
Status: COMPLETED | OUTPATIENT
Start: 2023-01-01 | End: 2023-01-01

## 2023-01-01 RX ORDER — OXYCODONE HYDROCHLORIDE 5 MG/1
5 TABLET ORAL EVERY 4 HOURS PRN
Status: DISCONTINUED | OUTPATIENT
Start: 2023-01-01 | End: 2023-01-01 | Stop reason: HOSPADM

## 2023-01-01 RX ORDER — IOPAMIDOL 755 MG/ML
500 INJECTION, SOLUTION INTRAVASCULAR ONCE
Status: COMPLETED | OUTPATIENT
Start: 2023-01-01 | End: 2023-01-01

## 2023-01-01 RX ORDER — FUROSEMIDE 10 MG/ML
40 INJECTION INTRAMUSCULAR; INTRAVENOUS EVERY 12 HOURS
Status: DISCONTINUED | OUTPATIENT
Start: 2023-01-01 | End: 2023-01-01

## 2023-01-01 RX ORDER — ACETAMINOPHEN 325 MG/1
650 TABLET ORAL EVERY 4 HOURS PRN
Status: DISCONTINUED | OUTPATIENT
Start: 2023-01-01 | End: 2023-01-01 | Stop reason: HOSPADM

## 2023-01-01 RX ORDER — SERTRALINE HYDROCHLORIDE 25 MG/1
25 TABLET, FILM COATED ORAL DAILY
Status: DISCONTINUED | OUTPATIENT
Start: 2023-01-01 | End: 2023-01-01

## 2023-01-01 RX ORDER — HYDROCODONE BITARTRATE AND ACETAMINOPHEN 5; 325 MG/1; MG/1
2 TABLET ORAL EVERY 4 HOURS PRN
Status: DISCONTINUED | OUTPATIENT
Start: 2023-01-01 | End: 2023-01-01 | Stop reason: HOSPADM

## 2023-01-01 RX ORDER — MINERAL OIL/HYDROPHIL PETROLAT
OINTMENT (GRAM) TOPICAL
Status: DISCONTINUED | OUTPATIENT
Start: 2023-01-01 | End: 2023-01-01 | Stop reason: HOSPADM

## 2023-01-01 RX ORDER — HYDROCODONE BITARTRATE AND ACETAMINOPHEN 10; 325 MG/1; MG/1
1 TABLET ORAL EVERY 6 HOURS PRN
Status: DISCONTINUED | OUTPATIENT
Start: 2023-01-01 | End: 2023-01-01

## 2023-01-01 RX ORDER — ASPIRIN 81 MG/1
81 TABLET ORAL DAILY
Status: DISCONTINUED | OUTPATIENT
Start: 2023-01-01 | End: 2023-01-01 | Stop reason: HOSPADM

## 2023-01-01 RX ORDER — ONDANSETRON 4 MG/1
4 TABLET, ORALLY DISINTEGRATING ORAL EVERY 6 HOURS PRN
COMMUNITY

## 2023-01-01 RX ORDER — METOPROLOL TARTRATE 1 MG/ML
INJECTION, SOLUTION INTRAVENOUS
Status: DISCONTINUED
Start: 2023-01-01 | End: 2023-01-01 | Stop reason: WASHOUT

## 2023-01-01 RX ORDER — ENOXAPARIN SODIUM 100 MG/ML
40 INJECTION SUBCUTANEOUS EVERY 24 HOURS
Status: DISCONTINUED | OUTPATIENT
Start: 2023-01-01 | End: 2023-01-01

## 2023-01-01 RX ORDER — VANCOMYCIN HYDROCHLORIDE 125 MG/1
125 CAPSULE ORAL 4 TIMES DAILY
Status: DISCONTINUED | OUTPATIENT
Start: 2023-01-01 | End: 2023-01-01 | Stop reason: HOSPADM

## 2023-01-01 RX ORDER — KETOROLAC TROMETHAMINE 15 MG/ML
15 INJECTION, SOLUTION INTRAMUSCULAR; INTRAVENOUS EVERY 6 HOURS PRN
Status: COMPLETED | OUTPATIENT
Start: 2023-01-01 | End: 2023-01-01

## 2023-01-01 RX ORDER — SERTRALINE HYDROCHLORIDE 25 MG/1
25 TABLET, FILM COATED ORAL DAILY
Status: DISCONTINUED | OUTPATIENT
Start: 2023-01-01 | End: 2023-01-01 | Stop reason: HOSPADM

## 2023-01-01 RX ORDER — DICYCLOMINE HYDROCHLORIDE 10 MG/1
10 CAPSULE ORAL 3 TIMES DAILY PRN
Qty: 60 CAPSULE | Refills: 0 | Status: SHIPPED | OUTPATIENT
Start: 2023-01-01

## 2023-01-01 RX ORDER — HYDROMORPHONE HYDROCHLORIDE 1 MG/ML
0.3 INJECTION, SOLUTION INTRAMUSCULAR; INTRAVENOUS; SUBCUTANEOUS
Status: COMPLETED | OUTPATIENT
Start: 2023-01-01 | End: 2023-01-01

## 2023-01-01 RX ORDER — HYDROMORPHONE HYDROCHLORIDE 2 MG/1
2 TABLET ORAL
Status: DISCONTINUED | OUTPATIENT
Start: 2023-01-01 | End: 2023-01-01

## 2023-01-01 RX ORDER — HYDROXYZINE HYDROCHLORIDE 10 MG/1
20 TABLET, FILM COATED ORAL EVERY 6 HOURS
Status: DISCONTINUED | OUTPATIENT
Start: 2023-01-01 | End: 2023-01-01

## 2023-01-01 RX ORDER — CALCIUM CARBONATE 500 MG/1
1000 TABLET, CHEWABLE ORAL DAILY PRN
Status: DISCONTINUED | OUTPATIENT
Start: 2023-01-01 | End: 2023-01-01 | Stop reason: HOSPADM

## 2023-01-01 RX ORDER — PROPOFOL 10 MG/ML
INJECTION, EMULSION INTRAVENOUS PRN
Status: DISCONTINUED | OUTPATIENT
Start: 2023-01-01 | End: 2023-01-01

## 2023-01-01 RX ORDER — HYDROMORPHONE HCL IN WATER/PF 6 MG/30 ML
.2-.5 PATIENT CONTROLLED ANALGESIA SYRINGE INTRAVENOUS
Status: DISCONTINUED | OUTPATIENT
Start: 2023-01-01 | End: 2023-01-01

## 2023-01-01 RX ORDER — HYDROCODONE BITARTRATE AND ACETAMINOPHEN 5; 325 MG/1; MG/1
1 TABLET ORAL EVERY 6 HOURS PRN
Status: DISCONTINUED | OUTPATIENT
Start: 2023-01-01 | End: 2023-01-01 | Stop reason: DRUGHIGH

## 2023-01-01 RX ORDER — SODIUM CHLORIDE, SODIUM LACTATE, POTASSIUM CHLORIDE, CALCIUM CHLORIDE 600; 310; 30; 20 MG/100ML; MG/100ML; MG/100ML; MG/100ML
INJECTION, SOLUTION INTRAVENOUS CONTINUOUS PRN
Status: DISCONTINUED | OUTPATIENT
Start: 2023-01-01 | End: 2023-01-01

## 2023-01-01 RX ORDER — LOPERAMIDE HCL 2 MG
2 CAPSULE ORAL 3 TIMES DAILY PRN
Status: DISCONTINUED | OUTPATIENT
Start: 2023-01-01 | End: 2023-01-01 | Stop reason: HOSPADM

## 2023-01-01 RX ORDER — PROCHLORPERAZINE MALEATE 5 MG
5 TABLET ORAL EVERY 6 HOURS PRN
Status: DISCONTINUED | OUTPATIENT
Start: 2023-01-01 | End: 2023-01-01

## 2023-01-01 RX ORDER — ACETAMINOPHEN 500 MG
500 TABLET ORAL 3 TIMES DAILY
Status: DISCONTINUED | OUTPATIENT
Start: 2023-01-01 | End: 2023-01-01 | Stop reason: HOSPADM

## 2023-01-01 RX ORDER — FLUMAZENIL 0.1 MG/ML
0.2 INJECTION, SOLUTION INTRAVENOUS
Status: DISCONTINUED | OUTPATIENT
Start: 2023-01-01 | End: 2023-01-01

## 2023-01-01 RX ORDER — MEPERIDINE HYDROCHLORIDE 25 MG/ML
12.5 INJECTION INTRAMUSCULAR; INTRAVENOUS; SUBCUTANEOUS EVERY 5 MIN PRN
Status: DISCONTINUED | OUTPATIENT
Start: 2023-01-01 | End: 2023-01-01 | Stop reason: HOSPADM

## 2023-01-01 RX ORDER — AZITHROMYCIN 500 MG/5ML
500 INJECTION, POWDER, LYOPHILIZED, FOR SOLUTION INTRAVENOUS EVERY 24 HOURS
Status: DISCONTINUED | OUTPATIENT
Start: 2023-01-01 | End: 2023-01-01

## 2023-01-01 RX ORDER — FENTANYL CITRATE 50 UG/ML
50 INJECTION, SOLUTION INTRAMUSCULAR; INTRAVENOUS EVERY 5 MIN PRN
Status: DISCONTINUED | OUTPATIENT
Start: 2023-01-01 | End: 2023-01-01 | Stop reason: HOSPADM

## 2023-01-01 RX ORDER — KIT FOR THE PREPARATION OF TECHNETIUM TC 99M MEBROFENIN 45 MG/10ML
5 INJECTION, POWDER, LYOPHILIZED, FOR SOLUTION INTRAVENOUS ONCE
Status: COMPLETED | OUTPATIENT
Start: 2023-01-01 | End: 2023-01-01

## 2023-01-01 RX ORDER — PREDNISONE 20 MG/1
40 TABLET ORAL ONCE
Status: DISCONTINUED | OUTPATIENT
Start: 2023-01-01 | End: 2023-01-01

## 2023-01-01 RX ORDER — FLUMAZENIL 0.1 MG/ML
0.2 INJECTION, SOLUTION INTRAVENOUS
Status: ACTIVE | OUTPATIENT
Start: 2023-01-01 | End: 2023-01-01

## 2023-01-01 RX ORDER — ATORVASTATIN CALCIUM 40 MG/1
40 TABLET, FILM COATED ORAL EVERY EVENING
Status: DISCONTINUED | OUTPATIENT
Start: 2023-01-01 | End: 2023-01-01 | Stop reason: HOSPADM

## 2023-01-01 RX ORDER — TRAMADOL HYDROCHLORIDE 50 MG/1
50 TABLET ORAL EVERY 6 HOURS PRN
Status: DISCONTINUED | OUTPATIENT
Start: 2023-01-01 | End: 2023-01-01

## 2023-01-01 RX ORDER — AZITHROMYCIN 250 MG/1
250 TABLET, FILM COATED ORAL DAILY
Status: ON HOLD | COMMUNITY
End: 2023-01-01

## 2023-01-01 RX ORDER — PREGABALIN 25 MG/1
25 CAPSULE ORAL 2 TIMES DAILY
Status: DISCONTINUED | OUTPATIENT
Start: 2023-01-01 | End: 2023-01-01

## 2023-01-01 RX ORDER — EPINEPHRINE 1 MG/ML
0.1 INJECTION, SOLUTION INTRAMUSCULAR; SUBCUTANEOUS
Status: DISCONTINUED | OUTPATIENT
Start: 2023-01-01 | End: 2023-01-01

## 2023-01-01 RX ORDER — HYDROMORPHONE HCL IN WATER/PF 6 MG/30 ML
0.2 PATIENT CONTROLLED ANALGESIA SYRINGE INTRAVENOUS
Status: DISCONTINUED | OUTPATIENT
Start: 2023-01-01 | End: 2023-01-01

## 2023-01-01 RX ORDER — FUROSEMIDE 10 MG/ML
40 INJECTION INTRAMUSCULAR; INTRAVENOUS DAILY
Status: DISCONTINUED | OUTPATIENT
Start: 2023-01-01 | End: 2023-01-01

## 2023-01-01 RX ORDER — BACITRACIN ZINC, NEOMYCIN, POLYMYXIN B 400; 3.5; 5 [USP'U]/G; MG/G; [USP'U]/G
OINTMENT TOPICAL EVERY 6 HOURS PRN
COMMUNITY

## 2023-01-01 RX ORDER — PREGABALIN 25 MG/1
25 CAPSULE ORAL 2 TIMES DAILY
Status: DISCONTINUED | OUTPATIENT
Start: 2023-01-01 | End: 2023-01-01 | Stop reason: HOSPADM

## 2023-01-01 RX ORDER — LOPERAMIDE HCL 2 MG
2 CAPSULE ORAL PRN
COMMUNITY

## 2023-01-01 RX ORDER — PANTOPRAZOLE SODIUM 40 MG/1
40 TABLET, DELAYED RELEASE ORAL ONCE
Status: COMPLETED | OUTPATIENT
Start: 2023-01-01 | End: 2023-01-01

## 2023-01-01 RX ORDER — DIGOXIN 0.25 MG/ML
500 INJECTION INTRAMUSCULAR; INTRAVENOUS ONCE
Status: COMPLETED | OUTPATIENT
Start: 2023-01-01 | End: 2023-01-01

## 2023-01-01 RX ORDER — ALBUTEROL SULFATE 90 UG/1
2 AEROSOL, METERED RESPIRATORY (INHALATION) EVERY 6 HOURS PRN
Status: DISCONTINUED | OUTPATIENT
Start: 2023-01-01 | End: 2023-01-01 | Stop reason: HOSPADM

## 2023-01-01 RX ORDER — ONDANSETRON 4 MG/1
4 TABLET, ORALLY DISINTEGRATING ORAL EVERY 30 MIN PRN
Status: DISCONTINUED | OUTPATIENT
Start: 2023-01-01 | End: 2023-01-01 | Stop reason: HOSPADM

## 2023-01-01 RX ORDER — HYDROMORPHONE HYDROCHLORIDE 1 MG/ML
0.5 INJECTION, SOLUTION INTRAMUSCULAR; INTRAVENOUS; SUBCUTANEOUS
Status: DISCONTINUED | OUTPATIENT
Start: 2023-01-01 | End: 2023-01-01

## 2023-01-01 RX ORDER — FUROSEMIDE 40 MG
40 TABLET ORAL DAILY
Status: DISCONTINUED | OUTPATIENT
Start: 2023-01-01 | End: 2023-01-01 | Stop reason: HOSPADM

## 2023-01-01 RX ORDER — NITROGLYCERIN 0.4 MG/1
0.4 TABLET SUBLINGUAL EVERY 5 MIN PRN
Status: DISCONTINUED | OUTPATIENT
Start: 2023-01-01 | End: 2023-01-01 | Stop reason: HOSPADM

## 2023-01-01 RX ORDER — LIDOCAINE 40 MG/G
CREAM TOPICAL
Status: DISCONTINUED | OUTPATIENT
Start: 2023-01-01 | End: 2023-01-01

## 2023-01-01 RX ORDER — HYDROMORPHONE HYDROCHLORIDE 1 MG/ML
0.5 INJECTION, SOLUTION INTRAMUSCULAR; INTRAVENOUS; SUBCUTANEOUS ONCE
Status: DISCONTINUED | OUTPATIENT
Start: 2023-01-01 | End: 2023-01-01

## 2023-01-01 RX ORDER — HYDROCODONE BITARTRATE AND ACETAMINOPHEN 5; 325 MG/1; MG/1
1-2 TABLET ORAL 4 TIMES DAILY PRN
Status: DISCONTINUED | OUTPATIENT
Start: 2023-01-01 | End: 2023-01-01 | Stop reason: HOSPADM

## 2023-01-01 RX ORDER — NALOXONE HYDROCHLORIDE 0.4 MG/ML
0.4 INJECTION, SOLUTION INTRAMUSCULAR; INTRAVENOUS; SUBCUTANEOUS
Status: DISCONTINUED | OUTPATIENT
Start: 2023-01-01 | End: 2023-01-01

## 2023-01-01 RX ORDER — LIDOCAINE HYDROCHLORIDE 20 MG/ML
INJECTION, SOLUTION INFILTRATION; PERINEURAL PRN
Status: DISCONTINUED | OUTPATIENT
Start: 2023-01-01 | End: 2023-01-01

## 2023-01-01 RX ORDER — DEXAMETHASONE SODIUM PHOSPHATE 4 MG/ML
4 INJECTION, SOLUTION INTRA-ARTICULAR; INTRALESIONAL; INTRAMUSCULAR; INTRAVENOUS; SOFT TISSUE
Status: DISCONTINUED | OUTPATIENT
Start: 2023-01-01 | End: 2023-01-01 | Stop reason: HOSPADM

## 2023-01-01 RX ORDER — FLUTICASONE PROPIONATE 50 MCG
2 SPRAY, SUSPENSION (ML) NASAL DAILY PRN
Status: DISCONTINUED | OUTPATIENT
Start: 2023-01-01 | End: 2023-01-01 | Stop reason: HOSPADM

## 2023-01-01 RX ORDER — ONDANSETRON 2 MG/ML
4 INJECTION INTRAMUSCULAR; INTRAVENOUS EVERY 6 HOURS PRN
Status: DISCONTINUED | OUTPATIENT
Start: 2023-01-01 | End: 2023-01-01

## 2023-01-01 RX ORDER — METHOCARBAMOL 500 MG/1
500 TABLET, FILM COATED ORAL 4 TIMES DAILY PRN
Status: DISCONTINUED | OUTPATIENT
Start: 2023-01-01 | End: 2023-01-01

## 2023-01-01 RX ORDER — AMOXICILLIN 250 MG
1 CAPSULE ORAL 2 TIMES DAILY PRN
Status: DISCONTINUED | OUTPATIENT
Start: 2023-01-01 | End: 2023-01-01

## 2023-01-01 RX ORDER — CALCIUM CARBONATE 500 MG/1
1000 TABLET, CHEWABLE ORAL 3 TIMES DAILY PRN
Status: DISCONTINUED | OUTPATIENT
Start: 2023-01-01 | End: 2023-01-01 | Stop reason: HOSPADM

## 2023-01-01 RX ORDER — FENTANYL CITRATE 50 UG/ML
25 INJECTION, SOLUTION INTRAMUSCULAR; INTRAVENOUS ONCE
Status: COMPLETED | OUTPATIENT
Start: 2023-01-01 | End: 2023-01-01

## 2023-01-01 RX ORDER — HYDROMORPHONE HYDROCHLORIDE 1 MG/ML
2 SOLUTION ORAL
Status: DISCONTINUED | OUTPATIENT
Start: 2023-01-01 | End: 2023-01-01 | Stop reason: HOSPADM

## 2023-01-01 RX ORDER — POLYETHYLENE GLYCOL 3350 17 G/17G
1 POWDER, FOR SOLUTION ORAL DAILY PRN
COMMUNITY

## 2023-01-01 RX ORDER — PROPOFOL 10 MG/ML
1 INJECTION, EMULSION INTRAVENOUS ONCE
Status: COMPLETED | OUTPATIENT
Start: 2023-01-01 | End: 2023-01-01

## 2023-01-01 RX ORDER — MULTIVITAMIN,THERAPEUTIC
1 TABLET ORAL DAILY
Status: DISCONTINUED | OUTPATIENT
Start: 2023-01-01 | End: 2023-01-01 | Stop reason: HOSPADM

## 2023-01-01 RX ORDER — MAGNESIUM SULFATE HEPTAHYDRATE 40 MG/ML
2 INJECTION, SOLUTION INTRAVENOUS ONCE
Status: COMPLETED | OUTPATIENT
Start: 2023-01-01 | End: 2023-01-01

## 2023-01-01 RX ORDER — ALBUTEROL SULFATE 0.83 MG/ML
2.5 SOLUTION RESPIRATORY (INHALATION) EVERY 4 HOURS PRN
Status: DISCONTINUED | OUTPATIENT
Start: 2023-01-01 | End: 2023-01-01 | Stop reason: HOSPADM

## 2023-01-01 RX ORDER — HYDROMORPHONE HYDROCHLORIDE 1 MG/ML
0.3 INJECTION, SOLUTION INTRAMUSCULAR; INTRAVENOUS; SUBCUTANEOUS
Status: DISCONTINUED | OUTPATIENT
Start: 2023-01-01 | End: 2023-01-01 | Stop reason: HOSPADM

## 2023-01-01 RX ORDER — ACETAMINOPHEN 325 MG/1
975 TABLET ORAL EVERY 8 HOURS
Status: DISCONTINUED | OUTPATIENT
Start: 2023-01-01 | End: 2023-01-01 | Stop reason: HOSPADM

## 2023-01-01 RX ORDER — CEFTRIAXONE 2 G/1
2 INJECTION, POWDER, FOR SOLUTION INTRAMUSCULAR; INTRAVENOUS ONCE
Status: COMPLETED | OUTPATIENT
Start: 2023-01-01 | End: 2023-01-01

## 2023-01-01 RX ORDER — SODIUM CHLORIDE, SODIUM LACTATE, POTASSIUM CHLORIDE, CALCIUM CHLORIDE 600; 310; 30; 20 MG/100ML; MG/100ML; MG/100ML; MG/100ML
INJECTION, SOLUTION INTRAVENOUS CONTINUOUS
Status: DISCONTINUED | OUTPATIENT
Start: 2023-01-01 | End: 2023-01-01

## 2023-01-01 RX ORDER — DEXAMETHASONE SODIUM PHOSPHATE 4 MG/ML
INJECTION, SOLUTION INTRA-ARTICULAR; INTRALESIONAL; INTRAMUSCULAR; INTRAVENOUS; SOFT TISSUE PRN
Status: DISCONTINUED | OUTPATIENT
Start: 2023-01-01 | End: 2023-01-01

## 2023-01-01 RX ORDER — ACETAMINOPHEN 325 MG/1
650 TABLET ORAL EVERY 6 HOURS PRN
Status: DISCONTINUED | OUTPATIENT
Start: 2023-01-01 | End: 2023-01-01 | Stop reason: HOSPADM

## 2023-01-01 RX ORDER — HYDROXYZINE HYDROCHLORIDE 10 MG/1
10 TABLET, FILM COATED ORAL EVERY 6 HOURS
Status: DISCONTINUED | OUTPATIENT
Start: 2023-01-01 | End: 2023-01-01 | Stop reason: HOSPADM

## 2023-01-01 RX ORDER — ONDANSETRON 2 MG/ML
INJECTION INTRAMUSCULAR; INTRAVENOUS PRN
Status: DISCONTINUED | OUTPATIENT
Start: 2023-01-01 | End: 2023-01-01

## 2023-01-01 RX ORDER — NALOXONE HYDROCHLORIDE 0.4 MG/ML
0.2 INJECTION, SOLUTION INTRAMUSCULAR; INTRAVENOUS; SUBCUTANEOUS
Status: DISCONTINUED | OUTPATIENT
Start: 2023-01-01 | End: 2023-01-01

## 2023-01-01 RX ORDER — HYDROMORPHONE HYDROCHLORIDE 1 MG/ML
1 SOLUTION ORAL
Status: DISCONTINUED | OUTPATIENT
Start: 2023-01-01 | End: 2023-01-01 | Stop reason: HOSPADM

## 2023-01-01 RX ORDER — LORAZEPAM 2 MG/ML
0.5 INJECTION INTRAMUSCULAR EVERY 6 HOURS PRN
Status: DISCONTINUED | OUTPATIENT
Start: 2023-01-01 | End: 2023-01-01

## 2023-01-01 RX ORDER — CEFAZOLIN SODIUM 2 G/100ML
2 INJECTION, SOLUTION INTRAVENOUS
Status: CANCELLED | OUTPATIENT
Start: 2023-01-01

## 2023-01-01 RX ORDER — NALOXONE HYDROCHLORIDE 0.4 MG/ML
0.1 INJECTION, SOLUTION INTRAMUSCULAR; INTRAVENOUS; SUBCUTANEOUS
Status: DISCONTINUED | OUTPATIENT
Start: 2023-01-01 | End: 2023-01-01 | Stop reason: HOSPADM

## 2023-01-01 RX ORDER — HYDROMORPHONE HYDROCHLORIDE 1 MG/ML
0.5 INJECTION, SOLUTION INTRAMUSCULAR; INTRAVENOUS; SUBCUTANEOUS
Status: COMPLETED | OUTPATIENT
Start: 2023-01-01 | End: 2023-01-01

## 2023-01-01 RX ORDER — BISACODYL 5 MG/1
5 TABLET, DELAYED RELEASE ORAL DAILY PRN
Status: DISCONTINUED | OUTPATIENT
Start: 2023-01-01 | End: 2023-01-01

## 2023-01-01 RX ORDER — FUROSEMIDE 40 MG
40 TABLET ORAL DAILY
Status: DISCONTINUED | OUTPATIENT
Start: 2023-01-01 | End: 2023-01-01

## 2023-01-01 RX ORDER — VITAMIN B COMPLEX
25 TABLET ORAL DAILY
Status: DISCONTINUED | OUTPATIENT
Start: 2023-01-01 | End: 2023-01-01 | Stop reason: HOSPADM

## 2023-01-01 RX ORDER — SODIUM CHLORIDE 9 MG/ML
INJECTION, SOLUTION INTRAVENOUS CONTINUOUS
Status: DISCONTINUED | OUTPATIENT
Start: 2023-01-01 | End: 2023-01-01

## 2023-01-01 RX ORDER — HYDROMORPHONE HCL IN WATER/PF 6 MG/30 ML
.2-.4 PATIENT CONTROLLED ANALGESIA SYRINGE INTRAVENOUS
Status: DISCONTINUED | OUTPATIENT
Start: 2023-01-01 | End: 2023-01-01

## 2023-01-01 RX ORDER — PREDNISONE 20 MG/1
30 TABLET ORAL DAILY
Qty: 8 TABLET | Refills: 0 | Status: SHIPPED | OUTPATIENT
Start: 2023-01-01 | End: 2023-01-01

## 2023-01-01 RX ORDER — HYDROCODONE BITARTRATE AND ACETAMINOPHEN 5; 325 MG/1; MG/1
2 TABLET ORAL EVERY 4 HOURS PRN
COMMUNITY

## 2023-01-01 RX ORDER — HYDROMORPHONE HCL IN WATER/PF 6 MG/30 ML
0.2 PATIENT CONTROLLED ANALGESIA SYRINGE INTRAVENOUS EVERY 5 MIN PRN
Status: DISCONTINUED | OUTPATIENT
Start: 2023-01-01 | End: 2023-01-01 | Stop reason: HOSPADM

## 2023-01-01 RX ORDER — FLUORIDE TOOTHPASTE
30 TOOTHPASTE DENTAL 4 TIMES DAILY
Status: DISCONTINUED | OUTPATIENT
Start: 2023-01-01 | End: 2023-01-01 | Stop reason: HOSPADM

## 2023-01-01 RX ORDER — HYDROMORPHONE HYDROCHLORIDE 1 MG/ML
0.5 INJECTION, SOLUTION INTRAMUSCULAR; INTRAVENOUS; SUBCUTANEOUS
Status: DISCONTINUED | OUTPATIENT
Start: 2023-01-01 | End: 2023-01-01 | Stop reason: HOSPADM

## 2023-01-01 RX ORDER — DIAZEPAM 10 MG/2ML
2.5 INJECTION, SOLUTION INTRAMUSCULAR; INTRAVENOUS
Status: DISCONTINUED | OUTPATIENT
Start: 2023-01-01 | End: 2023-01-01 | Stop reason: HOSPADM

## 2023-01-01 RX ORDER — ERGOCALCIFEROL 1.25 MG/1
1250 CAPSULE, LIQUID FILLED ORAL
Status: DISCONTINUED | OUTPATIENT
Start: 2023-01-01 | End: 2023-01-01

## 2023-01-01 RX ORDER — CEFAZOLIN SODIUM 2 G/100ML
2 INJECTION, SOLUTION INTRAVENOUS SEE ADMIN INSTRUCTIONS
Status: CANCELLED | OUTPATIENT
Start: 2023-01-01

## 2023-01-01 RX ORDER — PROPOFOL 10 MG/ML
INJECTION, EMULSION INTRAVENOUS
Status: DISCONTINUED
Start: 2023-01-01 | End: 2023-01-01 | Stop reason: HOSPADM

## 2023-01-01 RX ORDER — DEXTROSE MONOHYDRATE 25 G/50ML
25-50 INJECTION, SOLUTION INTRAVENOUS
Status: DISCONTINUED | OUTPATIENT
Start: 2023-01-01 | End: 2023-01-01 | Stop reason: HOSPADM

## 2023-01-01 RX ORDER — ONDANSETRON 4 MG/1
4 TABLET, ORALLY DISINTEGRATING ORAL EVERY 6 HOURS PRN
Status: DISCONTINUED | OUTPATIENT
Start: 2023-01-01 | End: 2023-01-01

## 2023-01-01 RX ORDER — ACETAMINOPHEN 500 MG
500 TABLET ORAL 3 TIMES DAILY
COMMUNITY

## 2023-01-01 RX ORDER — ACETAMINOPHEN 325 MG/1
650 TABLET ORAL EVERY 8 HOURS
Status: DISCONTINUED | OUTPATIENT
Start: 2023-01-01 | End: 2023-01-01

## 2023-01-01 RX ORDER — LORAZEPAM 2 MG/ML
0.5 INJECTION INTRAMUSCULAR
Status: DISCONTINUED | OUTPATIENT
Start: 2023-01-01 | End: 2023-01-01 | Stop reason: HOSPADM

## 2023-01-01 RX ORDER — FENTANYL CITRATE 50 UG/ML
50-100 INJECTION, SOLUTION INTRAMUSCULAR; INTRAVENOUS EVERY 5 MIN PRN
Status: DISCONTINUED | OUTPATIENT
Start: 2023-01-01 | End: 2023-01-01

## 2023-01-01 RX ORDER — FENTANYL CITRATE 50 UG/ML
INJECTION, SOLUTION INTRAMUSCULAR; INTRAVENOUS PRN
Status: DISCONTINUED | OUTPATIENT
Start: 2023-01-01 | End: 2023-01-01

## 2023-01-01 RX ORDER — HYDROXYZINE HYDROCHLORIDE 25 MG/1
25 TABLET, FILM COATED ORAL 4 TIMES DAILY PRN
Status: DISCONTINUED | OUTPATIENT
Start: 2023-01-01 | End: 2023-01-01 | Stop reason: HOSPADM

## 2023-01-01 RX ORDER — NYSTATIN 100000 [USP'U]/G
POWDER TOPICAL 2 TIMES DAILY PRN
COMMUNITY

## 2023-01-01 RX ORDER — DICYCLOMINE HYDROCHLORIDE 10 MG/1
10 CAPSULE ORAL 4 TIMES DAILY PRN
Status: DISCONTINUED | OUTPATIENT
Start: 2023-01-01 | End: 2023-01-01 | Stop reason: HOSPADM

## 2023-01-01 RX ORDER — PANTOPRAZOLE SODIUM 40 MG/1
40 TABLET, DELAYED RELEASE ORAL 2 TIMES DAILY
Status: DISCONTINUED | OUTPATIENT
Start: 2023-01-01 | End: 2023-01-01

## 2023-01-01 RX ORDER — BISACODYL 10 MG
10 SUPPOSITORY, RECTAL RECTAL DAILY PRN
Status: DISCONTINUED | OUTPATIENT
Start: 2023-01-01 | End: 2023-01-01 | Stop reason: HOSPADM

## 2023-01-01 RX ORDER — HYDROMORPHONE HCL IN WATER/PF 6 MG/30 ML
0.4 PATIENT CONTROLLED ANALGESIA SYRINGE INTRAVENOUS EVERY 5 MIN PRN
Status: DISCONTINUED | OUTPATIENT
Start: 2023-01-01 | End: 2023-01-01 | Stop reason: HOSPADM

## 2023-01-01 RX ORDER — HYDROCODONE BITARTRATE AND ACETAMINOPHEN 5; 325 MG/1; MG/1
1 TABLET ORAL 4 TIMES DAILY PRN
Status: ON HOLD | DISCHARGE
Start: 2023-01-01 | End: 2023-01-01

## 2023-01-01 RX ORDER — PROCHLORPERAZINE 25 MG
12.5 SUPPOSITORY, RECTAL RECTAL EVERY 12 HOURS PRN
Status: DISCONTINUED | OUTPATIENT
Start: 2023-01-01 | End: 2023-01-01 | Stop reason: HOSPADM

## 2023-01-01 RX ORDER — FENTANYL CITRATE 50 UG/ML
25 INJECTION, SOLUTION INTRAMUSCULAR; INTRAVENOUS EVERY 5 MIN PRN
Status: DISCONTINUED | OUTPATIENT
Start: 2023-01-01 | End: 2023-01-01 | Stop reason: HOSPADM

## 2023-01-01 RX ORDER — FENTANYL CITRATE-0.9 % NACL/PF 10 MCG/ML
100 PLASTIC BAG, INJECTION (ML) INTRAVENOUS ONCE
Status: COMPLETED | OUTPATIENT
Start: 2023-01-01 | End: 2023-01-01

## 2023-01-01 RX ORDER — PREDNISONE 20 MG/1
40 TABLET ORAL DAILY
Status: DISCONTINUED | OUTPATIENT
Start: 2023-01-01 | End: 2023-01-01 | Stop reason: HOSPADM

## 2023-01-01 RX ORDER — FENTANYL CITRATE 50 UG/ML
50 INJECTION, SOLUTION INTRAMUSCULAR; INTRAVENOUS
Status: DISCONTINUED | OUTPATIENT
Start: 2023-01-01 | End: 2023-01-01

## 2023-01-01 RX ORDER — SIMETHICONE 40MG/0.6ML
133 SUSPENSION, DROPS(FINAL DOSAGE FORM)(ML) ORAL
Status: DISCONTINUED | OUTPATIENT
Start: 2023-01-01 | End: 2023-01-01

## 2023-01-01 RX ORDER — HYDROCODONE BITARTRATE AND ACETAMINOPHEN 5; 325 MG/1; MG/1
2 TABLET ORAL EVERY 6 HOURS PRN
Status: DISCONTINUED | OUTPATIENT
Start: 2023-01-01 | End: 2023-01-01

## 2023-01-01 RX ORDER — PROCHLORPERAZINE MALEATE 5 MG
5 TABLET ORAL EVERY 6 HOURS PRN
Status: DISCONTINUED | OUTPATIENT
Start: 2023-01-01 | End: 2023-01-01 | Stop reason: HOSPADM

## 2023-01-01 RX ORDER — EPHEDRINE SULFATE 50 MG/ML
INJECTION, SOLUTION INTRAVENOUS PRN
Status: DISCONTINUED | OUTPATIENT
Start: 2023-01-01 | End: 2023-01-01

## 2023-01-01 RX ORDER — BENZOCAINE/MENTHOL 6 MG-10 MG
LOZENGE MUCOUS MEMBRANE 4 TIMES DAILY PRN
COMMUNITY

## 2023-01-01 RX ORDER — CEFTRIAXONE 2 G/1
2 INJECTION, POWDER, FOR SOLUTION INTRAMUSCULAR; INTRAVENOUS EVERY 24 HOURS
Status: DISCONTINUED | OUTPATIENT
Start: 2023-01-01 | End: 2023-01-01

## 2023-01-01 RX ORDER — CEFAZOLIN SODIUM/WATER 2 G/20 ML
2 SYRINGE (ML) INTRAVENOUS
Status: COMPLETED | OUTPATIENT
Start: 2023-01-01 | End: 2023-01-01

## 2023-01-01 RX ORDER — HYDROMORPHONE HYDROCHLORIDE 2 MG/1
2 TABLET ORAL
Status: DISCONTINUED | OUTPATIENT
Start: 2023-01-01 | End: 2023-01-01 | Stop reason: HOSPADM

## 2023-01-01 RX ORDER — LORATADINE 10 MG/1
10 TABLET ORAL DAILY PRN
COMMUNITY

## 2023-01-01 RX ORDER — GUAIFENESIN 200 MG/10ML
10 LIQUID ORAL EVERY 4 HOURS PRN
COMMUNITY

## 2023-01-01 RX ORDER — NITROGLYCERIN 0.4 MG/1
TABLET SUBLINGUAL
Qty: 25 TABLET | Refills: 3 | Status: SHIPPED | OUTPATIENT
Start: 2023-01-01

## 2023-01-01 RX ORDER — FENTANYL CITRATE 50 UG/ML
50 INJECTION, SOLUTION INTRAMUSCULAR; INTRAVENOUS ONCE
Status: COMPLETED | OUTPATIENT
Start: 2023-01-01 | End: 2023-01-01

## 2023-01-01 RX ORDER — LIDOCAINE HYDROCHLORIDE 10 MG/ML
10 INJECTION, SOLUTION EPIDURAL; INFILTRATION; INTRACAUDAL; PERINEURAL ONCE
Status: COMPLETED | OUTPATIENT
Start: 2023-01-01 | End: 2023-01-01

## 2023-01-01 RX ORDER — METHOCARBAMOL 500 MG/1
250 TABLET ORAL 4 TIMES DAILY
Status: DISCONTINUED | OUTPATIENT
Start: 2023-01-01 | End: 2023-01-01 | Stop reason: HOSPADM

## 2023-01-01 RX ORDER — VASOPRESSIN IN 0.9 % NACL 2 UNIT/2ML
SYRINGE (ML) INTRAVENOUS PRN
Status: DISCONTINUED | OUTPATIENT
Start: 2023-01-01 | End: 2023-01-01

## 2023-01-01 RX ORDER — BISACODYL 10 MG
10 SUPPOSITORY, RECTAL RECTAL
Status: DISCONTINUED | OUTPATIENT
Start: 2023-03-21 | End: 2023-01-01 | Stop reason: HOSPADM

## 2023-01-01 RX ORDER — AMOXICILLIN 250 MG
2 CAPSULE ORAL 2 TIMES DAILY PRN
Status: DISCONTINUED | OUTPATIENT
Start: 2023-01-01 | End: 2023-01-01

## 2023-01-01 RX ORDER — ALBUTEROL SULFATE 90 UG/1
2 AEROSOL, METERED RESPIRATORY (INHALATION) EVERY 6 HOURS PRN
Qty: 18 G | Refills: 0 | Status: SHIPPED | OUTPATIENT
Start: 2023-01-01

## 2023-01-01 RX ORDER — SALIVA STIMULANT COMB. NO.3
1 SPRAY, NON-AEROSOL (ML) MUCOUS MEMBRANE
Status: DISCONTINUED | OUTPATIENT
Start: 2023-01-01 | End: 2023-01-01 | Stop reason: HOSPADM

## 2023-01-01 RX ORDER — HYDROCODONE BITARTRATE AND ACETAMINOPHEN 5; 325 MG/1; MG/1
2 TABLET ORAL 4 TIMES DAILY PRN
Status: DISCONTINUED | OUTPATIENT
Start: 2023-01-01 | End: 2023-01-01

## 2023-01-01 RX ORDER — SODIUM CHLORIDE 9 MG/ML
INJECTION, SOLUTION INTRAVENOUS CONTINUOUS
Status: DISCONTINUED | OUTPATIENT
Start: 2023-01-01 | End: 2023-01-01 | Stop reason: HOSPADM

## 2023-01-01 RX ORDER — LABETALOL HYDROCHLORIDE 5 MG/ML
10 INJECTION, SOLUTION INTRAVENOUS EVERY 10 MIN PRN
Status: DISCONTINUED | OUTPATIENT
Start: 2023-01-01 | End: 2023-01-01 | Stop reason: HOSPADM

## 2023-01-01 RX ORDER — ATROPINE SULFATE 10 MG/ML
2 SOLUTION/ DROPS OPHTHALMIC EVERY 4 HOURS PRN
Status: DISCONTINUED | OUTPATIENT
Start: 2023-01-01 | End: 2023-01-01 | Stop reason: HOSPADM

## 2023-01-01 RX ORDER — ONDANSETRON 2 MG/ML
4 INJECTION INTRAMUSCULAR; INTRAVENOUS ONCE
Status: COMPLETED | OUTPATIENT
Start: 2023-01-01 | End: 2023-01-01

## 2023-01-01 RX ADMIN — LOPERAMIDE HYDROCHLORIDE 2 MG: 2 CAPSULE ORAL at 16:01

## 2023-01-01 RX ADMIN — HYDROCODONE BITARTRATE AND ACETAMINOPHEN 2 TABLET: 5; 325 TABLET ORAL at 08:36

## 2023-01-01 RX ADMIN — FUROSEMIDE 40 MG: 40 TABLET ORAL at 08:19

## 2023-01-01 RX ADMIN — FUROSEMIDE 40 MG: 40 TABLET ORAL at 11:35

## 2023-01-01 RX ADMIN — Medication 25 MCG: at 11:35

## 2023-01-01 RX ADMIN — HYDROMORPHONE HYDROCHLORIDE 0.4 MG: 0.2 INJECTION, SOLUTION INTRAMUSCULAR; INTRAVENOUS; SUBCUTANEOUS at 10:18

## 2023-01-01 RX ADMIN — HYDROMORPHONE HYDROCHLORIDE 0.2 MG: 0.2 INJECTION, SOLUTION INTRAMUSCULAR; INTRAVENOUS; SUBCUTANEOUS at 05:44

## 2023-01-01 RX ADMIN — HYDROCODONE BITARTRATE AND ACETAMINOPHEN 2 TABLET: 5; 325 TABLET ORAL at 19:05

## 2023-01-01 RX ADMIN — MAGNESIUM SULFATE HEPTAHYDRATE 4 G: 4 INJECTION, SOLUTION INTRAVENOUS at 11:46

## 2023-01-01 RX ADMIN — HYDROCODONE BITARTRATE AND ACETAMINOPHEN 2 TABLET: 5; 325 TABLET ORAL at 18:01

## 2023-01-01 RX ADMIN — TAZOBACTAM SODIUM AND PIPERACILLIN SODIUM 4.5 G: 500; 4 INJECTION, SOLUTION INTRAVENOUS at 14:31

## 2023-01-01 RX ADMIN — PHENYLEPHRINE HYDROCHLORIDE 0.5 MCG/KG/MIN: 10 INJECTION INTRAVENOUS at 08:35

## 2023-01-01 RX ADMIN — ACETAMINOPHEN 650 MG: 325 TABLET ORAL at 22:21

## 2023-01-01 RX ADMIN — ACETAMINOPHEN 500 MG: 500 TABLET ORAL at 10:18

## 2023-01-01 RX ADMIN — PHENYLEPHRINE HYDROCHLORIDE 150 MCG: 10 INJECTION INTRAVENOUS at 08:08

## 2023-01-01 RX ADMIN — IOPAMIDOL 55 ML: 755 INJECTION, SOLUTION INTRAVENOUS at 10:41

## 2023-01-01 RX ADMIN — FENTANYL CITRATE 25 MCG: 50 INJECTION, SOLUTION INTRAMUSCULAR; INTRAVENOUS at 10:09

## 2023-01-01 RX ADMIN — HYDROCODONE BITARTRATE AND ACETAMINOPHEN 1 TABLET: 5; 325 TABLET ORAL at 08:29

## 2023-01-01 RX ADMIN — ACETAMINOPHEN 650 MG: 325 TABLET, FILM COATED ORAL at 08:43

## 2023-01-01 RX ADMIN — LORAZEPAM 0.5 MG: 2 INJECTION INTRAMUSCULAR; INTRAVENOUS at 22:19

## 2023-01-01 RX ADMIN — ONDANSETRON 4 MG: 2 INJECTION INTRAMUSCULAR; INTRAVENOUS at 23:39

## 2023-01-01 RX ADMIN — FUROSEMIDE 10 MG: 10 INJECTION, SOLUTION INTRAMUSCULAR; INTRAVENOUS at 12:55

## 2023-01-01 RX ADMIN — CARVEDILOL 3.12 MG: 3.12 TABLET, FILM COATED ORAL at 10:47

## 2023-01-01 RX ADMIN — HYDROCODONE BITARTRATE AND ACETAMINOPHEN 2 TABLET: 5; 325 TABLET ORAL at 22:39

## 2023-01-01 RX ADMIN — PANTOPRAZOLE SODIUM 40 MG: 40 INJECTION, POWDER, FOR SOLUTION INTRAVENOUS at 13:14

## 2023-01-01 RX ADMIN — INSULIN ASPART 1 UNITS: 100 INJECTION, SOLUTION INTRAVENOUS; SUBCUTANEOUS at 22:51

## 2023-01-01 RX ADMIN — FUROSEMIDE 40 MG: 40 TABLET ORAL at 08:43

## 2023-01-01 RX ADMIN — PANTOPRAZOLE SODIUM 40 MG: 40 TABLET, DELAYED RELEASE ORAL at 20:31

## 2023-01-01 RX ADMIN — PANTOPRAZOLE SODIUM 40 MG: 40 INJECTION, POWDER, FOR SOLUTION INTRAVENOUS at 14:11

## 2023-01-01 RX ADMIN — INSULIN ASPART 1 UNITS: 100 INJECTION, SOLUTION INTRAVENOUS; SUBCUTANEOUS at 21:06

## 2023-01-01 RX ADMIN — ESMOLOL HYDROCHLORIDE 10 MG: 10 INJECTION, SOLUTION INTRAVENOUS at 11:37

## 2023-01-01 RX ADMIN — HYDROCODONE BITARTRATE AND ACETAMINOPHEN 2 TABLET: 5; 325 TABLET ORAL at 22:23

## 2023-01-01 RX ADMIN — PANTOPRAZOLE SODIUM 40 MG: 40 INJECTION, POWDER, FOR SOLUTION INTRAVENOUS at 15:38

## 2023-01-01 RX ADMIN — ASPIRIN 81 MG: 81 TABLET, COATED ORAL at 10:17

## 2023-01-01 RX ADMIN — MAGNESIUM SULFATE HEPTAHYDRATE 4 G: 40 INJECTION, SOLUTION INTRAVENOUS at 18:30

## 2023-01-01 RX ADMIN — PANTOPRAZOLE SODIUM 40 MG: 40 TABLET, DELAYED RELEASE ORAL at 08:07

## 2023-01-01 RX ADMIN — ATORVASTATIN CALCIUM 40 MG: 40 TABLET, FILM COATED ORAL at 19:36

## 2023-01-01 RX ADMIN — CARVEDILOL 3.12 MG: 3.12 TABLET, FILM COATED ORAL at 18:01

## 2023-01-01 RX ADMIN — HYDROMORPHONE HYDROCHLORIDE 0.3 MG: 1 INJECTION, SOLUTION INTRAMUSCULAR; INTRAVENOUS; SUBCUTANEOUS at 01:46

## 2023-01-01 RX ADMIN — LORAZEPAM 0.5 MG: 2 INJECTION INTRAMUSCULAR; INTRAVENOUS at 13:26

## 2023-01-01 RX ADMIN — Medication 250 MG: at 16:31

## 2023-01-01 RX ADMIN — PREGABALIN 25 MG: 25 CAPSULE ORAL at 19:47

## 2023-01-01 RX ADMIN — ACETAMINOPHEN 500 MG: 500 TABLET ORAL at 13:13

## 2023-01-01 RX ADMIN — PANTOPRAZOLE SODIUM 40 MG: 40 INJECTION, POWDER, FOR SOLUTION INTRAVENOUS at 23:44

## 2023-01-01 RX ADMIN — LORAZEPAM 0.5 MG: 2 INJECTION INTRAMUSCULAR; INTRAVENOUS at 19:55

## 2023-01-01 RX ADMIN — FENTANYL CITRATE 25 MCG: 50 INJECTION, SOLUTION INTRAMUSCULAR; INTRAVENOUS at 08:16

## 2023-01-01 RX ADMIN — ATORVASTATIN CALCIUM 40 MG: 40 TABLET, FILM COATED ORAL at 19:47

## 2023-01-01 RX ADMIN — Medication 1 UNITS: at 10:38

## 2023-01-01 RX ADMIN — VANCOMYCIN HYDROCHLORIDE 125 MG: 125 CAPSULE ORAL at 15:38

## 2023-01-01 RX ADMIN — LOPERAMIDE HYDROCHLORIDE 2 MG: 2 CAPSULE ORAL at 10:04

## 2023-01-01 RX ADMIN — SODIUM CHLORIDE 100 ML: 9 INJECTION, SOLUTION INTRAVENOUS at 11:26

## 2023-01-01 RX ADMIN — HYDROCODONE BITARTRATE AND ACETAMINOPHEN 1 TABLET: 5; 325 TABLET ORAL at 10:06

## 2023-01-01 RX ADMIN — LORAZEPAM 0.5 MG: 2 INJECTION INTRAMUSCULAR; INTRAVENOUS at 05:56

## 2023-01-01 RX ADMIN — THERA TABS 1 TABLET: TAB at 08:38

## 2023-01-01 RX ADMIN — HYDROMORPHONE HYDROCHLORIDE 0.5 MG: 1 INJECTION, SOLUTION INTRAMUSCULAR; INTRAVENOUS; SUBCUTANEOUS at 14:59

## 2023-01-01 RX ADMIN — HYDROCODONE BITARTRATE AND ACETAMINOPHEN 1 TABLET: 5; 325 TABLET ORAL at 08:19

## 2023-01-01 RX ADMIN — HYDROMORPHONE HYDROCHLORIDE 0.3 MG: 1 INJECTION, SOLUTION INTRAMUSCULAR; INTRAVENOUS; SUBCUTANEOUS at 04:28

## 2023-01-01 RX ADMIN — HYDROMORPHONE HYDROCHLORIDE 0.3 MG: 1 INJECTION, SOLUTION INTRAMUSCULAR; INTRAVENOUS; SUBCUTANEOUS at 02:45

## 2023-01-01 RX ADMIN — PHENYLEPHRINE HYDROCHLORIDE 200 MCG: 10 INJECTION INTRAVENOUS at 09:50

## 2023-01-01 RX ADMIN — HYDROXYZINE HYDROCHLORIDE 10 MG: 10 TABLET ORAL at 20:56

## 2023-01-01 RX ADMIN — FENTANYL CITRATE 25 MCG: 50 INJECTION, SOLUTION INTRAMUSCULAR; INTRAVENOUS at 11:57

## 2023-01-01 RX ADMIN — ACETAMINOPHEN 650 MG: 325 TABLET, FILM COATED ORAL at 21:53

## 2023-01-01 RX ADMIN — SINCALIDE 1 MCG: 5 INJECTION, POWDER, LYOPHILIZED, FOR SOLUTION INTRAVENOUS at 10:58

## 2023-01-01 RX ADMIN — PHENYLEPHRINE HYDROCHLORIDE 150 MCG: 10 INJECTION INTRAVENOUS at 08:35

## 2023-01-01 RX ADMIN — DICLOFENAC SODIUM 4 G: 10 GEL TOPICAL at 14:27

## 2023-01-01 RX ADMIN — HYDROCODONE BITARTRATE AND ACETAMINOPHEN 1 TABLET: 5; 325 TABLET ORAL at 19:39

## 2023-01-01 RX ADMIN — HYDROCODONE BITARTRATE AND ACETAMINOPHEN 2 TABLET: 5; 325 TABLET ORAL at 04:44

## 2023-01-01 RX ADMIN — INSULIN ASPART 1 UNITS: 100 INJECTION, SOLUTION INTRAVENOUS; SUBCUTANEOUS at 13:09

## 2023-01-01 RX ADMIN — THIAMINE HCL TAB 100 MG 100 MG: 100 TAB at 10:47

## 2023-01-01 RX ADMIN — HYDROMORPHONE HYDROCHLORIDE 0.2 MG: 0.2 INJECTION, SOLUTION INTRAMUSCULAR; INTRAVENOUS; SUBCUTANEOUS at 06:40

## 2023-01-01 RX ADMIN — PREGABALIN 25 MG: 25 CAPSULE ORAL at 21:35

## 2023-01-01 RX ADMIN — PREDNISONE 40 MG: 20 TABLET ORAL at 19:16

## 2023-01-01 RX ADMIN — PREGABALIN 25 MG: 25 CAPSULE ORAL at 10:47

## 2023-01-01 RX ADMIN — SODIUM CHLORIDE, POTASSIUM CHLORIDE, SODIUM LACTATE AND CALCIUM CHLORIDE: 600; 310; 30; 20 INJECTION, SOLUTION INTRAVENOUS at 06:12

## 2023-01-01 RX ADMIN — MIDAZOLAM 0.5 MG: 1 INJECTION INTRAMUSCULAR; INTRAVENOUS at 13:44

## 2023-01-01 RX ADMIN — TAZOBACTAM SODIUM AND PIPERACILLIN SODIUM 4.5 G: 500; 4 INJECTION, SOLUTION INTRAVENOUS at 09:11

## 2023-01-01 RX ADMIN — HYDROMORPHONE HYDROCHLORIDE 0.3 MG: 1 INJECTION, SOLUTION INTRAMUSCULAR; INTRAVENOUS; SUBCUTANEOUS at 01:09

## 2023-01-01 RX ADMIN — SERTRALINE HYDROCHLORIDE 25 MG: 25 TABLET ORAL at 08:07

## 2023-01-01 RX ADMIN — THIAMINE HCL TAB 100 MG 100 MG: 100 TAB at 11:34

## 2023-01-01 RX ADMIN — ACETAMINOPHEN 500 MG: 500 TABLET ORAL at 20:09

## 2023-01-01 RX ADMIN — HYDROCODONE BITARTRATE AND ACETAMINOPHEN 2 TABLET: 5; 325 TABLET ORAL at 17:16

## 2023-01-01 RX ADMIN — INSULIN ASPART 1 UNITS: 100 INJECTION, SOLUTION INTRAVENOUS; SUBCUTANEOUS at 03:20

## 2023-01-01 RX ADMIN — HYDROCODONE BITARTRATE AND ACETAMINOPHEN 1 TABLET: 5; 325 TABLET ORAL at 16:00

## 2023-01-01 RX ADMIN — PANTOPRAZOLE SODIUM 40 MG: 40 TABLET, DELAYED RELEASE ORAL at 08:56

## 2023-01-01 RX ADMIN — PREGABALIN 25 MG: 25 CAPSULE ORAL at 08:43

## 2023-01-01 RX ADMIN — HYDROXYZINE HYDROCHLORIDE 25 MG: 25 TABLET ORAL at 01:09

## 2023-01-01 RX ADMIN — TAZOBACTAM SODIUM AND PIPERACILLIN SODIUM 4.5 G: 500; 4 INJECTION, SOLUTION INTRAVENOUS at 19:47

## 2023-01-01 RX ADMIN — PROPOFOL 60 MG: 10 INJECTION, EMULSION INTRAVENOUS at 20:25

## 2023-01-01 RX ADMIN — HYDROMORPHONE HYDROCHLORIDE 0.5 MG: 1 INJECTION, SOLUTION INTRAMUSCULAR; INTRAVENOUS; SUBCUTANEOUS at 22:19

## 2023-01-01 RX ADMIN — OXYCODONE HYDROCHLORIDE 5 MG: 5 TABLET ORAL at 21:40

## 2023-01-01 RX ADMIN — CARVEDILOL 3.12 MG: 3.12 TABLET, FILM COATED ORAL at 18:31

## 2023-01-01 RX ADMIN — HYDROMORPHONE HYDROCHLORIDE 0.5 MG: 1 INJECTION, SOLUTION INTRAMUSCULAR; INTRAVENOUS; SUBCUTANEOUS at 11:36

## 2023-01-01 RX ADMIN — LORAZEPAM 0.5 MG: 2 INJECTION INTRAMUSCULAR; INTRAVENOUS at 16:20

## 2023-01-01 RX ADMIN — PREGABALIN 25 MG: 25 CAPSULE ORAL at 21:25

## 2023-01-01 RX ADMIN — HYDROMORPHONE HYDROCHLORIDE 0.3 MG: 1 INJECTION, SOLUTION INTRAMUSCULAR; INTRAVENOUS; SUBCUTANEOUS at 15:58

## 2023-01-01 RX ADMIN — Medication 1 UNITS: at 10:58

## 2023-01-01 RX ADMIN — HYDROCODONE BITARTRATE AND ACETAMINOPHEN 2 TABLET: 5; 325 TABLET ORAL at 00:14

## 2023-01-01 RX ADMIN — PREGABALIN 25 MG: 25 CAPSULE ORAL at 08:38

## 2023-01-01 RX ADMIN — HYDROCODONE BITARTRATE AND ACETAMINOPHEN 2 TABLET: 5; 325 TABLET ORAL at 04:13

## 2023-01-01 RX ADMIN — PHENYLEPHRINE HYDROCHLORIDE 200 MCG: 10 INJECTION INTRAVENOUS at 10:57

## 2023-01-01 RX ADMIN — TAZOBACTAM SODIUM AND PIPERACILLIN SODIUM 3.38 G: 375; 3 INJECTION, SOLUTION INTRAVENOUS at 21:13

## 2023-01-01 RX ADMIN — PHENYLEPHRINE HYDROCHLORIDE 100 MCG: 10 INJECTION INTRAVENOUS at 08:05

## 2023-01-01 RX ADMIN — PANTOPRAZOLE SODIUM 40 MG: 40 TABLET, DELAYED RELEASE ORAL at 08:19

## 2023-01-01 RX ADMIN — VANCOMYCIN HYDROCHLORIDE 125 MG: 125 CAPSULE ORAL at 19:47

## 2023-01-01 RX ADMIN — HYDROCODONE BITARTRATE AND ACETAMINOPHEN 2 TABLET: 5; 325 TABLET ORAL at 08:59

## 2023-01-01 RX ADMIN — OXYCODONE HYDROCHLORIDE 5 MG: 5 TABLET ORAL at 21:26

## 2023-01-01 RX ADMIN — ATORVASTATIN CALCIUM 40 MG: 40 TABLET, FILM COATED ORAL at 21:08

## 2023-01-01 RX ADMIN — CARVEDILOL 3.12 MG: 3.12 TABLET, FILM COATED ORAL at 17:34

## 2023-01-01 RX ADMIN — HYDROXYZINE HYDROCHLORIDE 25 MG: 25 TABLET, FILM COATED ORAL at 14:23

## 2023-01-01 RX ADMIN — FENTANYL CITRATE 50 MCG: 50 INJECTION INTRAMUSCULAR; INTRAVENOUS at 13:44

## 2023-01-01 RX ADMIN — SODIUM CHLORIDE: 9 INJECTION, SOLUTION INTRAVENOUS at 00:11

## 2023-01-01 RX ADMIN — HYDROMORPHONE HYDROCHLORIDE 0.5 MG: 1 INJECTION, SOLUTION INTRAMUSCULAR; INTRAVENOUS; SUBCUTANEOUS at 19:55

## 2023-01-01 RX ADMIN — VANCOMYCIN HYDROCHLORIDE 125 MG: 125 CAPSULE ORAL at 11:33

## 2023-01-01 RX ADMIN — ENOXAPARIN SODIUM 30 MG: 30 INJECTION SUBCUTANEOUS at 05:25

## 2023-01-01 RX ADMIN — HYDROCODONE BITARTRATE AND ACETAMINOPHEN 2 TABLET: 5; 325 TABLET ORAL at 18:46

## 2023-01-01 RX ADMIN — Medication 250 MG: at 21:10

## 2023-01-01 RX ADMIN — ENOXAPARIN SODIUM 40 MG: 40 INJECTION SUBCUTANEOUS at 06:16

## 2023-01-01 RX ADMIN — PANTOPRAZOLE SODIUM 40 MG: 40 INJECTION, POWDER, FOR SOLUTION INTRAVENOUS at 01:39

## 2023-01-01 RX ADMIN — PREGABALIN 25 MG: 25 CAPSULE ORAL at 20:53

## 2023-01-01 RX ADMIN — SODIUM CHLORIDE, POTASSIUM CHLORIDE, SODIUM LACTATE AND CALCIUM CHLORIDE: 600; 310; 30; 20 INJECTION, SOLUTION INTRAVENOUS at 21:13

## 2023-01-01 RX ADMIN — HYDROMORPHONE HYDROCHLORIDE 0.5 MG: 1 INJECTION, SOLUTION INTRAMUSCULAR; INTRAVENOUS; SUBCUTANEOUS at 01:18

## 2023-01-01 RX ADMIN — INSULIN ASPART 1 UNITS: 100 INJECTION, SOLUTION INTRAVENOUS; SUBCUTANEOUS at 04:23

## 2023-01-01 RX ADMIN — METHOCARBAMOL 500 MG: 500 TABLET ORAL at 11:23

## 2023-01-01 RX ADMIN — INSULIN ASPART 1 UNITS: 100 INJECTION, SOLUTION INTRAVENOUS; SUBCUTANEOUS at 19:57

## 2023-01-01 RX ADMIN — DICYCLOMINE HYDROCHLORIDE 10 MG: 10 CAPSULE ORAL at 08:36

## 2023-01-01 RX ADMIN — HYDROCODONE BITARTRATE AND ACETAMINOPHEN 2 TABLET: 5; 325 TABLET ORAL at 15:38

## 2023-01-01 RX ADMIN — HYDROCODONE BITARTRATE AND ACETAMINOPHEN 2 TABLET: 5; 325 TABLET ORAL at 14:26

## 2023-01-01 RX ADMIN — ESMOLOL HYDROCHLORIDE 20 MG: 10 INJECTION, SOLUTION INTRAVENOUS at 11:35

## 2023-01-01 RX ADMIN — Medication 30 ML: at 08:56

## 2023-01-01 RX ADMIN — HYDROXYZINE HYDROCHLORIDE 20 MG: 10 TABLET ORAL at 15:00

## 2023-01-01 RX ADMIN — ATORVASTATIN CALCIUM 40 MG: 40 TABLET, FILM COATED ORAL at 19:20

## 2023-01-01 RX ADMIN — LORAZEPAM 0.5 MG: 2 INJECTION INTRAMUSCULAR; INTRAVENOUS at 03:41

## 2023-01-01 RX ADMIN — IPRATROPIUM BROMIDE AND ALBUTEROL SULFATE 3 ML: .5; 2.5 SOLUTION RESPIRATORY (INHALATION) at 10:27

## 2023-01-01 RX ADMIN — MAGNESIUM SULFATE HEPTAHYDRATE 2 G: 2 INJECTION, SOLUTION INTRAVENOUS at 10:07

## 2023-01-01 RX ADMIN — PHENYLEPHRINE HYDROCHLORIDE 200 MCG: 10 INJECTION INTRAVENOUS at 09:48

## 2023-01-01 RX ADMIN — Medication 1 UNITS: at 10:36

## 2023-01-01 RX ADMIN — THERA TABS 1 TABLET: TAB at 16:01

## 2023-01-01 RX ADMIN — CARVEDILOL 3.12 MG: 3.12 TABLET, FILM COATED ORAL at 17:05

## 2023-01-01 RX ADMIN — ESMOLOL HYDROCHLORIDE 10 MG: 10 INJECTION, SOLUTION INTRAVENOUS at 11:31

## 2023-01-01 RX ADMIN — HYDROMORPHONE HYDROCHLORIDE 0.2 MG: 0.2 INJECTION, SOLUTION INTRAMUSCULAR; INTRAVENOUS; SUBCUTANEOUS at 05:49

## 2023-01-01 RX ADMIN — ACETAMINOPHEN 650 MG: 325 TABLET ORAL at 15:00

## 2023-01-01 RX ADMIN — HYDROCODONE BITARTRATE AND ACETAMINOPHEN 2 TABLET: 5; 325 TABLET ORAL at 00:22

## 2023-01-01 RX ADMIN — PHENYLEPHRINE HYDROCHLORIDE 200 MCG: 10 INJECTION INTRAVENOUS at 11:46

## 2023-01-01 RX ADMIN — INSULIN ASPART 1 UNITS: 100 INJECTION, SOLUTION INTRAVENOUS; SUBCUTANEOUS at 14:41

## 2023-01-01 RX ADMIN — DICYCLOMINE HYDROCHLORIDE 10 MG: 10 CAPSULE ORAL at 15:38

## 2023-01-01 RX ADMIN — ROCURONIUM BROMIDE 40 MG: 50 INJECTION, SOLUTION INTRAVENOUS at 07:36

## 2023-01-01 RX ADMIN — HYDROCODONE BITARTRATE AND ACETAMINOPHEN 2 TABLET: 5; 325 TABLET ORAL at 21:42

## 2023-01-01 RX ADMIN — SODIUM CHLORIDE 100 ML: 9 INJECTION, SOLUTION INTRAVENOUS at 10:59

## 2023-01-01 RX ADMIN — Medication 250 MG: at 20:31

## 2023-01-01 RX ADMIN — Medication 250 MG: at 20:56

## 2023-01-01 RX ADMIN — TAZOBACTAM SODIUM AND PIPERACILLIN SODIUM 3.38 G: 375; 3 INJECTION, SOLUTION INTRAVENOUS at 02:21

## 2023-01-01 RX ADMIN — HYDROCODONE BITARTRATE AND ACETAMINOPHEN 2 TABLET: 5; 325 TABLET ORAL at 08:27

## 2023-01-01 RX ADMIN — PANTOPRAZOLE SODIUM 40 MG: 40 INJECTION, POWDER, FOR SOLUTION INTRAVENOUS at 01:19

## 2023-01-01 RX ADMIN — Medication 25 MCG: at 08:37

## 2023-01-01 RX ADMIN — ACETAMINOPHEN 500 MG: 500 TABLET ORAL at 15:38

## 2023-01-01 RX ADMIN — HYDROCODONE BITARTRATE AND ACETAMINOPHEN 2 TABLET: 5; 325 TABLET ORAL at 04:26

## 2023-01-01 RX ADMIN — Medication 250 MG: at 08:55

## 2023-01-01 RX ADMIN — ACETAMINOPHEN 650 MG: 325 TABLET, FILM COATED ORAL at 02:18

## 2023-01-01 RX ADMIN — PREDNISONE 40 MG: 20 TABLET ORAL at 08:43

## 2023-01-01 RX ADMIN — CARVEDILOL 3.12 MG: 3.12 TABLET, FILM COATED ORAL at 08:27

## 2023-01-01 RX ADMIN — FUROSEMIDE 40 MG: 10 INJECTION, SOLUTION INTRAMUSCULAR; INTRAVENOUS at 00:12

## 2023-01-01 RX ADMIN — CARVEDILOL 3.12 MG: 3.12 TABLET, FILM COATED ORAL at 08:41

## 2023-01-01 RX ADMIN — SODIUM CHLORIDE 100 ML: 9 INJECTION, SOLUTION INTRAVENOUS at 10:41

## 2023-01-01 RX ADMIN — PANTOPRAZOLE SODIUM 40 MG: 40 TABLET, DELAYED RELEASE ORAL at 19:04

## 2023-01-01 RX ADMIN — HYDROXYZINE HYDROCHLORIDE 10 MG: 10 TABLET ORAL at 20:31

## 2023-01-01 RX ADMIN — SENNOSIDES AND DOCUSATE SODIUM 1 TABLET: 50; 8.6 TABLET ORAL at 08:55

## 2023-01-01 RX ADMIN — BISACODYL 5 MG: 5 TABLET, COATED ORAL at 01:29

## 2023-01-01 RX ADMIN — PANTOPRAZOLE SODIUM 40 MG: 40 TABLET, DELAYED RELEASE ORAL at 21:10

## 2023-01-01 RX ADMIN — HYDROXYZINE HYDROCHLORIDE 20 MG: 10 TABLET ORAL at 02:24

## 2023-01-01 RX ADMIN — HYDROMORPHONE HYDROCHLORIDE 0.2 MG: 0.2 INJECTION, SOLUTION INTRAMUSCULAR; INTRAVENOUS; SUBCUTANEOUS at 08:06

## 2023-01-01 RX ADMIN — SENNOSIDES AND DOCUSATE SODIUM 1 TABLET: 50; 8.6 TABLET ORAL at 20:31

## 2023-01-01 RX ADMIN — HYDROCODONE BITARTRATE AND ACETAMINOPHEN 2 TABLET: 5; 325 TABLET ORAL at 10:17

## 2023-01-01 RX ADMIN — DIGOXIN 500 MCG: 0.25 INJECTION INTRAMUSCULAR; INTRAVENOUS at 17:22

## 2023-01-01 RX ADMIN — HYDROMORPHONE HYDROCHLORIDE 0.2 MG: 0.2 INJECTION, SOLUTION INTRAMUSCULAR; INTRAVENOUS; SUBCUTANEOUS at 11:01

## 2023-01-01 RX ADMIN — HYDROMORPHONE HYDROCHLORIDE 0.5 MG: 1 INJECTION, SOLUTION INTRAMUSCULAR; INTRAVENOUS; SUBCUTANEOUS at 05:56

## 2023-01-01 RX ADMIN — HYDROCODONE BITARTRATE AND ACETAMINOPHEN 2 TABLET: 5; 325 TABLET ORAL at 14:02

## 2023-01-01 RX ADMIN — HYDROMORPHONE HYDROCHLORIDE 0.5 MG: 1 INJECTION, SOLUTION INTRAMUSCULAR; INTRAVENOUS; SUBCUTANEOUS at 19:56

## 2023-01-01 RX ADMIN — ERGOCALCIFEROL 1250 MCG: 1.25 CAPSULE, LIQUID FILLED ORAL at 15:06

## 2023-01-01 RX ADMIN — HYDROCODONE BITARTRATE AND ACETAMINOPHEN 1 TABLET: 5; 325 TABLET ORAL at 05:42

## 2023-01-01 RX ADMIN — FENTANYL CITRATE 50 MCG: 50 INJECTION, SOLUTION INTRAMUSCULAR; INTRAVENOUS at 07:48

## 2023-01-01 RX ADMIN — HYDROCODONE BITARTRATE AND ACETAMINOPHEN 1 TABLET: 5; 325 TABLET ORAL at 22:54

## 2023-01-01 RX ADMIN — GADOBUTROL 5.5 ML: 604.72 INJECTION INTRAVENOUS at 08:43

## 2023-01-01 RX ADMIN — LIDOCAINE HYDROCHLORIDE 10 ML: 10 INJECTION, SOLUTION EPIDURAL; INFILTRATION; INTRACAUDAL; PERINEURAL at 13:10

## 2023-01-01 RX ADMIN — SODIUM CHLORIDE, POTASSIUM CHLORIDE, SODIUM LACTATE AND CALCIUM CHLORIDE: 600; 310; 30; 20 INJECTION, SOLUTION INTRAVENOUS at 05:10

## 2023-01-01 RX ADMIN — PHENYLEPHRINE HYDROCHLORIDE 400 MCG: 10 INJECTION INTRAVENOUS at 10:27

## 2023-01-01 RX ADMIN — FUROSEMIDE 40 MG: 40 TABLET ORAL at 09:43

## 2023-01-01 RX ADMIN — PANTOPRAZOLE SODIUM 40 MG: 40 TABLET, DELAYED RELEASE ORAL at 10:16

## 2023-01-01 RX ADMIN — PREGABALIN 25 MG: 25 CAPSULE ORAL at 19:35

## 2023-01-01 RX ADMIN — Medication 2 G: at 07:28

## 2023-01-01 RX ADMIN — CEFTRIAXONE 2 G: 2 INJECTION, POWDER, FOR SOLUTION INTRAMUSCULAR; INTRAVENOUS at 11:29

## 2023-01-01 RX ADMIN — TAZOBACTAM SODIUM AND PIPERACILLIN SODIUM 3.38 G: 375; 3 INJECTION, SOLUTION INTRAVENOUS at 08:21

## 2023-01-01 RX ADMIN — FENTANYL CITRATE 50 MCG: 50 INJECTION, SOLUTION INTRAMUSCULAR; INTRAVENOUS at 08:43

## 2023-01-01 RX ADMIN — VANCOMYCIN HYDROCHLORIDE 125 MG: 125 CAPSULE ORAL at 17:05

## 2023-01-01 RX ADMIN — ACETAMINOPHEN 975 MG: 325 TABLET ORAL at 16:31

## 2023-01-01 RX ADMIN — HYDROCODONE BITARTRATE AND ACETAMINOPHEN 1 TABLET: 5; 325 TABLET ORAL at 19:05

## 2023-01-01 RX ADMIN — HYDROMORPHONE HYDROCHLORIDE 0.5 MG: 1 INJECTION, SOLUTION INTRAMUSCULAR; INTRAVENOUS; SUBCUTANEOUS at 20:52

## 2023-01-01 RX ADMIN — HYDROXYZINE HYDROCHLORIDE 25 MG: 25 TABLET ORAL at 01:55

## 2023-01-01 RX ADMIN — PHENYLEPHRINE HYDROCHLORIDE 200 MCG: 10 INJECTION INTRAVENOUS at 09:52

## 2023-01-01 RX ADMIN — IOPAMIDOL 65 ML: 755 INJECTION, SOLUTION INTRAVENOUS at 10:59

## 2023-01-01 RX ADMIN — ASPIRIN 81 MG: 81 TABLET, COATED ORAL at 17:05

## 2023-01-01 RX ADMIN — ONDANSETRON HYDROCHLORIDE 4 MG: 2 INJECTION, SOLUTION INTRAMUSCULAR; INTRAVENOUS at 06:03

## 2023-01-01 RX ADMIN — DICLOFENAC SODIUM 4 G: 10 GEL TOPICAL at 08:56

## 2023-01-01 RX ADMIN — ACETAMINOPHEN 975 MG: 325 TABLET ORAL at 22:56

## 2023-01-01 RX ADMIN — SODIUM CHLORIDE: 9 INJECTION, SOLUTION INTRAVENOUS at 00:50

## 2023-01-01 RX ADMIN — MEBROFENIN 6 MILLICURIE: 45 INJECTION, POWDER, LYOPHILIZED, FOR SOLUTION INTRAVENOUS at 09:33

## 2023-01-01 RX ADMIN — SERTRALINE HYDROCHLORIDE 25 MG: 25 TABLET ORAL at 08:36

## 2023-01-01 RX ADMIN — DIGOXIN 500 MCG: 0.25 INJECTION INTRAMUSCULAR; INTRAVENOUS at 08:08

## 2023-01-01 RX ADMIN — PREGABALIN 25 MG: 25 CAPSULE ORAL at 08:56

## 2023-01-01 RX ADMIN — HYDROMORPHONE HYDROCHLORIDE 0.2 MG: 0.2 INJECTION, SOLUTION INTRAMUSCULAR; INTRAVENOUS; SUBCUTANEOUS at 01:06

## 2023-01-01 RX ADMIN — HUMAN ALBUMIN MICROSPHERES AND PERFLUTREN 2 ML: 10; .22 INJECTION, SOLUTION INTRAVENOUS at 15:13

## 2023-01-01 RX ADMIN — ATORVASTATIN CALCIUM 40 MG: 40 TABLET, FILM COATED ORAL at 20:53

## 2023-01-01 RX ADMIN — VANCOMYCIN HYDROCHLORIDE 1250 MG: 5 INJECTION, POWDER, LYOPHILIZED, FOR SOLUTION INTRAVENOUS at 21:28

## 2023-01-01 RX ADMIN — PANTOPRAZOLE SODIUM 40 MG: 40 TABLET, DELAYED RELEASE ORAL at 21:42

## 2023-01-01 RX ADMIN — IOPAMIDOL 51 ML: 755 INJECTION, SOLUTION INTRAVENOUS at 11:26

## 2023-01-01 RX ADMIN — HYDROCODONE BITARTRATE AND ACETAMINOPHEN 2 TABLET: 5; 325 TABLET ORAL at 14:14

## 2023-01-01 RX ADMIN — PREGABALIN 25 MG: 25 CAPSULE ORAL at 08:29

## 2023-01-01 RX ADMIN — Medication 30 ML: at 21:08

## 2023-01-01 RX ADMIN — CARVEDILOL 3.12 MG: 3.12 TABLET, FILM COATED ORAL at 08:19

## 2023-01-01 RX ADMIN — PREGABALIN 25 MG: 25 CAPSULE ORAL at 00:50

## 2023-01-01 RX ADMIN — ACETAMINOPHEN 500 MG: 500 TABLET ORAL at 19:36

## 2023-01-01 RX ADMIN — HYDROMORPHONE HYDROCHLORIDE 0.3 MG: 1 INJECTION, SOLUTION INTRAMUSCULAR; INTRAVENOUS; SUBCUTANEOUS at 11:26

## 2023-01-01 RX ADMIN — PANTOPRAZOLE SODIUM 40 MG: 40 TABLET, DELAYED RELEASE ORAL at 09:43

## 2023-01-01 RX ADMIN — DICLOFENAC SODIUM 4 G: 10 GEL TOPICAL at 20:01

## 2023-01-01 RX ADMIN — Medication 25 MCG: at 16:01

## 2023-01-01 RX ADMIN — DICLOFENAC SODIUM 4 G: 10 GEL TOPICAL at 21:08

## 2023-01-01 RX ADMIN — PHENYLEPHRINE HYDROCHLORIDE 200 MCG: 10 INJECTION INTRAVENOUS at 11:50

## 2023-01-01 RX ADMIN — CARVEDILOL 3.12 MG: 3.12 TABLET, FILM COATED ORAL at 09:43

## 2023-01-01 RX ADMIN — PREGABALIN 25 MG: 25 CAPSULE ORAL at 19:20

## 2023-01-01 RX ADMIN — CARVEDILOL 3.12 MG: 3.12 TABLET, FILM COATED ORAL at 08:43

## 2023-01-01 RX ADMIN — Medication 250 MG: at 17:35

## 2023-01-01 RX ADMIN — HYDROCODONE BITARTRATE AND ACETAMINOPHEN 2 TABLET: 5; 325 TABLET ORAL at 04:14

## 2023-01-01 RX ADMIN — PANTOPRAZOLE SODIUM 40 MG: 40 TABLET, DELAYED RELEASE ORAL at 21:08

## 2023-01-01 RX ADMIN — DICLOFENAC SODIUM 4 G: 10 GEL TOPICAL at 17:33

## 2023-01-01 RX ADMIN — HYDROMORPHONE HYDROCHLORIDE 0.2 MG: 0.2 INJECTION, SOLUTION INTRAMUSCULAR; INTRAVENOUS; SUBCUTANEOUS at 02:19

## 2023-01-01 RX ADMIN — PREGABALIN 25 MG: 25 CAPSULE ORAL at 21:08

## 2023-01-01 RX ADMIN — HYDROCODONE BITARTRATE AND ACETAMINOPHEN 1 TABLET: 5; 325 TABLET ORAL at 22:51

## 2023-01-01 RX ADMIN — TAZOBACTAM SODIUM AND PIPERACILLIN SODIUM 3.38 G: 375; 3 INJECTION, SOLUTION INTRAVENOUS at 19:42

## 2023-01-01 RX ADMIN — ASPIRIN 81 MG: 81 TABLET, COATED ORAL at 08:28

## 2023-01-01 RX ADMIN — AZITHROMYCIN MONOHYDRATE 500 MG: 500 INJECTION, POWDER, LYOPHILIZED, FOR SOLUTION INTRAVENOUS at 14:01

## 2023-01-01 RX ADMIN — CEFAZOLIN 1 G: 1 INJECTION, POWDER, FOR SOLUTION INTRAMUSCULAR; INTRAVENOUS at 16:08

## 2023-01-01 RX ADMIN — HYDROCODONE BITARTRATE AND ACETAMINOPHEN 2 TABLET: 5; 325 TABLET ORAL at 20:55

## 2023-01-01 RX ADMIN — PREGABALIN 25 MG: 25 CAPSULE ORAL at 20:31

## 2023-01-01 RX ADMIN — ASPIRIN 81 MG: 81 TABLET, COATED ORAL at 08:37

## 2023-01-01 RX ADMIN — SODIUM CHLORIDE 500 ML: 9 INJECTION, SOLUTION INTRAVENOUS at 11:00

## 2023-01-01 RX ADMIN — INSULIN ASPART 1 UNITS: 100 INJECTION, SOLUTION INTRAVENOUS; SUBCUTANEOUS at 23:01

## 2023-01-01 RX ADMIN — FUROSEMIDE 40 MG: 10 INJECTION, SOLUTION INTRAMUSCULAR; INTRAVENOUS at 17:49

## 2023-01-01 RX ADMIN — ATORVASTATIN CALCIUM 40 MG: 40 TABLET, FILM COATED ORAL at 19:04

## 2023-01-01 RX ADMIN — DEXAMETHASONE SODIUM PHOSPHATE 4 MG: 4 INJECTION, SOLUTION INTRA-ARTICULAR; INTRALESIONAL; INTRAMUSCULAR; INTRAVENOUS; SOFT TISSUE at 07:36

## 2023-01-01 RX ADMIN — Medication 30 ML: at 21:14

## 2023-01-01 RX ADMIN — HYDROMORPHONE HYDROCHLORIDE 0.3 MG: 1 INJECTION, SOLUTION INTRAMUSCULAR; INTRAVENOUS; SUBCUTANEOUS at 06:39

## 2023-01-01 RX ADMIN — PHENYLEPHRINE HYDROCHLORIDE 300 MCG: 10 INJECTION INTRAVENOUS at 10:51

## 2023-01-01 RX ADMIN — CEFAZOLIN 1 G: 1 INJECTION, POWDER, FOR SOLUTION INTRAMUSCULAR; INTRAVENOUS at 22:56

## 2023-01-01 RX ADMIN — PREGABALIN 25 MG: 25 CAPSULE ORAL at 20:09

## 2023-01-01 RX ADMIN — HYDROCODONE BITARTRATE AND ACETAMINOPHEN 2 TABLET: 5; 325 TABLET ORAL at 22:55

## 2023-01-01 RX ADMIN — ACETAMINOPHEN 650 MG: 325 TABLET, FILM COATED ORAL at 19:10

## 2023-01-01 RX ADMIN — Medication 100 MCG: at 13:24

## 2023-01-01 RX ADMIN — KETOROLAC TROMETHAMINE 15 MG: 15 INJECTION, SOLUTION INTRAMUSCULAR; INTRAVENOUS at 11:26

## 2023-01-01 RX ADMIN — HYDROMORPHONE HYDROCHLORIDE 0.2 MG: 0.2 INJECTION, SOLUTION INTRAMUSCULAR; INTRAVENOUS; SUBCUTANEOUS at 04:42

## 2023-01-01 RX ADMIN — FUROSEMIDE 40 MG: 10 INJECTION, SOLUTION INTRAMUSCULAR; INTRAVENOUS at 08:56

## 2023-01-01 RX ADMIN — BUPIVACAINE HYDROCHLORIDE AND EPINEPHRINE BITARTRATE 30 ML: 5; .005 INJECTION, SOLUTION PERINEURAL at 07:15

## 2023-01-01 RX ADMIN — PANTOPRAZOLE SODIUM 40 MG: 40 TABLET, DELAYED RELEASE ORAL at 19:20

## 2023-01-01 RX ADMIN — INSULIN ASPART 1 UNITS: 100 INJECTION, SOLUTION INTRAVENOUS; SUBCUTANEOUS at 16:47

## 2023-01-01 RX ADMIN — KETOROLAC TROMETHAMINE 15 MG: 15 INJECTION, SOLUTION INTRAMUSCULAR; INTRAVENOUS at 09:15

## 2023-01-01 RX ADMIN — HYDROCODONE BITARTRATE AND ACETAMINOPHEN 2 TABLET: 5; 325 TABLET ORAL at 11:34

## 2023-01-01 RX ADMIN — ATORVASTATIN CALCIUM 40 MG: 40 TABLET, FILM COATED ORAL at 20:31

## 2023-01-01 RX ADMIN — ACETAMINOPHEN 500 MG: 500 TABLET ORAL at 19:47

## 2023-01-01 RX ADMIN — PANTOPRAZOLE SODIUM 40 MG: 40 TABLET, DELAYED RELEASE ORAL at 20:56

## 2023-01-01 RX ADMIN — EPHEDRINE SULFATE 25 MG: 50 INJECTION INTRAVENOUS at 13:35

## 2023-01-01 RX ADMIN — SERTRALINE HYDROCHLORIDE 25 MG: 25 TABLET ORAL at 10:48

## 2023-01-01 RX ADMIN — VANCOMYCIN HYDROCHLORIDE 125 MG: 125 CAPSULE ORAL at 14:12

## 2023-01-01 RX ADMIN — ONDANSETRON HYDROCHLORIDE 4 MG: 2 INJECTION, SOLUTION INTRAMUSCULAR; INTRAVENOUS at 00:48

## 2023-01-01 RX ADMIN — TOPICAL ANESTHETIC 0.5 ML: 200 SPRAY DENTAL; PERIODONTAL at 13:50

## 2023-01-01 RX ADMIN — INSULIN ASPART 1 UNITS: 100 INJECTION, SOLUTION INTRAVENOUS; SUBCUTANEOUS at 23:42

## 2023-01-01 RX ADMIN — Medication 1 UNITS: at 10:35

## 2023-01-01 RX ADMIN — HYDROXYZINE HYDROCHLORIDE 20 MG: 10 TABLET ORAL at 21:10

## 2023-01-01 RX ADMIN — ACETAMINOPHEN 500 MG: 500 TABLET ORAL at 08:29

## 2023-01-01 RX ADMIN — PANTOPRAZOLE SODIUM 40 MG: 40 TABLET, DELAYED RELEASE ORAL at 08:28

## 2023-01-01 RX ADMIN — INSULIN ASPART 1 UNITS: 100 INJECTION, SOLUTION INTRAVENOUS; SUBCUTANEOUS at 08:56

## 2023-01-01 RX ADMIN — HYDROMORPHONE HYDROCHLORIDE 0.2 MG: 0.2 INJECTION, SOLUTION INTRAMUSCULAR; INTRAVENOUS; SUBCUTANEOUS at 03:18

## 2023-01-01 RX ADMIN — CARVEDILOL 3.12 MG: 3.12 TABLET, FILM COATED ORAL at 08:29

## 2023-01-01 RX ADMIN — MAGNESIUM SULFATE HEPTAHYDRATE 4 G: 4 INJECTION, SOLUTION INTRAVENOUS at 09:14

## 2023-01-01 RX ADMIN — HYDROMORPHONE HYDROCHLORIDE 0.2 MG: 0.2 INJECTION, SOLUTION INTRAMUSCULAR; INTRAVENOUS; SUBCUTANEOUS at 00:17

## 2023-01-01 RX ADMIN — HYDROCODONE BITARTRATE AND ACETAMINOPHEN 1 TABLET: 5; 325 TABLET ORAL at 01:02

## 2023-01-01 RX ADMIN — HYDROXYZINE HYDROCHLORIDE 25 MG: 25 TABLET, FILM COATED ORAL at 00:50

## 2023-01-01 RX ADMIN — SUGAMMADEX 100 MG: 100 INJECTION, SOLUTION INTRAVENOUS at 11:06

## 2023-01-01 RX ADMIN — SERTRALINE HYDROCHLORIDE 25 MG: 25 TABLET ORAL at 08:55

## 2023-01-01 RX ADMIN — HYDROCODONE BITARTRATE AND ACETAMINOPHEN 2 TABLET: 5; 325 TABLET ORAL at 03:30

## 2023-01-01 RX ADMIN — DICLOFENAC SODIUM 4 G: 10 GEL TOPICAL at 21:14

## 2023-01-01 RX ADMIN — ASPIRIN 81 MG: 81 TABLET, COATED ORAL at 08:57

## 2023-01-01 RX ADMIN — KETOROLAC TROMETHAMINE 15 MG: 15 INJECTION, SOLUTION INTRAMUSCULAR; INTRAVENOUS at 00:12

## 2023-01-01 RX ADMIN — HYDROMORPHONE HYDROCHLORIDE 2 MG: 2 TABLET ORAL at 13:09

## 2023-01-01 RX ADMIN — ATORVASTATIN CALCIUM 40 MG: 40 TABLET, FILM COATED ORAL at 20:56

## 2023-01-01 RX ADMIN — Medication 30 ML: at 16:56

## 2023-01-01 RX ADMIN — HYDROMORPHONE HYDROCHLORIDE 0.2 MG: 0.2 INJECTION, SOLUTION INTRAMUSCULAR; INTRAVENOUS; SUBCUTANEOUS at 12:32

## 2023-01-01 RX ADMIN — FENTANYL CITRATE 50 MCG: 50 INJECTION, SOLUTION INTRAMUSCULAR; INTRAVENOUS at 20:13

## 2023-01-01 RX ADMIN — PREGABALIN 25 MG: 25 CAPSULE ORAL at 16:31

## 2023-01-01 RX ADMIN — HYDROCODONE BITARTRATE AND ACETAMINOPHEN 2 TABLET: 5; 325 TABLET ORAL at 12:54

## 2023-01-01 RX ADMIN — ATORVASTATIN CALCIUM 40 MG: 40 TABLET, FILM COATED ORAL at 20:09

## 2023-01-01 RX ADMIN — HYDROMORPHONE HYDROCHLORIDE 0.5 MG: 1 INJECTION, SOLUTION INTRAMUSCULAR; INTRAVENOUS; SUBCUTANEOUS at 17:09

## 2023-01-01 RX ADMIN — VANCOMYCIN HYDROCHLORIDE 125 MG: 125 CAPSULE ORAL at 21:42

## 2023-01-01 RX ADMIN — LIDOCAINE HYDROCHLORIDE 50 MG: 20 INJECTION, SOLUTION INFILTRATION; PERINEURAL at 07:36

## 2023-01-01 RX ADMIN — HYDROXYZINE HYDROCHLORIDE 10 MG: 10 TABLET ORAL at 02:07

## 2023-01-01 RX ADMIN — PHENYLEPHRINE HYDROCHLORIDE 300 MCG: 10 INJECTION INTRAVENOUS at 10:32

## 2023-01-01 RX ADMIN — HYDROCODONE BITARTRATE AND ACETAMINOPHEN 2 TABLET: 5; 325 TABLET ORAL at 02:29

## 2023-01-01 RX ADMIN — LIDOCAINE HYDROCHLORIDE 30 ML: 20 SOLUTION ORAL; TOPICAL at 12:30

## 2023-01-01 RX ADMIN — PHENYLEPHRINE HYDROCHLORIDE 100 MCG: 10 INJECTION INTRAVENOUS at 10:28

## 2023-01-01 RX ADMIN — ASPIRIN 81 MG: 81 TABLET, COATED ORAL at 09:42

## 2023-01-01 RX ADMIN — ASPIRIN 81 MG: 81 TABLET, COATED ORAL at 11:35

## 2023-01-01 RX ADMIN — PREGABALIN 25 MG: 25 CAPSULE ORAL at 21:10

## 2023-01-01 RX ADMIN — HYDROXYZINE HYDROCHLORIDE 25 MG: 25 TABLET, FILM COATED ORAL at 08:07

## 2023-01-01 RX ADMIN — THERA TABS 1 TABLET: TAB at 11:35

## 2023-01-01 RX ADMIN — HYDROMORPHONE HYDROCHLORIDE 0.3 MG: 1 INJECTION, SOLUTION INTRAMUSCULAR; INTRAVENOUS; SUBCUTANEOUS at 00:07

## 2023-01-01 RX ADMIN — FUROSEMIDE 40 MG: 10 INJECTION, SOLUTION INTRAMUSCULAR; INTRAVENOUS at 18:50

## 2023-01-01 RX ADMIN — ONDANSETRON HYDROCHLORIDE 4 MG: 2 INJECTION, SOLUTION INTRAMUSCULAR; INTRAVENOUS at 23:41

## 2023-01-01 RX ADMIN — PREGABALIN 25 MG: 25 CAPSULE ORAL at 11:34

## 2023-01-01 RX ADMIN — HYDROCODONE BITARTRATE AND ACETAMINOPHEN 1 TABLET: 5; 325 TABLET ORAL at 14:32

## 2023-01-01 RX ADMIN — CARVEDILOL 3.12 MG: 3.12 TABLET, FILM COATED ORAL at 10:16

## 2023-01-01 RX ADMIN — VANCOMYCIN HYDROCHLORIDE 125 MG: 125 CAPSULE ORAL at 08:36

## 2023-01-01 RX ADMIN — PANTOPRAZOLE SODIUM 40 MG: 40 TABLET, DELAYED RELEASE ORAL at 08:57

## 2023-01-01 RX ADMIN — ACETAMINOPHEN 500 MG: 500 TABLET ORAL at 14:26

## 2023-01-01 RX ADMIN — PANTOPRAZOLE SODIUM 40 MG: 40 TABLET, DELAYED RELEASE ORAL at 20:09

## 2023-01-01 RX ADMIN — FENTANYL CITRATE 50 MCG: 50 INJECTION, SOLUTION INTRAMUSCULAR; INTRAVENOUS at 08:10

## 2023-01-01 RX ADMIN — THIAMINE HCL TAB 100 MG 100 MG: 100 TAB at 16:01

## 2023-01-01 RX ADMIN — HYDROMORPHONE HYDROCHLORIDE 0.2 MG: 0.2 INJECTION, SOLUTION INTRAMUSCULAR; INTRAVENOUS; SUBCUTANEOUS at 08:02

## 2023-01-01 RX ADMIN — HYDROMORPHONE HYDROCHLORIDE 0.5 MG: 1 INJECTION, SOLUTION INTRAMUSCULAR; INTRAVENOUS; SUBCUTANEOUS at 16:22

## 2023-01-01 RX ADMIN — ONDANSETRON 4 MG: 2 INJECTION INTRAMUSCULAR; INTRAVENOUS at 10:37

## 2023-01-01 RX ADMIN — SERTRALINE HYDROCHLORIDE 25 MG: 25 TABLET ORAL at 15:37

## 2023-01-01 RX ADMIN — ACETAMINOPHEN 975 MG: 325 TABLET ORAL at 08:55

## 2023-01-01 RX ADMIN — PREGABALIN 25 MG: 25 CAPSULE ORAL at 08:20

## 2023-01-01 RX ADMIN — Medication 25 MCG: at 10:48

## 2023-01-01 RX ADMIN — OXYCODONE HYDROCHLORIDE 5 MG: 5 TABLET ORAL at 11:41

## 2023-01-01 RX ADMIN — MIDAZOLAM 0.5 MG: 1 INJECTION INTRAMUSCULAR; INTRAVENOUS at 13:52

## 2023-01-01 RX ADMIN — PREGABALIN 25 MG: 25 CAPSULE ORAL at 19:04

## 2023-01-01 RX ADMIN — THIAMINE HCL TAB 100 MG 100 MG: 100 TAB at 08:36

## 2023-01-01 RX ADMIN — FUROSEMIDE 40 MG: 10 INJECTION, SOLUTION INTRAMUSCULAR; INTRAVENOUS at 05:54

## 2023-01-01 RX ADMIN — Medication 1 UNITS: at 11:20

## 2023-01-01 RX ADMIN — FENTANYL CITRATE 50 MCG: 50 INJECTION, SOLUTION INTRAMUSCULAR; INTRAVENOUS at 07:34

## 2023-01-01 RX ADMIN — FUROSEMIDE 40 MG: 10 INJECTION, SOLUTION INTRAMUSCULAR; INTRAVENOUS at 11:29

## 2023-01-01 RX ADMIN — CARVEDILOL 3.12 MG: 3.12 TABLET, FILM COATED ORAL at 19:10

## 2023-01-01 RX ADMIN — SERTRALINE HYDROCHLORIDE 25 MG: 25 TABLET ORAL at 08:29

## 2023-01-01 RX ADMIN — VANCOMYCIN HYDROCHLORIDE 125 MG: 125 CAPSULE ORAL at 22:05

## 2023-01-01 RX ADMIN — OXYCODONE HYDROCHLORIDE 5 MG: 5 TABLET ORAL at 02:18

## 2023-01-01 RX ADMIN — TAZOBACTAM SODIUM AND PIPERACILLIN SODIUM 4.5 G: 500; 4 INJECTION, SOLUTION INTRAVENOUS at 02:27

## 2023-01-01 RX ADMIN — ALBUMIN HUMAN: 0.05 INJECTION, SOLUTION INTRAVENOUS at 09:19

## 2023-01-01 RX ADMIN — PREGABALIN 25 MG: 25 CAPSULE ORAL at 10:17

## 2023-01-01 RX ADMIN — FUROSEMIDE 40 MG: 40 TABLET ORAL at 08:37

## 2023-01-01 RX ADMIN — ONDANSETRON HYDROCHLORIDE 4 MG: 2 INJECTION, SOLUTION INTRAMUSCULAR; INTRAVENOUS at 01:32

## 2023-01-01 RX ADMIN — HYDROMORPHONE HYDROCHLORIDE 0.5 MG: 1 INJECTION, SOLUTION INTRAMUSCULAR; INTRAVENOUS; SUBCUTANEOUS at 03:41

## 2023-01-01 RX ADMIN — HYDROMORPHONE HYDROCHLORIDE 0.5 MG: 1 INJECTION, SOLUTION INTRAMUSCULAR; INTRAVENOUS; SUBCUTANEOUS at 16:20

## 2023-01-01 RX ADMIN — ACETAMINOPHEN 500 MG: 500 TABLET ORAL at 19:14

## 2023-01-01 RX ADMIN — ACETAMINOPHEN 500 MG: 500 TABLET ORAL at 09:42

## 2023-01-01 RX ADMIN — HYDROMORPHONE HYDROCHLORIDE 0.5 MG: 1 INJECTION, SOLUTION INTRAMUSCULAR; INTRAVENOUS; SUBCUTANEOUS at 18:20

## 2023-01-01 RX ADMIN — HYDROMORPHONE HYDROCHLORIDE 4 MG: 2 TABLET ORAL at 22:21

## 2023-01-01 RX ADMIN — HYDROCODONE BITARTRATE AND ACETAMINOPHEN 2 TABLET: 5; 325 TABLET ORAL at 11:08

## 2023-01-01 RX ADMIN — HYDROXYZINE HYDROCHLORIDE 25 MG: 25 TABLET ORAL at 11:26

## 2023-01-01 RX ADMIN — PREGABALIN 25 MG: 25 CAPSULE ORAL at 08:28

## 2023-01-01 RX ADMIN — PHENYLEPHRINE HYDROCHLORIDE 200 MCG: 10 INJECTION INTRAVENOUS at 10:47

## 2023-01-01 RX ADMIN — DICLOFENAC SODIUM 4 G: 10 GEL TOPICAL at 08:25

## 2023-01-01 RX ADMIN — Medication 1 UNITS: at 10:47

## 2023-01-01 RX ADMIN — ATORVASTATIN CALCIUM 40 MG: 40 TABLET, FILM COATED ORAL at 21:10

## 2023-01-01 RX ADMIN — VANCOMYCIN HYDROCHLORIDE 125 MG: 125 CAPSULE ORAL at 10:47

## 2023-01-01 RX ADMIN — THERA TABS 1 TABLET: TAB at 10:47

## 2023-01-01 RX ADMIN — HYDROCODONE BITARTRATE AND ACETAMINOPHEN 2 TABLET: 5; 325 TABLET ORAL at 09:42

## 2023-01-01 RX ADMIN — ACETAMINOPHEN 500 MG: 500 TABLET ORAL at 14:11

## 2023-01-01 RX ADMIN — FENTANYL CITRATE 25 MCG: 50 INJECTION, SOLUTION INTRAMUSCULAR; INTRAVENOUS at 10:18

## 2023-01-01 RX ADMIN — HYDROMORPHONE HYDROCHLORIDE 0.5 MG: 1 INJECTION, SOLUTION INTRAMUSCULAR; INTRAVENOUS; SUBCUTANEOUS at 09:45

## 2023-01-01 RX ADMIN — FUROSEMIDE 40 MG: 40 TABLET ORAL at 17:05

## 2023-01-01 RX ADMIN — PHENYLEPHRINE HYDROCHLORIDE 300 MCG: 10 INJECTION INTRAVENOUS at 11:12

## 2023-01-01 RX ADMIN — CARVEDILOL 3.12 MG: 3.12 TABLET, FILM COATED ORAL at 11:34

## 2023-01-01 RX ADMIN — FUROSEMIDE 10 MG: 10 INJECTION, SOLUTION INTRAMUSCULAR; INTRAVENOUS at 12:02

## 2023-01-01 RX ADMIN — HYDROMORPHONE HYDROCHLORIDE 0.2 MG: 0.2 INJECTION, SOLUTION INTRAMUSCULAR; INTRAVENOUS; SUBCUTANEOUS at 04:52

## 2023-01-01 RX ADMIN — PANTOPRAZOLE SODIUM 40 MG: 40 TABLET, DELAYED RELEASE ORAL at 08:43

## 2023-01-01 RX ADMIN — HYDROCODONE BITARTRATE AND ACETAMINOPHEN 2 TABLET: 5; 325 TABLET ORAL at 18:26

## 2023-01-01 RX ADMIN — PROPOFOL 100 MG: 10 INJECTION, EMULSION INTRAVENOUS at 07:36

## 2023-01-01 RX ADMIN — TAZOBACTAM SODIUM AND PIPERACILLIN SODIUM 3.38 G: 375; 3 INJECTION, SOLUTION INTRAVENOUS at 14:33

## 2023-01-01 RX ADMIN — DEXAMETHASONE SODIUM PHOSPHATE 4 MG: 4 INJECTION, SOLUTION INTRA-ARTICULAR; INTRALESIONAL; INTRAMUSCULAR; INTRAVENOUS; SOFT TISSUE at 09:55

## 2023-01-01 RX ADMIN — OXYCODONE HYDROCHLORIDE 5 MG: 5 TABLET ORAL at 15:55

## 2023-01-01 RX ADMIN — ATORVASTATIN CALCIUM 40 MG: 40 TABLET, FILM COATED ORAL at 19:14

## 2023-01-01 RX ADMIN — PANTOPRAZOLE SODIUM 40 MG: 40 TABLET, DELAYED RELEASE ORAL at 19:47

## 2023-01-01 RX ADMIN — SODIUM CHLORIDE, POTASSIUM CHLORIDE, SODIUM LACTATE AND CALCIUM CHLORIDE: 600; 310; 30; 20 INJECTION, SOLUTION INTRAVENOUS at 15:02

## 2023-01-01 RX ADMIN — ONDANSETRON 4 MG: 2 INJECTION INTRAMUSCULAR; INTRAVENOUS at 11:01

## 2023-01-01 RX ADMIN — CARVEDILOL 3.12 MG: 3.12 TABLET, FILM COATED ORAL at 08:57

## 2023-01-01 RX ADMIN — HYDROMORPHONE HYDROCHLORIDE 0.5 MG: 1 INJECTION, SOLUTION INTRAMUSCULAR; INTRAVENOUS; SUBCUTANEOUS at 13:26

## 2023-01-01 RX ADMIN — SODIUM CHLORIDE, POTASSIUM CHLORIDE, SODIUM LACTATE AND CALCIUM CHLORIDE: 600; 310; 30; 20 INJECTION, SOLUTION INTRAVENOUS at 10:28

## 2023-01-01 RX ADMIN — HYDROXYZINE HYDROCHLORIDE 10 MG: 10 TABLET ORAL at 08:55

## 2023-01-01 RX ADMIN — DICYCLOMINE HYDROCHLORIDE 10 MG: 10 CAPSULE ORAL at 21:44

## 2023-01-01 RX ADMIN — HYDROCODONE BITARTRATE AND ACETAMINOPHEN 2 TABLET: 5; 325 TABLET ORAL at 10:47

## 2023-01-01 RX ADMIN — ASPIRIN 81 MG: 81 TABLET, COATED ORAL at 08:43

## 2023-01-01 RX ADMIN — ASPIRIN 81 MG: 81 TABLET, COATED ORAL at 08:19

## 2023-01-01 RX ADMIN — FUROSEMIDE 40 MG: 40 TABLET ORAL at 08:55

## 2023-01-01 RX ADMIN — ACETAMINOPHEN 500 MG: 500 TABLET ORAL at 08:57

## 2023-01-01 RX ADMIN — PREGABALIN 25 MG: 25 CAPSULE ORAL at 08:07

## 2023-01-01 RX ADMIN — SERTRALINE HYDROCHLORIDE 25 MG: 25 TABLET ORAL at 11:34

## 2023-01-01 RX ADMIN — PANTOPRAZOLE SODIUM 40 MG: 40 INJECTION, POWDER, FOR SOLUTION INTRAVENOUS at 11:45

## 2023-01-01 RX ADMIN — PREGABALIN 25 MG: 25 CAPSULE ORAL at 09:42

## 2023-01-01 RX ADMIN — PREGABALIN 25 MG: 25 CAPSULE ORAL at 19:14

## 2023-01-01 ASSESSMENT — ACTIVITIES OF DAILY LIVING (ADL)
ADLS_ACUITY_SCORE: 58
ADLS_ACUITY_SCORE: 37
ADLS_ACUITY_SCORE: 56
ADLS_ACUITY_SCORE: 52
ADLS_ACUITY_SCORE: 52
DEPENDENT_IADLS:: CLEANING;COOKING;LAUNDRY;SHOPPING;MEAL PREPARATION;MEDICATION MANAGEMENT;TRANSPORTATION
DRESSING/BATHING: BATHING DIFFICULTY, REQUIRES EQUIPMENT
DIFFICULTY_EATING/SWALLOWING: NO
ADLS_ACUITY_SCORE: 37
ADLS_ACUITY_SCORE: 37
ADLS_ACUITY_SCORE: 58
ADLS_ACUITY_SCORE: 52
ADLS_ACUITY_SCORE: 56
ADLS_ACUITY_SCORE: 36
ADLS_ACUITY_SCORE: 37
ADLS_ACUITY_SCORE: 37
ADLS_ACUITY_SCORE: 38
DRESSING/BATHING_DIFFICULTY: YES
ADLS_ACUITY_SCORE: 32
ADLS_ACUITY_SCORE: 58
ADLS_ACUITY_SCORE: 57
ADLS_ACUITY_SCORE: 57
ADLS_ACUITY_SCORE: 52
DEPENDENT_IADLS:: INDEPENDENT
TOILETING: 1-->ASSISTANCE (EQUIPMENT/PERSON) NEEDED
ADLS_ACUITY_SCORE: 57
ADLS_ACUITY_SCORE: 56
ADLS_ACUITY_SCORE: 46
ADLS_ACUITY_SCORE: 36
TOILETING_ISSUES: YES
ADLS_ACUITY_SCORE: 36
ADLS_ACUITY_SCORE: 53
ADLS_ACUITY_SCORE: 36
ADLS_ACUITY_SCORE: 39
ADLS_ACUITY_SCORE: 53
ADLS_ACUITY_SCORE: 45
ADLS_ACUITY_SCORE: 41
ADLS_ACUITY_SCORE: 41
ADLS_ACUITY_SCORE: 56
ADLS_ACUITY_SCORE: 52
ADLS_ACUITY_SCORE: 56
NUMBER_OF_TIMES_PATIENT_HAS_FALLEN_WITHIN_LAST_SIX_MONTHS: 1
ADLS_ACUITY_SCORE: 37
ADLS_ACUITY_SCORE: 56
ADLS_ACUITY_SCORE: 58
ADLS_ACUITY_SCORE: 32
ADLS_ACUITY_SCORE: 56
ADLS_ACUITY_SCORE: 36
ADLS_ACUITY_SCORE: 56
ADLS_ACUITY_SCORE: 52
ADLS_ACUITY_SCORE: 32
ADLS_ACUITY_SCORE: 56
ADLS_ACUITY_SCORE: 37
FALL_HISTORY_WITHIN_LAST_SIX_MONTHS: YES
ADLS_ACUITY_SCORE: 36
ADLS_ACUITY_SCORE: 59
ADLS_ACUITY_SCORE: 56
ADLS_ACUITY_SCORE: 56
ADLS_ACUITY_SCORE: 47
ADLS_ACUITY_SCORE: 49
ADLS_ACUITY_SCORE: 57
ADLS_ACUITY_SCORE: 56
ADLS_ACUITY_SCORE: 37
ADLS_ACUITY_SCORE: 57
ADLS_ACUITY_SCORE: 56
ADLS_ACUITY_SCORE: 56
ADLS_ACUITY_SCORE: 53
ADLS_ACUITY_SCORE: 37
ADLS_ACUITY_SCORE: 42
ADLS_ACUITY_SCORE: 52
ADLS_ACUITY_SCORE: 47
ADLS_ACUITY_SCORE: 57
DRESSING/BATHING: BATHING DIFFICULTY, ASSISTANCE 1 PERSON;DRESSING DIFFICULTY, REQUIRES EQUIPMENT
ADLS_ACUITY_SCORE: 46
ADLS_ACUITY_SCORE: 52
ADLS_ACUITY_SCORE: 52
ADLS_ACUITY_SCORE: 56
ADLS_ACUITY_SCORE: 52
ADLS_ACUITY_SCORE: 57
ADLS_ACUITY_SCORE: 57
ADLS_ACUITY_SCORE: 30
ADLS_ACUITY_SCORE: 36
ADLS_ACUITY_SCORE: 32
ADLS_ACUITY_SCORE: 36
ADLS_ACUITY_SCORE: 37
ADLS_ACUITY_SCORE: 54
TOILETING: 0-->INDEPENDENT
ADLS_ACUITY_SCORE: 46
ADLS_ACUITY_SCORE: 37
ADLS_ACUITY_SCORE: 56
ADLS_ACUITY_SCORE: 57
ADLS_ACUITY_SCORE: 37
ADLS_ACUITY_SCORE: 53
ADLS_ACUITY_SCORE: 56
WEAR_GLASSES_OR_BLIND: YES
ADLS_ACUITY_SCORE: 53
ADLS_ACUITY_SCORE: 56
NUMBER_OF_TIMES_PATIENT_HAS_FALLEN_WITHIN_LAST_SIX_MONTHS: 1
ADLS_ACUITY_SCORE: 57
ADLS_ACUITY_SCORE: 53
ADLS_ACUITY_SCORE: 49
CONCENTRATING,_REMEMBERING_OR_MAKING_DECISIONS_DIFFICULTY: NO
ADLS_ACUITY_SCORE: 32
ADLS_ACUITY_SCORE: 58
TOILETING_ASSISTANCE: TOILETING DIFFICULTY, REQUIRES EQUIPMENT
ADLS_ACUITY_SCORE: 58
DRESS: 1-->ASSISTANCE (EQUIPMENT/PERSON) NEEDED (NOT DEVELOPMENTALLY APPROPRIATE)
ADLS_ACUITY_SCORE: 37
ADLS_ACUITY_SCORE: 36
WALKING_OR_CLIMBING_STAIRS: AMBULATION DIFFICULTY, ASSISTANCE 1 PERSON;TRANSFERRING DIFFICULTY, ASSISTANCE 1 PERSON;STAIR CLIMBING DIFFICULTY, DEPENDENT
ADLS_ACUITY_SCORE: 32
ADLS_ACUITY_SCORE: 36
ADLS_ACUITY_SCORE: 58
ADLS_ACUITY_SCORE: 36
TRANSFERRING: 1-->ASSISTANCE (EQUIPMENT/PERSON) NEEDED (NOT DEVELOPMENTALLY APPROPRIATE)
ADLS_ACUITY_SCORE: 53
ADLS_ACUITY_SCORE: 49
BATHING: 1-->ASSISTANCE NEEDED
ADLS_ACUITY_SCORE: 37
ADLS_ACUITY_SCORE: 36
ADLS_ACUITY_SCORE: 39
ADLS_ACUITY_SCORE: 56
ADLS_ACUITY_SCORE: 58
WALKING_OR_CLIMBING_STAIRS_DIFFICULTY: YES
ADLS_ACUITY_SCORE: 53
DRESS: 1-->ASSISTANCE (EQUIPMENT/PERSON) NEEDED
ADLS_ACUITY_SCORE: 37
ADLS_ACUITY_SCORE: 49
ADLS_ACUITY_SCORE: 39
ADLS_ACUITY_SCORE: 38
ADLS_ACUITY_SCORE: 36
ADLS_ACUITY_SCORE: 58
ADLS_ACUITY_SCORE: 57
DRESS: 1-->ASSISTANCE (EQUIPMENT/PERSON) NEEDED (NOT DEVELOPMENTALLY APPROPRIATE)
ADLS_ACUITY_SCORE: 58
ADLS_ACUITY_SCORE: 49
ADLS_ACUITY_SCORE: 37
BATHING: 1-->ASSISTANCE NEEDED
EQUIPMENT_CURRENTLY_USED_AT_HOME: WALKER, ROLLING;GRAB BAR, TOILET;GRAB BAR, TUB/SHOWER
TOILETING_ISSUES: YES
CHANGE_IN_FUNCTIONAL_STATUS_SINCE_ONSET_OF_CURRENT_ILLNESS/INJURY: NO
ADLS_ACUITY_SCORE: 36
ADLS_ACUITY_SCORE: 53
ADLS_ACUITY_SCORE: 37
ADLS_ACUITY_SCORE: 53
CONCENTRATING,_REMEMBERING_OR_MAKING_DECISIONS_DIFFICULTY: NO
ADLS_ACUITY_SCORE: 36
ADLS_ACUITY_SCORE: 49
TOILETING: 0-->NOT TOILET TRAINED OR ASSISTANCE NEEDED (DEVELOPMENTALLY APPROPRIATE)
ADLS_ACUITY_SCORE: 52
FALL_HISTORY_WITHIN_LAST_SIX_MONTHS: YES
ADLS_ACUITY_SCORE: 57
TRANSFERRING: 1-->ASSISTANCE (EQUIPMENT/PERSON) NEEDED
ADLS_ACUITY_SCORE: 57
ADLS_ACUITY_SCORE: 53
ADLS_ACUITY_SCORE: 49
DRESSING/BATHING_DIFFICULTY: YES
ADLS_ACUITY_SCORE: 30
ADLS_ACUITY_SCORE: 47
ADLS_ACUITY_SCORE: 58
ADLS_ACUITY_SCORE: 49
ADLS_ACUITY_SCORE: 47
DRESSING/BATHING_MANAGEMENT: AL
ADLS_ACUITY_SCORE: 56
ADLS_ACUITY_SCORE: 56
TRANSFERRING: 1-->ASSISTANCE (EQUIPMENT/PERSON) NEEDED (NOT DEVELOPMENTALLY APPROPRIATE)
ADLS_ACUITY_SCORE: 56
ADLS_ACUITY_SCORE: 58
ADLS_ACUITY_SCORE: 58
VISION_MANAGEMENT: GLASSES
ADLS_ACUITY_SCORE: 49
ADLS_ACUITY_SCORE: 37
ADLS_ACUITY_SCORE: 56
ADLS_ACUITY_SCORE: 53
DIFFICULTY_COMMUNICATING: NO
CHANGE_IN_FUNCTIONAL_STATUS_SINCE_ONSET_OF_CURRENT_ILLNESS/INJURY: NO
ADLS_ACUITY_SCORE: 52
ADLS_ACUITY_SCORE: 36
WALKING_OR_CLIMBING_STAIRS: AMBULATION DIFFICULTY, ASSISTANCE 1 PERSON;STAIR CLIMBING DIFFICULTY, ASSISTANCE 1 PERSON;TRANSFERRING DIFFICULTY, ASSISTANCE 1 PERSON
ADLS_ACUITY_SCORE: 37
WALKING_OR_CLIMBING_STAIRS_DIFFICULTY: YES
DOING_ERRANDS_INDEPENDENTLY_DIFFICULTY: YES
ADLS_ACUITY_SCORE: 46
ADLS_ACUITY_SCORE: 37
DEPENDENT_IADLS:: INDEPENDENT
ADLS_ACUITY_SCORE: 37
ADLS_ACUITY_SCORE: 58
ADLS_ACUITY_SCORE: 30
ADLS_ACUITY_SCORE: 58
ADLS_ACUITY_SCORE: 52
ADLS_ACUITY_SCORE: 42
ADLS_ACUITY_SCORE: 57
DRESS: 1-->ASSISTANCE (EQUIPMENT/PERSON) NEEDED
ADLS_ACUITY_SCORE: 49
ADLS_ACUITY_SCORE: 37
ADLS_ACUITY_SCORE: 56
ADLS_ACUITY_SCORE: 36
ADLS_ACUITY_SCORE: 36
ADLS_ACUITY_SCORE: 57
ADLS_ACUITY_SCORE: 52
ADLS_ACUITY_SCORE: 54
ADLS_ACUITY_SCORE: 36
ADLS_ACUITY_SCORE: 42
ADLS_ACUITY_SCORE: 52
DEPENDENT_IADLS:: INDEPENDENT
ADLS_ACUITY_SCORE: 42
ADLS_ACUITY_SCORE: 57
TOILETING_ASSISTANCE: TOILETING DIFFICULTY, REQUIRES EQUIPMENT
ADLS_ACUITY_SCORE: 57
WEAR_GLASSES_OR_BLIND: YES
ADLS_ACUITY_SCORE: 49
TOILETING_MANAGEMENT: AL
DIFFICULTY_EATING/SWALLOWING: NO
ADLS_ACUITY_SCORE: 39
TRANSFERRING: 1-->ASSISTANCE (EQUIPMENT/PERSON) NEEDED
ADLS_ACUITY_SCORE: 49
ADLS_ACUITY_SCORE: 57
DOING_ERRANDS_INDEPENDENTLY_DIFFICULTY: YES
ADLS_ACUITY_SCORE: 36
ADLS_ACUITY_SCORE: 47
TOILETING: 1-->ASSISTANCE (EQUIPMENT/PERSON) NEEDED
ADLS_ACUITY_SCORE: 58
ADLS_ACUITY_SCORE: 46
ADLS_ACUITY_SCORE: 52
VISION_MANAGEMENT: GLASSES
ADLS_ACUITY_SCORE: 58
ADLS_ACUITY_SCORE: 49

## 2023-01-01 ASSESSMENT — ENCOUNTER SYMPTOMS
FEVER: 0
SHORTNESS OF BREATH: 1
CHEST TIGHTNESS: 1
COUGH: 0
BACK PAIN: 1
SHORTNESS OF BREATH: 1
VOMITING: 1
SHORTNESS OF BREATH: 1
COUGH: 0
ABDOMINAL PAIN: 0
NAUSEA: 1
COUGH: 1
DIFFICULTY URINATING: 0
FEVER: 0

## 2023-01-06 NOTE — TELEPHONE ENCOUNTER
Fax received from [facility] 2/18/22 for review and signature.  Put in Dr. Londono's yellow folder.

## 2023-01-09 NOTE — TELEPHONE ENCOUNTER
[facility] Call Center    Phone Message    May a detailed message be left on voicemail: yes     Reason for Call: Other: Pt takes nitrostat and it not on her med list current and non current. Please call pt daughter in law to discuss. Thank you     Action Taken: Message routed to:  Other: Cardiology    Travel Screening: Not Applicable     Thank you!  [facility]

## 2023-01-09 NOTE — TELEPHONE ENCOUNTER
Seema is on an older C2C but not the most recent one.  Advised her that there are no recent nitroglycerin orders and any in the past came from cardiology so she will need to call them..FRANKLYN Ventura R.N.

## 2023-01-09 NOTE — TELEPHONE ENCOUNTER
Patients daughter in law Seema calling, Not on C2C  Patient in assisted living now and is requesting   nitroGLYcerin (NITROSTAT) sublingual tablet 0.4 mg   Sent to [facility] (fax [phone]) and also order faxed to [facility] in [place] fax [phone]  Ok to call and  [phone]

## 2023-01-10 NOTE — TELEPHONE ENCOUNTER
Received VM from patient's daughter in law, providing fax numbers for [facility] in [place] ([phone]) and [facility] in [place] ([phone]) for the nitroglycerin rx to be faxed to. Rxs faxed to respective locations.

## 2023-01-10 NOTE — PROGRESS NOTES
Clinical Product Navigator RN reviewed chart; patient on payer product coverage.  Review results: following TCU discharge, patient has not had any recurrent ED or hospitalizations, she has moved into an Assisted Living facility.     At this time, no further care coordination outreaches will be scheduled unless patient initiates or new referral for care coordination is placed.       Mervat Capps RN/Clinical Product Navigator

## 2023-01-10 NOTE — TELEPHONE ENCOUNTER
Spoke with patient's daughter in law. Patient has recently moved to assisted living, and they would like an updated medication list. Somehow, rx for PRN sublingual nitroglycerin got removed from patient's medication list. Patient's assisted living, and family members, would like her to have the prescription there in case she may ever need it. Patient's daughter in law states that she needs the prescription faxed to two different places - [facility] [place], [email or web address] [place]. She will call back with the fax numbers for both places. Will await call back, and then send rx over to both places.

## 2023-01-16 PROBLEM — J90 PLEURAL EFFUSION: Status: ACTIVE | Noted: 2022-01-01

## 2023-01-16 PROBLEM — I50.21 ACUTE HFREF (HEART FAILURE WITH REDUCED EJECTION FRACTION) (H): Status: ACTIVE | Noted: 2023-01-01
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
## 2023-01-16 NOTE — PROCEDURES
Thoracentesis Procedure Note    INDICATION: Large RIGHT Pleural Effusion  PROCEDURE : Milton Paul MD  CONSENT: Written consent obtained from patient  LOCATION: Right Thorax     PROCEDURE SUMMARY:  Patient's chest imaging was reviewed and pleural effusion identified with bedside ultrasound. The appropriate side was confirmed and marked. A time out was performed. The patient was prepped and draped in a sterile manner using  chlorhexidine scrub. 1% lidocaine was used to anesthesize the skin,  subcutaneous tissue, superior aspect of the rib periosteum and parietal  pleura. A finder needle was then introduced over the superior aspect of  the rib to locate the pleural fluid; straw colored fluid was aspirated. A 10-blade scalpel was used to nick the  skin at the insertion site. The Safe-t-Centesis needle was then  introduced through the skin incision into the pleural space using  negative aspiration pressure and the red colormetric indicator to confirm  appropriate positioning of the needle. The thoracentesis catheter was then threaded without difficulty. 750ml of straw colored fluid was removed without difficulty. The catheter was then removed. No immediate complications were noted during the procedure. A post-procedure chest  x-ray is pending at the time of this note. The fluid will be sent for studies. Estimated blood loss is 1ml.

## 2023-01-16 NOTE — PHARMACY-ADMISSION MEDICATION HISTORY
Admission medication history interview status for this patient is complete. See [facility] admission navigator for allergy information, prior to admission medications and immunization status.     Medication history interview done, indicate source(s): MAR  Medication history resources (including written lists, pill bottles, clinic record):MAR  Pharmacy: na    Changes made to PTA medication list:  Added: azithromycin, tums, tylenol, loperamide  Changed: voltaren from 4 times daily to 3 times daily, norco from 1-2 tablets every 4 hours to 1 tablet every 4 hrs  Reported as Not Taking: zofran, mucinex, oscal  Removed: zofran,mucinex    Actions taken by pharmacist (provider contacted, etc):reviewed MAR     Additional medication history information:None    Medication reconciliation/reorder completed by provider prior to medication history?  no          Prior to Admission medications    Medication Sig Last Dose Taking? Auth Provider Long Term End Date   acetaminophen (TYLENOL) 500 MG tablet Take 500 mg by mouth every 8 hours as needed for pain 1/15/2023 at pm Yes Unknown, Entered By History     alendronate (FOSAMAX) 70 MG tablet Take 1 tablet (70 mg) by mouth every 7 days 1/11/2023 at am Yes Eddie Mora MD Yes    aspirin EC 81 MG EC tablet Take 1 tablet (81 mg) by mouth daily 1/16/2023 at am Yes Radha Umanzor PAPatriciaC No    atorvastatin (LIPITOR) 40 MG tablet Take 1 tablet (40 mg) by mouth every evening 1/15/2023 at pm Yes Michael Londono MD Yes    azithromycin (ZITHROMAX) 250 MG tablet Take 250 mg by mouth daily 1/15/2023 at pm Yes Unknown, Entered By History     bisacodyl (DULCOLAX) 5 MG EC tablet Take 5 mg by mouth daily as needed for constipation  at prn Yes Unknown, Entered By History     calcium carbonate (TUMS) 500 MG chewable tablet Take 2 chew tab by mouth as needed for heartburn Max 15 tabs/day, separate from other drugs by 1 hr  at prn Yes Unknown, Entered By History     carvedilol (COREG) 3.125 MG tablet Take  1 tablet (3.125 mg) by mouth 2 times daily (with meals) 1/15/2023 at pm Yes Hiren Collins MD Yes    diclofenac (VOLTAREN) 1 % topical gel Apply topically 3 times daily 1/15/2023 at pm Yes Reported, Patient     fluticasone (FLONASE) 50 MCG/ACT nasal spray Spray 2 sprays into both nostrils daily as needed for rhinitis or allergies  at prn Yes Reported, Patient     furosemide (LASIX) 20 MG tablet Take 2 tablets (40 mg) by mouth daily Add additional 20mg for increased edema 1/16/2023 at am Yes Eddie Mora MD Yes    HYDROcodone-acetaminophen (NORCO) 5-325 MG tablet Take 1-2 tablets by mouth 4 times daily as needed for severe pain (7-10) Begin substantial taper ASAP to previous chronic directions of one tablet BID prn.  Patient taking differently: Take 1 tablet by mouth 4 times daily as needed for severe pain (7-10) Begin substantial taper ASAP to previous chronic directions of one tablet BID prn. 1/16/2023 Yes Michael Londono MD     hydrOXYzine (ATARAX) 25 MG tablet Take 1 tablet (25 mg) by mouth 4 times daily as needed 1/15/2023 at prn Yes Kenia Parra APRN CNP     Hyprom-Naphaz-Polysorb-Zn Sulf (CLEAR EYES COMPLETE) SOLN Apply 2 drops to eye every 4 hours as needed  at prn Yes Reported, Patient     Lidocaine (LIDOCARE) 4 % Patch Place 2 patches onto the skin every 24 hours To prevent lidocaine toxicity, patient should be patch free for 12 hrs daily. 1/15/2023 at pm Yes Kenia Parra APRN CNP     loperamide (IMODIUM) 2 MG capsule Take 2 mg by mouth as needed for diarrhea Past Month at prn Yes Unknown, Entered By History     multivitamin, therapeutic (THERA-VIT) TABS tablet Take 1 tablet by mouth daily 1/16/2023 at am Yes Unknown, Entered By History     nitroGLYcerin (NITROSTAT) 0.4 MG sublingual tablet For chest pain place 1 tablet under the tongue every 5 minutes for 3 doses. If symptoms persist 5 minutes after 1st dose call 911.  at prn Yes Hiren Collins MD Yes    pantoprazole (PROTONIX) 40  MG EC tablet TAKE 1 TABLET BY MOUTH TWICE DAILY 1/16/2023 at am Yes Michael Londono MD     polyethylene glycol (MIRALAX) 17 GM/Dose powder Take 17 g by mouth daily  at prn Yes Eddie Mora MD     pregabalin (LYRICA) 25 MG capsule Take 1 capsule (25 mg) by mouth 2 times daily 1/16/2023 at am Yes Kenia Parra, APRN CNP Yes    senna-docusate (SENOKOT-S/PERICOLACE) 8.6-50 MG tablet Take 1 tablet by mouth 2 times daily as needed for constipation  at prn Yes Eddie Mora MD     Trolamine Salicylate (ASPERCREME EX) Externally apply topically daily as needed  at prn Yes Unknown, Entered By History     calcium carb-cholecalciferol (OS-LIGIA) 500-200 MG-UNIT tablet Take 1 tablet by mouth 2 times daily (with meals)  Patient not taking: Reported on 1/16/2023 Not Taking  Eddie Mora MD     losartan (COZAAR) 25 MG tablet Take 1 tablet (25 mg) by mouth daily   Hiren Collins MD Yes 9/14/22

## 2023-01-16 NOTE — LETTER
Imani Christiansen, RN, BSN, CM  Inpatient Care Coordination  [facility]  [phone]    Discharge orders

## 2023-01-16 NOTE — ED TRIAGE NOTES
Patient presents with complaints of chest pain for the past 4 days. Pain now radiating into neck and back. Patient received 2 nitroglycerin and 424 mg of Asprin  PTA. Patient states she is also being treated for pneumonia as well.  ABC intact without need for intervention at this time.

## 2023-01-16 NOTE — ED NOTES
[facility]  ED Nurse Handoff Report    Gosia Alonzo is a 89 year old female   ED Chief complaint: Chest Pain  . ED Diagnosis:   Final diagnoses:   Acute HFrEF (heart failure with reduced ejection fraction) (H)   Pleural effusion     Allergies:   Allergies   Allergen Reactions     Codeine      GI UPSET     Escitalopram Oxalate      fatigue     Esomeprazole Magnesium Trihydrate      HA     Gabapentin Dizziness     Dizziness, confusion     Imdur [Isosorbide]      Headache       Meperidine Hcl      N/V     Morphine Hcl      HIVES     Oxycodone      (percodan) GI UPSET     Pentazocine      (talwin)  HALLUCINATIONS     Propoxyphene Hcl      STOMACH UPSET     Sumatriptan Succinate      chest pain       Code Status:   Activity level - Baseline/Home:  Independent. Activity Level - Current:   Assist X 1. Lift room needed: No. Bariatric: No   Needed: No   Isolation: No. Infection: Not Applicable.     Vital Signs:   Vitals:    01/16/23 1114 01/16/23 1115 01/16/23 1130 01/16/23 1145   BP:  134/65  (!) 143/76   Pulse: 88 90 98 101   Resp: 18 20 25 27   Temp:       TempSrc:       SpO2: 97% 96% 97% 98%   Weight:           Cardiac Rhythm:  ,      Pain level:    Patient confused: No. Patient Falls Risk: Yes.   Elimination Status: Has voided   Patient Report - Initial Complaint: Chest pain. Focused Assessment:   Patient presents with complaints of chest pain for the past 4 days. Pain now radiating into neck and back. Patient received 2 nitroglycerin and 424 mg of Asprin  PTA. Patient states she is also being treated for pneumonia as well.  ABC intact without need for intervention at this time.   Tests Performed: labs, ct. Abnormal Results: abnormal ct, required 2L NC oxygen.   Treatments provided: Meds, IV abx  Family Comments: at bedside  OBS brochure/video discussed/provided to patient:  N/A  ED Medications:   Medications   cefTRIAXone (ROCEPHIN) 2 g vial to attach to  ml bag for ADULTS or NS 50  ml bag for PEDS (2 g Intravenous New Bag 1/16/23 1129)   ipratropium - albuterol 0.5 mg/2.5 mg/3 mL (DUONEB) neb solution 3 mL (3 mLs Nebulization Given 1/16/23 1027)   0.9% sodium chloride BOLUS (0 mLs Intravenous Stopped 1/16/23 1128)   iopamidol (ISOVUE-370) solution 500 mL (55 mLs Intravenous Given 1/16/23 1041)   furosemide (LASIX) injection 40 mg (40 mg Intravenous Given 1/16/23 1129)     Drips infusing:  No  For the majority of the shift, the patient's behavior Green. Interventions performed were .    Sepsis treatment initiated: No     Patient tested for COVID 19 prior to admission:     ED Nurse Name/Phone Number: JULIAN Dempsey,   11:53 AM  RECEIVING UNIT ED HANDOFF REVIEW    Above ED Nurse Handoff Report was reviewed: Yes  Reviewed by: Minerva Bolaños RN on January 16, 2023 at 7:58 PM

## 2023-01-16 NOTE — ED PROVIDER NOTES
"    History   Chief Complaint:  Chest Pain     The history is provided by the patient and a relative.      Gosia Alonzo is a 89 year old female who presents via EMS with chest pain. Patient reports that for the past 4 days she has been experiencing shortness of breath and chest pain that radiates into her neck and back. She describes her pain as a \"knife stuck in her chest.\" She states that her assisted living thought that the patient had pneumonia and was put on azithromycin. She has completed this, but it did not help her symptoms. She is not on oxygen at baseline, but her sats dropped in the ED, so she was put on O2. No cough or fever. She adds that the chest pain and shortness of breath have been ongoing for more than 4 days, but her symptoms have worsened within the last 4 days. No leg swelling or history of blood clots.    Independent Historian: Supplemented by son and daughter    ROS:  Review of Systems   Constitutional: Negative for fever.   Respiratory: Positive for shortness of breath. Negative for cough.    Cardiovascular: Positive for chest pain. Negative for leg swelling.   All other systems reviewed and are negative.    Allergies:  Codeine  Escitalopram Oxalate  Esomeprazole Magnesium Trihydrate  Gabapentin  Imdur [Isosorbide]  Meperidine Hcl  Morphine Hcl  Oxycodone  Pentazocine  Propoxyphene Hcl  Sumatriptan Succinate     Medications:    Alendronate   Aspirin 81 mg  Atorvastatin  Bisacodyl   Carvedilol  Flonase  Furosemide  Guaifenesin  Hydrocodone-acetaminophen  Hydroxyzine   Nitrostat   Ondansetron  Pantoprazole   Pregabalin  Senna    Past Medical History:    CHF  Allergic rhinitis   Arthritis  CAD  CKD, stage 3  Type 2 diabetes  Diverticula of colon  Edema   Esophageal reflux  GI bleeding  Hiatal hernia  Hypertension   Infected R knee prosthesis  Infiltrating ductal carcinoma right breast  Ischemic cardiomyopathy   Migraine  NSTEMI  Obese  Osteoporosis   Hyperlipidemia  Iron deficiency " anemia   Seborrheic keratosis   Afib  Pericarditis  Postop DVT right calf    Past Surgical History:    Angiogram x2  Appendectomy  Hysterectomy   Left elbow surgery   Right total knee arthroplasty  Lumbar fusion x4  Right mastectomy  Right rotator cuff/shoulder replacement     Family History:    C.A.D. in her father   Cancer in mother    Social History:  Presents with daughter and son  Presents via EMS  Lives in assisted living  PCP: Michael Londono     Physical Exam     Patient Vitals for the past 24 hrs:   BP Temp Temp src Pulse Resp SpO2 Weight   01/16/23 1130 -- -- -- 98 25 97 % --   01/16/23 1115 134/65 -- -- 90 20 96 % --   01/16/23 1114 -- -- -- 88 18 97 % --   01/16/23 1113 -- -- -- 97 19 (!) 87 % --   01/16/23 1100 127/64 -- -- 99 21 97 % --   01/16/23 0925 -- -- -- -- -- -- 53 kg (116 lb 12.8 oz)   01/16/23 0922 129/76 99.1  F (37.3  C) Oral 98 18 94 % --        Physical Exam  VS: Reviewed per above  HENT: normal speech  EYES: sclera anicteric  CV: Rate as noted, regular rhythm.   RESP: Mild tachypnea, decreased breath sounds bilaterally.  No wheezing.  GI: no tenderness/rebound/guarding, not distended.  NEURO: Alert, moving all extremities  MSK: No deformity of the extremities  SKIN: Warm and dry    Emergency Department Course   ECG:  ECG results from 01/16/23 @ 0921   EKG 12 lead     Value    Systolic Blood Pressure     Diastolic Blood Pressure     Ventricular Rate 107    Atrial Rate 98    NY Interval     QRS Duration 72        QTc 485    P Axis     R AXIS 35    T Axis 75    Interpretation ECG      Atrial fibrillation with rapid ventricular response  Read by me @ 1017       Imaging:  CT Chest Pulmonary Embolism w Contrast   Preliminary Result   IMPRESSION:   1.  No pulmonary embolism demonstrated.   2.  Moderate to large right effusion with associated compressive   atelectasis and/or infiltrate, new from previous.   3.  Small left effusion increased from previous.          Report per  radiology    Laboratory:  Labs Ordered and Resulted from Time of ED Arrival to Time of ED Departure   COMPREHENSIVE METABOLIC PANEL - Abnormal       Result Value    Sodium 137      Potassium 3.5      Chloride 100      Carbon Dioxide (CO2) 24      Anion Gap 13      Urea Nitrogen 20.2      Creatinine 0.65      Calcium 8.0 (*)     Glucose 147 (*)     Alkaline Phosphatase 238 (*)     AST 33      ALT 20      Protein Total 6.5      Albumin 3.0 (*)     Bilirubin Total 0.8      GFR Estimate 83     TROPONIN T, HIGH SENSITIVITY - Abnormal    Troponin T, High Sensitivity 30 (*)    CBC WITH PLATELETS AND DIFFERENTIAL - Abnormal    WBC Count 11.7 (*)     RBC Count 3.23 (*)     Hemoglobin 9.3 (*)     Hematocrit 29.5 (*)     MCV 91      MCH 28.8      MCHC 31.5      RDW 14.5      Platelet Count 243      % Neutrophils 75      % Lymphocytes 12      % Monocytes 10      % Eosinophils 1      % Basophils 1      % Immature Granulocytes 1      NRBCs per 100 WBC 0      Absolute Neutrophils 8.8 (*)     Absolute Lymphocytes 1.4      Absolute Monocytes 1.2      Absolute Eosinophils 0.2      Absolute Basophils 0.1      Absolute Immature Granulocytes 0.1      Absolute NRBCs 0.0     NT PROBNP INPATIENT - Abnormal    N terminal Pro BNP Inpatient 2,254 (*)    LIPASE - Normal    Lipase 28     TROPONIN T, HIGH SENSITIVITY   PROCALCITONIN   INR      Emergency Department Course & Assessments:     Interventions:  Medications   cefTRIAXone (ROCEPHIN) 2 g vial to attach to  ml bag for ADULTS or NS 50 ml bag for PEDS (2 g Intravenous New Bag 1/16/23 1129)   ipratropium - albuterol 0.5 mg/2.5 mg/3 mL (DUONEB) neb solution 3 mL (3 mLs Nebulization Given 1/16/23 1027)   0.9% sodium chloride BOLUS (0 mLs Intravenous Stopped 1/16/23 1128)   iopamidol (ISOVUE-370) solution 500 mL (55 mLs Intravenous Given 1/16/23 1041)   furosemide (LASIX) injection 40 mg (40 mg Intravenous Given 1/16/23 1129)        Independent Interpretation (X-rays, CTs, rhythm  strip):  I reviewed patient's CT.     Consultations/Discussion of Management or Tests:   ED Course as of 01/16/23 1141   Mon Jan 16, 2023   1011 I obtained history and examined the patient.   1132 I rechecked and updated the patient.   1138 I spoke to Dr. Andrade, hospitalist, who accepts admission.     Disposition:  The patient was admitted to the hospital under the care of Dr. Andrade.     Impression & Plan    Medical Decision Making:  Patient presents to the ER for evaluation of hypoxemia, chest pain, shortness of breath.  Vital signs notable for SPO2 dipping into the mid 80s on room air which improved with 2 L per nasal cannula.  Initially there were decreased breath sounds but no clear benefit with DuoNeb.  CT scan shows right greater than left pleural effusion cannot rule out underlying infiltrate.  Procalcitonin added on and Rocephin provided in addition to the azithromycin she had been receiving at her living facility.  There was no PE on her CT scanning.  She was given Lasix with concern for CHF exacerbation.  She was admitted to hospitalist team for further cares.    Diagnosis:    ICD-10-CM    1. Acute HFrEF (heart failure with reduced ejection fraction) (H)  I50.21       2. Pleural effusion  J90            Scribe Disclosure:  Radha XAVIER, am serving as a scribe at 10:09 AM on 1/16/2023 to document services personally performed by Lonny Haddad MD based on my observations and the provider's statements to me.     1/16/2023   Lonny Haddad MD Lindenbaum, Elan, MD  01/16/23 5457

## 2023-01-16 NOTE — ED NOTES
Bed: EDILMA  Expected date: 1/16/23  Expected time: 9:09 AM  Means of arrival:   Comments:   4 89F

## 2023-01-17 NOTE — CONSULTS
Care Management Initial Consult    General Information  Assessment completed with: Patient, Estephania  Type of CM/SW Visit: Initial Assessment    Primary Care Provider verified and updated as needed: Yes   Readmission within the last 30 days: no previous admission in last 30 days      Reason for Consult: discharge planning  Advance Care Planning: Advance Care Planning Reviewed: present on chart          Communication Assessment  Patient's communication style: spoken language (English or Bilingual)             Cognitive  Cognitive/Neuro/Behavioral: WDL                      Living Environment:   People in home: alone     Current living Arrangements: assisted living  Name of Facility: [facility]   Able to return to prior arrangements: yes       Family/Social Support:  Care provided by: self, homecare agency, other (see comments) ([facility] staff)  Provides care for: no one     Children          Description of Support System: Supportive, Involved         Current Resources:   Patient receiving home care services: Yes  Skilled Home Care Services: Skilled Nursing, Physicial Therapy, Occupational Therapy  Community Resources: None  Equipment currently used at home: grab bar, toilet, grab bar, tub/shower, walker, standard  Supplies currently used at home: Incontinence Supplies    Employment/Financial:  Employment Status: retired        Financial Concerns: No concerns identified           Lifestyle & Psychosocial Needs:  Social Determinants of Health     Tobacco Use: Low Risk      Smoking Tobacco Use: Never     Smokeless Tobacco Use: Never     Passive Exposure: Not on file   Alcohol Use: Not on file   Financial Resource Strain: Not on file   Food Insecurity: Not on file   Transportation Needs: Not on file   Physical Activity: Not on file   Stress: Not on file   Social Connections: Not on file   Intimate Partner Violence: Not on file   Depression: Not at risk     PHQ-2 Score: 0   Housing Stability: Not on file       Functional  Status:  Prior to admission patient needed assistance:         Assesssment of Functional Status: Needs assistance with bathing, Needs assistance with laundry/houskeeping        Values/Beliefs:  Spiritual, Cultural Beliefs, Yazidi Practices, Values that affect care: no               Additional Information:  Care Coordination consult for discharge planning and high URR.  Patient admitted after several day hx of sob and pain- was being treated for pneumonia. Yesterday pain was intense and patient sent to ED via ambulance where she was found to have a pleural effusion, had thoracentesis.  This is her second in 4 months.  Pulmonary will see. She is receiving supplemental oxygen and IV abx.   Met with patient at bedside and verified her primary doctor, resides [facility].She receives assistance with medications, bathing, housekeeping, ADL assist. She also is with [facility] where she receives RN/PT/OT. Spoke to HC Liason and this is correct and they will continue to follow for discharge needs.  She uses a walker and requires incontinence products.  Primary contact is Seema JARA, who will provide transportation back to [place] at discharge patient states. Plan is to discharge back to [facility] with all the above services.     A call was placed to [facility], and a message was left for a return call from the RN  To verify services.    Imani Christiansen RN CC  [phone]   no

## 2023-01-17 NOTE — PLAN OF CARE
"CT abd/pelvis completed for cancer w/u. Has concerning pancreatic findings. Discussed with patient and daughter. Patient reluctant to undergo MRCP right now for further workup, citing her advanced age and \"testing just leads to more testing.\" Will await pulmonology consultation and recommendations before pursuing further workup.     Milton Paul MD  "

## 2023-01-17 NOTE — PROGRESS NOTES
[facility]  Patient is currently open to home care services with [facility]. The patient is currently receiving SN PT OT services.  [facility]  and team have been notified of patient admission.  [facility] liaison will continue to follow patient during stay.  If appropriate provide orders to resume home care at time of discharge.

## 2023-01-17 NOTE — PROGRESS NOTES
[facility]  Hospitalist Progress Note     Assessment & Plan     ASSESSMENT    89F with hx of pAF not on AC due to past GIBs, chronic leg wounds, and HFrEF EF 35-40% 2/2 ischemic cardiomyopathy s/p stents presents with respiratory failure 2/2 large right pleural effusion s/p thoracentesis. Initially though effusion 2/2 CHF although fluid studies came back exudative and neutrophilic predominate, which will need further workup per below.    PLAN    Large Right Exudative Neutrophil-Predominate Pleural Effusion  -S/p right thoracentesis with 750ml removed  -Studies exudative with 89% neutrophils  -Minimal CT evidence of PNA, no infectious sx, and no systemic evidence of infection (fevers, leukocytosis, ect)  -Malignant effusions occasionally can be neutrophil predom - will order CT abd/pelvis for further w/u  -F/u cx and cytology  -Zosyn 3.375mg q6 (of note, MRSA swab neg)  -Pulmonology consultation     Acute Hypoxic Respiratory Failure  -Needing 2L O2 to keep sats >90% on arrival not on O2 at home  -Now resolved after thoracentesis    AE HFrEF EF 35-40%  -Repeat echo without changes  -Will transition back to home oral lasix 40mg every day  -Home Coreg  -Unclear why patient isn't on ACEi/ARB - rec starting prior to discharge     CAD s/p Stents  -Continue home ASA and statin     pAF  -Home Coreg  -Not on AC due to past GIBs     Chronic Wounds of Legs  -Per patient and family not worse than typical  -Wound care consult     DVT Prophy  -SCDs     Disposition  -Med/Tele        Milton Paul MD    Subjective     Pleural studies exudative so added cytology which is pending. Patient feels much improved after thoracentesis. Requesting to go home.        Objective   Blood pressure 129/57, pulse 95, temperature 98  F (36.7  C), temperature source Oral, resp. rate 22, weight 53 kg (116 lb 12.8 oz), SpO2 96 %, not currently breastfeeding.    PHYSICAL EXAM  General: In no acute distress  CV: RRR.  Lungs: Diminished  throughout. Nl WOB.  Abd: Non-tender.  Ext: No edema.    LABS AND IMAGING  Reviewed and pertinent results discussed in assessment and plan.

## 2023-01-17 NOTE — PLAN OF CARE
A/o x4. VSS. Saline locked. Has zosyn, one time dose vanco. Thoracentesis today- 750 mL taken off. Purewick. 2L NC. Echo this afternoon. On lasix. Mepalex on R shin

## 2023-01-17 NOTE — PLAN OF CARE
Goal Outcome Evaluation:       Admitting Diagnosis:  General weakness  Pertinent History:Pleural effusion CHF.  For vitals and assessment please see flow sheet.   Living Situation: Home  Pain plan: c/o back pain prn meds given.   Mobility:  assistance, 1 person/gb/w  Baseline activity: with assistive equipment.   Alarms/Safety:BA.   LDA's:  PIV  Pertinent test results:  K+ 3.5, MG+ 1.3.  Consults:  None  Abnormals/Pending: None  Other Cares/Comments:A & O, VSS, LS diminished, O2 95% 2L. IV Abx given.   Discharge Disposition:  TBD.  Discharge Time: TBD.

## 2023-01-17 NOTE — PHARMACY-VANCOMYCIN DOSING SERVICE
"Pharmacy Vancomycin Initial Note  Date of Service 2023  Patient's  1932  89 year old, female    Indication: Healthcare-Associated Pneumonia    Current estimated CrCl = Estimated Creatinine Clearance: 48.1 mL/min (based on SCr of 0.65 mg/dL).    Creatinine for last 3 days  2023:  9:37 AM Creatinine 0.65 mg/dL    Recent Vancomycin Level(s) for last 3 days  No results found for requested labs within last 72 hours.      Vancomycin IV Administrations (past 72 hours)                   vancomycin (VANCOCIN) 1,250 mg in 0.9% NaCl 250 mL intermittent infusion (mg) 1,250 mg New Bag 23                Nephrotoxins and other renal medications (From now, onward)    Start     Dose/Rate Route Frequency Ordered Stop    23  vancomycin (VANCOCIN) 1,250 mg in 0.9% NaCl 250 mL intermittent infusion         1,250 mg  over 90 Minutes Intravenous EVERY 24 HOURS 23 [id number]  vancomycin (VANCOCIN) 1,250 mg in 0.9% NaCl 250 mL intermittent infusion         1,250 mg  over 90 Minutes Intravenous ONCE 23 1833      01/16/23 1830  piperacillin-tazobactam (ZOSYN) intermittent infusion 4.5 g        Note to Pharmacy: For [facility], [facility] and [facility]: For Zosyn-naive patients, use the \"Zosyn initial dose + extended infusion\" order panel.    4.5 g  200 mL/hr over 30 Minutes Intravenous EVERY 6 HOURS 23 1826      23 1800  furosemide (LASIX) injection 40 mg         40 mg  over 1-3 Minutes Intravenous EVERY 12 HOURS 23 1208            Contrast Orders - past 72 hours (72h ago, onward)    Start     Dose/Rate Route Frequency Stop    23 1515  perflutren diluted 1mL to 2mL with saline (OPTISON) diluted injection 2 mL         2 mL Intravenous ONCE 23 1513    23 1045  iopamidol (ISOVUE-370) solution 500 mL         500 mL Intravenous ONCE 23 1041          [employer] Prediction of Planned Initial Vancomycin Regimen  Regimen: 1250 mg IV every 24 hours.  Start " time: 22:53 on 01/16/2023  Exposure target: AUC24 (range)400-600 mg/L.hr   AUC24,ss: 564 mg/L.hr  Probability of AUC24 > 400: 85 %  Ctrough,ss: 16.3 mg/L  Probability of Ctrough,ss > 20: 31 %  Probability of nephrotoxicity (Lodise MILDRED 2009): 12 %          Plan:  1. Start vancomycin  1250 mg IV q24h.   2. Vancomycin monitoring method: AUC  3. Vancomycin therapeutic monitoring goal: 400-600 mg*h/L  4. Pharmacy will check vancomycin levels as appropriate in 1-3 Days.    5. Serum creatinine levels will be ordered daily for the first week of therapy and at least twice weekly for subsequent weeks.      Gary Burns RPH

## 2023-01-17 NOTE — PLAN OF CARE
Goal Outcome Evaluation:       Home to Assisted Living , [facility], where patient received medication, meals, ADL assist.  Also has [facility] with RN/PT/OT

## 2023-01-17 NOTE — PROGRESS NOTES
Patient Transfer Information  Patient connected to monitoring equipment on arrival: IVF infusing     Patient connected to wall oxygen on arrival: Yes    Belongings: Transferred with patient    Safety check completed: Yes

## 2023-01-17 NOTE — PROGRESS NOTES
Patient Transfer Information  Patient connected to monitoring equipment on arrival: yes Tele reconnected o2 sats 93% RA     Patient connected to wall oxygen on arrival: N/A 93% RA    Belongings: remained in room with pt.    Safety check completed: Yes

## 2023-01-17 NOTE — PLAN OF CARE
Goal Outcome Evaluation:       VS stable. 98% on 2L 02. +1 edema to bilateral legs. +2 edema to right ankle and foot. +1 edema to left ankle and foot. Complained of pain on right side near thoracentesis site and pain med's given. Tele is sinus rhythm. Slept well throughout the night.

## 2023-01-17 NOTE — PLAN OF CARE
Goal Outcome Evaluation:      VSS afebrile. Wean off o2, 94-96% RA, GRIFFIN. Pt had Right Thoracentesis yesterday, 750cc removed, Right back site C/D/I. Pt c/o right back and chest pain, and continues to have chronic pain left shoulder. PRN Norco x4 a day. BNP elevated on Admission. Echo done EF 35-40%. Tele SR. Blood cx pending. On IV Zosyn and IV Vanco dc'd today. IV Lasix changed to PO Lasix. CT abd/pelivs done today,see results. Pt states having difficulty with transfers due to swelling in feet and left shoulder broken. PT consulted. Pt feeling anxious and normally on atarax, Atarax added.

## 2023-01-18 NOTE — PLAN OF CARE
Goal Outcome Evaluation:       Admitting Diagnosis:  General weakness  Pertinent History:Pleural effusion CHF.  For vitals and assessment please see flow sheet.   Living Situation: [facility]  Pain plan: c/o back pain prn Narco x 1 given.   Mobility:  assistance, 1 person/gb/w  Baseline activity: with assistive equipment.   Alarms/Safety:BA.   LDA's:  PIV  Pertinent test results:  K+ 3.7, MG+ 2.6.  Consults:  None  Abnormals/Pending: None  Other Cares/Comments:A & O, VSS, LS diminished, O2 95% RA. IV Abx given. puriwick in place. CT today please see result.   Discharge Disposition:  TBD.  Discharge Time: TBD.

## 2023-01-18 NOTE — PLAN OF CARE
"Goal Outcome Evaluation:      Plan of Care Reviewed With: patient    Overall Patient Progress: no changeOverall Patient Progress: no change    Outcome Evaluation: monitor VS, labs, tele, on TOBY      VSS, no co CP/sob, pain to L shoulder controlled with PRN norco/oxy per pt request. Tele SR PACs.  Turn and reposition as pt will allow every two hours, up with Ax1+gb+walker, cardiac diet, fair appetite, alarms on for safety, falls risk, forgetful, [place].  HGB 9.1, Na 135, Cr 1.02.  Pulmonology constuled, TOBY continue, purewick in place, see flow sheet for skin assessment details (BLE xena with scabs to R, scattered smaller scabbing/bruising, blanchable redness to coccyx with mepliex in place for protection of intact skin), +BM, tap site bandage removed.  EF 35-40%, po lasix, diminished LS.  Continue POC and monitoring.        Problem: Plan of Care - These are the overarching goals to be used throughout the patient stay.    Goal: Plan of Care Review  Description: The Plan of Care Review/Shift note should be completed every shift.  The Outcome Evaluation is a brief statement about your assessment that the patient is improving, declining, or no change.  This information will be displayed automatically on your shift note.  Outcome: Not Progressing  Flowsheets (Taken 1/18/2023 1336)  Outcome Evaluation: monitor VS, labs, tele, on TOBY  Plan of Care Reviewed With: patient  Overall Patient Progress: no change  Goal: Patient-Specific Goal (Individualized)  Description: You can add care plan individualizations to a care plan. Examples of Individualization might be:  \"Parent requests to be called daily at 9am for status\", \"I have a hard time hearing out of my right ear\", or \"Do not touch me to wake me up as it startles me\".  Outcome: Not Progressing  Goal: Absence of Hospital-Acquired Illness or Injury  Outcome: Not Progressing  Intervention: Identify and Manage Fall Risk  Recent Flowsheet Documentation  Taken 1/18/2023 1316 by " Schweim, Aria K, RN  Safety Promotion/Fall Prevention:   bed alarm on   safety round/check completed  Taken 1/18/2023 1231 by Aria Sykes RN  Safety Promotion/Fall Prevention:   bed alarm on   safety round/check completed  Taken 1/18/2023 1142 by Aria Sykes RN  Safety Promotion/Fall Prevention:   bed alarm on   safety round/check completed  Taken 1/18/2023 1117 by Aria Sykes RN  Safety Promotion/Fall Prevention:   bed alarm on   safety round/check completed  Taken 1/18/2023 1026 by Aria Sykes RN  Safety Promotion/Fall Prevention:   bed alarm on   safety round/check completed  Taken 1/18/2023 1013 by Aria Sykes RN  Safety Promotion/Fall Prevention:   bed alarm on   safety round/check completed  Taken 1/18/2023 0904 by Aria Sykes RN  Safety Promotion/Fall Prevention:   fall prevention program maintained   treat underlying cause   treat reversible contributory factors   supervised activity   safety round/check completed   room organization consistent   room near nurse's station   room door open   patient and family education   nonskid shoes/slippers when out of bed   lighting adjusted   increase visualization of patient   increased rounding and observation   clutter free environment maintained   activity supervised   assistive device/personal items within reach   bed alarm on  Taken 1/18/2023 0847 by Aria Sykes RN  Safety Promotion/Fall Prevention:   bed alarm on   safety round/check completed  Taken 1/18/2023 0823 by Aria Sykes RN  Safety Promotion/Fall Prevention:   bed alarm on   safety round/check completed  Taken 1/18/2023 0721 by Aria Sykes RN  Safety Promotion/Fall Prevention:   bed alarm on   safety round/check completed  Intervention: Prevent Skin Injury  Recent Flowsheet Documentation  Taken 1/18/2023 1142 by Aria Sykes RN  Body Position: (pillow removed per pt request)   turned   supine   weight shifting   other (see comments)  Taken  1/18/2023 1026 by Aria ySkes RN  Body Position: (boosted in bed)   turned   left   side-lying   heels elevated   weight shifting   other (see comments)  Intervention: Prevent and Manage VTE (Venous Thromboembolism) Risk  Recent Flowsheet Documentation  Taken 1/18/2023 0904 by Aria Sykes, RN  VTE Prevention/Management: SCDs (sequential compression devices) off  Goal: Optimal Comfort and Wellbeing  Outcome: Not Progressing  Intervention: Monitor Pain and Promote Comfort  Recent Flowsheet Documentation  Taken 1/18/2023 1138 by Aria Sykes, RN  Pain Management Interventions:   medication (see MAR)   distraction   emotional support   care clustered  Taken 1/18/2023 0903 by Aria Sykes RN  Pain Management Interventions: declines  Taken 1/18/2023 0817 by Aria Sykes, RN  Pain Management Interventions: medication (see MAR)  Goal: Readiness for Transition of Care  Outcome: Not Progressing     Problem: Infection  Goal: Absence of Infection Signs and Symptoms  Outcome: Not Progressing

## 2023-01-18 NOTE — PLAN OF CARE
Goal Outcome Evaluation:       VS stable. Diminished LS. +1 edema to bilateral legs. + 2 edema to right ankle. +3 edema to right foot. Complained of pain to right back at thoracentesis site and pain med's given. Tele is sinus rhythm. Slept on and off throughout the night.

## 2023-01-18 NOTE — DISCHARGE SUMMARY
[facility]  Discharge Summary    Admit date:  1/16/2023    Discharge date and time: 01/19/23 1000    Discharge Physician: Milton Paul MD    Primary care provider: Michael Londono    Primary Discharge Diagnosis      Large Right Exudative Neutrophil-Predominate Pleural Effusion    Secondary Diagnoses     Suspected Community Acquired PNA  Acute Hypoxic Respiratory Failure  AE HFrEF EF 35-40%  CAD s/p Stents  pAF  Chronic Wounds of Legs    Summary of Hospital Stay     89F with hx of pAF not on AC due to past GIBs, chronic leg wounds, and HFrEF EF 35-40% 2/2 ischemic cardiomyopathy s/p stents presented with respiratory failure 2/2 large right pleural effusion s/p thoracentesis with evidence of exudative and neutrophilic predominate pleural effusion. Oddly minimal evidence of PNA on imaging. Discussed case with pulmonology - recommendation course of antibiotics and steroids and repeat CXR in 2-4 weeks to monitor for re-accumulation. If re-accumulation, will need to follow-up with pulmonology for further investingation. If no re-accumulation does not need to follow-up with pulmonology.    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 Patient declined further workup for this. Lower concern that pleural fluid is malignant in nature given neutrophil predominance, nevertheless, cytology sent on fluid that is pending at the time of discharge. I will call the patient if cytology results are abnormal.    Patient Discharge Condition & Exam     Discharge condition: Improved    /81 (BP Location: Right arm)   Pulse 96   Temp 98.7  F (37.1  C) (Oral)   Resp 19   Wt 53 kg (116 lb 12.8 oz)   LMP  (LMP Unknown)   SpO2 92%   BMI 19.44 kg/m       General: In NAD.  Cardiac: RRR.  Lungs: CTAB.  Abd: Non-tender.  Ext: No edema.    Discharge Instructions     Patient/family instructions: Written discharge instruction given to  patient/family    Discharge Medications:     Review of your medicines      UNREVIEWED medicines. Ask your doctor about these medicines      Dose / Directions   calcium carb-cholecalciferol 500-200 MG-UNIT tablet  Commonly known as: OS-LIGIA  Used for: Age-related osteoporosis with current pathological fracture with delayed healing, subsequent encounter      Dose: 1 tablet  Take 1 tablet by mouth 2 times daily (with meals)  Quantity: 100 tablet  Refills: 3        START taking      Dose / Directions   amoxicillin-clavulanate 875-125 MG tablet  Commonly known as: AUGMENTIN  Used for: Pleural effusion      Dose: 1 tablet  Take 1 tablet by mouth 2 times daily  Quantity: 14 tablet  Refills: 0     predniSONE 20 MG tablet  Commonly known as: DELTASONE  Used for: Pleural effusion      Dose: 30 mg  Take 1.5 tablets (30 mg) by mouth daily  Quantity: 8 tablet  Refills: 0        CONTINUE these medicines which have NOT CHANGED      Dose / Directions   acetaminophen 500 MG tablet  Commonly known as: TYLENOL      Dose: 500 mg  Take 500 mg by mouth every 8 hours as needed for pain  Refills: 0     alendronate 70 MG tablet  Commonly known as: FOSAMAX  Used for: Age-related osteoporosis with current pathological fracture with delayed healing, subsequent encounter      Dose: 70 mg  Take 1 tablet (70 mg) by mouth every 7 days  Quantity: 4 tablet  Refills: 0     ASPERCREME EX      Externally apply topically daily as needed  Refills: 0     aspirin 81 MG EC tablet  Commonly known as: ASA      Dose: 81 mg  Take 1 tablet (81 mg) by mouth daily  Refills: 0     atorvastatin 40 MG tablet  Commonly known as: LIPITOR  Used for: ST elevation myocardial infarction (STEMI) involving other coronary artery of anterior wall (H), Clostridium difficile diarrhea      Dose: 40 mg  Take 1 tablet (40 mg) by mouth every evening  Quantity: 30 tablet  Refills: 0     bisacodyl 5 MG EC tablet  Commonly known as: DULCOLAX      Dose: 5 mg  Take 5 mg by mouth daily as  needed for constipation  Refills: 0     calcium carbonate 500 MG chewable tablet  Commonly known as: TUMS      Dose: 2 chew tab  Take 2 chew tab by mouth as needed for heartburn Max 15 tabs/day, separate from other drugs by 1 hr  Refills: 0     carvedilol 3.125 MG tablet  Commonly known as: COREG  Used for: Ischemic cardiomyopathy      Dose: 3.125 mg  Take 1 tablet (3.125 mg) by mouth 2 times daily (with meals)  Quantity: 180 tablet  Refills: 3     Clear Eyes Complete Soln      Dose: 2 drop  Apply 2 drops to eye every 4 hours as needed  Refills: 0     diclofenac 1 % topical gel  Commonly known as: VOLTAREN      Apply topically 3 times daily  Refills: 0     fluticasone 50 MCG/ACT nasal spray  Commonly known as: FLONASE      Dose: 2 spray  Spray 2 sprays into both nostrils daily as needed for rhinitis or allergies  Refills: 0     furosemide 20 MG tablet  Commonly known as: LASIX  Used for: Ischemic cardiomyopathy      Dose: 40 mg  Take 2 tablets (40 mg) by mouth daily Add additional 20mg for increased edema  Quantity: 95 tablet  Refills: 2     HYDROcodone-acetaminophen 5-325 MG tablet  Commonly known as: NORCO  Used for: Other chronic pain, Closed fracture of proximal end of left humerus with routine healing, unspecified fracture morphology, subsequent encounter      Dose: 1 tablet  Take 1 tablet by mouth 4 times daily as needed for severe pain (7-10) Begin substantial taper ASAP to previous chronic directions of one tablet BID prn.  Refills: 0     hydrOXYzine 25 MG tablet  Commonly known as: ATARAX  Used for: Herpes zoster infection of thoracic region      Dose: 25 mg  Take 1 tablet (25 mg) by mouth 4 times daily as needed  Quantity: 30 tablet  Refills: 0     Lidocaine 4 % Patch  Commonly known as: LIDOCARE  Used for: Compression fracture of T3 vertebra, initial encounter (H)      Dose: 2 patch  Place 2 patches onto the skin every 24 hours To prevent lidocaine toxicity, patient should be patch free for 12 hrs  daily.  Quantity: 30 patch  Refills: 0     loperamide 2 MG capsule  Commonly known as: IMODIUM      Dose: 2 mg  Take 2 mg by mouth as needed for diarrhea  Refills: 0     multivitamin, therapeutic Tabs tablet      Dose: 1 tablet  Take 1 tablet by mouth daily  Refills: 0     nitroGLYcerin 0.4 MG sublingual tablet  Commonly known as: NITROSTAT  Used for: Coronary artery disease involving native coronary artery of native heart without angina pectoris      For chest pain place 1 tablet under the tongue every 5 minutes for 3 doses. If symptoms persist 5 minutes after 1st dose call 911.  Quantity: 25 tablet  Refills: 3     pantoprazole 40 MG EC tablet  Commonly known as: PROTONIX  Used for: Acute GI bleeding, Gastroesophageal reflux disease with esophagitis, unspecified whether hemorrhage      TAKE 1 TABLET BY MOUTH TWICE DAILY  Quantity: 180 tablet  Refills: 0     polyethylene glycol 17 GM/Dose powder  Commonly known as: MIRALAX  Used for: Compression fracture of T3 vertebra, initial encounter (H)      Dose: 17 g  Take 17 g by mouth daily  Quantity: 510 g  Refills: 0     pregabalin 25 MG capsule  Commonly known as: LYRICA  Used for: Closed fracture of proximal end of left humerus with routine healing, unspecified fracture morphology, subsequent encounter      Dose: 25 mg  Take 1 capsule (25 mg) by mouth 2 times daily  Quantity: 60 capsule  Refills: 0     senna-docusate 8.6-50 MG tablet  Commonly known as: SENOKOT-S/PERICOLACE  Used for: Compression fracture of T3 vertebra, initial encounter (H)      Dose: 1 tablet  Take 1 tablet by mouth 2 times daily as needed for constipation  Quantity: 100 tablet  Refills: 0        STOP taking    azithromycin 250 MG tablet  Commonly known as: ZITHROMAX              Where to get your medicines      These medications were sent to [facility] [place], [place] - [address] [address] [facility]  [address] [facility] [address]    Phone: [phone]      amoxicillin-clavulanate 875-125 MG tablet    predniSONE 20 MG tablet     Information about where to get these medications is not yet available    Ask your nurse or doctor about these medications    HYDROcodone-acetaminophen 5-325 MG tablet        Discharge diet: Cardiac    Discharge activity: Activity as tolerated    Discharge follow-up:    Follow up with primary care provider within one week or earlier if symptoms return or gets worse.    Follow up with consultant as instructed.    Pending Results     Unresulted Labs Ordered in the Past 30 Days of this Admission     Date and Time Order Name Status Description    1/16/2023  8:05 PM Blood Culture Hand, Right Preliminary     1/16/2023  8:05 PM Blood Culture Arm, Left Preliminary     1/16/2023  3:13 PM Pleural fluid Aerobic Bacterial Culture Routine with Gram Stain Preliminary     1/16/2023  3:12 PM Cytology, non-gynecologic In process     1/16/2023  1:43 PM Anaerobic Bacterial Culture Routine Preliminary            Patient Allergies     Allergies   Allergen Reactions     Codeine      GI UPSET     Escitalopram Oxalate      fatigue     Esomeprazole Magnesium Trihydrate      HA     Gabapentin Dizziness     Dizziness, confusion     Imdur [Isosorbide]      Headache       Meperidine Hcl      N/V     Morphine Hcl      HIVES     Oxycodone      (percodan) GI UPSET     Pentazocine      (talwin)  HALLUCINATIONS     Propoxyphene Hcl      STOMACH UPSET     Sumatriptan Succinate      chest pain     Disposition   Disposition: Assisted Living Facility     I saw and evaluated the patient on day of discharge and  discharge instructions reviewed  and  all the patient's questions and concerns addressed. Over 30 minutes spent on discharge and coordination of discharge process for this patient.

## 2023-01-18 NOTE — PROGRESS NOTES
Care Management Discharge Note    Discharge Date: 01/19/2023       Discharge Disposition: Assisted Living, Home Care    Discharge Services:  [facility] RN/PT/OT    Discharge Transportation: family or friend will provide  Handoff Referral Completed: Yes    Additional Information:  CM following for discharge planning. Per MD patiet ready to return to [facility] today.  Called [facility] and the RN has left for day.  Plan for discharge tomorrow.  RX's sent to pharmacy to be filled.  Willl fax discharge orders in AM. Spoke to patient at bedside and her DIL will be able to transport back to [facility]. Updated MD and charge RN.         Imani Christiansen, RN, BSN, CM  Inpatient Care Coordination  [facility]  [phone]

## 2023-01-18 NOTE — PLAN OF CARE
Patient cannot discharge until tomorrow because her facility cannot take her back tonight. She will discharge at 1000. All discharge paperwork complete and meds sent to hospital pharmacy.

## 2023-01-19 NOTE — PROVIDER NOTIFICATION
"    pt's IV line is leaking. Pt refused to restart IV line stated, \" she is leaving tomorrow and does not want to get poked again\". Zosyn given but not completed there is some medication left in the bag. Provider notified.  "

## 2023-01-19 NOTE — PROGRESS NOTES
"/89   Pulse 89   Temp 98.7  F (37.1  C) (Oral)   Resp 19   Wt 53 kg (116 lb 12.8 oz)   LMP  (LMP Unknown)   SpO2 95%   BMI 19.44 kg/m          Alert and orientated x4. Forgetful at times. Assist 1 Walker and GB. TELE SR , right foot 3+ edema. Pure wick in place. Zosyn given completed about 75% when I noticed leakage of the IV site. Pt  refused new IV insertion. Informed provider. Plan is to discharge tomorrow to facility. Per provider verbal instructions,  \"would like chest x ray done  at facility in a week\". Continue with plan of care.  "

## 2023-01-19 NOTE — PROGRESS NOTES
Care Management Discharge Note    Discharge Date: 01/19/2023       Discharge Disposition: Assisted Living, Home Care    Discharge Transportation: family or friend will provide    Private pay costs discussed: Not applicable    Education Provided on the Discharge Plan:    Persons Notified of Discharge Plans: provider, nursing, patient and daughter  Patient/Family in Agreement with the Plan: yes    Handoff Referral Completed: No    Additional Information:  Plan is for patient to return to [facility] with [facility]- RN/PT/OT.  HC is through [facility]. Discharge orders to be faxed to both [facility]-  [phone],   and [facility].  Paged provider Dr. Toledo for discharge orders by 0930. Transportation will be provided by DIL at 1030.    Addendum 0900:  Discharge orders faxed to Attn: Marion at [facility].    Imani Christiansen, RN, BSN, CM  Inpatient Care Coordination  [facility]  [phone]

## 2023-01-19 NOTE — TELEPHONE ENCOUNTER
Natividad, from [facility] calls [phone] asking for verbal orders for Resumption of Care starting Sunday 1/22/23.   Ryan Anderson RN

## 2023-01-19 NOTE — TELEPHONE ENCOUNTER
Fax received from [facility] 01/19/23 for review and signature.  Put in Dr. Londono's in basket.
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
## 2023-01-19 NOTE — PROGRESS NOTES
Vitals: /60 (BP Location: Right arm)   Pulse 84   Temp 98.6  F (37  C) (Oral)   Resp 20   Wt 53 kg (116 lb 12.8 oz)   LMP  (LMP Unknown)   SpO2 91%   BMI 19.44 kg/m      Neuro: A&Ox 4  Resp: Clear, RA  Cardiac: tele SR PACs  GI: Incontinent  : Purewick  Skin: dependent edema +2 R leg, +1 L leg. Redness on bottom  Lines/ Drains: No PIV, MD paged on eves. Patient refused new IV and overnight dose of Zosin. Antibiotics education was provided.  Activity: A1 walker/belt  Nutrition: cardiac  Pain: Denies  Labs: Hbg 9.1, blood cultures for R hand and L arm resulted negative    Plans: Possible discharge today to care facility, MD recommended a chest xray be preformed one week from today 1/26 at the facility

## 2023-01-19 NOTE — PLAN OF CARE
Discharged back to assisted living, family transporting. Shoulder pain relieved with PRN tylenol and norco, belongings returned. Pt and family did not have any questions or concerns noted upon departure.    Declines

## 2023-01-19 NOTE — DISCHARGE INSTRUCTIONS
Your home care referral was sent to [facility]  If you haven't heard from them within the next 24-48 hours,  Please call them at [phone]

## 2023-01-23 NOTE — PROGRESS NOTES
"Clinic Care Coordination Contact  [facility]: Post-Discharge Note  SITUATION                                                      Admission:    Admission Date: 01/16/23   Reason for Admission: Large Right Exudative Neutrophil-Predominate Pleural Effusion  Discharge:   Discharge Date: 01/19/23  Discharge Diagnosis: Large Right Exudative Neutrophil-Predominate Pleural Effusion    BACKGROUND                                                      Per hospital discharge summary and inpatient provider notes:  \"89F with hx of pAF not on AC due to past GIBs, chronic leg wounds, and HFrEF EF 35-40% 2/2 ischemic cardiomyopathy s/p stents presented with respiratory failure 2/2 large right pleural effusion s/p thoracentesis with evidence of exudative and neutrophilic predominate pleural effusion. Oddly minimal evidence of PNA on imaging. Discussed case with pulmonology - recommendation course of antibiotics and steroids and repeat CXR in 2-4 weeks to monitor for re-accumulation. If re-accumulation, will need to follow-up with pulmonology for further investingation. If no re-accumulation does not need to follow-up with pulmonology.     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 Patient declined further workup for this. Lower concern that pleural fluid is malignant in nature given neutrophil predominance, nevertheless, cytology sent on fluid that is pending at the time of discharge. I will call the patient if cytology results are abnormal.\"    ASSESSMENT      Discharge Assessment  How are you doing now that you are home?: [facility] called and spoke with patients daughter, Wen. Wen states patient is doing well. Per report, patient resides at an snf and has everything she needs. Per chart review, resumption of homecare with [facility] approved. Wen denies any new concerns or complaints. No ongoing CC needs identified.  How are your symptoms? (Red Flag " symptoms escalate to triage hotline per guidelines): Unchanged  Does the patient have their discharge instructions? : Unknown  Discharge follow-up appointment scheduled within 14 calendar days? : No      Care Mgmt General Assessment  Referral  Referral Source: Care Team  Health Care Home/Utilization  Preferred Hospital: [facility]  [phone]  Preferred Urgent Care: [facility], [phone]  Living Situation  Current living arrangement:: I live in assisted living ([facility])  Type of residence:: Assisted living  Resources  Patient receiving home care services:: Yes ([facility])  Skilled Home Care Services: Skilled Nursing;Home Health Aid;Physicial Therapy;Occupational Therapy  Community Resources: Home Care  Supplies Currently Used at Home: Incontinence Supplies  Equipment Currently Used at Home: grab bar, toilet;grab bar, tub/shower;walker, standard  Referrals Placed: None  Employment Status: retired  Psychosocial  Methodist or spiritual beliefs that impact treatment:: No  Informal Support system:: Children;Family  Functional Status  Dependent ADLs:: Ambulation-walker  Dependent IADLs:: Independent  Bed or wheelchair confined:: No  Advance Care Plan/Directive  Advanced Care Plans/Directives on file:: Yes  Type Advanced Care Plans/Directives: Advanced Directive - On File;DNR/DNI     Care Mgmt Encounter Assessment  Preventative Care  Routine Health maintenance Reviewed: Due/Overdue   Health Maintenance Due   Topic Date Due     HF ACTION PLAN  Never done     URINE DRUG SCREEN  Never done     DTAP/TDAP/TD IMMUNIZATION (1 - Tdap) Never done     ZOSTER IMMUNIZATION (1 of 2) Never done     MEDICARE ANNUAL WELLNESS VISIT  12/13/2003     DIABETIC FOOT EXAM  12/10/2014     EYE EXAM  12/10/2014     MICROALBUMIN  11/19/2015     Pneumococcal Vaccine: 65+ Years (2 - PCV) 01/01/2017     PHQ-2 (once per calendar year)  01/01/2023     Clinic  Utilization  Difficulty keeping appointments:: No  Compliance Concerns: No  No-Show Concerns: No  No PCP office visit in Past Year: No  Transportation  Transportation means:: Family  Barriers in Communication  How confident are you filling out medical forms by yourself:: Not Assessed    PLAN                                                      Outpatient Plan: Patients daughter, Wen, was encouraged to call clinic with questions, concerns, support needs. SW CC will remain available as needed. No further care coordination outreaches will be made at this time.    Future Appointments   Date Time Provider Department Center   5/10/2023  1:15 PM Hiren Collins MD [facility]     For any urgent concerns, please contact our 24 hour nurse triage line: [phone] ([phone])       Estelle Gilman, ARTUROSW

## 2023-01-23 NOTE — TELEPHONE ENCOUNTER
Grecia form [facility] calling for resumed care for skilled nursing to address wound care  2 visits a week for 4 weeks  Ok to call and lm [phone]

## 2023-01-25 NOTE — TELEPHONE ENCOUNTER
Natividad JORDAN Case Manager with [facility] call back regarding home care orders. Informed her request was sent to Dr. Londono and we are waiting for response if ok for home care orders.      Call Natividad back at [phone].    Paola Shepard RN  [facility]

## 2023-02-02 NOTE — TELEPHONE ENCOUNTER
[facility] OT, and SLP 01/22/23 for review and signature.  Put in Dr. Londono's yellow folder.

## 2023-02-13 NOTE — ED TRIAGE NOTES
"Pt presents via ems from assisted living. Pt reports substernal CP that goes straight through to the back. Per pt this feels like when \"they took a L of fluid off of my heart.\" Pt endorses SOB. 324 aspirin and 1 nitroglycerin SL given by ems. Normal 12 lead per ems.       Triage Assessment     Row Name 02/13/23 0803       Triage Assessment (Adult)    Airway WDL WDL       Respiratory WDL    Respiratory WDL X;rhythm/pattern    Rhythm/Pattern, Respiratory shortness of breath       Skin Circulation/Temperature WDL    Skin Circulation/Temperature WDL WDL       Cardiac WDL    Cardiac WDL X;chest pain       Chest Pain Assessment    Chest Pain Location substernal       Peripheral/Neurovascular WDL    Peripheral Neurovascular WDL WDL       Cognitive/Neuro/Behavioral WDL    Cognitive/Neuro/Behavioral WDL WDL              "

## 2023-02-13 NOTE — ED PROVIDER NOTES
History     Chief Complaint:  Chest Pain and Shortness of Breath     HPI   Gosia Alonzo is a 89 year old female with a history of CAD, CKD, CHF, HTN, hyperlipidemia, and type II diabetes mellitus who presents via EMS with substernal chest pain and shortness of breath. EMS reports that patient was seen in the ED about 1 month ago and was found to have a large right pleural effusion requiring a thoracentesis at this time.  She states it was 1L removed.  Estephania has been doing well since discharge until she developed chest pain going through to the back about one week ago. She then became short of breath during dinner time last night. She states that it is painful to breath and adds that the pain feels similar to the pain she experienced with her recent pleural effusion. She endorses mild leg swelling as well, more on right which is typical for her. Denies cough and abdominal pain. Estephania was tried 10 mg Norco for the pain, though this has not offered any relief. Patient says she was unable to sleep last night due to symptoms. She was given 234 mg Aspirin and 1 nitroglycerin en route by EMS with minimal pain relief.  Notes that her breathing sometimes feels worse at night.      Independent Historian:   EMS - They report history as above.    Review of External Notes: Past visit with thoracentesis and past imaging studies     ROS:  Review of Systems   Respiratory: Positive for shortness of breath. Negative for cough.    Cardiovascular: Positive for chest pain and leg swelling.   Gastrointestinal: Negative for abdominal pain.   Musculoskeletal: Positive for back pain.   All other systems reviewed and are negative.    Allergies:  Codeine  Escitalopram Oxalate  Esomeprazole Magnesium Trihydrate  Gabapentin  Imdur [Isosorbide]  Meperidine Hcl  Morphine Hcl  Oxycodone  Pentazocine  Propoxyphene Hcl  Sumatriptan Succinate     Medications:    Fosamax  Aspirin 81  mg  Lipitor  Coreg  Lasix  Atarax  Norco  Atarax  Imodium  Nitrostat  Protonix  Lyrica  Senokot     Past Medical History:    Migraines  Allergic rhinitis  Osteoporosis  Hiatal hernia  HTN  Hyperlipidemia   TSH deficiency  Type II diabetes mellitus   Anemia   CAD  CKD  Anxiety   GI bleed  STEMI  Ischemic cardiomyopathy   Osteoarthritis  Chronic systolic CHF  Paroxysmal atrial fibrillation   Herpes zoster  Chronic pain  Pleural effusion   Pericarditis  DVT  Pancreatic cyst  GERD  DJD of spine  PVD  Diverticula of colon  Infected R knee prosthesis  Infiltrating ductal carcinoma right breast  Obesity     Past Surgical History:    Rotator cuff repair/shoulder replacement, right  Shoulder revision, right  Appendectomy   Hysterectomy   Surgery for tendonitis of left elbow  TKA, right  Knee revision, right  Lumbar fusion x4  Mastectomy, right  Colonoscopy  Phacoemulsification with standard IOL implant  Angiogram x2  EGD x2    Family History:    Father: CAD  Mother: Pancreatic cancer  Brother: Colon cancer  Sister: HTN    Social History:  Arrives alone via EMS  Lives in an assisted living home  PCP: Michael Londono MD, Internal Medicine     Physical Exam     Patient Vitals for the past 24 hrs:   BP Temp Temp src Pulse Resp SpO2   02/13/23 1200 124/69 -- -- 90 17 98 %   02/13/23 1150 122/82 -- -- 92 18 98 %   02/13/23 1015 -- -- -- 85 14 98 %   02/13/23 0945 107/58 -- -- 90 14 96 %   02/13/23 0850 -- -- -- 75 12 97 %   02/13/23 0845 106/60 -- -- 84 20 --   02/13/23 0815 95/68 -- -- 84 12 --   02/13/23 0800 120/77 97.7  F (36.5  C) Oral 94 20 97 %        Physical Exam  Eyes:               Sclera white; Pupils are equal and round  ENT:                External ears and nares normal  CV:                  Rate as above with regular rhythm   Resp:               Crackles L base, diminished R base  GI:                   Mild high epigastric tenderness                          No rebound tenderness or peritoneal features  MS:                   Moves all extremities, R foot/ankle edema  Skin:                Warm and dry  Neuro:             Speech is normal and fluent. No apparent deficit.    Emergency Department Course   ECG  ECG taken at 0817, ECG read at 0823  Accelerated junctional rhythm vs small amplified p waves  Abnormal ECG  Atrial fibrillation with RVR has resolved as compared to 1/16/23  No significant change as compared to 9/28/22  Rate 85 bpm. CA interval * ms. QRS duration 74 ms. QT/QTc 370/440 ms. P-R-T axes * 1 37.     Imaging:  US Thoracentesis   Final Result   IMPRESSION: Technically successful right thoracentesis without   immediate complications.      LIA SANDERSON MD            SYSTEM ID:  [id number]      CT Chest Pulmonary Embolism w Contrast   Preliminary Result   IMPRESSION:   1.  No evidence for pulmonary embolism.   2.  Stable moderate right pleural fluid and small left pleural fluid   along with bibasilar atelectasis.   3.  Stable large hiatal hernia.   4.  Compression deformity with sclerosis appears somewhat increased at   T10 compared to 1/16/2023. There are other levels of the thoracic   vertebral body compression that appears similar to prior.   5.  Lobulated presumed cystic structure of the left upper quadrant   appears stable but remains indeterminant. This could be a cystic   neoplasm potentially arising from the pancreas tail. Recommend   nonurgent imaging workup with dedicated pancreas MRI.      XR Chest 2 Views   Final Result   IMPRESSION: Increased opacification along the left lung base since   1/16/2023. This may be airspace consolidation such as pneumonia versus   atelectasis. Stable appearing bilateral vascular and interstitial   prominence suggesting stable edema. Cardiomegaly appears slightly   larger. There may be trace bibasilar pleural fluid without convincing   change. Right shoulder arthroplasty. Subacute left humeral fracture   again noted.       STEWART LOPEZ MD            SYSTEM ID:  [id number]          Report per radiology    Laboratory:  Labs Ordered and Resulted from Time of ED Arrival to Time of ED Departure   BASIC METABOLIC PANEL - Abnormal       Result Value    Sodium 140      Potassium 4.0      Chloride 105      Carbon Dioxide (CO2) 25      Anion Gap 10      Urea Nitrogen 19.8      Creatinine 0.65      Calcium 8.4      Glucose 132 (*)     GFR Estimate 83     TROPONIN T, HIGH SENSITIVITY - Abnormal    Troponin T, High Sensitivity 23 (*)    HEPATIC FUNCTION PANEL - Abnormal    Protein Total 6.7      Albumin 2.9 (*)     Bilirubin Total 0.5      Alkaline Phosphatase 322 (*)     AST 44 (*)     ALT 15      Bilirubin Direct <0.20     CBC WITH PLATELETS AND DIFFERENTIAL - Abnormal    WBC Count 5.9      RBC Count 3.17 (*)     Hemoglobin 9.0 (*)     Hematocrit 29.0 (*)     MCV 92      MCH 28.4      MCHC 31.0 (*)     RDW 17.9 (*)     Platelet Count 244      % Neutrophils 39      % Lymphocytes 42      % Monocytes 14      % Eosinophils 4      % Basophils 1      % Immature Granulocytes 0      NRBCs per 100 WBC 0      Absolute Neutrophils 2.3      Absolute Lymphocytes 2.5      Absolute Monocytes 0.8      Absolute Eosinophils 0.2      Absolute Basophils 0.1      Absolute Immature Granulocytes 0.0      Absolute NRBCs 0.0     D DIMER QUANTITATIVE - Abnormal    D-Dimer Quantitative 3.14 (*)    TROPONIN T, HIGH SENSITIVITY - Abnormal    Troponin T, High Sensitivity 26 (*)    NT PROBNP INPATIENT - Normal    N terminal Pro BNP Inpatient 1,554     LIPASE - Normal    Lipase 28          Emergency Department Course & Assessments:       Interventions:  Medications   fentaNYL (PF) (SUBLIMAZE) injection 25 mcg (25 mcg Intravenous Given 2/13/23 0816)   0.9% sodium chloride BOLUS (0 mLs Intravenous Stopped 2/13/23 1140)   iopamidol (ISOVUE-370) solution 500 mL (51 mLs Intravenous Given 2/13/23 1126)   fentaNYL (PF) (SUBLIMAZE) injection 25 mcg (25 mcg Intravenous Given 2/13/23 1157)   lidocaine (viscous) (XYLOCAINE) 2 % 15 mL, alum &  mag hydroxide-simethicone (MAALOX) 15 mL GI Cocktail (30 mLs Oral Given 2/13/23 1230)   lidocaine (PF) (XYLOCAINE) 1 % injection 10 mL (10 mLs Subcutaneous Given by Other 2/13/23 1310)        Independent Interpretation (X-rays, CTs, rhythm strip):  EKG as above; CXR agree, CT agree     Social Determinants of Health affecting care:   None    Assessments:  0800 I obtained history and examined the patient as noted above.  1212 I rechecked the patient and explained findings to patient and her family at bedside. Patient states that she would prefer to go home after her thoracentesis, if possible.   1301 550 mL fluid removed during US thoracentesis    Disposition:  The patient was discharged to home.     Impression & Plan      Medical Decision Making:  Her initial exam and presentation are concerning for the possibility of heart failure related symptoms.  With her associated chest pain, cardiac etiologies were also considered.  EKG was obtained without evidence for acute dysrhythmia or ischemia.  Computer read it as an accelerated junctional rhythm but I think there are small P waves with and which would be consistent with sinus rhythm.  Initial troponin returned abnormal but similar to prior.  This was trended without significant change.  This is not consistent with ACS.  BNP is not as elevated as it has been in the past.  Chest x-ray shows pleural effusion but not as high of a volume as I would typically associate to be symptomatic.  Work-up was expanded and D-dimer was markedly elevated.  PE can also be a cause of pleuritic pain and shortness of breath.  CT scan did not support this finding.  Thoracentesis performed with improvement in symptoms.  Since she also had this done 1 month ago and has no infectious symptoms or findings, this was done therapeutic today was not sent for analysis.  It was straw-colored and I suspect likely related to her underlying heart failure diagnosis.  She is not showing signs of acute  decompensation and felt better after thoracentesis.  She would like to be discharged.  Family is with her and in agreement.  Follow-up with cardiology.  She was interested in trying an inhaler for occasional shortness of breath at home.  Although my clinical suspicion is it is more related to CHF complications, risk benefit of the inhaler is thought to be low risk and was prescribed.    Diagnosis:    ICD-10-CM    1. Recurrent right pleural effusion  J90     Thoracentesis today      2. Chest pain, unspecified type  R07.9       3. Shortness of breath  R06.02            Discharge Medications:  New Prescriptions    ALBUTEROL (PROAIR HFA/PROVENTIL HFA/VENTOLIN HFA) 108 (90 BASE) MCG/ACT INHALER    Inhale 2 puffs into the lungs every 6 hours as needed for shortness of breath, wheezing or cough      Scribe Disclosure:  Fouzia XAVIER, am serving as a scribe at 8:34 AM on 2/13/2023 to document services personally performed by Jennie Morrissey MD based on my observations and the provider's statements to me.   2/13/2023   Jennie Morrissey MD Gosen, Christine Leigh, MD  02/13/23 7000

## 2023-02-13 NOTE — PROGRESS NOTES
Right thoracentesis completed. Pt tolerated well, . No SOB reported. Obtained consent with Dr. Knox. Time out completed and documented in EMR prior to start of procedure. 550 mls fluid collected appears straw colored. Fluid not sent for testing as it was ordered therapeutic. Site covered with bandage.  Reviewed education and discharge instructions with pt.

## 2023-02-17 NOTE — TELEPHONE ENCOUNTER
Call to below number. Left vergal OK for requested orders.     The Home Care/Assisted Living/Nursing Facility is calling regarding an established patient.  Has the patient seen Home Care in the past or is currently residing in Assisted Living or Nursing Facility? Yes.     Home Care Visits Continuation    Any additional Orders:  Are there any orders requested, not stated above, that are outside of the standing order and must be routed to a licensed practitioner for approval?    No    Writer has verified [employer] will send fax to have orders signed.

## 2023-02-17 NOTE — TELEPHONE ENCOUNTER
[facility] calling for orders . SKILLED NURSING 2/week for 4 weeks, 1 week for 4 weeks and 3 PRN for wound care and symptoms management.    Annette [phone]

## 2023-02-18 NOTE — LETTER
Denise Buck, [facility] KRYSTLE   Inpatient Care Coordination   Supervisor  [facility]  [phone]

## 2023-02-19 PROBLEM — R06.02 SOB (SHORTNESS OF BREATH): Status: ACTIVE | Noted: 2023-01-01

## 2023-02-19 NOTE — PLAN OF CARE
Transferred to room 310, belongings sent along, family at the bedside. Family concerned that pt would not be able to discharge back to assisted living after 1600, reminded that she is being admitted and will spend the night in the hospital. Pt still waiting for cardiologist and pulmonologist. Will keep monitoring.
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
## 2023-02-19 NOTE — ED TRIAGE NOTES
Pt lives at assisted living. C/O CP since this morning. As day progressed pt c/o SOB. BLE edema. Denies fevers. Pt reports recent admission with thoracentesis due to large pleural effusion. ABC in tact. A/OX4

## 2023-02-19 NOTE — CONSULTS
GI  Consult dictated- Hx pancreatic IPMN. MRCP reviewed. This does not appear at all related to SOB/CP/ No intervention in this 89yo. Discussed with pt/son.Robi S/O    Des Wise MD  [facility]   Office: [phone]   Pager: [phone]

## 2023-02-19 NOTE — PROGRESS NOTES
Patient Transfer Information  Patient connected to monitoring equipment on arrival: Yes     Patient connected to wall oxygen on arrival: N/A    Belongings: Transferred with patient- blue bag and walker    Safety check completed: Yes
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
## 2023-02-19 NOTE — ED NOTES
"[facility]  ED Nurse Handoff Report    Gosia Alonzo is a 89 year old female   ED Chief complaint: Chest Pain  . ED Diagnosis:   Final diagnoses:   None     Allergies:   Allergies   Allergen Reactions     Codeine      GI UPSET     Escitalopram Oxalate      fatigue     Esomeprazole Magnesium Trihydrate      HA     Gabapentin Dizziness     Dizziness, confusion     Imdur [Isosorbide]      Headache       Meperidine Hcl      N/V     Morphine Hcl      HIVES     Oxycodone      (percodan) GI UPSET     Pentazocine      (talwin)  HALLUCINATIONS     Propoxyphene Hcl      STOMACH UPSET     Sumatriptan Succinate      chest pain       Code Status: Full Code  Activity level - Baseline/Home:  Independent. Activity Level - Current:   Stand by Assist. Lift room needed: No. Bariatric: No   Needed: No   Isolation: No. Infection: Not Applicable.     Vital Signs:   Vitals:    02/18/23 2323 02/18/23 2324 02/18/23 2325 02/18/23 2326   BP:       Pulse:       Resp:       Temp:       TempSrc:       SpO2: 100% 99% 93% 93%   Weight:           Cardiac Rhythm:  ,      Pain level:    Patient confused: No. Patient Falls Risk: Yes.   Elimination Status: Has voided pure wic  Patient Report - Initial Complaint: CP. Focused Assessment: Gosia Alonzo is a 89 year old female with a history of CAD, diabetes (type II), heart attacks, and pleural effusion who presents with chest pain. Patient reports the onset of her pain discomfort started this morning. She reports \"hurting all over\" and like theres a \"knife in the chest\". She states having extreme shortness of breath, paresthesia in both her arms, and sharp chest pain. She notes feeling like vomiting but can't, along with feeling full but hasn't eaten anything. Patient does report having normal bowl and urinary movement.      Patient notes that she was in the hospital 2 weeks ago for right pleural effusion. She also notes having a history of 2 heart attacks and a stent " placement.       Independent Historian:   Patient         ROS:  Review of Systems   Respiratory: Positive for chest tightness and shortness of breath.    Cardiovascular: Positive for chest pain.   Neurological:        (+) Paresthesia   All other systems reviewed and are negative.        2329  Gastrointestinal Gastrointestinal  JJ    Gastrointestinal Gastrointestinal WDL: .WDL except  (nausea and dry heaving)            2328  Cardiac  Cardiac  JJ    Cardiac (Adult) Cardiac WDL: .WDL except  (CP and SOB. hx pleurl effusion. Pain worsening throughout today.)            2327  Respiratory  Respiratory  JJ    Respiratory (Adult) Airway WDL: .WDL except  (SOB without cough or URI sx.)         Tests Performed: labs, imaging. Abnormal Results:   Labs Ordered and Resulted from Time of ED Arrival to Time of ED Departure   BASIC METABOLIC PANEL - Abnormal       Result Value    Sodium 137      Potassium 4.1      Chloride 103      Carbon Dioxide (CO2) 23      Anion Gap 11      Urea Nitrogen 22.4      Creatinine 0.74      Calcium 7.9 (*)     Glucose 128 (*)     GFR Estimate 76     CBC WITH PLATELETS AND DIFFERENTIAL - Abnormal    WBC Count 6.5      RBC Count 3.26 (*)     Hemoglobin 9.4 (*)     Hematocrit 30.4 (*)     MCV 93      MCH 28.8      MCHC 30.9 (*)     RDW 18.7 (*)     Platelet Count 300      % Neutrophils 49      % Lymphocytes 32      % Monocytes 12      % Eosinophils 5      % Basophils 2      % Immature Granulocytes 0      NRBCs per 100 WBC 0      Absolute Neutrophils 3.2      Absolute Lymphocytes 2.0      Absolute Monocytes 0.8      Absolute Eosinophils 0.3      Absolute Basophils 0.1      Absolute Immature Granulocytes 0.0      Absolute NRBCs 0.0     TROPONIN T, HIGH SENSITIVITY   NT PROBNP INPATIENT     Chest XR,  PA & LAT   Final Result   IMPRESSION: The heart is enlarged. There is central pulmonary venous congestion with bibasilar interstitial edema. A small right-sided pleural effusion is again noted.         .    Treatments provided: frequent VS zofram  Family Comments: sons at bedside  OBS brochure/video discussed/provided to patient:  No  ED Medications:   Medications   ondansetron (ZOFRAN) injection 4 mg (has no administration in time range)     Drips infusing:  No  For the majority of the shift, the patient's behavior Green. Interventions performed were na.    Sepsis treatment initiated: No     Patient tested for COVID 19 prior to admission: NO    ED Nurse Name/Phone Number: Amara Perry RN,   11:29 PM    RECEIVING UNIT ED HANDOFF REVIEW    Above ED Nurse Handoff Report was reviewed: Yes  Reviewed by: Rosalee Cisneros RN on February 19, 2023 at 3:32 PM

## 2023-02-19 NOTE — UTILIZATION REVIEW
"  Admission Status; Secondary Review Determination         Under the authority of the Utilization Management Committee, the utilization review process indicated a secondary review on the above patient.  The review outcome is based on review of the medical records, discussions with staff, and applying clinical experience noted on the date of the review.          (x) Observation Status Appropriate - This patient does not meet hospital inpatient criteria and is placed in observation status. If this patient's primary payer is Medicare and was admitted as an inpatient, Condition Code 44 should be used and patient status changed to \"observation\".     RATIONALE FOR DETERMINATION     89 year old female with pmh of CAD, DMII, recurrent cp and pleural effusions (inflammatory per cytology) who presents with cp, sob, and paresthesias of both arms. Initially an observation order was placed at 0106 AM this morning and then inpatient placed at 0700 per the admitting team. GI consulted and did not think it was related to any underlying pancreatic issues. Pulmonary consultation is pending. SHe is not receiving any IV antibiotics. One dose of IV lasix but this is not continued. She is not hypoxemic.    Given hospital day 0, would change to observation status for now, would consider inpatint pending further evaluation, workup, and elucidation of hospital needs.            The severity of illness, intensity of service provided, expected LOS and risk for adverse outcome make the care appropriate for further observation; however, doesn't meet criteria for hospital inpatient admission. Dr Bhatti notified of this determination.    This document was produced using voice recognition software.      The information on this document is developed by the utilization review team in order for the business office to ensure compliance.  This only denotes the appropriateness of proper admission status and does not reflect the quality of care rendered.   "       The definitions of Inpatient Status and Observation Status used in making the determination above are those provided in the CMS Coverage Manual, Chapter 1 and Chapter 6, section 70.4.      Sincerely,     Delbert Guerrero DO   Physician Advisor  Utilization Management  [facility].

## 2023-02-19 NOTE — PHARMACY-ADMISSION MEDICATION HISTORY
Admission medication history interview status for this patient is complete. See [facility] admission navigator for allergy information, prior to admission medications and immunization status.     Medication history interview done, indicate source(s): N/A  Medication history resources (including written lists, pill bottles, clinic record): [facility]  Pharmacy: [id number] - [place], [place] - [address] [phone]    Changes made to PTA medication list:  Added: Facility PRNs     Actions taken by pharmacist (provider contacted, etc):None     Additional medication history information:None    Medication reconciliation/reorder completed by provider prior to medication history?  No      Prior to Admission medications    Medication Sig Last Dose Taking? Auth Provider Long Term End Date   acetaminophen (TYLENOL) 500 MG tablet Take 500 mg by mouth 3 times daily 2/18/2023 at 1952 Yes Unknown, Entered By History     alendronate (FOSAMAX) 70 MG tablet Take 1 tablet (70 mg) by mouth every 7 days Past Week Yes Eddie Mora MD Yes    aspirin EC 81 MG EC tablet Take 1 tablet (81 mg) by mouth daily 2/18/2023 at AM Yes Radha Umanzor PA-C No    atorvastatin (LIPITOR) 40 MG tablet Take 1 tablet (40 mg) by mouth every evening 2/18/2023 at 1952 Yes Michael Londono MD Yes    bisacodyl (DULCOLAX) 5 MG EC tablet Take 5 mg by mouth daily as needed for constipation  Yes Unknown, Entered By History     calcium carbonate (TUMS) 500 MG chewable tablet Take 2 chew tab by mouth as needed for heartburn Max 15 tabs/day, separate from other drugs by 1 hr  Yes Unknown, Entered By History     carvedilol (COREG) 3.125 MG tablet Take 1 tablet (3.125 mg) by mouth 2 times daily (with meals) 2/18/2023 at 1606 Yes Hiren Collins MD Yes    diclofenac (VOLTAREN) 1 % topical gel Apply 2 g topically 3 times daily 2/18/2023 at 1952 Yes Reported, Patient     fluticasone (FLONASE) 50 MCG/ACT  nasal spray Spray 2 sprays into both nostrils daily as needed for rhinitis or allergies  Yes Reported, Patient     furosemide (LASIX) 20 MG tablet Take 2 tablets (40 mg) by mouth daily Add additional 20mg for increased edema 2/18/2023 at AM Yes Eddie Mora MD Yes    guaiFENesin (ROBITUSSIN) 20 mg/mL liquid Take 10 mLs by mouth every 4 hours as needed for cough  Yes Unknown, Entered By History     HYDROcodone-acetaminophen (NORCO) 5-325 MG tablet Take 1 tablet by mouth 4 times daily as needed for severe pain (7-10) Begin substantial taper ASAP to previous chronic directions of one tablet BID prn.  Patient taking differently: Take 2 tablets by mouth every 4 hours as needed for pain 2/18/2023 at 1738 Yes Robin Andrade MD     hydrocortisone (CORTAID) 1 % external cream Apply topically 4 times daily as needed for itching or rash  Yes Unknown, Entered By History     hydrOXYzine (ATARAX) 25 MG tablet Take 1 tablet (25 mg) by mouth 4 times daily as needed  Yes Kenia Parra APRN CNP     Hyprom-Naphaz-Polysorb-Zn Sulf (CLEAR EYES COMPLETE) SOLN Apply 2 drops to eye every 4 hours as needed  Yes Reported, Patient     Lidocaine (LIDOCARE) 4 % Patch Place 2 patches onto the skin every 24 hours To prevent lidocaine toxicity, patient should be patch free for 12 hrs daily. 2/18/2023 at 0755 - removed PTA Yes Kenia Parra APRN CNP     loperamide (IMODIUM) 2 MG capsule Take 2 mg by mouth as needed for diarrhea  Yes Unknown, Entered By History     loratadine (CLARITIN) 10 MG tablet Take 10 mg by mouth daily as needed for allergies  Yes Unknown, Entered By History     magnesium hydroxide (MILK OF MAGNESIA) 400 MG/5ML suspension Take 30 mLs by mouth daily as needed for constipation  Yes Unknown, Entered By History     multivitamin, therapeutic (THERA-VIT) TABS tablet Take 1 tablet by mouth daily  Yes Unknown, Entered By History     nitroGLYcerin (NITROSTAT) 0.4 MG sublingual tablet For chest pain place 1  tablet under the tongue every 5 minutes for 3 doses. If symptoms persist 5 minutes after 1st dose call 911.  Yes Hiren Collins MD Yes    pantoprazole (PROTONIX) 40 MG EC tablet TAKE 1 TABLET BY MOUTH TWICE DAILY 2/18/2023 at 1606 Yes Michael Londono MD     polyethylene glycol (MIRALAX) 17 GM/Dose powder Take 17 g by mouth daily  Yes Eddie Mora MD     pregabalin (LYRICA) 25 MG capsule Take 1 capsule (25 mg) by mouth 2 times daily 2/18/2023 at 1952 Yes Kenia Parra APRN CNP Yes    senna-docusate (SENOKOT-S/PERICOLACE) 8.6-50 MG tablet Take 1 tablet by mouth 2 times daily as needed for constipation  Yes Eddie Mora MD     Trolamine Salicylate (ASPERCREME EX) Externally apply topically daily as needed  Yes Unknown, Entered By History     albuterol (PROAIR HFA/PROVENTIL HFA/VENTOLIN HFA) 108 (90 Base) MCG/ACT inhaler Inhale 2 puffs into the lungs every 6 hours as needed for shortness of breath, wheezing or cough   Jennie Morrissey MD Yes

## 2023-02-19 NOTE — CONSULTS
Consult Date: 02/19/2023    HISTORY OF PRESENT ILLNESS:  Ms. Alonzo is a 89-year-old woman with a history of recurrent chest pain and effusions, pancreatic cystic disease, coronary artery disease and congestive heart failure, who we are asked to see because of abdominal pain and abnormal imaging.    By history, she has had known pancreatic cystic disease.  She has been followed through our organization with imaging in the past.    She, however, at this time, was admitted to the hospital after developing increasing shortness of breath and chest discomfort.  It came on suddenly and with this, she became increasingly short of breath.  Her symptoms were such that she presented to the ER today where she was found to have evidence of pleural effusions, hiatal hernia and with the above, she is admitted.    She has had no jaundice or dark urine.  She denies any abdominal pain.  With a concern, she is seen at this time.    Her past history is reviewed.    PAST SURGICAL HISTORY:  Include appendectomy, back surgery, breast surgery, ocular surgery, appendectomy, hysterectomy and several orthopedic procedures.    PAST MEDICAL HISTORY:  Medically, she has been treated for arthritis, coronary artery disease with stenting, renal insufficiency, diabetes, diverticulosis, hiatal hernia and reflux, hypertension, migraines, pericarditis.    SOCIAL HISTORY:  The patient is not a smoker or drinker.  She is seen today with her son.    FAMILY HISTORY:  Shows no pancreatic disease.    PHYSICAL EXAMINATION:    GENERAL:  She is a pleasant woman in no acute distress, slightly hard of hearing with stable vital signs.  She does not have jaundice or pallor.  NECK:  No adenopathy in the neck.  Carotids are slightly diminished bilaterally.  Thyroid is not palpable.  LUNGS:  Show diminished breath sounds at the bases, question right more than left.  HEART:  S1, S2 appear normal.  ABDOMEN:  Flat, soft.  There is no localized tenderness or fullness.   She does not have swelling.  NEUROLOGIC:  She is alert and oriented to person, place and time.    LABORATORY DATA:  From today shows a white count of 5.7, hemoglobin 8.9, platelet count is 300.  MCV is 93.  Her ALT and AST are normal.  Alkaline phosphatase is 285.  Her bilirubin is normal at 0.4. Associated with this, her electrolytes are intact.    I did review her chest x-ray showing moderate effusions bilaterally.  MRCP is reviewed showing what appears to be multiple small IPMNs.  There does not appear to be any evidence to suggest obstruction in the biliary system.    IMPRESSION:    1.  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  2.  Hiatal hernia.  3.  Shortness of breath and chest pain.    This patient is admitted with the above findings.  At this time, we are asked to see her because of the prior history of IPMNs.  I do not believe that these are related to the patient's symptoms.  Certainly, some of the discomfort may be related to her hiatal hernia and she can be treated empirically with proton pump inhibitors.    The MRCP does a nice job in terms of defining her pancreatic process without more evidence to suggest a malignancy or worsening of the problem, no intervention would be in order in this 89-year-old.    Rather would suggest that attention be directed towards her chest discomfort, the recurrent pleural effusions and treatment of her underlying congestive heart failure, ejection fraction of 35-40 has been noted.    We will not follow Ms. Ely, but would be happy to see her if there are recurrent issues to this, and these findings were discussed in detail with Ganesh and with her son today.  We appreciate assisting in this patient's care.    Des Wise MD        D: 2023   T: 2023   MT: LWMT    Name:     MILAGRO ELY  MRN:      [id number]        Account:      [id number]   :      1932           Consult Date: 2023     Document: [id number]    cc:  Des Wise MD

## 2023-02-20 NOTE — PROGRESS NOTES
Hospitalist Medicine Progress Note   [facility]       Gosia Harvey a 89-year-old lady was admitted 2/18/2023 with past history of CAD CHF with systolic dysfunction and LVEF 35 to 40%, T2DM, recurrent chest pain with pleural effusions since 9/20/2022, shortness of breath which is significant, paresthesias of both hands, dry heaving, generalized fullness of the stomach.  Chest x-ray showed cardiac enlargement with pulmonary vascular congestion and bibasilar interstitial edema and small right-sided pleural effusion.  BNP was elevated at 2347 hemoglobin was 9.4 creatinine 0.74 sodium 137 potassium 4.1  She was diagnosed with chest pain with recurrent pleural effusions and  was evaluated by pulmonary medicine who thought that there was no indication for thoracentesis at this time.        Date of Admission:  2/18/2023  Assessment & Plan     Acute exacerbation of systolic congestive heart failure with LVEF of 35-40%  She came in with shortness of breath  Was started on furosemide with decrease in weight from 115 to 107.9 Lb in weight with which she feels better   Will do strict input output charting  Daily weight  2 g salt low-salt diet  BMP in a.m  She still has lower Chest discomfort continiously which is present for many days and only gets better with Narcotics     CAD  Continue ASA and Statins     Pancreatic tail rule out mass and renal mass  Hx of right mastectomy with implant  No intervention by Gastroenterology   BUT patient has increased CA-19-9 which will be discussed again with GI   Will check CMP with increased Alkaline Phosphatase follow up with abdominal pain and thickened Gall Bladder wall seen on MRCP     T2DM  Diet controlled     L1, L4, and multiple thoracic spine compression fractures, subsequent encounter,  Age-related osteoporosis with current pathologic fractures  -- continue lyrica              Plan:   Check US abdomen   CMP in am - will follow up the elevated Alkaline  Phosphatase   POSITIVE CA-19-9 - Discuss again with GI   Change Lasix to PO in am from IV       Diet: 2 Gram Sodium Diet     Manzano Catheter: Not present  Code Status: No CPR- Pre-arrest intubation OK               The patient's care was discussed with the Patient      Lyndon Bhatti MD  Hospitalist Service  [employer]    ______________________________________________________________________    Interval History     Symptoms   Abdominal pain is 7/10 in the Lower Chest and very upper abdomen      Review of Systems:   Breathing is better     Data reviewed today: I reviewed all medications, new labs and imaging results over the last 24 hours.     Physical Exam   Vital Signs: Temp: 98.3  F (36.8  C) Temp src: Oral BP: 101/65 Pulse: 88   Resp: 20 SpO2: 99 % O2 Device: None (Room air)    Weight: 107 lbs 14.4 oz      GENERAL: Patient is not in acute distress  HEENT: EOM+,Conjunctiva is clear   NECK:  no Jugular Venous distention  HEART: S1 S2 regular Rate and Rhythm, there is  no murmur,   LUNGS: Respirations are  not laboured, Lungs are  clear to auscultation without Crepitations or Wheezing   ABDOMEN: Soft , there is no tenderness ,Bowel Sounds are  Positive   LOWER LIMBS: no  Pedal Edema  Bilaterally   CNS:  Alert,  Oriented x 3, Moving all the Four Limbs     Data   Recent Labs   Lab 02/20/23  0613 02/19/23  0704 02/18/23  2303   WBC  --  5.7 6.5   HGB  --  8.9* 9.4*   MCV  --  93 93   PLT  --  284 300    138 137   POTASSIUM 4.5 4.5 4.1   CHLORIDE 105 105 103   CO2 26 26 23   BUN 28.0* 24.3* 22.4   CR 0.91 0.85 0.74   ANIONGAP 7 7 11   LIGIA 8.0* 8.3 7.9*   * 110* 128*   ALBUMIN  --  2.6*  --    PROTTOTAL  --  6.6  --    BILITOTAL  --  0.4  --    ALKPHOS  --  285*  --    ALT  --  13  --    AST  --  33  --          No results found for this or any previous visit (from the past 24 hour(s)).

## 2023-02-20 NOTE — UTILIZATION REVIEW
[facility] Utilization Review  Admission Status; Secondary Review Determination     Admission Date: 2/18/2023  9:06 PM      Under the authority of the Utilization Management Committee, the utilization review process indicated a secondary review on the above patient.  The review outcome is based on review of the medical records, discussions with staff, and applying clinical experience noted on the date of the review.        (X)      Inpatient Status Appropriate - This patient's medical care is consistent with medical management for inpatient care and reasonable inpatient medical practice.          RATIONALE FOR DETERMINATION   89 yo F with history of coronary artery disease, diabetes mellitus, recurrent chest pain with pleural effusions, admitted with chest pain, shortness of breath, paresthesias, nausea.  Status post thoracentesis last fall earlier this evening, cytology has been negative in the past.  Patient also mass in the pancreatic tail and renal, MRCP done, and no evidence of malignancy seen, GI team recommend no further intervention.  Patient was switched from inpatient to observation on 2/19, patient started on IV Lasix twice a day receiving multiple doses of IV Dilaudid with oral Norco for pain control.  Complex patient who has a history of recurrent pleural effusions, admitted with chest pain and shortness of breath, now found to have systolic CHF exacerbation which is symptomatic, started on IV Lasix twice daily with strict intake and output and daily weights, following creatinine and potassium levels closely, also needs aggressive pain control with IV and oral narcotics, with anticipated length of stay more than 2 midnight, recommend change to inpatient status, communicated to       The severity of illness, intensity of service provided, expected LOS and risk for adverse outcome make the care complex, high risk and appropriate for hospital admission.The patient requires hospital based  medical care which is anticipated to require a stay of 2 or more midnights; according to CMS guidelines the patient should be admitted as inpatient        The information on this document is developed by the utilization review team in order for the business office to ensure compliance.  This only denotes the appropriateness of proper admission status and does not reflect the quality of care rendered.         The definitions of Inpatient Status and Observation Status used in making the determination above are those provided in the CMS Coverage Manual, Chapter 1 and Chapter 6, section 70.4.      Sincerely,       Harriet Tate MD  Physician Advisor  Utilization Review-[place]    Phone: [phone]

## 2023-02-20 NOTE — PLAN OF CARE
Goal Outcome Evaluation:      VS stable. + 2 edema to left foot. +3 edema to right foot. Complained of pain to left shoulder and chest and pain med's given. Slept well throughout the night.
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
## 2023-02-20 NOTE — PLAN OF CARE
Goal Outcome Evaluation:    Vitals: Temp: 98.3  F (36.8  C) Temp src: Oral BP: 101/65 Pulse: 88   Resp: 20 SpO2: 99 % O2 Device: None (Room air)    Pain: PRN Norco and dilaudid given; see MAR for full med list  Neuro: AO4; anxious, PRN atarax given  Respiratory: LS dim, reports SOB frequently, O2 stable on ra.   Cardiac/tele: WDL, denies Cardiac pain  GI/: WDL  Skin: Red/xena BLE, scattered bruising and scabs  LDAs: PIV x2; both SL  Diet: 2gm Na  Activity: A1 with gaitbelt and walker  Plan: Pulm consulted. Safety maintained. Discharge TBD.

## 2023-02-20 NOTE — PROGRESS NOTES
Care Management Follow Up    Length of Stay (days): 1    Expected Discharge Date: 02/20/2023       Patient plan of care discussed at interdisciplinary rounds: Yes    Anticipated Discharge Disposition:  TBD     Anticipated Discharge Services:  TBD  Anticipated Discharge DME:  N/A    Additional Information:  SW met with pt at bedside and completed MOON. Pt stated she came from [facility] a third time within the last number of weeks. Pt states she has pain in her chest and shortness of breathe. Pt states she is receiving SN from [facility] and has support from her four kids (1 daughter and 3 sons). Pt states she wants to get the fluid removed from her chest and go back to [facility]. Pt stated at discharge transportation would be provided by daughter- Seema or son-Bala.      SHAE Padilla, [facility]   Care Management

## 2023-02-20 NOTE — PROGRESS NOTES
[facility]  Patient is currently open to home care services with [facility]. The patient is currently receiving SN services.  [facility]  and team have been notified of patient admission.  [facility] liaison will continue to follow patient during stay.

## 2023-02-20 NOTE — PLAN OF CARE
/86 (BP Location: Right arm)   Pulse 88   Temp 97.7  F (36.5  C) (Oral)   Resp 18   Wt 52.2 kg (115 lb)   LMP  (LMP Unknown)   SpO2 93%   BMI 19.14 kg/m      Neuro: a/o x4  Cardiac: WNL  Lungs: WNL  GI: WNL  : purewick  Pain: 7/10- norco given with some relief  IV: saline locked x2  Meds: norco, tylenol  Labs/tests: xray- R pleural effusion noted again  Diet: reg  Activity: assist x1/gb/walker  Misc: pulmonology consult. Mepalex on coccyx/R shin. Edema in feet  Plan: Will continue to monitor and provide cares.

## 2023-02-21 NOTE — PROGRESS NOTES
"CLINICAL NUTRITION SERVICES - ASSESSMENT NOTE     Nutrition Prescription      Malnutrition Status:    % Intake: < 75% for > 7 days (moderate)  % Weight Loss: > 5% in 1 month (severe)  Subcutaneous Fat Loss: Facial region:  Moderate, diffuse global loss  Muscle Loss: Thoracic region (clavicle, acromium bone, deltoid, trapezius, pectoral):  severe, Upper arm (bicep, tricep):  severe, Lower arm  (forearm):  severe, Dorsal hand:  severe and Upper leg (quadricep, hamstring):  severe  Fluid Accumulation/Edema: None noted  Malnutrition Diagnosis: Severe malnutrition in the context of acute on chronic illness    Recommendations already ordered by Registered Dietitian (RD):  Medical food supplement therapy- Ensure Enlive daily between meals (patient likely discharging today, but will order anyway per patient request)       REASON FOR ASSESSMENT  Gosia Alonzo is a/an 89 year old female assessed by the dietitian for MST score of 2: positive  for weight loss of 2-13 lbs and positive  for poor oral intake related to decreased appetite    Pt admitted d/t recurrent pleural effusions- Per pulm, no indication for thoracentesis at this time. PMH significant for CAD, CHF with systolic dysfunction and LVEF 35 to 40%, T2DM    NUTRITION HISTORY  - Information obtained from patient. Patient resides at an [facility] where she receives 3 meals per day. She typically likes what is served at her facility, but not always. She reports a decreased appetite for the past few days. She takes chocolate Boost at home.  - Allergies: NKFA    CURRENT NUTRITION ORDERS  Diet: 2 g Sodium  Intake/Tolerance: %    LABS  BUN 28    MEDICATIONS  - Lipitor  - Lasix  - Pantoprazole    ANTHROPOMETRICS  Height: 165.1 cm (5' 5\")  Most Recent Weight: 49.1 kg (108 lb 3.2 oz)    IBW: 56.8 kg  Body mass index is 18.01 kg/m .  BMI: Underweight BMI <18.5  Weight History: patient report having gotten her weight up to 115 lbs. She reports she was last weighed a few " weeks ago, which is consistent with her recent weight records- based on records here, patient has lost 7.3% bodyweight over 5 weeks  Wt Readings from Last 20 Encounters:   02/21/23 49.1 kg (108 lb 3.2 oz)   01/16/23 53 kg (116 lb 12.8 oz)   12/06/22 51.3 kg (113 lb)   11/25/22 51.3 kg (113 lb)   11/23/22 51.3 kg (113 lb 3.2 oz)   11/16/22 51.5 kg (113 lb 9.6 oz)   11/08/22 52.1 kg (114 lb 12.8 oz)   11/02/22 49.4 kg (109 lb)   10/25/22 53.5 kg (117 lb 14.4 oz)   10/18/22 53.5 kg (117 lb 14.4 oz)   10/13/22 53.4 kg (117 lb 12.8 oz)   10/11/22 53.5 kg (117 lb 14.4 oz)   10/07/22 53.5 kg (117 lb 14.4 oz)   10/04/22 72.2 kg (159 lb 1.6 oz)   09/22/22 51 kg (112 lb 6.4 oz)   09/13/22 52.1 kg (114 lb 12.8 oz)   08/02/22 45.8 kg (101 lb)   07/31/22 45.4 kg (100 lb)   07/28/22 52.7 kg (116 lb 1.6 oz)   07/25/22 52.6 kg (116 lb)   12/09/21 49 kg (108 lb)    Dosing Weight: 49.1 kg    ASSESSED NUTRITION NEEDS  Estimated Energy Needs: 5285-9615+ kcals/day (30 - 35+ kcals/kg )  Justification: Underweight, repletion  Estimated Protein Needs: 59-74+ grams protein/day (1.2 - 1.5+ grams of pro/kg)  Justification: Repletion  Estimated Fluid Needs: Per provider pending fluid status    PHYSICAL FINDINGS  See malnutrition section below.    MALNUTRITION  As noted above    NUTRITION DIAGNOSIS  Inadequate oral intake related to decreased appetite in the setting of increased SOB with a few recurrences lately as evidenced by >5% weight loss over 1 month, reported decreased intake, muscle and fat loss      INTERVENTIONS  Implementation  Nutrition Education: Introduced self and role, discussed PO PTA and since admission, discussed supplements available. Encouraged adequate intake of food and fluids.       Medical food supplement therapy- Ensure Enlive daily between meals (patient likely discharging today, but will order anyway per patient request)    Goals  Patient to consume % of nutritionally adequate meal trays TID, or the equivalent  with supplements/snacks.     Monitoring/Evaluation  Progress toward goals will be monitored and evaluated per protocol.  Jennie Herbert RD, LD, [facility]  Pager - 3rd floor/ICU: [phone]  Pager - All other floors: [phone]  Pager - Weekend/holiday: [phone]  Office: [phone]

## 2023-02-21 NOTE — PROGRESS NOTES
Hospitalist Medicine Progress Note   [facility]       Gosia Harvey a 89-year-old lady was admitted 2/18/2023 with past history of CAD CHF with systolic dysfunction and LVEF 35 to 40%, T2DM, recurrent chest pain with pleural effusions since 9/20/2022, shortness of breath which is significant, paresthesias of both hands, dry heaving, generalized fullness of the stomach.  Chest x-ray showed cardiac enlargement with pulmonary vascular congestion and bibasilar interstitial edema and small right-sided pleural effusion.  BNP was elevated at 2347 hemoglobin was 9.4 creatinine 0.74 sodium 137 potassium 4.1  She was diagnosed with chest pain with recurrent pleural effusions and  was evaluated by pulmonary medicine who thought that there was no indication for thoracentesis at this time.        Date of Admission:  2/18/2023  Assessment & Plan     Acute exacerbation of systolic congestive heart failure with LVEF of 35-40%  She came in with shortness of breath  Was started on furosemide with decrease in weight from 115 to 107.9 Lb in weight with which she feels better   Will do strict input output charting  Daily weight  2 g salt low-salt diet      Biliary colic  Cholelithiasis and possible cholecystitis  She still has lower Chest discomfort continiously which is present for many days and only gets better with Narcotics   Alkaline phosphatase is elevated  We will consult surgery.      CAD  Continue ASA and Statins     Pancreatic tail rule out mass and renal mass  Hx of right mastectomy with implant  No intervention by Gastroenterology   BUT patient has increased CA-19-9 which will be discussed again with GI   Will check CMP with increased Alkaline Phosphatase follow up with abdominal pain and thickened Gall Bladder wall seen on MRCP     T2DM  Diet controlled     L1, L4, and multiple thoracic spine compression fractures, subsequent encounter,  Age-related osteoporosis with current pathologic  fractures  -- continue lyrica              Plan:   Consult general surgery regarding biliary colic management  CMP in a.m.  Will check INR ?  Was elevated not on warfarin    Had a long discussion with the patient and her daughter Seema.  Explained in detail that the patient probably has 3 issues going on including congestive heart failure, biliary colic, probable pancreatic cancer with positive CA 19-9 with pancreatic masses which need outpatient evaluation and treatment      Diet: 2 Gram Sodium Diet     Manzano Catheter: Not present  Code Status: No CPR- Pre-arrest intubation OK               The patient's care was discussed with the Patient and her daughter Seema    Lyndon Bhatti MD  Hospitalist Service  [facility]    ______________________________________________________________________    Interval History     Symptoms   Lower chest pain is is relatively less at this time  Patient denies having any abdominal pain but there is pain radiating into her back      Review of Systems:   Breathing is better     Data reviewed today: I reviewed all medications, new labs and imaging results over the last 24 hours.     Physical Exam   Vital Signs: Temp: 97.5  F (36.4  C) Temp src: Axillary BP: 134/77 Pulse: 86   Resp: 22 SpO2: 98 % O2 Device: None (Room air)    Weight: 108 lbs 3.2 oz      GENERAL: Patient is not in acute distress  HEENT: EOM+,Conjunctiva is clear   NECK:  no Jugular Venous distention  HEART: S1 S2 regular Rate and Rhythm, there is  no murmur,   LUNGS: Respirations are  not laboured, Lungs are  clear to auscultation without Crepitations or Wheezing   ABDOMEN: Soft , there is no tenderness ,Bowel Sounds are  Positive   LOWER LIMBS: no  Pedal Edema  Bilaterally   CNS:  Alert,  Oriented x 3, Moving all the Four Limbs     Data   Recent Labs   Lab 02/21/23  0620 02/20/23  0613 02/19/23  0704 02/18/23  2303   WBC  --   --  5.7 6.5   HGB  --   --  8.9* 9.4*   MCV  --   --  93 93   PLT  --   --   284 300    138 138 137   POTASSIUM 4.3 4.5 4.5 4.1   CHLORIDE 102 105 105 103   CO2 25 26 26 23   BUN 26.5* 28.0* 24.3* 22.4   CR 0.73 0.91 0.85 0.74   ANIONGAP 10 7 7 11   LIGIA 7.9* 8.0* 8.3 7.9*   * 110* 110* 128*   ALBUMIN 2.7*  --  2.6*  --    PROTTOTAL 6.8  --  6.6  --    BILITOTAL 0.3  --  0.4  --    ALKPHOS 320*  --  285*  --    ALT 14  --  13  --    AST 40*  --  33  --          Recent Results (from the past 24 hour(s))   US Abdomen Limited    Narrative    ULTRASOUND ABDOMEN LIMITED February 21, 2023 7:13 AM     HISTORY: Right upper quadrant ultrasound with lower chest pain.    COMPARISON: Abdominal MRI on 2/19/2023.    FINDINGS:    Gallbladder: Cholelithiasis and gallbladder sludge. Gallbladder wall  thickening measuring 6 mm. Sonographic Licona's sign is negative.    Bile ducts: CHD is normal diameter. No intrahepatic biliary  dilatation. The distal aspect of the common bile duct is obscured by  overlying bowel gas.    Liver: It demonstrates normal parenchymal echogenicity. No focal  hepatic mass is visualized.    Pancreas: Partially obscured by overlying bowel gas, but grossly  unremarkable.     Right kidney: No hydronephrosis or shadowing calculi.    Aorta and IVC: Not specifically assessed.     Small volume ascites.      Impression    IMPRESSION: Cholelithiasis/gallbladder sludge with mild gallbladder  wall thickening. No other sonographic evidence of acute cholecystitis.    THOMAS ARTEAGA MD         SYSTEM ID:  [id number]

## 2023-02-21 NOTE — DISCHARGE SUMMARY
[facility]  Hospitalist Discharge Summary      Date of Admission:  2/18/2023  Date of Discharge:  2/21/2023  Discharging Provider: Lyndon Bhatti MD  Discharge Service: Hospitalist Service    Discharge Diagnoses   Acute exacerbation of systolic congestive heart failure with LVEF of 35-40%  Cholelithiasis  Coronary artery disease  Pancreatic mass?  Pancreatic cancer -pancreatic IPMN  T2DM  L1, L4 and multiple thoracic spine compression fractures  Age-related osteoporosis      Follow-ups Needed After Discharge   Follow-up Appointments     Add follow up information to the AVS prior to printing      Follow-up and recommended labs and tests       Follow up with primary care provider, Michael Londono MD, within 7 days   for hospital follow- up.  The following labs/tests are recommended: CMP   with increased LFTs including alkaline phosphatase    of the stomach.  She was diagnosed with acute exacerbation of chronic   systolic congestive heart failure and was given IV diuretic therapy which   has been changed to her original Lasix therapy that she takes at home.   She has cholelithiasis with cholecystitis-like changes was evaluated by   general surgery who did not think that she needed cholecystectomy  She needs to follow-up for possible pancreatic cancer with GI as   outpatient             Discharge Disposition   Discharged to assisted living  Condition at discharge: Stable       Hospital Course   Gosia Harvey a 89-year-old lady was admitted 2/18/2023 with past history of CAD CHF with systolic dysfunction and LVEF 35 to 40%, T2DM, recurrent chest pain with pleural effusions since 9/20/2022, shortness of breath which is significant, paresthesias of both hands, dry heaving, generalized fullness of the stomach.  Chest x-ray showed cardiac enlargement with pulmonary vascular congestion and bibasilar interstitial edema and small right-sided pleural effusion.  BNP was elevated at 2347 hemoglobin  was 9.4 creatinine 0.74 sodium 137 potassium 4.1  She was diagnosed with chest pain with recurrent pleural effusions and  was evaluated by pulmonary medicine who thought that there was no indication for thoracentesis at this time.   Patient had lower chest pain with cholelithiasis and?  Sludge in the gallbladder and?  Thickened gallbladder wall this was evaluated by general surgery Dr. Michael Montoya who did not think that she is having acute biliary colic at this time.  Patient did not wanted any surgery anyway.   She was told about pancreatic IPMN which could turn into pancreatic cancer.   Patient tells me that she wants to take Zoloft for anxiety as she used to take it before and felt better.  There is history of allergy written down but according to the patient she is not allergic to it.     Consultations This Hospital Stay   GASTROENTEROLOGY IP CONSULT  PULMONARY IP CONSULT  SURGERY GENERAL IP CONSULT    Code Status   No CPR- Pre-arrest intubation OK    Time Spent on this Encounter   I, Lyndon Bhatti MD, personally saw the patient today and spent greater than 30 minutes discharging this patient.       Lyndon Bhatti MD  [place] [address] [facility]  [address] [place] [address]  Phone: [phone]  Fax: [phone]  ______________________________________________________________________    Physical Exam   Vital Signs: Temp: 98  F (36.7  C) Temp src: Oral BP: 121/69 Pulse: 83   Resp: 19 SpO2: 97 % O2 Device: None (Room air)    Weight: 108 lbs 3.2 oz  GENERAL: Patient is not in acute distress  HEENT: EOM+,Conjunctiva is clear   NECK:  no Jugular Venous distention  HEART: S1 S2 regular Rate and Rhythm, there is  no murmur,   LUNGS: Respirations are  not laboured, Lungs are  clear to auscultation without Crepitations or Wheezing   ABDOMEN: Soft , there is no tenderness ,Bowel Sounds are  Positive   LOWER LIMBS: no  Pedal Edema  Bilaterally   CNS:  Alert,  Oriented x 3, Moving all the  Four Limbs        Primary Care Physician   Michael Londono MD    Discharge Orders      Medication Therapy Management Referral      Reason for your hospital stay    Patient came in on 2/18/2023 with symptoms of shortness of breath nausea with dry heaving and generalized fullness of the stomach.  She was diagnosed with congestive heart failure and was given IV diuretic therapy which has been changed to her original Lasix therapy that she takes at home.   She has cholelithiasis with cholecystitis-like changes was evaluated by general surgery who did not think that she needed cholecystectomy  She needs to follow-up for possible pancreatic cancer with GI as outpatient     Follow-up and recommended labs and tests     Follow up with primary care provider, Michael Londono MD, within 7 days for hospital follow- up.  The following labs/tests are recommended: CMP with increased LFTs including alkaline phosphatase    of the stomach.  She was diagnosed with acute exacerbation of chronic systolic congestive heart failure and was given IV diuretic therapy which has been changed to her original Lasix therapy that she takes at home.   She has cholelithiasis with cholecystitis-like changes was evaluated by general surgery who did not think that she needed cholecystectomy  She needs to follow-up for possible pancreatic cancer with GI as outpatient     Activity    Your activity upon discharge: activity as tolerated     Monitor and record    Weigh yourself every morning     When to contact your care team    Contact your care team If symptoms get worse, If increased shortness of breath, If waking up at night with difficulty breathing, If unable to lie down for sleep due to symptoms, If weight gain of 2 pounds a day for 2 days in a row OR 5 pounds in 1 week., Increased swelling in your ankles or legs, and Dizziness or lightheadedness     Discharge Follow Up - with Primary Care clinic within 3-5 days (RN to schedule prior to d/c for  [facility] primary care     Add follow up information to the AVS prior to printing     Discharge Instructions    Follow-up of for CHF with primary care physician    Come back to the hospital if the abdominal pain gets worse or if the chest pain gets worse associated with nausea and vomiting or fever or chills.    Follow-up with [facility] regarding probable pancreatic cancer     Diet    Follow this diet upon discharge: Orders Placed This Encounter      2 Gram Sodium Diet       Significant Results and Procedures   Most Recent 3 CBC's:Recent Labs   Lab Test 02/19/23  0704 02/18/23  2303 02/13/23  0809   WBC 5.7 6.5 5.9   HGB 8.9* 9.4* 9.0*   MCV 93 93 92    300 244     Most Recent 3 BMP's:Recent Labs   Lab Test 02/21/23  0620 02/20/23  0613 02/19/23  0704    138 138   POTASSIUM 4.3 4.5 4.5   CHLORIDE 102 105 105   CO2 25 26 26   BUN 26.5* 28.0* 24.3*   CR 0.73 0.91 0.85   ANIONGAP 10 7 7   LIGIA 7.9* 8.0* 8.3   * 110* 110*     Most Recent 2 LFT's:Recent Labs   Lab Test 02/21/23  0620 02/19/23  0704   AST 40* 33   ALT 14 13   ALKPHOS 320* 285*   BILITOTAL 0.3 0.4     Most Recent 3 INR's:Recent Labs   Lab Test 02/21/23  1043 01/16/23  0938 09/11/22  1804   INR 1.06 1.13 0.94     Most Recent Hemoglobin A1c:Recent Labs   Lab Test 12/29/22  0943   A1C 6.6*   ,   Results for orders placed or performed during the hospital encounter of 02/18/23   Chest XR,  PA & LAT    Narrative    EXAM: XR CHEST 2 VIEWS  LOCATION: [facility]  DATE/TIME: 2/18/2023 10:44 PM    INDICATION: SOB, recent large pleural effusion  COMPARISON: 2/13/2023      Impression    IMPRESSION: The heart is enlarged. There is central pulmonary venous congestion with bibasilar interstitial edema. A small right-sided pleural effusion is again noted.    MR Abdomen MRCP w/o & w Contrast    Narrative    EXAM: MR ABDOMEN MRCP W/O and W CONTRAST  LOCATION: [facility]  DATE/TIME: 2/19/2023 9:58 AM    INDICATION:  Abnormal pancreatic tail, suspicious kidney lesions, recurrent pleural effusions.  COMPARISON: CT chest 02/13/2023, CT abdomen and pelvis 01/17/2023.  TECHNIQUE: Routine MR liver/pancreas protocol including axial and coronal MRCP sequences. 2D and 3D reconstruction performed by MR technologist including MIP reconstruction and slab cholangiograms. If performed with contrast, additional dynamic T1 post   IV contrast images.  CONTRAST: 5.5 mL Gadavist     FINDINGS:     MRCP: No choledocholithiasis or biliary ductal dilatation. Common bile duct measures 3 to 4 mm. Layering gallbladder sludge versus ill-defined cholelithiasis. Mild wall prominence of the gallbladder. The main pancreatic duct measures 3 mm at the pancreas   tail. There are numerous cystic lesions at the pancreas tail adjacent to one another. One of the largest is 3 x 2.1 cm on series 6 image 7. These could potentially communicate with the main pancreatic duct via side branches. These are stable in size   compared to a CT from 01/17/2023 but may be larger than older examinations. After contrast administration, no nodular enhancement identified.    LIVER: No fatty infiltration identified. Somewhat heterogeneous hepatic parenchyma noted but no focal observation identified.    PANCREAS: See above MRCP section. No focal pancreas lesion otherwise identified. No edema is seen. Homogeneous enhancement of the pancreas parenchyma.    ADDITIONAL FINDINGS: Prominent hiatal hernia again noted. Bibasilar pleural fluid again seen with bibasilar atelectasis. Enlarged portacaval lymph node is stable measuring 1.1 cm. Trace ascites. Bilateral nonenhancing increased T1 signal renal lesions   consistent with hemorrhagic cysts.      Impression    IMPRESSION:  1.  Multiple cystic pancreas lesions grouped at the pancreas tail noted without worrisome nodular enhancement. These could reside within pancreas duct side branches. These are suggestive for pancreatic cystic neoplasms.  See below for follow-up.  2.  Gallbladder sludge versus ill-defined gallstones. Mild gallbladder wall prominence. If there is concern for cholecystitis, ultrasound could be performed. No biliary ductal dilatation or choledocholithiasis.  3.  Small bibasilar pleural fluid and bibasilar atelectasis.  4.  Prominent hiatal hernia again noted.  5.  Stable mildly enlarged upper abdominal lymph node.  6.  Trace ascites.  7.  Incidental bilateral hemorrhagic renal cysts. No specific follow-up recommended.    REFERENCE:  Revisions of international consensus [place] guidelines for the management of IPMN of the pancreas. Pancreatology 2017;17(5):738-753.    Largest cyst between 20-30 mm: EUS in 3-6 months and then annual surveillance alternating between MRI and EUS as appropriate.     US Abdomen Limited    Narrative    ULTRASOUND ABDOMEN LIMITED February 21, 2023 7:13 AM     HISTORY: Right upper quadrant ultrasound with lower chest pain.    COMPARISON: Abdominal MRI on 2/19/2023.    FINDINGS:    Gallbladder: Cholelithiasis and gallbladder sludge. Gallbladder wall  thickening measuring 6 mm. Sonographic Licona's sign is negative.    Bile ducts: CHD is normal diameter. No intrahepatic biliary  dilatation. The distal aspect of the common bile duct is obscured by  overlying bowel gas.    Liver: It demonstrates normal parenchymal echogenicity. No focal  hepatic mass is visualized.    Pancreas: Partially obscured by overlying bowel gas, but grossly  unremarkable.     Right kidney: No hydronephrosis or shadowing calculi.    Aorta and IVC: Not specifically assessed.     Small volume ascites.      Impression    IMPRESSION: Cholelithiasis/gallbladder sludge with mild gallbladder  wall thickening. No other sonographic evidence of acute cholecystitis.    THOMAS ARTEAGA MD         SYSTEM ID:  [id number]     *Note: Due to a large number of results and/or encounters for the requested time period, some results have not been displayed. A  complete set of results can be found in Results Review.       Discharge Medications   Current Discharge Medication List      START taking these medications    Details   sertraline (ZOLOFT) 25 MG tablet Take 1 tablet (25 mg) by mouth daily  Qty: 30 tablet, Refills: 0    Associated Diagnoses: Anxiety         CONTINUE these medications which have NOT CHANGED    Details   acetaminophen (TYLENOL) 500 MG tablet Take 500 mg by mouth 3 times daily      albuterol (PROAIR HFA/PROVENTIL HFA/VENTOLIN HFA) 108 (90 Base) MCG/ACT inhaler Inhale 2 puffs into the lungs every 6 hours as needed for shortness of breath, wheezing or cough  Qty: 18 g, Refills: 0    Comments: Pharmacy may dispense brand covered by insurance (Proair, or proventil or ventolin or generic albuterol inhaler)      alendronate (FOSAMAX) 70 MG tablet Take 1 tablet (70 mg) by mouth every 7 days  Qty: 4 tablet, Refills: 0    Associated Diagnoses: Age-related osteoporosis with current pathological fracture with delayed healing, subsequent encounter      aspirin EC 81 MG EC tablet Take 1 tablet (81 mg) by mouth daily      atorvastatin (LIPITOR) 40 MG tablet Take 1 tablet (40 mg) by mouth every evening  Qty: 30 tablet, Refills: 0    Comments: Need to update lipid panel--last done in September 2021.  Associated Diagnoses: ST elevation myocardial infarction (STEMI) involving other coronary artery of anterior wall (H); Clostridium difficile diarrhea      bisacodyl (DULCOLAX) 5 MG EC tablet Take 5 mg by mouth daily as needed for constipation      calcium carbonate (TUMS) 500 MG chewable tablet Take 2 chew tab by mouth as needed for heartburn Max 15 tabs/day, separate from other drugs by 1 hr      carvedilol (COREG) 3.125 MG tablet Take 1 tablet (3.125 mg) by mouth 2 times daily (with meals)  Qty: 180 tablet, Refills: 3    Associated Diagnoses: Ischemic cardiomyopathy      diclofenac (VOLTAREN) 1 % topical gel Apply 2 g topically 3 times daily      fluticasone (FLONASE) 50  MCG/ACT nasal spray Spray 2 sprays into both nostrils daily as needed for rhinitis or allergies      furosemide (LASIX) 20 MG tablet Take 2 tablets (40 mg) by mouth daily Add additional 20mg for increased edema  Qty: 95 tablet, Refills: 2    Associated Diagnoses: Ischemic cardiomyopathy      guaiFENesin (ROBITUSSIN) 20 mg/mL liquid Take 10 mLs by mouth every 4 hours as needed for cough      HYDROcodone-acetaminophen (NORCO) 5-325 MG tablet Take 1 tablet by mouth 4 times daily as needed for severe pain (7-10) Begin substantial taper ASAP to previous chronic directions of one tablet BID prn.    Associated Diagnoses: Other chronic pain; Closed fracture of proximal end of left humerus with routine healing, unspecified fracture morphology, subsequent encounter      hydrocortisone (CORTAID) 1 % external cream Apply topically 4 times daily as needed for itching or rash      hydrOXYzine (ATARAX) 25 MG tablet Take 1 tablet (25 mg) by mouth 4 times daily as needed  Qty: 30 tablet, Refills: 0    Associated Diagnoses: Herpes zoster infection of thoracic region      Hyprom-Naphaz-Polysorb-Zn Sulf (CLEAR EYES COMPLETE) SOLN Apply 2 drops to eye every 4 hours as needed      Lidocaine (LIDOCARE) 4 % Patch Place 2 patches onto the skin every 24 hours To prevent lidocaine toxicity, patient should be patch free for 12 hrs daily.  Qty: 30 patch, Refills: 0    Associated Diagnoses: Compression fracture of T3 vertebra, initial encounter (H)      loperamide (IMODIUM) 2 MG capsule Take 2 mg by mouth as needed for diarrhea      loratadine (CLARITIN) 10 MG tablet Take 10 mg by mouth daily as needed for allergies      magnesium hydroxide (MILK OF MAGNESIA) 400 MG/5ML suspension Take 30 mLs by mouth daily as needed for constipation      multivitamin, therapeutic (THERA-VIT) TABS tablet Take 1 tablet by mouth daily      nitroGLYcerin (NITROSTAT) 0.4 MG sublingual tablet For chest pain place 1 tablet under the tongue every 5 minutes for 3 doses.  If symptoms persist 5 minutes after 1st dose call 911.  Qty: 25 tablet, Refills: 3    Associated Diagnoses: Coronary artery disease involving native coronary artery of native heart without angina pectoris      pantoprazole (PROTONIX) 40 MG EC tablet TAKE 1 TABLET BY MOUTH TWICE DAILY  Qty: 180 tablet, Refills: 0    Associated Diagnoses: Acute GI bleeding; Gastroesophageal reflux disease with esophagitis, unspecified whether hemorrhage      polyethylene glycol (MIRALAX) 17 GM/Dose powder Take 17 g by mouth daily  Qty: 510 g, Refills: 0    Associated Diagnoses: Compression fracture of T3 vertebra, initial encounter (H)      pregabalin (LYRICA) 25 MG capsule Take 1 capsule (25 mg) by mouth 2 times daily  Qty: 60 capsule, Refills: 0    Associated Diagnoses: Closed fracture of proximal end of left humerus with routine healing, unspecified fracture morphology, subsequent encounter      senna-docusate (SENOKOT-S/PERICOLACE) 8.6-50 MG tablet Take 1 tablet by mouth 2 times daily as needed for constipation  Qty: 100 tablet, Refills: 0    Associated Diagnoses: Compression fracture of T3 vertebra, initial encounter (H)      Trolamine Salicylate (ASPERCREME EX) Externally apply topically daily as needed           Allergies   Allergies   Allergen Reactions     Codeine      GI UPSET     Escitalopram Oxalate      fatigue     Esomeprazole Magnesium Trihydrate      HA     Gabapentin Dizziness     Dizziness, confusion     Imdur [Isosorbide]      Headache       Meperidine Hcl      N/V     Morphine Hcl      HIVES     Oxycodone      (percodan) GI UPSET     Pentazocine      (talwin)  HALLUCINATIONS     Propoxyphene Hcl      STOMACH UPSET     Sumatriptan Succinate      chest pain

## 2023-02-21 NOTE — PLAN OF CARE
Alert and oriented x4  VSS on RA  A1 with gaitbelt and walker  Pain managed with Norco and Dilaudid  Acetaminophen held due to PRN Norco being given throughout shift.   Ultrasound to abdomen at midnight  NPO started at 1800  Purewick in place  Start PO Lasix in AM      /65   Pulse 88   Temp 98.3  F (36.8  C) (Oral)   Resp 20   Wt 48.9 kg (107 lb 14.4 oz)   LMP  (LMP Unknown)   SpO2 99%   BMI 17.96 kg/m

## 2023-02-21 NOTE — CONSULTS
Consult Date: 02/21/2023    REASON FOR CONSULTATION:  Gallstones.    HISTORY OF PRESENT ILLNESS:  Ms. Alonzo is a 89-year-old female with a history of heart failure, who came to the ED with chest pain and difficulty breathing.  She had some fullness and epigastric discomfort, but no overt pains.  In the meantime, she has been diagnosed with an acute exacerbation of her heart failure and has undergone diuresis with some improvement of her symptoms.  Imaging done earlier this month showed a possible cystic structure in the pancreas.  Followup MRCP showed this lesion as well as gallstones.  Followup ultrasound today again documented gallstones with duct measuring 9 mm.  There was borderline wall thickening, but no Licona sign.  The patient denies any postprandial right upper quadrant pain and states that she is currently feeling well and hungry.  She denies any jaundice or icterus in her history.  She was able to eat breakfast this morning without difficulty.    PAST MEDICAL AND SURGICAL HISTORY:  This is reviewed from the chart, but is notable for heart failure, coronary artery disease, diabetes, reflux, breast cancer, osteoporosis, atrial fibrillation.  She has had a previous open appendectomy as well as hysterectomy and multiple coronary angiograms.  She has had multiple orthopedic procedures as well.    CURRENT MEDICATIONS:  At the time of admission, the patient's home medications included:   1.  Tylenol.   2.  Albuterol.  3.  Fosamax.  4.  Baby aspirin.  5.  Lipitor.  6.  Dulcolax.   7.  Tums.  8.  Coreg.  9.  Voltaren.  10.  Flonase.  11.  Lasix.  12.  Robitussin.  13.  Norco.  14.  Cortaid.   15.  Atarax.  16.  Lidocaine patch.    17.  Imodium.  18.  Claritin.  19.  Milk of magnesia.   20.  Multivitamins.  21.  Nitrostat.  22.  Protonix.  23.  MiraLax.  24.  Lyrica.  25.  Senokot.  26.  Aspercreme.    ALLERGIES:  THE PATIENT HAS 11 STATED DRUG ALLERGIES INCLUDING CODEINE, LEXAPRO, ESOMEPRAZOLE, GABAPENTIN,  IMDUR, MEPERIDINE, MORPHINE, OXYCODONE, PENTAZOCINE, PROPOXYPHENE, SUMATRIPTAN.    PHYSICAL EXAMINATION:    VITAL SIGNS:  Ms. Alonzo is afebrile, temperature currently is 97.8, pulse is 86 with a blood pressure of 149/81, respiratory rate is 22 with 98% saturations on room air.  GENERAL:  She is generally alert and appropriate, nontoxic.  CARDIOVASCULAR:  Her heart sounds are regular, but somewhat distant.  LUNGS:  Breath sounds without crackles at the moment.   ABDOMEN:  Soft.  She has a well-healed low midline scar as well as right paramedian incision.  She has some mild epigastric discomfort to deep palpation, but no guarding.  She has no right upper quadrant tenderness on palpation and no Licona sign on deep inspiration.    LABORATORY EXAMINATION:  Notable for white blood cell count two days ago of 5.7 thousand, hemoglobin of 8.9.  LFTs today show an ALT and AST of 14 and 40, respectively, with a total bilirubin of 0.3 and an alkaline phosphatase of 320.    IMAGING:  I personally reviewed the patient's ultrasound done this morning, it does show gallstones with some sludge and borderline wall thickening and a common duct diameter of 9 mm.  There was no reported sonographic Licona sign.  Of note, MRCP report from the 19th does describe a pancreatic lesion in the tail with cystic and nodular components.    IMPRESSION AND RECOMMENDATIONS:  This is a 89-year-old female with a history of heart failure who came in with an exacerbation thereof, who was found to have gallstones in the workup of a pancreatic tail mass, but does not seem to have typical biliary colic symptoms nor any clinical findings consistent with cholecystitis.  I relayed my findings to her and her floor nurses and stated I did not see a clear indication for acute surgery today.  I think she can continue her diet as tolerated and observe for postprandial symptoms that would be typical of biliary colic.  For now, it seems as though she has enough  concerns regarding her cardiopulmonary status that this can be left to the side for the moment.    Thank you very much for this consultation.    Michael Bledsoe MD        D: 2023   T: 2023   MT: APRIL    Name:     MILAGRO ELY  MRN:      [id number]        Account:      [id number]   :      1932           Consult Date: 2023     Document: [id number]    cc:  Michael Londono MD

## 2023-02-21 NOTE — PROGRESS NOTES
Care Management Discharge Note    Discharge Date: 02/21/2023       Discharge Disposition:  Return to her assisted living [facility]    Discharge Services:  Services at facility, meals and medication managment    Discharge DME:  none    Discharge Transportation:  family    Private pay costs discussed: Not applicable    PAS Confirmation Code:  NA  Patient/family educated on Medicare website which has current facility and service quality ratings:  NA    Education Provided on the Discharge Plan:  yes  Persons Notified of Discharge Plans: patient  Patient/Family in Agreement with the Plan:  yes    Handoff Referral Completed: No    Additional Information:  Patient is returning to her assisted living at [facility] in [place] today. Spoke with their DON, Marion updated her and orders were faxed. Reported to charge nurse that any new meds needed to be filled.    Family transporting.    TIFFANI Gunderson   Inpatient Care Coordination   Supervisor  [facility]  [phone]          TIFFANI Castro

## 2023-02-21 NOTE — PROGRESS NOTES
Patient Transfer Information  Patient connected to monitoring equipment on arrival: N/A     Patient connected to wall oxygen on arrival: N/A    Belongings: N/A    Safety check completed: Yes

## 2023-02-21 NOTE — PROVIDER NOTIFICATION
Heart Failure Care Map  GOALS TO BE MET BEFORE DISCHARGE:    1. Decrease congestion and/or edema with diuretic therapy to achieve near optimal volume status.     Dyspnea improved: Yes, satisfactory for discharge.   Edema improved: Yes, satisfactory for discharge.        Last 24 hour I/O:   Intake/Output Summary (Last 24 hours) at 2/21/2023 1048  Last data filed at 2/21/2023 0930  Gross per 24 hour   Intake 720 ml   Output 820 ml   Net -100 ml           Net I/O and Weights since admission:   01/22 1500 - 02/21 1459  In: 1200 [P.O.:1200]  Out: 1570 [Urine:1570]  Net: -370     Vitals:    02/18/23 2114 02/20/23 0843 02/21/23 0631   Weight: 52.2 kg (115 lb) 48.9 kg (107 lb 14.4 oz) 49.1 kg (108 lb 3.2 oz)       2.  O2 sats > 90% on room air, or at prior home O2 therapy level.      Able to wean O2 this shift to keep sats above 90%?: Yes, satisfactory for discharge.   Does patient use Home O2? No          Current oxygenation status:   SpO2: 98 %     O2 Device: None (Room air),      3.  Tolerates ambulation and mobility near baseline.     Ambulation: Yes, satisfactory for discharge.   Times patient ambulated with staff this shift: 1    Please review the Heart Failure Care Map for additional HF goal outcomes.    VSS. Pt. AOx4. Pt. Ambulates using a walker and SBA. Sats stable on room air. LS dim. discharge instructions reviewed with pt. And daughter in law. Pt has met daily dose of tylenol with scheduled tylenol and norco. PT informed of this. She preferred norco over tylenol so her scheduled tylenol was held. Pt educated on tylenol overdose. Pt already has elevated liver enzymes and MD is aware of this. New prescription for zoloft filled here.   Aria Daniels, RN  2/21/2023

## 2023-02-21 NOTE — PLAN OF CARE
A&O. VSS. C/o back and chest pain- Norco given x2. LS diminished. BLE edema. Pt c/o constipation- Dulcolax given. Mepilex to coccyx. Purewick in place. Assist x1/walker. NPO until Abdominal US can be completed this morning.

## 2023-02-23 NOTE — PROGRESS NOTES
Clinic Care Coordination Contact  [facility]: Post-Discharge Note  SITUATION                                                      Admission:    Admission Date: 02/18/23   Reason for Admission: SOB  Discharge:   Discharge Date: 02/21/23  Discharge Diagnosis: Acute exacerbation of systolic congestive heart failure with LVEF of 35-40%  Cholelithiasis  Coronary artery disease  Pancreatic mass?  Pancreatic cancer -pancreatic IPMN  T2DM  L1, L4 and multiple thoracic spine compression fractures  Age-related osteoporosis    BACKGROUND                                                      Per hospital discharge summary and inpatient provider notes:  Gosia Harvey a 89-year-old lady was admitted 2/18/2023 with past history of CAD CHF with systolic dysfunction and LVEF 35 to 40%, T2DM, recurrent chest pain with pleural effusions since 9/20/2022, shortness of breath which is significant, paresthesias of both hands, dry heaving, generalized fullness of the stomach.  Chest x-ray showed cardiac enlargement with pulmonary vascular congestion and bibasilar interstitial edema and small right-sided pleural effusion.  BNP was elevated at 2347 hemoglobin was 9.4 creatinine 0.74 sodium 137 potassium 4.1  She was diagnosed with chest pain with recurrent pleural effusions and  was evaluated by pulmonary medicine who thought that there was no indication for thoracentesis at this time.   Patient had lower chest pain with cholelithiasis and?  Sludge in the gallbladder and?  Thickened gallbladder wall this was evaluated by general surgery Dr. Michael Montoya who did not think that she is having acute biliary colic at this time.  Patient did not wanted any surgery anyway.   She was told about pancreatic IPMN which could turn into pancreatic cancer.   Patient tells me that she wants to take Zoloft for anxiety as she used to take it before and felt better.  There is history of allergy written down but according to the patient she is  not allergic to it.            Consultations This Hospital Stay      GASTROENTEROLOGY IP CONSULT  PULMONARY IP CONSULT  SURGERY GENERAL IP CONSULT           Code Status      No CPR- Pre-arrest intubation OK           Time Spent on this Encounter      I, Lyndon Bhatti MD, personally saw the patient today and spent greater than 30 minutes discharging this patient.     Lyndon Bhatti MD  [place] [address] MEDICAL SURGICAL  [address]  [facility] [address]  Phone: [phone]  Fax: [phone]  ______________________________________________________________________           Physical Exam     Vital Signs: Temp: 98  F (36.7  C) Temp src: Oral BP: 121/69 Pulse: 83   Resp: 19 SpO2: 97 % O2 Device: None (Room air)    Weight: 108 lbs 3.2 oz  GENERAL: Patient is not in acute distress  HEENT: EOM+,Conjunctiva is clear   NECK:  no Jugular Venous distention  HEART: S1 S2 regular Rate and Rhythm, there is  no murmur,   LUNGS: Respirations are  not laboured, Lungs are  clear to auscultation without Crepitations or Wheezing   ABDOMEN: Soft , there is no tenderness ,Bowel Sounds are  Positive   LOWER LIMBS: no  Pedal Edema  Bilaterally   CNS:  Alert,  Oriented x 3, Moving all the Four Limbs        ASSESSMENT           Discharge Assessment  How are you doing now that you are home?: RN CC called patient's phone preference, and spoke with patient's daughter-in-law TOM Stephenson on file from 2016. Seema states that patient is doing well at home. Reviewed AVS with recommendation to follow up with PCP in next 7 days. Seema states that patient has transitioned to in house PCP with the [facility]. Patient is no longer a candidate for primary care coordination with [facility]. Seema denies any on going CC needs.  How are your symptoms? (Red Flag symptoms escalate to triage hotline per guidelines): Improved  Do you feel your condition is stable enough to be safe at home until your provider visit?: Yes  Does  the patient have their discharge instructions? : Yes  Does the patient have questions regarding their discharge instructions? : No  Were you started on any new medications or were there changes to any of your previous medications? : Yes  Does the patient have all of their medications?: Yes  Do you have questions regarding any of your medications? : No  Do you have all of your needed medical supplies or equipment (DME)?  (i.e. oxygen tank, CPAP, cane, etc.): Yes  Discharge follow-up appointment scheduled within 14 calendar days? : Yes  Discharge Follow Up Appointment Scheduled with?: Primary Care Provider              Care Management       Care Mgmt General Assessment  Referral  Referral Source: IP Report  Health Care Home/Utilization  Preferred Hospital: [facility]  [phone]  Preferred Urgent Care: [facility] [phone]  Living Situation  Current living arrangement:: I live in assisted living ([facility])  Type of residence:: Assisted living  Resources  Patient receiving home care services:: Yes ([facility])  Skilled Home Care Services: Skilled Nursing;Home Health Aid;Physicial Therapy;Occupational Therapy  Community Resources: Home Care  Supplies Currently Used at Home: Incontinence Supplies  Equipment Currently Used at Home: grab bar, toilet;grab bar, tub/shower;walker, rolling  Referrals Placed: None  Employment Status: retired  Psychosocial  Judaism or spiritual beliefs that impact treatment:: No  Mental health DX:: No  Mental health management concern (GOAL):: No  Informal Support system:: Children;Family  Functional Status  Dependent ADLs:: Ambulation-walker  Dependent IADLs:: Independent  Bed or wheelchair confined:: No  Advance Care Plan/Directive  Advanced Care Plans/Directives on file:: Yes  Type Advanced Care Plans/Directives: Advanced Directive - On File;DNR/DNI    PLAN                                                       Outpatient Plan:  Patient was provided with RN CC's contact information and encouraged to call with questions, concerns, support needs. RN CC will remain available as needed. No further care coordination outreaches will be made at this time.       Future Appointments   Date Time Provider Department Center   5/10/2023  1:15 PM Hiren Collins MD [facility]         For any urgent concerns, please contact our 24 hour nurse triage line: [phone] ([phone])         Katlyn Gleason RN

## 2023-02-24 PROBLEM — K52.9 COLITIS: Status: ACTIVE | Noted: 2023-01-01
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
## 2023-02-24 NOTE — ED TRIAGE NOTES
Pt presents via ems from nursing facility. Per staff pt was coughing bright red sputum. For ems more frothy pink sputum. Hx thoracentesis. .     Triage Assessment     Row Name 02/24/23 0858       Triage Assessment (Adult)    Airway WDL WDL       Respiratory WDL    Respiratory WDL X;rhythm/pattern;cough    Rhythm/Pattern, Respiratory shortness of breath    Cough Type productive       Skin Circulation/Temperature WDL    Skin Circulation/Temperature WDL WDL       Cardiac WDL    Cardiac WDL X  CP/epigastric pain       Peripheral/Neurovascular WDL    Peripheral Neurovascular WDL WDL       Cognitive/Neuro/Behavioral WDL    Cognitive/Neuro/Behavioral WDL WDL

## 2023-02-24 NOTE — ED NOTES
[facility]  ED Nurse Handoff Report    Gosia Alonzo is a 89 year old female   ED Chief complaint: Hemoptysis  . ED Diagnosis:   Final diagnoses:   Colitis     Allergies:   Allergies   Allergen Reactions     Codeine      GI UPSET     Escitalopram Oxalate      fatigue     Esomeprazole Magnesium Trihydrate      HA     Gabapentin Dizziness     Dizziness, confusion     Imdur [Isosorbide]      Headache       Meperidine Hcl      N/V     Morphine Hcl      HIVES     Oxycodone      (percodan) GI UPSET     Pentazocine      (talwin)  HALLUCINATIONS     Propoxyphene Hcl      STOMACH UPSET     Sumatriptan Succinate      chest pain       Code Status:   Activity level - Baseline/Home:  Assist X 1. Activity Level - Current:   Assist X 2. Lift room needed: No. Bariatric: No   Needed: No   Isolation: No. Infection: Not Applicable.     Vital Signs:   Vitals:    02/24/23 0854 02/24/23 0855 02/24/23 0955 02/24/23 1025   BP: 122/66 114/68 115/56    Pulse: 73      Resp: 22      Temp: 98.5  F (36.9  C)      TempSrc: Oral      SpO2: 97% 96% 94% 91%       Cardiac Rhythm:  ,      Pain level:    Patient confused: No. Patient Falls Risk: Yes.   Elimination Status: Has voided   Patient Report - Initial Complaint: Hemoptysis. Focused Assessment:  Pt presents via ems from nursing facility. Per staff pt was coughing bright red sputum. For ems more frothy pink sputum. Hx thoracentesis. .  Tests Performed: labs, cxr, ct. Abnormal Results: Abnormal CT.   Treatments provided: IVF, Zofran, Dilaudid  Family Comments: at bedside  OBS brochure/video discussed/provided to patient:  N/A  ED Medications:   Medications   ondansetron (ZOFRAN) injection 4 mg (4 mg Intravenous $Given 2/24/23 1101)   0.9% sodium chloride BOLUS (500 mLs Intravenous $New Bag 2/24/23 1100)   HYDROmorphone (DILAUDID) injection 0.2 mg (0.2 mg Intravenous $Given 2/24/23 1101)     Drips infusing:  No  For the majority of the shift, the patient's  behavior Green. Interventions performed were .    Sepsis treatment initiated: No     Patient tested for COVID 19 prior to admission: YES    ED Nurse Name/Phone Number: JULIAN Dempsey,   11:23 AM    RECEIVING UNIT ED HANDOFF REVIEW    Above ED Nurse Handoff Report was reviewed: Yes  Reviewed by: CELESTINO ACOSTA RN on February 24, 2023 at 4:05 PM
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
## 2023-02-24 NOTE — PHARMACY-ADMISSION MEDICATION HISTORY
Admission medication history interview status for this patient is complete. See [facility] admission navigator for allergy information, prior to admission medications and immunization status.     Medication history interview done, indicate source(s): Patient - Pt resides at [facility]  Medication history resources (including written lists, pill bottles, clinic record): Julisa Discharge summary from 2/21/23, [facility] admission note from 2/18/23  Pharmacy: [facility]    **MAR not present from facility, pt just discharged a few days ago. Pt states no changes in meds since then and was able to verify last doses of meds. Did not request updated list from facility at this time.       Changes made to PTA medication list:  Added: none  Changed: none  Reported as Not Taking: none  Removed: none    Actions taken by pharmacist (provider contacted, etc):None     Additional medication history information: none    Medication reconciliation/reorder completed by provider prior to medication history?  N   (Y/N)     Medication Affordability: N            Prior to Admission medications    Medication Sig Last Dose Taking? Auth Provider Long Term End Date   acetaminophen (TYLENOL) 500 MG tablet Take 500 mg by mouth 3 times daily 2/23/2023 at pm Yes Unknown, Entered By History     albuterol (PROAIR HFA/PROVENTIL HFA/VENTOLIN HFA) 108 (90 Base) MCG/ACT inhaler Inhale 2 puffs into the lungs every 6 hours as needed for shortness of breath, wheezing or cough Unknown Yes Jennie Morrissey MD Yes    alendronate (FOSAMAX) 70 MG tablet Take 1 tablet (70 mg) by mouth every 7 days Past Week Yes Eddie Mora MD Yes    aspirin EC 81 MG EC tablet Take 1 tablet (81 mg) by mouth daily 2/23/2023 Yes Radha Umanzor PA-C No    atorvastatin (LIPITOR) 40 MG tablet Take 1 tablet (40 mg) by mouth every evening 2/23/2023 Yes Michael Londono MD Yes    bisacodyl (DULCOLAX) 5 MG EC tablet Take 5 mg by mouth daily as needed  for constipation Unknown Yes Unknown, Entered By History     calcium carbonate (TUMS) 500 MG chewable tablet Take 2 chew tab by mouth as needed for heartburn Max 15 tabs/day, separate from other drugs by 1 hr Unknown Yes Unknown, Entered By History     carvedilol (COREG) 3.125 MG tablet Take 1 tablet (3.125 mg) by mouth 2 times daily (with meals) 2/23/2023 at pm Yes Hiren Collins MD Yes    diclofenac (VOLTAREN) 1 % topical gel Apply 2 g topically 3 times daily 2/23/2023 at pm Yes Reported, Patient     fluticasone (FLONASE) 50 MCG/ACT nasal spray Spray 2 sprays into both nostrils daily as needed for rhinitis or allergies Unknown Yes Reported, Patient     furosemide (LASIX) 20 MG tablet Take 2 tablets (40 mg) by mouth daily Add additional 20mg for increased edema 2/23/2023 Yes Eddie Mora MD Yes    guaiFENesin (ROBITUSSIN) 20 mg/mL liquid Take 10 mLs by mouth every 4 hours as needed for cough Unknown Yes Unknown, Entered By History     HYDROcodone-acetaminophen (NORCO) 5-325 MG tablet Take 2 tablets by mouth 4 times daily as needed for severe pain (7-10) Begin substantial taper ASAP to previous chronic directions of one tablet BID prn. 2/23/2023 Yes Lyndon Bhatti MD     hydrocortisone (CORTAID) 1 % external cream Apply topically 4 times daily as needed for itching or rash Unknown Yes Unknown, Entered By History     hydrOXYzine (ATARAX) 25 MG tablet Take 1 tablet (25 mg) by mouth 4 times daily as needed Unknown Yes Kenia Parra APRN CNP     Hyprom-Naphaz-Polysorb-Zn Sulf (CLEAR EYES COMPLETE) SOLN Apply 2 drops to eye every 4 hours as needed Unknown Yes Reported, Patient     Lidocaine (LIDOCARE) 4 % Patch Place 2 patches onto the skin every 24 hours To prevent lidocaine toxicity, patient should be patch free for 12 hrs daily. 2/23/2023 Yes Kenia Parra APRN CNP     loperamide (IMODIUM) 2 MG capsule Take 2 mg by mouth as needed for diarrhea Unknown Yes Unknown, Entered By History     loratadine  (CLARITIN) 10 MG tablet Take 10 mg by mouth daily as needed for allergies Unknown Yes Unknown, Entered By History     magnesium hydroxide (MILK OF MAGNESIA) 400 MG/5ML suspension Take 30 mLs by mouth daily as needed for constipation Unknown Yes Unknown, Entered By History     multivitamin, therapeutic (THERA-VIT) TABS tablet Take 1 tablet by mouth daily 2/23/2023 Yes Unknown, Entered By History     nitroGLYcerin (NITROSTAT) 0.4 MG sublingual tablet For chest pain place 1 tablet under the tongue every 5 minutes for 3 doses. If symptoms persist 5 minutes after 1st dose call 911. Unknown Yes Hiren Collins MD Yes    pantoprazole (PROTONIX) 40 MG EC tablet TAKE 1 TABLET BY MOUTH TWICE DAILY 2/23/2023 at pm Yes Michael Londono MD     polyethylene glycol (MIRALAX) 17 GM/Dose powder Take 17 g by mouth daily 2/23/2023 Yes Eddie Mora MD     pregabalin (LYRICA) 25 MG capsule Take 1 capsule (25 mg) by mouth 2 times daily 2/23/2023 at pm Yes Kenia Parra APRN CNP Yes    senna-docusate (SENOKOT-S/PERICOLACE) 8.6-50 MG tablet Take 1 tablet by mouth 2 times daily as needed for constipation Unknown Yes Eddie Mora MD     sertraline (ZOLOFT) 25 MG tablet Take 1 tablet (25 mg) by mouth daily 2/23/2023 Yes Lyndon Bhatti MD Yes    Trolamine Salicylate (ASPERCREME EX) Externally apply topically daily as needed Unknown Yes Unknown, Entered By History

## 2023-02-24 NOTE — PROGRESS NOTES
Pt. Arrived to bed 31 at 1130. VSS. A/O x4. NPO for  EGD-complete. US of abdomen ordered. Pt now on low fat diet. Denies nausea. Pain 5/10 in right upper quadrant, scheduled tylenol given. UA results pending. Stool sample waiting to be collected. Assist of 1 to commode. Will continue to provide supportive cares.
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
## 2023-02-24 NOTE — ED PROVIDER NOTES
History     Chief Complaint:  Hemoptysis       The history is provided by the patient and the EMS personnel.      Gosia Alonzo is a 89 year old female with a history of coronary artery disease, congestive heart failure, CKD, hypertension, hyperlipidemia, and type 2 diabetes mellitus who presents with nausea, and vomiting and coughing bright red blood beginning this morning. She also reports right-sided abdominal pain and diarrhea. Denies fever and difficulty urinating. No known sick contacts.   She was seen in the ED 2/13/23 and received a thoracentesis for recurrent pleural effusion, draining 500 mL. She was also hospitalized 2/18/23-2/21/23 with exacerbation of congestive heart failure, at which time lesions were noted on her pancreas.    Independent Historian: EMS report diminished left-sided lung sounds. She was at 94% O2 on room air. They report she was coughing up frothy pink sputum.     Review of External Notes: 2/21/23 Discharge summary, 2/1/323 ED visit summary.     ROS:  Review of Systems   Constitutional: Negative for fever.   Respiratory: Positive for cough (with bright red sputum).    Gastrointestinal: Positive for nausea and vomiting.   Genitourinary: Negative for difficulty urinating.   All other systems reviewed and are negative.    Allergies:  Codeine  Escitalopram Oxalate  Esomeprazole Magnesium Trihydrate  Gabapentin  Imdur [Isosorbide]  Meperidine Hcl  Morphine Hcl  Oxycodone  Pentazocine  Propoxyphene Hcl  Sumatriptan Succinate     Medications:    Albuterol inhaler   Fosamax  Aspirin 81 mg   Lipitor  Coreg  Lasix  Atarax  Protonix  Lyrica  Zoloft    Past Medical History:    Congestive heart failure   Allergic rhinitis  Coronary artery disease   CKD, stage 3  Diabetes mellitus, type 2   Diverticula of colon  Edema  GERD  GI bleed  Hiatal hernia  Hypertension   Hyperlipidemia   Infected right knee prosthesis  Infiltrating ductal cancer, right breast  Ischemic  cardiomyopathy  Migraine  NSTEMI  Obesity  Osteoporosis   Iron deficiency anemia  Seborrheic keratosis  Paroxysmal atrial fibrillation   Pericarditis  Post-op DVT right calf  Pyogenic granuloma of skin and subcutaneous tissue  Solitary cyst of breast  TSH deficiency  Diabetes mellitus, type 2 with diabetic nephropathy  CKD, stage 3  Vasomotor rhinitis  Viral warts    Past Surgical History:    Angiogram  Appendectomy   Lumbar fusion x4   Colonoscopy  Esophagogastroduodenoscopy   Head and neck surgery   Right rotator cuff surgery   Cataract removal, IOL  Right knee arthroplasty   Needle aspiration breast  Right mastectomy     Family History:    Father- coronary artery disease  Brother- cancer   Mother- cancer   Sister- hypertension     Social History:  The patient presents to the ED alone via EMS.  Patient lives in skilled nursing facility.  PCP: Michael Londono     Physical Exam     Patient Vitals for the past 24 hrs:   BP Temp Temp src Pulse Resp SpO2   02/24/23 1025 -- -- -- -- -- 91 %   02/24/23 0955 115/56 -- -- -- -- 94 %   02/24/23 0855 114/68 -- -- -- -- 96 %   02/24/23 0854 122/66 98.5  F (36.9  C) Oral 73 22 97 %        Physical Exam  Constitutional:       Appearance: She is well-developed.   HENT:      Right Ear: External ear normal.      Left Ear: External ear normal.      Mouth/Throat:      Mouth: Mucous membranes are moist.      Pharynx: Oropharynx is clear. No oropharyngeal exudate or posterior oropharyngeal erythema.   Eyes:      General: No scleral icterus.     Extraocular Movements: Extraocular movements intact.      Conjunctiva/sclera: Conjunctivae normal.      Pupils: Pupils are equal, round, and reactive to light.   Cardiovascular:      Rate and Rhythm: Normal rate and regular rhythm.      Heart sounds: Normal heart sounds. No murmur heard.    No friction rub. No gallop.   Pulmonary:      Effort: Pulmonary effort is normal. No respiratory distress.      Breath sounds: Normal breath sounds. No  wheezing or rales.   Abdominal:      General: Bowel sounds are normal. There is no distension.      Palpations: Abdomen is soft. There is no mass.      Tenderness: There is abdominal tenderness. There is no right CVA tenderness or left CVA tenderness.      Comments: RLQ and RUQ TTP   Musculoskeletal:         General: Normal range of motion.      Cervical back: Normal range of motion and neck supple.   Skin:     General: Skin is warm and dry.      Capillary Refill: Capillary refill takes less than 2 seconds.      Findings: No rash.   Neurological:      General: No focal deficit present.      Mental Status: She is alert.       Emergency Department Course   ECG:  ECG results from 02/24/23   EKG 12-lead, tracing only     Value    Systolic Blood Pressure     Diastolic Blood Pressure     Ventricular Rate 71    Atrial Rate 71    NC Interval 170    QRS Duration 86        QTc 452    P Axis -28    R AXIS -12    T Axis 61    Interpretation ECG      Sinus rhythm  Normal ECG  When compared with ECG of 18-FEB-2023 21:15,  Premature atrial complexes are no longer Present       *Note: Due to a large number of results and/or encounters for the requested time period, some results have not been displayed. A complete set of results can be found in Results Review.     Imaging:  XR Chest 2 Views   Preliminary Result   IMPRESSION: Small bilateral pleural effusions and associated   compressive atelectasis and/or infiltrate similar to previous.   Interstitial prominence in the periphery of both lungs may be   interstitial edema. No airspace infiltrates in the mid and upper   lungs. The cardiac silhouette appears prominent, but similar to   previous. Pulmonary vasculature is unremarkable.      CT Abdomen Pelvis w/o Contrast   Final Result   IMPRESSION:    1.  Nonspecific uncomplicated colitis.   2.  Small bilateral pleural effusions and associated compressive   atelectasis and/or infiltrate.      HERI SHEETS MD            SYSTEM ID:   [id number]         Report per radiology    Laboratory:  Labs Ordered and Resulted from Time of ED Arrival to Time of ED Departure   COMPREHENSIVE METABOLIC PANEL - Abnormal       Result Value    Sodium 136      Potassium 3.8      Chloride 101      Carbon Dioxide (CO2) 25      Anion Gap 10      Urea Nitrogen 21.5      Creatinine 0.80      Calcium 7.8 (*)     Glucose 131 (*)     Alkaline Phosphatase 327 (*)     AST 42 (*)     ALT 13      Protein Total 6.8      Albumin 2.7 (*)     Bilirubin Total 0.4      GFR Estimate 70     TROPONIN T, HIGH SENSITIVITY - Abnormal    Troponin T, High Sensitivity 29 (*)    CBC WITH PLATELETS AND DIFFERENTIAL - Abnormal    WBC Count 5.5      RBC Count 3.33 (*)     Hemoglobin 9.4 (*)     Hematocrit 31.5 (*)     MCV 95      MCH 28.2      MCHC 29.8 (*)     RDW 18.4 (*)     Platelet Count 266      % Neutrophils 61      % Lymphocytes 24      % Monocytes 11      % Eosinophils 2      % Basophils 2      % Immature Granulocytes 0      NRBCs per 100 WBC 0      Absolute Neutrophils 3.3      Absolute Lymphocytes 1.3      Absolute Monocytes 0.6      Absolute Eosinophils 0.1      Absolute Basophils 0.1      Absolute Immature Granulocytes 0.0      Absolute NRBCs 0.0     INR - Normal    INR 1.07     LACTIC ACID WHOLE BLOOD - Normal    Lactic Acid 1.0     LIPASE - Normal    Lipase 31     INFLUENZA A/B & SARS-COV2 PCR MULTIPLEX - Normal    Influenza A PCR Negative      Influenza B PCR Negative      RSV PCR Negative      SARS CoV2 PCR Negative     ROUTINE UA WITH MICROSCOPIC REFLEX TO CULTURE   TYPE AND SCREEN, ADULT   ENTERIC BACTERIA AND VIRUS PANEL BY JESÚS STOOL   C. DIFFICILE TOXIN B PCR WITH REFLEX TO C. DIFFICILE ANTIGEN AND TOXINS A/B EIA   ABO/RH TYPE AND SCREEN      Emergency Department Course & Assessments:  ED Course as of 02/24/23 1111 Fri Feb 24, 2023   0846 I obtained the history and examined the patient as noted above.           Interventions:  Medications   ondansetron (ZOFRAN) injection 4  mg (4 mg Intravenous $Given 2/24/23 1101)   0.9% sodium chloride BOLUS (500 mLs Intravenous $New Bag 2/24/23 1100)   HYDROmorphone (DILAUDID) injection 0.2 mg (0.2 mg Intravenous $Given 2/24/23 1101)        Independent Interpretation (X-rays, CTs, rhythm strip):  none    Consultations/Discussion of Management or Tests:  1107 I consulted with Елена Ramirez PA-C, of the hospitalist team, regarding the patient, and they accepted the patient on behalf of Dr. Barnes.    Social Determinants of Health affecting care:  None    Disposition:  The patient was admitted to the hospital under the care of Dr. Barnes.     Impression & Plan    Medical Decision Making:  Patient presents today for evaluation of above complaints.  It was hard to determine initially whether she was vomiting or coughing blood.  It appears that this is more abdominal related with vomiting and nauseous feelings.  She was did not seem to be coughing at all or feeling short of breath here in the ER.  CT did show colitis which is likely the reason for her pain on the right side of her abdomen.  She was having intermittent nausea and cramping.  She does require IV medication to control her symptoms.  She did not have any diarrhea here but we did ask for stool sample.  Given her symptoms as well as need for controlling her pain and nausea, I did recommend admission to monitor closely.  I felt that with her age and comorbidity, she is unlikely to do well as an outpatient.  She is comfortable with the plan.  She is admitted to the hospitalist service under Dr. Barnes.    Diagnosis:    ICD-10-CM    1. Colitis  K52.9             Scribe Disclosure:  I, Sapphire Funez, am serving as a scribe at 9:03 AM on 2/24/2023 to document services personally performed by Niall Blue MD based on my observations and the provider's statements to me.     2/24/2023   Niall Blue MD Cheng, Wenlan, MD  02/24/23 1210

## 2023-02-25 NOTE — PLAN OF CARE
Goal Outcome Evaluation:             Pertinent assessments:    Loose stool inc x3, stool sample needed for rule out c-diff. purewick in place briefly, pt did not tolerate and was removed. No emesis this shift. Admission complete. Complaining of abd pain cramping, PRN norco given, pt states she takes 10mg norco q4 for many years, dr. Herrera and one time additional dose of 5mg norco was ordered and given. scheduled coreg held due to sbp <120. A&Ox4. [place]. Scattered scabs from keratosis on body including face, scalp. Heels and sacrum/coccyx red. Mepilex on coccyx removed due to multiple stools. Extensive bruising to bilat arms, legs/feet. R foot swollen/edematous per pt this is baseline. Heels elevated/floated on pillows. BG 94 before dinner, pt refused HS BG states she is not diabetic. Tele SR with ST depression, HR 83. NPO at midnight for ultrasound in AM.   Major Shift Events admission to unit, multiple stools  Treatment Plan: pain management, test for c-diff,   Bedside Nurse: CELESTINO ACOSTA RN

## 2023-02-25 NOTE — PROGRESS NOTES
[facility]    Medicine Progress Note - Hospitalist Service    Date of Admission:  2/24/2023    Assessment & Plan   89 year old female with a history of Breast Cancer, Compression Fractures, CAD, DM type 2, CHF (EF 35-40%), PAF, Pericarditis, GERD, Hiatel Hernia, GIB, Diverticulitis, Cdiff who presents from her nursing facility with nausea, and vomiting and coughing bright red blood beginning this morning. She also reports right-sided abdominal pain and diarrhea.        Acute Colitis - pt presents with epigastric and right sided abdominal pain with several episodes of  hematemesis and one episode of diarrhea.  CT abd/pelvis revealed nonspecific uncomplicated colitis.  Diverticulosis was noted with no evidence of diverticulitis.  Afebrile, normal wbc, negative lactic acid.  Pt with hx of Cdiff 2/2020.  DDx includes viral, ischemic, cdiff.  -Pending enteric and cdiff panels.  Also check stool for Giardia, Cryptosporidium, lactoferrin and calprotectin  -Consider colonoscopy as this might be microscopic colitis.     Hematemesis  Hx of GIB/Duodenal Ulcers  Chronic Anemia - pt presents with several episodes of bright red emesis, nausea with epigastric and right sided abdominal pain.  Acute Colitis noted on CT as above.  Hgb 9.4 at recent baseline.  Hx of GIB with Duodenal Ulcers seen on an EGD in 2/2020.  Most recent EGD 7/2022 was normal.  - hold pta ASA and po Protonix  - start IV Protonix      - Monitor serial hgb levels, hemoglobin 9.3 and no significant decrease since yesterday.  - GI consult appreciated.     Elevated LFTs  Hx Cholelithiasis - chronically elevated Alk phos and AST with normal tbili and ALT; remain at baseline today.  Afebrile with normal wbc.  CT with no significant mass or bile duct dilatation. No calcified gallstones.  U/S last admission noted Cholelithiasis/gallbladder sludge with mild gallbladder wall thickening. General Surgery was consulted at that time and did not feel  her presentation was c/w acute cholecystitis.  RUQ ultrasound shows gallbladder sludge and cholecystitis could not be ruled out.  Can check HIDA scan tomorrow.     Chronic CHF  Ischemic Cardiomyopathy  Hx CAD s/p CAMILA to mid and proximal LAD 1/2016  HTN - no signs of acute exacerbation.  No new chest pain/sob/cough.  No hypoxia.  Troponin detectible, but at baseline.  BNP wnl.  CXR with small bilateral pleural effusions and associated compressive atelectasis and/or infiltrate similar to previous.  Most recent echo with EF 35-40%.  - continue pta Atorvastatin, Coreg  - hold Lasix and ASA in the setting of emesis/gib/npo     Hx Paroxysmal Atrial Fibrillation - EKG on admission with NSR.  - monitor on telemetry     DM Type 2 - hgb A1C 6.6 (12/2022).  Diet controlled.  Not on medications.  Monitor     Osteoporosis  Osteoarthritis  Hx of Compression Fractures  Hx Multiple Orthopedic Surgeries   - continue pta Tylenol, Voltaren Gel, Lidocaine patch, Lyrica, Norco  - resume Fosamax upon discharge.     Anxiety - continue pta Sertraline.  Not regularly taking pta Atarax, hold for now.     Probable IPMN of the pancreas - evaluated by GI on a previous admission.       Hx Breast Cancer - dx 9/1998 s/p right mastectomy     Hx Post op RLE DVT 1988       Diet: Low Saturated Fat Na <2400 mg    DVT Prophylaxis: Pneumatic Compression Devices  Manzano Catheter: Not present  Lines: None     Cardiac Monitoring: ACTIVE order. Indication: Tachyarrhythmias, acute (48 hours)  Code Status: No CPR- Do NOT Intubate      Clinically Significant Risk Factors            # Hypomagnesemia: Lowest Mg = 1.4 mg/dL in last 2 days, will replace as needed   # Hypoalbuminemia: Lowest albumin = 2.6 g/dL at 2/25/2023 11:11 AM, will monitor as appropriate          # DMII: A1C = 6.6 % (Ref range: <5.7 %) within past 6 months, PRESENT ON ADMISSION  # Cachexia: Estimated body mass index is 17.72 kg/m  as calculated from the following:    Height as of this  "encounter: 1.651 m (5' 5\").    Weight as of this encounter: 48.3 kg (106 lb 8 oz)., PRESENT ON ADMISSION         Disposition Plan      Expected Discharge Date: 02/27/2023                  Eddie Mora MD  Hospitalist Service  [facility]  Securely message with [employer] (more info)  Text page via [employer] Paging/Directory   ______________________________________________________________________    Interval History   Continues to complain of abdominal pain.  Stools are semiformed but not watery.    Physical Exam   Vital Signs: Temp: 97.8  F (36.6  C) Temp src: Oral BP: 117/54 Pulse: 81   Resp: 20 SpO2: 93 % O2 Device: None (Room air)    Weight: 106 lbs 8 oz    Appears uncomfortable with abdominal pain.  Chest is clear to auscultation.  Heart S1-S2 is regular.  Abdomen.  Mild epigastric and right-sided tenderness.  Extremities no edema.    Medical Decision Making       MANAGEMENT DISCUSSED with the following over the past 24 hours: Patient and RN       Data     I have personally reviewed the following data over the past 24 hrs:    7.1  \   9.3 (L)   / 256     136 103 15.3 /  110 (H)   3.9 22 0.68 \       ALT: 12 AST: 39 (H) AP: 293 (H) TBILI: 0.4   ALB: 2.6 (L) TOT PROTEIN: 6.4 LIPASE: N/A       Imaging results reviewed over the past 24 hrs:   Recent Results (from the past 24 hour(s))   US Abdomen Limited    Narrative    EXAM: US ABDOMEN LIMITED  LOCATION: [facility]  DATE/TIME: 2/25/2023 9:36 AM    INDICATION: abdominal pain, nausea, emesis.  Hx cholelithiasis  COMPARISON: 02/24/2023 CT study   TECHNIQUE: Limited abdominal ultrasound.    FINDINGS:  GALLBLADDER: Gallbladder sludge with trace pericholecystic fluid and mild gallbladder wall thickening. No sonographic Licona sign.     BILE DUCTS: No intrahepatic biliary dilatation. The common duct measures up to 8 mm.    LIVER: Normal parenchyma with smooth contour. No focal mass.    RIGHT KIDNEY: No hydronephrosis. Cysts do not " require follow-up.    PANCREAS: The pancreas is largely obscured by overlying gas.    Small volume of ascites.      Impression    IMPRESSION:  1.  Gallbladder sludge, wall thickening, and trace pericholecystic fluid. No sonographic Licona sign. Cholecystitis cannot be excluded.  2.  No bile duct dilatation.          Recent Labs   Lab 02/25/23  1306 02/25/23  1221 02/25/23  1111 02/25/23  0652 02/25/23  0606 02/24/23  2334 02/24/23  1716 02/24/23  0939 02/23/23  1048 02/21/23  1043 02/20/23  0613 02/19/23  0704   WBC  --   --   --  7.1  --   --   --  5.5  --   --   --  5.7   HGB  --  9.3*  --  9.2*  9.2*  --  8.7*   < > 9.4*  --   --   --  8.9*   MCV  --   --   --  95  --   --   --  95  --   --   --  93   PLT  --   --   --  256  --   --   --  266  --   --   --  284   INR  --   --   --   --   --   --   --  1.07  --  1.06  --   --    NA  --   --  136  --   --   --   --  136 138  --    < > 138   POTASSIUM  --   --  3.9  --   --   --   --  3.8 4.0  --    < > 4.5   CHLORIDE  --   --  103  --   --   --   --  101 104  --    < > 105   CO2  --   --  22  --   --   --   --  25 22  --    < > 26   BUN  --   --  15.3  --   --   --   --  21.5 22.0  --    < > 24.3*   CR  --   --  0.68  --   --   --   --  0.80 0.78  --    < > 0.85   ANIONGAP  --   --  11  --   --   --   --  10 12  --    < > 7   LIGIA  --   --  7.8*  --   --   --   --  7.8* 8.1*  --    < > 8.3   *  --  118*  --  104*  --    < > 131* 142*  --    < > 110*   ALBUMIN  --   --  2.6*  --   --   --   --  2.7* 2.7*  --    < > 2.6*   PROTTOTAL  --   --  6.4  --   --   --   --  6.8 6.3*  --    < > 6.6   BILITOTAL  --   --  0.4  --   --   --   --  0.4 0.4  --    < > 0.4   ALKPHOS  --   --  293*  --   --   --   --  327* 283*  --    < > 285*   ALT  --   --  12  --   --   --   --  13 13  --    < > 13   AST  --   --  39*  --   --   --   --  42* 44*  --    < > 33   LIPASE  --   --   --   --   --   --   --  31  --   --   --   --     < > = values in this interval not displayed.      Most Recent 3 CBC's:Recent Labs   Lab Test 02/25/23  1221 02/25/23  0652 02/24/23  2334 02/24/23  1835 02/24/23  0939 02/19/23  0704   WBC  --  7.1  --   --  5.5 5.7   HGB 9.3* 9.2*  9.2* 8.7*   < > 9.4* 8.9*   MCV  --  95  --   --  95 93   PLT  --  256  --   --  266 284    < > = values in this interval not displayed.     Most Recent 3 BMP's:Recent Labs   Lab Test 02/25/23  1306 02/25/23  1111 02/25/23  0606 02/24/23  1716 02/24/23  0939 02/23/23  1048   NA  --  136  --   --  136 138   POTASSIUM  --  3.9  --   --  3.8 4.0   CHLORIDE  --  103  --   --  101 104   CO2  --  22  --   --  25 22   BUN  --  15.3  --   --  21.5 22.0   CR  --  0.68  --   --  0.80 0.78   ANIONGAP  --  11  --   --  10 12   LIGIA  --  7.8*  --   --  7.8* 8.1*   * 118* 104*   < > 131* 142*    < > = values in this interval not displayed.     Most Recent 2 LFT's:Recent Labs   Lab Test 02/25/23  1111 02/24/23  0939   AST 39* 42*   ALT 12 13   ALKPHOS 293* 327*   BILITOTAL 0.4 0.4

## 2023-02-25 NOTE — PLAN OF CARE
Goal Outcome Evaluation:    Ordered:    Orange Magic cup BID b/n meals to improve oral intake  MVI w/ cosign as pt is meeting < 75% nutrition needs  100mg thiamin as pt is at risk for refeeding syndrome  Phos lab as pt is at risk for refeeding syndrome  1000 units Vitamin D3 w/ cosign as pt has h/o osteoporosis

## 2023-02-25 NOTE — PROGRESS NOTES
"[facility]  Gastroenterology Progress note      Assessment       Hematemesis with normal EGD.  Likely minor irritation at EG junction.  Abdominal pain, diarrhea and thickened right colon: Still complaining of right abdom discomfort.  CT suggests right sided inflammation. C diff ordered but not collected yet. Other concerns would be other infection or ischemic colitis.      Plan    Continue efforts at collecting stool for c diff (lives in institution and hx c diff)  Stool for culture, giardia, cryptosporidium, fecal lactoferrin and calprotectin  She has not had colonoscopy in many years. Could consider this if no etiology identified and further workup desired.       Interval History     ongoing complaints of diarrhea and RLQ abdominal discomfort.        Physical Exam    /54 (BP Location: Right arm)   Pulse 81   Temp 97.8  F (36.6  C) (Oral)   Resp 20   Ht 1.651 m (5' 5\")   Wt 48.3 kg (106 lb 8 oz)   LMP  (LMP Unknown)   SpO2 93%   BMI 17.72 kg/m    Temp (24hrs), Av.3  F (36.8  C), Min:97.8  F (36.6  C), Max:98.6  F (37  C)    Patient Vitals for the past 72 hrs:   Weight   23 1656 48.3 kg (106 lb 8 oz)     No intake or output data in the 24 hours ending 23 1328    Hard of hearing  Constitutional: NAD, comfortable  Cardiovascular: RRR, normal S1, S2   Respiratory: CTAB  Abdomen: soft, non-tender, nondistended, minimal tenderness to palpation RLQ      Additional Comments     ROS, FH, SH: See initial GI consult for details.    Lab Data     Recent Labs   Lab Test 23  1221 23  0652 23  2334 23  1835 23  0939 23  1043 23  0704 23  0634 23  0938   WBC  --  7.1  --   --  5.5  --  5.7   < >  --    HGB 9.3* 9.2*  9.2* 8.7*   < > 9.4*  --  8.9*   < >  --    MCV  --  95  --   --  95  --  93   < >  --    PLT  --  256  --   --  266  --  284   < >  --    INR  --   --   --   --  1.07 1.06  --   --  1.13    < > = values in this " interval not displayed.     Recent Labs   Lab Test 02/25/23  1111 02/24/23  0939 02/23/23  1048    136 138   POTASSIUM 3.9 3.8 4.0   CHLORIDE 103 101 104   CO2 22 25 22   BUN 15.3 21.5 22.0   CR 0.68 0.80 0.78   ANIONGAP 11 10 12   LIGIA 7.8* 7.8* 8.1*     Recent Labs   Lab Test 02/25/23  1111 02/24/23  1307 02/24/23  0939 02/23/23  1048 02/19/23  0704 02/13/23  0809 01/16/23  0937 03/11/20  1537 02/11/20  2018 01/12/20  1631 12/11/19  1654   ALBUMIN 2.6*  --  2.7* 2.7*   < > 2.9* 3.0*   < >  --    < >  --    BILITOTAL 0.4  --  0.4 0.4   < > 0.5 0.8   < >  --    < >  --    ALT 12  --  13 13   < > 15 20   < >  --    < >  --    AST 39*  --  42* 44*   < > 44* 33   < >  --    < >  --    ALKPHOS 293*  --  327* 283*   < > 322* 238*   < >  --    < >  --    PROTEIN  --  10*  --   --   --   --   --   --  Negative  --  30*   LIPASE  --   --  31  --   --  28 28   < >  --    < >  --     < > = values in this interval not displayed.               CHUYITA Pearce MD  [facility]  Office: [phone] call if needed after 5PM or on weekends  Cell: [phone], not available after 5PM at this number

## 2023-02-25 NOTE — PLAN OF CARE
Goal Outcome Evaluation:       Patient incontinat of stool but formed did not send cdiff due to stool being formed, stool sample needed for rule out c-diff. purewick in place briefly, pt did not tolerate and was removed. Antiemetics given for nausea, No emesis this shift. . Complaining of abd pain cramping, PRN norco given, pt states she takes 10mg norco q4 for many years.  A&Ox4. [place]. Scattered scabs from keratosis on body including face, scalp. Heels and sacrum/coccyx red. Mepilex on coccyx removed due to multiple stools. Extensive bruising to bilat arms, legs/feet. R foot swollen/edematous per pt this is baseline. Heels elevated/floated on pillows.  this am Tele SR with ST depression, HR 83. NPO at midnight for ultrasound in AM.   Major Shift Events admission to unit, multiple stools  Treatment Plan: pain management, test for c-diff,   Bedside Nurse: antonia HSU RN

## 2023-02-25 NOTE — PROGRESS NOTES
"CLINICAL NUTRITION SERVICES - ASSESSMENT NOTE     Nutrition Prescription    RECOMMENDATIONS FOR MDs/PROVIDERS TO ORDER:  Pt claims to not have DM2 diagnosis. Hgb A1c 6.6% Consider conversation with patient and possibly family    Malnutrition Status:    Severe in the context of acute illness    Recommendations already ordered by Registered Dietitian (RD):  Orange Magic cup BID b/n meals  MVI w/ cosign as pt is meeting < 75% nutrition needs  100mg thiamin as pt is at risk for refeeding syndrome  Phos lab as pt is at risk for refeeding syndrome  1000 units Vitamin D3 w/ cosign as pt has h/o osteoporosis    Future/Additional Recommendations:  Consider adjusting supplements pending BG levels/patient preference/PO intake       REASON FOR ASSESSMENT  Gosia Alonzo is a/an 89 year old female assessed by the dietitian for Admission Nutrition Risk Screen for positive    Patient presents with colitis, hematemesis, h/o GIB, duodenal ulcers, anemia, h/o cholelithiasis, CHF, HTN, DM2, osteoporosis, osteoarthritis, anxiety.     NUTRITION HISTORY  Pt stated having eaten < 50% usual intake for at least 4 days. NKFA. Follows a regular diet at home. Pt drinks Boost at home, does not like Ensure dt how thick it is in comparison. Per PTA med list pt taking tums and thera-vit. Pt stated unintentional weight loss w/ -115lbs.     CURRENT NUTRITION ORDERS  Diet: Low Saturated Fat/2400 mg Sodium  Intake/Tolerance: Pt states she has been eating all of her meals.     LABS  Labs reviewed: alb 2.6, alk phos 293, ast 39, hgb 9.3, hematocrit 30.8, rbc 3.23, MCHC 29.9, RDW 18.3    MEDICATIONS  Medications reviewed: Magnesium sulfate, protonix, zofran    ANTHROPOMETRICS  Height: 165.1 cm (5' 5\")  Most Recent Weight: 48.3 kg (106 lb 8 oz)    IBW: 57 kg  BMI: Underweight BMI <18.5  Weight History:   Wt Readings from Last 30 Encounters:   02/24/23 48.3 kg (106 lb 8 oz)   02/21/23 49.1 kg (108 lb 3.2 oz)   01/16/23 53 kg (116 lb 12.8 oz)    "        8.9% wt loss in 1 month   12/06/22 51.3 kg (113 lb)   11/25/22 51.3 kg (113 lb)   11/23/22 51.3 kg (113 lb 3.2 oz)          5.8% wt loss in 3 months   11/16/22 51.5 kg (113 lb 9.6 oz)   11/08/22 52.1 kg (114 lb 12.8 oz)   11/02/22 49.4 kg (109 lb)   10/25/22 53.5 kg (117 lb 14.4 oz)   10/18/22 53.5 kg (117 lb 14.4 oz)   10/13/22 53.4 kg (117 lb 12.8 oz)   10/11/22 53.5 kg (117 lb 14.4 oz)   10/07/22 53.5 kg (117 lb 14.4 oz)   10/04/22 72.2 kg (159 lb 1.6 oz)   09/22/22 51 kg (112 lb 6.4 oz)   09/13/22 52.1 kg (114 lb 12.8 oz)         7.3% wt loss in 6 months   08/02/22 45.8 kg (101 lb)   07/31/22 45.4 kg (100 lb)   07/28/22 52.7 kg (116 lb 1.6 oz)   07/25/22 52.6 kg (116 lb)   07/25/22 52.9 kg (116 lb 9.6 oz)   12/09/21 49 kg (108 lb)                         1.4% wt loss in 1 year   11/09/21 54 kg (119 lb)   09/15/21 55.8 kg (123 lb)   06/15/21 56.7 kg (125 lb)   06/07/21 54.3 kg (119 lb 9.6 oz)   03/04/20 48.8 kg (107 lb 9.6 oz)   02/25/20 52.9 kg (116 lb 11.2 oz)   02/16/20 54 kg (119 lb)       Dosing Weight: 48.3 kg    ASSESSED NUTRITION NEEDS  Estimated Energy Needs: 1186 kcals/day ([place] [address] Jeor)  Justification: Increased needs, Repletion and Underweight  Estimated Protein Needs: 58-72 grams protein/day (1.2 - 1.5 grams of pro/kg)  Justification: Hypercatabolism with acute illness, Increased needs and Repletion  Estimated Fluid Needs: 2116-1555 mL/day (25 - 30 mL/kg)   Justification: Maintenance    PHYSICAL FINDINGS  See malnutrition section below.  Dry/flaky skin  Poor dentition   Scattered Bruising/abrasions/excoriation    MALNUTRITION:  % Weight Loss:  > 5% in 1 month (severe malnutrition)  % Intake:  Decreased intake does not meet criteria for malnutrition pt meeting 65% of meals in past 24 hours. If < 75% for 4 more days will meet criteria  Subcutaneous Fat Loss:  Orbital region moderate depletion and Thoracic region moderate-severe depletion (Pt declined exam observation only)  Muscle Loss:   Temporal region moderate depletion (Pt declined exam observation only)  Fluid Retention:  Moderate to severe (2-3+)    Malnutrition Diagnosis: Severe malnutrition  In Context of:  Acute illness    NUTRITION DIAGNOSIS  Malnutrition related to colitis as evidenced by unintentional weight loss of 8.9% in 1 month, moderate subcutaneous fat loss, moderate muscle loss, and moderate to severe fluid accumulation      INTERVENTIONS  Implementation  Nutrition Education: Per provider order if indicated   Collaboration with other providers  Medical food supplement therapy  Multivitamin/mineral supplement therapy   Micronutrient supplement therapy    Goals  Patient to consume % of nutritionally adequate meals three times per day, or the equivalent with supplements/snacks.  Total avg nutritional intake to meet a minimum of 1186 kcal/kg and 58 g PRO/kg daily (per dosing wt 48.3 kg).     Monitoring/Evaluation  Progress toward goals will be monitored and evaluated per protocol. PO intake, supplement tolerance, wt, labs

## 2023-02-25 NOTE — PLAN OF CARE
End of Shift Summary    For vital signs and complete assessments, please see documentation flowsheets.     Pertinent assessments: Pt alert and oriented x4, [place]. Pt complains of some abdominal pain, managed with norco and scheduled tylenol. Pt had 1 soft stool this morning. No stool sample sent to lab yet. Pt up with assist of 1 with gait belt. Pt denies any nausea at this time. Blood glucose 118. Tolerating diet well. IV saline locked. Pt on K and Mg protocol, K was 3.9, recheck in AM. Mg was 1.5, replaced, recheck was 2.0, recheck again in AM. Pt on tele monitoring, per Vericept tech, SR 90.    Major Shift Events: Ultrasound in AM    Treatment Plan: Stool sample, pain management    Bedside Nurse: Sue Tubbs RN

## 2023-02-26 NOTE — PROGRESS NOTES
[facility]    Medicine Progress Note - Hospitalist Service    Date of Admission:  2/24/2023    Assessment & Plan     89 year old female with a history of Breast Cancer, Compression Fractures, CAD, DM type 2, CHF (EF 35-40%), PAF, Pericarditis, GERD, Hiatel Hernia, GIB, Diverticulitis, Cdiff who presents from her nursing facility with nausea, and vomiting and coughing bright red blood beginning. She also reports right-sided abdominal pain and diarrhea.      1. Hematemesis.    EGD on 2/24 showed no significant upper GI pathology except likely minor irritation at GE junction.    Hemoglobin remained stable.    Aspirin on hold, continue Protonix    2. Abdominal pain    Right-sided colitis on CT.  GI consult appreciated.  o Fecal lactoferrin positive.  Calprotectin pending.  o Stool enteric panel, cryptosporidium/Giardia and C. difficile pending.  o Ischemic colitis is also in differential.     Patient has chronically elevated alkaline phosphatase and AST and ultrasound showing cholelithiasis with gallbladder sludge, cholecystitis cannot be ruled out.  Will check HIDA scan tomorrow, could not be done today.    3. Ischemic cardiomyopathy-CAD with CAMILA to mid and proximal LAD in January 2016    CHF not in exacerbation.    Continue atorvastatin and carvedilol.    Aspirin was held due to GI bleed.  Okay to resume p.o. Lasix once patient has good p.o. intake.    4. Chronic pain.    Has history of multiple orthopedic surgeries, compression fractures, osteoarthritis and osteoporosis.    Continue Tylenol, Voltaren Gel, Lidocaine patch, Lyrica, Norco and resume Fosamax upon discharge.    5. Other medical conditions    Paroxysmal atrial fibrillation.  EKG on presentation was normal sinus rhythm.  Not on anticoagulation.    Diabetes mellitus type 2.  Patient states she has never been diabetic and it was entered into the chart erroneously.  Her HbA1c is 6.6.  She does not want to be checked for blood sugars all  "the time and this has been removed.  Diagnosis of diabetes also removed from the history.    Anxiety disorder.  Continue sertraline.  Not taking Atarax regularly, currently on hold.    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 Guidelines for surveillance:  Largest cyst between 20-30 mm: EUS in 3-6 months and then annual surveillance alternating between MRI and EUS as appropriate.    History of breast cancer.  Diagnosed in 1998, status post right mastectomy.    History of postoperative right lower extremity DVT in 1988.         Diet: Low Saturated Fat Na <2400 mg  Snacks/Supplements Adult: Magic Cup; Between Meals    DVT Prophylaxis: Pneumatic Compression Devices  Manzano Catheter: Not present  Lines: None     Cardiac Monitoring: ACTIVE order. Indication: Tachyarrhythmias, acute (48 hours)  Code Status: No CPR- Do NOT Intubate      Clinically Significant Risk Factors            # Hypomagnesemia: Lowest Mg = 1.4 mg/dL in last 2 days, will replace as needed   # Hypoalbuminemia: Lowest albumin = 2.6 g/dL at 2/25/2023 11:11 AM, will monitor as appropriate          # DMII: A1C = 6.6 % (Ref range: <5.7 %) within past 6 months, PRESENT ON ADMISSION  # Cachexia: Estimated body mass index is 17.72 kg/m  as calculated from the following:    Height as of this encounter: 1.651 m (5' 5\").    Weight as of this encounter: 48.3 kg (106 lb 8 oz)., PRESENT ON ADMISSION  # Severe Malnutrition: based on nutrition assessment, PRESENT ON ADMISSION       Disposition Plan     Expected Discharge Date: 02/27/2023                  Eddie Mora MD  Hospitalist Service  [facility]  Securely message with [employer] (more info)  Text page via [facility] Paging/Directory   ______________________________________________________________________    Interval History   Patient seen with son at bedside.  No more diarrhea or hematemesis.  Still continues to complain of epigastric and right-sided pain.    Physical Exam   Vital Signs: Temp: 98.4  F (36.9 "  C) Temp src: Oral BP: 126/58 Pulse: 94   Resp: 20 SpO2: 94 % O2 Device: None (Room air)    Weight: 106 lbs 8 oz    Appears uncomfortable with abdominal pain.  Chest is clear to auscultation.  Heart S1-S2 is regular.  Abdomen.  Mild epigastric and right-sided tenderness.  Extremities no edema.       Medical Decision Making       MANAGEMENT DISCUSSED with the following over the past 24 hours: Patient, RN and son.   NOTE(S)/MEDICAL RECORDS REVIEWED over the past 24 hours: Nursing notes.      Data     I have personally reviewed the following data over the past 24 hrs:    5.9  \   9.8 (L)   / 253     134 (L) 102 15.9 /  98   4.1 22 0.64 \       Imaging results reviewed over the past 24 hrs:   No results found for this or any previous visit (from the past 24 hour(s)).  Recent Labs   Lab 02/26/23  1158 02/26/23  0847 02/26/23  0843 02/25/23  2357 02/25/23  2143 02/25/23  1221 02/25/23  1111 02/25/23  0652 02/24/23  1716 02/24/23  0939 02/23/23  1048 02/21/23  1043   WBC  --   --  5.9  --   --   --   --  7.1  --  5.5  --   --    HGB 9.8*  --  9.6* 8.7*  --    < >  --  9.2*  9.2*   < > 9.4*  --   --    MCV  --   --  91  --   --   --   --  95  --  95  --   --    PLT  --   --  253  --   --   --   --  256  --  266  --   --    INR  --   --   --   --   --   --   --   --   --  1.07  --  1.06   NA  --   --  134*  --   --   --  136  --   --  136   < >  --    POTASSIUM  --   --  4.1  --   --   --  3.9  --   --  3.8   < >  --    CHLORIDE  --   --  102  --   --   --  103  --   --  101   < >  --    CO2  --   --  22  --   --   --  22  --   --  25   < >  --    BUN  --   --  15.9  --   --   --  15.3  --   --  21.5   < >  --    CR  --   --  0.64  --   --   --  0.68  --   --  0.80   < >  --    ANIONGAP  --   --  10  --   --   --  11  --   --  10   < >  --    LIGIA  --   --  8.1*  --   --   --  7.8*  --   --  7.8*   < >  --    GLC  --  98 105*  --  97   < > 118*  --    < > 131*   < >  --    ALBUMIN  --   --   --   --   --   --  2.6*  --   --   2.7*   < >  --    PROTTOTAL  --   --   --   --   --   --  6.4  --   --  6.8   < >  --    BILITOTAL  --   --   --   --   --   --  0.4  --   --  0.4   < >  --    ALKPHOS  --   --   --   --   --   --  293*  --   --  327*   < >  --    ALT  --   --   --   --   --   --  12  --   --  13   < >  --    AST  --   --   --   --   --   --  39*  --   --  42*   < >  --    LIPASE  --   --   --   --   --   --   --   --   --  31  --   --     < > = values in this interval not displayed.     Most Recent 3 CBC's:Recent Labs   Lab Test 02/26/23  1158 02/26/23  0843 02/25/23  2357 02/25/23  1221 02/25/23  0652 02/24/23  1835 02/24/23  0939   WBC  --  5.9  --   --  7.1  --  5.5   HGB 9.8* 9.6* 8.7*   < > 9.2*  9.2*   < > 9.4*   MCV  --  91  --   --  95  --  95   PLT  --  253  --   --  256  --  266    < > = values in this interval not displayed.     Most Recent 3 BMP's:Recent Labs   Lab Test 02/26/23  0847 02/26/23  0843 02/25/23  2143 02/25/23  1306 02/25/23  1111 02/24/23  1716 02/24/23  0939   NA  --  134*  --   --  136  --  136   POTASSIUM  --  4.1  --   --  3.9  --  3.8   CHLORIDE  --  102  --   --  103  --  101   CO2  --  22  --   --  22  --  25   BUN  --  15.9  --   --  15.3  --  21.5   CR  --  0.64  --   --  0.68  --  0.80   ANIONGAP  --  10  --   --  11  --  10   LIGIA  --  8.1*  --   --  7.8*  --  7.8*   GLC 98 105* 97   < > 118*   < > 131*    < > = values in this interval not displayed.     Most Recent 2 LFT's:Recent Labs   Lab Test 02/25/23  1111 02/24/23  0939   AST 39* 42*   ALT 12 13   ALKPHOS 293* 327*   BILITOTAL 0.4 0.4

## 2023-02-26 NOTE — PROGRESS NOTES
[facility]    Medicine Progress Note - Hospitalist Service    Date of Admission:  2/24/2023    Assessment & Plan     89 year old female with a history of Breast Cancer, Compression Fractures, CAD, DM type 2, CHF (EF 35-40%), PAF, Pericarditis, GERD, Hiatel Hernia, GIB, Diverticulitis, Cdiff who presents from her nursing facility with nausea, and vomiting and coughing bright red blood beginning. She also reports right-sided abdominal pain and diarrhea.      1. Hematemesis.    EGD on 2/24 showed no significant upper GI pathology except likely minor irritation at GE junction.    Hemoglobin remained stable.    Resume asa today     2. Abdominal pain    Right-sided colitis on CT.  GI consult appreciated.  o Fecal lactoferrin positive.  Calprotectin pending.  o Stool enteric panel, cryptosporidium/Giardia and C. difficile pending.  o Ischemic colitis is also in differential.     Patient has chronically elevated alkaline phosphatase and AST and ultrasound showing cholelithiasis with gallbladder sludge, cholecystitis cannot be ruled out.  Will check HIDA scan tomorrow, could not be done today.-GI plans to fractionate her Alk phos, check hep serologies/MARTHA/AMA    3. Ischemic cardiomyopathy-CAD with CAMILA to mid and proximal LAD in January 2016    CHF with EF 35-40% not in exacerbation.    Continue atorvastatin and carvedilol.    Aspirin was held due to GI bleed.  Okay to resume p.o. Lasix today.  Aspirin resumed today     4. Chronic pain.    Has history of multiple orthopedic surgeries, compression fractures, osteoarthritis and osteoporosis.    Continue Tylenol, Voltaren Gel, Lidocaine patch, Lyrica, Norco and resume Fosamax upon discharge.    5. Other medical conditions    Paroxysmal atrial fibrillation.  EKG on presentation was normal sinus rhythm.  Not on anticoagulation.    Diabetes mellitus type 2.  Patient states she has never been diabetic and it was entered into the chart erroneously.  Her HbA1c  "is 6.6.  She does not want to be checked for blood sugars all the time and this has been removed.  Diagnosis of diabetes also removed from the history.    Anxiety disorder.  Continue sertraline.  Not taking Atarax regularly, currently on hold.    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 Guidelines for surveillance:  Largest cyst between 20-30 mm: EUS in 3-6 months and then annual surveillance alternating between MRI and EUS as appropriate.    History of breast cancer.  Diagnosed in 1998, status post right mastectomy.    History of postoperative right lower extremity DVT in 1988.         Diet: Low Saturated Fat Na <2400 mg  Snacks/Supplements Adult: Magic Cup; Between Meals    DVT Prophylaxis: Pneumatic Compression Devices  Manzano Catheter: Not present  Lines: None     Cardiac Monitoring: ACTIVE order. Indication: Tachyarrhythmias, acute (48 hours)  Code Status: No CPR- Do NOT Intubate      Clinically Significant Risk Factors            # Hypomagnesemia: Lowest Mg = 1.5 mg/dL in last 2 days, will replace as needed   # Hypoalbuminemia: Lowest albumin = 2.6 g/dL at 2/25/2023 11:11 AM, will monitor as appropriate          # DMII: A1C = 6.6 % (Ref range: <5.7 %) within past 6 months, PRESENT ON ADMISSION  # Cachexia: Estimated body mass index is 17.72 kg/m  as calculated from the following:    Height as of this encounter: 1.651 m (5' 5\").    Weight as of this encounter: 48.3 kg (106 lb 8 oz)., PRESENT ON ADMISSION  # Severe Malnutrition: based on nutrition assessment, PRESENT ON ADMISSION       Disposition Plan      Expected Discharge Date: 02/27/2023      Destination: assisted living;home with help/services            Ariadna Blank MD  Hospitalist Service  [facility]  Securely message with [employer] (more info)  Text page via [facility] Paging/Directory   ______________________________________________________________________    Interval History   Patient seen with son at bedside.  No more diarrhea or hematemesis.  " Still continues to complain of epigastric and right-sided pain.    Physical Exam   Vital Signs: Temp: 98.2  F (36.8  C) Temp src: Oral BP: 121/53 Pulse: 76   Resp: 20 SpO2: 93 % O2 Device: None (Room air)    Weight: 106 lbs 8 oz    Appears uncomfortable with abdominal pain.  Chest is clear to auscultation.  Heart S1-S2 is regular.  Abdomen.  Mild epigastric and right-sided tenderness.  Extremities no edema.       Medical Decision Making       MANAGEMENT DISCUSSED with the following over the past 24 hours: Patient, RN and son.   NOTE(S)/MEDICAL RECORDS REVIEWED over the past 24 hours: Nursing notes.      Data     I have personally reviewed the following data over the past 24 hrs:    5.9  \   9.8 (L)   / 253     134 (L) 102 15.9 /  98   4.1 22 0.64 \       Imaging results reviewed over the past 24 hrs:   No results found for this or any previous visit (from the past 24 hour(s)).  Recent Labs   Lab 02/26/23  1158 02/26/23  0847 02/26/23  0843 02/25/23  2357 02/25/23  2143 02/25/23  1221 02/25/23  1111 02/25/23  0652 02/24/23  1716 02/24/23  0939 02/23/23  1048 02/21/23  1043   WBC  --   --  5.9  --   --   --   --  7.1  --  5.5  --   --    HGB 9.8*  --  9.6* 8.7*  --    < >  --  9.2*  9.2*   < > 9.4*  --   --    MCV  --   --  91  --   --   --   --  95  --  95  --   --    PLT  --   --  253  --   --   --   --  256  --  266  --   --    INR  --   --   --   --   --   --   --   --   --  1.07  --  1.06   NA  --   --  134*  --   --   --  136  --   --  136   < >  --    POTASSIUM  --   --  4.1  --   --   --  3.9  --   --  3.8   < >  --    CHLORIDE  --   --  102  --   --   --  103  --   --  101   < >  --    CO2  --   --  22  --   --   --  22  --   --  25   < >  --    BUN  --   --  15.9  --   --   --  15.3  --   --  21.5   < >  --    CR  --   --  0.64  --   --   --  0.68  --   --  0.80   < >  --    ANIONGAP  --   --  10  --   --   --  11  --   --  10   < >  --    LIGIA  --   --  8.1*  --   --   --  7.8*  --   --  7.8*   < >  --     GLC  --  98 105*  --  97   < > 118*  --    < > 131*   < >  --    ALBUMIN  --   --   --   --   --   --  2.6*  --   --  2.7*   < >  --    PROTTOTAL  --   --   --   --   --   --  6.4  --   --  6.8   < >  --    BILITOTAL  --   --   --   --   --   --  0.4  --   --  0.4   < >  --    ALKPHOS  --   --   --   --   --   --  293*  --   --  327*   < >  --    ALT  --   --   --   --   --   --  12  --   --  13   < >  --    AST  --   --   --   --   --   --  39*  --   --  42*   < >  --    LIPASE  --   --   --   --   --   --   --   --   --  31  --   --     < > = values in this interval not displayed.     Most Recent 3 CBC's:  Recent Labs   Lab Test 02/26/23  1158 02/26/23  0843 02/25/23  2357 02/25/23  1221 02/25/23  0652 02/24/23  1835 02/24/23  0939   WBC  --  5.9  --   --  7.1  --  5.5   HGB 9.8* 9.6* 8.7*   < > 9.2*  9.2*   < > 9.4*   MCV  --  91  --   --  95  --  95   PLT  --  253  --   --  256  --  266    < > = values in this interval not displayed.     Most Recent 3 BMP's:  Recent Labs   Lab Test 02/26/23  0847 02/26/23  0843 02/25/23  2143 02/25/23  1306 02/25/23  1111 02/24/23  1716 02/24/23  0939   NA  --  134*  --   --  136  --  136   POTASSIUM  --  4.1  --   --  3.9  --  3.8   CHLORIDE  --  102  --   --  103  --  101   CO2  --  22  --   --  22  --  25   BUN  --  15.9  --   --  15.3  --  21.5   CR  --  0.64  --   --  0.68  --  0.80   ANIONGAP  --  10  --   --  11  --  10   LIGIA  --  8.1*  --   --  7.8*  --  7.8*   GLC 98 105* 97   < > 118*   < > 131*    < > = values in this interval not displayed.     Most Recent 2 LFT's:  Recent Labs   Lab Test 02/25/23  1111 02/24/23  0939   AST 39* 42*   ALT 12 13   ALKPHOS 293* 327*   BILITOTAL 0.4 0.4

## 2023-02-26 NOTE — PLAN OF CARE
Pt alert and oriented.  Calling to make needs known.  Pt tele SR.  Pt up SBA with walker to bathroom this shift.  Pt complaining of pain, receiving norco x1.  Plan to test for enteric disease.  Continue POC

## 2023-02-26 NOTE — PLAN OF CARE
Goal Outcome Evaluation:       Pertinent assessments: Pt alert and oriented x4, [place]. Pt complains of some abdominal pain, managed with norco and scheduled tylenol. No BM this shift, stool sample still needed. Tolerating diet well. IV saline locked. Purewick in place per pt request. Extensive brusing on bilat extremities. Mepilex on coccyx, R leg.   Edema bilat feet. Tele SR HR 80.     Major Shift Events: Ultrasound in AM    Treatment Plan: Stool sample, pain management    Bedside Nurse: CELESTINO ACOSTA RN

## 2023-02-26 NOTE — CONSULTS
Care Management Initial Consult    General Information  Assessment completed with: Patient, Estephania  Type of CM/SW Visit: Initial Assessment    Primary Care Provider verified and updated as needed: Yes   Readmission within the last 30 days:        Reason for Consult: discharge planning  Advance Care Planning: Advance Care Planning Reviewed: present on chart          Communication Assessment  Patient's communication style: spoken language (English or Bilingual)    Hearing Difficulty or Deaf: yes   Wear Glasses or Blind: yes    Cognitive  Cognitive/Neuro/Behavioral: WDL                      Living Environment:   People in home: alone     Current living Arrangements: assisted living [facility]  Able to return to prior arrangements: yes       Family/Social Support:  Care provided by: homecare agency (Facility staff)  Provides care for: no one  Marital Status:   Children, Facility resident(s)/Staff          Description of Support System: Supportive, Involved    Support Assessment: Adequate social supports, Adequate family and caregiver support    Current Resources:   Patient receiving home care services: Yes  Skilled Home Care Services: Skilled Nursing, Home Health Aid, Physicial Therapy, Occupational Therapy  Community Resources: Home Care - [facility]  Equipment currently used at home: grab bar, toilet, grab bar, tub/shower, walker, rolling, shower chair, lift device, dressing device  Supplies currently used at home: Incontinence Supplies    Employment/Financial:  Employment Status: retired        Financial Concerns: No concerns identified   Referral to Financial Worker: No       Lifestyle & Psychosocial Needs:  Social Determinants of Health     Tobacco Use: Low Risk      Smoking Tobacco Use: Never     Smokeless Tobacco Use: Never     Passive Exposure: Not on file   Alcohol Use: Not on file   Financial Resource Strain: Not on file   Food Insecurity: Not on file   Transportation Needs: Not on file    Physical Activity: Not on file   Stress: Not on file   Social Connections: Not on file   Intimate Partner Violence: Not on file   Depression: Not at risk     PHQ-2 Score: 0   Housing Stability: Not on file       Functional Status:  Prior to admission patient needed assistance:    Pt gets assistance with medication administration, bathing, and meals if needed.  Pt does not drive.        Mental Health Status:  Mental Health Status: No Current Concerns       Chemical Dependency Status:  Chemical Dependency Status: No Current Concerns             Values/Beliefs:  Spiritual, Cultural Beliefs, Christian Practices, Values that affect care:            Values/Beliefs Comment: [employer]ism    Additional Information:  Erendira met with the pt to discuss her discharge plan.  [facility]  She confirmed that she admitted from [facility].  She asked Erendira to call her dtr Wen for further discussion about discharge planning.  Erendira called and left a vm with the pt's dtr Wen, requesting a call back to discuss the pt's discharge plan.    SHAE Flowers, [facility]  Inpatient Care Coordination  [facility]  [phone]    ADDENDUM 1300:  Erendira received a call back from Wen who confirmed the above information.  Wen said that family can provide transport when the pt is discharge ready.  Erendira told Wen that they will be calling the pt's [facility] to discuss the pt's return with them as well and she identified no concerns.  Wen said that this weekend, the staff will likely not be able to confirm the pt's return and it will need to wait until tomorrow to talk with the nurse in charge.  Erendira told Wen that they will plan to call [facility] tomorrow.

## 2023-02-26 NOTE — PROGRESS NOTES
"[facility]  Gastroenterology Progress note      Assessment       Lactoferrin positive. Likely she had either infectious enterocolitis or ischemic colitis.  Clearly improved today.  No pain and wants to eat.  She is not interested in colonoscopy at present and is comfortable letting her bowels improve.    Elevated alk phos noted.  No dilated ducts on imaging studies. Sludge in GB likely not significant.  Will check alk phos fractionation, hep ABC and MARTHA, AMA.  This can be followed up as outpt      Plan    As above      Interval History     feeling much better      Physical Exam    /53 (BP Location: Right arm)   Pulse 76   Temp 98.2  F (36.8  C) (Oral)   Resp 20   Ht 1.651 m (5' 5\")   Wt 48.3 kg (106 lb 8 oz)   LMP  (LMP Unknown)   SpO2 93%   BMI 17.72 kg/m    Temp (24hrs), Av.2  F (36.8  C), Min:98  F (36.7  C), Max:98.4  F (36.9  C)    Patient Vitals for the past 72 hrs:   Weight   23 1656 48.3 kg (106 lb 8 oz)       Intake/Output Summary (Last 24 hours) at 2023 1527  Last data filed at 2023 1441  Gross per 24 hour   Intake 120 ml   Output 200 ml   Net -80 ml         Constitutional: NAD, comfortable  Cardiovascular: RRR, normal S1, S2   Respiratory: CTAB  Abdomen: soft, non-tender, nondistended, bs active      Additional Comments     ROS, FH, SH: See initial GI consult for details.    Lab Data     Recent Labs   Lab Test 23  1158 23  0843 23  2357 23  1221 23  0652 23  1835 23  0939 23  1043 23  0634 23  0938   WBC  --  5.9  --   --  7.1  --  5.5  --    < >  --    HGB 9.8* 9.6* 8.7*   < > 9.2*  9.2*   < > 9.4*  --    < >  --    MCV  --  91  --   --  95  --  95  --    < >  --    PLT  --  253  --   --  256  --  266  --    < >  --    INR  --   --   --   --   --   --  1.07 1.06  --  1.13    < > = values in this interval not displayed.     Recent Labs   Lab Test 23  0843 23  1111 " 02/24/23  0939   * 136 136   POTASSIUM 4.1 3.9 3.8   CHLORIDE 102 103 101   CO2 22 22 25   BUN 15.9 15.3 21.5   CR 0.64 0.68 0.80   ANIONGAP 10 11 10   LIGIA 8.1* 7.8* 7.8*     Recent Labs   Lab Test 02/25/23  1111 02/24/23  1307 02/24/23  0939 02/23/23  1048 02/19/23  0704 02/13/23  0809 01/16/23  0937 03/11/20  1537 02/11/20  2018 01/12/20  1631 12/11/19  1654   ALBUMIN 2.6*  --  2.7* 2.7*   < > 2.9* 3.0*   < >  --    < >  --    BILITOTAL 0.4  --  0.4 0.4   < > 0.5 0.8   < >  --    < >  --    ALT 12  --  13 13   < > 15 20   < >  --    < >  --    AST 39*  --  42* 44*   < > 44* 33   < >  --    < >  --    ALKPHOS 293*  --  327* 283*   < > 322* 238*   < >  --    < >  --    PROTEIN  --  10*  --   --   --   --   --   --  Negative  --  30*   LIPASE  --   --  31  --   --  28 28   < >  --    < >  --     < > = values in this interval not displayed.               CHUYITA Pearce MD  [facility]  Office: [phone] call if needed after 5PM or on weekends  Cell: [phone], not available after 5PM at this number

## 2023-02-27 NOTE — PROGRESS NOTES
Care Management Follow Up    Length of Stay (days): 3    Expected Discharge Date: 02/27/2023     Concerns to be Addressed: discharge planning     Patient plan of care discussed at interdisciplinary rounds: Yes    Anticipated Discharge Disposition: Assisted Living, Home Care     Anticipated Discharge Services: None  Anticipated Discharge DME: Walker    Patient/family educated on Medicare website which has current facility and service quality ratings:  (N/A)  Education Provided on the Discharge Plan:    Patient/Family in Agreement with the Plan: yes    Referrals Placed by CM/SW: Homecare  Private pay costs discussed: Not applicable    Additional Information:  Pt will not discharge today since she needs to be back at her [facility] by 2:00 today; pt will not be ready to discharge by then. KRYSTLE updated dtr, Wen about tentative discharge tomorrow and the need to be at [facility] by 2:00, preferably sooner. Family can transport;  will inform Wen when pt is ready to discharge tomorrow ASAPAlessio Schultz, nurse at [facility], [phone], requests new Rx be filled at [facility] and orders and any medical changes be faxed to [phone].  Pt does not need medications filled that pt was on prior to admit. [facility] is aware of updated discharge tomorrow, 2/28     SHAE Pelaez, [facility]  Inpatient Care Coordination  [facility]

## 2023-02-27 NOTE — TELEPHONE ENCOUNTER
[facility] Cert. Plan of Care 2/16-4/16/23 form received via fax. Placed in PCP's mailbox for review and signature.

## 2023-02-27 NOTE — PROGRESS NOTES
[facility]  Hospitalist Progress Note  Ariadna Blank MD 02/27/2023    Reason for Stay (Diagnosis): colitis         Assessment and Plan:      Summary of Stay: Gosia Alonzo is a 89 year old female with a history of PAF (not on stroke ppx), CAD s/p NSTEMI 2015 s/p thrombectomy and CAMILA to the LAD and initiated on DAPT complicated by GIB -> discontinuation of her ASA->in stent clot->repeat angio 1/16 with thrombectomy and balloon angioplasty all resulting in chronic ischemic CM now with EF in the 30-35 % range and stable) most recent echo 1/2023, hx of UGI bleed 12/2020 2/2 PUD, most recent echo 1/2023 30-35 % and stable, hx of C diff, GERD/Hiatal hernia admitted on 2/24/2023 with n/v with coughing up of bright read blood along with right sided abdominal pain with diarrhea.     Just hospitalized here 2/19-2/23/2023 for acute systolic HF exacerbation with pleural effusions s/p thoracentesis     Initial evaluation with stable VS and hemoglobin.  Underwent EGD on day 2 of hospitalization and without e/o source of GIB.  Stool studies positive for lactoferrin suggestive acute infectious vs ischemic colitis.      Her hematemesis vs hemoptysis has resolved.  Diarrhea has been tapering off        Problem List:   Hematemesis vs hemoptysis  No GI source by EGD.  Home aspirin held on admission and resumed 2/26  Only with single episode and no recurrence     Abdominal pain  Right sided colitis   Appreciate GI consult, lactoferrin positive,calprotectin very elevated at 481(nl range 0-50)  Both suggest inflammatory condition-infection vs ischemic colitis       :  Hep b/c negative, crypto and giardia negative, enteric panel negative.  AMA/MARTHA/Anti     Smooth muscle jazmine all pending, will follow-up with GI  -C diff ordered and not yet obtained, has hx of C diff, sx do seem to be improving on empiric oral vanco  She chronically has elevated alk phos so underwent NM HIDA 2/27 which was negative    Abrupt worsening of  abdominal pain after eating  I wonder a bit about IBS, she's quite anxious especially after her recent hospitalizations  -trial dicyclomine   ? If some IBS, will trial prn dicyclomine     Ischemic CM with EF 30-35 % range by most recent echo in 1/2023  CAD s/p NSTEMI 2015 s/p thrombectomy and CAMILA to the LAD and initiated on DAPT complicated by GIB -> discontinuation of her ASA->in stent clot->repeat angio 1/16 with thrombectomy and balloon angioplasty all resulting in chronic ischemic CM now  Htn/hlp  No e/o exacerbation  Cont with pta asa 81 mg/atorva 40    Chronic pain related to multiple orthopedic injuries/compression fx etc  Cont with pta norco 5-325 2 tabs q 4 hour prn     PAF  Not on anticoagulation presumably due to prior GIB      T2dm  Patient and family adamantly deny.  hgb A1c 6.6 %.  Not on any diabetic medications at home  -does not need routine testing/insulin coverage, both discontinued    Depression with anxiety     Probable IPMN of the pancreas    Probable IPMN of the pancreas. Guidelines for surveillance:  Largest cyst between 20-30 mm: EUS in 3-6 months and then annual surveillance alternating between MRI and EUS as appropriate.    Remote hx of breast cancer    Hx of post op DVT 1988    Covid   S/p 3 shot pfizer series followed by moderna booster 10/2022 (biv)      DVT Prophylaxis: Pneumatic Compression Devices  Code Status: DNR / DNI  Functional Status: lives at [facility] living [facility], ambulates with a walker  Diet: regular texture  [place]: not needed  Access PIV    Disposition Plan   Expected discharge in 1-2 days to prior living arrangement once abdominal pain adequately controlled and tolerating po.     Entered: Ariadna Blank MD 02/27/2023, 8:03 AM     Updated dtr in law Stephenson by phone     I spent 45 minutes reviewing epic including prior labs/imaging/medical history and notes related to this encounter  In addition time was spent in interveiwing the patient, communicating with contacts, and  "medical decision making      Interval History (Subjective):      Was doing better this am as diarrhea was tapering off then this afternoon with accelerating abdominal pain in association with eating.  No other CP/SOB                  Physical Exam:      Last Vital Signs:  /70 (BP Location: Right arm)   Pulse 84   Temp 98.6  F (37  C) (Oral)   Resp 18   Ht 1.651 m (5' 5\")   Wt 50.1 kg (110 lb 6.4 oz)   LMP  (LMP Unknown)   SpO2 92%   BMI 18.37 kg/m      Pleasant nad looks stated age head ncat sclera clear lungs ctab nl effort rrr no mrg no le edema skin warm and dry no cyanosis or clubbing alert and oriented affect appropriate dominguez belly pretty soft and not tender.           Medications:      All current medications were reviewed with changes reflected in problem list.         Data:      All new lab and imaging data was reviewed.   Labs:  Recent Labs   Lab 02/27/23  0611   *   POTASSIUM 4.1   CHLORIDE 103   CO2 22   ANIONGAP 10   *   BUN 17.0   CR 0.69   GFRESTIMATED 82   LIGIA 7.7*     Recent Labs   Lab 02/27/23  1238 02/27/23  0611   WBC  --  6.6   HGB 9.4* 9.1*  9.2*   HCT  --  30.4*   MCV  --  94   PLT  --  247     Recent Labs   Lab 02/27/23  0611 02/27/23  0220 02/26/23  2205 02/26/23  1657 02/26/23  0847   * 100* 120* 123* 98     Recent Labs   Lab 02/25/23  1111 02/24/23  0939 02/23/23  1048   AST 39* 42* 44*   ALT 12 13 13   ALKPHOS 293* 327* 283*   BILITOTAL 0.4 0.4 0.4      Imaging:   Results for orders placed or performed during the hospital encounter of 02/24/23   CT Abdomen Pelvis w/o Contrast    Narrative    CT ABDOMEN AND PELVIS WITHOUT CONTRAST 2/24/2023 9:23 AM    CLINICAL HISTORY: Right abdominal pain.     TECHNIQUE: CT scan of the abdomen and pelvis was performed without IV  contrast. Multiplanar reformats were obtained. Dose reduction  techniques were used.  CONTRAST: None.    COMPARISON: January 17, 2023    FINDINGS:   LOWER CHEST: Small bilateral pleural effusions " and associated  compressive atelectasis and/or infiltrate.    HEPATOBILIARY: No significant mass or bile duct dilatation. No  calcified gallstones. Stable calcifications that are likely  granulomatous.    PANCREAS: Stable cystic lesions in the tail of the pancreas.    SPLEEN: Normal size.    ADRENAL GLANDS: No significant nodules.    KIDNEYS/BLADDER: Stable hyperdense cysts. No hydronephrosis or  urolithiasis.    BOWEL: Wall thickening of the right colon compatible with a  nonspecific uncomplicated colitis. Question some rectosigmoid wall  thickening as well. Moderate diverticulosis without diverticulitis. No  obstruction.    VASCULATURE: There are moderate atherosclerotic changes of the  visualized aorta and its branches. There is no evidence of aortic  aneurysm.    PELVIC ORGANS: No pelvic masses.    OTHER: Small amount of free fluid. No free air.    MUSCULOSKELETAL: No frankly destructive bony lesions. Upper lumbar and  lower thoracic compression deformities again evident and unchanged.      Impression    IMPRESSION:   1.  Nonspecific uncomplicated colitis.  2.  Small bilateral pleural effusions and associated compressive  atelectasis and/or infiltrate.    HERI SHEETS MD         SYSTEM ID:  [id number]   XR Chest 2 Views    Narrative    CHEST TWO VIEWS 2/24/2023 10:12 AM     HISTORY: Cough    COMPARISON: February 18, 2023       Impression    IMPRESSION: Small bilateral pleural effusions and associated  compressive atelectasis and/or infiltrate similar to previous.  Interstitial prominence in the periphery of both lungs may be  interstitial edema. No airspace infiltrates in the mid and upper  lungs. The cardiac silhouette appears prominent, but similar to  previous. Pulmonary vasculature is unremarkable.    HERI SHEETS MD         SYSTEM ID:  [id number]   US Abdomen Limited    Narrative    EXAM: US ABDOMEN LIMITED  LOCATION: [facility]  DATE/TIME: 2/25/2023 9:36 AM    INDICATION: abdominal  pain, nausea, emesis.  Hx cholelithiasis  COMPARISON: 02/24/2023 CT study   TECHNIQUE: Limited abdominal ultrasound.    FINDINGS:  GALLBLADDER: Gallbladder sludge with trace pericholecystic fluid and mild gallbladder wall thickening. No sonographic Licona sign.     BILE DUCTS: No intrahepatic biliary dilatation. The common duct measures up to 8 mm.    LIVER: Normal parenchyma with smooth contour. No focal mass.    RIGHT KIDNEY: No hydronephrosis. Cysts do not require follow-up.    PANCREAS: The pancreas is largely obscured by overlying gas.    Small volume of ascites.      Impression    IMPRESSION:  1.  Gallbladder sludge, wall thickening, and trace pericholecystic fluid. No sonographic Licona sign. Cholecystitis cannot be excluded.  2.  No bile duct dilatation.          *Note: Due to a large number of results and/or encounters for the requested time period, some results have not been displayed. A complete set of results can be found in Results Review.

## 2023-02-27 NOTE — PROGRESS NOTES
Gastroenterology progress note    SUBJECTIVE:  HIDA scan completed. Patient back in room. Denies any abdominal pain, nausea or vomiting.     OBJECTIVE:  Vitals last 24 hours  Temp:  [97.7  F (36.5  C)-98.6  F (37  C)] 98.6  F (37  C)  Pulse:  [76-89] 86  Resp:  [16-20] 18  BP: (107-135)/(50-70) 121/50  SpO2:  [92 %-95 %] 92 % O2 Device: None (Room air)    Body mass index is 18.37 kg/m .  Constitutional: alert and no distress   Cardiovascular: PMI normal. RRR.   Respiratory: Good diaphragmatic excursion. Lungs clear  Abdomen: Abdomen soft, non-tender. BS normal.   SKIN: no suspicious lesions or rashes      Additional Comments:   ROS, FH, SH: See initial GI consult for details.      LABS:  CBC:  Recent Labs   Lab Test 02/27/23  1238 02/27/23  0611   WBC  --  6.6   RBC  --  3.25*   HGB 9.4* 9.1*  9.2*   HCT  --  30.4*   MCV  --  94   MCH  --  28.0   MCHC  --  29.9*   RDW  --  18.5*   PLT  --  247        BMP:  Recent Labs   Lab 02/27/23  0611 02/27/23  0220 02/26/23  2205 02/26/23  0847 02/26/23  0843 02/25/23  1306 02/25/23  1111   *  --   --   --  134*  --  136   POTASSIUM 4.1  --   --   --  4.1  --  3.9   CHLORIDE 103  --   --   --  102  --  103   CO2 22  --   --   --  22  --  22   * 100* 120*   < > 105*   < > 118*   CR 0.69  --   --   --  0.64  --  0.68   BUN 17.0  --   --   --  15.9  --  15.3    < > = values in this interval not displayed.       Liver/Pancreas:  Recent Labs   Lab 02/25/23  1111 02/24/23  0939 02/23/23  1048   AST 39* 42* 44*   ALT 12 13 13   ALKPHOS 293* 327* 283*   BILITOTAL 0.4 0.4 0.4   LIPASE  --  31  --      Recent Labs   Lab Test 02/24/23  0939   INR 1.07       IMAGING:    US Abdomen Limited    Result Date: 2/25/2023  EXAM: US ABDOMEN LIMITED LOCATION: [facility] DATE/TIME: 2/25/2023 9:36 AM INDICATION: abdominal pain, nausea, emesis.  Hx cholelithiasis COMPARISON: 02/24/2023 CT study TECHNIQUE: Limited abdominal ultrasound. FINDINGS: GALLBLADDER:  Gallbladder sludge with trace pericholecystic fluid and mild gallbladder wall thickening. No sonographic Licona sign. BILE DUCTS: No intrahepatic biliary dilatation. The common duct measures up to 8 mm. LIVER: Normal parenchyma with smooth contour. No focal mass. RIGHT KIDNEY: No hydronephrosis. Cysts do not require follow-up. PANCREAS: The pancreas is largely obscured by overlying gas. Small volume of ascites.     IMPRESSION: 1.  Gallbladder sludge, wall thickening, and trace pericholecystic fluid. No sonographic Licona sign. Cholecystitis cannot be excluded. 2.  No bile duct dilatation.       ASSESSMENT:  1. Hematemesis  2. Abdominal pain    89 yo woman with a history of Breast Cancer, Compression Fractures, CAD, DM type 2, CHF (EF 35-40%), PAF, Pericarditis, GERD, Hiatel Hernia, GIB, Diverticulitis, Cdiff who presented from her nursing facility with nausea, and vomiting and coughing bright red blood admitted with CHF exacerbation.    She developed some upper abdominal discomfort, diarrhea and hematemesis.  She has a history of duodenal ulcers in 2020 and has been on pantoprazole since. She also takes an 81 mg aspirin daily.     CT showed wall thickening of the right colon compatible with a nonspecific uncomplicated colitis. Question some rectosigmoid wall thickening as well. Moderate diverticulosis without diverticulitis. Noobstruction.   Patient has no interest in colonoscopy with improving symptoms.     EGD on 2/24 showed no significant upper GI pathology. Hemoglobin stable at 9.4 today. Stool test positive for lactoferrin and fecal omar protectin with negative enteric bacteria and virus panel, and giardia. HIDA scan pending. Pending liver tests- which can be followed outpatient.     PLAN:  - supportive measures per primary team.   - HIDA scan pending.   - continue to trend hemoglobin.   - continue PPI BID.   - GI will continue to follow.       Patient was seen and discussed with Dr. Kraft.       30 minutes of  total time was spent providing patient care, including patient evaluation, reviewing documentation/test results, and .          WILLIE Villalta,CNP  Thank you for the opportunity to participate in the care of this patient.   Please feel free to call with any questions or concerns.  [phone].

## 2023-02-27 NOTE — PLAN OF CARE
Goal Outcome Evaluation:         Pertinent assessments: Pt alert and oriented x4, [place]. Pt complains of some abdominal pain, Tolerating diet well. IV saline locked. Purewick in place per pt request. Extensive brusing on bilat extremities. Mepilex on coccyx, R leg.  Edema bilat feet. Patient slept through NOC     Major Shift Events: Ultrasound in AM    Treatment Plan: pain management    Bedside Nurse: Kendrick HSU RN

## 2023-02-27 NOTE — PLAN OF CARE
Goal Outcome Evaluation:       Pertinent assessments: Pt alert and oriented x4, [place] IRA. Pt complains of some abdominal pain, managed with norco and scheduled tylenol. Large loose BM, A1 with walker and gait belt to BR. Tolerating diet well. IV saline locked. Purewick in place per pt request. Extensive brusing on bilat extremities. Mepilex on coccyx, R leg.  Edema bilat feet. Tele SR, discontinued tele per MD order.     Major Shift Events: Ultrasound in AM    Treatment Plan: pain management    Bedside Nurse: CELESTINO ACOSTA RN

## 2023-02-27 NOTE — PROGRESS NOTES
[facility]  Patient is currently open to home care services with [facility]. The patient is currently receiving SN services.  [facility]  and team have been notified of patient admission.  [facility] liaison will continue to follow patient during stay.  If appropriate provide orders to resume home care at time of discharge.

## 2023-02-28 NOTE — PLAN OF CARE
End of Shift Summary  For vital signs and complete assessments, please see documentation flowsheets.     Pertinent assessments: Pt A/Ox4, VSS on RA ex soft bp. Reports pain at 7 in abdomen, cramping, given bentyl, norco x2 with relief per pt. Up A1-2 WGB. [place]. PW in place, no BM, need sample. Mag replaced, rechecked for 2.6, hgb recheck in AM. Denies nausea. PI to sacrum/coccyx no mepi in place due to incontinent stooling, t\r q2h. BLE xena, bruised. R foot edema +2, chronic per pt. IV access restored, SL    Major Shift Events: None    Treatment Plan: pain management, stool sample, vancomycin, monitor labs    Bedside Nurse: Arlin Jonas RN

## 2023-02-28 NOTE — PROGRESS NOTES
Notified by nursing that patient lost IV access.  Having difficulty getting new IV.  I placed one time order for oral pantoprazole.  Nurse paging flyer to see if peripheral IV can be obtained.    Ang Perdue MD

## 2023-02-28 NOTE — DISCHARGE SUMMARY
"Discharge Summary  Hospitalist Service    Gosia Alonzo MRN# [id number]   YOB: 1932 Age: 89 year old     Date of Admission:  2/24/2023  Date of Discharge:  2/28/2023  Admitting Physician:  Jose Barnes MD  Discharge Physician: Ariadna Blank MD  Discharging Service: Hospitalist Service     Primary Provider: Michael Londono   Primary Care Physician Phone Number: [phone]         Discharge Diagnoses/Problem Oriented Hospital Course (Providers):      Gosia Alonzo was admitted on 2/24/2023 by Jose Barnes MD and I would refer you to their history and physical.  The following problems were addressed during her hospitalization:    Single episode of hemoptysis vs hematemesis  Inflammatory colitis complicated by IBS  Chronic systolic HF 2/2 ischemic CM complicated by pleural effusions  Htn/hlp  Had carried the dx of \"T2dm\" and hgb A1c 6.6 %-patient and family deny this diagnosis  Probably IPMN of the pancreas with recs for follow-up with GI per prior hospitalization   Chronic Pain  Pressure wound present pta       Summary of Stay: Gosia Alonzo is a 89 year old female with a history of PAF (not on stroke ppx), CAD s/p NSTEMI 2015 s/p thrombectomy and CAMILA to the LAD and initiated on DAPT complicated by GIB -> discontinuation of her ASA->in stent clot->repeat angio 1/16 with thrombectomy and balloon angioplasty all resulting in chronic ischemic CM now with EF in the 30-35 % range and stable) most recent echo 1/2023, hx of UGI bleed 12/2020 2/2 PUD, most recent echo 1/2023 30-35 % and stable, hx of C diff, GERD/Hiatal hernia admitted on 2/24/2023 with n/v with coughing up of bright read blood along with right sided abdominal pain with diarrhea.      Just hospitalized here 2/19-2/23/2023 for acute systolic HF exacerbation with pleural effusions s/p thoracentesis      Initial evaluation with stable VS and hemoglobin.  Underwent EGD on day 2 of hospitalization and without e/o " source of GIB.  Stool studies positive for lactoferrin suggestive acute infectious vs ischemic colitis.       Her hematemesis vs hemoptysis has resolved.  Diarrhea has been tapering off           Problem List:   Hematemesis vs hemoptysis  No GI source by EGD.  Home aspirin held on admission and resumed 2/26  Only with single episode and no recurrence      Abdominal pain  Right sided colitis   Appreciate GI consult, lactoferrin positive,calprotectin very elevated at 481(nl range 0-50)  Both suggest inflammatory condition-infection vs ischemic colitis       :  Hep b/c negative, crypto and giardia negative, enteric panel negative.  AMA/MARTHA/Anti                             Smooth muscle jazmine all pending, will follow-up with GI  -C diff ordered and never obtained, has hx of C diff, sx do seem to be improving on empiric oral vanco (although difficult to know for sure.   Will complete total of 7 days of oral vanco  -I also suspect a degree of post colitis IBS, is responding well to dicyclomine.  Will discharge with limited Rx as should be transient - see below   She chronically has elevated alk phos so underwent NM HIDA 2/27 which was negative     Abrupt worsening of abdominal pain after eating  I wonder a bit about IBS, she's quite anxious especially after her recent hospitalizations  -trial dicyclomine   ? If some IBS,  trial prn dicyclomine with excellent results      Ischemic CM with EF 30-35 % range by most recent echo in 1/2023  CAD s/p NSTEMI 2015 s/p thrombectomy and CAMILA to the LAD and initiated on DAPT complicated by GIB -> discontinuation of her ASA->in stent clot->repeat angio 1/16 with thrombectomy and balloon angioplasty all resulting in chronic ischemic CM now  Htn/hlp  No e/o exacerbation but will checked CXR at dtr in law request to ensure no re-accumulation prior to discharge     -should get repeat cxr in one week and likely on some schedule there after to follow effusions and                 arrange for OP  thoracenteses if needed  Cont with pta asa 81 mg/atorva 40  Cont with furosemide/carvedilol      Chronic pain related to multiple orthopedic injuries/compression fx etc  Cont with pta norco 5-325 2 tabs q 4 hour prn      PAF  Not on anticoagulation presumably due to prior GIB     T2dm  Patient and family adamantly deny.  hgb A1c 6.6 %.  Not on any diabetic medications at home  -does not need routine testing/insulin coverage, both discontinued     Depression with anxiety      Probable IPMN of the pancreas    Probable IPMN of the pancreas. Guidelines for surveillance:  Largest cyst between 20-30 mm: EUS in 3-6 months and then annual surveillance alternating between MRI and EUS as appropriate.     Remote hx of breast cancer     Hx of post op DVT 1988    Pressure ulcer present pta  Resumed home RN/wound care    Severe malnutrition in the context of acute illness  See dietician note 2/25/2023     Covid   S/p 3 shot pfizer series followed by moderna booster 10/2022 (biv)        DVT Prophylaxis: Pneumatic Compression Devices  Code Status: DNR / DNI  Functional Status: lives at [facility] living [facility], ambulates with a walker  Diet: regular texture  Manzano: not needed  Access PIV            Code Status:      DNR / DNI        Brief Hospital Stay Summary Sent Home With Patient in AVS:          Reason for your hospital stay      Colitis, ? Post illness IBS                      Important Results:        As noted below          Pending Results:        Unresulted Labs Ordered in the Past 30 Days of this Admission     Date and Time Order Name Status Description    2/27/2023 12:02 AM F Actin EIA with reflex In process     2/26/2023  3:33 PM Mitochondrial M2 Antibody IgG In process     2/26/2023  3:33 PM Anti Nuclear Kerri IgG by IFA with Reflex In process             Discharge Instructions and Follow-Up:      Follow-up Appointments     Follow-up and recommended labs and tests       Follow-up with PCP 5-7 days, you should get a repeat CXR  at that time to   follow-up the fluid around your lungs.  You may periodically need   outpatient thoracenteses (where they drain the fluid from around your   lung)    Please call [facility] to arrange for follow-up of pending liver tests and to   arrange follow-up for pancreas lesion seen on scanning at prior   hospitalization               Discharge Disposition:        Discharged to home         Discharge Medications:        Current Discharge Medication List      START taking these medications    Details   dicyclomine (BENTYL) 10 MG capsule Take 1 capsule (10 mg) by mouth 3 times daily as needed (abdominal cramping)  Qty: 60 capsule, Refills: 0    Associated Diagnoses: Colitis      vancomycin (VANCOCIN) 125 MG capsule Take 1 capsule (125 mg) by mouth 4 times daily for 14 doses  Qty: 14 capsule, Refills: 0    Associated Diagnoses: Colitis         CONTINUE these medications which have NOT CHANGED    Details   acetaminophen (TYLENOL) 500 MG tablet Take 500 mg by mouth 3 times daily      albuterol (PROAIR HFA/PROVENTIL HFA/VENTOLIN HFA) 108 (90 Base) MCG/ACT inhaler Inhale 2 puffs into the lungs every 6 hours as needed for shortness of breath, wheezing or cough  Qty: 18 g, Refills: 0    Comments: Pharmacy may dispense brand covered by insurance (Proair, or proventil or ventolin or generic albuterol inhaler)      alendronate (FOSAMAX) 70 MG tablet Take 1 tablet (70 mg) by mouth every 7 days  Qty: 4 tablet, Refills: 0    Associated Diagnoses: Age-related osteoporosis with current pathological fracture with delayed healing, subsequent encounter      aspirin EC 81 MG EC tablet Take 1 tablet (81 mg) by mouth daily      atorvastatin (LIPITOR) 40 MG tablet Take 1 tablet (40 mg) by mouth every evening  Qty: 30 tablet, Refills: 0    Comments: Need to update lipid panel--last done in September 2021.  Associated Diagnoses: ST elevation myocardial infarction (STEMI) involving other coronary artery of anterior wall (H); Clostridium  difficile diarrhea      bisacodyl (DULCOLAX) 5 MG EC tablet Take 5 mg by mouth daily as needed for constipation      calcium carbonate (TUMS) 500 MG chewable tablet Take 2 chew tab by mouth as needed for heartburn Max 15 tabs/day, separate from other drugs by 1 hr      carvedilol (COREG) 3.125 MG tablet Take 1 tablet (3.125 mg) by mouth 2 times daily (with meals)  Qty: 180 tablet, Refills: 3    Associated Diagnoses: Ischemic cardiomyopathy      diclofenac (VOLTAREN) 1 % topical gel Apply 2 g topically 3 times daily      fluticasone (FLONASE) 50 MCG/ACT nasal spray Spray 2 sprays into both nostrils daily as needed for rhinitis or allergies      furosemide (LASIX) 20 MG tablet Take 2 tablets (40 mg) by mouth daily Add additional 20mg for increased edema  Qty: 95 tablet, Refills: 2    Associated Diagnoses: Ischemic cardiomyopathy      guaiFENesin (ROBITUSSIN) 20 mg/mL liquid Take 10 mLs by mouth every 4 hours as needed for cough      HYDROcodone-acetaminophen (NORCO) 5-325 MG tablet Take 2 tablets by mouth 4 times daily as needed for severe pain (7-10) Begin substantial taper ASAP to previous chronic directions of one tablet BID prn.  Qty: 1 tablet, Refills: 0    Comments: Do not fill  Associated Diagnoses: Other chronic pain; Closed fracture of proximal end of left humerus with routine healing, unspecified fracture morphology, subsequent encounter      hydrocortisone (CORTAID) 1 % external cream Apply topically 4 times daily as needed for itching or rash      hydrOXYzine (ATARAX) 25 MG tablet Take 1 tablet (25 mg) by mouth 4 times daily as needed  Qty: 30 tablet, Refills: 0    Associated Diagnoses: Herpes zoster infection of thoracic region      Hyprom-Naphaz-Polysorb-Zn Sulf (CLEAR EYES COMPLETE) SOLN Apply 2 drops to eye every 4 hours as needed      Lidocaine (LIDOCARE) 4 % Patch Place 2 patches onto the skin every 24 hours To prevent lidocaine toxicity, patient should be patch free for 12 hrs daily.  Qty: 30 patch,  Refills: 0    Associated Diagnoses: Compression fracture of T3 vertebra, initial encounter (H)      loperamide (IMODIUM) 2 MG capsule Take 2 mg by mouth as needed for diarrhea      loratadine (CLARITIN) 10 MG tablet Take 10 mg by mouth daily as needed for allergies      magnesium hydroxide (MILK OF MAGNESIA) 400 MG/5ML suspension Take 30 mLs by mouth daily as needed for constipation      multivitamin, therapeutic (THERA-VIT) TABS tablet Take 1 tablet by mouth daily      nitroGLYcerin (NITROSTAT) 0.4 MG sublingual tablet For chest pain place 1 tablet under the tongue every 5 minutes for 3 doses. If symptoms persist 5 minutes after 1st dose call 911.  Qty: 25 tablet, Refills: 3    Associated Diagnoses: Coronary artery disease involving native coronary artery of native heart without angina pectoris      pantoprazole (PROTONIX) 40 MG EC tablet TAKE 1 TABLET BY MOUTH TWICE DAILY  Qty: 180 tablet, Refills: 0    Associated Diagnoses: Acute GI bleeding; Gastroesophageal reflux disease with esophagitis, unspecified whether hemorrhage      polyethylene glycol (MIRALAX) 17 GM/Dose powder Take 17 g by mouth daily  Qty: 510 g, Refills: 0    Associated Diagnoses: Compression fracture of T3 vertebra, initial encounter (H)      pregabalin (LYRICA) 25 MG capsule Take 1 capsule (25 mg) by mouth 2 times daily  Qty: 60 capsule, Refills: 0    Associated Diagnoses: Closed fracture of proximal end of left humerus with routine healing, unspecified fracture morphology, subsequent encounter      senna-docusate (SENOKOT-S/PERICOLACE) 8.6-50 MG tablet Take 1 tablet by mouth 2 times daily as needed for constipation  Qty: 100 tablet, Refills: 0    Associated Diagnoses: Compression fracture of T3 vertebra, initial encounter (H)      sertraline (ZOLOFT) 25 MG tablet Take 1 tablet (25 mg) by mouth daily  Qty: 30 tablet, Refills: 0    Associated Diagnoses: Anxiety      Trolamine Salicylate (ASPERCREME EX) Externally apply topically daily as needed  "              Allergies:         Allergies   Allergen Reactions     Codeine      GI UPSET     Escitalopram Oxalate      fatigue     Esomeprazole Magnesium Trihydrate      HA     Gabapentin Dizziness     Dizziness, confusion     Imdur [Isosorbide]      Headache       Meperidine Hcl      N/V     Morphine Hcl      HIVES     Oxycodone      (percodan) GI UPSET     Pentazocine      (talwin)  HALLUCINATIONS     Propoxyphene Hcl      STOMACH UPSET     Sumatriptan Succinate      chest pain           Consultations This Hospital Stay:        Consultation during this admission received from GI/SW/dietician         Condition and Physical on Discharge:        Discharge condition: Stable   Vitals: Blood pressure 125/60, pulse 84, temperature 98.5  F (36.9  C), temperature source Oral, resp. rate 16, height 1.651 m (5' 5\"), weight 52 kg (114 lb 9.6 oz), SpO2 90 %, not currently breastfeeding.     Constitutional: Pleasant nad looks stated age, thin     Lungs: cta diminished bs in the bases but otherwise cta   Cardiovascular: rrr no mrg, mild chronic edema of the right ankle   Abdomen: bs + s/nt/nd, tolerating po without difficulty    Skin: Warm and dry no cyanosis or clubbing    Other: Alert and oriented affect appropriate dominguez          Discharge Time:      Greater than 30 minutes.        Image Results From This Hospital Stay (For Non-EPIC Providers):        Results for orders placed or performed during the hospital encounter of 02/24/23   CT Abdomen Pelvis w/o Contrast    Narrative    CT ABDOMEN AND PELVIS WITHOUT CONTRAST 2/24/2023 9:23 AM    CLINICAL HISTORY: Right abdominal pain.     TECHNIQUE: CT scan of the abdomen and pelvis was performed without IV  contrast. Multiplanar reformats were obtained. Dose reduction  techniques were used.  CONTRAST: None.    COMPARISON: January 17, 2023    FINDINGS:   LOWER CHEST: Small bilateral pleural effusions and associated  compressive atelectasis and/or infiltrate.    HEPATOBILIARY: No " significant mass or bile duct dilatation. No  calcified gallstones. Stable calcifications that are likely  granulomatous.    PANCREAS: Stable cystic lesions in the tail of the pancreas.    SPLEEN: Normal size.    ADRENAL GLANDS: No significant nodules.    KIDNEYS/BLADDER: Stable hyperdense cysts. No hydronephrosis or  urolithiasis.    BOWEL: Wall thickening of the right colon compatible with a  nonspecific uncomplicated colitis. Question some rectosigmoid wall  thickening as well. Moderate diverticulosis without diverticulitis. No  obstruction.    VASCULATURE: There are moderate atherosclerotic changes of the  visualized aorta and its branches. There is no evidence of aortic  aneurysm.    PELVIC ORGANS: No pelvic masses.    OTHER: Small amount of free fluid. No free air.    MUSCULOSKELETAL: No frankly destructive bony lesions. Upper lumbar and  lower thoracic compression deformities again evident and unchanged.      Impression    IMPRESSION:   1.  Nonspecific uncomplicated colitis.  2.  Small bilateral pleural effusions and associated compressive  atelectasis and/or infiltrate.    HERI SHEETS MD         SYSTEM ID:  [id number]   XR Chest 2 Views    Narrative    CHEST TWO VIEWS 2/24/2023 10:12 AM     HISTORY: Cough    COMPARISON: February 18, 2023       Impression    IMPRESSION: Small bilateral pleural effusions and associated  compressive atelectasis and/or infiltrate similar to previous.  Interstitial prominence in the periphery of both lungs may be  interstitial edema. No airspace infiltrates in the mid and upper  lungs. The cardiac silhouette appears prominent, but similar to  previous. Pulmonary vasculature is unremarkable.    HERI SHEETS MD         SYSTEM ID:  [id number]   US Abdomen Limited    Narrative    EXAM: US ABDOMEN LIMITED  LOCATION: [facility]  DATE/TIME: 2/25/2023 9:36 AM    INDICATION: abdominal pain, nausea, emesis.  Hx cholelithiasis  COMPARISON: 02/24/2023 CT study    TECHNIQUE: Limited abdominal ultrasound.    FINDINGS:  GALLBLADDER: Gallbladder sludge with trace pericholecystic fluid and mild gallbladder wall thickening. No sonographic Licona sign.     BILE DUCTS: No intrahepatic biliary dilatation. The common duct measures up to 8 mm.    LIVER: Normal parenchyma with smooth contour. No focal mass.    RIGHT KIDNEY: No hydronephrosis. Cysts do not require follow-up.    PANCREAS: The pancreas is largely obscured by overlying gas.    Small volume of ascites.      Impression    IMPRESSION:  1.  Gallbladder sludge, wall thickening, and trace pericholecystic fluid. No sonographic Licona sign. Cholecystitis cannot be excluded.  2.  No bile duct dilatation.        NM Hepatobiliary Scan w GB EF    Narrative    NM HEPATOBILIARY SCAN WITH GALLBLADDER EJECTION FRACTION   2/27/2023  11:18 AM     HISTORY: Epigastric pain. Gallbladder sludge on ultrasound.  COMPARISON: Right upper quadrant ultrasound performed 2/25/2023.  TECHNIQUE: The patient was injected with 6 mCi technetium 99m Choletec  followed by dynamic imaging over the biliary system for 60 minutes.  Patient was then given an slow intravenous infusion of a total of 1  mcg of CCK, followed by generation of gallbladder volume curves to  estimate gallbladder ejection fraction.    FINDINGS: There is rapid uptake of radiotracer throughout the liver  without focal abnormalities. Radiotracer is seen within the common  bile duct by 5 minutes, and in the gallbladder by 20 minutes. Bowel  activity is seen by 20 minutes. The bile ducts are unremarkable,  without evidence for biliary dilatation. No evidence for cystic duct  or common bile duct obstruction.    After administration of CCK, no patient symptoms were reported.  Calculated gallbladder ejection fraction is 100%, which is within the  normal range of 35% or greater.      Impression    IMPRESSION:   1. Unremarkable hepatobiliary scan without evidence for acute  cholecystitis or biliary  obstruction.  2. Normal gallbladder ejection fraction.      RENATO MONTANO MD         SYSTEM ID:  [id number]     *Note: Due to a large number of results and/or encounters for the requested time period, some results have not been displayed. A complete set of results can be found in Results Review.           Most Recent Lab Results In EPIC (For Non-EPIC Providers):    Most Recent 3 CBC's:  Recent Labs   Lab Test 02/28/23  0733 02/28/23  0033 02/27/23  1811 02/27/23  1238 02/27/23  0611 02/26/23  1158 02/26/23  0843   WBC 6.3  --   --   --  6.6  --  5.9   HGB 9.4*  9.6* 9.0* 9.9*   < > 9.1*  9.2*   < > 9.6*   MCV 93  --   --   --  94  --  91     --   --   --  247  --  253    < > = values in this interval not displayed.      Most Recent 3 BMP's:  Recent Labs   Lab Test 02/28/23  0733 02/27/23  0611 02/27/23  0220 02/26/23  0847 02/26/23  0843   * 135*  --   --  134*   POTASSIUM 4.0 4.1  --   --  4.1   CHLORIDE 101 103  --   --  102   CO2 23 22  --   --  22   BUN 17.9 17.0  --   --  15.9   CR 0.71 0.69  --   --  0.64   ANIONGAP 10 10  --   --  10   LIGIA 7.5* 7.7*  --   --  8.1*   * 113* 100*   < > 105*    < > = values in this interval not displayed.     Most Recent 3 INR's:  Recent Labs   Lab Test 02/24/23  0939 02/21/23  1043 01/16/23  0938   INR 1.07 1.06 1.13     Most Recent 2 LFT's:  Recent Labs   Lab Test 02/25/23  1111 02/24/23  0939   AST 39* 42*   ALT 12 13   ALKPHOS 293* 327*   BILITOTAL 0.4 0.4

## 2023-02-28 NOTE — PROGRESS NOTES
Care Management Discharge Note    Discharge Date: 02/28/2023       Discharge Disposition: Assisted Living, Home Care    Discharge Services: None    Discharge DME: Walker    Discharge Transportation: family or friend will provide    Private pay costs discussed: Not applicable    PAS Confirmation Code:  (N/A)  Patient/family educated on Medicare website which has current facility and service quality ratings:  (N/A)    Education Provided on the Discharge Plan:  yes  Persons Notified of Discharge Plans: dtr, pt, nurse, penitentiary  Patient/Family in Agreement with the Plan: yes    Handoff Referral Completed: Yes    Additional Information:  Dtr was called this am regarding discharge today at 1:30.  If anything changes after scan, SW will contact her. Family will pick pt up.  Discharge orders faxed to [email or web address].    SHAE Pelaez, [facility]  Inpatient Care Coordination  [facility]  [phone]

## 2023-02-28 NOTE — PROGRESS NOTES
Gastroenterology progress note    SUBJECTIVE:  Patient sleeping comfortably, easy to arouse.  Denies any abdominal pain, nausea or vomiting.     OBJECTIVE:  Vitals last 24 hours  Temp:  [97.9  F (36.6  C)-98.6  F (37  C)] 98.5  F (36.9  C)  Pulse:  [77-86] 84  Resp:  [16-18] 16  BP: ()/(42-60) 125/60  SpO2:  [90 %-94 %] 90 % O2 Device: None (Room air)    Body mass index is 19.07 kg/m .  Constitutional: easy to arouse.   Cardiovascular: PMI normal. RRR.   Respiratory: Good diaphragmatic excursion. Lungs clear  Abdomen: Abdomen soft, non-tender. BS normal.   SKIN: no suspicious lesions or rashes      Additional Comments:   ROS, FH, SH: See initial GI consult for details.      LABS:  CBC:  Recent Labs   Lab Test 02/27/23  1238 02/27/23  0611   WBC  --  6.6   RBC  --  3.25*   HGB 9.4* 9.1*  9.2*   HCT  --  30.4*   MCV  --  94   MCH  --  28.0   MCHC  --  29.9*   RDW  --  18.5*   PLT  --  247        BMP:  Recent Labs   Lab 02/28/23  0733 02/27/23  0611 02/27/23  0220 02/26/23  0847 02/26/23  0843   * 135*  --   --  134*   POTASSIUM 4.0 4.1  --   --  4.1   CHLORIDE 101 103  --   --  102   CO2 23 22  --   --  22   * 113* 100*   < > 105*   CR 0.71 0.69  --   --  0.64   BUN 17.9 17.0  --   --  15.9    < > = values in this interval not displayed.       Liver/Pancreas:  Recent Labs   Lab 02/25/23  1111 02/24/23  0939 02/23/23  1048   AST 39* 42* 44*   ALT 12 13 13   ALKPHOS 293* 327* 283*   BILITOTAL 0.4 0.4 0.4   LIPASE  --  31  --      Recent Labs   Lab Test 02/24/23  0939   INR 1.07       IMAGING:    US Abdomen Limited  Result Date: 2/25/2023  IMPRESSION: 1.  Gallbladder sludge, wall thickening, and trace pericholecystic fluid. No sonographic Licona sign. Cholecystitis cannot be excluded. 2.  No bile duct dilatation.     ASSESSMENT:  1. Hematemesis  2. Abdominal pain    89 yo woman with a history of Breast Cancer, Compression Fractures, CAD, DM type 2, CHF (EF 35-40%), PAF, Pericarditis, GERD, Hiatel  Hernia, GIB, Diverticulitis, Cdiff who presented from her nursing facility with nausea, and vomiting and coughing bright red blood admitted with CHF exacerbation.    She developed some upper abdominal discomfort, diarrhea and hematemesis.  She has a history of duodenal ulcers in 2020 and has been on pantoprazole since. She was also taking 81 mg aspirin daily.     CT showed wall thickening of the right colon compatible with a nonspecific uncomplicated colitis. Question some rectosigmoid wall thickening as well. Moderate diverticulosis without diverticulitis. Noobstruction.   Patient has no interest in colonoscopy with improving symptoms.     EGD on 2/24 showed no significant upper GI pathology. Hemoglobin stable at 9.4 today. Stool test positive for lactoferrin and fecal omar protectin with negative enteric bacteria and virus panel, and giardia.     HIDA scan from 2/27 was unremarkable without evidence for acute cholecystitis or biliary obstruction. Normal gallbladder ejection fraction.      Hemoglobin levels have remained stable.     PLAN:  - supportive measures per primary team.   - continue to trend hemoglobin.   - continue PPI BID.   - GI will sign off, thanks for letting us participate in her care! Please don't hesitate to call with any questions or concerns.       Patient was seen and discussed with Dr. Kraft.       30 minutes of total time was spent providing patient care, including patient evaluation, reviewing documentation/test results, and .          WILLIE Villalta,CNP  Thank you for the opportunity to participate in the care of this patient.   Please feel free to call with any questions or concerns.  [phone].

## 2023-02-28 NOTE — PLAN OF CARE
Goal Outcome Evaluation:        Pt up Ax1-2 with walker & belt. A&O. [place]. Epigastric pain, worse after eating, giving Norco & Bentyl added, relief. Chronic back pain. Incont of stool x2, soft, will send cdiff sample once obtained. Mag 1.5, replaced, will recheck. Jimy diet, appetite fair, denies nausea. Had HIDA scan. Pressure ulcers on buttocks. R foot edena, chronic. BIANKA mccallum. Held Coreg, BP soft.

## 2023-02-28 NOTE — PROGRESS NOTES
Pt discharged back to facility. Discharge instructions given & verbalized understanding. Discharge meds sent with pt.

## 2023-03-01 NOTE — PROGRESS NOTES
[facility]    Background: Transitional Care Management program identified per system criteria and reviewed by [facility] [place] team for possible outreach.    Assessment: Upon chart review, CCR Team member will not proceed with patient outreach related to this episode of Transitional Care Management program due to reason below:    [facility] TCU: Patient discharged to TCU/ARU/LTACH and is established within [facility]. Referral created for Primary Care-Care Coordination program.    Plan: Transitional Care Management episode addressed appropriately per reason noted above.      SALVATORE Mathews  [facility] [place], [facility]    *Connected Care Resource Team does NOT follow patient ongoing. Referrals are identified based on internal discharge reports and the outreach is to ensure patient has an understanding of their discharge instructions.

## 2023-03-01 NOTE — PROGRESS NOTES
Clinic Care Coordination Contact  Care Coordination Clinician Chart Review    Situation: Patient chart reviewed by care coordinator.    Background: Clinic Care Coordination Referral placed by [facility]..    Assessment: Upon chart review, patient is not a candidate for Primary Care Clinic Care Coordination enrollment due to reason stated below:  Patient is not a candidate for Primary Care Coordination, as she has Sheree MDs as her PCP at her in [facility] in [place].     Plan/Recommendations: Clinic Care Coordination Referral/order cancelled. RN/SW CC will perform no further monitoring/outreaches at this time and will remain available as needed. If new needs arise, a new Care Coordination Referral may be placed.    Allegra Gleason RN Care Coordinator  [facility]  Email: [email or web address]  Phone: [phone]

## 2023-03-03 NOTE — TELEPHONE ENCOUNTER
[facility] N[facility]SING 1/week for 6 weeks with 3 PRN for resumption of care. Patient just got out of the hospital.    Tatiana [phone]

## 2023-03-06 NOTE — TELEPHONE ENCOUNTER
[facility] calls back. Advised.     Verbal order given to approve visits until certification received for MD signature.

## 2023-03-10 NOTE — TELEPHONE ENCOUNTER
Fax received from [facility] - Resumption of Care 03/02/23 for review and signature.  Put in Dr. Ramachandran's in basket on Dr. Londono's behalf.

## 2023-03-14 PROBLEM — S72.402A CLOSED FRACTURE OF DISTAL END OF LEFT FEMUR, UNSPECIFIED FRACTURE MORPHOLOGY, INITIAL ENCOUNTER (H): Status: ACTIVE | Noted: 2023-01-01

## 2023-03-14 PROBLEM — S42.412A LEFT SUPRACONDYLAR HUMERUS FRACTURE, CLOSED, INITIAL ENCOUNTER: Status: ACTIVE | Noted: 2023-01-01

## 2023-03-14 NOTE — ED NOTES
Fell in the lobby of her senior care, daughter returning her from a dental appointment. Nasal fentanyl by EMS last 25 at 1554. No LOC, left elbow, right leg, left hip pain.

## 2023-03-14 NOTE — ED PROVIDER NOTES
History     Chief Complaint:  Fall     HPI   Gosia Alonzo is a 89 year old female with a history of hypertension, atrial fibrillation, and osteoporosis who presents with fall. The patient reports that she fell today at her assisted living facility. She denies hitting her head or loss of consciousness. The patient reports that she has been having pain to her left elbow, left leg and right leg. The patient denies any head or neck pain. She states that she has a fracture in her left shoulder that is currently healing. She states she has multiple rods in both legs.     Independent Historian:   None - Patient Only    Review of External Notes: none     ROS:  Review of Systems   ROS: ROS neg other than the symptoms noted above in the HPI.    Allergies:  Codeine  Escitalopram Oxalate  Esomeprazole Magnesium Trihydrate  Gabapentin  Imdur  Meperidine Hcl  Morphine Hcl  Oxycodone  Pentazocine  Propoxyphene Hcl  Sumatriptan Succinate     Medications:    Albuterol   Alendronate   Aspirin   Atorvastatin  Carvedilol   Dicyclomine   Furosemide   Norco  Atarax  Nitrostat  Pantoprazole   Lyrica  Sertraline       Past Medical History:    CHF   Arthritis     CAD   CKD   GERD   Hiatal hernia   Hypertension   NSTEMI   Migraine   Obesity   Osteoporosis   Hyperlipidemia   Atrial fibrillation   DVT    Past Surgical History:    Thrombectomy   Appendectomy   Back surgery  Angioplasty  Right mastectomy   Right rotator cuff   Hysterectomy   Left elbow tendonitis   Lumbar fusion   Total knee replacement bilateral    Family History:    family history includes C.A.D. in her father; Cancer in her brother and mother; Hypertension in her sister.    Social History:  The patient presents alone via EMS.    reports that she has never smoked. She has never used smokeless tobacco. She reports that she does not drink alcohol and does not use drugs.  PCP: Michael Londono     Physical Exam     Patient Vitals for the past 24 hrs:   BP Pulse SpO2    03/14/23 2045 110/72 -- --   03/14/23 2044 -- 107 100 %   03/14/23 2040 100/58 106 98 %   03/14/23 2030 (!) 80/48 -- --   03/14/23 2029 -- 99 94 %   03/14/23 2024 113/52 103 98 %   03/14/23 2019 99/52 93 99 %   03/14/23 2015 -- 105 (!) 88 %   03/14/23 2014 90/54 105 (!) 88 %   03/14/23 1701 111/65 -- 100 %        Physical Exam  Vitals reviewed.   Constitutional:       Comments: Uncomfortable moaning in pain.   HENT:      Head: Normocephalic and atraumatic.      Right Ear: Tympanic membrane normal.   Eyes:      Pupils: Pupils are equal, round, and reactive to light.   Cardiovascular:      Rate and Rhythm: Normal rate and regular rhythm.   Pulmonary:      Effort: Pulmonary effort is normal.   Abdominal:      General: Abdomen is flat.      Palpations: Abdomen is soft.   Musculoskeletal:      Cervical back: Normal range of motion. No tenderness.      Comments: Left arm: There is obvious swelling of the distal humerus in the region of the elbow.  Patient is unable to lift or move the arm without pain.  Normal distal radial and ulnar pulse.  Normal dorsiflexion of the hand.    Lower extremities.  Both are held in slight flexion.  Left leg is in internal rotation patient unable to move without pain.  Normal distal sensation and capillary refill.   Neurological:      General: No focal deficit present.      Mental Status: She is alert and oriented to person, place, and time.   Psychiatric:         Mood and Affect: Mood normal.         Emergency Department Course     Imaging:  XR Ankle Port Right 2 Views   Final Result   IMPRESSION: Acute comminuted fracture of the distal tibial diaphysis.   Acute comminuted fracture of the mid fibular diaphysis. Subacute   insufficiency fracture in the distal tibial diaphysis with   intramedullary sclerosis. Distal portion of the tibial stem is   present. Osteopenia.      MERT COLLINS MD            SYSTEM ID:  [id number]      XR Knee Port Left 1/2 Views   Final Result   IMPRESSION:  Longstem custom knee prosthesis has been placed.   Postoperative air is present. Osteopenia. No acute fractures are   evident. Partial resection of the fibula.      MERT COLLINS MD            SYSTEM ID:  [id number]      XR Femur Port Left 2 Views   Final Result   IMPRESSION: Longstem custom knee prosthesis has been placed.   Postoperative air is present. Osteopenia. No acute fractures are   evident. Partial resection of the fibula.      MERT COLLINS MD            SYSTEM ID:  [id number]      XR Surgery KHADIJAH L/T 5 Min Fluoro w Stills   Final Result      XR Foot Port Right 2 Views   Final Result   IMPRESSION: Prominent soft tissue swelling dorsal to the metatarsal   region. Insufficiency fracture the distal tibial metaphysis.   Osteopenia. No foot fractures are evident.      MERT COLLINS MD            SYSTEM ID:  [id number]      XR Chest Port 1 View   Final Result   IMPRESSION: Cardiac silhouette mildly enlarged. Slightly improved aeration at the right lung base with modestly increased atelectasis or infiltrate in the left lung base. Bandlike opacity in the right midlung likely reflects atelectasis or loculated    fissural fluid. Retrocardiac mixed density likely representing a large hiatal hernia. Small pleural effusions. No pneumothorax. Right shoulder arthroplasty. Right chest wall surgical clips. Chronic left proximal humerus fracture deformity.      CT Femur Right w/o Contrast   Final Result   IMPRESSION:   1.  Right total knee arthroplasty with distal femoral resection and custom endoprosthesis.   2.  Nondisplaced transverse oblique periprosthetic fracture of the right femur mid diaphysis.   3.  Normal joint alignment.         CT Knee Left w/o Contrast   Final Result   IMPRESSION:   1.  Acute mildly displaced distal left femoral metaphyseal fracture with slight impaction and mild angulation.   2.  Profound diffuse bone demineralization.   3.  Left knee joint effusion.   4.  Severe lateral  compartment and moderate medial compartment left knee osteoarthritis.   5.  Absent patella with extensor mechanism mineralization, particularly in the region of the distal quadriceps tendon.         XR Femur Bilateral 2 Views   Final Result   IMPRESSION:    1.  Left Femur: Acute fracture of the left femur distal metaphysis with mild impaction and anterior apex angulation. Left knee degenerative arthrosis. Bone demineralization. Atherosclerotic calcification.   2.  Right Femur: Right total knee arthroplasty with distal femoral resection and custom endoprosthesis. There is a small focus of cortical step-off in the medial aspect of the right femur mid diaphysis; a nondisplaced fracture cannot be excluded. Bone    demineralization. Atherosclerotic calcification.         Humerus XR,  G/E 2 views, left   Final Result   IMPRESSION:    1.  Acute oblique fracture of the left humerus distal metadiaphysis. Mild foreshortening and mild-moderate anterior apex angulation.   2.  Old unhealed fracture of the left proximal humerus. There is sclerosis and hypertrophic changes at the fracture margins.   3.  Bone demineralization.          Report per radiology    Laboratory:  Labs Ordered and Resulted from Time of ED Arrival to Time of ED Departure   BASIC METABOLIC PANEL - Abnormal       Result Value    Sodium 137      Potassium 4.2      Chloride 101      Carbon Dioxide (CO2) 27      Anion Gap 9      Urea Nitrogen 23.4 (*)     Creatinine 0.89      Calcium 8.2      Glucose 162 (*)     GFR Estimate 61     CBC WITH PLATELETS AND DIFFERENTIAL - Abnormal    WBC Count 5.7      RBC Count 3.28 (*)     Hemoglobin 9.2 (*)     Hematocrit 30.4 (*)     MCV 93      MCH 28.0      MCHC 30.3 (*)     RDW 18.3 (*)     Platelet Count 231      % Neutrophils 47      % Lymphocytes 36      % Monocytes 11      % Eosinophils 4      % Basophils 2      % Immature Granulocytes 0      NRBCs per 100 WBC 0      Absolute Neutrophils 2.7      Absolute Lymphocytes 2.0       Absolute Monocytes 0.7      Absolute Eosinophils 0.2      Absolute Basophils 0.1      Absolute Immature Granulocytes 0.0      Absolute NRBCs 0.0     VITAMIN D DEFICIENCY SCREENING   PARATHYROID HORMONE INTACT   TYPE AND SCREEN, ADULT    ABO/RH(D) AB POS      Antibody Screen Negative      SPECIMEN EXPIRATION DATE [id number]     TYPE AND SCREEN, ADULT   ABO/RH TYPE AND SCREEN   ABO/RH TYPE AND SCREEN        [facility]    -Fracture    Date/Time: 3/14/2023 8:07 PM  Performed by: Ed George MD  Authorized by: Ed George MD     Risks, benefits and alternatives discussed.    ED EVALUATION:      Assessment Time: 3/14/2023 8:09 PM      I have performed an Emergency Department Evaluation including taking a history and physical examination, this evaluation will be documented in the electronic medical record for this ED encounter.      ASA Class: Class 1- healthy patient    Mallampati: Grade 1- soft palate, uvula, tonsillar pillars, and posterior pharyngeal wall visible    NPO Status: appropriately NPO for procedure    UNIVERSAL PROTOCOL   Site Marked: NA  Prior Images Obtained and Reviewed:  Yes  Required items: Required blood products, implants, devices and special equipment available    Patient identity confirmed:  Verbally with patient, arm band, provided demographic data and hospital-assigned identification number  Patient was reevaluated immediately before administering moderate or deep sedation or anesthesia  Confirmation Checklist:  Patient's identity using two indicators, relevant allergies, procedure was appropriate and matched the consent or emergent situation and correct equipment/implants were available  Time out: Immediately prior to the procedure a time out was called    Universal Protocol: the Joint Commission Universal Protocol was followed    Preparation: Patient was prepped and draped in usual sterile fashion      INJURY      Injury location:  Upper arm     Upper arm injury location:  L upper arm    Upper arm fracture type: humeral shaft      PRE PROCEDURE ASSESSMENT      Neurological function: normal      Distal perfusion: normal      Range of motion: reduced      SEDATION  Patient Sedated: Yes    Sedation:  Fentanyl and propofol  Vital signs: Vital signs monitored during sedation      ANESTHESIA (see MAR for exact dosages)      Anesthesia method:  None    PROCEDURE DETAILS:     Manipulation performed: yes      Skeletal traction used: no      Reduction successful: yes      X-ray confirmed reduction: yes      Immobilization:  Splint    Splint type:  Long arm    POST PROCEDURE ASSESSMENT      Neurological function: normal      Distal perfusion: normal      Range of motion: improved        PROCEDURE    Patient Tolerance:  Patient tolerated the procedure well with no immediate complications  Length of time physician/provider present for 1:1 monitoring during sedation: 15M [facility]    -Fracture    Date/Time: 3/14/2023 8:11 PM  Performed by: Ed George MD  Authorized by: Ed George MD     Risks, benefits and alternatives discussed.      UNIVERSAL PROTOCOL   Site Marked: NA  Prior Images Obtained and Reviewed:  Yes  Required items: Required blood products, implants, devices and special equipment available    Patient identity confirmed:  Verbally with patient, hospital-assigned identification number, arm band and provided demographic data  Patient was reevaluated immediately before administering moderate or deep sedation or anesthesia  Confirmation Checklist:  Patient's identity using two indicators, relevant allergies, procedure was appropriate and matched the consent or emergent situation and correct equipment/implants were available  Time out: Immediately prior to the procedure a time out was called    Universal Protocol: the Joint Commission Universal Protocol was followed    Preparation: Patient was prepped and draped in  usual sterile fashion      INJURY      Injury location:  Upper leg    Upper leg injury location:  L upper leg    Upper leg fracture type: transcondylar femoral      PRE PROCEDURE ASSESSMENT      Neurological function: normal      Distal perfusion: normal      Range of motion: reduced      ANESTHESIA (see MAR for exact dosages)      Anesthesia method:  None    PROCEDURE DETAILS:     Manipulation performed: yes      Skeletal traction used: no      Reduction successful: yes      X-ray confirmed reduction: yes      Immobilization:  Splint    Splint type:  Long leg    POST PROCEDURE ASSESSMENT      Neurological function: normal      Distal perfusion: normal      Range of motion: improved        PROCEDURE    Patient Tolerance:  [facility] well [facility]    Procedure: Sedation for fracture manipulation and splint placement.    Date/Time: 3/17/2023 6:39 PM  Performed by: Ed George MD  Authorized by: Ed George MD     Risks, benefits and alternatives discussed.    ED EVALUATION:      I have performed an Emergency Department Evaluation including taking a history and physical examination, this evaluation will be documented in the electronic medical record for this ED encounter.      ASA Class: Class 2- mild systemic disease, no acute problems, no functional limitations    Mallampati: Grade 2- soft palate, base of uvula, tonsillar pillars, and portion of posterior pharyngeal wall visible    NPO Status: appropriately NPO for procedure    UNIVERSAL PROTOCOL   Site Marked: NA  Prior Images Obtained and Reviewed:  NA  Required items: Required blood products, implants, devices and special equipment available    Patient identity confirmed:  Verbally with patient  Patient was reevaluated immediately before administering moderate or deep sedation or anesthesia  Confirmation Checklist:  Patient's identity using two indicators, relevant  allergies, procedure was appropriate and matched the consent or emergent situation and correct equipment/implants were available  Time out: Immediately prior to the procedure a time out was called    Universal Protocol: the Joint Commission Universal Protocol was followed    Preparation: Patient was prepped and draped in usual sterile fashion      SEDATION  Patient Sedated: Yes    Sedation:  See MAR for details, propofol and fentanyl  Vital signs: Vital signs monitored during sedation      PROCEDURE  Describe Procedure: Sedation was maintained until the procedure was complete. The patient was monitored by staff until recovered.  Patient Tolerance:  Patient tolerated the procedure well with no immediate complications  Length of time physician/provider present for 1:1 monitoring during sedation: 25   Care was discussed with the patient and patient's family.  Due to multiple extremity trauma and ability to operate emergency and need for placing splint with patient unable to move recommended sedation for splint placement.  Patient agreed with the plan and did sign consent procedure well without complication long-leg splint of the left leg and long-arm splint of the left arm was placed by me in the ER tech in good position patient was placed in a position of comfort is definitive orthopedic care will be provided in the morning.       Emergency Department Course & Assessments:    Interventions:  Medications   flumazenil (ROMAZICON) injection 0.2 mg (has no administration in time range)   fentaNYL (PF) (SUBLIMAZE) injection 50 mcg (has no administration in time range)   HYDROmorphone (PF) (DILAUDID) injection 0.5 mg (has no administration in time range)   propofol (DIPRIVAN) 200 MG/20ML injection (has no administration in time range)   HYDROmorphone (PF) (DILAUDID) injection 0.5 mg (has no administration in time range)   HYDROmorphone (PF) (DILAUDID) injection 0.5 mg (0.5 mg Intravenous $Given 3/14/23 5409)   propofol  (DIPRIVAN) injection 10 mg/mL vial (60 mg Intravenous $Given 3/14/23 2025)   fentaNYL (PF) (SUBLIMAZE) injection 50 mcg (50 mcg Intravenous $Given 3/14/23 2013)   HYDROmorphone (PF) (DILAUDID) injection 0.5 mg (0.5 mg Intravenous $Given 3/14/23 1955)        Assessments:  1610 I obtained history and examined the patient as noted above.     1840 I returned to check on patient. We discussed findings and plan of care.     2010 I performed sedated reduction on patient, see procedure notes above.    Independent Interpretation (X-rays, CTs, rhythm strip):  I personally reviewed patient's XR. Acute fractures of left humorous and left femur. I agree with radiologist interpretation.     Consultations/Discussion of Management or Tests:  1956 I spoke with Dr. Foster of orthopedics regarding patient's presentation, findings, and plan of care.     2045 I spoke with Dr. Mederos of the Hospitalist service from [facility] regarding patient's presentation, findings, and plan of care.    Social Determinants of Health affecting care:   None    Disposition:  The patient was admitted to the hospital under the care of Dr. Mederos.    Impression & Plan    CMS Diagnoses: None    Medical Decision Making:  Patient presents after with severe pain after mechanical fall obvious deformities of the left upper and left lower extremity.  There is a history of prior pinning of the right thigh but unable to examine due to the overlying left thigh and deformity.  X-rays confirm distal humerus fracture and distal femur fracture.  Due to timing and need for splinting and severe pain recommended procedural sedation for splint pale placement this was performed by me using 2 doses of propofol patient tolerated procedure well and both long-arm splint and long-leg splint was placed by me.  Place was patient was placed in position of comfort.  Ultimately recommended CT scan of the right femur regardless patient will require admission for likely  surgical and orthopedic repair may require TCU afterwards due to probably extremity trauma and inability to care for self.  Care was discussed with the hospitalist and was admitted as an inpatient.    Diagnosis:    ICD-10-CM    1. Left supracondylar humerus fracture, closed, initial encounter  S42.412A       2. Closed fracture of distal end of left femur, unspecified fracture morphology, initial encounter (H)  S72.402A            Scribe Disclosure:  I, Dorcas Cortes, am serving as a scribe at 4:15 PM on 3/14/2023 to document services personally performed by Ed George MD based on my observations and the provider's statements to me.     3/14/2023   Ed George MD Goodman, Brian Samuel, MD  03/17/23 1849

## 2023-03-14 NOTE — ED NOTES
[facility]  ED Nurse Handoff Report    Gosia Alonzo is a 89 year old female   ED Chief complaint: No chief complaint on file.  . ED Diagnosis:   Final diagnoses:   None     Allergies:   Allergies   Allergen Reactions    Codeine      GI UPSET    Escitalopram Oxalate      fatigue    Esomeprazole Magnesium Trihydrate      HA    Gabapentin Dizziness     Dizziness, confusion    Imdur [Isosorbide]      Headache      Meperidine Hcl      N/V    Morphine Hcl      HIVES    Oxycodone      (percodan) GI UPSET    Pentazocine      (talwin)  HALLUCINATIONS    Propoxyphene Hcl      STOMACH UPSET    Sumatriptan Succinate      chest pain       Code Status: Full Code  Activity level - Baseline/Home:  Stand by Assist. Activity Level - Current:   Total Care. Lift room needed: Yes. Bariatric: No   Needed: No   Isolation: No. Infection: Not Applicable.     Vital Signs:   Vitals:    03/14/23 1701   BP: 111/65   SpO2: 100%       Cardiac Rhythm:  ,      Pain level:    Patient confused: No. Patient Falls Risk: Yes.   Elimination Status: due to void  Patient Report - Initial Complaint: fall/pain. Focused Assessment: pain management x-ray   Tests Performed: see chart. Abnormal Results:   Labs Ordered and Resulted from Time of ED Arrival to Time of ED Departure   BASIC METABOLIC PANEL - Abnormal       Result Value    Sodium 137      Potassium 4.2      Chloride 101      Carbon Dioxide (CO2) 27      Anion Gap 9      Urea Nitrogen 23.4 (*)     Creatinine 0.89      Calcium 8.2      Glucose 162 (*)     GFR Estimate 61     CBC WITH PLATELETS AND DIFFERENTIAL - Abnormal    WBC Count 5.7      RBC Count 3.28 (*)     Hemoglobin 9.2 (*)     Hematocrit 30.4 (*)     MCV 93      MCH 28.0      MCHC 30.3 (*)     RDW 18.3 (*)     Platelet Count 231      % Neutrophils 47      % Lymphocytes 36      % Monocytes 11      % Eosinophils 4      % Basophils 2      % Immature Granulocytes 0      NRBCs per 100 WBC 0      Absolute Neutrophils  2.7      Absolute Lymphocytes 2.0      Absolute Monocytes 0.7      Absolute Eosinophils 0.2      Absolute Basophils 0.1      Absolute Immature Granulocytes 0.0      Absolute NRBCs 0.0     TYPE AND SCREEN, ADULT    ABO/RH(D) AB POS      Antibody Screen Negative      SPECIMEN EXPIRATION DATE [id number]     TYPE AND SCREEN, ADULT   ABO/RH TYPE AND SCREEN   ABO/RH TYPE AND SCREEN      Humerus XR,  G/E 2 views, left    (Results Pending)   XR Femur Bilateral 2 Views    (Results Pending)   .   Treatments provided: pain medications waiting for x-ray  Family Comments: at the bedside, with pt during fall  OBS brochure/video discussed/provided to patient:  Yes  ED Medications:   Medications   HYDROmorphone (PF) (DILAUDID) injection 0.5 mg (0.5 mg Intravenous $Given 3/14/23 3210)     Drips infusing:  No  For the majority of the shift, the patient's behavior Green. Interventions performed were per care plan.    Sepsis treatment initiated: No     Patient tested for COVID 19 prior to admission: NO    ED Nurse Name/Phone Number: Brian Hernández RN,   6:37 PM

## 2023-03-15 NOTE — PROGRESS NOTES
Patient referred by RN for Healing Touch due to pain and anxiety.  Healing Touch explained to patient and patient consented to HT session.  Pain prior to session 10/10 anxiety prior to session 10/10. Performed HT with music.Pain and anxiety not changed post session. Time spent with patient 40 minutes.    Armando BELL RN-BC HNB-BC HTP

## 2023-03-15 NOTE — PLAN OF CARE
Goal Outcome Evaluation:         Patient vital signs are at baseline: No,  Reason:  3L  Patient able to ambulate as they were prior to admission or with assist devices provided by therapies during their stay:  No,  Reason:  New fractures  Patient MUST void prior to discharge:  Yes  Patient able to tolerate oral intake:  Yes  Pain has adequate pain control using Oral analgesics:  No,  Reason:  Fractures  Does patient have an identified :  Yes  Has goal D/C date and time been discussed with patient:  Yes        Patient aox4. Tachy. Other VSS on 3L. PTA norco given for pain. Awaiting med list from [facility] for other medications. IV hydromorphone and fentanyl ineffective in ED. IV hydromorphone discontinued by provider. Extensive allergies including morphine, oxy and codeine.    Had prior surgery on RLE. Edema and discoloration were present prior to current admission.    Bedrest. CHG bath done. Purewick in place. Good cap refill. Denies numbness and tingling. Declined ICE.

## 2023-03-15 NOTE — CONSULTS
"NUTRITION ASSESSMENT      REASON FOR NUTRITION CONSULT:  Malnutrition screening tool (MST) total score of \"2\" with \"unsure\" answered to the question, \"have you recently lost weight without trying?\".      ASSESSMENT:  Per review of available wt trends below, patient has not lost weight.  Current wt is relatively consistent with fall of 2022 trends and 1/2023 trends.  There is also the potential for slight scale differences.  This would bring down MST score to \"0\":  Wt Readings from Last 10 Encounters:   03/14/23 50.9 kg (112 lb 3.2 oz)   02/28/23 52 kg (114 lb 9.6 oz)   02/21/23 49.1 kg (108 lb 3.2 oz)   01/16/23 53 kg (116 lb 12.8 oz)   12/06/22 51.3 kg (113 lb)   11/25/22 51.3 kg (113 lb)   11/23/22 51.3 kg (113 lb 3.2 oz)   11/16/22 51.5 kg (113 lb 9.6 oz)   11/08/22 52.1 kg (114 lb 12.8 oz)   11/02/22 49.4 kg (109 lb)         FOLLOW UP:   Will follow at LOS per policy, unless formally consulted in the interim.       Radha Chinchilla RDN, LD  Clinical Dietitian  3rd floor/ICU: [phone]  All other floors: [phone]  Weekend/holiday: [phone]  Office: [phone]  "

## 2023-03-15 NOTE — PROGRESS NOTES
Report received from JULIAN Mckeon on ortho spine. Dr. Earl made aware of magnesium 1.1. MDA states that Mg needs to be replaced and redrawn prior to anesthetic.     Per JULIAN Mckeon, patient had a CBC drawn this morning and Hgb was 6.6 on initial draw but is not resulted in computer because a redraw order was placed for a new CBC. CRAIG made aware of this as well.    Surgery will be rescheduled to a new time once patient is better optimized, per CRAIG.

## 2023-03-15 NOTE — PROGRESS NOTES
[facility]  Medicine Progress Note - Hospitalist Service  Date of Admission:  3/14/2023    Assessment & Plan   Ms. Gosia Alonzo is a 89 year old female with a history of breast cancer, compression Fractures, CAD, DM type 2, CHF (EF 35-40%), PAF, Pericarditis, GERD, Hiatel Hernia, GIB, Diverticulitis, Cdiff admitted on 3/14/2023 with acute femur fracture after a mechanical fall.  Noted to have acute anemia and hypomagnesemia, so surgical intervention was postponed until 3/16.    Acute femur fracture   Patient suffered a mechanical slip and fall and presented for evaluation. Xray with evidence of acute fracture of the left femur distal metaphysis with mild impaction and anterior apex angulation. Left knee degenerative arthrosis. Bone demineralization. ED provider spoke with orthopedics who recommended further imaging. Patient remains in pain, uses [facility] [place] at home.  CT imaging was performed.  - orthopedics following   - ORIF vs distal femur replacement  - pain control with tylenol and Norco which works for her   -Difficulty with pain control, so pain team was consulted  - NPO at midnight, IVF while NPO   - bedrest for now, PT postoperatively    Acute oblique fracture of the left humerus distal metadiaphysis  Old unhealed fracture of the left proximal humerus   - orthopedics following    -LUE post splint, NWB, trial nonop in silveira vs ORIF  - pain control as above   - PT when able     Acute anemia, normocytic  Hemoglobin noted to be 6.6 on 3/15.  Recheck similar.  The patient received 1 unit of packed red blood cells.  Plan for recheck hemoglobin and conditional transfusion if hemoglobin less than 7 optimized for surgery  -Repeat hemoglobin at 2000  -Repeat CBC in a.m.    Acute hypoxic respiratory failure, persistent  Unclear etiology, patient requiring 3 L of nasal cannula oxygen to maintain saturations.  Chest x-ray not performed in the emergency department.  Chest x-ray with evidence of  pulmonary edema on 3/14.  Patient received a dose of Lasix.  -Continuous pulse oximetry  -Incentive spirometry  -Wean oxygen as tolerated  -On PTA Lasix as below, will administer additional diuresis if evidence of pulmonary edema, caution with soft pressures  -No fever or pneumonia symptoms, low concern for pneumonia at this time     Chronic CHF  Ischemic Cardiomyopathy  Hx CAD s/p CAMILA to mid and proximal LAD 1/2016  HTN - no signs of acute exacerbation.  No new chest pain/sob/cough.  No hypoxia. Most recent echo with EF 35-40% on 1/26/23.   - PTA Atorvastatin   - PTA Coreg   - PTA Lasix   - PTA ASA      Hx Paroxysmal Atrial Fibrillation   - monitor on telemetry     DM Type 2 - hgb A1C 6.6 (12/2022).  Diet controlled.  Not on medications.  Monitor BG while inpatient      Osteoporosis  Osteoarthritis  Hx of Compression Fractures  Hx Multiple Orthopedic Surgeries   - PTA Tylenol, Lyrica,   - PRN dilaudid as above   - Vitamin D, PTH elevated, hypocalcemia      Anxiety - PTA Sertraline.  PRN Atarax      Probable IPMN of the pancreas - evaluated by GI on a previous admission      Hx Breast Cancer - dx 9/1998 s/p right mastectomy     Hx Post op RLE DVT 1988    Hypomagnesemia -replace per protocol       Diet: Regular Diet Adult  NPO for Medical/Clinical Reasons Except for: Meds    DVT Prophylaxis: Pneumatic Compression Devices  Manzano Catheter: Not present  Lines: None     Cardiac Monitoring: ACTIVE order. Indication: monitoring pre-operatively  Code Status: No CPR- Do NOT Intubate      Clinically Significant Risk Factors Present on Admission            # Hypomagnesemia: Lowest Mg = 1.1 mg/dL in last 2 days, will replace as needed        # Chronic systolic heart failure: echo within the past year with EF <40%   # Acute Respiratory Failure: Documented O2 saturation < 91%.  Continue supplemental oxygen as needed    # DMII: A1C = 6.6 % (Ref range: <5.7 %) within past 6 months            Disposition Plan      Expected Discharge  Date: 03/16/2023      Destination: assisted living  Discharge Comments: Assisted Living          Zoe Mederos DO  Hospitalist Service  [employer]  Securely message with [employer] (more info)  Text page via [employer] Paging/Directory   ______________________________________________________________________    Interval History   Patient continues to have pain, no numbness or tingling, no new injuries or trauma to report  Her family is at bedside and updated today    She reports no chest pain, pressure or shortness of breath  She has no other new concerns or complaints today    My concern is pain control for which pain team was consulted to assist    Physical Exam   Vital Signs: Temp: 99.1  F (37.3  C) Temp src: Axillary BP: 101/45 Pulse: 107   Resp: 14 SpO2: 96 % O2 Device: Nasal cannula Oxygen Delivery: 2 LPM  Weight: 112 lbs 3.2 oz    Constitutional: awake, alert, cooperative, in mild distress complaining of pain, appears stated age  HEENT: Normocephalic, atraumatic  Respiratory: Normal work of breathing, good air exchange, clear to auscultation bilaterally, no crackles or wheezing appreciated   Nasal cannula in place requiring 3-4 L intermittently   Cardiovascular: regular rate and rhythm, no murmurs appreciated  GI: Soft and nontender to palpation, nondistended, no rebound or guarding  Skin: normal skin color, texture, turgor  Musculoskeletal: laying slightly tilted to her right side, unable to assess leg length discrepancy given patient unable to position secondary to pain  No obvious bruising appreciated on overlying skin  Sensation is largely intact  Pulses palpated bilaterally  Neurologic: Alert and oriented, no focal deficits   Neuropsychiatric: General: normal, calm and normal eye contact      Data   ------------------------- PAST 24 HR DATA REVIEWED -----------------------------------------------    I have personally reviewed the following data over the past 24 hrs:    10.0  \   6.6  (LL)   / 141 (L)     136 101 24.4 (H) /  174 (H)   4.0 26 0.87 \       Imaging results reviewed over the past 24 hrs:   Recent Results (from the past 24 hour(s))   Humerus XR,  G/E 2 views, left    Narrative    EXAM: XR HUMERUS LEFT G/E 2 VIEWS  LOCATION: [facility]  DATE/TIME: 3/14/2023 6:43 PM    INDICATION: Left arm pain after a fall.    COMPARISON: None.      Impression    IMPRESSION:   1.  Acute oblique fracture of the left humerus distal metadiaphysis. Mild foreshortening and mild-moderate anterior apex angulation.  2.  Old unhealed fracture of the left proximal humerus. There is sclerosis and hypertrophic changes at the fracture margins.  3.  Bone demineralization.    XR Femur Bilateral 2 Views    Narrative    EXAM: XR FEMUR BILATERAL 2 VIEWS  LOCATION: [facility]  DATE/TIME: 3/14/2023 6:44 PM    INDICATION: Bilateral femur pain after a fall.    COMPARISON: 9/11/2022 right femur.      Impression    IMPRESSION:   1.  Left Femur: Acute fracture of the left femur distal metaphysis with mild impaction and anterior apex angulation. Left knee degenerative arthrosis. Bone demineralization. Atherosclerotic calcification.  2.  Right Femur: Right total knee arthroplasty with distal femoral resection and custom endoprosthesis. There is a small focus of cortical step-off in the medial aspect of the right femur mid diaphysis; a nondisplaced fracture cannot be excluded. Bone   demineralization. Atherosclerotic calcification.     CT Knee Left w/o Contrast    Narrative    EXAM: CT KNEE LEFT W/O CONTRAST  LOCATION: [facility]  DATE/TIME: 3/14/2023 9:21 PM    INDICATION: Distal femur fracture.  COMPARISON: Femur radiographic exam earlier today.  TECHNIQUE: Noncontrast. Axial, sagittal and coronal thin-section reconstruction. Dose reduction techniques were used.     FINDINGS:     BONES:  -Redemonstrated is an acute, mildly displaced, oblique fracture through  the distal left femoral metaphysis with slight impaction and mild angulation. Profound diffuse bone demineralization. No definitive proximal tibia or fibula fracture. The patella is   absent. Mineralization is present within the extensor tendons, particularly the distal quadriceps tendon. Surgical change in the anterior proximal right tibia. Severe lateral compartment and moderate medial compartment left knee osteoarthritis. No   aggressive bone lesion.    SOFT TISSUES:  -Knee joint effusion/hemarthrosis. Soft tissue swelling/infiltrative hemorrhage distal thigh about the distal femur fracture. Arterial calcification. Chondrocalcinosis.      Impression    IMPRESSION:  1.  Acute mildly displaced distal left femoral metaphyseal fracture with slight impaction and mild angulation.  2.  Profound diffuse bone demineralization.  3.  Left knee joint effusion.  4.  Severe lateral compartment and moderate medial compartment left knee osteoarthritis.  5.  Absent patella with extensor mechanism mineralization, particularly in the region of the distal quadriceps tendon.     CT Femur Right w/o Contrast    Narrative    EXAM: CT FEMUR THIGH RIGHT W/O CONTRAST  LOCATION: [facility]  DATE/TIME: 3/14/2023 9:24 PM    INDICATION: 89-year-old patient with right thigh pain and possible fracture.  COMPARISON: 03/14/2023 radiographs.  TECHNIQUE: Noncontrast. Axial, sagittal and coronal thin-section reconstruction. Dose reduction techniques were used.     FINDINGS:     BONES AND JOINTS:  -Right total knee arthroplasty with distal femur resection with custom endoprosthesis. The femoral stem extends to the level of the femur intertrochanteric region. Incompletely evaluated tibial component. Normal knee joint alignment.  -Nondisplaced transverse oblique periprosthetic fracture of the femur mid diaphysis.  -Moderate right hip degenerative arthrosis.  -Old healed fracture of the right inferior pubic ramus.  -Bone  demineralization.  -Left femur distal metaphysis fracture incidentally noted.    SOFT TISSUES:  -No soft tissue fluid collection.  -Atherosclerotic calcification.      Impression    IMPRESSION:  1.  Right total knee arthroplasty with distal femoral resection and custom endoprosthesis.  2.  Nondisplaced transverse oblique periprosthetic fracture of the right femur mid diaphysis.  3.  Normal joint alignment.     XR Chest Port 1 View    Narrative    EXAM: XR CHEST PORT 1 VIEW  LOCATION: [facility]  DATE/TIME: 3/14/2023 10:39 PM    INDICATION: hypoxia  COMPARISON: 02/28/2023      Impression    IMPRESSION: Cardiac silhouette mildly enlarged. Slightly improved aeration at the right lung base with modestly increased atelectasis or infiltrate in the left lung base. Bandlike opacity in the right midlung likely reflects atelectasis or loculated   fissural fluid. Retrocardiac mixed density likely representing a large hiatal hernia. Small pleural effusions. No pneumothorax. Right shoulder arthroplasty. Right chest wall surgical clips. Chronic left proximal humerus fracture deformity.   XR Foot Port Right 2 Views    Narrative    XR FOOT PORT RIGHT 2 VIEWS 3/15/2023 1:33 PM     HISTORY: swelling and pain after fall.  getting blood, etc plz do  portable.  eval for fx    COMPARISON: None.      Impression    IMPRESSION: Prominent soft tissue swelling dorsal to the metatarsal  region. Insufficiency fracture the distal tibial metaphysis.  Osteopenia. No foot fractures are evident.    MERT COLLINS MD         SYSTEM ID:  [id number]

## 2023-03-15 NOTE — CONSULTS
Care Management Initial Consult    General Information  Assessment completed with: Patient, Other ([facility] RN to verify services), Estephania, RN  Type of CM/SW Visit: Initial Assessment    Primary Care Provider verified and updated as needed: Yes   Readmission within the last 30 days: current reason for admission unrelated to previous admission      Reason for Consult: discharge planning, care coordination/care conference  Advance Care Planning:            Communication Assessment  Patient's communication style: spoken language (English or Bilingual)    Hearing Difficulty or Deaf: yes   Wear Glasses or Blind: yes    Cognitive  Cognitive/Neuro/Behavioral: WDL                      Living Environment:   People in home: alone (Lives in [facility])     Current living Arrangements: assisted living  Name of Facility: [facility] in [place]   Able to return to prior arrangements: other (see comments)  Living Arrangement Comments: TBD    Family/Social Support:  Care provided by: self  Provides care for: no one     Children, Facility resident(s)/Staff          Description of Support System: Involved, Supportive    Support Assessment: Adequate social supports, Adequate family and caregiver support    Current Resources:   Patient receiving home care services: No (Discharged-Did have [facility]- RN, PT OT)     Community Resources: None  Equipment currently used at home: walker, rolling, grab bar, tub/shower, grab bar, toilet  Supplies currently used at home: Incontinence Supplies            Lifestyle & Psychosocial Needs:  Social Determinants of Health     Tobacco Use: Low Risk      Smoking Tobacco Use: Never     Smokeless Tobacco Use: Never     Passive Exposure: Not on file   Alcohol Use: Not on file   Financial Resource Strain: Not on file   Food Insecurity: Not on file   Transportation Needs: Not on file   Physical Activity: Not on file   Stress: Not on file   Social Connections: Not on file   Intimate Partner Violence: Not on file    Depression: Not at risk     PHQ-2 Score: 0   Housing Stability: Not on file       Functional Status:  Prior to admission patient needed assistance:   Dependent ADLs:: Ambulation-walker, Independent  Dependent IADLs:: Cleaning, Cooking, Laundry, Shopping, Meal Preparation, Medication Management, Transportation               Additional Information:  Patient admitted after fall, fracturing L distal femur/hip area.  She will be going to the OR tomorrow.   She is receiving Pain medication,supplemental electrolytes and is on bedrest. Patient is being followed by Ortho, dietary and the Hospitalist.   RN CC/SW is following/consulted for discharge planning and high URR.  Per chart review, pt resides in an Assisted Living Facility. Spoke to Patient to verify her residence at [facility] in [place] ([facility]). Verbal permission was received to speak to the facility to verify services and to discuss the discharge plan of care. A call was placed to [facility] [place] and services were verified.     Patient receives services as follows: Medication management, meals, cleaning, cooking, laundry and assist of one if needed.    [facility] contact (name/number): [phone]  Fax #: [phone]  Barriers to pt returning: Assist of 2  Baseline mobility: independent with walker. Occasional assist of 1  Time of preferred return: 12 noon  New meds/prescriptions (fill here?): No, Fax to Pharmacy below  Pharmacy: [facility] [phone]    Other: After last hospitalization, patient was open to [facility]- RN,OT PT. ELIAZAR usually use [facility]    JULIAN CC/SW will continue to follow for discharge planning and return to facility.    Imani Christiansen, RN, BSN, CM  Inpatient Care Coordination  [facility]  [phone]

## 2023-03-15 NOTE — ED NOTES
[facility]  ED Nurse Handoff Report    Gosia Alonzo is a 89 year old female   ED Chief complaint: No chief complaint on file.  . ED Diagnosis:   Final diagnoses:   Left supracondylar humerus fracture, closed, initial encounter   Closed fracture of distal end of left femur, unspecified fracture morphology, initial encounter (H)     Allergies:   Allergies   Allergen Reactions     Codeine      GI UPSET     Escitalopram Oxalate      fatigue     Esomeprazole Magnesium Trihydrate      HA     Gabapentin Dizziness     Dizziness, confusion     Imdur [Isosorbide]      Headache       Meperidine Hcl      N/V     Morphine Hcl      HIVES     Oxycodone      (percodan) GI UPSET     Pentazocine      (talwin)  HALLUCINATIONS     Propoxyphene Hcl      STOMACH UPSET     Sumatriptan Succinate      chest pain       Code Status: DNR / DNI  Activity level - Baseline/Home:  Independent. Activity Level - Current:   Total Care. Lift room needed: No. Bariatric: No   Needed: No   Isolation: No. Infection: Not Applicable.     Vital Signs:   Vitals:    03/14/23 2030 03/14/23 2040 03/14/23 2044 03/14/23 2045   BP: (!) 80/48 100/58  110/72   Pulse:  106 107    SpO2:  98% 100%        Cardiac Rhythm:  ,      Pain level:    Patient confused: No. Patient Falls Risk: Yes.   Elimination Status:  has not voided in ED    Patient Report - Initial Complaint: Fall. Focused Assessment: Ms. Gosia Alonzo is a 89 year old female with a history of breast cancer, compression Fractures, CAD, DM type 2, CHF (EF 35-40%), PAF, Pericarditis, GERD, Hiatel Hernia, GIB, Diverticulitis, Cdiff admitted on 3/14/2023 with acute femur fracture. Pending orthopedic evaluation and clinical management.    Tests Performed:   XR Femur Bilateral 2 Views   Final Result   IMPRESSION:    1.  Left Femur: Acute fracture of the left femur distal metaphysis with mild impaction and anterior apex angulation. Left knee degenerative arthrosis. Bone  demineralization. Atherosclerotic calcification.   2.  Right Femur: Right total knee arthroplasty with distal femoral resection and custom endoprosthesis. There is a small focus of cortical step-off in the medial aspect of the right femur mid diaphysis; a nondisplaced fracture cannot be excluded. Bone    demineralization. Atherosclerotic calcification.         Humerus XR,  G/E 2 views, left   Final Result   IMPRESSION:    1.  Acute oblique fracture of the left humerus distal metadiaphysis. Mild foreshortening and mild-moderate anterior apex angulation.   2.  Old unhealed fracture of the left proximal humerus. There is sclerosis and hypertrophic changes at the fracture margins.   3.  Bone demineralization.       CT Knee Left w/o Contrast    (Results Pending)   CT Femur Right w/o Contrast    (Results Pending)     . Abnormal Results:   Labs Ordered and Resulted from Time of ED Arrival to Time of ED Departure   BASIC METABOLIC PANEL - Abnormal       Result Value    Sodium 137      Potassium 4.2      Chloride 101      Carbon Dioxide (CO2) 27      Anion Gap 9      Urea Nitrogen 23.4 (*)     Creatinine 0.89      Calcium 8.2      Glucose 162 (*)     GFR Estimate 61     CBC WITH PLATELETS AND DIFFERENTIAL - Abnormal    WBC Count 5.7      RBC Count 3.28 (*)     Hemoglobin 9.2 (*)     Hematocrit 30.4 (*)     MCV 93      MCH 28.0      MCHC 30.3 (*)     RDW 18.3 (*)     Platelet Count 231      % Neutrophils 47      % Lymphocytes 36      % Monocytes 11      % Eosinophils 4      % Basophils 2      % Immature Granulocytes 0      NRBCs per 100 WBC 0      Absolute Neutrophils 2.7      Absolute Lymphocytes 2.0      Absolute Monocytes 0.7      Absolute Eosinophils 0.2      Absolute Basophils 0.1      Absolute Immature Granulocytes 0.0      Absolute NRBCs 0.0     VITAMIN D DEFICIENCY SCREENING   PARATHYROID HORMONE INTACT   TYPE AND SCREEN, ADULT    ABO/RH(D) AB POS      Antibody Screen Negative      SPECIMEN EXPIRATION DATE  [id number]     TYPE AND SCREEN, ADULT   ABO/RH TYPE AND SCREEN   ABO/RH TYPE AND SCREEN     .   Treatments provided: imaging, blood work, splinting  Family Comments: Son at bedside  OBS brochure/video discussed/provided to patient:  N/A  ED Medications:   Medications   flumazenil (ROMAZICON) injection 0.2 mg (has no administration in time range)   fentaNYL (PF) (SUBLIMAZE) injection 50 mcg (has no administration in time range)   propofol (DIPRIVAN) 200 MG/20ML injection (has no administration in time range)   HYDROmorphone (PF) (DILAUDID) injection 0.5 mg (has no administration in time range)   HYDROmorphone (DILAUDID) tablet 2 mg (has no administration in time range)   HYDROmorphone (PF) (DILAUDID) injection 0.5 mg (0.5 mg Intravenous $Given 3/14/23 1820)   propofol (DIPRIVAN) injection 10 mg/mL vial (60 mg Intravenous $Given 3/14/23 2025)   fentaNYL (PF) (SUBLIMAZE) injection 50 mcg (50 mcg Intravenous $Given 3/14/23 2013)   HYDROmorphone (PF) (DILAUDID) injection 0.5 mg (0.5 mg Intravenous $Given 3/14/23 1955)   HYDROmorphone (PF) (DILAUDID) injection 0.5 mg (0.5 mg Intravenous $Given 3/14/23 2052)     Drips infusing:  No  For the majority of the shift, the patient's behavior Green. Interventions performed were NA.    Sepsis treatment initiated: No     Patient tested for COVID 19 prior to admission: NO    ED Nurse Name/Phone Number: Britta Klein RN,   8:58 PM     RECEIVING UNIT ED HANDOFF REVIEW    Above ED Nurse Handoff Report was reviewed: Yes  Reviewed by: Chuck Murphy RN on March 14, 2023 at 9:50 PM
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
## 2023-03-15 NOTE — PROGRESS NOTES
Ortho Brief -     89 yo F with severe left shoulder OA and distal humerus fracture.  Left knee severe OA and distal femur fracture.    LUE post splint, NWB, trial nonop in silveira vs ORIF    LLE - will need to discuss how much knee pain she has been having.  ORIF vs distal femur replacement    Bedrest    NPO p MN    Michael Winters MD

## 2023-03-15 NOTE — PROGRESS NOTES
An ETCO2 monitor was placed on the pt with 4 LPM bled in. The Ambu bag, suction, and airways were setup and present in the room, the patient required assisted ventilation for short period. ETCO2 levels were maintained between 31-41.    RT Mary Grace on 3/14/2023 at 8:32 PM

## 2023-03-15 NOTE — PHARMACY-ADMISSION MEDICATION HISTORY
Admission medication history interview status for this patient is complete. See [facility] admission navigator for allergy information, prior to admission medications and immunization status.     Medication history interview source(s):[facility] ([phone]) med list, [facility] discharge summary 2/28/23, sure scripts  Medication history resources (including written lists, pill bottles, clinic record):see above  Primary pharmacy:[facility]    Changes made to PTA medication list:  Added: norco q4h prn, witch hazel pads, nystatin powder, zofran, triple abx oint, throat silviano, miralax prn  Deleted: norco qid prn, miralax daily  Changed: -----    Actions taken by pharmacist (provider contacted, etc):None     Additional medication history information:None    Medication reconciliation/reorder completed by provider prior to medication history? Yes, sticky note left for MD    Medication Affordability:not assessed          For patients on insulin therapy:N    Prior to Admission medications    Medication Sig Last Dose Taking? Auth Provider Long Term End Date   acetaminophen (TYLENOL) 500 MG tablet Take 500 mg by mouth 3 times daily Past Week at ? Yes Unknown, Entered By History     albuterol (PROAIR HFA/PROVENTIL HFA/VENTOLIN HFA) 108 (90 Base) MCG/ACT inhaler Inhale 2 puffs into the lungs every 6 hours as needed for shortness of breath, wheezing or cough Unknown at ? Yes Jennie Morrissey MD Yes    alendronate (FOSAMAX) 70 MG tablet Take 1 tablet (70 mg) by mouth every 7 days Past Week at ? Yes Eddie Mora MD Yes    aspirin EC 81 MG EC tablet Take 1 tablet (81 mg) by mouth daily Past Week Yes Radha Umanzor PA-C No    atorvastatin (LIPITOR) 40 MG tablet Take 1 tablet (40 mg) by mouth every evening Past Week at ? Yes Michael Londono MD Yes    benzocaine-menthol (CHLORASEPTIC) 6-10 MG lozenge Place 1 lozenge inside cheek every hour as needed for sore throat Unknown at ? Yes Unknown, Entered By  History     bisacodyl (DULCOLAX) 5 MG EC tablet Take 5 mg by mouth daily as needed for constipation Unknown at ? Yes Unknown, Entered By History     calcium carbonate (TUMS) 500 MG chewable tablet Take 2 chew tab by mouth as needed for heartburn Max 15 tabs/day, separate from other drugs by 1 hr Unknown at ? Yes Unknown, Entered By History     carvedilol (COREG) 3.125 MG tablet Take 1 tablet (3.125 mg) by mouth 2 times daily (with meals) Past Week at ? Yes Hiren Collins MD Yes    diclofenac (VOLTAREN) 1 % topical gel Apply 2 g topically 3 times daily Past Week at ? Yes Reported, Patient     dicyclomine (BENTYL) 10 MG capsule Take 1 capsule (10 mg) by mouth 3 times daily as needed (abdominal cramping) Unknown at ? Yes Ariadna Blank MD     fluticasone (FLONASE) 50 MCG/ACT nasal spray Spray 2 sprays into both nostrils daily as needed for rhinitis or allergies Unknown at ? Yes Reported, Patient     furosemide (LASIX) 20 MG tablet Take 2 tablets (40 mg) by mouth daily Add additional 20mg for increased edema Past Week at ? Yes Eddie Mora MD Yes    guaiFENesin (ROBITUSSIN) 20 mg/mL liquid Take 10 mLs by mouth every 4 hours as needed for cough Unknown at ? Yes Unknown, Entered By History     HYDROcodone-acetaminophen (NORCO) 5-325 MG tablet Take 2 tablets by mouth every 4 hours as needed for severe pain (7-10) Unknown at ? Yes Unknown, Entered By History     hydrocortisone (CORTAID) 1 % external cream Apply topically 4 times daily as needed for itching or rash Unknown at ? Yes Unknown, Entered By History     hydrOXYzine (ATARAX) 25 MG tablet Take 1 tablet (25 mg) by mouth 4 times daily as needed Unknown at ? Yes Kenia Parra, APRN CNP     Hyprom-Naphaz-Polysorb-Zn Sulf (CLEAR EYES COMPLETE) SOLN Apply 2 drops to eye every 4 hours as needed Unknown at ? Yes Reported, Patient     Lidocaine (LIDOCARE) 4 % Patch Place 2 patches onto the skin every 24 hours To prevent lidocaine toxicity, patient should be patch  free for 12 hrs daily. Unknown at ? Yes Kenia Parra APRN CNP     loperamide (IMODIUM) 2 MG capsule Take 2 mg by mouth as needed for diarrhea Unknown at ? Yes Unknown, Entered By History     loratadine (CLARITIN) 10 MG tablet Take 10 mg by mouth daily as needed for allergies Unknown at ? Yes Unknown, Entered By History     magnesium hydroxide (MILK OF MAGNESIA) 400 MG/5ML suspension Take 30 mLs by mouth daily as needed for constipation Unknown at ? Yes Unknown, Entered By History     multivitamin, therapeutic (THERA-VIT) TABS tablet Take 1 tablet by mouth daily Past Week at ? Yes Unknown, Entered By History     Neomycin-Bacitracin-Polymyxin (TRIPLE ANTIBIOTIC) 3.5-400-5000 MG-UNIT OINT Externally apply topically every 6 hours as needed Unknown at ? Yes Unknown, Entered By History     nitroGLYcerin (NITROSTAT) 0.4 MG sublingual tablet For chest pain place 1 tablet under the tongue every 5 minutes for 3 doses. If symptoms persist 5 minutes after 1st dose call 911. Unknown at ? Yes Hiren Collins MD Yes    nystatin (MYCOSTATIN) 360628 UNIT/GM external powder Apply topically 2 times daily as needed Unknown at ? Yes Unknown, Entered By History     ondansetron (ZOFRAN ODT) 4 MG ODT tab Take 4 mg by mouth every 6 hours as needed for nausea Unknown at ? Yes Unknown, Entered By History     pantoprazole (PROTONIX) 40 MG EC tablet TAKE 1 TABLET BY MOUTH TWICE DAILY Past Week at ? Yes Michael Londono MD     polyethylene glycol (MIRALAX) 17 GM/Dose powder Take 17 g by mouth daily as needed Unknown at ? Yes Eddie Mora MD     pregabalin (LYRICA) 25 MG capsule Take 1 capsule (25 mg) by mouth 2 times daily Past Week at ? Yes Kenia Parra APRN CNP Yes    senna-docusate (SENOKOT-S/PERICOLACE) 8.6-50 MG tablet Take 1 tablet by mouth 2 times daily as needed for constipation Unknown at ? Yes Eddie Mora MD     sertraline (ZOLOFT) 25 MG tablet Take 1 tablet (25 mg) by mouth daily Past Week at ? Yes Lyndon Bhatti MD  Yes    Trolamine Salicylate (ASPERCREME EX) Externally apply topically daily as needed Unknown at ? Yes Unknown, Entered By History     witch hazel-glycerin (TUCKS) pad Apply 1 each topically every 2 hours as needed for hemorrhoids Unknown at ? Yes Unknown, Entered By History

## 2023-03-15 NOTE — SEDATION DOCUMENTATION
Patient tolerated procedure well, restless, c/o pain, son at bedside, CMS intact primary nurse updated

## 2023-03-15 NOTE — PLAN OF CARE
Goal Outcome Evaluation:      Plan of Care Reviewed With: patient    Overall Patient Progress: no changeOverall Patient Progress: no change      Patient vital signs are at baseline: No,  Reason: 3LPM via NC  Patient able to ambulate as they were prior to admission or with assist devices provided by therapies during their stay:  No,  Reason:  Bedrest  Patient MUST void prior to discharge:  Yes  Patient able to tolerate oral intake:  No,  Reason:  NPO since midnight  Pain has adequate pain control using Oral analgesics:  No,  Reason:  IV dilaudid   Does patient have an identified :  Yes    Pt A/O x4. VVS; on 3LPM via NC. PIV infusing. Poor pain control, giving PRN Norco, atarax, and IV dilaudid. Voiding via external catheter. NPO since midnight. CMS intact. LUE in sling. Bruising and discoloration to BLE. Bedrest. Surgical scrub completed. Plan is OR at 1125.
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
## 2023-03-15 NOTE — PROVIDER NOTIFICATION
[phone] - Admitting Hospitalist paged - Pt recently admitted with multiple fx's and is very painful. Has Norco and Atarax only and not effective. Did call Ortho but they referred to Hospitalist.

## 2023-03-15 NOTE — CONSULTS
"Pain Service Consultation Note  Telemedicine Consult  [facility]       Patient Name: Gosia Alonzo  MRN: [id number]   Age: 89 year old  Sex: female  Date: March 15, 2023                                      Reviewed: Yes    Referring Provider:  Zoe Mederos MD   Referring Service:   hospitalist   Reason for Consultation:  Acute on chronic pain     Gosia Alonzo is a 89 year old female who was admitted on 3/14/2023.  Day of Surgery. I was asked to see the patient for  Acute on chronic pain management for multiple fractures of left and right femurs, left humerus sustained from mechanical fall  with pain type  musculoskeletal. History of breast cancer, compression lumbar fracture, severe OSTEOARTHRITIS, CAD, DM type 2, CHF (EF 35-40%), PAF, Pericarditis, GERD, Hiatel Hernia, GIB, Diverticulitis, Cdiff. Acute Pain began when she fell at her custodial and states \"I'm pretty banged up.\"  Describes pain as 7/10 and 10/10. The patient denies. The patient does smoke and chemical dependency history. Opioid tolerance is yes                   Assessment/Recommendations:  89 year old female with complex medical history and chronic pain with multiple fractures noted above from a mechanical fall.     Opioid Induced Respiratory Depression Risk Assessment: high  (Low 0-1; Moderate 2-4; or High >4 or >/= 3 if two of the risk factors are age > 60 and opioid naive) due to the following risk factors: MARIE, COPD/Asthma/pulmonary disease, CHF, renal dysfunction, hepatic dysfunction, Obesity, Smoker, Age>60, >2 opioid therapies, concomitant CNS depressants, opioid naive status, or post surgical ?   Chronic opioid use = 60  mg MME,  Opioid used this past 24 hours . 92 mg Morphine EQ dose plus Fentanyl 50 mcg    PLAN:     1) Pain us consistent with Musculoskeletal pain, secondary to OSTEOARTHRITIS,osteopenia  in the setting of mechanical fall with bilateral femur fracture and left humerus fracture. Appreciate input from " "Orthopedics with plan ORIF for 3/16/23 of left femur. Treatment plan discussed with patient and ESTEFANI Seema Alonzo includes multimodal pain approach, medications as listed below, reviewed.  Discharge medications, advised keeping track of doses, educated on tapering off as pain improves, watch for constipation and stool softeners, no driving or alcohol with opioids, store medications in a safe place, advised to take no more than the prescribed dose as increased doses may cause respiratory depression or death. Patient is understanding of the plan. All questions and concerns addressed to patient's satisfaction.   2)Multimodal Medication Therapy  Topical:Diclofenac ointmento low back and left shoulder 2 grams each area tid   Adjuvants:CrCl is34,4 ml/min and Tylenol  650 mg every 8 hrs\", Hydroxyzine 10-20 mg every 6 hrs \" Gabapentin avoid due to AE's . \" methocarbamol ( Robaxin) 250 mg qid   Antidepressants/anxiolytics:  As chronic zoloft   Opioids:  Hydrocodone/acetaminopohen hold due to concern for acetaminophine toxcity, and Hydromorphone(Dilaudid) 2-4 mg every 3 hrs prn   IV Pain medication: Dilaudid 0.2-0.4 mg every 1 hour prn   3)Non-medication interventions- Ice, Heat, physical therapy, distraction aroma therapy Healing touch   4)Constipation Prophylaxis- senna and miralax. Last BM 3/15/23   5) Follow up post op  -Opioid prescriber has been Hiren Wallace MD  . PCP is Michael Londono  -Discharge Recommendations - We recommend prescribing the following at the time of discharge:  To be determined          Thank you for the opportunity to participate in the care of Gosia BRENDA Cheri  Pain Service will continue to follow.    Discussed with attending anesthesiologist  Primary Service Contacted with Recommendations? Yes    Radha Betts, APRN CNP  3/15/2023      Chief Complaint:  Acute on chronic pain due to multiple fractures from mechanical fall      History of Present Illness:  Gosia Alonzo " is a 89 year old female with past medical history noted above.  The pain is reported to be acute bilateral femurs, left foot, left upper arm, chronic low back and left shoulder, pain located in the left leg,radiates to thigh and foot.  Current pain is rated at 7-10/10 and goal is 0/10. The patient is with chronic pain. The patient has a high opioid tolerance. Opioid induced side effects noted, including sedation yes, nausea no, and constipation no.  Noted sleep dysfunction yes due to pain, sleep apnea no, and disease of addiction no.      Past pain treatments have included pain clinic yes, PT, LIAN, and surgery. Pharmacological treatments (in past) have included  Gabapentin, oxycodone,. Past surgeries include lumbar fusions, right and left shoulder rotator cuff right and left reverse shoulder, bilateral total knee arthroplasty.        Pain Assessment:   Description of Pain: spasms, sharp, radiates constant, tender   Location:  Both legs, left arn  Current Pain: 7/10  Goal:  0/10  Baseline Pain Level: 7/10  Onset: acute pain after fall    Relieving/exacerbating factors:  Rest, pain medication, movement   Radiating:  Left thigh and left foot    Localized:  Left arm and right leg   Constant:  Yes   Intermittent:  None     Per MN  review pulled from system on 03/15/23.   indicating the following analgesic usage:  Last refill on 3/10/23 Marli Caal MD Hydrocodone 5 mg/325 mg, Pregablin 3/14/23 #30 with Hiren Vázquez MD    Past Medical History:  Past Medical History:   Diagnosis Date     Acute on chronic congestive heart failure, unspecified heart failure type (H) 07/19/2022     Allergic rhinitis, cause unspecified      Arthritis      CAD (coronary artery disease)     Cath 12/2015: aspiration thrombectomy and CAMILA to mid and prox LAD. Cath 1/9/16: ASA/ticargelor held due to LGI bleed and she thrombosed the Lad stent. Aspiration thrombectomy and PTCA to mLAD.     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     Ischemic cardiomyopathy      Migraine, unspecified, without mention of intractable migraine without mention of status migrainosus      NSTEMI (non-ST elevated myocardial infarction) (H) 08/10/2015     Obesity, unspecified      Osteoporosis 11/2008     Other and unspecified hyperlipidemia      Other iron deficiency anemia 04/21/2016     Other seborrheic keratosis      PAF (paroxysmal atrial fibrillation) (H)      Paroxysmal atrial fibrillation (H) 10/26/2016     Pericarditis age 15     PONV (postoperative nausea and vomiting)      Postop DVT right calf 1988     Pyogenic granuloma of skin and subcutaneous tissue      R upper extremity edema, postop     ? RSD     Solitary cyst of breast      TSH deficiency      VASOMOTOR RHINITIS 2006    Dr. Wilson     Viral warts, unspecified          Family History:    Family History   Problem Relation Age of Onset     C.A.D. Father         MI 56     Cancer Mother         pancreatic CA     Cancer Brother         CA colon 58     Hypertension Sister        Social History:  Social History     Tobacco Use     Smoking status: Never     Smokeless tobacco: Never   Substance Use Topics     Alcohol use: No     Alcohol/week: 0.0 standard drinks     Comment: rarely         Tobacco:  None   ETOH:   None   H/O Substance Abuse:   None   L[place] in [place]       Review of Systems:  Complete ROS reviewed. Unless otherwise noted, all other systems found to be negative.        Laboratory Results:  Recent " Labs   Lab Test 03/15/23  0934 03/15/23  0731 02/25/23  0652 02/24/23  0939 01/10/16  0630 01/09/16  1341   INR  --   --   --  1.07   < > 0.95   *  --    < > 266   < > 294   PTT  --   --   --   --   --  27   BUN  --  24.4*   < > 21.5   < > 21    < > = values in this interval not displayed.         Allergies:  Allergies   Allergen Reactions     Codeine      GI UPSET     Escitalopram Oxalate      fatigue     Esomeprazole Magnesium Trihydrate      HA     Gabapentin Dizziness     Dizziness, confusion     Imdur [Isosorbide]      Headache       Meperidine Hcl      N/V     Morphine Hcl      HIVES     Oxycodone      (percodan) GI UPSET     Pentazocine      (talwin)  HALLUCINATIONS     Propoxyphene Hcl      STOMACH UPSET     Sumatriptan Succinate      chest pain         Pain Medications:  Pain Medications/Prescriber: Hiren Wallace MD    Current Pain Related Medications:  Medications related to Pain Management (From now, onward)    Start     Dose/Rate Route Frequency Ordered Stop    03/15/23 1109  methocarbamol (ROBAXIN) tablet 500 mg         500 mg Oral 4 TIMES DAILY PRN 03/15/23 1109      03/15/23 0900  HYDROmorphone (DILAUDID) injection 0.2-0.5 mg         0.2-0.5 mg Intravenous EVERY 1 HOUR PRN 03/15/23 0830      03/15/23 0831  ketorolac (TORADOL) injection 15 mg         15 mg Intravenous EVERY 6 HOURS PRN 03/15/23 0831      03/15/23 0800  aspirin EC tablet 81 mg        Note to Pharmacy: PTA Sig:Take 1 tablet (81 mg) by mouth daily      81 mg Oral DAILY 03/14/23 2059      03/15/23 0100  pregabalin (LYRICA) capsule 25 mg        Note to Pharmacy: PTA Sig:Take 1 capsule (25 mg) by mouth 2 times daily      25 mg Oral 2 TIMES DAILY 03/14/23 2059 03/14/23 2208  HYDROcodone-acetaminophen (NORCO) 5-325 MG per tablet 2 tablet        Note to Pharmacy: PTA Sig:Take 2 tablets by mouth 4 times daily as needed for severe pain (7-10) Begin substantial taper ASAP to previous chronic directions of one tablet BID prn.      2  "tablet Oral 4 TIMES DAILY PRN 03/14/23 2208 03/14/23 2059  hydrOXYzine (ATARAX) tablet 25 mg        Note to Pharmacy: PTA Sig:Take 1 tablet (25 mg) by mouth 4 times daily as needed      25 mg Oral 4 TIMES DAILY PRN 03/14/23 2059 03/14/23 2059  lidocaine 1 % 0.1-1 mL         0.1-1 mL Other EVERY 1 HOUR PRN 03/14/23 2059 03/14/23 2059  lidocaine (LMX4) cream          Topical EVERY 1 HOUR PRN 03/14/23 2059 03/14/23 2058  senna-docusate (SENOKOT-S/PERICOLACE) 8.6-50 MG per tablet 1 tablet        See Hyperspace for full Linked Orders Report.    1 tablet Oral 2 TIMES DAILY PRN 03/14/23 2059 03/14/23 2058  senna-docusate (SENOKOT-S/PERICOLACE) 8.6-50 MG per tablet 2 tablet        See Hyperspace for full Linked Orders Report.    2 tablet Oral 2 TIMES DAILY PRN 03/14/23 2059              Physical Exam:  Vitals: BP 94/42   Pulse 104   Temp 99.1  F (37.3  C) (Axillary)   Resp 16   Ht 1.651 m (5' 5\")   Wt 50.9 kg (112 lb 3.2 oz)   LMP  (LMP Unknown)   SpO2 95%   BMI 18.67 kg/m      Physical Exam:   CONSTITUTIONAL/GENERAL APPEARANCE: Mildly distressed due pain.   RESPIRATORY:  NAD, even chest rise symmetrical.    NEURO: Alert and Oriented x3.  symmetrical limits in the context pain. Answers questions appropriately  SKIN:  Normal color  Psych: anxious affect, conversant appropriate.     Tele-Visit Details    Type of service:  Video Visit    Video Start Time (time video started): 14:15    Video End Time (time video stopped): 15:00    Originating Location (pt. Location): Patient Hospital Room     Distant Location (provider location): [facility]     Reason for Televisit: Pain Consult     Mode of Communication:  Video Conference via [employer]    Provider has received verbal consent for a video visit from the patient? Yes      Radha Betts, APRN CNP     Please see A&P for additional details of medical decision making.  MANAGEMENT DISCUSSED with the following over the past 24 " hours:  MD Linda Euceda, RN    NOTE(S)/MEDICAL RECORDS REVIEWED over the past 24 hours: history & physical, consult notes, lab, imaging  Tests REVIEWED in the past 24 hours:  - CMP  - CBC  - EKG with PAC, CT left and right leg, xray left arm   Medical complexity over the past 24 hours:  - Parenteral (IV) CONTROLLED SUBSTANCES ordered  - Decision regarding ESCALATION OF LEVEL OF CARE  - Prescription DRUG MANAGEMENT performed    Acute Inpatient Pain Service [facility]  Coverage Monday-Friday 8:00-4:30  No weekend coverage contact house officer  [facility] Paging/Directory Pain  Securely message with the Vocera Web Console (learn more here)

## 2023-03-15 NOTE — PROGRESS NOTES
"0900 Magnesium this AM 1.1. Replacement now. Will redraw at 1200.   HGB 6.6 call received from lab at 0855. HGB to be redraw now per Hospitalist Dr. Mederos.   Did hold Lasix and coreg and ok per Dr. Rodriguez.   Pain 10/10. Mainly to \"my knees.\" received new pain medication orders.   Ace wraps to bilateral legs and left arm. Sling to left arm.   doppler pedal pulse to right foot. Unable to assess left pedal pulse.   Right foot is purple in color. Right foot cool to touch. Redness to right lower leg area.Numbness to bilateral feet per patient. Unable to dorsi and planter flexion due to pain. Right foot is swollen severe edema. \"normally swollen but I can;'t see it from where I am.\" NPO. Remote tele sinus tachycardia.     0949 DATE:  3/15/2023   TIME OF RECEIPT FROM LAB:  0945  LAB TEST:  HGB  LAB VALUE:  6.6  RESULTS GIVEN WITH READ-BACK TO (PROVIDER):  {Choose the provider you called     Dr. Valente  TIME LAB VALUE REPORTED TO PROVIDER:   [id number] Patient vital signs are at baseline: No,  Reason:  Oxygen on 2L per NC. BP trending 90s-100s systolically. Did not give lasix or coreg this morning.   Patient able to ambulate as they were prior to admission or with assist devices provided by therapies during their stay:  No,  Reason: bedrest  Patient MUST void prior to discharge:  Yes. incontinent  Patient able to tolerate oral intake:  Yes  Pain has adequate pain control using Oral analgesics:  No,  Reason: pain team consult. Toradol, norco, robaxin, dilaudid,   Does patient have an identified :  Yes  Has goal D/C date and time been discussed with patient:  Yes. Lives at [facility]     Plan is no surgery today. Blood infusing now. Seema is the point of contact for family. Ace wraps to left arm, left leg, right knee, right knee immobilizer. Right foot severe swelling and purple in color, cool to touch,. dopplered pulses.     "

## 2023-03-16 NOTE — PROGRESS NOTES
Patient was taken to the OR this morning for surgical intervention. She remained in the OR for the duration of the day. I was not able to see or examine her, but I reviewed her medical record, ordered AM labs for recheck. Updated plan as outlined below.     Ms. Gosia Alonzo is a 89 year old female with a history of breast cancer, compression Fractures, CAD, DM type 2, CHF (EF 35-40%), PAF, Pericarditis, GERD, Hiatel Hernia, GIB, Diverticulitis, Cdiff admitted on 3/14/2023 with acute femur fracture after a mechanical fall.  Noted to have acute anemia and hypomagnesemia, so surgical intervention was postponed until 3/16.     Acute femur fracture   S/p surgical intervention 03/16/23   Patient suffered a mechanical slip and fall and presented for evaluation. Xray with evidence of acute fracture of the left femur distal metaphysis with mild impaction and anterior apex angulation. Left knee degenerative arthrosis. Bone demineralization. ED provider spoke with orthopedics who recommended further imaging. Patient remains in pain, uses [place] at home.  CT imaging was performed.   - orthopedics performed repair on 03/16/23   - pain team following   - resume diet once back from OR   - bedrest for now, PT postoperatively      Acute oblique fracture of the left humerus distal metadiaphysis  Old unhealed fracture of the left proximal humerus   - orthopedics following                 -LUE post splint, NWB, trial nonop in [facility] vs ORIF  - pain control as above   - PT when able      Acute anemia, normocytic  Hemoglobin noted to be 6.6 on 3/15.  Recheck similar.  The patient received 1 unit of packed red blood cells.  Plan for recheck hemoglobin and conditional transfusion if hemoglobin less than 7 optimized for surgery.   -Repeat CBC in a.m.  -transfuse with HGB < 7.0      Acute hypoxic respiratory failure, persistent  Unclear etiology, patient requiring 3 L of nasal cannula oxygen to maintain saturations.  Chest x-ray not  performed in the emergency department.  Chest x-ray with evidence of pulmonary edema on 3/14.  Patient received a dose of Lasix.  -Continuous pulse oximetry  -Incentive spirometry  -Wean oxygen as tolerated  -On PTA Lasix as below, will administer additional diuresis if evidence of pulmonary edema, caution with soft pressures  -No fever or pneumonia symptoms, low concern for pneumonia at this time      Chronic CHF  Ischemic Cardiomyopathy  Hx CAD s/p CAMILA to mid and proximal LAD 1/2016  HTN - no signs of acute exacerbation.  No new chest pain/sob/cough.  No hypoxia. Most recent echo with EF 35-40% on 1/26/23.   - PTA Atorvastatin   - PTA Coreg   - PTA Lasix   - PTA ASA      Hx Paroxysmal Atrial Fibrillation   - monitor on telemetry     DM Type 2 - hgb A1C 6.6 (12/2022).  Diet controlled.  Not on medications.  Monitor BG while inpatient      Osteoporosis  Osteoarthritis  Hx of Compression Fractures  Hx Multiple Orthopedic Surgeries   - PTA Tylenol, Lyrica,   - PRN dilaudid as above   - Vitamin D, PTH elevated, hypocalcemia      Anxiety - PTA Sertraline.  PRN Atarax      Probable IPMN of the pancreas - evaluated by GI on a previous admission      Hx Breast Cancer - dx 9/1998 s/p right mastectomy     Hx Post op RLE DVT 1988     Hypomagnesemia -replace per protocol    Zoe Mederos,  3:13 PM

## 2023-03-16 NOTE — ANESTHESIA PROCEDURE NOTES
"Saphenous Procedure Note    Pre-Procedure   Staff -        Anesthesiologist:  Reilly Valencia MD       Performed By: anesthesiologist       Location: pre-op       Procedure Start/Stop Times: 3/16/2023 7:04 AM and 3/16/2023 7:15 AM       Pre-Anesthestic Checklist: patient identified, IV checked, site marked, risks and benefits discussed, informed consent, monitors and equipment checked, pre-op evaluation, at physician/surgeon's request and post-op pain management  Timeout:       Correct Patient: Yes        Correct Procedure: Yes        Correct Site: Yes        Correct Position: Yes        Correct Laterality: Yes        Site Marked: Yes  Procedure Documentation  Procedure: Saphenous       Laterality: left       Patient Position: supine       Patient Prep/Sterile Barriers: sterile gloves, mask       Skin prep: Betadine       Local skin infiltrated with 0.6 mL of 1% lidocaine.        Needle Type: insulated       Needle Gauge: 22.        Needle Length (Inches): 4        Ultrasound guided       1. Ultrasound was used to identify targeted nerve, plexus, vascular marker, or fascial plane and place a needle adjacent to it in real-time.       2. Ultrasound was used to visualize the spread of anesthetic in close proximity to the above referenced structure.    Assessment/Narrative         The placement was negative for: blood aspirated, painful injection and site bleeding       Paresthesias: No.       Bolus given via needle. no blood aspirated via catheter.        Secured via.        Insertion/Infusion Method: Single Shot       Complications: none    Medication(s) Administered   Bupivacaine 0.5% w/ 1:200K Epi (Other) - Other   30 mL - 3/16/2023 7:15:00 AM  Medication Administration Time: 3/16/2023 7:04 AM      FOR [facility] ([facility]) ONLY:   Pain Team Contact information: please page the Pain Team Via [employer] Search \"Pain\". During daytime hours, please page the attending first. At night please page the resident " first.

## 2023-03-16 NOTE — PROGRESS NOTES
ICU End of Shift Summary.  For vital signs and complete assessments, please see documentation flowsheets.      Pertinent assessments: Patient awakes to touch. Mumbled speech. Sedated and lethargic. Appears not to be in pain. Patient weaned from 10L oxymask to 5L oxymask. Has not ate or drank post-op due to lethargy. No nausea noted.  Capno in place. Has not been out of bed.  Pt no longer hypotensive but remains tachycardic in 120s and 130s.   Major Shift Events: transferred from pacu to icu  Plan (Upcoming Events):  wean 02, iv antbiotics, IV fluids  Discharge/Transfer Needs: wean 02, monitor blood pressure     Bedside Shift Report Completed :  yes  Bedside Safety Check Completed: yes

## 2023-03-16 NOTE — OP NOTE
ORTHOPEDIC SURGERY  OPERATIVE NOTE    Gsoia Alonzo  [id number]  11/14/1932 March 16, 2023      PREOPERATIVE DIAGNOSIS:    1. Left distal femur fracture  2. Left knee osteoarthritis  3. Hx of left knee patellectomy  4. Osteoporosis     POSTOPERATIVE DIAGNOSIS:   Same    PROCEDURE:    1. Left distal femur replacement with hinged left total knee arthroplasty  2. Radical resection, left distal femur    22 Modifier: Application of a 22 modifier is appropriate in this case as the case required approximately 30% additional time (1 hr) and effort due to the altered surgical field from her prior patellectomy, the technical complexity of the osteoporotic fracture and bone.     SURGEON:  Bonifacio Perry MD    ASSISTANT: DIANDRA Peters; A skilled first assistant was necessary for this procedure for assistance with patient positioning, prepping, draping, surgical visualization, wound closure, and application of the dressing.     SPECIMENS:  None     COMPLICATIONS:  None    ESTIMATED BLOOD LOSS: 100cc    IMPLANTS:   Implant Name Type Inv. Item Serial No.  Lot No. LRB No. Used Action   BONE CEMENT SIMPLEX FULL DOSE 6191-1-001 - [id number] Cement, Bone BONE CEMENT SIMPLEX FULL DOSE 6191-1-001  [employer] [id number] Left 2 Implanted   BONE CEMENT SIMPLEX FULL DOSE 6191-1-001 - [id number] Cement, Bone BONE CEMENT SIMPLEX FULL DOSE 6191-1-001  [employer] [id number] Left 2 Implanted   IMP COMP FEMORAL STRK DIST STD LT 6495-2-030 - [id number] Total Joint Component/Insert IMP COMP FEMORAL STRK DIST STD LT 6495-2-030  [employer] [id number] Left 1 Implanted   BONE CEMENT RESTRICTOR LYMAN FEMORAL 25MM 994525 - [id number] Cement, Bone BONE CEMENT RESTRICTOR YLMAN FEMORAL 25MM 934707  [employer] [id number] Left 1 Implanted   BONE CEMENT RESTRICTOR LYMAN FEMORAL 25MM 407512 - [id number] Cement, Bone BONE CEMENT RESTRICTOR LYMAN FEMORAL 25MM [id number]  [employer] [id number] Left 1  Implanted   Titanium Fluted Stem 19mm anamaria, 80mm length, Apha Code Q    [employer]ACKIE [id number] Left 1 Implanted   Long Curved Cemented Stem 17mm anamaria, 203mm length    [employer] [id number] Left 1 Implanted   IMP BASEPLATE TIBIAL STRK MR KEEL S2 6481-3-111 - [id number] Total Joint Component/Insert IMP BASEPLATE TIBIAL STRK MR KEEL S2 6481-3-111  [employer] [id number] Left 1 Implanted   IMP COMP TIBIAL STRK MR 6481-2-100 - [id number] Total Joint Component/Insert IMP COMP TIBIAL STRK MR 6481-2-100  [employer] [id number] Left 1 Implanted   IMP INSERT STRK KN MR AXLE STD 6481-2-120 - [id number] Total Joint Component/Insert IMP INSERT STRK KN MR AXLE STD 6481-2-120  [employer] [id number] Left 1 Implanted   IMP INSERT TIBIAL SLEEVE STRK 6481-2-140 - [id number] Total Joint Component/Insert IMP INSERT TIBIAL SLEEVE STRK 6481-2-140  [employer] [id number] Left 1 Implanted   IMP INSERT STRK KN BUMPER NEUTAL 6481-2-130 - [id number] Total Joint Component/Insert IMP INSERT STRK KN BUMPER NEUTAL 6481-2-130  [employer] [id number] Left 1 Implanted   IMP INSERT STRK KN MR BUSHING STD 6481-2-110 - [id number] Total Joint Component/Insert IMP INSERT STRK KN MR BUSHING STD 6481-2-110  [employer] [id number] Left 1 Implanted   IMP INSERT STRK KN MRH BUSHING STD 6481-2-110 - [id number] Total Joint Component/Insert IMP INSERT STRK KN MR BUSHING STD 6481-2-110  [employer] [id number] Left 1 Implanted   IMP INSERT TIBIAL STRK MRH S1/S2 16MM 6481-3-216 - [id number] Total Joint Component/Insert IMP INSERT TIBIAL STRK MRH S1/S2 16MM 6481-3-216  [employer] [id number] Left 1 Implanted         TOURNIQUET TIME:  129 @ 250 mmHg    INDICATIONS:    Gosia Alonzo is a 89 year old female with a history of a right knee prosthesis, and left patellectomy who presented with a left distal femur fracture after a mechanical fall.  She also has a history of breast cancer, coronary artery disease, diabetes, CHF,  pericarditis, and GERD. She has a history of ongoing left knee arthritis and feels this contributed to her fall.  She was also found to have a left humeral shaft fracture.  She ambulates with assist but is able to ambulate around her and in the community.     We discussed possible treatment options including nonoperative and operative intervention along with the risks, benefits, and recovery of each option.   The patient's goal is to return to ambulating.  We discussed potential treatment options including ORIF versus a distal femoral replacement.  I spoke with the patient as well as her son.  She has had ongoing left knee pain and issues secondary to arthritis and they are concerned about her ability to be nonweightbearing with an ORIF.  After thorough discussion, they elected to proceed with a distal femoral replacement and hinged total knee arthroplasty.  They are aware that this is a longer surgery and comes with increased risks related to her age and multiple core morbidities.  However, their goal is quality of life and they would like her to return to ambulating as soon as possible.  In addition, my partner Dr. Winters met with him and was in agreement to proceed with a distal femoral replacement.      The risks of bleeding, infection, nerve damage, nonunion, malunion, hardware failure, loss of motion, pain, further surgery, stiffness, and the medical risks of anesthesia were discussed. Benefits including improved chance of motion, earlier rehab and improved function were discussed.     Alternatives including nonoperative management were discussed, but not recommended.  The patient and son were in agreement with the plan to proceed.  The informed consent was signed and documented.  Met with the patient preoperatively to debbie the operative extremity.    OPERATIVE COURSE:    The patient was brought into the operating room and placed on operating table.  The patient underwent general anesthesia.  The patient  was positioned in a supine position with a tourniquet.  All bony prominences were well-padded.  The operative extremity was cleansed with Hibiclens. The operative extremity was then prepped with ChloraPrep.  The surgical team scrubbed in.  A WHO timeout was conducted to verify correct patient, correct extremity, presence of the surgeon's initials on the operative extremity, and administration of IV antibiotics, in this case Ancef.      Prior to incision, the tourniquet was inflated to 250mm Hg. A midline incision was made along her prior incision.  Prior scar and adhesions were noted from her previous patellectomy.  An arthrotomy was performed just along the medial aspect of the quadricep tendon and extended distally just medial to her tibial tubercle.  Fat pad was resected along with a medial release along the tibia.  The patient's ACL and PCL were then removed.  We then carefully resected around the distal femur.  She was noted to have a comminuted fracture in the supracondylar region.  Femoral shaft was marked for future replacement.  A debbie was made along the midline aspect of the femur with approximately 7 cm proximal to the joint line for our future resection.    The distal femur was then carefully resected both medially and laterally.  A bone hook was utilized notch to carefully elevate the distal femur away from posterior structures.  Electrocautery was utilized along the bone.  The distal femur was then removed.  A saw was then used to make a clean cut approximately 7 cm proximal to the joint line.  We then sequentially reamed the femoral shaft up to approximately 19 mm for a 17 mm stem x 203mm stem.    We then turned our attention to the tibia.  The meniscus was then resected.  A lateral and medial release were then performed to fully visualize the proximal tibia.  And opening reamer was then placed to identify the shaft.  The tibia was then subsequently reamed by hand to 21 mm for a 19mm x 80mm tibial  stem.  The reamer was then utilized to set the tibial cutting guide.  Approximately 9 mm resection was made based off the lateral tibial plateau.  Proximal tibia was then subsequently prepped, reamed for the tibial implant.  Trial components were then obtained for the femur and tibia.  Fluoroscopy was utilized to help  length.  The patient's leg lengths appear to be well restored.      We then proceeded towards final implants.  The patient's tibia and femur were irrigated and plug was placed in both palms for cementing.  2 units of cement were then placed in the femur followed by the [place] GMRS 17x203 stem and DFR prosthesis.  Once the femur was well fixed we proceeded with fixation of the tibia.  Another 2 units of cement were placed in the tibia followed by the [place] GMR S 19 mm x 80 mm tibial stem and tibial tray.  We proceeded with trialing poly once the tibial and femoral stems were implanted.  A 16 mm poly was found to be good fit with the leg out to good length.  We then proceeded with placing the final poly along with a rotating platform and hinged prosthesis.    After placement of all components, the knee was ranged and found to be stable.  The knee appeared to be tracking well and had a good leg length.  The wound was then copiously irrigated with dilute Betadine followed by Pulsavac.  Vancomycin powder was then placed in the wound.  The patient's fascia was then closed with 1 Ethibond and #1 Vicryl followed by a running #1 strata fix.  2-0 Monocryl was used in the subcutaneous tissue followed by a running 3-0 Monocryl.  A Dermabond was placed over the incision.    The patient's wound was then dressed with gauze, Tegaderm, cast padding, Ace wrap and placed in a knee immobilizer.      All instruments were accounted for at the end of the case. The patient awoke from anesthesia and was transferred to the PACU in stable condition.      POST OP PLAN:    Postoperatively:   1.  Ancef x 24 hours.    2.  Lovnoex for DVT prophylaxis.   3.  PACU x-rays, including right ankle r/o fracture  4.  Weightbearing as tolerated with a walker x 6 weeks, Hinged knee brace locked in extension, may unlock to 60deg with therapy.     5.  Physical therapy/occupational therapy.   6.  Keep dressing on for 2 weeks.  OK to shower.  No submerging wound.  7.  Osteoporosis workup and treatment with primary care provider within 2 months.   8.  Discharge:  Case management social work consult for placement     FOLLOWUP:     1.  Plan 2-week wound check with x-rays (AP and Lateral Xrays).  This could also be done at patients rehab facility with x-rays sent to me at [facility].   2.  Eight-week followup with X-rays.        FOLLOWUP:     Please call as soon as possible to make an appointment to be seen in Dr. Bonifacio Perry's clinic in 2 weeks.     Dr. Perry's care coordinator is Bere Sinha. Please contact her at [phone] to schedule an appointment.      Dr. Perry sees patient's at 2 clinic locations:    [facility]  o [address] [place] [address]    [facility]   o [address], [address], [place] [address]      Please call the on-call phone number [phone] during evenings, nights and weekends for any urgent needs. Prescription refills must be done during business hours by calling [phone]      Bonifacio Perry MD  [facility]

## 2023-03-16 NOTE — ANESTHESIA POSTPROCEDURE EVALUATION
Patient: Gosia Alonzo    Procedure: Procedure(s):  Total knee arthroplasty with distal femoral replacement       Anesthesia Type:  General    Note:  Disposition: Inpatient   Postop Pain Control: Uneventful            Sign Out: Well controlled pain   PONV: No   Neuro/Psych: Uneventful            Sign Out: Acceptable/Baseline neuro status   Airway/Respiratory: Uneventful            Sign Out: O2 supplementation               Oxygen: Face mask   CV/Hemodynamics: Uneventful            Sign Out: Detailed CV status               Blood Pressure: HypOtension               Rate/Rhythm: Tachycardia               Perfusion:  Adequate perfusion indices   Other NRE: NONE   DID A NON-ROUTINE EVENT OCCUR? No    Event details/Postop Comments:  Admitting to ICU for management of hypotension, tachycardia and respiratory support.           Last vitals:  Vitals Value Taken Time   BP 90/66 03/16/23 1350   Temp 97.5  F (36.4  C) 03/16/23 1345   Pulse 143 03/16/23 1358   Resp 22 03/16/23 1358   SpO2 96 % 03/16/23 1358   Vitals shown include unvalidated device data.    Electronically Signed By: Reilly Valencia MD  March 16, 2023  1:59 PM

## 2023-03-16 NOTE — PLAN OF CARE
Goal Outcome Evaluation:      Plan of Care Reviewed With: patient    Overall Patient Progress: no changeOverall Patient Progress: no change         Cared for pt. 2033-2686    VSS. Afebrile. AO x4. O2 2 LPM via NC. Voiding via purewick. Bladder scanned for 461. Voided 400. IVF NS running at 50. Bedrest. Scheduled robaxin and atarax and oral dilaudid used for pain management. Pt. Will be NPO at midnight. Pt. On tele.     One staff member from the pt. Assisted Living. Brought items for pt. From assisted living and dinner for pt. Staff asking question and was here most of the night. Nurse redirected staff member and was unable to answer questions due to pt. Confidentiality. Staff member was asked to leave at 2030 and after until she left the unit. Staff member stated she would be back in the morning.     Blood sugars: 135, 159. Coverage given.    B/P: 132/54, T: 97.9, P: 107, R: 12

## 2023-03-16 NOTE — BRIEF OP NOTE
Brief Operative Note    Pre-operative diagnosis: Closed fracture of distal end of left femur, unspecified fracture morphology, initial encounter (H) [V10.569B]  Post-operative diagnosis Same    Procedure: Procedure(s):  Total knee arthroplasty with distal femoral replacement  Surgeon: Surgeon(s) and Role:     * Bonfiacio Perry MD - Primary     * Eddi Pink - Assisting  Anesthesia: General   Estimated Blood Loss: 100cc    Drains: None  Specimens:   ID Type Source Tests Collected by Time Destination   A : OR blood draw for hemoglobin level Blood Line, venous HEMOGLOBIN Bonifacio Perry MD 3/16/2023  8:28 AM      Findings:   None.  Complications: None.  Implants:   Implant Name Type Inv. Item Serial No.  Lot No. LRB No. Used Action   BONE CEMENT SIMPLEX FULL DOSE 6191-1-001 - [id number] Cement, Bone BONE CEMENT SIMPLEX FULL DOSE 6191-1-001  [employer] [id number] Left 2 Wasted   BONE CEMENT SIMPLEX FULL DOSE 6191-1-001 - [id number] Cement, Bone BONE CEMENT SIMPLEX FULL DOSE 6191-1-001  [employer] [id number] Left 2 Implanted   BONE CEMENT SIMPLEX FULL DOSE 6191-1-001 - [id number] Cement, Bone BONE CEMENT SIMPLEX FULL DOSE 6191-1-001  [employer] [id number] Left 2 Implanted   IMP COMP FEMORAL STRK DIST STD LT 6495-2-030 - [id number] Total Joint Component/Insert IMP COMP FEMORAL STRK DIST STD LT 6495-2-030  [employer] [id number] Left 1 Implanted   BONE CEMENT RESTRICTOR LYMAN FEMORAL 25MM 928106 - [id number] Cement, Bone BONE CEMENT RESTRICTOR LYMAN FEMORAL 25MM 513793  [employer] [id number] Left 1 Implanted   BONE CEMENT RESTRICTOR LYMAN FEMORAL 25MM 697530 - [id number] Cement, Bone BONE CEMENT RESTRICTOR LYMAN FEMORAL 25MM 052775  [employer] [id number] Left 1 Implanted   Titanium Fluted Stem 19mm anamaria, 80mm length,     [employer] [id number] Left 1 Implanted   Long Curved Cemented Stem 17mm anamaria, 203mm length    [employer] [id number]  Left 1 Implanted   IMP BASEPLATE TIBIAL STRK MR KEEL S2 6481-3-111 - [id number] Total Joint Component/Insert IMP BASEPLATE TIBIAL STRK MR KEEL S2 6481-3-111  [employer] [id number] Left 1 Implanted   IMP COMP TIBIAL STRK MR 6481-2-100 - [id number] Total Joint Component/Insert IMP COMP TIBIAL STRK MR 6481-2-100  [employer] [id number] Left 1 Implanted   IMP INSERT STRK KN MR AXLE STD 6481-2-120 - [id number] Total Joint Component/Insert IMP INSERT STRK KN MR AXLE STD 6481-2-120  [employer] [id number] Left 1 Implanted   IMP INSERT TIBIAL SLEEVE STRK 6481-2-140 - [id number] Total Joint Component/Insert IMP INSERT TIBIAL SLEEVE STRK 6481-2-140  [employer] [id number] Left 1 Implanted   IMP INSERT STRK KN BUMPER NEUTAL 6481-2-130 - [id number] Total Joint Component/Insert IMP INSERT STRK KN BUMPER NEUTAL 6481-2-130  [employer] [id number] Left 1 Implanted   IMP INSERT STRK KN MRH BUSHING STD 6481-2-110 - [id number] Total Joint Component/Insert IMP INSERT STRK KN MR BUSHING STD 6481-2-110  [employer] [id number] Left 1 Implanted   IMP INSERT STRK KN MRH BUSHING STD 6481-2-110 - [id number] Total Joint Component/Insert IMP INSERT STRK KN MRH BUSHING STD 6481-2-110  [employer] [id number] Left 1 Implanted   IMP INSERT TIBIAL STRK MR S1/S2 16MM 6481-3-216 - [id number] Total Joint Component/Insert IMP INSERT TIBIAL STRK MR S1/S2 16MM 6481-3-216  [employer] [facility] [id number] Left 1 Implanted       Postoperatively:   1.  Ancef x 24 hours.   2.  Lovnoex for DVT prophylaxis.   3.  PACU x-rays, including right ankle r/o fracture  4.  Weightbearing as tolerated with a walker x 6 weeks, Hinged knee brace locked in extension, may unlock to 60deg with therapy.     5.  Physical therapy/occupational therapy.   6.  Keep dressing on for 2 weeks.  OK to shower.  No submerging wound.  7.  Osteoporosis workup and treatment with primary care provider within 2 months.   8.  Discharge:  Case management social  work consult for placement     FOLLOWUP:     1.  Plan 2-week wound check with x-rays (AP and Lateral Xrays).  This could also be done at patients rehab facility with x-rays sent to me at [facility].   2.  Eight-week followup with X-rays.    GONZALEZ STANFORD MD
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
## 2023-03-16 NOTE — PLAN OF CARE
Goal Outcome Evaluation:      Plan of Care Reviewed With: patient    Overall Patient Progress: no changeOverall Patient Progress: no change     Patient vital signs are at baseline: No,  Reason:  requiring 3LPM via NC  Patient able to ambulate as they were prior to admission or with assist devices provided by therapies during their stay:  No,  Reason:  Bedrest  Patient MUST void prior to discharge:  Yes  Patient able to tolerate oral intake:  No,  Reason:  NPO since midnight  Pain has adequate pain control using Oral analgesics:  Yes  Does patient have an identified :  Yes  Has goal D/C date and time been discussed with patient:  No,  Reason:  Or today for surgery    Pt A/O x4. VVS; except 3LPM via NC. Pain managed with schedule tylenol, atarax, and robaxin and PO dilaudid and one IV dose of dilaudid. Bedrest. Voiding via external catheter. NPO since midnight. LUE and LLE splinted and ace wrapped. RLE in KI. Plan is OR at 0730. Surgical bath and wies completed.

## 2023-03-16 NOTE — ANESTHESIA CARE TRANSFER NOTE
Patient: Gosia Alonzo    Procedure: Procedure(s):  Total knee arthroplasty with distal femoral replacement       Diagnosis: Closed fracture of distal end of left femur, unspecified fracture morphology, initial encounter (H) [S72.822A]  Diagnosis Additional Information: No value filed.    Anesthesia Type:   General     Note:    Oropharynx: nasal airway in place and spontaneously breathing  Level of Consciousness: drowsy  Oxygen Supplementation: face mask  Level of Supplemental Oxygen (L/min / FiO2): 8  Airway patency satisfactory and stable: somewhat, long pauses and exhalations needs prompting.  Dentition: dentition unchanged    Report to RN Given: handoff report given  Patient transferred to: PACU  Comments: Unstable on dropoff, MDA present throughout extubation and patient transfer of care. CRNA did request ICU bed for recovery. Report given to 2 RNs, supportive care, positioning and help on transfer of care.   Handoff Report: Identifed the Patient, Identified the Reponsible Provider, Reviewed the pertinent medical history, Discussed the surgical course, Reviewed Intra-OP anesthesia mangement and issues during anesthesia and Allowed opportunity for questions and acknowledgement of understanding      Vitals:  Vitals Value Taken Time   BP 95/33 03/16/23 1150   Temp 97.2  F (36.2  C) 03/16/23 1145   Pulse 116 03/16/23 1155   Resp 23 03/16/23 1155   SpO2 92 % 03/16/23 1155   Vitals shown include unvalidated device data.    Electronically Signed By: WILLIE Huff CRNA  March 16, 2023  11:57 AM

## 2023-03-17 NOTE — PROGRESS NOTES
Hospitalist Medicine Progress Note   [facility]       Gosia Alonzo is a  89 year old female with a history of breast cancer, compression Fractures, CAD, DM type 2, CHF (EF 35-40%), PAF, Pericarditis, GERD, Hiatel Hernia, GIB, Diverticulitis, Cdiff admitted on 3/14/2023 with acute femur fracture after a mechanical fall.  With acute anemia and hypomagnesemia, surgical intervention was postponed and she had left distal femur replacement with hinged left total knee arthroplasty and radical resection of the left distal femur accomplished by Dr. Bonifacio Perry MD on 3/16/2023.  Postop patient was sleepy and less interactive.  Orthopedic surgery has recommended weightbearing as tolerated with a walker x6 weeks with hinged knee brace locked in extension may unlocked to 60 degrees with therapy on the left lower extremity and on the other right lower extremity weightbearing as tolerated with walker with KAI and Lasix up ankle brace in place.  Patient might be going to TCU.  She was put in ICU for closer monitoring.  But can go to the floor       Date of Admission:  3/14/2023  Assessment & Plan     Left distal femur fracture  s/p surgical intervention 03/16/23   Patient suffered a mechanical slip and fall and presented for evaluation. Xray with evidence of acute fracture of the left femur distal metaphysis with mild impaction and anterior apex angulation. Left knee degenerative arthrosis. Bone demineralization. ED provider spoke with orthopedics who recommended further imaging. Patient remains in pain, uses [place] at home.  CT imaging was performed.   - orthopedics performed repair on 03/16/23   - pain team following   - resume diet    -  PT postoperatively   -Probably anticipating to go to a TCU     Acute oblique fracture of the left humerus distal metadiaphysis  Old unhealed fracture of the left proximal humerus   - orthopedics following   -LUE post splint, NWB, trial nonop in [facility] vs  ORIF  - pain control as above   - PT when able      Acute anemia, normocytic  Hemoglobin noted to be 6.6 on 3/15.  Recheck similar.  The patient received 1 unit of packed red blood cells.  Plan for recheck hemoglobin and conditional transfusion if hemoglobin less than 7 optimized for surgery.   -Repeat CBC in a.m.  -transfuse with HGB < 7.0      Acute hypoxic respiratory failure, persistent  Unclear etiology, patient requiring 3 L of nasal cannula oxygen to maintain saturations.  Chest x-ray not performed in the emergency department.  Chest x-ray with evidence of pulmonary edema on 3/14.  Patient received a dose of Lasix.  -Continuous pulse oximetry  -Incentive spirometry  -Wean oxygen as tolerated  -On PTA Lasix as below, will administer additional diuresis if evidence of pulmonary edema, caution with soft pressures  -No fever or pneumonia symptoms, low concern for pneumonia at this time      Chronic CHF  Ischemic Cardiomyopathy  Hx CAD s/p CAMILA to mid and proximal LAD 1/2016  HTN - no signs of acute exacerbation.  No new chest pain/sob/cough.  No hypoxia. Most recent echo with EF 35-40% on 1/26/23.   - PTA Atorvastatin   - PTA Coreg   - PTA Lasix   - PTA ASA      Hx Paroxysmal Atrial Fibrillation   - monitor on telemetry     DM Type 2 - hgb A1C 6.6 (12/2022).  Diet controlled.  Not on medications.  Monitor BG while inpatient      Osteoporosis  Osteoarthritis  Hx of Compression Fractures  Hx Multiple Orthopedic Surgeries   - PTA Tylenol, Lyrica,   - PRN dilaudid as above   - Vitamin D, PTH elevated, hypocalcemia      Anxiety - PTA Sertraline.  PRN Atarax      Probable IPMN of the pancreas - evaluated by GI on a previous admission      Hx Breast Cancer - dx 9/1998 s/p right mastectomy     Hx Post op RLE DVT 1988     Hypomagnesemia -replace per protocol            Plan:   Check VBG  Transferred to the floor  Will change glucose checks from every 4 hours to 4 times daily    Diet: Advance Diet as Tolerated: Regular Diet  Adult    DVT Prophylaxis: Aspirin per Orthopedic service  Manzano Catheter: PRESENT, indication: Strict 1-2 Hour I&O  Code Status: No CPR- Do NOT Intubate               The patient's care was discussed with the Patient .    Lyndon Bhatti MD  Hospitalist Service      ______________________________________________________________________    Interval History     Symptoms   Patient is sleepy and is not indulging much in history giving    Review of Systems:   As above  Blood pressures which were lower earlier have come back to normal      Data reviewed today: I reviewed all medications, new labs and imaging results over the last 24 hours.     Physical Exam   Vital Signs: Temp: 98.2  F (36.8  C) Temp src: Axillary BP: 122/80 Pulse: 100   Resp: 18 SpO2: 96 % O2 Device: Oxymizer cannula Oxygen Delivery: 2 LPM  Weight: 112 lbs 3.2 oz      GENERAL: Patient is not in acute distress  HEENT: Conjunctiva is clear   NECK: no Jugular Venous distention  HEART: S1 S2 tachycardia is present  LUNGS: Respirations are  not laboured, Lungs are  clear to auscultation without Crepitations or Wheezing   ABDOMEN: Soft , there is no tenderness   LOWER LIMBS: not examined    CNS: Sleepy and if I has to open the eyes alert,   Data   Recent Labs   Lab 03/17/23  1610 03/17/23  1154 03/17/23  0801 03/17/23  0733 03/16/23  1619 03/16/23  1535 03/16/23  1154 03/16/23  0828 03/16/23  0616 03/15/23  2217 03/15/23  1955 03/15/23  1045 03/15/23  0934 03/15/23  0731 03/14/23  2241 03/14/23  1612   WBC  --   --   --  15.8*  --   --   --   --   --   --   --   --  10.0  --   --  5.7   HGB  --   --   --  10.3*  --   --   --  7.6*  --   --  7.4*  --  6.6*  --   --  9.2*   MCV  --   --   --  84  --   --   --   --   --   --   --   --  91  --   --  93   PLT  --   --   --  150  --   --   --   --   --   --   --   --  141*  --   --  231   NA  --   --   --  135*  --   --   --   --   --   --   --   --   --  136  --  137   POTASSIUM  --    --   --  5.1  --   --   --   --  4.1  --   --   --   --  4.0  --  4.2   CHLORIDE  --   --   --  102  --   --   --   --   --   --   --   --   --  101  --  101   CO2  --   --   --  17*  --   --   --   --   --   --   --   --   --  26  --  27   BUN  --   --   --  37.8*  --   --   --   --   --   --   --   --   --  24.4*  --  23.4*   CR  --   --   --  1.29*  --  1.02*  --   --   --   --   --   --   --  0.87  --  0.89   ANIONGAP  --   --   --  16*  --   --   --   --   --   --   --   --   --  9  --  9   LIGIA  --   --   --  7.6*  --   --   --   --   --   --   --   --   --  7.8*  --  8.2   * 154* 170* 186*   < >  --    < >  --   --    < >  --    < >  --  137*   < > 162*    < > = values in this interval not displayed.         No results found for this or any previous visit (from the past 24 hour(s)).

## 2023-03-17 NOTE — PLAN OF CARE
ICU End of Shift Summary.  For vital signs and complete assessments, please see documentation flowsheets.     Pertinent assessments:   CNS: Appropriate with age, alert, incomprehensable words, moaning and mumbling probably due to Pain, but able to cooperate in taking oral meds.  CVS: Tele- ST, BP stable without pressures  RESP: On  Oxymask, SPO2 WDL, LS dine crackles diminished at the base  GI: Offered some jelly & pudding noted poor appetite   : FC in placed borderline UO.   SKIN: High risk for PI.    Major Shift Events:     Plan (Upcoming Events):  Alleviate pain to mobilze  Discharge/Transfer Needs: TBD    Bedside Shift Report Completed : YES  Bedside Safety Check Completed: YES           Plan of Care Reviewed With: patient                  takes coreg and lasix at home  c/w coreg  given 1 dose hydralazine in ed for sbp 200s  monitor bp  hold lasix for now takes coreg and lasix at home  c/w coreg  given 1 dose hydralazine in ed for sbp 200s  monitor bp  hold lasix for now

## 2023-03-17 NOTE — PROGRESS NOTES
Orthopedic Surgery  Gosia Alonzo  03/17/2023     Admit Date:  3/14/2023    POD: 1 Day Post-Op   Procedure(s):  1.  Left distal femur replacement with hinged left total knee arthroplasty 2.  Radical resection, left distal femur   Probable right nondisplaced femur fracture, s/p right TKA   Left distal humerus fracture  Right distal tibia and fibula shaft fractures    Patient resting comfortably in bed.    Patient does not arouse to my voice or questions.  Per RN, she is off of pressors.  Pain controlled at rest, but patient seems to be in pain with repositioning.  No acute events overnight.    Temp:  [97.7  F (36.5  C)-98.2  F (36.8  C)] 98.2  F (36.8  C)  Pulse:  [100-129] 100  Resp:  [13-29] 18  BP: ()/(59-79) 109/79  Cuff Mean (mmHg):  [79-84] 84  SpO2:  [91 %-100 %] 96 %    Alert and oriented.   Left lower extremity Ace wrap/surgical dressing and KI in place. Clean, dry, and intact.   Right lower extremity Ace wrap and KI in place.   Scattered bruising to all extremities.   Moderate edema of the right lower leg and ankle.  No significant swelling of the left foot/ankle.  Calves are soft.  Mild grimacing with passive bilateral ankle DF and PF.   Will spontaneously flex and extend toes at times.  PT pulse palpable bilaterally and left DP pulse palpable, unable to palpate right DP pulse due to edema.    Left long arm splint is clean, dry, and intact.  Patient does not range fingers per command.  Capillary refill 2-3 seconds.    Labs:  Recent Labs   Lab Test 03/17/23  0733 03/16/23  0828 03/15/23  1955 03/15/23  0934 03/14/23  1612   WBC 15.8*  --   --  10.0 5.7   HGB 10.3* 7.6* 7.4* 6.6* 9.2*     --   --  141* 231     Recent Labs   Lab Test 02/24/23  0939 02/21/23  1043 01/16/23  0938   INR 1.07 1.06 1.13     Recent Labs   Lab Test 11/10/21  0750   CRP 8.2*       A/P    1.  S/p left distal femur replacement with hinged left total knee arthroplasty and radical resection, left distal femur; probable  right nondisplaced femur fracture, s/p right TKA; left distal humerus fracture; right distal tibia and fibula shaft fractures   Continue Lovenox for DVT prophylaxis.     Mobilize with PT/OT    LLE: Weightbearing as tolerated with a walker x 6 weeks, Hinged knee brace locked in extension, may unlock to 60deg with therapy.    RLE: WBAT with walker with KI and lace up ankle brace in place.   LUE: NWB. Possible left distal femur ORIF on 3/20/23 per Dr. Perry.   Continue current pain regimen.   Dressings: Keep LLE dressing intact. Change if >50% saturated or peeling off.    Orthopedics will continue to follow. Follow up for the left leg at 2 weeks postop with Dr. Bonifacio Perry.    2. Disposition   Anticipate d/c to TCU pending surgical management of the left distal humerus fracture and when medically cleared and progressing in PT.    Aiyana Ramon PA-C  [facility]

## 2023-03-17 NOTE — PROGRESS NOTES
Estephania Alonzo has two orders:  1. A Left Hinged Knee Brace locked in full extension, with a flexion setting of 60 degrees.  2. A Right lace-up ankle brace.  The brace has orders that the patient can remove the brace when at rest and not weight bearing.     Delbert Pham left me a written note that stated that he was asked by a nurse not to fit the brace yesterday due to the patient being in extreme pain.    This morning I called the patient s nurse to obtain a shoe size for the ankle brace.  The nurse declined the brace fitting due to the patient s pain level.  She stated that she will call me after Physical Therapy evaluates her to see if she would be ready to be fit with these braces.  She will also provide me with the patient s shoe size at that time.    Electronically signed by Subhash Davies CPO, JANEO

## 2023-03-17 NOTE — PROGRESS NOTES
"Pain Service Progress Note  [facility]  Telemedicine follow up  Date: 03/17/2023           Patient Name: Gosia Alonzo  MRN: [id number]  Age: 89 year old  Sex: femaleThe patient/surrogate has been notified of following:     \"This telephone/video visit will be conducted via a video-call/call between you and your physician/provider. We have found that certain health care needs can be provided without the need for a physical exam. This service lets us provide the care you need with a short phone conversation. If a prescription is necessary, we can send it directly to your pharmacy. If lab work is needed, we can place an order for that and you can then stop by our lab to have the test done at a later time.    Telephone/video visits are billed at different rates depending on your insurance coverage.?During this emergency period, for some insurers they may be billed the same as an in-person visit.? Please reach out to your insurance provider with any questions.  If during the course of the call the physician/provider feels a telephone visit is not appropriate, you will not be charged for this service.\"    Patient has given verbal consent to a Video/Telephone visit? Yes    Phone call duration: 19  minutes      Assessment:  Gosia Alonzo is a 89 year old female who was admitted on 3/14/2023.  1 Day Post-Op left total knee arthroplasty. I was asked to see the patient for  Acute pain management with pain type  musculoskeletal. History of falls resulting in bilateral distal femur fracture and left humerus fracture. Pain began after fall 3/14/23. She has chronic pain history from OSTEOARTHRITIS multiple sites no opioid, stage 3 CKD, CHF,GI bleed remote, Afib, significant [place].  She is very drowzy intermittently opens yes and moans.  She is non verbal and moans. Suspect sedation is from CKD and reduced filtration. The patient does not smoke and nochemical dependency history. Opioid " "tolerance is  naive.     Opioid Induced Respiratory Depression Risk Assessment: high  (Low 0-1; Moderate 2-4; or High >4 or >/= 3 if two of the risk factors are age > 60 and opioid naive) due to the following risk factors: MARIE, COPD/Asthma/pulmonary disease, CHF, renal dysfunction, hepatic dysfunction, Obesity, Smoker, Age>60, >2 opioid therapies, concomitant CNS depressants, opioid naive status, or post surgical ?   Chronic opioid use = 0 mg MME, opioid used this past 24 hours  Fentanyl 250 mcg.     Plan/Recommendations:  1) Pain is consistent with acute on chronic moderate exacerbation and musculoskeletal type, secondary to mechical fall, in the setting of  CKD, Afib, CAD, OSTEOARTHRITIS. Treatment plan includes and discussed with ESTEFANI Alonzo by phone and is a multimodal pain approach, medications as listed below, reviewed. Concerned given age, extent of fractures that patient will be challenged in recovery with increase levels of pain and marked changes in function.  Advise daughter In law we will continue to monitor.  Goals of care discussion may be indicated.  Discharge medications, advised keeping track of doses, educated on tapering off as pain improves, watch for constipation and stool softeners, no driving or alcohol with opioids, store medications in a safe place, advised to take no more than the prescribed dose as increased doses may cause respiratory depression or death. Patient is understanding of the plan. All questions and concerns addressed to patient's satisfaction.   2)Multimodal Medication Therapy  Topical: Lidocaine patch 4% and Diclofenac ointment as ordered  Adjuvants:  CrCl is23 ml/min and Ekdaoxx341 mg Q8H\", Hydroxyzine 10 mg Q6,hold for sedation,\" Gabapentin  no, \" Lyrica 25 mg tid, Methocarbamol 250 mg qid hold for sedation   Antidepressants/anxiolytics: none   Opioids:Hydromorphone(Dilaudid) reduce to 2 mg every 2 hrs prn and Tramadol  d/c   IV Pain medication: Dilaudid0.2 mg Q1 prn " "  3)Non-medication interventions- Ice, Heat, physical therapy, distraction aroma therapy  Ordered Pocket talker  4)Constipation Prophylaxis- senna and miralax     5) Follow up pain service will continue to follow  -Opioid prescriber has been hospitalist. PCP is Michael Londono  -Discharge Recommendations - We recommend prescribing the following at the time of discharge:   -continue dilaudid 2 mg every 3 hrs prn   -continue methocarbamol ( Robaxin) 250 gm qid prn  -diclofenac gel tid   - lidocaine patch   - lyrica 25 mg tid      WILLIE Conway CNP  03/17/2023     Overnight Events:  Excess sedation    Tubes/Drains: Yes  EDISON Manzano     Subjective: sedated intermittently opens eyes to command raised right arms some, moaning   Nausea: No  Vomiting: Yes  Pruritus: Yes  Symptoms of LAST: No  Participation in therapies No    Pain Location:  Too sleepy but likely from post op and other fractures    Pain Intensity:    Pain right ear moaning restless grimace   Pain at Rest: NA   Pain with Activity: NA  Comfort Goal: NA   Baseline Pain: 5/10   Satisfied with your level of pain control: No    Diet: Advance Diet as Tolerated: Regular Diet Adult    Relevant Labs:  Recent Labs   Lab Test 03/17/23  0733 02/25/23  0652 02/24/23  0939 01/10/16  0630 01/09/16  1341   INR  --   --  1.07   < > 0.95      < > 266   < > 294   PTT  --   --   --   --  27   BUN 37.8*   < > 21.5   < > 21    < > = values in this interval not displayed.       Physical Exam:  Vitals: /69   Pulse 103   Temp 97.7  F (36.5  C) (Oral)   Resp 20   Ht 1.651 m (5' 5\")   Wt 50.9 kg (112 lb 3.2 oz)   LMP  (LMP Unknown)   SpO2 96%   BMI 18.67 kg/m      Physical Exam:   Orientation:  Alert, oriented, and in no acute distress: No  Sedation: Yes    Motor Examination:  5/5 Strength in lower extremities: No      Tender: No      Relevant Medications:  Current Pain Medications:  Medications related to Pain Management (From now, onward)    " Start     Dose/Rate Route Frequency Ordered Stop    03/19/23 0000  acetaminophen (TYLENOL) tablet 650 mg         650 mg Oral EVERY 4 HOURS PRN 03/16/23 1458      03/17/23 1600  pregabalin (LYRICA) capsule 25 mg        Note to Pharmacy: CLAIRE Sig:Take 1 capsule (25 mg) by mouth 2 times daily      25 mg Oral 3 TIMES DAILY 03/17/23 0948      03/17/23 0800  polyethylene glycol (MIRALAX) Packet 17 g         17 g Oral DAILY 03/16/23 1458      03/16/23 2000  senna-docusate (SENOKOT-S/PERICOLACE) 8.6-50 MG per tablet 1 tablet         1 tablet Oral 2 TIMES DAILY 03/16/23 1458      03/16/23 1500  acetaminophen (TYLENOL) tablet 975 mg         975 mg Oral EVERY 8 HOURS 03/16/23 1458 03/19/23 1559    03/16/23 1458  magnesium hydroxide (MILK OF MAGNESIA) suspension 30 mL         30 mL Oral DAILY PRN 03/16/23 1458      03/16/23 1458  bisacodyl (DULCOLAX) suppository 10 mg         10 mg Rectal DAILY PRN 03/16/23 1458      03/16/23 1458  traMADol (ULTRAM) tablet 50 mg         50 mg Oral EVERY 6 HOURS PRN 03/16/23 1458      03/16/23 1458  lidocaine 1 % 0.1-1 mL         0.1-1 mL Other EVERY 1 HOUR PRN 03/16/23 1458      03/16/23 1458  lidocaine (LMX4) cream          Topical EVERY 1 HOUR PRN 03/16/23 1458      03/15/23 1600  methocarbamol (ROBAXIN) half-tab 250 mg         250 mg Oral 4 TIMES DAILY 03/15/23 1426      03/15/23 1430  diclofenac (VOLTAREN) 1 % topical gel 4 g         4 g Topical 3 TIMES DAILY 03/15/23 1426      03/15/23 1430  hydrOXYzine (ATARAX) tablet 10 mg        See Hyperspace for full Linked Orders Report.    10 mg Oral EVERY 6 HOURS 03/15/23 1426      03/15/23 1418  HYDROmorphone (DILAUDID) tablet 2 mg        See Hyperspace for full Linked Orders Report.    2 mg Oral EVERY 3 HOURS PRN 03/15/23 1426      03/15/23 0831  ketorolac (TORADOL) injection 15 mg         15 mg Intravenous EVERY 6 HOURS PRN 03/15/23 0831      03/15/23 0800  [Held by provider]  aspirin EC tablet 81 mg        (Held by provider since Wed 3/15/2023  at 1521 by Zoe Mederos DO.Hold Reason: Bleeding)   Note to Pharmacy: PTA Sig:Take 1 tablet (81 mg) by mouth daily      81 mg Oral DAILY 03/14/23 2059 03/14/23 2208  [Held by provider]  HYDROcodone-acetaminophen (NORCO) 5-325 MG per tablet 2 tablet        (Held by provider since Wed 3/15/2023 at 1426 by Radha Betts APRN CNP.Hold Reason: OtherHold Comments: Using Dilaudid)   Note to Pharmacy: PTA Sig:Take 2 tablets by mouth 4 times daily as needed for severe pain (7-10) Begin substantial taper ASAP to previous chronic directions of one tablet BID prn.      2 tablet Oral 4 TIMES DAILY PRN 03/14/23 2208            Primary Service Contacted with Recommendations? Yes      Please see A&P for additional details of medical decision making.  MANAGEMENT DISCUSSED with the following over the past 24 hours: Lyndon Bhatti MD, Laila Sinha RN    NOTE(S)/MEDICAL RECORDS REVIEWED over the past 24 hours: recent notes surgical notes, labs   - See lab/imaging results included in the data section of the note  Medical complexity over the past 24 hours:  - Parenteral (IV) CONTROLLED SUBSTANCES ordered  - Decision to DE-ESCALATE CARE based on prognosis  - Prescription DRUG MANAGEMENT performed  - Treatment limited by [employer]  40 MINUTES SPENT BY ME on the date of service doing chart review, history, exam, documentation & further activities per the note.      Tele-Visit Details    Type of service:  Video Visit    Video Start Time (time video started): 09:55    Video End Time (time video stopped): 10:14    Originating Location (pt. Location): Patient Hospital Room     Distant Location (provider location): [facility]    Reason for Televisit: Pain follow up     Mode of Communication:  Video Conference via [email or web address]    Physician has received verbal consent for a video visit from the patient? Yes      WILLIE Conway CNP       Acute Inpatient Pain Service [facility]/[place]    Hours of pain coverage 8:00-4:30 Monday.-Friday   No weekend coverage contact house officer  [facility] Paging/Directory Pain  Securely message with the Vocera Web Console (learn more here)

## 2023-03-18 NOTE — PROGRESS NOTES
PT/OT: Per chart review, family has decided to transition pt to comfort cares.  Will complete therapy orders at this time.

## 2023-03-18 NOTE — PROGRESS NOTES
ICU End of Shift Summary.  For vital signs and complete assessments, please see documentation flowsheets.      Pertinent assessments:  CV: ST with PVC's, normotensive   Neuro: Non-verbal, restless overnight, only moans, and does not following commands. Patient will not open mouth to take any PO meds. PRN IV dilaudid added and given q2h for pain.   Pulm: Weaned oxygen to 1 L NC  Renal: Manzano, poor UO  GI: Regular diet, pt. Having loose stools  Endo: q4h blood sugars with sliding scale  Skin: No changes  IV: LR @ 75 ml/hour    Discharge/Transfer Needs: TCU     Bedside Shift Report Completed : Yes  Bedside Safety Check Completed: Yes

## 2023-03-18 NOTE — PROGRESS NOTES
"PT: Orders received, chart reviewed.  Discussed with RN.  At this time, pt is not appropriate for skilled PT intervention as pt is moaning in pain, not following commands, and is medically not stable (HR was up to 180-190's this AM).  Did encourage RN to contact orthotics to have braces issued as ortho has ordered to help stabilize fractures while nrsg reposition pt.  In chart there is a note from orthotics yesterday that states RN \"will call me after Physical Therapy evaluates her to see if she would be ready to be fit with these braces\" but pt will need braces prior to participating in PT once she is stable.    Anticipate pt will be dread lift dependent for the foreseeable future as it will be very challenging to get into standing with B knees locked into extension with different braces and NWB to LUE.  Will continue to follow and evaluate if/when pt can tolerate therapy.  "

## 2023-03-18 NOTE — PROGRESS NOTES
SPIRITUAL HEALTH SERVICES Progress Note   Hospital  Unit ICU.Room# 363    Met Estephania and her family per unit nurse page for emotional support for family.  Patient's family welcomed  support and provided light -hearted conversation.  Estephania was resting in bed.  This writer offered words of support and encouragement.   Offered bedside prayer.  Informed patient's family how to request [facility] for emotional support during hospital stay.  [facility] remains available upon request.     Nadeem Thompson,  Intern .    [facility] routine referrals *[phone]   [facility] available 24/7 for emergent requests/referrals, either by paging the on-call  or by entering an ASAP/STAT consult in Epic (this will also page the on-call ).

## 2023-03-18 NOTE — PLAN OF CARE
Patient now comfort care status. Had many family members at the bedside today. PRN IV dilaudid and IV  ativan given frequently per MAR. Patients family requests that the monitor stay on, although O2 is  not capturing d/t low perfusion. New PIV in right upper bicep in for PRN medications. Patient does not due well with oral/sublingual medications as she will not open her mouth and tends to spit out anything placed in her mouth. Micro-turns done today but patient moans with any movement. Was able to full reposition patient around 6pm and change her, she had a bowel movement. Manzano still in place, poor urine output. Patient has a few skint ears on her skin that are covered and wrapped. Orthopedics, PT, and spiritual health health. SW was consulted and will see patient when they are back in the office. Patient is currently resting. Will continue to monitor and assess.

## 2023-03-18 NOTE — PROGRESS NOTES
I had a talk with the family members of Ganesh including 3 sons and her only daughter all of will believe that their mother should be comfort care only.  They do not want her to suffer.   I did tell them that we will make her comfortable and asked hospice to come and see the patient.   She is already DNR/DNI    Dr. Lyndon PUENTE.  Hospitalist [place], [place].

## 2023-03-18 NOTE — PROVIDER NOTIFICATION
Patients HR increased to 180-190 while patient at rest. Dr. Bhatti paged and came to bedside. Patient was given 500mcg of diltiazem IVP. HR improved to 100-110. Hold on diltiazem infusion for now per Dr. Bhatti.

## 2023-03-18 NOTE — PROGRESS NOTES
Patients family at bedside. They have come to an agreement and would like to persue comfort care measures. Dr. Bhatti and Charge nurse notified.

## 2023-03-18 NOTE — PROVIDER NOTIFICATION
Patients heart rate increased to 160-180 in afib. Dr. Bhatti was notified and gave verbal order to five digoxin 0.5mg IV x1 dose. Order was transcribed and ordered in chart.

## 2023-03-18 NOTE — PROGRESS NOTES
Hospitalist Medicine Progress Note   [facility]       Gosia Alonzo is a  89 year old female with a history of breast cancer, compression Fractures, CAD, DM type 2, CHF (EF 35-40%), PAF, Pericarditis, GERD, Hiatel Hernia, GIB, Diverticulitis, Cdiff admitted on 3/14/2023 with acute femur fracture after a mechanical fall.  With acute anemia and hypomagnesemia, surgical intervention was postponed and she had left distal femur replacement with hinged left total knee arthroplasty and radical resection of the left distal femur accomplished by Dr. Bonifacio Perry MD on 3/16/2023.  Postop patient was sleepy and less interactive.  Orthopedic surgery has recommended weightbearing as tolerated with a walker x6 weeks with hinged knee brace locked in extension may unlocked to 60 degrees with therapy on the left lower extremity and on the other right lower extremity weightbearing as tolerated with walker with KAI and Lasix up ankle brace in place.  Patient might be going to TCU.  She was put in ICU for closer monitoring.  But can go to the floor       Date of Admission:  3/14/2023  Assessment & Plan      Left distal femur fracture  S/p surgical intervention 03/16/23   Acute oblique fracture of the left humerus distal metadiaphysis  Old unhealed fracture of the left proximal humerus   Patient suffered a mechanical slip and fall and presented for evaluation. Xray with evidence of acute fracture of the left femur distal metaphysis with mild impaction and anterior apex angulation. Left knee degenerative arthrosis. Bone demineralization. ED provider spoke with orthopedics who recommended further imaging. Patient remains in pain, uses [employer] at home.  CT imaging was performed.   - orthopedics performed repair on 03/16/23   - pain team following   - resume diet but patient is too sleepy to eat  -  PT postoperatively   -Probably anticipating to go to a TCU  - orthopedics following   -LUE post splint, NWB,  trial nonop in silveira vs ORIF  - pain control -increase the dose of Dilaudid from 0.2 to 0.5 mg every 3 hours  - PT when able   Acute oblique fracture of the left humerus distal metadiaphysis  Old unhealed fracture of the left proximal humerus     Atrial fibrillation with RVR  Patient was hypotensive as well but because she is DNR/DNI this was not shocked.   Started on digoxin 0.5 mg x 1 with which heart rate has been much better between 100s to 120s  Blood pressure is marginal to start beta-blockade  We will start on diltiazem drip if the heart rate is greater than 120     Acute anemia, normocytic  Hemoglobin noted to be 6.6 on 3/15.  Recheck similar.  The patient received 1 unit of packed red blood cells.  Plan for recheck hemoglobin and conditional transfusion if hemoglobin less than 7 optimized for surgery.   -Repeat CBC in a.m.  -transfuse with HGB < 7.0      Acute hypoxic respiratory failure, persistent  Unclear etiology, patient requiring 3 L of nasal cannula oxygen to maintain saturations.  Chest x-ray not performed in the emergency department.  Chest x-ray with evidence of pulmonary edema on 3/14.  Patient received a dose of Lasix.  -Continuous pulse oximetry  -Incentive spirometry  -Wean oxygen as tolerated  -On PTA Lasix as below, will administer additional diuresis if evidence of pulmonary edema, caution with soft pressures  -No fever or pneumonia symptoms, low concern for pneumonia at this time      Chronic CHF  Ischemic Cardiomyopathy  Hx CAD s/p CAMILA to mid and proximal LAD 1/2016  HTN - no signs of acute exacerbation.  No new chest pain/sob/cough.  No hypoxia. Most recent echo with EF 35-40% on 1/26/23.   - PTA Atorvastatin   - PTA Coreg   - PTA Lasix   - PTA ASA      Hx Paroxysmal Atrial Fibrillation   - monitor on telemetry     DM Type 2 - hgb A1C 6.6 (12/2022).  Diet controlled.  Not on medications.  Monitor BG while inpatient      Osteoporosis  Osteoarthritis  Hx of Compression Fractures  Hx  Multiple Orthopedic Surgeries   - PTA Tylenol, Lyrica,   - PRN dilaudid as above   - Vitamin D, PTH elevated, hypocalcemia      Anxiety - PTA Sertraline.  PRN Atarax      Probable IPMN of the pancreas - evaluated by GI on a previous admission      Hx Breast Cancer - dx 9/1998 s/p right mastectomy     Hx Post op RLE DVT 1988     Hypomagnesemia -replace per protocol            Plan:   Check chest x-ray to diagnose if there is any pneumonia with elevated WBC count or if there is any fluid overload with history of CHF  Digoxin 0.5 mg x 1 was given  We will start diltiazem drip with a heart rate is greater than 120 but hold it if the blood pressure is less than 90 systolic  Increased pain medications from 0.2 mg every 2 hours to 0.5 mg every 3 hours  Discussed in detail with 2 sons and 2 daughter-in-law's plan of care     Diet: Advance Diet as Tolerated: Regular Diet Adult    DVT Prophylaxis: Aspirin per Orthopedic service  Manzano Catheter: PRESENT, indication: Strict 1-2 Hour I&O  Code Status: No CPR- Do NOT Intubate               The patient's care was discussed with the Patient's 2 sons and 2 daughter-in-law's.    Lyndon Bhatti MD  Hospitalist Service  [facility]    ______________________________________________________________________    Interval History     Symptoms   Patient is sleepy and is not indulging much in history giving  She was moaning probably with pain  Had atrial fibrillation with RVR which was better with IV digoxin as mentioned above    Review of Systems:   As above   Blood pressures which were lower earlier have come back to normal after the control of heart rate      Data reviewed today: I reviewed all medications, new labs and imaging results over the last 24 hours.     Physical Exam   Vital Signs: Temp: 98.2  F (36.8  C) Temp src: Axillary BP: 118/59 Pulse: 110   Resp: 28 SpO2: 95 % O2 Device: Nasal cannula Oxygen Delivery: 1 LPM  Weight: 126 lbs 8.7 oz      GENERAL: Patient  is not in acute distress  HEENT: Conjunctiva is clear   NECK: Jugular Venous distention  HEART: S1 S2 tachycardia is present which is irregular  LUNGS: Respirations are  not laboured, Lungs are  clear to auscultation without Crepitations or Wheezing   ABDOMEN: Soft , there is no tenderness   LOWER LIMBS: Right leg as well as left leg have swelling I did not examine the incision on the right leg  CNS: Sleepy and occasionally opens eyes otherwise is morning at times    Data   Recent Labs   Lab 03/18/23  0432 03/18/23  0024 03/17/23  2231 03/17/23  0801 03/17/23  0733 03/16/23  1619 03/16/23  1535 03/16/23  1154 03/16/23  0828 03/16/23  0616 03/15/23  2217 03/15/23  1955 03/15/23  1045 03/15/23  0934 03/15/23  0731 03/14/23  2241 03/14/23  1612   WBC  --   --   --   --  15.8*  --   --   --   --   --   --   --   --  10.0  --   --  5.7   HGB  --   --   --   --  10.3*  --   --   --  7.6*  --   --  7.4*  --  6.6*  --   --  9.2*   MCV  --   --   --   --  84  --   --   --   --   --   --   --   --  91  --   --  93   PLT  --   --   --   --  150  --   --   --   --   --   --   --   --  141*  --   --  231   NA  --   --   --   --  135*  --   --   --   --   --   --   --   --   --  136  --  137   POTASSIUM  --   --   --   --  5.1  --   --   --   --  4.1  --   --   --   --  4.0  --  4.2   CHLORIDE  --   --   --   --  102  --   --   --   --   --   --   --   --   --  101  --  101   CO2  --   --   --   --  17*  --   --   --   --   --   --   --   --   --  26  --  27   BUN  --   --   --   --  37.8*  --   --   --   --   --   --   --   --   --  24.4*  --  23.4*   CR  --   --   --   --  1.29*  --  1.02*  --   --   --   --   --   --   --  0.87  --  0.89   ANIONGAP  --   --   --   --  16*  --   --   --   --   --   --   --   --   --  9  --  9   LIGIA  --   --   --   --  7.6*  --   --   --   --   --   --   --   --   --  7.8*  --  8.2   * 129* 140*   < > 186*   < >  --    < >  --   --    < >  --    < >  --  137*   < > 162*    < > = values in  this interval not displayed.         No results found for this or any previous visit (from the past 24 hour(s)).

## 2023-03-18 NOTE — PLAN OF CARE
"Goal Outcome Evaluation:    Pt is alert, able to open eyes, on 2 L NC, LR infusing at 75 ml/hr, scheduled pain medications was given, did not take medication so well during this shift as she kept spiting and  swallows some. Pt was turned and repositioned, had x2 loose stool during this shift, senna was held. Patient's daughter removed 2 rings from the right hand fingers and took them home. Pt has a Manzano catheter with minimal output. Continue with POC.     /80   Pulse 100   Temp 98.2  F (36.8  C) (Axillary)   Resp 18   Ht 1.651 m (5' 5\")   Wt 50.9 kg (112 lb 3.2 oz)   LMP  (LMP Unknown)   SpO2 96%   BMI 18.67 kg/m         "

## 2023-03-18 NOTE — PROVIDER NOTIFICATION
Attending physician paged- pt. Is non-verbal and has started to moan more in the last 30 minutes. Patient will not open mouth to take any PO pain medications such as scheduled Tylenol or PRN dilaudid. Able to add an IV pain medication for patient?

## 2023-03-18 NOTE — PROGRESS NOTES
Orthopedic Surgery  Gosia Alonzo  03/18/2023     Admit Date:  3/14/2023    POD: 2 Days Post-Op   Procedure(s):  1.  Left distal femur replacement with hinged left total knee arthroplasty 2.  Radical resection, left distal femur   Probable right nondisplaced femur fracture, s/p right TKA   Left distal humerus fracture  Right distal tibia and fibula shaft fractures    Patient resting comfortably in bed. She awakes to my questions. She does not answer questions or follow commands.    Pain appears controlled at rest.   No acute events overnight.    Temp:  [98.2  F (36.8  C)] 98.2  F (36.8  C)  Pulse:  [] 110  Resp:  [9-41] 24  BP: ()/(36-95) 111/66  Cuff Mean (mmHg):  [84] 84  SpO2:  [92 %-100 %] 96 %    Alert and oriented.   Left lower extremity Ace wrap/surgical dressing and KI in place. Clean, dry, and intact.   Right lower extremity Ace wrap and KI in place.   Scattered bruising to all extremities.   Moderate edema of the right lower leg and ankle.  No significant swelling of the left foot/ankle.  Calves are soft.  Will spontaneously flex and extend toes at times.  PT pulse palpable bilaterally and left DP pulse palpable, unable to palpate right DP pulse due to edema.    Left long arm splint is clean, dry, and intact.  Patient does not range fingers per command.  Nl capillary refill. Fingers warm.     Labs:  Recent Labs   Lab Test 03/17/23  0733 03/16/23  0828 03/15/23  1955 03/15/23  0934 03/14/23  1612   WBC 15.8*  --   --  10.0 5.7   HGB 10.3* 7.6* 7.4* 6.6* 9.2*     --   --  141* 231     Recent Labs   Lab Test 02/24/23  0939 02/21/23  1043 01/16/23  0938   INR 1.07 1.06 1.13     Recent Labs   Lab Test 11/10/21  0750   CRP 8.2*       A/P    1.  S/p left distal femur replacement with hinged left total knee arthroplasty and radical resection, left distal femur; probable right nondisplaced femur fracture, s/p right TKA; left distal humerus fracture; right distal tibia and fibula shaft  fractures   Continue Lovenox for DVT prophylaxis.     Mobilize with PT/OT    LLE: Weightbearing as tolerated with a walker x 6 weeks, Hinged knee brace locked in extension, may unlock to 60deg with therapy.    RLE: WBAT with walker with KI and lace up ankle brace in place.   LUE: NWB. Tentative plan for ORIF left humerus fx on 3/20/23 per Dr. Perry.   Continue current pain regimen.   Dressings: Keep LLE dressing intact. Change if >50% saturated or peeling off.    Orthopedics will continue to follow. Follow up for the left leg at 2 weeks postop with Dr. Bonifacio Perry.    2. Disposition   Anticipate d/c to TCU pending surgical management of the left distal humerus fracture and when medically cleared and progressing in PT.    Paola Tamez PA-C  [facility]

## 2023-03-19 NOTE — PROGRESS NOTES
MD DEATH PRONOUNCEMENT    Called to pronounce Gosia Alonzo dead.    Physical Exam: Unresponsive to noxious stimuli, Spontaneous respirations absent, Breath sounds absent, Carotid pulse absent, Heart sounds absent, Pupillary light reflex absent and Corneal blink reflex absent    Patient was pronounced dead at 6:43 AM, March 19, 2023.         Infectious disease present?: NO    Communicable disease present? (examples: HIV, chicken pox, TB, Ebola, CJD) :  NO    Multi-drug resistant organism present? (example: MRSA): NO        Body disposition: Autopsy was discussed with family member:   in person.  Permission for autopsy was declined.

## 2023-03-19 NOTE — PROGRESS NOTES
0643 notified  Of pt's passing - came and pronounced at 0643.Contacted family prior, arrived shortly after. [employer] contacted, not a candidate for donation. Notified ME of pt expiring - dicussed hospitalization, indicated they should have enough information from the chart - should they need any other information will contact supervisor.

## 2023-03-19 NOTE — PROVIDER NOTIFICATION
Pt. Was switched to comfort cares earlier today. Orders are still in place for diltiazem drip and pt. Currently in a-fib RVR. Want to clarify we are not escalating cares such as starting a Dilt drip?

## 2023-03-19 NOTE — PROGRESS NOTES
Pt. Is comfort cares- giving PRN IV dilaudid and IV ativan q2h. Pt. Appears comfortable and resting. Pt. Is non-verbal and only moans although the moaning has improved with pain medications and repositioning. Pt. does not follow commands and does not take any medications PO. Will continue to monitor.

## 2023-03-19 NOTE — DISCHARGE SUMMARY
[facility]  Hospitalist Discharge Summary      Date of Admission:  3/14/2023  Date and Time of Death :  06:43 AM, March 19, 2023  Discharging Provider: Lyndon Bhatti MD  Discharge Service: Hospitalist Service    Discharge Diagnoses   Left distal femur fracture  S/p surgical intervention 03/16/23   Acute oblique fracture of the left humerus distal metadiaphysis  Atrial fibrillation with RVR  Acute anemia, normocytic  Acute hypoxic respiratory failure, persistent  Acute Kidney Injury   Chronic CHF  Ischemic Cardiomyopathy  Coronary Artery Disease CAD s/p CAMILA to mid and proximal LAD 1/2016  Hypertension  Osteoporosis  Osteoarthritis  Hx of Compression Fractures  Anxiety  Probable IPMN of the pancreas   Breast Cancer   Post op RLE DVT 1988  Hypomagnesemia     Hospital Course   Gosia Alonzo is a  89 year old female with a history of breast cancer, compression Fractures, CAD, DM type 2, CHF (EF 35-40%), PAF, Pericarditis, GERD, Hiatel Hernia, GIB, Diverticulitis, Cdiff admitted on 3/14/2023 after a mechanical fall with  Acute oblique fracture of the left humerus distal metadiaphysis. Mild foreshortening and mild-moderate anterior apex angulation, Acute Left distal  Femur fracture .  With acute anemia and hypomagnesemia, surgical intervention was postponed and then she had left distal femur replacement with hinged left total knee arthroplasty and radical resection of the left distal femur accomplished by Dr. Bonifacio Perry MD on 3/16/2023.  Postop patient was sleepy and less interactive.  Orthopedic surgery has recommended weightbearing as tolerated with a walker x6 weeks with hinged knee brace locked in extension may unlocked to 60 degrees with therapy on the left lower extremity and on the other right lower extremity weightbearing as tolerated with walker She had post Op hypotension for which she was admitted to the ICU which improved but then she had Atrial Fibrillation with RVR  associated with Acute Kidney Injury .  On 3/18/2023 family decided that she should be Comfort Cares only with Hospice consultation given. She was DNR/DNI   She passed away  06:43 AM, March 19, 2023 and was declared dead.      Consultations This Hospital Stay   ORTHOPEDIC SURGERY IP CONSULT  PHYSICAL THERAPY ADULT IP CONSULT  PAIN MANAGEMENT ADULT IP CONSULT  CARE MANAGEMENT / SOCIAL WORK IP CONSULT  CARE MANAGEMENT / SOCIAL WORK IP CONSULT  PHYSICAL THERAPY ADULT IP CONSULT  OCCUPATIONAL THERAPY ADULT IP CONSULT  ORTHOSIS EXTREMITY LOWER REFERRAL IP CONSULT  ORTHOSIS EXTREMITY LOWER REFERRAL IP CONSULT  SPIRITUAL HEALTH SERVICES IP CONSULT  CARE MANAGEMENT / SOCIAL WORK IP CONSULT    Code Status   No CPR- Do NOT Intubate    Time Spent on this Encounter   I, Lyndon Bhatti MD, personally saw the patient today and spent less than or equal to 30 minutes discharging this patient.       Lyndon Bhatti MD  [facility] ICU  [address] [place] [address]  Phone: [phone]  Fax: [phone]  ______________________________________________________________________        Primary Care Physician   Michael Londono MD    Allergies   Allergies   Allergen Reactions     Codeine      GI UPSET     Escitalopram Oxalate      fatigue     Esomeprazole Magnesium Trihydrate      HA     Gabapentin Dizziness     Dizziness, confusion     Imdur [Isosorbide]      Headache       Meperidine Hcl      N/V     Morphine Hcl      HIVES     Oxycodone      (percodan) GI UPSET     Pentazocine      (talwin)  HALLUCINATIONS     Propoxyphene Hcl      STOMACH UPSET     Sumatriptan Succinate      chest pain

## 2023-03-19 NOTE — PROGRESS NOTES
Cross Cover    Called for ongoing tachycardia on telemetry and multiple medications/orders still in place including dilt gtt for tachycardia    Apparently family members wanted her to remain on tele, unclear why as if comfort measures this would only be a potential stressor.   Family has left for the night      I discontinued usual VS, meds (patient not taking oral medications anyway)  I discussed with the RN that no need to treat tachycardia with dilt gtt overnight.  If family returns and is concerned they can start the dilt gtt and day rounder can address in the am     Primary rounding doc needs to discuss with family that treating these things will only prolong the dying process without offering much comfort.

## 2023-06-05 NOTE — PROGRESS NOTES
Gosia Alonzo is a 89-year-old lady was admitted 2/18/2023 with past history of CAD CHF with systolic dysfunction and LVEF 35 to 40%, T2DM, recurrent chest pain with pleural effusions since 9/20/2022, shortness of breath which is significant, paresthesias of both hands, dry heaving, generalized fullness of the stomach.  Chest x-ray showed cardiac enlargement with pulmonary vascular congestion and bibasilar interstitial edema and small right-sided pleural effusion.  BNP was elevated at 2347 hemoglobin was 9.4 creatinine 0.74 sodium 137 potassium 4.1  She was diagnosed with chest pain with recurrent pleural effusions    Plan:  Give Lasix 40 mg 2 times daily and monitor breathing in this patient with systolic CHF  Will do strict input output charting  Daily weight  2 g salt low-salt diet  BMP in a.m.  Check magnesium in a.m.    Lyndon Bhatti MD           Qbrexza Pregnancy And Lactation Text: There is no available data on Qbrexza use in pregnant women.  There is no available data on Qbrexza use in lactation.

## 2023-08-19 NOTE — PROGRESS NOTES
[facility]ERIATRICS    Chief Complaint   Patient presents with     RECHECK     HPI:  Gosia Alonzo is a 89 year old  (11/14/1932), who is being seen today for an episodic care visit at: [facility] (TCU) [[id number]].     Per recent TCU provider progress notes:   89 year old female PMH diastolic/systolic heart failure, ischemic cardiomyopathy, PAF and chronic back pain due to compression fractures, HTN, OA, CAD with prior NSTEMI/LAD stent, DM type 2, anemia and GERD initially hospitalized 9/11-9/14 due to pain from thoracic/lumbar compression fractures and left shoulder and right knee pain with possible right superior and inferior rami fracture and CHF exacerbation. Fell subsequently, noted to have distal and proximal left humerus fracture.  Ortho: nonoperative treatment, in sling, NWB left arm. Acute on chronic pain: tylenol, Lyrica, Voltaren gel, lidocaine patch, Norco, atarax.  Syncope felt to be likely vasovagal. CHF with EF 35-40%, on lasix. HTN, CAD with NSTEMI and LAD stent: on statin, carvedilol. DM2 diet controlled. Anemia chronic Hgb 7-8. GERD on PPI. To TCU for rehab.     Today's concern is: seen for f/u pain, mobility. No headaches, chest pain, dyspnea. Pain in back, arm managed better with increase in scheduled Norco frequency but asks to adjust administration times to get scheduled doses during waking hours. Continues PRN atarax,  twice daily Lyrica. No nausea.  Walked 248 ft with FWW in therapies. SLUMS 22/30. BP range /55-79 and sats 96% room air.    Allergies, and PMH/PSH reviewed in [facility] today.  REVIEW OF SYSTEMS:  4 point ROS including Respiratory, CV, GI and , other than that noted in the HPI,  is negative    Objective:   /79   Pulse 78   Temp 97.7  F (36.5  C)   Resp 18   Wt 51.3 kg (113 lb)   LMP  (LMP Unknown)   SpO2 96%   BMI 18.80 kg/m    GENERAL APPEARANCE:  Alert, in no distress, cooperative. Frail  ENT:  Mouth normal, moist mucous membranes, normal  hearing acuity  EYES:  Conjunctiva and lids normal  RESP:  no respiratory distress, on room air  CV: no LE edema  NEURO:   No facial asymmetry, speech clear  PSYCH:  oriented X 3, affect and mood normal     10/26/22 WBC 7.5, Hgb 8.9 and   10/9/22 WBC 8.3, HGB 8.2,   Most Recent 3 CBC's:  Recent Labs   Lab Test 10/04/22  0616 10/03/22  0637 10/02/22  0701   WBC 10.4 11.0 10.2   HGB 7.8* 8.1* 8.1*   MCV 96 97 98    275 248     10/26/22 , K 4.0, BUN 30 and Cr 0.67  10/9/22: Na 140, K 4.0, BUN 36, Cr 0.75  Most Recent 3 BMP's:  Recent Labs   Lab Test 10/04/22  0616 10/03/22  0637 10/02/22  0701    137 136   POTASSIUM 4.1 4.0 4.2   CHLORIDE 103 103 104   CO2 25 24 21*   BUN 31.7* 35.6* 33.7*   CR 0.79 0.74 0.72   ANIONGAP 10 10 11   LIGIA 8.4* 8.3* 8.4*   * 143* 142*       Assessment/Plan:  Minimally displaced left proximal humerus fracture, subsequent encounter  L1, L4, and multiple thoracic spine compression fractures, subsequent encounter  Age-related osteoporosis with current pathologic fractures  Physical deconditioning  Acute, improved. Nonsurgical management per Ortho. Stopped tylenol. Changed Norco to 5-325 mg take 2 tabs QID and QID PRN will adjust administration times, resumed pregabalin 25 mg twice daily, lidocaine patch, diclofenac topical gel to painful joints. Continue alendronate 70 mg every Wednesday calcium and vitamin D supplements. Changed atarax to 25 mg QID ORN. Therapies as ordered and f/u with progress. Follow-up with Ortho as directed. Due to at risk for pressure uses APM - asks to replace mattress due to discomfort.     CAD, s/p CAMILA  Ischemic cardiomyopathy  Chronic HFrEF  Moderate pulmonary hypertension  Essential hypertension  Dyslipidemia  History of PAF  History of right calf DVT   Chronic, stable. Continue aspirin 81 mg daily, atorvastatin 40 mg daily, carvedilol 3.125 mg twice daily, and furosemide 40 mg daily. Not on AC due to fall risk. Monitor vs, wt  and review ranges next visit. Cardiology f/u as recommended.     DM type II, diet-controlled  Chronic, diet controlled. No routine BG checks.     CKD stage 3  Baseline creatinine around 0.8, stable last check at 0.67 on 10/26. Avoid nephrotoxins. Monitor BMP PRN.     GERD  No symptoms. Continue pantoprazole 40 mg daily    Acute on chronic anemia  Baseline hemoglobin 9-11. Hgb 8.2 on 10/9 and 8.9 on 10/26. Monitor hemoglobin periodically.     Slow transit constipation  Well managed. Continue the bowel regimen.     Post Medication Reconciliation Status: Discharge medications reconciled, continue medications without change    Orders:  Please replace APM due to undcomfortable  Chage norco 5/325 mg tabs admin times to 2 tabs at 0800, 1200, 1600 and 2000 and BID PRN pain    Electronically signed by: WILLIE Beaver CNP        Yes

## 2023-09-01 NOTE — TELEPHONE ENCOUNTER
Hold your tramadol while taking the Percocet for pain control.  Contact [facility] pain and spine for a follow-up appointment.  Suggest follow-up also with Dr. Dustin Dominguez, neurosurgery, for further evaluation of the L1 compression fracture.   Please fax written Rx.

## 2024-02-14 NOTE — PLAN OF CARE
PT - Attempted to eval pt, but she is too painful to participate today and declines. Will try back tomorrow.   How Many Mls Were Removed From The 40 Mg/Ml (10ml) Vial When Preparing The Injectable Solution?: 0 Concentration Of Kenalog Solution Injected (Mg/Ml): 20.0 Consent: The risks of atrophy were reviewed with the patient. Expiration Date For Kenalog (Optional): 09/24 Detail Level: Zone Total Volume (Ccs): 0.2 Show Inventory Tab: Hide Lot # For Kenalog (Optional): [id number] Medical Necessity Clause: This procedure was medically necessary because the lesions that were treated were: Require Ndc Code?: No Validate Note Data When Using Inventory: Yes Kenalog Preparation: Kenalog Kenalog Type Of Vial: Multiple Dose

## (undated) DEVICE — PACK HAND SOP32HARMO

## (undated) DEVICE — SUTURE VICRYL+ 2-0 CT-2 27" UND VCP269H

## (undated) DEVICE — BONE CLEANING TIP INTERPULSE FEMORAL CANAL 0210-008-000

## (undated) DEVICE — DRAPE STERI U 1015

## (undated) DEVICE — GLOVE BIOGEL PI SZ 7.5 40875

## (undated) DEVICE — KIT ENDO TURNOVER/PROCEDURE W/CLEAN A SCOPE LINERS 103888

## (undated) DEVICE — BONE CEMENT MIXING SYSTEM REVOLUTION

## (undated) DEVICE — BLADE SAW RECIP STRK LONG 70X12.5X0.9MM 0277-096-278

## (undated) DEVICE — LINEN HALF SHEET 5512

## (undated) DEVICE — LINEN FULL SHEET 5511

## (undated) DEVICE — BNDG ELASTIC 4"X5YDS UNSTERILE 6611-40

## (undated) DEVICE — DRAPE STOCKINETTE IMPERVIOUS 12" 1587

## (undated) DEVICE — TOURNIQUET SGL BLADDER 18"X4" RED 5921-218-135

## (undated) DEVICE — PREP CHLORAPREP 26ML TINTED HI-LITE ORANGE 930815

## (undated) DEVICE — SU VICRYL+ 1 MO-4 18" DYED VCP702D

## (undated) DEVICE — Device

## (undated) DEVICE — GLOVE BIOGEL PI MICRO INDICATOR UNDERGLOVE SZ 8.0 48980

## (undated) DEVICE — SET HANDPIECE INTERPULSE W/COAXIAL FAN SPRAY TIP 0210118000

## (undated) DEVICE — BLADE KNIFE SURG 10 371110

## (undated) DEVICE — GLOVE BIOGEL PI MICRO INDICATOR UNDERGLOVE SZ 7.5 48975

## (undated) DEVICE — BAG CLEAR TRASH 1.3M 39X33" P4040C

## (undated) DEVICE — SU PDO 1 STRATAFIX 36X36CM CTX TAPERPOINT SXPD2B405

## (undated) DEVICE — SUTURE MONOCRYL+ 3-0 PS-1 27" UNDYED MCP936H

## (undated) DEVICE — DRSG TEGADERM 4X4 3/4" 1626W

## (undated) DEVICE — LINEN ORTHO ACL PACK 5447

## (undated) DEVICE — SU VICRYL 0 CT-2 27" UND J270H

## (undated) DEVICE — TOURNIQUET CUFF 24" YELLOW LF 5921-024-135

## (undated) DEVICE — DRSG TEGADERM 4X10" 1627

## (undated) DEVICE — ADH SKIN CLOSURE PREMIERPRO EXOFIN 1.0ML 3470

## (undated) DEVICE — SUTURE MONOCRYL+ 2-0 CT-1 36" UNDYED MCP945H

## (undated) DEVICE — CAST PADDING 4" STERILE 9044S

## (undated) DEVICE — DECANTER VIAL 2006S

## (undated) DEVICE — DRSG XEROFORM 1X8"

## (undated) DEVICE — PACK TOTAL KNEE BOXED LATEX FREE PO15TKFCT

## (undated) DEVICE — SU ETHIBOND 1 CT-1 30" X425H

## (undated) DEVICE — ENDO BITE BLOCK ADULT OLYMPUS LATEX FREE MAJ-1632

## (undated) DEVICE — BLADE SAW SAGITTAL STRK 18X90X1.27MM HD SYS 6 6118-127-090

## (undated) DEVICE — SUCTION CANISTER MEDIVAC LINER 3000ML W/LID 65651-530

## (undated) DEVICE — GOWN XXL 9575

## (undated) DEVICE — GLOVE BIOGEL PI SZ 8.0 40880

## (undated) DEVICE — SU VICRYL 1 CT-1 27" J341H

## (undated) DEVICE — SPONGE LAP 18X18" X8435

## (undated) DEVICE — DRAPE IOBAN INCISE 23X17" 6650EZ

## (undated) DEVICE — SUCTION MANIFOLD NEPTUNE 2 SYS 4 PORT 0702-020-000

## (undated) DEVICE — TUBING SUCTION 12"X1/4" N612

## (undated) DEVICE — DRAPE C-ARM 60X42" 1013

## (undated) DEVICE — ESU PENCIL W/HOLSTER E2350H

## (undated) DEVICE — DRAPE X-RAY TUBE 00-901169-01-OEC

## (undated) DEVICE — CAST PADDING 6" STERILE 9046S

## (undated) DEVICE — SUCTION TIP YANKAUER W/O VENT K86

## (undated) DEVICE — ESU GROUND PAD ADULT W/CORD E7507

## (undated) DEVICE — BONE CEMENT MIXEVAC III HI VAC KIT  0206-015-000

## (undated) RX ORDER — DEXAMETHASONE SODIUM PHOSPHATE 4 MG/ML
INJECTION, SOLUTION INTRA-ARTICULAR; INTRALESIONAL; INTRAMUSCULAR; INTRAVENOUS; SOFT TISSUE
Status: DISPENSED
Start: 2023-01-01

## (undated) RX ORDER — LIDOCAINE HYDROCHLORIDE 10 MG/ML
INJECTION, SOLUTION EPIDURAL; INFILTRATION; INTRACAUDAL; PERINEURAL
Status: DISPENSED
Start: 2023-01-01

## (undated) RX ORDER — CEFAZOLIN SODIUM/WATER 2 G/20 ML
SYRINGE (ML) INTRAVENOUS
Status: DISPENSED
Start: 2023-01-01

## (undated) RX ORDER — PHENYLEPHRINE HYDROCHLORIDE 10 MG/ML
INJECTION INTRAVENOUS
Status: DISPENSED
Start: 2023-01-01

## (undated) RX ORDER — PROPOFOL 10 MG/ML
INJECTION, EMULSION INTRAVENOUS
Status: DISPENSED
Start: 2023-01-01

## (undated) RX ORDER — FENTANYL CITRATE 50 UG/ML
INJECTION, SOLUTION INTRAMUSCULAR; INTRAVENOUS
Status: DISPENSED
Start: 2020-02-12

## (undated) RX ORDER — ESMOLOL HYDROCHLORIDE 10 MG/ML
INJECTION INTRAVENOUS
Status: DISPENSED
Start: 2023-01-01

## (undated) RX ORDER — FENTANYL CITRATE-0.9 % NACL/PF 10 MCG/ML
PLASTIC BAG, INJECTION (ML) INTRAVENOUS
Status: DISPENSED
Start: 2023-01-01

## (undated) RX ORDER — FENTANYL CITRATE 50 UG/ML
INJECTION, SOLUTION INTRAMUSCULAR; INTRAVENOUS
Status: DISPENSED
Start: 2023-01-01

## (undated) RX ORDER — ONDANSETRON 2 MG/ML
INJECTION INTRAMUSCULAR; INTRAVENOUS
Status: DISPENSED
Start: 2023-01-01

## (undated) RX ORDER — VASOPRESSIN 20 U/ML
INJECTION PARENTERAL
Status: DISPENSED
Start: 2023-01-01

## (undated) RX ORDER — GLYCOPYRROLATE 0.2 MG/ML
INJECTION INTRAMUSCULAR; INTRAVENOUS
Status: DISPENSED
Start: 2023-01-01

## (undated) RX ORDER — FENTANYL CITRATE 0.05 MG/ML
INJECTION, SOLUTION INTRAMUSCULAR; INTRAVENOUS
Status: DISPENSED
Start: 2022-01-01

## (undated) RX ORDER — EPHEDRINE SULFATE 50 MG/ML
INJECTION, SOLUTION INTRAVENOUS
Status: DISPENSED
Start: 2023-01-01

## (undated) RX ORDER — VANCOMYCIN HYDROCHLORIDE 1 G/20ML
INJECTION, POWDER, LYOPHILIZED, FOR SOLUTION INTRAVENOUS
Status: DISPENSED
Start: 2023-01-01

## (undated) RX ORDER — FUROSEMIDE 10 MG/ML
INJECTION INTRAMUSCULAR; INTRAVENOUS
Status: DISPENSED
Start: 2023-01-01